# Patient Record
Sex: MALE | Race: WHITE | NOT HISPANIC OR LATINO | Employment: OTHER | URBAN - NONMETROPOLITAN AREA
[De-identification: names, ages, dates, MRNs, and addresses within clinical notes are randomized per-mention and may not be internally consistent; named-entity substitution may affect disease eponyms.]

---

## 2020-08-10 ENCOUNTER — TRANSCRIBE ORDERS (OUTPATIENT)
Dept: ADMINISTRATIVE | Facility: HOSPITAL | Age: 70
End: 2020-08-10

## 2020-08-10 DIAGNOSIS — Z01.818 PREOP EXAMINATION: Primary | ICD-10-CM

## 2022-05-29 ENCOUNTER — APPOINTMENT (OUTPATIENT)
Dept: CT IMAGING | Facility: HOSPITAL | Age: 72
End: 2022-05-29

## 2022-05-29 ENCOUNTER — HOSPITAL ENCOUNTER (EMERGENCY)
Facility: HOSPITAL | Age: 72
Discharge: HOME | End: 2022-05-29
Attending: STUDENT IN AN ORGANIZED HEALTH CARE EDUCATION/TRAINING PROGRAM | Admitting: EMERGENCY MEDICINE

## 2022-05-29 VITALS
TEMPERATURE: 98.2 F | DIASTOLIC BLOOD PRESSURE: 79 MMHG | WEIGHT: 250 LBS | RESPIRATION RATE: 18 BRPM | SYSTOLIC BLOOD PRESSURE: 155 MMHG | OXYGEN SATURATION: 94 % | HEART RATE: 102 BPM | HEIGHT: 71 IN | BODY MASS INDEX: 35 KG/M2

## 2022-05-29 DIAGNOSIS — R11.2 NAUSEA AND VOMITING, UNSPECIFIED VOMITING TYPE: Primary | ICD-10-CM

## 2022-05-29 LAB
ALBUMIN SERPL-MCNC: 3.97 G/DL (ref 3.5–5.2)
ALBUMIN/GLOB SERPL: 1.2 G/DL
ALP SERPL-CCNC: 57 U/L (ref 39–117)
ALT SERPL W P-5'-P-CCNC: 15 U/L (ref 1–41)
AMYLASE SERPL-CCNC: 51 U/L (ref 28–100)
ANION GAP SERPL CALCULATED.3IONS-SCNC: 18.7 MMOL/L (ref 5–15)
AST SERPL-CCNC: 27 U/L (ref 1–40)
BACTERIA UR QL AUTO: ABNORMAL /HPF
BASOPHILS # BLD AUTO: 0.04 10*3/MM3 (ref 0–0.2)
BASOPHILS NFR BLD AUTO: 0.3 % (ref 0–1.5)
BILIRUB SERPL-MCNC: 0.3 MG/DL (ref 0–1.2)
BILIRUB UR QL STRIP: NEGATIVE
BUN SERPL-MCNC: 44 MG/DL (ref 8–23)
BUN/CREAT SERPL: 10.5 (ref 7–25)
CALCIUM SPEC-SCNC: 9.1 MG/DL (ref 8.6–10.5)
CHLORIDE SERPL-SCNC: 103 MMOL/L (ref 98–107)
CLARITY UR: CLEAR
CO2 SERPL-SCNC: 19.3 MMOL/L (ref 22–29)
COLOR UR: YELLOW
CREAT SERPL-MCNC: 4.18 MG/DL (ref 0.76–1.27)
CRP SERPL-MCNC: 1.7 MG/DL (ref 0–0.5)
D-LACTATE SERPL-SCNC: 1.2 MMOL/L (ref 0.5–2)
DEPRECATED RDW RBC AUTO: 44.8 FL (ref 37–54)
EGFRCR SERPLBLD CKD-EPI 2021: 14.3 ML/MIN/1.73
EOSINOPHIL # BLD AUTO: 0.12 10*3/MM3 (ref 0–0.4)
EOSINOPHIL NFR BLD AUTO: 1 % (ref 0.3–6.2)
ERYTHROCYTE [DISTWIDTH] IN BLOOD BY AUTOMATED COUNT: 14.9 % (ref 12.3–15.4)
FLUAV RNA RESP QL NAA+PROBE: NOT DETECTED
FLUBV RNA RESP QL NAA+PROBE: NOT DETECTED
GLOBULIN UR ELPH-MCNC: 3.2 GM/DL
GLUCOSE BLDC GLUCOMTR-MCNC: 149 MG/DL (ref 70–130)
GLUCOSE SERPL-MCNC: 158 MG/DL (ref 65–99)
GLUCOSE UR STRIP-MCNC: ABNORMAL MG/DL
HCT VFR BLD AUTO: 34 % (ref 37.5–51)
HGB BLD-MCNC: 10.8 G/DL (ref 13–17.7)
HGB UR QL STRIP.AUTO: NEGATIVE
HOLD SPECIMEN: NORMAL
HOLD SPECIMEN: NORMAL
HYALINE CASTS UR QL AUTO: ABNORMAL /LPF
IMM GRANULOCYTES # BLD AUTO: 0.05 10*3/MM3 (ref 0–0.05)
IMM GRANULOCYTES NFR BLD AUTO: 0.4 % (ref 0–0.5)
KETONES UR QL STRIP: NEGATIVE
LEUKOCYTE ESTERASE UR QL STRIP.AUTO: NEGATIVE
LIPASE SERPL-CCNC: 24 U/L (ref 13–60)
LYMPHOCYTES # BLD AUTO: 0.84 10*3/MM3 (ref 0.7–3.1)
LYMPHOCYTES NFR BLD AUTO: 7 % (ref 19.6–45.3)
MAGNESIUM SERPL-MCNC: 1.7 MG/DL (ref 1.6–2.4)
MCH RBC QN AUTO: 26.5 PG (ref 26.6–33)
MCHC RBC AUTO-ENTMCNC: 31.8 G/DL (ref 31.5–35.7)
MCV RBC AUTO: 83.5 FL (ref 79–97)
MONOCYTES # BLD AUTO: 0.78 10*3/MM3 (ref 0.1–0.9)
MONOCYTES NFR BLD AUTO: 6.5 % (ref 5–12)
NEUTROPHILS NFR BLD AUTO: 10.24 10*3/MM3 (ref 1.7–7)
NEUTROPHILS NFR BLD AUTO: 84.8 % (ref 42.7–76)
NITRITE UR QL STRIP: NEGATIVE
NRBC BLD AUTO-RTO: 0 /100 WBC (ref 0–0.2)
PH UR STRIP.AUTO: <=5 [PH] (ref 5–8)
PLATELET # BLD AUTO: 221 10*3/MM3 (ref 140–450)
PMV BLD AUTO: 10.7 FL (ref 6–12)
POTASSIUM SERPL-SCNC: 4.9 MMOL/L (ref 3.5–5.2)
PROCALCITONIN SERPL-MCNC: 0.17 NG/ML (ref 0–0.25)
PROT SERPL-MCNC: 7.2 G/DL (ref 6–8.5)
PROT UR QL STRIP: ABNORMAL
QT INTERVAL: 362 MS
QTC INTERVAL: 466 MS
RBC # BLD AUTO: 4.07 10*6/MM3 (ref 4.14–5.8)
RBC # UR STRIP: ABNORMAL /HPF
REF LAB TEST METHOD: ABNORMAL
SARS-COV-2 RNA RESP QL NAA+PROBE: NOT DETECTED
SODIUM SERPL-SCNC: 141 MMOL/L (ref 136–145)
SP GR UR STRIP: 1.01 (ref 1–1.03)
SQUAMOUS #/AREA URNS HPF: ABNORMAL /HPF
T4 FREE SERPL-MCNC: 1.43 NG/DL (ref 0.93–1.7)
TROPONIN T SERPL-MCNC: 0.09 NG/ML (ref 0–0.03)
TROPONIN T SERPL-MCNC: 0.09 NG/ML (ref 0–0.03)
TSH SERPL DL<=0.05 MIU/L-ACNC: 1.54 UIU/ML (ref 0.27–4.2)
UROBILINOGEN UR QL STRIP: ABNORMAL
WBC # UR STRIP: ABNORMAL /HPF
WBC NRBC COR # BLD: 12.07 10*3/MM3 (ref 3.4–10.8)
WHOLE BLOOD HOLD COAG: NORMAL
WHOLE BLOOD HOLD SPECIMEN: NORMAL

## 2022-05-29 PROCEDURE — 83605 ASSAY OF LACTIC ACID: CPT | Performed by: PHYSICIAN ASSISTANT

## 2022-05-29 PROCEDURE — 87636 SARSCOV2 & INF A&B AMP PRB: CPT | Performed by: PHYSICIAN ASSISTANT

## 2022-05-29 PROCEDURE — 82962 GLUCOSE BLOOD TEST: CPT

## 2022-05-29 PROCEDURE — 85025 COMPLETE CBC W/AUTO DIFF WBC: CPT | Performed by: PHYSICIAN ASSISTANT

## 2022-05-29 PROCEDURE — 80053 COMPREHEN METABOLIC PANEL: CPT | Performed by: PHYSICIAN ASSISTANT

## 2022-05-29 PROCEDURE — 84145 PROCALCITONIN (PCT): CPT | Performed by: PHYSICIAN ASSISTANT

## 2022-05-29 PROCEDURE — 83735 ASSAY OF MAGNESIUM: CPT | Performed by: PHYSICIAN ASSISTANT

## 2022-05-29 PROCEDURE — 83690 ASSAY OF LIPASE: CPT | Performed by: PHYSICIAN ASSISTANT

## 2022-05-29 PROCEDURE — 93010 ELECTROCARDIOGRAM REPORT: CPT | Performed by: INTERNAL MEDICINE

## 2022-05-29 PROCEDURE — 84439 ASSAY OF FREE THYROXINE: CPT | Performed by: PHYSICIAN ASSISTANT

## 2022-05-29 PROCEDURE — 82150 ASSAY OF AMYLASE: CPT | Performed by: PHYSICIAN ASSISTANT

## 2022-05-29 PROCEDURE — 84484 ASSAY OF TROPONIN QUANT: CPT | Performed by: PHYSICIAN ASSISTANT

## 2022-05-29 PROCEDURE — 96374 THER/PROPH/DIAG INJ IV PUSH: CPT

## 2022-05-29 PROCEDURE — 86140 C-REACTIVE PROTEIN: CPT | Performed by: PHYSICIAN ASSISTANT

## 2022-05-29 PROCEDURE — 93005 ELECTROCARDIOGRAM TRACING: CPT | Performed by: PHYSICIAN ASSISTANT

## 2022-05-29 PROCEDURE — 74176 CT ABD & PELVIS W/O CONTRAST: CPT

## 2022-05-29 PROCEDURE — 99283 EMERGENCY DEPT VISIT LOW MDM: CPT

## 2022-05-29 PROCEDURE — 81001 URINALYSIS AUTO W/SCOPE: CPT | Performed by: PHYSICIAN ASSISTANT

## 2022-05-29 PROCEDURE — 87040 BLOOD CULTURE FOR BACTERIA: CPT | Performed by: PHYSICIAN ASSISTANT

## 2022-05-29 PROCEDURE — 84443 ASSAY THYROID STIM HORMONE: CPT | Performed by: PHYSICIAN ASSISTANT

## 2022-05-29 PROCEDURE — 25010000002 ONDANSETRON PER 1 MG: Performed by: PHYSICIAN ASSISTANT

## 2022-05-29 RX ORDER — ATORVASTATIN CALCIUM 20 MG/1
20 TABLET, FILM COATED ORAL NIGHTLY
COMMUNITY

## 2022-05-29 RX ORDER — ONDANSETRON 4 MG/1
4 TABLET, ORALLY DISINTEGRATING ORAL EVERY 6 HOURS PRN
Qty: 10 TABLET | Refills: 0 | Status: SHIPPED | OUTPATIENT
Start: 2022-05-29

## 2022-05-29 RX ORDER — METOPROLOL SUCCINATE 25 MG/1
12.5 TABLET, EXTENDED RELEASE ORAL DAILY
COMMUNITY

## 2022-05-29 RX ORDER — OMEPRAZOLE 20 MG/1
20 CAPSULE, DELAYED RELEASE ORAL DAILY
COMMUNITY

## 2022-05-29 RX ORDER — DOXAZOSIN MESYLATE 4 MG/1
4 TABLET ORAL NIGHTLY
COMMUNITY

## 2022-05-29 RX ORDER — ONDANSETRON 2 MG/ML
4 INJECTION INTRAMUSCULAR; INTRAVENOUS ONCE
Status: COMPLETED | OUTPATIENT
Start: 2022-05-29 | End: 2022-05-29

## 2022-05-29 RX ORDER — HYDRALAZINE HYDROCHLORIDE 50 MG/1
100 TABLET, FILM COATED ORAL 3 TIMES DAILY
COMMUNITY

## 2022-05-29 RX ORDER — FENOFIBRATE 54 MG/1
54 TABLET ORAL DAILY
COMMUNITY

## 2022-05-29 RX ORDER — SODIUM CHLORIDE 0.9 % (FLUSH) 0.9 %
10 SYRINGE (ML) INJECTION AS NEEDED
Status: DISCONTINUED | OUTPATIENT
Start: 2022-05-29 | End: 2022-05-29 | Stop reason: HOSPADM

## 2022-05-29 RX ORDER — CLONIDINE HYDROCHLORIDE 0.2 MG/1
0.2 TABLET ORAL 3 TIMES DAILY
COMMUNITY

## 2022-05-29 RX ORDER — FUROSEMIDE 40 MG/1
40 TABLET ORAL 2 TIMES DAILY
COMMUNITY

## 2022-05-29 RX ORDER — LEVOTHYROXINE SODIUM 0.07 MG/1
75 TABLET ORAL DAILY
COMMUNITY

## 2022-05-29 RX ORDER — GLIPIZIDE 10 MG/1
10 TABLET ORAL
COMMUNITY

## 2022-05-29 RX ADMIN — ONDANSETRON 4 MG: 2 INJECTION INTRAMUSCULAR; INTRAVENOUS at 17:22

## 2022-05-29 RX ADMIN — SODIUM CHLORIDE 500 ML: 9 INJECTION, SOLUTION INTRAVENOUS at 18:31

## 2022-06-03 LAB
BACTERIA SPEC AEROBE CULT: NORMAL
BACTERIA SPEC AEROBE CULT: NORMAL

## 2023-03-23 ENCOUNTER — LAB REQUISITION (OUTPATIENT)
Dept: LAB | Facility: HOSPITAL | Age: 73
End: 2023-03-23
Payer: MEDICARE

## 2023-03-23 DIAGNOSIS — I10 ESSENTIAL (PRIMARY) HYPERTENSION: ICD-10-CM

## 2023-03-23 LAB
ALBUMIN SERPL-MCNC: 3.7 G/DL (ref 3.5–5.2)
ALBUMIN/GLOB SERPL: 1.4 G/DL
ALP SERPL-CCNC: 104 U/L (ref 39–117)
ALT SERPL W P-5'-P-CCNC: 6 U/L (ref 1–41)
ANION GAP SERPL CALCULATED.3IONS-SCNC: 10.2 MMOL/L (ref 5–15)
AST SERPL-CCNC: 11 U/L (ref 1–40)
BASOPHILS # BLD AUTO: 0.05 10*3/MM3 (ref 0–0.2)
BASOPHILS NFR BLD AUTO: 0.5 % (ref 0–1.5)
BILIRUB SERPL-MCNC: 0.5 MG/DL (ref 0–1.2)
BUN SERPL-MCNC: 29 MG/DL (ref 8–23)
BUN/CREAT SERPL: 6.7 (ref 7–25)
CALCIUM SPEC-SCNC: 9.7 MG/DL (ref 8.6–10.5)
CHLORIDE SERPL-SCNC: 101 MMOL/L (ref 98–107)
CHOLEST SERPL-MCNC: 71 MG/DL (ref 0–200)
CO2 SERPL-SCNC: 27.8 MMOL/L (ref 22–29)
CREAT SERPL-MCNC: 4.3 MG/DL (ref 0.76–1.27)
DEPRECATED RDW RBC AUTO: 49.8 FL (ref 37–54)
EGFRCR SERPLBLD CKD-EPI 2021: 13.7 ML/MIN/1.73
EOSINOPHIL # BLD AUTO: 0.17 10*3/MM3 (ref 0–0.4)
EOSINOPHIL NFR BLD AUTO: 1.6 % (ref 0.3–6.2)
ERYTHROCYTE [DISTWIDTH] IN BLOOD BY AUTOMATED COUNT: 17.2 % (ref 12.3–15.4)
GLOBULIN UR ELPH-MCNC: 2.6 GM/DL
GLUCOSE SERPL-MCNC: 172 MG/DL (ref 65–99)
HBA1C MFR BLD: 6.9 % (ref 4.8–5.6)
HCT VFR BLD AUTO: 31.5 % (ref 37.5–51)
HDLC SERPL-MCNC: 32 MG/DL (ref 40–60)
HGB BLD-MCNC: 9.8 G/DL (ref 13–17.7)
IMM GRANULOCYTES # BLD AUTO: 0.04 10*3/MM3 (ref 0–0.05)
IMM GRANULOCYTES NFR BLD AUTO: 0.4 % (ref 0–0.5)
LDLC SERPL CALC-MCNC: 18 MG/DL (ref 0–100)
LDLC/HDLC SERPL: 0.5 {RATIO}
LYMPHOCYTES # BLD AUTO: 0.8 10*3/MM3 (ref 0.7–3.1)
LYMPHOCYTES NFR BLD AUTO: 7.7 % (ref 19.6–45.3)
MCH RBC QN AUTO: 25.3 PG (ref 26.6–33)
MCHC RBC AUTO-ENTMCNC: 31.1 G/DL (ref 31.5–35.7)
MCV RBC AUTO: 81.4 FL (ref 79–97)
MONOCYTES # BLD AUTO: 1.08 10*3/MM3 (ref 0.1–0.9)
MONOCYTES NFR BLD AUTO: 10.4 % (ref 5–12)
NEUTROPHILS NFR BLD AUTO: 79.4 % (ref 42.7–76)
NEUTROPHILS NFR BLD AUTO: 8.25 10*3/MM3 (ref 1.7–7)
NRBC BLD AUTO-RTO: 0 /100 WBC (ref 0–0.2)
PLATELET # BLD AUTO: 170 10*3/MM3 (ref 140–450)
PMV BLD AUTO: 11.6 FL (ref 6–12)
POTASSIUM SERPL-SCNC: 3.7 MMOL/L (ref 3.5–5.2)
PROT SERPL-MCNC: 6.3 G/DL (ref 6–8.5)
RBC # BLD AUTO: 3.87 10*6/MM3 (ref 4.14–5.8)
SODIUM SERPL-SCNC: 139 MMOL/L (ref 136–145)
TRIGL SERPL-MCNC: 115 MG/DL (ref 0–150)
TSH SERPL DL<=0.05 MIU/L-ACNC: 2.47 UIU/ML (ref 0.27–4.2)
VLDLC SERPL-MCNC: 21 MG/DL (ref 5–40)
WBC NRBC COR # BLD: 10.39 10*3/MM3 (ref 3.4–10.8)

## 2023-03-23 PROCEDURE — 84443 ASSAY THYROID STIM HORMONE: CPT | Performed by: INTERNAL MEDICINE

## 2023-03-23 PROCEDURE — 80053 COMPREHEN METABOLIC PANEL: CPT | Performed by: INTERNAL MEDICINE

## 2023-03-23 PROCEDURE — 80061 LIPID PANEL: CPT | Performed by: INTERNAL MEDICINE

## 2023-03-23 PROCEDURE — 83036 HEMOGLOBIN GLYCOSYLATED A1C: CPT | Performed by: INTERNAL MEDICINE

## 2023-03-23 PROCEDURE — 85025 COMPLETE CBC W/AUTO DIFF WBC: CPT | Performed by: INTERNAL MEDICINE

## 2023-03-29 ENCOUNTER — LAB REQUISITION (OUTPATIENT)
Dept: LAB | Facility: HOSPITAL | Age: 73
End: 2023-03-29
Payer: MEDICARE

## 2023-03-29 DIAGNOSIS — I10 ESSENTIAL (PRIMARY) HYPERTENSION: ICD-10-CM

## 2023-03-29 LAB
CHOLEST SERPL-MCNC: 59 MG/DL (ref 0–200)
FERRITIN SERPL-MCNC: 329.8 NG/ML (ref 30–400)
HDLC SERPL-MCNC: 27 MG/DL (ref 40–60)
IRON 24H UR-MRATE: 44 MCG/DL (ref 59–158)
IRON SATN MFR SERPL: 17 % (ref 20–50)
LDL/HDL RATIO NULL: ABNORMAL
LDLC SERPL CALC-MCNC: <5 MG/DL (ref 0–100)
TIBC SERPL-MCNC: 253 MCG/DL (ref 298–536)
TRANSFERRIN SERPL-MCNC: 170 MG/DL (ref 200–360)
TRIGL SERPL-MCNC: 173 MG/DL (ref 0–150)
VLDLC SERPL-MCNC: ABNORMAL MG/DL

## 2023-03-29 PROCEDURE — 83540 ASSAY OF IRON: CPT | Performed by: INTERNAL MEDICINE

## 2023-03-29 PROCEDURE — 80061 LIPID PANEL: CPT | Performed by: INTERNAL MEDICINE

## 2023-03-29 PROCEDURE — 82728 ASSAY OF FERRITIN: CPT | Performed by: INTERNAL MEDICINE

## 2023-03-29 PROCEDURE — 84466 ASSAY OF TRANSFERRIN: CPT | Performed by: INTERNAL MEDICINE

## 2023-05-04 ENCOUNTER — LAB REQUISITION (OUTPATIENT)
Dept: LAB | Facility: HOSPITAL | Age: 73
End: 2023-05-04
Payer: MEDICARE

## 2023-05-04 DIAGNOSIS — I10 ESSENTIAL (PRIMARY) HYPERTENSION: ICD-10-CM

## 2023-05-04 LAB
027 TOXIN: ABNORMAL
C DIFF GDH + TOXINS A+B STL QL IA.RAPID: NEGATIVE
C DIFF TOX GENS STL QL NAA+PROBE: POSITIVE

## 2023-05-04 PROCEDURE — 87449 NOS EACH ORGANISM AG IA: CPT | Performed by: INTERNAL MEDICINE

## 2023-05-04 PROCEDURE — 87427 SHIGA-LIKE TOXIN AG IA: CPT | Performed by: INTERNAL MEDICINE

## 2023-05-04 PROCEDURE — 87493 C DIFF AMPLIFIED PROBE: CPT | Performed by: INTERNAL MEDICINE

## 2023-05-04 PROCEDURE — 87046 STOOL CULTR AEROBIC BACT EA: CPT | Performed by: INTERNAL MEDICINE

## 2023-05-04 PROCEDURE — 87045 FECES CULTURE AEROBIC BACT: CPT | Performed by: INTERNAL MEDICINE

## 2023-05-08 LAB
BACTERIA SPEC CULT: NORMAL
BACTERIA SPEC CULT: NORMAL
CAMPYLOBACTER STL CULT: NORMAL
E COLI SXT STL QL IA: NEGATIVE
SALM + SHIG STL CULT: NORMAL

## 2023-05-15 ENCOUNTER — LAB REQUISITION (OUTPATIENT)
Dept: LAB | Facility: HOSPITAL | Age: 73
End: 2023-05-15
Payer: MEDICARE

## 2023-05-15 DIAGNOSIS — R79.9 ABNORMAL FINDING OF BLOOD CHEMISTRY, UNSPECIFIED: ICD-10-CM

## 2023-05-15 PROCEDURE — 87186 SC STD MICRODIL/AGAR DIL: CPT | Performed by: NURSE PRACTITIONER

## 2023-05-15 PROCEDURE — 87205 SMEAR GRAM STAIN: CPT | Performed by: NURSE PRACTITIONER

## 2023-05-15 PROCEDURE — 87070 CULTURE OTHR SPECIMN AEROBIC: CPT | Performed by: NURSE PRACTITIONER

## 2023-05-15 PROCEDURE — 87147 CULTURE TYPE IMMUNOLOGIC: CPT | Performed by: NURSE PRACTITIONER

## 2023-05-18 LAB
BACTERIA SPEC AEROBE CULT: ABNORMAL
BACTERIA SPEC AEROBE CULT: ABNORMAL
GRAM STN SPEC: ABNORMAL

## 2023-05-22 ENCOUNTER — OFFICE VISIT (OUTPATIENT)
Dept: SURGERY | Facility: CLINIC | Age: 73
End: 2023-05-22
Payer: MEDICARE

## 2023-05-22 DIAGNOSIS — L98.9 SCALP LESION: Primary | ICD-10-CM

## 2023-05-22 DIAGNOSIS — L97.411 SKIN ULCER OF RIGHT HEEL, LIMITED TO BREAKDOWN OF SKIN: ICD-10-CM

## 2023-05-22 RX ORDER — SULFAMETHOXAZOLE AND TRIMETHOPRIM 800; 160 MG/1; MG/1
1 TABLET ORAL 2 TIMES DAILY
COMMUNITY

## 2023-05-22 RX ORDER — ACETAMINOPHEN 500 MG
500 TABLET ORAL EVERY 6 HOURS PRN
COMMUNITY

## 2023-05-22 RX ORDER — INSULIN LISPRO 100 [IU]/ML
INJECTION, SOLUTION INTRAVENOUS; SUBCUTANEOUS
COMMUNITY

## 2023-05-22 NOTE — PROGRESS NOTES
Subjective   Kennedy Prescott is a 72 y.o. male.     Chief Complaint: heel ulcer and scalp lesion    History of Present Illness He is a 73 yo who has had a slowly growing lesion on his right anterior scalp for a year. He also has some skin issues on his right foot. He is weak and is mostly in a wheelchair during the day.     The following portions of the patient's history were reviewed and updated as appropriate: current medications, past family history, past medical history, past social history, past surgical history and problem list.    Review of Systems    Objective   Physical Exam He has a raised 2.5 x 1.5 cm lesion on the right anterior scalp excised with lidocaine and nylon sutures.   The heel has some callous and superficial breakdown. There is a small callous on the medial MP joint.     Past Medical History:   Diagnosis Date   • Diabetes mellitus    • Disease of thyroid gland    • Myocardial infarction    • Renal disorder        History reviewed. No pertinent family history.    Social History     Tobacco Use   • Smoking status: Never   • Smokeless tobacco: Never   Substance Use Topics   • Alcohol use: Never   • Drug use: Never       Past Surgical History:   Procedure Laterality Date   • CARDIAC SURGERY     • CHOLECYSTECTOMY         Current Outpatient Medications   Medication Instructions   • acetaminophen (TYLENOL) 500 mg, Oral, Every 6 Hours PRN   • atorvastatin (LIPITOR) 20 mg, Oral, Nightly   • cloNIDine (CATAPRES) 0.2 mg, Oral, 3 Times Daily   • doxazosin (CARDURA) 4 mg, Oral, Nightly   • fenofibrate (TRICOR) 54 mg, Oral, Daily   • furosemide (LASIX) 40 mg, Oral, 2 Times Daily   • glipizide (GLUCOTROL) 10 mg, Oral, 2 Times Daily Before Meals   • hydrALAZINE (APRESOLINE) 100 mg, Oral, 3 Times Daily   • Insulin Lispro (humaLOG) 100 UNIT/ML injection Subcutaneous, 3 Times Daily Before Meals   • levothyroxine (SYNTHROID, LEVOTHROID) 75 mcg, Oral, Daily   • metoprolol succinate XL (TOPROL-XL) 12.5 mg, Oral,  Daily   • omeprazole (PRILOSEC) 20 mg, Oral, Daily   • ondansetron ODT (ZOFRAN-ODT) 4 mg, Translingual, Every 6 Hours PRN   • SITagliptin (JANUVIA) 100 mg, Oral, Daily   • sulfamethoxazole-trimethoprim (BACTRIM DS,SEPTRA DS) 800-160 MG per tablet 1 tablet, Oral, 2 Times Daily         Assessment & Plan   Diagnoses and all orders for this visit:    1. Scalp lesion (Primary)    2. Skin ulcer of right heel, limited to breakdown of skin    remove sutures in a little over a week.   Avoid keeping feet in one place too long. Try to move them around more often, as these are from not not moving around his feet enough while sitting in the wheelchair.

## 2023-05-23 ENCOUNTER — LAB REQUISITION (OUTPATIENT)
Dept: LAB | Facility: HOSPITAL | Age: 73
End: 2023-05-23
Payer: MEDICARE

## 2023-05-23 ENCOUNTER — APPOINTMENT (OUTPATIENT)
Dept: CT IMAGING | Facility: HOSPITAL | Age: 73
End: 2023-05-23
Payer: MEDICARE

## 2023-05-23 ENCOUNTER — HOSPITAL ENCOUNTER (EMERGENCY)
Facility: HOSPITAL | Age: 73
Discharge: HOME | End: 2023-05-24
Attending: EMERGENCY MEDICINE | Admitting: EMERGENCY MEDICINE
Payer: MEDICARE

## 2023-05-23 VITALS
HEIGHT: 71 IN | TEMPERATURE: 98.3 F | DIASTOLIC BLOOD PRESSURE: 60 MMHG | HEART RATE: 66 BPM | WEIGHT: 210 LBS | OXYGEN SATURATION: 90 % | BODY MASS INDEX: 29.4 KG/M2 | SYSTOLIC BLOOD PRESSURE: 114 MMHG | RESPIRATION RATE: 18 BRPM

## 2023-05-23 DIAGNOSIS — I10 ESSENTIAL (PRIMARY) HYPERTENSION: ICD-10-CM

## 2023-05-23 DIAGNOSIS — N17.9 AKI (ACUTE KIDNEY INJURY): Primary | ICD-10-CM

## 2023-05-23 DIAGNOSIS — E87.5 HYPERKALEMIA: ICD-10-CM

## 2023-05-23 LAB
A-A DO2: 38.3 MMHG (ref 0–300)
ALBUMIN SERPL-MCNC: 3.6 G/DL (ref 3.5–5.2)
ALBUMIN SERPL-MCNC: 3.7 G/DL (ref 3.5–5.2)
ALBUMIN/GLOB SERPL: 1.3 G/DL
ALBUMIN/GLOB SERPL: 1.4 G/DL
ALP SERPL-CCNC: 129 U/L (ref 39–117)
ALP SERPL-CCNC: 132 U/L (ref 39–117)
ALT SERPL W P-5'-P-CCNC: 17 U/L (ref 1–41)
ALT SERPL W P-5'-P-CCNC: 17 U/L (ref 1–41)
ANION GAP SERPL CALCULATED.3IONS-SCNC: 14.5 MMOL/L (ref 5–15)
ANION GAP SERPL CALCULATED.3IONS-SCNC: 16.7 MMOL/L (ref 5–15)
ANION GAP SERPL CALCULATED.3IONS-SCNC: 17.6 MMOL/L (ref 5–15)
ARTERIAL PATENCY WRIST A: ABNORMAL
AST SERPL-CCNC: 17 U/L (ref 1–40)
AST SERPL-CCNC: 17 U/L (ref 1–40)
ATMOSPHERIC PRESS: 730 MMHG
BASE EXCESS BLDA CALC-SCNC: -5.2 MMOL/L (ref 0–2)
BASOPHILS # BLD AUTO: 0.03 10*3/MM3 (ref 0–0.2)
BASOPHILS # BLD AUTO: 0.04 10*3/MM3 (ref 0–0.2)
BASOPHILS NFR BLD AUTO: 0.3 % (ref 0–1.5)
BASOPHILS NFR BLD AUTO: 0.3 % (ref 0–1.5)
BDY SITE: ABNORMAL
BILIRUB SERPL-MCNC: 0.3 MG/DL (ref 0–1.2)
BILIRUB SERPL-MCNC: 0.3 MG/DL (ref 0–1.2)
BUN SERPL-MCNC: 50 MG/DL (ref 8–23)
BUN SERPL-MCNC: 52 MG/DL (ref 8–23)
BUN SERPL-MCNC: 52 MG/DL (ref 8–23)
BUN/CREAT SERPL: 7.8 (ref 7–25)
BUN/CREAT SERPL: 8 (ref 7–25)
BUN/CREAT SERPL: 8 (ref 7–25)
CALCIUM SPEC-SCNC: 8.7 MG/DL (ref 8.6–10.5)
CALCIUM SPEC-SCNC: 8.9 MG/DL (ref 8.6–10.5)
CALCIUM SPEC-SCNC: 9.2 MG/DL (ref 8.6–10.5)
CHLORIDE SERPL-SCNC: 91 MMOL/L (ref 98–107)
CHLORIDE SERPL-SCNC: 91 MMOL/L (ref 98–107)
CHLORIDE SERPL-SCNC: 95 MMOL/L (ref 98–107)
CO2 BLDA-SCNC: 21.4 MMOL/L (ref 22–33)
CO2 SERPL-SCNC: 21.4 MMOL/L (ref 22–29)
CO2 SERPL-SCNC: 22.3 MMOL/L (ref 22–29)
CO2 SERPL-SCNC: 23.5 MMOL/L (ref 22–29)
COHGB MFR BLD: 1.5 % (ref 0–5)
CREAT SERPL-MCNC: 6.4 MG/DL (ref 0.76–1.27)
CREAT SERPL-MCNC: 6.5 MG/DL (ref 0.76–1.27)
CREAT SERPL-MCNC: 6.54 MG/DL (ref 0.76–1.27)
DEPRECATED RDW RBC AUTO: 58 FL (ref 37–54)
DEPRECATED RDW RBC AUTO: 58.1 FL (ref 37–54)
EGFRCR SERPLBLD CKD-EPI 2021: 8.3 ML/MIN/1.73
EGFRCR SERPLBLD CKD-EPI 2021: 8.4 ML/MIN/1.73
EGFRCR SERPLBLD CKD-EPI 2021: 8.5 ML/MIN/1.73
EOSINOPHIL # BLD AUTO: 0.1 10*3/MM3 (ref 0–0.4)
EOSINOPHIL # BLD AUTO: 0.12 10*3/MM3 (ref 0–0.4)
EOSINOPHIL NFR BLD AUTO: 0.8 % (ref 0.3–6.2)
EOSINOPHIL NFR BLD AUTO: 1 % (ref 0.3–6.2)
ERYTHROCYTE [DISTWIDTH] IN BLOOD BY AUTOMATED COUNT: 17.8 % (ref 12.3–15.4)
ERYTHROCYTE [DISTWIDTH] IN BLOOD BY AUTOMATED COUNT: 17.9 % (ref 12.3–15.4)
FLUAV RNA RESP QL NAA+PROBE: NOT DETECTED
FLUBV RNA RESP QL NAA+PROBE: NOT DETECTED
GLOBULIN UR ELPH-MCNC: 2.6 GM/DL
GLOBULIN UR ELPH-MCNC: 2.8 GM/DL
GLUCOSE SERPL-MCNC: 177 MG/DL (ref 65–99)
GLUCOSE SERPL-MCNC: 192 MG/DL (ref 65–99)
GLUCOSE SERPL-MCNC: 193 MG/DL (ref 65–99)
HCO3 BLDA-SCNC: 20.2 MMOL/L (ref 20–26)
HCT VFR BLD AUTO: 34.8 % (ref 37.5–51)
HCT VFR BLD AUTO: 36.2 % (ref 37.5–51)
HCT VFR BLD CALC: 33.9 % (ref 38–51)
HGB BLD-MCNC: 10.9 G/DL (ref 13–17.7)
HGB BLD-MCNC: 11.1 G/DL (ref 13–17.7)
HGB BLDA-MCNC: 11.1 G/DL (ref 14–18)
IMM GRANULOCYTES # BLD AUTO: 0.05 10*3/MM3 (ref 0–0.05)
IMM GRANULOCYTES # BLD AUTO: 0.06 10*3/MM3 (ref 0–0.05)
IMM GRANULOCYTES NFR BLD AUTO: 0.4 % (ref 0–0.5)
IMM GRANULOCYTES NFR BLD AUTO: 0.5 % (ref 0–0.5)
INHALED O2 CONCENTRATION: 21 %
LYMPHOCYTES # BLD AUTO: 0.46 10*3/MM3 (ref 0.7–3.1)
LYMPHOCYTES # BLD AUTO: 0.68 10*3/MM3 (ref 0.7–3.1)
LYMPHOCYTES NFR BLD AUTO: 3.8 % (ref 19.6–45.3)
LYMPHOCYTES NFR BLD AUTO: 5.6 % (ref 19.6–45.3)
Lab: ABNORMAL
MCH RBC QN AUTO: 27.1 PG (ref 26.6–33)
MCH RBC QN AUTO: 27.6 PG (ref 26.6–33)
MCHC RBC AUTO-ENTMCNC: 30.7 G/DL (ref 31.5–35.7)
MCHC RBC AUTO-ENTMCNC: 31.3 G/DL (ref 31.5–35.7)
MCV RBC AUTO: 88.1 FL (ref 79–97)
MCV RBC AUTO: 88.3 FL (ref 79–97)
METHGB BLD QL: 0.1 % (ref 0–3)
MODALITY: ABNORMAL
MONOCYTES # BLD AUTO: 1.23 10*3/MM3 (ref 0.1–0.9)
MONOCYTES # BLD AUTO: 1.26 10*3/MM3 (ref 0.1–0.9)
MONOCYTES NFR BLD AUTO: 10.3 % (ref 5–12)
MONOCYTES NFR BLD AUTO: 10.4 % (ref 5–12)
NEUTROPHILS NFR BLD AUTO: 10.13 10*3/MM3 (ref 1.7–7)
NEUTROPHILS NFR BLD AUTO: 82.2 % (ref 42.7–76)
NEUTROPHILS NFR BLD AUTO: 84.4 % (ref 42.7–76)
NEUTROPHILS NFR BLD AUTO: 9.93 10*3/MM3 (ref 1.7–7)
NOTE: ABNORMAL
NOTIFIED BY: ABNORMAL
NOTIFIED WHO: ABNORMAL
NRBC BLD AUTO-RTO: 0 /100 WBC (ref 0–0.2)
NRBC BLD AUTO-RTO: 0 /100 WBC (ref 0–0.2)
OXYHGB MFR BLDV: 87.8 % (ref 94–99)
PCO2 BLDA: 38.1 MM HG (ref 35–45)
PCO2 TEMP ADJ BLD: ABNORMAL MM[HG]
PH BLDA: 7.33 PH UNITS (ref 7.35–7.45)
PH, TEMP CORRECTED: ABNORMAL
PLATELET # BLD AUTO: 130 10*3/MM3 (ref 140–450)
PLATELET # BLD AUTO: 132 10*3/MM3 (ref 140–450)
PMV BLD AUTO: 10.3 FL (ref 6–12)
PMV BLD AUTO: 10.9 FL (ref 6–12)
PO2 BLDA: 62.1 MM HG (ref 83–108)
PO2 TEMP ADJ BLD: ABNORMAL MM[HG]
POTASSIUM SERPL-SCNC: 6.1 MMOL/L (ref 3.5–5.2)
POTASSIUM SERPL-SCNC: 7 MMOL/L (ref 3.5–5.2)
POTASSIUM SERPL-SCNC: 7.1 MMOL/L (ref 3.5–5.2)
PROT SERPL-MCNC: 6.2 G/DL (ref 6–8.5)
PROT SERPL-MCNC: 6.5 G/DL (ref 6–8.5)
RBC # BLD AUTO: 3.95 10*6/MM3 (ref 4.14–5.8)
RBC # BLD AUTO: 4.1 10*6/MM3 (ref 4.14–5.8)
SAO2 % BLDCOA: 89.2 % (ref 94–99)
SARS-COV-2 RNA RESP QL NAA+PROBE: NOT DETECTED
SODIUM SERPL-SCNC: 129 MMOL/L (ref 136–145)
SODIUM SERPL-SCNC: 130 MMOL/L (ref 136–145)
SODIUM SERPL-SCNC: 134 MMOL/L (ref 136–145)
VENTILATOR MODE: ABNORMAL
WBC NRBC COR # BLD: 12 10*3/MM3 (ref 3.4–10.8)
WBC NRBC COR # BLD: 12.09 10*3/MM3 (ref 3.4–10.8)

## 2023-05-23 PROCEDURE — 25010000002 CALCIUM GLUCONATE-NACL 1-0.675 GM/50ML-% SOLUTION: Performed by: EMERGENCY MEDICINE

## 2023-05-23 PROCEDURE — 96376 TX/PRO/DX INJ SAME DRUG ADON: CPT

## 2023-05-23 PROCEDURE — 96375 TX/PRO/DX INJ NEW DRUG ADDON: CPT

## 2023-05-23 PROCEDURE — 85025 COMPLETE CBC W/AUTO DIFF WBC: CPT | Performed by: EMERGENCY MEDICINE

## 2023-05-23 PROCEDURE — 80053 COMPREHEN METABOLIC PANEL: CPT | Performed by: NURSE PRACTITIONER

## 2023-05-23 PROCEDURE — 94640 AIRWAY INHALATION TREATMENT: CPT

## 2023-05-23 PROCEDURE — 87636 SARSCOV2 & INF A&B AMP PRB: CPT | Performed by: EMERGENCY MEDICINE

## 2023-05-23 PROCEDURE — 74176 CT ABD & PELVIS W/O CONTRAST: CPT

## 2023-05-23 PROCEDURE — 82375 ASSAY CARBOXYHB QUANT: CPT

## 2023-05-23 PROCEDURE — 99284 EMERGENCY DEPT VISIT MOD MDM: CPT

## 2023-05-23 PROCEDURE — 36415 COLL VENOUS BLD VENIPUNCTURE: CPT

## 2023-05-23 PROCEDURE — 85025 COMPLETE CBC W/AUTO DIFF WBC: CPT | Performed by: NURSE PRACTITIONER

## 2023-05-23 PROCEDURE — 71250 CT THORAX DX C-: CPT

## 2023-05-23 PROCEDURE — 87040 BLOOD CULTURE FOR BACTERIA: CPT | Performed by: EMERGENCY MEDICINE

## 2023-05-23 PROCEDURE — 36600 WITHDRAWAL OF ARTERIAL BLOOD: CPT

## 2023-05-23 PROCEDURE — 94799 UNLISTED PULMONARY SVC/PX: CPT

## 2023-05-23 PROCEDURE — 82805 BLOOD GASES W/O2 SATURATION: CPT

## 2023-05-23 PROCEDURE — 83050 HGB METHEMOGLOBIN QUAN: CPT

## 2023-05-23 PROCEDURE — 63710000001 INSULIN REGULAR HUMAN PER 5 UNITS: Performed by: EMERGENCY MEDICINE

## 2023-05-23 RX ORDER — SODIUM POLYSTYRENE SULFONATE 4.1 MEQ/G
15 POWDER, FOR SUSPENSION ORAL; RECTAL ONCE
Status: COMPLETED | OUTPATIENT
Start: 2023-05-23 | End: 2023-05-23

## 2023-05-23 RX ORDER — MAGNESIUM SULFATE 1 G/100ML
1 INJECTION INTRAVENOUS ONCE
Status: COMPLETED | OUTPATIENT
Start: 2023-05-24 | End: 2023-05-24

## 2023-05-23 RX ORDER — SODIUM CHLORIDE 9 MG/ML
125 INJECTION, SOLUTION INTRAVENOUS CONTINUOUS
Status: DISCONTINUED | OUTPATIENT
Start: 2023-05-24 | End: 2023-05-24 | Stop reason: HOSPADM

## 2023-05-23 RX ORDER — DEXTROSE MONOHYDRATE 25 G/50ML
50 INJECTION, SOLUTION INTRAVENOUS ONCE
Status: COMPLETED | OUTPATIENT
Start: 2023-05-23 | End: 2023-05-23

## 2023-05-23 RX ORDER — CALCIUM GLUCONATE 20 MG/ML
1 INJECTION, SOLUTION INTRAVENOUS ONCE
Status: COMPLETED | OUTPATIENT
Start: 2023-05-24 | End: 2023-05-24

## 2023-05-23 RX ORDER — CALCIUM GLUCONATE 20 MG/ML
1 INJECTION, SOLUTION INTRAVENOUS ONCE
Status: COMPLETED | OUTPATIENT
Start: 2023-05-23 | End: 2023-05-23

## 2023-05-23 RX ADMIN — SODIUM POLYSTYRENE SULFONATE 15 G: 4.1 POWDER, FOR SUSPENSION ORAL; RECTAL at 21:15

## 2023-05-23 RX ADMIN — CALCIUM GLUCONATE 1 G: 20 INJECTION, SOLUTION INTRAVENOUS at 21:21

## 2023-05-23 RX ADMIN — ALBUTEROL SULFATE 5 MG: 2.5 SOLUTION RESPIRATORY (INHALATION) at 21:01

## 2023-05-23 RX ADMIN — SODIUM CHLORIDE 1000 ML: 9 INJECTION, SOLUTION INTRAVENOUS at 19:23

## 2023-05-23 RX ADMIN — SODIUM BICARBONATE 50 MEQ: 84 INJECTION INTRAVENOUS at 21:14

## 2023-05-23 RX ADMIN — INSULIN HUMAN 10 UNITS: 100 INJECTION, SOLUTION PARENTERAL at 21:19

## 2023-05-23 RX ADMIN — DEXTROSE MONOHYDRATE 50 ML: 25 INJECTION, SOLUTION INTRAVENOUS at 21:14

## 2023-05-24 PROCEDURE — 96365 THER/PROPH/DIAG IV INF INIT: CPT

## 2023-05-24 PROCEDURE — 25010000002 CALCIUM GLUCONATE-NACL 1-0.675 GM/50ML-% SOLUTION: Performed by: EMERGENCY MEDICINE

## 2023-05-24 PROCEDURE — 96367 TX/PROPH/DG ADDL SEQ IV INF: CPT

## 2023-05-24 PROCEDURE — 25010000002 MAGNESIUM SULFATE IN D5W 1G/100ML (PREMIX) 1-5 GM/100ML-% SOLUTION: Performed by: EMERGENCY MEDICINE

## 2023-05-24 RX ADMIN — CALCIUM GLUCONATE 1 G: 20 INJECTION, SOLUTION INTRAVENOUS at 00:28

## 2023-05-24 RX ADMIN — SODIUM CHLORIDE 125 ML/HR: 9 INJECTION, SOLUTION INTRAVENOUS at 00:45

## 2023-05-24 RX ADMIN — MAGNESIUM SULFATE HEPTAHYDRATE 1 G: 1 INJECTION, SOLUTION INTRAVENOUS at 00:45

## 2023-05-24 NOTE — ED NOTES
Called house supervisor for in house transport back to the [facility]. She will let the crew know.

## 2023-05-24 NOTE — ED PROVIDER NOTES
Subjective   History of Present Illness  Sent to Er with elevated K. Hx kidney disease    Weakness - Generalized  Severity:  Moderate  Onset quality:  Gradual  Timing:  Intermittent  Chronicity:  Recurrent  Context: dehydration    Relieved by:  Nothing  Worsened by:  Nothing  Ineffective treatments:  None tried      Review of Systems   Constitutional: Positive for activity change.   HENT: Negative.    Eyes: Negative.    Respiratory: Negative.    Cardiovascular: Negative.    Gastrointestinal: Negative.    Endocrine: Negative.    Musculoskeletal: Negative.    Allergic/Immunologic: Negative.    Hematological: Negative.    Psychiatric/Behavioral: Negative.        Past Medical History:   Diagnosis Date   • Diabetes mellitus    • Disease of thyroid gland    • Myocardial infarction    • Renal disorder        No Known Allergies    Past Surgical History:   Procedure Laterality Date   • CARDIAC SURGERY     • CHOLECYSTECTOMY         No family history on file.    Social History     Socioeconomic History   • Marital status: Unknown   Tobacco Use   • Smoking status: Never   • Smokeless tobacco: Never   Substance and Sexual Activity   • Alcohol use: Never   • Drug use: Never   • Sexual activity: Defer           Objective   Physical Exam  Vitals and nursing note reviewed.   HENT:      Head: Normocephalic.      Nose: Nose normal.      Mouth/Throat:      Mouth: Mucous membranes are moist.   Cardiovascular:      Rate and Rhythm: Normal rate.      Pulses: Normal pulses.   Pulmonary:      Effort: Pulmonary effort is normal.   Musculoskeletal:         General: Normal range of motion.      Cervical back: Normal range of motion.   Skin:     General: Skin is warm.      Capillary Refill: Capillary refill takes less than 2 seconds.   Neurological:      General: No focal deficit present.      Mental Status: He is alert.         Procedures           ED Course                                           MDM    Final diagnoses:   MIKEY (acute kidney  injury)   Hyperkalemia       ED Disposition  ED Disposition     ED Disposition   Discharge    Condition   Stable    Comment   --             Jag Guillaume MD  [address] [facility] [address]  [phone]    Schedule an appointment as soon as possible for a visit   If symptoms worsen         Medication List      No changes were made to your prescriptions during this visit.          Donnie Marcano MD  05/24/23 0038

## 2023-05-26 LAB — REF LAB TEST METHOD: NORMAL

## 2023-05-28 LAB
BACTERIA SPEC AEROBE CULT: NORMAL
BACTERIA SPEC AEROBE CULT: NORMAL

## 2023-06-05 ENCOUNTER — OFFICE VISIT (OUTPATIENT)
Dept: SURGERY | Facility: CLINIC | Age: 73
End: 2023-06-05
Payer: MEDICARE

## 2023-06-05 VITALS — HEIGHT: 71 IN | BODY MASS INDEX: 29.4 KG/M2 | WEIGHT: 210 LBS

## 2023-06-05 DIAGNOSIS — D23.9 HIDRADENOMA: Primary | ICD-10-CM

## 2023-06-05 PROCEDURE — 1160F RVW MEDS BY RX/DR IN RCRD: CPT | Performed by: SURGERY

## 2023-06-05 PROCEDURE — 1159F MED LIST DOCD IN RCRD: CPT | Performed by: SURGERY

## 2023-06-05 PROCEDURE — 99024 POSTOP FOLLOW-UP VISIT: CPT | Performed by: SURGERY

## 2023-06-05 NOTE — PROGRESS NOTES
Subjective   Kennedy Prescott is a 72 y.o. male.     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 The wound is doing well.    The following portions of the patient's history were reviewed and updated as appropriate: current medications, past family history, past medical history, past social history, past surgical history and problem list.    Review of Systems    Objective   Physical Exam the wound looks good. Sutures removed    Past Medical History:   Diagnosis Date    Diabetes mellitus     Disease of thyroid gland     Myocardial infarction     Renal disorder        History reviewed. No pertinent family history.    Social History     Tobacco Use    Smoking status: Never    Smokeless tobacco: Never   Substance Use Topics    Alcohol use: Never    Drug use: Never       Past Surgical History:   Procedure Laterality Date    CARDIAC SURGERY      CHOLECYSTECTOMY         Current Outpatient Medications   Medication Instructions    acetaminophen (TYLENOL) 500 mg, Oral, Every 6 Hours PRN    atorvastatin (LIPITOR) 20 mg, Oral, Nightly    cloNIDine (CATAPRES) 0.2 mg, Oral, 3 Times Daily    doxazosin (CARDURA) 4 mg, Oral, Nightly    fenofibrate (TRICOR) 54 mg, Oral, Daily    furosemide (LASIX) 40 mg, Oral, 2 Times Daily    glipizide (GLUCOTROL) 10 mg, Oral, 2 Times Daily Before Meals    hydrALAZINE (APRESOLINE) 100 mg, Oral, 3 Times Daily    Insulin Lispro (humaLOG) 100 UNIT/ML injection Subcutaneous, 3 Times Daily Before Meals    levothyroxine (SYNTHROID, LEVOTHROID) 75 mcg, Oral, Daily    metoprolol succinate XL (TOPROL-XL) 12.5 mg, Oral, Daily    omeprazole (PRILOSEC) 20 mg, Oral, Daily    ondansetron ODT (ZOFRAN-ODT) 4 mg, Translingual, Every 6 Hours PRN    SITagliptin (JANUVIA) 100 mg, Oral, Daily    sulfamethoxazole-trimethoprim (BACTRIM DS,SEPTRA DS) 800-160 MG per tablet 1 tablet, Oral, 2 Times Daily         Assessment & Plan    Diagnoses and all orders for this visit:    1. Hidradenoma (Primary)    Return in 1 mo to do a wider excision

## 2023-06-09 ENCOUNTER — HOSPITAL ENCOUNTER (EMERGENCY)
Facility: HOSPITAL | Age: 73
Discharge: HOME | End: 2023-06-09
Attending: STUDENT IN AN ORGANIZED HEALTH CARE EDUCATION/TRAINING PROGRAM
Payer: MEDICARE

## 2023-06-09 ENCOUNTER — LAB REQUISITION (OUTPATIENT)
Dept: LAB | Facility: HOSPITAL | Age: 73
End: 2023-06-09
Payer: MEDICARE

## 2023-06-09 VITALS
TEMPERATURE: 97.9 F | RESPIRATION RATE: 18 BRPM | WEIGHT: 210 LBS | HEART RATE: 81 BPM | BODY MASS INDEX: 29.4 KG/M2 | SYSTOLIC BLOOD PRESSURE: 156 MMHG | DIASTOLIC BLOOD PRESSURE: 71 MMHG | OXYGEN SATURATION: 91 % | HEIGHT: 71 IN

## 2023-06-09 DIAGNOSIS — E83.52 HYPERCALCEMIA: Primary | ICD-10-CM

## 2023-06-09 DIAGNOSIS — I10 ESSENTIAL (PRIMARY) HYPERTENSION: ICD-10-CM

## 2023-06-09 LAB
ALBUMIN SERPL-MCNC: 3.7 G/DL (ref 3.5–5.2)
ALBUMIN/GLOB SERPL: 1.3 G/DL
ALP SERPL-CCNC: 177 U/L (ref 39–117)
ALT SERPL W P-5'-P-CCNC: 18 U/L (ref 1–41)
ANION GAP SERPL CALCULATED.3IONS-SCNC: 12.5 MMOL/L (ref 5–15)
ANION GAP SERPL CALCULATED.3IONS-SCNC: 13.2 MMOL/L (ref 5–15)
AST SERPL-CCNC: 17 U/L (ref 1–40)
BASOPHILS # BLD AUTO: 0.06 10*3/MM3 (ref 0–0.2)
BASOPHILS NFR BLD AUTO: 0.8 % (ref 0–1.5)
BILIRUB SERPL-MCNC: 0.7 MG/DL (ref 0–1.2)
BUN SERPL-MCNC: 26 MG/DL (ref 8–23)
BUN SERPL-MCNC: 28 MG/DL (ref 8–23)
BUN/CREAT SERPL: 5.6 (ref 7–25)
BUN/CREAT SERPL: 5.8 (ref 7–25)
CALCIUM SPEC-SCNC: 11.7 MG/DL (ref 8.6–10.5)
CALCIUM SPEC-SCNC: 11.9 MG/DL (ref 8.6–10.5)
CALCIUM SPEC-SCNC: 12 MG/DL (ref 8.6–10.5)
CHLORIDE SERPL-SCNC: 97 MMOL/L (ref 98–107)
CHLORIDE SERPL-SCNC: 99 MMOL/L (ref 98–107)
CO2 SERPL-SCNC: 27.8 MMOL/L (ref 22–29)
CO2 SERPL-SCNC: 28.5 MMOL/L (ref 22–29)
CREAT SERPL-MCNC: 4.5 MG/DL (ref 0.76–1.27)
CREAT SERPL-MCNC: 4.98 MG/DL (ref 0.76–1.27)
DEPRECATED RDW RBC AUTO: 59.9 FL (ref 37–54)
EGFRCR SERPLBLD CKD-EPI 2021: 11.5 ML/MIN/1.73
EGFRCR SERPLBLD CKD-EPI 2021: 13 ML/MIN/1.73
EOSINOPHIL # BLD AUTO: 0.17 10*3/MM3 (ref 0–0.4)
EOSINOPHIL NFR BLD AUTO: 2.2 % (ref 0.3–6.2)
ERYTHROCYTE [DISTWIDTH] IN BLOOD BY AUTOMATED COUNT: 17.7 % (ref 12.3–15.4)
GLOBULIN UR ELPH-MCNC: 2.9 GM/DL
GLUCOSE SERPL-MCNC: 173 MG/DL (ref 65–99)
GLUCOSE SERPL-MCNC: 244 MG/DL (ref 65–99)
HCT VFR BLD AUTO: 38.9 % (ref 37.5–51)
HGB BLD-MCNC: 11.6 G/DL (ref 13–17.7)
HOLD SPECIMEN: NORMAL
IMM GRANULOCYTES # BLD AUTO: 0.02 10*3/MM3 (ref 0–0.05)
IMM GRANULOCYTES NFR BLD AUTO: 0.3 % (ref 0–0.5)
LYMPHOCYTES # BLD AUTO: 0.71 10*3/MM3 (ref 0.7–3.1)
LYMPHOCYTES NFR BLD AUTO: 9.1 % (ref 19.6–45.3)
MCH RBC QN AUTO: 27.6 PG (ref 26.6–33)
MCHC RBC AUTO-ENTMCNC: 29.8 G/DL (ref 31.5–35.7)
MCV RBC AUTO: 92.4 FL (ref 79–97)
MONOCYTES # BLD AUTO: 0.64 10*3/MM3 (ref 0.1–0.9)
MONOCYTES NFR BLD AUTO: 8.2 % (ref 5–12)
NEUTROPHILS NFR BLD AUTO: 6.19 10*3/MM3 (ref 1.7–7)
NEUTROPHILS NFR BLD AUTO: 79.4 % (ref 42.7–76)
NRBC BLD AUTO-RTO: 0 /100 WBC (ref 0–0.2)
PLATELET # BLD AUTO: 146 10*3/MM3 (ref 140–450)
PMV BLD AUTO: 10.6 FL (ref 6–12)
POTASSIUM SERPL-SCNC: 4.3 MMOL/L (ref 3.5–5.2)
POTASSIUM SERPL-SCNC: 4.8 MMOL/L (ref 3.5–5.2)
PROT SERPL-MCNC: 6.6 G/DL (ref 6–8.5)
RBC # BLD AUTO: 4.21 10*6/MM3 (ref 4.14–5.8)
SODIUM SERPL-SCNC: 138 MMOL/L (ref 136–145)
SODIUM SERPL-SCNC: 140 MMOL/L (ref 136–145)
WBC NRBC COR # BLD: 7.79 10*3/MM3 (ref 3.4–10.8)
WHOLE BLOOD HOLD COAG: NORMAL
WHOLE BLOOD HOLD SPECIMEN: NORMAL

## 2023-06-09 PROCEDURE — 96360 HYDRATION IV INFUSION INIT: CPT

## 2023-06-09 PROCEDURE — 85025 COMPLETE CBC W/AUTO DIFF WBC: CPT | Performed by: PHYSICIAN ASSISTANT

## 2023-06-09 PROCEDURE — 80053 COMPREHEN METABOLIC PANEL: CPT | Performed by: NURSE PRACTITIONER

## 2023-06-09 PROCEDURE — 99283 EMERGENCY DEPT VISIT LOW MDM: CPT

## 2023-06-09 PROCEDURE — 82310 ASSAY OF CALCIUM: CPT | Performed by: PHYSICIAN ASSISTANT

## 2023-06-09 RX ORDER — SODIUM CHLORIDE 0.9 % (FLUSH) 0.9 %
10 SYRINGE (ML) INJECTION AS NEEDED
Status: DISCONTINUED | OUTPATIENT
Start: 2023-06-09 | End: 2023-06-09 | Stop reason: HOSPADM

## 2023-06-09 RX ADMIN — SODIUM CHLORIDE 500 ML: 9 INJECTION, SOLUTION INTRAVENOUS at 17:39

## 2023-06-09 RX ADMIN — SODIUM CHLORIDE 500 ML: 9 INJECTION, SOLUTION INTRAVENOUS at 16:54

## 2023-06-09 NOTE — ED NOTES
Pt states he was sent here and is not sure why other than his blood labs abnormal. Pt is not complaining of any pain and doesn't appear to be in in acute distress. Pt is A&O x4 with VS stable.

## 2023-06-09 NOTE — ED PROVIDER NOTES
Subjective   History of Present Illness  72-year-old male who presents to the emergency department with complaint of weakness.  Patient had hypercalcemia that was found at [facility].  Patient does receive dialysis 3 times per week.  Patient states that nephrology told to send to ER.     History provided by:  Patient   used: No    Weakness - Generalized  Severity:  Moderate  Onset quality:  Gradual  Duration:  1 day  Timing:  Intermittent  Progression:  Worsening  Chronicity:  New  Context: not alcohol use, not allergies, not change in medication, not decreased sleep, not dehydration, not drug use, not recent infection and not stress    Relieved by:  Nothing  Worsened by:  Nothing  Ineffective treatments:  None tried  Associated symptoms: no abdominal pain, no anorexia, no arthralgias, no ataxia, no chest pain, no cough, no diarrhea, no dizziness, no drooling, no dysuria, no numbness in extremities, no falls, no fever, no foul-smelling urine, no headaches, no hematochezia, no lethargy, no loss of consciousness, no myalgias, no nausea, no near-syncope, no seizures, no sensory-motor deficit, no shortness of breath, no syncope, no urgency and no vision change    Risk factors: no anemia, no congestive heart failure, no coronary artery disease, no excessive menstruation, no family hx of stroke, no heart disease, no neurologic disease, no new medications and no recent stressors      Review of Systems   Constitutional: Negative.  Negative for chills, fatigue and fever.   HENT: Negative.  Negative for drooling, ear pain, facial swelling, hearing loss and mouth sores.    Eyes: Negative.  Negative for photophobia, pain, redness and itching.   Respiratory: Negative.  Negative for cough, chest tightness and shortness of breath.    Cardiovascular: Negative.  Negative for chest pain, syncope and near-syncope.   Gastrointestinal:  Negative for abdominal pain, anal bleeding, anorexia, blood in stool,  constipation, diarrhea, hematochezia and nausea.   Endocrine: Negative.  Negative for heat intolerance, polydipsia and polyphagia.   Genitourinary: Negative.  Negative for dysuria, enuresis, flank pain, hematuria, penile pain and urgency.   Musculoskeletal: Negative.  Negative for arthralgias, back pain, falls, gait problem, joint swelling and myalgias.   Skin: Negative.  Negative for color change, rash and wound.   Allergic/Immunologic: Negative.    Neurological:  Negative for dizziness, seizures, loss of consciousness, numbness and headaches.   Hematological: Negative.    Psychiatric/Behavioral: Negative.  Negative for behavioral problems, confusion, decreased concentration, dysphoric mood and hallucinations. The patient is not hyperactive.    All other systems reviewed and are negative.    Past Medical History:   Diagnosis Date    Diabetes mellitus     Disease of thyroid gland     Myocardial infarction     Renal disorder        No Known Allergies    Past Surgical History:   Procedure Laterality Date    CARDIAC SURGERY      CHOLECYSTECTOMY         No family history on file.    Social History     Socioeconomic History    Marital status: Unknown   Tobacco Use    Smoking status: Never    Smokeless tobacco: Never   Substance and Sexual Activity    Alcohol use: Never    Drug use: Never    Sexual activity: Defer           Objective   Physical Exam  Vitals and nursing note reviewed.   Constitutional:       General: He is not in acute distress.     Appearance: Normal appearance. He is normal weight. He is not ill-appearing, toxic-appearing or diaphoretic.   HENT:      Head: Normocephalic and atraumatic.      Right Ear: Tympanic membrane, ear canal and external ear normal. There is no impacted cerumen.      Left Ear: Tympanic membrane, ear canal and external ear normal. There is no impacted cerumen.      Nose: Nose normal. No congestion or rhinorrhea.      Mouth/Throat:      Mouth: Mucous membranes are moist. Mucous  membranes are dry.      Pharynx: Oropharynx is clear. No oropharyngeal exudate or posterior oropharyngeal erythema.   Eyes:      General: No scleral icterus.        Right eye: No discharge.         Left eye: No discharge.      Extraocular Movements: Extraocular movements intact.      Conjunctiva/sclera: Conjunctivae normal.      Pupils: Pupils are equal, round, and reactive to light.   Neck:      Vascular: No carotid bruit.   Cardiovascular:      Rate and Rhythm: Normal rate and regular rhythm.      Pulses: Normal pulses.      Heart sounds: Normal heart sounds. No murmur heard.    No friction rub. No gallop.   Pulmonary:      Effort: Pulmonary effort is normal. No respiratory distress.      Breath sounds: Normal breath sounds. No stridor. No wheezing, rhonchi or rales.   Chest:      Chest wall: No tenderness.   Abdominal:      General: Abdomen is flat. Bowel sounds are normal. There is no distension.      Palpations: Abdomen is soft. There is no mass.      Tenderness: There is no abdominal tenderness. There is no right CVA tenderness, left CVA tenderness, guarding or rebound.      Hernia: No hernia is present.   Musculoskeletal:         General: No swelling, tenderness, deformity or signs of injury. Normal range of motion.      Cervical back: Normal range of motion and neck supple. No rigidity or tenderness.      Right lower leg: No edema.      Left lower leg: No edema.   Lymphadenopathy:      Cervical: No cervical adenopathy.   Skin:     General: Skin is warm and dry.      Capillary Refill: Capillary refill takes less than 2 seconds.      Coloration: Skin is not jaundiced or pale.      Findings: No bruising, erythema, lesion or rash.   Neurological:      General: No focal deficit present.      Mental Status: He is alert and oriented to person, place, and time. Mental status is at baseline.      Cranial Nerves: No cranial nerve deficit.      Sensory: No sensory deficit.      Motor: No weakness.      Coordination:  Coordination normal.      Gait: Gait normal.      Deep Tendon Reflexes: Reflexes normal.   Psychiatric:         Mood and Affect: Mood normal.         Behavior: Behavior normal.         Thought Content: Thought content normal.         Judgment: Judgment normal.       Procedures           ED Course  ED Course as of 06/09/23 1946 Fri Jun 09, 2023 1730 Discussed care with nephrology.  Advised to give 1 L of normal saline recheck calcium if drops can be sent back to nursing home for dialysis tomorrow. [BH]      ED Course User Index  [] Jesus Domínguez PA-C                                           Medical Decision Making  Problems Addressed:  Hypercalcemia: complicated acute illness or injury    Amount and/or Complexity of Data Reviewed  Labs: ordered.    Risk  Prescription drug management.        Final diagnoses:   Hypercalcemia       ED Disposition  ED Disposition       ED Disposition   Discharge    Condition   Stable    Comment   --               Jag Guillaume MD  [address] [facility] [address] [phone]    Call in 1 day           Medication List      No changes were made to your prescriptions during this visit.            Jesus Domínguez PA-C  06/09/23 1946

## 2023-06-12 ENCOUNTER — LAB REQUISITION (OUTPATIENT)
Dept: LAB | Facility: HOSPITAL | Age: 73
End: 2023-06-12
Payer: MEDICARE

## 2023-06-12 DIAGNOSIS — R68.84 JAW PAIN: ICD-10-CM

## 2023-06-12 DIAGNOSIS — E11.8 TYPE 2 DIABETES MELLITUS WITH UNSPECIFIED COMPLICATIONS: ICD-10-CM

## 2023-06-12 LAB — PSA SERPL-MCNC: 1.63 NG/ML (ref 0–4)

## 2023-06-12 PROCEDURE — 82784 ASSAY IGA/IGD/IGG/IGM EACH: CPT | Performed by: INTERNAL MEDICINE

## 2023-06-12 PROCEDURE — 84165 PROTEIN E-PHORESIS SERUM: CPT | Performed by: INTERNAL MEDICINE

## 2023-06-12 PROCEDURE — 86334 IMMUNOFIX E-PHORESIS SERUM: CPT | Performed by: INTERNAL MEDICINE

## 2023-06-12 PROCEDURE — 84153 ASSAY OF PSA TOTAL: CPT | Performed by: INTERNAL MEDICINE

## 2023-06-12 PROCEDURE — 84155 ASSAY OF PROTEIN SERUM: CPT | Performed by: INTERNAL MEDICINE

## 2023-06-13 ENCOUNTER — LAB REQUISITION (OUTPATIENT)
Dept: LAB | Facility: HOSPITAL | Age: 73
End: 2023-06-13
Payer: MEDICARE

## 2023-06-13 DIAGNOSIS — D64.9 ANEMIA, UNSPECIFIED: ICD-10-CM

## 2023-06-13 LAB
ALBUMIN SERPL ELPH-MCNC: 3.4 G/DL (ref 2.9–4.4)
ALBUMIN/GLOB SERPL: 1.3 {RATIO} (ref 0.7–1.7)
ALPHA1 GLOB SERPL ELPH-MCNC: 0.3 G/DL (ref 0–0.4)
ALPHA2 GLOB SERPL ELPH-MCNC: 0.7 G/DL (ref 0.4–1)
B-GLOBULIN SERPL ELPH-MCNC: 0.8 G/DL (ref 0.7–1.3)
GAMMA GLOB SERPL ELPH-MCNC: 0.8 G/DL (ref 0.4–1.8)
GLOBULIN SER-MCNC: 2.7 G/DL (ref 2.2–3.9)
HEMOCCULT STL QL: NEGATIVE
IGA SERPL-MCNC: 331 MG/DL (ref 61–437)
IGG SERPL-MCNC: 735 MG/DL (ref 603–1613)
IGM SERPL-MCNC: 38 MG/DL (ref 15–143)
INTERPRETATION SERPL IEP-IMP: NORMAL
LABORATORY COMMENT REPORT: NORMAL
M PROTEIN SERPL ELPH-MCNC: NORMAL G/DL
PROT SERPL-MCNC: 6.1 G/DL (ref 6–8.5)

## 2023-06-13 PROCEDURE — 82272 OCCULT BLD FECES 1-3 TESTS: CPT | Performed by: INTERNAL MEDICINE

## 2023-08-06 ENCOUNTER — LAB REQUISITION (OUTPATIENT)
Dept: LAB | Facility: HOSPITAL | Age: 73
End: 2023-08-06
Payer: MEDICARE

## 2023-08-06 DIAGNOSIS — R19.5 OTHER FECAL ABNORMALITIES: ICD-10-CM

## 2023-08-06 LAB — HEMOCCULT STL QL: NEGATIVE

## 2023-08-06 PROCEDURE — 82272 OCCULT BLD FECES 1-3 TESTS: CPT | Performed by: NURSE PRACTITIONER

## 2023-08-25 ENCOUNTER — LAB REQUISITION (OUTPATIENT)
Dept: LAB | Facility: HOSPITAL | Age: 73
End: 2023-08-25
Payer: MEDICARE

## 2023-08-25 DIAGNOSIS — I10 ESSENTIAL (PRIMARY) HYPERTENSION: ICD-10-CM

## 2023-08-25 LAB — HBA1C MFR BLD: 6.8 % (ref 4.8–5.6)

## 2023-08-25 PROCEDURE — 83036 HEMOGLOBIN GLYCOSYLATED A1C: CPT | Performed by: NURSE PRACTITIONER

## 2023-09-11 ENCOUNTER — OFFICE VISIT (OUTPATIENT)
Dept: SURGERY | Facility: CLINIC | Age: 73
End: 2023-09-11
Payer: MEDICARE

## 2023-09-11 VITALS — BODY MASS INDEX: 29.4 KG/M2 | WEIGHT: 210 LBS | HEIGHT: 71 IN

## 2023-09-11 DIAGNOSIS — D23.9 HIDRADENOMA: ICD-10-CM

## 2023-09-11 DIAGNOSIS — K59.1 FUNCTIONAL DIARRHEA: Primary | ICD-10-CM

## 2023-09-11 PROCEDURE — 1160F RVW MEDS BY RX/DR IN RCRD: CPT | Performed by: SURGERY

## 2023-09-11 PROCEDURE — 1159F MED LIST DOCD IN RCRD: CPT | Performed by: SURGERY

## 2023-09-11 PROCEDURE — 99214 OFFICE O/P EST MOD 30 MIN: CPT | Performed by: SURGERY

## 2023-09-11 RX ORDER — SODIUM, POTASSIUM,MAG SULFATES 17.5-3.13G
SOLUTION, RECONSTITUTED, ORAL ORAL
Qty: 175 ML | Refills: 0 | Status: SHIPPED | OUTPATIENT
Start: 2023-09-11

## 2023-09-11 RX ORDER — HYDROXYZINE PAMOATE 25 MG/1
25 CAPSULE ORAL 3 TIMES DAILY PRN
COMMUNITY

## 2023-09-11 RX ORDER — MULTIPLE VITAMINS W/ MINERALS TAB 9MG-400MCG
1 TAB ORAL DAILY
COMMUNITY

## 2023-09-11 RX ORDER — LEVOCETIRIZINE DIHYDROCHLORIDE 5 MG/1
5 TABLET, FILM COATED ORAL EVERY EVENING
COMMUNITY

## 2023-09-11 RX ORDER — AMLODIPINE BESYLATE 5 MG/1
5 TABLET ORAL DAILY
COMMUNITY

## 2023-09-11 RX ORDER — LOPERAMIDE HYDROCHLORIDE 2 MG/1
2 CAPSULE ORAL 4 TIMES DAILY PRN
COMMUNITY

## 2023-09-11 RX ORDER — LIDOCAINE 40 MG/G
1 CREAM TOPICAL AS NEEDED
COMMUNITY

## 2023-09-11 RX ORDER — ONDANSETRON HYDROCHLORIDE 8 MG/1
TABLET, FILM COATED ORAL EVERY 8 HOURS PRN
COMMUNITY

## 2023-09-11 NOTE — PROGRESS NOTES
Subjective   Kennedy Prescott is a 73 y.o. male.     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 He was to have it re excised but had not had it done.   He also has had diarrhea the last few weeks. His last colonoscopy was several years ago, but he had no diarrhea then.    The following portions of the patient's history were reviewed and updated as appropriate: current medications, past family history, past medical history, past social history, past surgical history and problem list.    Review of Systems   Constitutional:  Negative for activity change, appetite change, chills, fever and unexpected weight change.   HENT:  Negative for congestion, facial swelling and sore throat.    Eyes:  Negative for photophobia and visual disturbance.   Respiratory:  Negative for chest tightness, shortness of breath and wheezing.    Cardiovascular:  Negative for chest pain, palpitations and leg swelling.   Gastrointestinal:  Positive for abdominal pain and diarrhea. Negative for abdominal distention, anal bleeding, blood in stool, constipation, nausea, rectal pain and vomiting.   Endocrine: Negative for cold intolerance, heat intolerance, polydipsia and polyuria.   Genitourinary:  Negative for difficulty urinating, dysuria, flank pain and urgency.   Musculoskeletal:  Negative for back pain and myalgias.   Skin:  Negative for rash and wound.   Allergic/Immunologic: Negative for immunocompromised state.   Neurological:  Negative for dizziness, seizures, syncope, light-headedness, numbness and headaches.   Hematological:  Negative for adenopathy. Does not bruise/bleed easily.   Psychiatric/Behavioral:  Negative for behavioral problems and confusion. The patient is not nervous/anxious.      Objective   Physical Exam  Vitals reviewed.   Constitutional:       General: He is not in acute distress.      Appearance: He is well-developed. He is not ill-appearing.   HENT:      Head: Normocephalic. No laceration. Hair is normal.      Right Ear: Hearing and ear canal normal.      Left Ear: Hearing and ear canal normal.      Nose: Nose normal.      Right Sinus: No maxillary sinus tenderness or frontal sinus tenderness.      Left Sinus: No maxillary sinus tenderness or frontal sinus tenderness.   Eyes:      General: Lids are normal.      Conjunctiva/sclera: Conjunctivae normal.      Pupils: Pupils are equal, round, and reactive to light.   Neck:      Thyroid: No thyroid mass or thyromegaly.      Vascular: No JVD.      Trachea: No tracheal tenderness or tracheal deviation.   Cardiovascular:      Rate and Rhythm: Normal rate and regular rhythm.      Heart sounds: No murmur heard.    No gallop.   Pulmonary:      Effort: Pulmonary effort is normal.      Breath sounds: Normal breath sounds. No stridor. No wheezing.   Chest:      Chest wall: No tenderness.   Abdominal:      General: Bowel sounds are normal. There is no distension.      Palpations: Abdomen is soft. There is no mass.      Tenderness: There is no abdominal tenderness. There is no guarding or rebound.      Hernia: No hernia is present.   Musculoskeletal:      Cervical back: Normal range of motion.   Lymphadenopathy:      Cervical: No cervical adenopathy.      Upper Body:      Right upper body: No supraclavicular adenopathy.      Left upper body: No supraclavicular adenopathy.   Skin:     General: Skin is warm and dry.      Coloration: Skin is not pale.      Findings: No erythema or rash.   Neurological:      Mental Status: He is alert and oriented to person, place, and time.      Motor: No abnormal muscle tone.   Psychiatric:         Behavior: Behavior normal.         Thought Content: Thought content normal.       Past Medical History:   Diagnosis Date    Diabetes mellitus     Disease of thyroid gland     Myocardial infarction     Renal disorder        History  reviewed. No pertinent family history.    Social History     Tobacco Use    Smoking status: Never    Smokeless tobacco: Never   Vaping Use    Vaping Use: Never used   Substance Use Topics    Alcohol use: Never    Drug use: Never       Past Surgical History:   Procedure Laterality Date    CARDIAC SURGERY      CHOLECYSTECTOMY         Current Outpatient Medications   Medication Instructions    acetaminophen (TYLENOL) 500 mg, Every 6 Hours PRN    amLODIPine (NORVASC) 5 mg, Oral, Daily    atorvastatin (LIPITOR) 20 mg, Oral, Nightly    cloNIDine (CATAPRES) 0.2 mg, 3 Times Daily    doxazosin (CARDURA) 4 mg, Nightly    fenofibrate (TRICOR) 54 mg, Daily    furosemide (LASIX) 40 mg, 2 Times Daily    glipizide (GLUCOTROL) 10 mg, Oral, 2 Times Daily Before Meals    hydrALAZINE (APRESOLINE) 100 mg, Oral, 3 Times Daily    hydrOXYzine pamoate (VISTARIL) 25 mg, Oral, 3 Times Daily PRN    insulin detemir (LEVEMIR) 100 UNIT/ML injection Subcutaneous, Daily    Insulin Lispro (humaLOG) 100 UNIT/ML injection Subcutaneous, 3 Times Daily Before Meals    levocetirizine (XYZAL) 5 mg, Oral, Every Evening    levothyroxine (SYNTHROID, LEVOTHROID) 75 mcg, Oral, Daily    lidocaine (LMX) 4 % cream 1 application , Topical, As Needed    loperamide (IMODIUM) 2 mg, Oral, 4 Times Daily PRN    metoprolol succinate XL (TOPROL-XL) 12.5 mg, Oral, Daily    multivitamin with minerals (THERA M PLUS PO) 1 tablet, Oral, Daily    omeprazole (PRILOSEC) 20 mg, Daily    ondansetron (ZOFRAN) 8 MG tablet Oral, Every 8 Hours PRN    ondansetron ODT (ZOFRAN-ODT) 4 mg, Translingual, Every 6 Hours PRN    sertraline (ZOLOFT) 50 mg, Oral, Daily    SITagliptin (JANUVIA) 100 mg, Daily    sulfamethoxazole-trimethoprim (BACTRIM DS,SEPTRA DS) 800-160 MG per tablet 1 tablet, 2 Times Daily         Assessment & Plan   Diagnoses and all orders for this visit:    1. Functional diarrhea (Primary)    2. Hidradenoma    Watch scalp wound  Schedule colonoscopy

## 2023-09-13 ENCOUNTER — LAB REQUISITION (OUTPATIENT)
Dept: LAB | Facility: HOSPITAL | Age: 73
End: 2023-09-13
Payer: MEDICARE

## 2023-09-13 DIAGNOSIS — I10 ESSENTIAL (PRIMARY) HYPERTENSION: ICD-10-CM

## 2023-09-13 LAB
ALBUMIN SERPL-MCNC: 3.9 G/DL (ref 3.5–5.2)
ALBUMIN/GLOB SERPL: 1.6 G/DL
ALP SERPL-CCNC: 186 U/L (ref 39–117)
ALT SERPL W P-5'-P-CCNC: 18 U/L (ref 1–41)
ANION GAP SERPL CALCULATED.3IONS-SCNC: 9 MMOL/L (ref 5–15)
AST SERPL-CCNC: 15 U/L (ref 1–40)
BASOPHILS # BLD AUTO: 0.03 10*3/MM3 (ref 0–0.2)
BASOPHILS NFR BLD AUTO: 0.4 % (ref 0–1.5)
BILIRUB SERPL-MCNC: 0.5 MG/DL (ref 0–1.2)
BUN SERPL-MCNC: 40 MG/DL (ref 8–23)
BUN/CREAT SERPL: 8.6 (ref 7–25)
CALCIUM SPEC-SCNC: 9 MG/DL (ref 8.6–10.5)
CHLORIDE SERPL-SCNC: 98 MMOL/L (ref 98–107)
CHOLEST SERPL-MCNC: 69 MG/DL (ref 0–200)
CO2 SERPL-SCNC: 33 MMOL/L (ref 22–29)
CREAT SERPL-MCNC: 4.65 MG/DL (ref 0.76–1.27)
DEPRECATED RDW RBC AUTO: 47 FL (ref 37–54)
EGFRCR SERPLBLD CKD-EPI 2021: 12.4 ML/MIN/1.73
EOSINOPHIL # BLD AUTO: 0.14 10*3/MM3 (ref 0–0.4)
EOSINOPHIL NFR BLD AUTO: 1.7 % (ref 0.3–6.2)
ERYTHROCYTE [DISTWIDTH] IN BLOOD BY AUTOMATED COUNT: 14.1 % (ref 12.3–15.4)
GLOBULIN UR ELPH-MCNC: 2.4 GM/DL
GLUCOSE SERPL-MCNC: 162 MG/DL (ref 65–99)
HBA1C MFR BLD: 6.6 % (ref 4.8–5.6)
HCT VFR BLD AUTO: 30.9 % (ref 37.5–51)
HDLC SERPL-MCNC: 35 MG/DL (ref 40–60)
HGB BLD-MCNC: 9.5 G/DL (ref 13–17.7)
IMM GRANULOCYTES # BLD AUTO: 0.02 10*3/MM3 (ref 0–0.05)
IMM GRANULOCYTES NFR BLD AUTO: 0.2 % (ref 0–0.5)
LDLC SERPL CALC-MCNC: 18 MG/DL (ref 0–100)
LDLC/HDLC SERPL: 0.57 {RATIO}
LYMPHOCYTES # BLD AUTO: 1.06 10*3/MM3 (ref 0.7–3.1)
LYMPHOCYTES NFR BLD AUTO: 12.6 % (ref 19.6–45.3)
MCH RBC QN AUTO: 28.5 PG (ref 26.6–33)
MCHC RBC AUTO-ENTMCNC: 30.7 G/DL (ref 31.5–35.7)
MCV RBC AUTO: 92.8 FL (ref 79–97)
MONOCYTES # BLD AUTO: 0.93 10*3/MM3 (ref 0.1–0.9)
MONOCYTES NFR BLD AUTO: 11.1 % (ref 5–12)
NEUTROPHILS NFR BLD AUTO: 6.21 10*3/MM3 (ref 1.7–7)
NEUTROPHILS NFR BLD AUTO: 74 % (ref 42.7–76)
NRBC BLD AUTO-RTO: 0 /100 WBC (ref 0–0.2)
PLATELET # BLD AUTO: 166 10*3/MM3 (ref 140–450)
PMV BLD AUTO: 10.9 FL (ref 6–12)
POTASSIUM SERPL-SCNC: 5.4 MMOL/L (ref 3.5–5.2)
PROT SERPL-MCNC: 6.3 G/DL (ref 6–8.5)
RBC # BLD AUTO: 3.33 10*6/MM3 (ref 4.14–5.8)
SODIUM SERPL-SCNC: 140 MMOL/L (ref 136–145)
TRIGL SERPL-MCNC: 71 MG/DL (ref 0–150)
TSH SERPL DL<=0.05 MIU/L-ACNC: 3.6 UIU/ML (ref 0.27–4.2)
VLDLC SERPL-MCNC: 16 MG/DL (ref 5–40)
WBC NRBC COR # BLD: 8.39 10*3/MM3 (ref 3.4–10.8)

## 2023-09-13 PROCEDURE — 80053 COMPREHEN METABOLIC PANEL: CPT | Performed by: NURSE PRACTITIONER

## 2023-09-13 PROCEDURE — 80061 LIPID PANEL: CPT | Performed by: NURSE PRACTITIONER

## 2023-09-13 PROCEDURE — 85025 COMPLETE CBC W/AUTO DIFF WBC: CPT | Performed by: NURSE PRACTITIONER

## 2023-09-13 PROCEDURE — 84443 ASSAY THYROID STIM HORMONE: CPT | Performed by: NURSE PRACTITIONER

## 2023-09-13 PROCEDURE — 83036 HEMOGLOBIN GLYCOSYLATED A1C: CPT | Performed by: NURSE PRACTITIONER

## 2023-09-15 ENCOUNTER — TELEPHONE (OUTPATIENT)
Dept: SURGERY | Facility: CLINIC | Age: 73
End: 2023-09-15
Payer: MEDICARE

## 2023-09-15 NOTE — TELEPHONE ENCOUNTER
Spoke to Leeroy at the nursing home. He is aware of patient's prep, diet, and arrival time for colonoscopy with Dr. Peter on 9/22/2023 at 9:30 am.

## 2023-09-22 ENCOUNTER — ANESTHESIA (OUTPATIENT)
Dept: PERIOP | Facility: HOSPITAL | Age: 73
End: 2023-09-22
Payer: MEDICARE

## 2023-09-22 ENCOUNTER — ANESTHESIA EVENT (OUTPATIENT)
Dept: PERIOP | Facility: HOSPITAL | Age: 73
End: 2023-09-22
Payer: MEDICARE

## 2023-09-22 ENCOUNTER — HOSPITAL ENCOUNTER (OUTPATIENT)
Facility: HOSPITAL | Age: 73
Setting detail: HOSPITAL OUTPATIENT SURGERY
Discharge: HOME | End: 2023-09-22
Attending: SURGERY | Admitting: SURGERY
Payer: MEDICARE

## 2023-09-22 VITALS
RESPIRATION RATE: 18 BRPM | HEIGHT: 71 IN | TEMPERATURE: 97.7 F | OXYGEN SATURATION: 98 % | SYSTOLIC BLOOD PRESSURE: 102 MMHG | BODY MASS INDEX: 26.88 KG/M2 | DIASTOLIC BLOOD PRESSURE: 72 MMHG | WEIGHT: 192 LBS | HEART RATE: 67 BPM

## 2023-09-22 DIAGNOSIS — K59.1 FUNCTIONAL DIARRHEA: ICD-10-CM

## 2023-09-22 LAB — GLUCOSE BLDC GLUCOMTR-MCNC: 72 MG/DL (ref 70–130)

## 2023-09-22 PROCEDURE — 82948 REAGENT STRIP/BLOOD GLUCOSE: CPT

## 2023-09-22 PROCEDURE — 88305 TISSUE EXAM BY PATHOLOGIST: CPT

## 2023-09-22 PROCEDURE — 25010000002 PROPOFOL 200 MG/20ML EMULSION: Performed by: NURSE ANESTHETIST, CERTIFIED REGISTERED

## 2023-09-22 PROCEDURE — 45380 COLONOSCOPY AND BIOPSY: CPT | Performed by: SURGERY

## 2023-09-22 RX ORDER — PROPOFOL 10 MG/ML
INJECTION, EMULSION INTRAVENOUS AS NEEDED
Status: DISCONTINUED | OUTPATIENT
Start: 2023-09-22 | End: 2023-09-22 | Stop reason: SURG

## 2023-09-22 RX ORDER — SODIUM CHLORIDE 9 MG/ML
40 INJECTION, SOLUTION INTRAVENOUS AS NEEDED
Status: DISCONTINUED | OUTPATIENT
Start: 2023-09-22 | End: 2023-09-22 | Stop reason: SDUPTHER

## 2023-09-22 RX ORDER — IPRATROPIUM BROMIDE AND ALBUTEROL SULFATE 2.5; .5 MG/3ML; MG/3ML
3 SOLUTION RESPIRATORY (INHALATION) ONCE AS NEEDED
Status: DISCONTINUED | OUTPATIENT
Start: 2023-09-22 | End: 2023-09-22 | Stop reason: HOSPADM

## 2023-09-22 RX ORDER — FAMOTIDINE 20 MG/1
20 TABLET, FILM COATED ORAL 2 TIMES DAILY
COMMUNITY

## 2023-09-22 RX ORDER — GABAPENTIN 100 MG/1
100 CAPSULE ORAL 3 TIMES DAILY
COMMUNITY

## 2023-09-22 RX ORDER — FENTANYL CITRATE 50 UG/ML
50 INJECTION, SOLUTION INTRAMUSCULAR; INTRAVENOUS
Status: DISCONTINUED | OUTPATIENT
Start: 2023-09-22 | End: 2023-09-22 | Stop reason: HOSPADM

## 2023-09-22 RX ORDER — SODIUM CHLORIDE, SODIUM LACTATE, POTASSIUM CHLORIDE, CALCIUM CHLORIDE 600; 310; 30; 20 MG/100ML; MG/100ML; MG/100ML; MG/100ML
125 INJECTION, SOLUTION INTRAVENOUS ONCE
Status: DISCONTINUED | OUTPATIENT
Start: 2023-09-22 | End: 2023-09-22 | Stop reason: HOSPADM

## 2023-09-22 RX ORDER — MIDAZOLAM HYDROCHLORIDE 1 MG/ML
0.5 INJECTION INTRAMUSCULAR; INTRAVENOUS
Status: DISCONTINUED | OUTPATIENT
Start: 2023-09-22 | End: 2023-09-22 | Stop reason: HOSPADM

## 2023-09-22 RX ORDER — SODIUM CHLORIDE 0.9 % (FLUSH) 0.9 %
10 SYRINGE (ML) INJECTION AS NEEDED
Status: DISCONTINUED | OUTPATIENT
Start: 2023-09-22 | End: 2023-09-22 | Stop reason: HOSPADM

## 2023-09-22 RX ORDER — ONDANSETRON 2 MG/ML
4 INJECTION INTRAMUSCULAR; INTRAVENOUS AS NEEDED
Status: DISCONTINUED | OUTPATIENT
Start: 2023-09-22 | End: 2023-09-22 | Stop reason: HOSPADM

## 2023-09-22 RX ORDER — SEVELAMER HYDROCHLORIDE 800 MG/1
800 TABLET, FILM COATED ORAL
COMMUNITY

## 2023-09-22 RX ORDER — SODIUM CHLORIDE, SODIUM LACTATE, POTASSIUM CHLORIDE, CALCIUM CHLORIDE 600; 310; 30; 20 MG/100ML; MG/100ML; MG/100ML; MG/100ML
100 INJECTION, SOLUTION INTRAVENOUS ONCE AS NEEDED
Status: DISCONTINUED | OUTPATIENT
Start: 2023-09-22 | End: 2023-09-22 | Stop reason: HOSPADM

## 2023-09-22 RX ORDER — OXYCODONE HYDROCHLORIDE AND ACETAMINOPHEN 5; 325 MG/1; MG/1
1 TABLET ORAL ONCE AS NEEDED
Status: DISCONTINUED | OUTPATIENT
Start: 2023-09-22 | End: 2023-09-22 | Stop reason: HOSPADM

## 2023-09-22 RX ORDER — SODIUM CHLORIDE, SODIUM LACTATE, POTASSIUM CHLORIDE, CALCIUM CHLORIDE 600; 310; 30; 20 MG/100ML; MG/100ML; MG/100ML; MG/100ML
125 INJECTION, SOLUTION INTRAVENOUS ONCE
Status: DISCONTINUED | OUTPATIENT
Start: 2023-09-22 | End: 2023-09-22 | Stop reason: SDUPTHER

## 2023-09-22 RX ORDER — SODIUM CHLORIDE 0.9 % (FLUSH) 0.9 %
10 SYRINGE (ML) INJECTION EVERY 12 HOURS SCHEDULED
Status: DISCONTINUED | OUTPATIENT
Start: 2023-09-22 | End: 2023-09-22 | Stop reason: HOSPADM

## 2023-09-22 RX ORDER — MIRTAZAPINE 15 MG/1
15 TABLET, FILM COATED ORAL NIGHTLY
COMMUNITY

## 2023-09-22 RX ORDER — SODIUM CHLORIDE 9 MG/ML
40 INJECTION, SOLUTION INTRAVENOUS AS NEEDED
Status: DISCONTINUED | OUTPATIENT
Start: 2023-09-22 | End: 2023-09-22 | Stop reason: HOSPADM

## 2023-09-22 RX ORDER — MIDAZOLAM HYDROCHLORIDE 1 MG/ML
0.5 INJECTION INTRAMUSCULAR; INTRAVENOUS
Status: DISCONTINUED | OUTPATIENT
Start: 2023-09-22 | End: 2023-09-22 | Stop reason: SDUPTHER

## 2023-09-22 RX ADMIN — PROPOFOL 20 MG: 10 INJECTION, EMULSION INTRAVENOUS at 11:37

## 2023-09-22 RX ADMIN — PROPOFOL 50 MG: 10 INJECTION, EMULSION INTRAVENOUS at 11:32

## 2023-09-22 RX ADMIN — PROPOFOL 100 MCG/KG/MIN: 10 INJECTION, EMULSION INTRAVENOUS at 11:33

## 2023-09-22 NOTE — ANESTHESIA PREPROCEDURE EVALUATION
Anesthesia Evaluation     Patient summary reviewed and Nursing notes reviewed   no history of anesthetic complications:                Airway   Mallampati: II  TM distance: >3 FB  Neck ROM: limited  Possible difficult intubation  Dental - normal exam   (+) poor dentition    Pulmonary - negative pulmonary ROS and normal exam   Cardiovascular - normal exam  Exercise tolerance: good (4-7 METS)    NYHA Classification: II  Beta blocker given within 24 hours of surgery    (+) hypertension, past MI , CAD, CHF , hyperlipidemia      Neuro/Psych- negative ROS  GI/Hepatic/Renal/Endo    (+) GERD, renal disease, diabetes mellitus, thyroid problem     Musculoskeletal (-) negative ROS    Abdominal  - normal exam    Bowel sounds: normal.   Substance History - negative use     OB/GYN negative ob/gyn ROS         Other - negative ROS                       Anesthesia Plan    ASA 3     general     intravenous induction     Anesthetic plan, risks, benefits, and alternatives have been provided, discussed and informed consent has been obtained with: patient.      CODE STATUS:

## 2023-09-22 NOTE — ANESTHESIA POSTPROCEDURE EVALUATION
Patient: Kennedy Prescott    Procedure Summary       Date: 09/22/23 Room / Location: [facility] OR [address] [place] [address] COR OR    Anesthesia Start: 1130 Anesthesia Stop: 1151    Procedure: COLONOSCOPY Diagnosis:       Functional diarrhea      (Functional diarrhea [K59.1])    Surgeons: Trip Peter MD Provider: Brennen Lees DO    Anesthesia Type: general ASA Status: 3            Anesthesia Type: general    Vitals  Vitals Value Taken Time   /72 09/22/23 1222   Temp 97.7 °F (36.5 °C) 09/22/23 1152   Pulse 67 09/22/23 1222   Resp 18 09/22/23 1222   SpO2 98 % 09/22/23 1222           Post Anesthesia Care and Evaluation    Patient location during evaluation: PHASE II  Patient participation: complete - patient participated  Level of consciousness: awake and alert  Pain score: 1  Pain management: adequate    Airway patency: patent  Anesthetic complications: No anesthetic complications  PONV Status: controlled  Cardiovascular status: acceptable  Respiratory status: acceptable  Hydration status: acceptable

## 2023-09-22 NOTE — OP NOTE
COLONOSCOPY  Procedure Note    Kennedy Prescott  9/22/2023    Pre-op Diagnosis:   Functional diarrhea [K59.1]    Post-op Diagnosis: two small polyps, diveticulosis        Procedure(s):  COLONOSCOPY    Surgeon(s):  Trip Peter MD    Anesthesia: General    Staff:   Circulator: Chelsea Toribio RN  Endo Technician: Carson Sneed    Estimated Blood Loss: none    Specimens:                Order Name Source Comment Collection Info Order Time   TISSUE EXAM, [facility] (QI, COR, [facility]) Large Intestine, Right / Ascending Colon  Collected By: Trip Peter MD 9/22/2023 11:40 AM     Release to patient   Routine Release              Drains: * No LDAs found *    Procedure: The scope was advanced from the rectum to the cecum. The ileocecal valve was unremarkable. No inflammation was seen. There was a small polyp in the ascending colon removed with biopsy forceps and one in the sigmoid as well. There is diverticulosis in the sigmoid but no inflammation or other abnormalities.     Findings: diverticulosis, polyps           Complications: none   Grafts / Implants N/A    Trip Peter MD     Date: 9/22/2023  Time: 11:51 EDT

## 2023-09-25 LAB — REF LAB TEST METHOD: NORMAL

## 2023-10-02 ENCOUNTER — OFFICE VISIT (OUTPATIENT)
Dept: SURGERY | Facility: CLINIC | Age: 73
End: 2023-10-02
Payer: MEDICARE

## 2023-10-02 VITALS — WEIGHT: 192 LBS | HEIGHT: 71 IN | BODY MASS INDEX: 26.88 KG/M2

## 2023-10-02 DIAGNOSIS — K59.1 FUNCTIONAL DIARRHEA: ICD-10-CM

## 2023-10-02 DIAGNOSIS — D12.2 ADENOMATOUS POLYP OF ASCENDING COLON: Primary | ICD-10-CM

## 2023-10-02 PROCEDURE — 1159F MED LIST DOCD IN RCRD: CPT | Performed by: SURGERY

## 2023-10-02 PROCEDURE — 1160F RVW MEDS BY RX/DR IN RCRD: CPT | Performed by: SURGERY

## 2023-10-02 PROCEDURE — 99212 OFFICE O/P EST SF 10 MIN: CPT | Performed by: SURGERY

## 2023-10-02 NOTE — PROGRESS NOTES
Subjective   Kennedy Prescott is a 73 y.o. male.     Chief Complaint: colon polyp    History of Present Illness He is a 74 yo who had a colonoscopy with a small polyp in the ascending colon. He had no indication of infection or inflammatory bowel disease, so his diarrhea is likely irritable bowel.     The following portions of the patient's history were reviewed and updated as appropriate: current medications, past family history, past medical history, past social history, past surgical history and problem list.    Review of Systems    Objective   Physical Exam abdomen soft and not tender.    Past Medical History:   Diagnosis Date    CHF (congestive heart failure)     Coronary artery disease     Diabetes mellitus     Dialysis patient     Disease of thyroid gland     Elevated cholesterol     GERD (gastroesophageal reflux disease)     Hypertension     Myocardial infarction     Renal disorder     stage 5       History reviewed. No pertinent family history.    Social History     Tobacco Use    Smoking status: Never    Smokeless tobacco: Never   Vaping Use    Vaping Use: Never used   Substance Use Topics    Alcohol use: Never    Drug use: Never       Past Surgical History:   Procedure Laterality Date    CARDIAC SURGERY      cabg    CHOLECYSTECTOMY      COLONOSCOPY N/A 9/22/2023    Procedure: COLONOSCOPY;  Surgeon: Trip Peter MD;  Location: [facility];  Service: Gastroenterology;  Laterality: N/A;    DIALYSIS FISTULA CREATION Left     arm       Current Outpatient Medications   Medication Instructions    acetaminophen (TYLENOL) 500 mg, Oral, Every 6 Hours PRN    amLODIPine (NORVASC) 5 mg, Oral, Daily    atorvastatin (LIPITOR) 20 mg, Oral, Nightly    famotidine (PEPCID) 20 mg, Oral, 2 Times Daily    gabapentin (NEURONTIN) 100 mg, Oral, 3 Times Daily    glipizide (GLUCOTROL) 10 mg, Oral, 2 Times Daily Before Meals    hydrALAZINE (APRESOLINE) 100 mg, Oral, 3 Times Daily    hydrOXYzine pamoate (VISTARIL) 25 mg, Oral, 3  Times Daily PRN    insulin detemir (LEVEMIR) 100 UNIT/ML injection Subcutaneous, Daily    Insulin Lispro (humaLOG) 100 UNIT/ML injection Subcutaneous, 3 Times Daily Before Meals    levocetirizine (XYZAL) 5 mg, Oral, Every Evening    levothyroxine (SYNTHROID, LEVOTHROID) 75 mcg, Oral, Daily    lidocaine (LMX) 4 % cream 1 application , Topical, As Needed    loperamide (IMODIUM) 2 mg, Oral, 4 Times Daily PRN    metoprolol succinate XL (TOPROL-XL) 12.5 mg, Oral, Daily    mirtazapine (REMERON) 15 mg, Oral, Nightly    multivitamin with minerals tablet tablet 1 tablet, Oral, Daily    ondansetron (ZOFRAN) 8 MG tablet Oral, Every 8 Hours PRN    ondansetron ODT (ZOFRAN-ODT) 4 mg, Translingual, Every 6 Hours PRN    sertraline (ZOLOFT) 50 mg, Oral, Daily    sevelamer (RENAGEL) 800 mg, Oral, 3 Times Daily With Meals    Suprep Bowel Prep Kit 17.5-3.13-1.6 GM/177ML solution oral solution Dispense one Kit        Sig: Used as Directed         Assessment & Plan   Diagnoses and all orders for this visit:    1. Adenomatous polyp of ascending colon (Primary)    2. Functional diarrhea    Recheck scalp wound in 2 months  Try probiotics and see if any particular foods make his diarrhea worse

## 2023-11-13 ENCOUNTER — LAB REQUISITION (OUTPATIENT)
Dept: LAB | Facility: HOSPITAL | Age: 73
End: 2023-11-13
Payer: MEDICARE

## 2023-11-13 DIAGNOSIS — E11.9 TYPE 2 DIABETES MELLITUS WITHOUT COMPLICATIONS: ICD-10-CM

## 2023-11-13 LAB — HBA1C MFR BLD: 7 % (ref 4.8–5.6)

## 2023-11-13 PROCEDURE — 83036 HEMOGLOBIN GLYCOSYLATED A1C: CPT | Performed by: INTERNAL MEDICINE

## 2023-12-16 ENCOUNTER — LAB REQUISITION (OUTPATIENT)
Dept: LAB | Facility: HOSPITAL | Age: 73
End: 2023-12-16
Payer: MEDICARE

## 2023-12-16 DIAGNOSIS — U07.1 COVID-19: ICD-10-CM

## 2023-12-16 LAB
ALBUMIN SERPL-MCNC: 3.8 G/DL (ref 3.5–5.2)
ALBUMIN/GLOB SERPL: 1.4 G/DL
ALP SERPL-CCNC: 159 U/L (ref 39–117)
ALT SERPL W P-5'-P-CCNC: 17 U/L (ref 1–41)
ANION GAP SERPL CALCULATED.3IONS-SCNC: 13.7 MMOL/L (ref 5–15)
AST SERPL-CCNC: 18 U/L (ref 1–40)
BASOPHILS # BLD AUTO: 0.03 10*3/MM3 (ref 0–0.2)
BASOPHILS NFR BLD AUTO: 0.3 % (ref 0–1.5)
BILIRUB SERPL-MCNC: 0.5 MG/DL (ref 0–1.2)
BUN SERPL-MCNC: 49 MG/DL (ref 8–23)
BUN/CREAT SERPL: 9.5 (ref 7–25)
CALCIUM SPEC-SCNC: 9.4 MG/DL (ref 8.6–10.5)
CHLORIDE SERPL-SCNC: 96 MMOL/L (ref 98–107)
CO2 SERPL-SCNC: 28.3 MMOL/L (ref 22–29)
CREAT SERPL-MCNC: 5.15 MG/DL (ref 0.76–1.27)
CRP SERPL-MCNC: 1.13 MG/DL (ref 0–0.5)
DEPRECATED RDW RBC AUTO: 49.1 FL (ref 37–54)
EGFRCR SERPLBLD CKD-EPI 2021: 11 ML/MIN/1.73
EOSINOPHIL # BLD AUTO: 0.11 10*3/MM3 (ref 0–0.4)
EOSINOPHIL NFR BLD AUTO: 1.1 % (ref 0.3–6.2)
ERYTHROCYTE [DISTWIDTH] IN BLOOD BY AUTOMATED COUNT: 14.5 % (ref 12.3–15.4)
ERYTHROCYTE [SEDIMENTATION RATE] IN BLOOD: 31 MM/HR (ref 0–20)
GLOBULIN UR ELPH-MCNC: 2.8 GM/DL
GLUCOSE SERPL-MCNC: 180 MG/DL (ref 65–99)
HCT VFR BLD AUTO: 29.6 % (ref 37.5–51)
HGB BLD-MCNC: 9.5 G/DL (ref 13–17.7)
IMM GRANULOCYTES # BLD AUTO: 0.03 10*3/MM3 (ref 0–0.05)
IMM GRANULOCYTES NFR BLD AUTO: 0.3 % (ref 0–0.5)
LYMPHOCYTES # BLD AUTO: 0.54 10*3/MM3 (ref 0.7–3.1)
LYMPHOCYTES NFR BLD AUTO: 5.6 % (ref 19.6–45.3)
MCH RBC QN AUTO: 29.5 PG (ref 26.6–33)
MCHC RBC AUTO-ENTMCNC: 32.1 G/DL (ref 31.5–35.7)
MCV RBC AUTO: 91.9 FL (ref 79–97)
MONOCYTES # BLD AUTO: 1.03 10*3/MM3 (ref 0.1–0.9)
MONOCYTES NFR BLD AUTO: 10.7 % (ref 5–12)
NEUTROPHILS NFR BLD AUTO: 7.87 10*3/MM3 (ref 1.7–7)
NEUTROPHILS NFR BLD AUTO: 82 % (ref 42.7–76)
NRBC BLD AUTO-RTO: 0 /100 WBC (ref 0–0.2)
PLATELET # BLD AUTO: 125 10*3/MM3 (ref 140–450)
PMV BLD AUTO: 11.1 FL (ref 6–12)
POTASSIUM SERPL-SCNC: 4.9 MMOL/L (ref 3.5–5.2)
PROT SERPL-MCNC: 6.6 G/DL (ref 6–8.5)
RBC # BLD AUTO: 3.22 10*6/MM3 (ref 4.14–5.8)
SODIUM SERPL-SCNC: 138 MMOL/L (ref 136–145)
WBC NRBC COR # BLD AUTO: 9.61 10*3/MM3 (ref 3.4–10.8)

## 2023-12-16 PROCEDURE — 85652 RBC SED RATE AUTOMATED: CPT | Performed by: INTERNAL MEDICINE

## 2023-12-16 PROCEDURE — 86140 C-REACTIVE PROTEIN: CPT | Performed by: INTERNAL MEDICINE

## 2023-12-16 PROCEDURE — 80053 COMPREHEN METABOLIC PANEL: CPT | Performed by: INTERNAL MEDICINE

## 2023-12-16 PROCEDURE — 85025 COMPLETE CBC W/AUTO DIFF WBC: CPT | Performed by: INTERNAL MEDICINE

## 2024-01-01 ENCOUNTER — APPOINTMENT (OUTPATIENT)
Dept: CARDIOLOGY | Facility: HOSPITAL | Age: 74
DRG: 377 | End: 2024-01-01
Payer: MEDICARE

## 2024-01-01 ENCOUNTER — APPOINTMENT (OUTPATIENT)
Dept: GENERAL RADIOLOGY | Facility: HOSPITAL | Age: 74
DRG: 377 | End: 2024-01-01
Payer: MEDICARE

## 2024-01-01 ENCOUNTER — HOSPITAL ENCOUNTER (INPATIENT)
Facility: HOSPITAL | Age: 74
LOS: 9 days | DRG: 377 | End: 2024-08-28
Attending: STUDENT IN AN ORGANIZED HEALTH CARE EDUCATION/TRAINING PROGRAM | Admitting: INTERNAL MEDICINE
Payer: MEDICARE

## 2024-01-01 VITALS
HEIGHT: 71 IN | DIASTOLIC BLOOD PRESSURE: 61 MMHG | OXYGEN SATURATION: 89 % | RESPIRATION RATE: 28 BRPM | WEIGHT: 227.07 LBS | BODY MASS INDEX: 31.79 KG/M2 | SYSTOLIC BLOOD PRESSURE: 78 MMHG | HEART RATE: 107 BPM | TEMPERATURE: 98.8 F

## 2024-01-01 DIAGNOSIS — K92.2 GASTROINTESTINAL HEMORRHAGE, UNSPECIFIED GASTROINTESTINAL HEMORRHAGE TYPE: Primary | ICD-10-CM

## 2024-01-01 DIAGNOSIS — D64.9 ANEMIA, UNSPECIFIED TYPE: ICD-10-CM

## 2024-01-01 DIAGNOSIS — K29.71 GASTROINTESTINAL HEMORRHAGE ASSOCIATED WITH GASTRITIS, UNSPECIFIED GASTRITIS TYPE: ICD-10-CM

## 2024-01-01 DIAGNOSIS — R19.7 DIARRHEA, UNSPECIFIED TYPE: ICD-10-CM

## 2024-01-01 DIAGNOSIS — Z99.2 STAGE 5 CHRONIC KIDNEY DISEASE ON CHRONIC DIALYSIS: ICD-10-CM

## 2024-01-01 DIAGNOSIS — N18.6 STAGE 5 CHRONIC KIDNEY DISEASE ON CHRONIC DIALYSIS: ICD-10-CM

## 2024-01-01 LAB
A-A DO2: 101.2 MMHG (ref 0–300)
A-A DO2: 139.1 MMHG (ref 0–300)
A-A DO2: 215 MMHG (ref 0–300)
A-A DO2: 235.3 MMHG (ref 0–300)
A-A DO2: 90.1 MMHG (ref 0–300)
ABO GROUP BLD: NORMAL
ABO GROUP BLD: NORMAL
ACID FAST STN SPEC: NEGATIVE
ALBUMIN SERPL-MCNC: 2.1 G/DL (ref 3.5–5.2)
ALBUMIN SERPL-MCNC: 2.4 G/DL (ref 3.5–5.2)
ALBUMIN SERPL-MCNC: 2.6 G/DL (ref 3.5–5.2)
ALBUMIN SERPL-MCNC: 2.7 G/DL (ref 3.5–5.2)
ALBUMIN SERPL-MCNC: 2.7 G/DL (ref 3.5–5.2)
ALBUMIN SERPL-MCNC: 2.9 G/DL (ref 3.5–5.2)
ALBUMIN SERPL-MCNC: 3 G/DL (ref 3.5–5.2)
ALBUMIN/GLOB SERPL: 0.9 G/DL
ALBUMIN/GLOB SERPL: 1 G/DL
ALBUMIN/GLOB SERPL: 1.1 G/DL
ALBUMIN/GLOB SERPL: 1.1 G/DL
ALBUMIN/GLOB SERPL: 1.2 G/DL
ALBUMIN/GLOB SERPL: 1.2 G/DL
ALBUMIN/GLOB SERPL: 1.3 G/DL
ALP SERPL-CCNC: 102 U/L (ref 39–117)
ALP SERPL-CCNC: 104 U/L (ref 39–117)
ALP SERPL-CCNC: 107 U/L (ref 39–117)
ALP SERPL-CCNC: 109 U/L (ref 39–117)
ALP SERPL-CCNC: 109 U/L (ref 39–117)
ALP SERPL-CCNC: 110 U/L (ref 39–117)
ALP SERPL-CCNC: 196 U/L (ref 39–117)
ALP SERPL-CCNC: 86 U/L (ref 39–117)
ALP SERPL-CCNC: 97 U/L (ref 39–117)
ALT SERPL W P-5'-P-CCNC: 11 U/L (ref 1–41)
ALT SERPL W P-5'-P-CCNC: 11 U/L (ref 1–41)
ALT SERPL W P-5'-P-CCNC: 1183 U/L (ref 1–41)
ALT SERPL W P-5'-P-CCNC: 14 U/L (ref 1–41)
ALT SERPL W P-5'-P-CCNC: 15 U/L (ref 1–41)
ALT SERPL W P-5'-P-CCNC: 15 U/L (ref 1–41)
ALT SERPL W P-5'-P-CCNC: 85 U/L (ref 1–41)
ALT SERPL W P-5'-P-CCNC: 9 U/L (ref 1–41)
ALT SERPL W P-5'-P-CCNC: 90 U/L (ref 1–41)
ANION GAP SERPL CALCULATED.3IONS-SCNC: 14 MMOL/L (ref 5–15)
ANION GAP SERPL CALCULATED.3IONS-SCNC: 15 MMOL/L (ref 5–15)
ANION GAP SERPL CALCULATED.3IONS-SCNC: 15.5 MMOL/L (ref 5–15)
ANION GAP SERPL CALCULATED.3IONS-SCNC: 15.9 MMOL/L (ref 5–15)
ANION GAP SERPL CALCULATED.3IONS-SCNC: 17.3 MMOL/L (ref 5–15)
ANION GAP SERPL CALCULATED.3IONS-SCNC: 17.5 MMOL/L (ref 5–15)
ANION GAP SERPL CALCULATED.3IONS-SCNC: 18 MMOL/L (ref 5–15)
ANION GAP SERPL CALCULATED.3IONS-SCNC: 18.3 MMOL/L (ref 5–15)
ANION GAP SERPL CALCULATED.3IONS-SCNC: 18.8 MMOL/L (ref 5–15)
ANION GAP SERPL CALCULATED.3IONS-SCNC: 19.3 MMOL/L (ref 5–15)
ANION GAP SERPL CALCULATED.3IONS-SCNC: 21.6 MMOL/L (ref 5–15)
ANION GAP SERPL CALCULATED.3IONS-SCNC: 23.6 MMOL/L (ref 5–15)
ANION GAP SERPL CALCULATED.3IONS-SCNC: 33.4 MMOL/L (ref 5–15)
ANION GAP SERPL CALCULATED.3IONS-SCNC: 34 MMOL/L (ref 5–15)
ANISOCYTOSIS BLD QL: ABNORMAL
ANISOCYTOSIS BLD QL: NORMAL
APTT PPP: 29.8 SECONDS (ref 26.5–34.5)
APTT PPP: 54.9 SECONDS (ref 26.5–34.5)
ARTERIAL PATENCY WRIST A: POSITIVE
AST SERPL-CCNC: 126 U/L (ref 1–40)
AST SERPL-CCNC: 14 U/L (ref 1–40)
AST SERPL-CCNC: 168 U/L (ref 1–40)
AST SERPL-CCNC: 18 U/L (ref 1–40)
AST SERPL-CCNC: 21 U/L (ref 1–40)
AST SERPL-CCNC: 22 U/L (ref 1–40)
AST SERPL-CCNC: 24 U/L (ref 1–40)
AST SERPL-CCNC: 25 U/L (ref 1–40)
AST SERPL-CCNC: 3307 U/L (ref 1–40)
ATMOSPHERIC PRESS: 728 MMHG
ATMOSPHERIC PRESS: 729 MMHG
ATMOSPHERIC PRESS: 729 MMHG
ATMOSPHERIC PRESS: 730 MMHG
ATMOSPHERIC PRESS: 730 MMHG
ATMOSPHERIC PRESS: 731 MMHG
BASE EXCESS BLDA CALC-SCNC: -14.7 MMOL/L (ref 0–2)
BASE EXCESS BLDA CALC-SCNC: 1.3 MMOL/L (ref 0–2)
BASE EXCESS BLDA CALC-SCNC: 4.6 MMOL/L (ref 0–2)
BASE EXCESS BLDA CALC-SCNC: 6.6 MMOL/L (ref 0–2)
BASE EXCESS BLDA CALC-SCNC: 6.7 MMOL/L (ref 0–2)
BASE EXCESS BLDV CALC-SCNC: -15.5 MMOL/L (ref 0–2)
BASE EXCESS BLDV CALC-SCNC: -2 MMOL/L (ref 0–2)
BASE EXCESS BLDV CALC-SCNC: -20.4 MMOL/L (ref 0–2)
BASOPHILS # BLD AUTO: 0.01 10*3/MM3 (ref 0–0.2)
BASOPHILS # BLD AUTO: 0.01 10*3/MM3 (ref 0–0.2)
BASOPHILS # BLD AUTO: 0.02 10*3/MM3 (ref 0–0.2)
BASOPHILS # BLD AUTO: 0.03 10*3/MM3 (ref 0–0.2)
BASOPHILS # BLD AUTO: 0.04 10*3/MM3 (ref 0–0.2)
BASOPHILS NFR BLD AUTO: 0.1 % (ref 0–1.5)
BASOPHILS NFR BLD AUTO: 0.2 % (ref 0–1.5)
BASOPHILS NFR BLD AUTO: 0.3 % (ref 0–1.5)
BDY SITE: ABNORMAL
BH BB BLOOD EXPIRATION DATE: NORMAL
BH BB BLOOD TYPE BARCODE: 2800
BH BB DISPENSE STATUS: NORMAL
BH BB PRODUCT CODE: NORMAL
BH BB UNIT NUMBER: NORMAL
BH CV ECHO MEAS - ACS: 2 CM
BH CV ECHO MEAS - AO MAX PG: 4.7 MMHG
BH CV ECHO MEAS - AO MEAN PG: 2 MMHG
BH CV ECHO MEAS - AO ROOT DIAM: 3.1 CM
BH CV ECHO MEAS - AO V2 MAX: 108 CM/SEC
BH CV ECHO MEAS - AO V2 VTI: 17 CM
BH CV ECHO MEAS - AVA(I,D): 2.35 CM2
BH CV ECHO MEAS - EDV(CUBED): 154.6 ML
BH CV ECHO MEAS - EDV(CUBED): 181.8 ML
BH CV ECHO MEAS - EDV(MOD-SP2): 133 ML
BH CV ECHO MEAS - EDV(MOD-SP2): 60.3 ML
BH CV ECHO MEAS - EDV(MOD-SP4): 166 ML
BH CV ECHO MEAS - EDV(MOD-SP4): 64.9 ML
BH CV ECHO MEAS - EF(MOD-BP): 32.4 %
BH CV ECHO MEAS - EF(MOD-BP): 43.9 %
BH CV ECHO MEAS - EF(MOD-SP2): 27 %
BH CV ECHO MEAS - EF(MOD-SP2): 32.6 %
BH CV ECHO MEAS - EF(MOD-SP4): 33.7 %
BH CV ECHO MEAS - EF(MOD-SP4): 50.8 %
BH CV ECHO MEAS - ESV(CUBED): 125 ML
BH CV ECHO MEAS - ESV(CUBED): 151.4 ML
BH CV ECHO MEAS - ESV(MOD-SP2): 44 ML
BH CV ECHO MEAS - ESV(MOD-SP2): 89.6 ML
BH CV ECHO MEAS - ESV(MOD-SP4): 110 ML
BH CV ECHO MEAS - ESV(MOD-SP4): 31.9 ML
BH CV ECHO MEAS - FS: 5.9 %
BH CV ECHO MEAS - FS: 6.8 %
BH CV ECHO MEAS - IVS/LVPW: 0.99 CM
BH CV ECHO MEAS - IVS/LVPW: 1 CM
BH CV ECHO MEAS - IVSD: 0.87 CM
BH CV ECHO MEAS - IVSD: 1.09 CM
BH CV ECHO MEAS - LA DIMENSION: 4.7 CM
BH CV ECHO MEAS - LA DIMENSION: 4.8 CM
BH CV ECHO MEAS - LAT PEAK E' VEL: 10.7 CM/SEC
BH CV ECHO MEAS - LV DIASTOLIC VOL/BSA (35-75): 29.2 CM2
BH CV ECHO MEAS - LV MASS(C)D: 169.8 GRAMS
BH CV ECHO MEAS - LV MASS(C)D: 251 GRAMS
BH CV ECHO MEAS - LV MAX PG: 2.23 MMHG
BH CV ECHO MEAS - LV MEAN PG: 1 MMHG
BH CV ECHO MEAS - LV SYSTOLIC VOL/BSA (12-30): 14.3 CM2
BH CV ECHO MEAS - LV V1 MAX: 74.6 CM/SEC
BH CV ECHO MEAS - LV V1 VTI: 10.5 CM
BH CV ECHO MEAS - LVIDD: 5.4 CM
BH CV ECHO MEAS - LVIDD: 5.7 CM
BH CV ECHO MEAS - LVIDS: 5 CM
BH CV ECHO MEAS - LVIDS: 5.3 CM
BH CV ECHO MEAS - LVOT AREA: 3.8 CM2
BH CV ECHO MEAS - LVOT DIAM: 2.2 CM
BH CV ECHO MEAS - LVPWD: 0.87 CM
BH CV ECHO MEAS - LVPWD: 1.1 CM
BH CV ECHO MEAS - MED PEAK E' VEL: 8.3 CM/SEC
BH CV ECHO MEAS - MR MAX PG: 77.2 MMHG
BH CV ECHO MEAS - MR MAX VEL: 439 CM/SEC
BH CV ECHO MEAS - MR MEAN PG: 48 MMHG
BH CV ECHO MEAS - MR MEAN VEL: 329 CM/SEC
BH CV ECHO MEAS - MR VTI: 105 CM
BH CV ECHO MEAS - MV DEC SLOPE: 917 CM/SEC2
BH CV ECHO MEAS - MV DEC TIME: 0.14 SEC
BH CV ECHO MEAS - MV E MAX VEL: 129 CM/SEC
BH CV ECHO MEAS - PA ACC TIME: 0.11 SEC
BH CV ECHO MEAS - RAP SYSTOLE: 10 MMHG
BH CV ECHO MEAS - RVSP: 28.1 MMHG
BH CV ECHO MEAS - SV(LVOT): 39.9 ML
BH CV ECHO MEAS - SV(MOD-SP2): 16.3 ML
BH CV ECHO MEAS - SV(MOD-SP2): 43.4 ML
BH CV ECHO MEAS - SV(MOD-SP4): 33 ML
BH CV ECHO MEAS - SV(MOD-SP4): 56 ML
BH CV ECHO MEAS - SVI(MOD-SP2): 7.3 ML/M2
BH CV ECHO MEAS - SVI(MOD-SP4): 14.8 ML/M2
BH CV ECHO MEAS - TAPSE (>1.6): 0.98 CM
BH CV ECHO MEAS - TR MAX PG: 18.1 MMHG
BH CV ECHO MEAS - TR MAX VEL: 213 CM/SEC
BH CV ECHO MEASUREMENTS AVERAGE E/E' RATIO: 13.58
BILIRUB SERPL-MCNC: 0.4 MG/DL (ref 0–1.2)
BILIRUB SERPL-MCNC: 0.5 MG/DL (ref 0–1.2)
BILIRUB SERPL-MCNC: 0.6 MG/DL (ref 0–1.2)
BILIRUB SERPL-MCNC: 0.7 MG/DL (ref 0–1.2)
BLD GP AB SCN SERPL QL: NEGATIVE
BUN SERPL-MCNC: 110 MG/DL (ref 8–23)
BUN SERPL-MCNC: 19 MG/DL (ref 8–23)
BUN SERPL-MCNC: 20 MG/DL (ref 8–23)
BUN SERPL-MCNC: 20 MG/DL (ref 8–23)
BUN SERPL-MCNC: 25 MG/DL (ref 8–23)
BUN SERPL-MCNC: 25 MG/DL (ref 8–23)
BUN SERPL-MCNC: 31 MG/DL (ref 8–23)
BUN SERPL-MCNC: 31 MG/DL (ref 8–23)
BUN SERPL-MCNC: 33 MG/DL (ref 8–23)
BUN SERPL-MCNC: 35 MG/DL (ref 8–23)
BUN SERPL-MCNC: 38 MG/DL (ref 8–23)
BUN SERPL-MCNC: 39 MG/DL (ref 8–23)
BUN SERPL-MCNC: 61 MG/DL (ref 8–23)
BUN SERPL-MCNC: 72 MG/DL (ref 8–23)
BUN/CREAT SERPL: 11.5 (ref 7–25)
BUN/CREAT SERPL: 13 (ref 7–25)
BUN/CREAT SERPL: 13.4 (ref 7–25)
BUN/CREAT SERPL: 14.4 (ref 7–25)
BUN/CREAT SERPL: 5.6 (ref 7–25)
BUN/CREAT SERPL: 6.4 (ref 7–25)
BUN/CREAT SERPL: 6.5 (ref 7–25)
BUN/CREAT SERPL: 6.5 (ref 7–25)
BUN/CREAT SERPL: 6.9 (ref 7–25)
BUN/CREAT SERPL: 7.1 (ref 7–25)
BUN/CREAT SERPL: 7.2 (ref 7–25)
BUN/CREAT SERPL: 7.5 (ref 7–25)
BUN/CREAT SERPL: 7.9 (ref 7–25)
BUN/CREAT SERPL: 9.2 (ref 7–25)
BURR CELLS BLD QL SMEAR: ABNORMAL
C3 FRG RBC-MCNC: ABNORMAL
CALCIUM SPEC-SCNC: 8.2 MG/DL (ref 8.6–10.5)
CALCIUM SPEC-SCNC: 8.3 MG/DL (ref 8.6–10.5)
CALCIUM SPEC-SCNC: 8.4 MG/DL (ref 8.6–10.5)
CALCIUM SPEC-SCNC: 8.6 MG/DL (ref 8.6–10.5)
CALCIUM SPEC-SCNC: 8.7 MG/DL (ref 8.6–10.5)
CALCIUM SPEC-SCNC: 8.8 MG/DL (ref 8.6–10.5)
CHLORIDE SERPL-SCNC: 88 MMOL/L (ref 98–107)
CHLORIDE SERPL-SCNC: 93 MMOL/L (ref 98–107)
CHLORIDE SERPL-SCNC: 93 MMOL/L (ref 98–107)
CHLORIDE SERPL-SCNC: 94 MMOL/L (ref 98–107)
CHLORIDE SERPL-SCNC: 95 MMOL/L (ref 98–107)
CHLORIDE SERPL-SCNC: 96 MMOL/L (ref 98–107)
CHLORIDE SERPL-SCNC: 96 MMOL/L (ref 98–107)
CHLORIDE SERPL-SCNC: 97 MMOL/L (ref 98–107)
CHLORIDE SERPL-SCNC: 97 MMOL/L (ref 98–107)
CHLORIDE SERPL-SCNC: 98 MMOL/L (ref 98–107)
CO2 BLDA-SCNC: 13.3 MMOL/L (ref 22–33)
CO2 BLDA-SCNC: 15.2 MMOL/L (ref 22–33)
CO2 BLDA-SCNC: 24.6 MMOL/L (ref 22–33)
CO2 BLDA-SCNC: 26.7 MMOL/L (ref 22–33)
CO2 BLDA-SCNC: 30.3 MMOL/L (ref 22–33)
CO2 BLDA-SCNC: 30.6 MMOL/L (ref 22–33)
CO2 BLDA-SCNC: 31.7 MMOL/L (ref 22–33)
CO2 BLDA-SCNC: 9.5 MMOL/L (ref 22–33)
CO2 SERPL-SCNC: 12 MMOL/L (ref 22–29)
CO2 SERPL-SCNC: 12.6 MMOL/L (ref 22–29)
CO2 SERPL-SCNC: 20.2 MMOL/L (ref 22–29)
CO2 SERPL-SCNC: 20.4 MMOL/L (ref 22–29)
CO2 SERPL-SCNC: 21.7 MMOL/L (ref 22–29)
CO2 SERPL-SCNC: 23 MMOL/L (ref 22–29)
CO2 SERPL-SCNC: 23.4 MMOL/L (ref 22–29)
CO2 SERPL-SCNC: 23.7 MMOL/L (ref 22–29)
CO2 SERPL-SCNC: 25 MMOL/L (ref 22–29)
CO2 SERPL-SCNC: 25.5 MMOL/L (ref 22–29)
CO2 SERPL-SCNC: 26.7 MMOL/L (ref 22–29)
CO2 SERPL-SCNC: 27.1 MMOL/L (ref 22–29)
CO2 SERPL-SCNC: 28 MMOL/L (ref 22–29)
CO2 SERPL-SCNC: 28.5 MMOL/L (ref 22–29)
COHGB MFR BLD: 1.4 % (ref 0–5)
COHGB MFR BLD: 1.8 % (ref 0–5)
COHGB MFR BLD: 2 % (ref 0–5)
COHGB MFR BLD: 2.1 % (ref 0–5)
COHGB MFR BLD: 2.1 % (ref 0–5)
COHGB MFR BLD: 2.4 % (ref 0–5)
COHGB MFR BLD: 2.4 % (ref 0–5)
COHGB MFR BLD: 3.2 % (ref 0–5)
CREAT SERPL-MCNC: 2.65 MG/DL (ref 0.76–1.27)
CREAT SERPL-MCNC: 2.7 MG/DL (ref 0.76–1.27)
CREAT SERPL-MCNC: 2.97 MG/DL (ref 0.76–1.27)
CREAT SERPL-MCNC: 3.54 MG/DL (ref 0.76–1.27)
CREAT SERPL-MCNC: 3.83 MG/DL (ref 0.76–1.27)
CREAT SERPL-MCNC: 3.83 MG/DL (ref 0.76–1.27)
CREAT SERPL-MCNC: 4.23 MG/DL (ref 0.76–1.27)
CREAT SERPL-MCNC: 4.47 MG/DL (ref 0.76–1.27)
CREAT SERPL-MCNC: 4.6 MG/DL (ref 0.76–1.27)
CREAT SERPL-MCNC: 4.68 MG/DL (ref 0.76–1.27)
CREAT SERPL-MCNC: 4.78 MG/DL (ref 0.76–1.27)
CREAT SERPL-MCNC: 4.96 MG/DL (ref 0.76–1.27)
CREAT SERPL-MCNC: 5.39 MG/DL (ref 0.76–1.27)
CREAT SERPL-MCNC: 7.65 MG/DL (ref 0.76–1.27)
CROSSMATCH INTERPRETATION: NORMAL
CRP SERPL-MCNC: 1.72 MG/DL (ref 0–0.5)
D DIMER PPP FEU-MCNC: 5.26 MCGFEU/ML (ref 0–0.76)
D-LACTATE SERPL-SCNC: 14.1 MMOL/L (ref 0.5–2)
D-LACTATE SERPL-SCNC: 14.9 MMOL/L (ref 0.5–2)
D-LACTATE SERPL-SCNC: 17.3 MMOL/L (ref 0.5–2)
D-LACTATE SERPL-SCNC: 2 MMOL/L (ref 0.5–2)
DACRYOCYTES BLD QL SMEAR: ABNORMAL
DACRYOCYTES BLD QL SMEAR: NORMAL
DEPRECATED RDW RBC AUTO: 63.2 FL (ref 37–54)
DEPRECATED RDW RBC AUTO: 64.7 FL (ref 37–54)
DEPRECATED RDW RBC AUTO: 65.1 FL (ref 37–54)
DEPRECATED RDW RBC AUTO: 65.1 FL (ref 37–54)
DEPRECATED RDW RBC AUTO: 67.5 FL (ref 37–54)
DEPRECATED RDW RBC AUTO: 68.2 FL (ref 37–54)
DEPRECATED RDW RBC AUTO: 69.1 FL (ref 37–54)
DEPRECATED RDW RBC AUTO: 69.2 FL (ref 37–54)
DEPRECATED RDW RBC AUTO: 70 FL (ref 37–54)
DEPRECATED RDW RBC AUTO: 70.2 FL (ref 37–54)
DEPRECATED RDW RBC AUTO: 70.5 FL (ref 37–54)
DEPRECATED RDW RBC AUTO: 70.6 FL (ref 37–54)
DEPRECATED RDW RBC AUTO: 73.7 FL (ref 37–54)
DEPRECATED RDW RBC AUTO: 77.5 FL (ref 37–54)
EGFRCR SERPLBLD CKD-EPI 2021: 10.3 ML/MIN/1.73
EGFRCR SERPLBLD CKD-EPI 2021: 11.4 ML/MIN/1.73
EGFRCR SERPLBLD CKD-EPI 2021: 11.9 ML/MIN/1.73
EGFRCR SERPLBLD CKD-EPI 2021: 12.2 ML/MIN/1.73
EGFRCR SERPLBLD CKD-EPI 2021: 12.5 ML/MIN/1.73
EGFRCR SERPLBLD CKD-EPI 2021: 12.9 ML/MIN/1.73
EGFRCR SERPLBLD CKD-EPI 2021: 13.8 ML/MIN/1.73
EGFRCR SERPLBLD CKD-EPI 2021: 15.6 ML/MIN/1.73
EGFRCR SERPLBLD CKD-EPI 2021: 15.6 ML/MIN/1.73
EGFRCR SERPLBLD CKD-EPI 2021: 17.1 ML/MIN/1.73
EGFRCR SERPLBLD CKD-EPI 2021: 21.1 ML/MIN/1.73
EGFRCR SERPLBLD CKD-EPI 2021: 23.7 ML/MIN/1.73
EGFRCR SERPLBLD CKD-EPI 2021: 24.2 ML/MIN/1.73
EGFRCR SERPLBLD CKD-EPI 2021: 6.8 ML/MIN/1.73
ELLIPTOCYTES BLD QL SMEAR: ABNORMAL
EOSINOPHIL # BLD AUTO: 0 10*3/MM3 (ref 0–0.4)
EOSINOPHIL # BLD AUTO: 0.02 10*3/MM3 (ref 0–0.4)
EOSINOPHIL # BLD AUTO: 0.04 10*3/MM3 (ref 0–0.4)
EOSINOPHIL # BLD AUTO: 0.04 10*3/MM3 (ref 0–0.4)
EOSINOPHIL # BLD AUTO: 0.06 10*3/MM3 (ref 0–0.4)
EOSINOPHIL # BLD AUTO: 0.08 10*3/MM3 (ref 0–0.4)
EOSINOPHIL # BLD AUTO: 0.1 10*3/MM3 (ref 0–0.4)
EOSINOPHIL # BLD AUTO: 0.13 10*3/MM3 (ref 0–0.4)
EOSINOPHIL # BLD AUTO: 0.16 10*3/MM3 (ref 0–0.4)
EOSINOPHIL NFR BLD AUTO: 0 % (ref 0.3–6.2)
EOSINOPHIL NFR BLD AUTO: 0.2 % (ref 0.3–6.2)
EOSINOPHIL NFR BLD AUTO: 0.3 % (ref 0.3–6.2)
EOSINOPHIL NFR BLD AUTO: 0.3 % (ref 0.3–6.2)
EOSINOPHIL NFR BLD AUTO: 0.5 % (ref 0.3–6.2)
EOSINOPHIL NFR BLD AUTO: 0.5 % (ref 0.3–6.2)
EOSINOPHIL NFR BLD AUTO: 1 % (ref 0.3–6.2)
EOSINOPHIL NFR BLD AUTO: 1.3 % (ref 0.3–6.2)
EOSINOPHIL NFR BLD AUTO: 1.5 % (ref 0.3–6.2)
ERYTHROCYTE [DISTWIDTH] IN BLOOD BY AUTOMATED COUNT: 19.9 % (ref 12.3–15.4)
ERYTHROCYTE [DISTWIDTH] IN BLOOD BY AUTOMATED COUNT: 20.5 % (ref 12.3–15.4)
ERYTHROCYTE [DISTWIDTH] IN BLOOD BY AUTOMATED COUNT: 20.7 % (ref 12.3–15.4)
ERYTHROCYTE [DISTWIDTH] IN BLOOD BY AUTOMATED COUNT: 20.8 % (ref 12.3–15.4)
ERYTHROCYTE [DISTWIDTH] IN BLOOD BY AUTOMATED COUNT: 20.9 % (ref 12.3–15.4)
ERYTHROCYTE [DISTWIDTH] IN BLOOD BY AUTOMATED COUNT: 21 % (ref 12.3–15.4)
ERYTHROCYTE [DISTWIDTH] IN BLOOD BY AUTOMATED COUNT: 21.1 % (ref 12.3–15.4)
ERYTHROCYTE [DISTWIDTH] IN BLOOD BY AUTOMATED COUNT: 21.2 % (ref 12.3–15.4)
ERYTHROCYTE [DISTWIDTH] IN BLOOD BY AUTOMATED COUNT: 21.2 % (ref 12.3–15.4)
ERYTHROCYTE [DISTWIDTH] IN BLOOD BY AUTOMATED COUNT: 21.3 % (ref 12.3–15.4)
ERYTHROCYTE [DISTWIDTH] IN BLOOD BY AUTOMATED COUNT: 21.6 % (ref 12.3–15.4)
FLUAV RNA RESP QL NAA+PROBE: NOT DETECTED
FLUBV RNA RESP QL NAA+PROBE: NOT DETECTED
GAS FLOW AIRWAY: 4 LPM
GEN 5 2HR TROPONIN T REFLEX: 150 NG/L
GEN 5 2HR TROPONIN T REFLEX: 188 NG/L
GEN 5 2HR TROPONIN T REFLEX: 237 NG/L
GLOBULIN UR ELPH-MCNC: 2.1 GM/DL
GLOBULIN UR ELPH-MCNC: 2.2 GM/DL
GLOBULIN UR ELPH-MCNC: 2.3 GM/DL
GLOBULIN UR ELPH-MCNC: 2.4 GM/DL
GLOBULIN UR ELPH-MCNC: 2.5 GM/DL
GLOBULIN UR ELPH-MCNC: 2.6 GM/DL
GLOBULIN UR ELPH-MCNC: 2.7 GM/DL
GLUCOSE BLDC GLUCOMTR-MCNC: 104 MG/DL (ref 70–130)
GLUCOSE BLDC GLUCOMTR-MCNC: 106 MG/DL (ref 70–130)
GLUCOSE BLDC GLUCOMTR-MCNC: 109 MG/DL (ref 70–130)
GLUCOSE BLDC GLUCOMTR-MCNC: 110 MG/DL (ref 70–130)
GLUCOSE BLDC GLUCOMTR-MCNC: 113 MG/DL (ref 70–130)
GLUCOSE BLDC GLUCOMTR-MCNC: 120 MG/DL (ref 70–130)
GLUCOSE BLDC GLUCOMTR-MCNC: 125 MG/DL (ref 70–130)
GLUCOSE BLDC GLUCOMTR-MCNC: 128 MG/DL (ref 70–130)
GLUCOSE BLDC GLUCOMTR-MCNC: 128 MG/DL (ref 70–130)
GLUCOSE BLDC GLUCOMTR-MCNC: 130 MG/DL (ref 70–130)
GLUCOSE BLDC GLUCOMTR-MCNC: 130 MG/DL (ref 70–130)
GLUCOSE BLDC GLUCOMTR-MCNC: 131 MG/DL (ref 70–130)
GLUCOSE BLDC GLUCOMTR-MCNC: 132 MG/DL (ref 70–130)
GLUCOSE BLDC GLUCOMTR-MCNC: 135 MG/DL (ref 70–130)
GLUCOSE BLDC GLUCOMTR-MCNC: 135 MG/DL (ref 70–130)
GLUCOSE BLDC GLUCOMTR-MCNC: 143 MG/DL (ref 70–130)
GLUCOSE BLDC GLUCOMTR-MCNC: 145 MG/DL (ref 70–130)
GLUCOSE BLDC GLUCOMTR-MCNC: 148 MG/DL (ref 70–130)
GLUCOSE BLDC GLUCOMTR-MCNC: 149 MG/DL (ref 70–130)
GLUCOSE BLDC GLUCOMTR-MCNC: 151 MG/DL (ref 70–130)
GLUCOSE BLDC GLUCOMTR-MCNC: 153 MG/DL (ref 70–130)
GLUCOSE BLDC GLUCOMTR-MCNC: 154 MG/DL (ref 70–130)
GLUCOSE BLDC GLUCOMTR-MCNC: 159 MG/DL (ref 70–130)
GLUCOSE BLDC GLUCOMTR-MCNC: 169 MG/DL (ref 70–130)
GLUCOSE BLDC GLUCOMTR-MCNC: 175 MG/DL (ref 70–130)
GLUCOSE BLDC GLUCOMTR-MCNC: 177 MG/DL (ref 70–130)
GLUCOSE BLDC GLUCOMTR-MCNC: 188 MG/DL (ref 70–130)
GLUCOSE BLDC GLUCOMTR-MCNC: 50 MG/DL (ref 70–130)
GLUCOSE BLDC GLUCOMTR-MCNC: 69 MG/DL (ref 70–130)
GLUCOSE BLDC GLUCOMTR-MCNC: 75 MG/DL (ref 70–130)
GLUCOSE BLDC GLUCOMTR-MCNC: 90 MG/DL (ref 70–130)
GLUCOSE BLDC GLUCOMTR-MCNC: 92 MG/DL (ref 70–130)
GLUCOSE BLDC GLUCOMTR-MCNC: 95 MG/DL (ref 70–130)
GLUCOSE BLDC GLUCOMTR-MCNC: 97 MG/DL (ref 70–130)
GLUCOSE BLDC GLUCOMTR-MCNC: 99 MG/DL (ref 70–130)
GLUCOSE BLDC GLUCOMTR-MCNC: 99 MG/DL (ref 70–130)
GLUCOSE SERPL-MCNC: 105 MG/DL (ref 65–99)
GLUCOSE SERPL-MCNC: 106 MG/DL (ref 65–99)
GLUCOSE SERPL-MCNC: 112 MG/DL (ref 65–99)
GLUCOSE SERPL-MCNC: 114 MG/DL (ref 65–99)
GLUCOSE SERPL-MCNC: 119 MG/DL (ref 65–99)
GLUCOSE SERPL-MCNC: 126 MG/DL (ref 65–99)
GLUCOSE SERPL-MCNC: 127 MG/DL (ref 65–99)
GLUCOSE SERPL-MCNC: 132 MG/DL (ref 65–99)
GLUCOSE SERPL-MCNC: 143 MG/DL (ref 65–99)
GLUCOSE SERPL-MCNC: 146 MG/DL (ref 65–99)
GLUCOSE SERPL-MCNC: 148 MG/DL (ref 65–99)
GLUCOSE SERPL-MCNC: 156 MG/DL (ref 65–99)
GLUCOSE SERPL-MCNC: 190 MG/DL (ref 65–99)
GLUCOSE SERPL-MCNC: 70 MG/DL (ref 65–99)
HCO3 BLDA-SCNC: 12.3 MMOL/L (ref 20–26)
HCO3 BLDA-SCNC: 25.5 MMOL/L (ref 20–26)
HCO3 BLDA-SCNC: 29.3 MMOL/L (ref 20–26)
HCO3 BLDA-SCNC: 29.3 MMOL/L (ref 20–26)
HCO3 BLDA-SCNC: 30.5 MMOL/L (ref 20–26)
HCO3 BLDV-SCNC: 13.8 MMOL/L (ref 22–28)
HCO3 BLDV-SCNC: 23.4 MMOL/L (ref 22–28)
HCO3 BLDV-SCNC: 8.6 MMOL/L (ref 22–28)
HCT VFR BLD AUTO: 19.4 % (ref 37.5–51)
HCT VFR BLD AUTO: 19.5 % (ref 37.5–51)
HCT VFR BLD AUTO: 23.6 % (ref 37.5–51)
HCT VFR BLD AUTO: 23.8 % (ref 37.5–51)
HCT VFR BLD AUTO: 24.4 % (ref 37.5–51)
HCT VFR BLD AUTO: 24.7 % (ref 37.5–51)
HCT VFR BLD AUTO: 25.2 % (ref 37.5–51)
HCT VFR BLD AUTO: 25.3 % (ref 37.5–51)
HCT VFR BLD AUTO: 25.6 % (ref 37.5–51)
HCT VFR BLD AUTO: 25.7 % (ref 37.5–51)
HCT VFR BLD AUTO: 26.1 % (ref 37.5–51)
HCT VFR BLD AUTO: 26.2 % (ref 37.5–51)
HCT VFR BLD AUTO: 30.2 % (ref 37.5–51)
HCT VFR BLD AUTO: 32.5 % (ref 37.5–51)
HCT VFR BLD AUTO: 37.7 % (ref 37.5–51)
HCT VFR BLD CALC: 22.3 % (ref 38–51)
HCT VFR BLD CALC: 22.5 % (ref 38–51)
HCT VFR BLD CALC: 23.6 % (ref 38–51)
HCT VFR BLD CALC: 27.1 % (ref 38–51)
HCT VFR BLD CALC: 29.3 % (ref 38–51)
HGB BLD-MCNC: 10.5 G/DL (ref 13–17.7)
HGB BLD-MCNC: 5.9 G/DL (ref 13–17.7)
HGB BLD-MCNC: 6 G/DL (ref 13–17.7)
HGB BLD-MCNC: 7.3 G/DL (ref 13–17.7)
HGB BLD-MCNC: 7.3 G/DL (ref 13–17.7)
HGB BLD-MCNC: 7.5 G/DL (ref 13–17.7)
HGB BLD-MCNC: 7.7 G/DL (ref 13–17.7)
HGB BLD-MCNC: 7.8 G/DL (ref 13–17.7)
HGB BLD-MCNC: 7.9 G/DL (ref 13–17.7)
HGB BLD-MCNC: 7.9 G/DL (ref 13–17.7)
HGB BLD-MCNC: 8 G/DL (ref 13–17.7)
HGB BLD-MCNC: 9.4 G/DL (ref 13–17.7)
HGB BLD-MCNC: 9.9 G/DL (ref 13–17.7)
HGB BLDA-MCNC: 10.1 G/DL (ref 14–18)
HGB BLDA-MCNC: 10.3 G/DL (ref 14–18)
HGB BLDA-MCNC: 7.3 G/DL (ref 14–18)
HGB BLDA-MCNC: 7.3 G/DL (ref 14–18)
HGB BLDA-MCNC: 7.7 G/DL (ref 14–18)
HGB BLDA-MCNC: 8.9 G/DL (ref 14–18)
HGB BLDA-MCNC: 9.5 G/DL (ref 14–18)
HGB BLDA-MCNC: 9.6 G/DL (ref 14–18)
HOLD SPECIMEN: NORMAL
HOLD SPECIMEN: NORMAL
HYPOCHROMIA BLD QL: ABNORMAL
HYPOCHROMIA BLD QL: NORMAL
IMM GRANULOCYTES # BLD AUTO: 0.05 10*3/MM3 (ref 0–0.05)
IMM GRANULOCYTES # BLD AUTO: 0.06 10*3/MM3 (ref 0–0.05)
IMM GRANULOCYTES # BLD AUTO: 0.08 10*3/MM3 (ref 0–0.05)
IMM GRANULOCYTES # BLD AUTO: 0.1 10*3/MM3 (ref 0–0.05)
IMM GRANULOCYTES # BLD AUTO: 0.19 10*3/MM3 (ref 0–0.05)
IMM GRANULOCYTES NFR BLD AUTO: 0.4 % (ref 0–0.5)
IMM GRANULOCYTES NFR BLD AUTO: 0.5 % (ref 0–0.5)
IMM GRANULOCYTES NFR BLD AUTO: 0.5 % (ref 0–0.5)
IMM GRANULOCYTES NFR BLD AUTO: 0.6 % (ref 0–0.5)
IMM GRANULOCYTES NFR BLD AUTO: 0.7 % (ref 0–0.5)
IMM GRANULOCYTES NFR BLD AUTO: 0.7 % (ref 0–0.5)
IMM GRANULOCYTES NFR BLD AUTO: 1.1 % (ref 0–0.5)
INHALED O2 CONCENTRATION: 100 %
INHALED O2 CONCENTRATION: 30 %
INHALED O2 CONCENTRATION: 36 %
INHALED O2 CONCENTRATION: 50 %
INHALED O2 CONCENTRATION: 60 %
INHALED O2 CONCENTRATION: 80 %
INR PPP: 1.31 (ref 0.9–1.1)
INR PPP: 1.4 (ref 0.9–1.1)
INR PPP: 2.14 (ref 0.9–1.1)
INR PPP: 3.3 (ref 0.9–1.1)
INR PPP: 4.25 (ref 0.9–1.1)
LARGE PLATELETS: ABNORMAL
LARGE PLATELETS: NORMAL
LEFT ATRIUM VOLUME INDEX: 35 ML/M2
LYMPHOCYTES # BLD AUTO: 0.3 10*3/MM3 (ref 0.7–3.1)
LYMPHOCYTES # BLD AUTO: 0.31 10*3/MM3 (ref 0.7–3.1)
LYMPHOCYTES # BLD AUTO: 0.39 10*3/MM3 (ref 0.7–3.1)
LYMPHOCYTES # BLD AUTO: 0.44 10*3/MM3 (ref 0.7–3.1)
LYMPHOCYTES # BLD AUTO: 0.64 10*3/MM3 (ref 0.7–3.1)
LYMPHOCYTES # BLD AUTO: 0.73 10*3/MM3 (ref 0.7–3.1)
LYMPHOCYTES # BLD AUTO: 0.75 10*3/MM3 (ref 0.7–3.1)
LYMPHOCYTES # BLD AUTO: 0.87 10*3/MM3 (ref 0.7–3.1)
LYMPHOCYTES # BLD AUTO: 1.02 10*3/MM3 (ref 0.7–3.1)
LYMPHOCYTES # BLD MANUAL: 1.19 10*3/MM3 (ref 0.7–3.1)
LYMPHOCYTES NFR BLD AUTO: 1.8 % (ref 19.6–45.3)
LYMPHOCYTES NFR BLD AUTO: 10.1 % (ref 19.6–45.3)
LYMPHOCYTES NFR BLD AUTO: 2.7 % (ref 19.6–45.3)
LYMPHOCYTES NFR BLD AUTO: 4.8 % (ref 19.6–45.3)
LYMPHOCYTES NFR BLD AUTO: 4.9 % (ref 19.6–45.3)
LYMPHOCYTES NFR BLD AUTO: 5.1 % (ref 19.6–45.3)
LYMPHOCYTES NFR BLD AUTO: 5.6 % (ref 19.6–45.3)
LYMPHOCYTES NFR BLD AUTO: 7 % (ref 19.6–45.3)
LYMPHOCYTES NFR BLD AUTO: 7.4 % (ref 19.6–45.3)
LYMPHOCYTES NFR BLD MANUAL: 9 % (ref 5–12)
Lab: ABNORMAL
MACROCYTES BLD QL SMEAR: ABNORMAL
MACROCYTES BLD QL SMEAR: NORMAL
MAGNESIUM SERPL-MCNC: 2 MG/DL (ref 1.6–2.4)
MAGNESIUM SERPL-MCNC: 2 MG/DL (ref 1.6–2.4)
MAGNESIUM SERPL-MCNC: 2.3 MG/DL (ref 1.6–2.4)
MCH RBC QN AUTO: 28.3 PG (ref 26.6–33)
MCH RBC QN AUTO: 28.5 PG (ref 26.6–33)
MCH RBC QN AUTO: 28.5 PG (ref 26.6–33)
MCH RBC QN AUTO: 28.7 PG (ref 26.6–33)
MCH RBC QN AUTO: 28.8 PG (ref 26.6–33)
MCH RBC QN AUTO: 28.8 PG (ref 26.6–33)
MCH RBC QN AUTO: 28.9 PG (ref 26.6–33)
MCH RBC QN AUTO: 29 PG (ref 26.6–33)
MCH RBC QN AUTO: 29.1 PG (ref 26.6–33)
MCH RBC QN AUTO: 29.2 PG (ref 26.6–33)
MCH RBC QN AUTO: 29.2 PG (ref 26.6–33)
MCH RBC QN AUTO: 29.5 PG (ref 26.6–33)
MCHC RBC AUTO-ENTMCNC: 27.9 G/DL (ref 31.5–35.7)
MCHC RBC AUTO-ENTMCNC: 30 G/DL (ref 31.5–35.7)
MCHC RBC AUTO-ENTMCNC: 30.3 G/DL (ref 31.5–35.7)
MCHC RBC AUTO-ENTMCNC: 30.5 G/DL (ref 31.5–35.7)
MCHC RBC AUTO-ENTMCNC: 30.5 G/DL (ref 31.5–35.7)
MCHC RBC AUTO-ENTMCNC: 30.7 G/DL (ref 31.5–35.7)
MCHC RBC AUTO-ENTMCNC: 30.9 G/DL (ref 31.5–35.7)
MCHC RBC AUTO-ENTMCNC: 30.9 G/DL (ref 31.5–35.7)
MCHC RBC AUTO-ENTMCNC: 31 G/DL (ref 31.5–35.7)
MCHC RBC AUTO-ENTMCNC: 31.1 G/DL (ref 31.5–35.7)
MCHC RBC AUTO-ENTMCNC: 31.2 G/DL (ref 31.5–35.7)
MCHC RBC AUTO-ENTMCNC: 31.3 G/DL (ref 31.5–35.7)
MCV RBC AUTO: 103.9 FL (ref 79–97)
MCV RBC AUTO: 92.1 FL (ref 79–97)
MCV RBC AUTO: 92.1 FL (ref 79–97)
MCV RBC AUTO: 92.2 FL (ref 79–97)
MCV RBC AUTO: 92.7 FL (ref 79–97)
MCV RBC AUTO: 92.8 FL (ref 79–97)
MCV RBC AUTO: 93 FL (ref 79–97)
MCV RBC AUTO: 93.8 FL (ref 79–97)
MCV RBC AUTO: 94.4 FL (ref 79–97)
MCV RBC AUTO: 94.6 FL (ref 79–97)
MCV RBC AUTO: 94.6 FL (ref 79–97)
MCV RBC AUTO: 95.6 FL (ref 79–97)
MCV RBC AUTO: 96.3 FL (ref 79–97)
MCV RBC AUTO: 97.5 FL (ref 79–97)
METAMYELOCYTES NFR BLD MANUAL: 2 % (ref 0–0)
METHGB BLD QL: 0 % (ref 0–3)
METHGB BLD QL: 0 % (ref 0–3)
METHGB BLD QL: 0.2 % (ref 0–3)
METHGB BLD QL: 0.2 % (ref 0–3)
METHGB BLD QL: 0.4 % (ref 0–3)
METHGB BLD QL: 0.4 % (ref 0–3)
METHGB BLD QL: 0.9 % (ref 0–3)
METHGB BLD QL: <-0.1 % (ref 0–3)
MODALITY: ABNORMAL
MONOCYTES # BLD AUTO: 0.58 10*3/MM3 (ref 0.1–0.9)
MONOCYTES # BLD AUTO: 0.75 10*3/MM3 (ref 0.1–0.9)
MONOCYTES # BLD AUTO: 0.77 10*3/MM3 (ref 0.1–0.9)
MONOCYTES # BLD AUTO: 0.84 10*3/MM3 (ref 0.1–0.9)
MONOCYTES # BLD AUTO: 0.95 10*3/MM3 (ref 0.1–0.9)
MONOCYTES # BLD AUTO: 1 10*3/MM3 (ref 0.1–0.9)
MONOCYTES # BLD AUTO: 1.05 10*3/MM3 (ref 0.1–0.9)
MONOCYTES # BLD AUTO: 1.15 10*3/MM3 (ref 0.1–0.9)
MONOCYTES # BLD AUTO: 1.39 10*3/MM3 (ref 0.1–0.9)
MONOCYTES # BLD: 3.58 10*3/MM3 (ref 0.1–0.9)
MONOCYTES NFR BLD AUTO: 11 % (ref 5–12)
MONOCYTES NFR BLD AUTO: 4.4 % (ref 5–12)
MONOCYTES NFR BLD AUTO: 6.8 % (ref 5–12)
MONOCYTES NFR BLD AUTO: 8 % (ref 5–12)
MONOCYTES NFR BLD AUTO: 8.3 % (ref 5–12)
MONOCYTES NFR BLD AUTO: 8.3 % (ref 5–12)
MONOCYTES NFR BLD AUTO: 9 % (ref 5–12)
MONOCYTES NFR BLD AUTO: 9.4 % (ref 5–12)
MONOCYTES NFR BLD AUTO: 9.9 % (ref 5–12)
MYELOCYTES NFR BLD MANUAL: 2 % (ref 0–0)
NEUTROPHILS # BLD AUTO: 33.42 10*3/MM3 (ref 1.7–7)
NEUTROPHILS NFR BLD AUTO: 10.14 10*3/MM3 (ref 1.7–7)
NEUTROPHILS NFR BLD AUTO: 11.19 10*3/MM3 (ref 1.7–7)
NEUTROPHILS NFR BLD AUTO: 12.96 10*3/MM3 (ref 1.7–7)
NEUTROPHILS NFR BLD AUTO: 15.63 10*3/MM3 (ref 1.7–7)
NEUTROPHILS NFR BLD AUTO: 6.91 10*3/MM3 (ref 1.7–7)
NEUTROPHILS NFR BLD AUTO: 7.49 10*3/MM3 (ref 1.7–7)
NEUTROPHILS NFR BLD AUTO: 79.7 % (ref 42.7–76)
NEUTROPHILS NFR BLD AUTO: 79.8 % (ref 42.7–76)
NEUTROPHILS NFR BLD AUTO: 8.1 10*3/MM3 (ref 1.7–7)
NEUTROPHILS NFR BLD AUTO: 8.14 10*3/MM3 (ref 1.7–7)
NEUTROPHILS NFR BLD AUTO: 8.38 10*3/MM3 (ref 1.7–7)
NEUTROPHILS NFR BLD AUTO: 80.6 % (ref 42.7–76)
NEUTROPHILS NFR BLD AUTO: 84.2 % (ref 42.7–76)
NEUTROPHILS NFR BLD AUTO: 84.8 % (ref 42.7–76)
NEUTROPHILS NFR BLD AUTO: 85.8 % (ref 42.7–76)
NEUTROPHILS NFR BLD AUTO: 87.3 % (ref 42.7–76)
NEUTROPHILS NFR BLD AUTO: 88.1 % (ref 42.7–76)
NEUTROPHILS NFR BLD AUTO: 92.1 % (ref 42.7–76)
NEUTROPHILS NFR BLD MANUAL: 77 % (ref 42.7–76)
NEUTS BAND NFR BLD MANUAL: 7 % (ref 0–5)
NEUTS VAC BLD QL SMEAR: ABNORMAL
NOTIFIED BY: ABNORMAL
NOTIFIED WHO: ABNORMAL
NRBC BLD AUTO-RTO: 0 /100 WBC (ref 0–0.2)
NRBC BLD AUTO-RTO: 0.1 /100 WBC (ref 0–0.2)
NRBC BLD AUTO-RTO: 0.2 /100 WBC (ref 0–0.2)
NRBC BLD AUTO-RTO: 0.3 /100 WBC (ref 0–0.2)
NRBC SPEC MANUAL: 2 /100 WBC (ref 0–0.2)
NT-PROBNP SERPL-MCNC: ABNORMAL PG/ML (ref 0–1800)
OXYHGB MFR BLDV: 34.7 % (ref 45–75)
OXYHGB MFR BLDV: 73.3 % (ref 45–75)
OXYHGB MFR BLDV: 81.2 % (ref 45–75)
OXYHGB MFR BLDV: 90.7 % (ref 94–99)
OXYHGB MFR BLDV: 93.6 % (ref 94–99)
OXYHGB MFR BLDV: 94.9 % (ref 94–99)
OXYHGB MFR BLDV: 96.8 % (ref 94–99)
OXYHGB MFR BLDV: >99 % (ref 94–99)
PCO2 BLDA: 32 MM HG (ref 35–45)
PCO2 BLDA: 32.9 MM HG (ref 35–45)
PCO2 BLDA: 37.9 MM HG (ref 35–45)
PCO2 BLDA: 39.3 MM HG (ref 35–45)
PCO2 BLDA: 43.4 MM HG (ref 35–45)
PCO2 BLDV: 30.8 MM HG (ref 41–51)
PCO2 BLDV: 41.5 MM HG (ref 41–51)
PCO2 BLDV: 46.4 MM HG (ref 41–51)
PCO2 TEMP ADJ BLD: ABNORMAL MM[HG]
PEEP RESPIRATORY: 5 CM[H2O]
PH BLDA: 7.19 PH UNITS (ref 7.35–7.45)
PH BLDA: 7.44 PH UNITS (ref 7.35–7.45)
PH BLDA: 7.44 PH UNITS (ref 7.35–7.45)
PH BLDA: 7.5 PH UNITS (ref 7.35–7.45)
PH BLDA: 7.56 PH UNITS (ref 7.35–7.45)
PH BLDV: 7.05 PH UNITS (ref 7.32–7.42)
PH BLDV: 7.08 PH UNITS (ref 7.32–7.42)
PH BLDV: 7.36 PH UNITS (ref 7.32–7.42)
PH, TEMP CORRECTED: ABNORMAL
PHOSPHATE SERPL-MCNC: 7.3 MG/DL (ref 2.5–4.5)
PLATELET # BLD AUTO: 146 10*3/MM3 (ref 140–450)
PLATELET # BLD AUTO: 148 10*3/MM3 (ref 140–450)
PLATELET # BLD AUTO: 149 10*3/MM3 (ref 140–450)
PLATELET # BLD AUTO: 158 10*3/MM3 (ref 140–450)
PLATELET # BLD AUTO: 159 10*3/MM3 (ref 140–450)
PLATELET # BLD AUTO: 166 10*3/MM3 (ref 140–450)
PLATELET # BLD AUTO: 171 10*3/MM3 (ref 140–450)
PLATELET # BLD AUTO: 172 10*3/MM3 (ref 140–450)
PLATELET # BLD AUTO: 182 10*3/MM3 (ref 140–450)
PLATELET # BLD AUTO: 183 10*3/MM3 (ref 140–450)
PLATELET # BLD AUTO: 193 10*3/MM3 (ref 140–450)
PLATELET # BLD AUTO: 267 10*3/MM3 (ref 140–450)
PLATELET # BLD AUTO: 285 10*3/MM3 (ref 140–450)
PLATELET # BLD AUTO: 293 10*3/MM3 (ref 140–450)
PMV BLD AUTO: 10 FL (ref 6–12)
PMV BLD AUTO: 10.2 FL (ref 6–12)
PMV BLD AUTO: 10.3 FL (ref 6–12)
PMV BLD AUTO: 10.5 FL (ref 6–12)
PMV BLD AUTO: 10.7 FL (ref 6–12)
PMV BLD AUTO: 11.1 FL (ref 6–12)
PMV BLD AUTO: 9.3 FL (ref 6–12)
PMV BLD AUTO: 9.7 FL (ref 6–12)
PMV BLD AUTO: 9.7 FL (ref 6–12)
PMV BLD AUTO: 9.8 FL (ref 6–12)
PO2 BLDA: 140 MM HG (ref 83–108)
PO2 BLDA: 291 MM HG (ref 83–108)
PO2 BLDA: 59 MM HG (ref 83–108)
PO2 BLDA: 67.3 MM HG (ref 83–108)
PO2 BLDA: 73.1 MM HG (ref 83–108)
PO2 BLDV: 27.8 MM HG (ref 27–53)
PO2 BLDV: 43.8 MM HG (ref 27–53)
PO2 BLDV: 62.6 MM HG (ref 27–53)
PO2 TEMP ADJ BLD: ABNORMAL MM[HG]
POIKILOCYTOSIS BLD QL SMEAR: NORMAL
POIKILOCYTOSIS BLD QL SMEAR: NORMAL
POLYCHROMASIA BLD QL SMEAR: ABNORMAL
POLYCHROMASIA BLD QL SMEAR: NORMAL
POTASSIUM SERPL-SCNC: 3.4 MMOL/L (ref 3.5–5.2)
POTASSIUM SERPL-SCNC: 3.6 MMOL/L (ref 3.5–5.2)
POTASSIUM SERPL-SCNC: 3.7 MMOL/L (ref 3.5–5.2)
POTASSIUM SERPL-SCNC: 3.8 MMOL/L (ref 3.5–5.2)
POTASSIUM SERPL-SCNC: 3.8 MMOL/L (ref 3.5–5.2)
POTASSIUM SERPL-SCNC: 4.3 MMOL/L (ref 3.5–5.2)
POTASSIUM SERPL-SCNC: 4.5 MMOL/L (ref 3.5–5.2)
POTASSIUM SERPL-SCNC: 4.6 MMOL/L (ref 3.5–5.2)
POTASSIUM SERPL-SCNC: 5.4 MMOL/L (ref 3.5–5.2)
POTASSIUM SERPL-SCNC: 5.5 MMOL/L (ref 3.5–5.2)
POTASSIUM SERPL-SCNC: 5.8 MMOL/L (ref 3.5–5.2)
POTASSIUM SERPL-SCNC: 6.2 MMOL/L (ref 3.5–5.2)
PROT SERPL-MCNC: 4.5 G/DL (ref 6–8.5)
PROT SERPL-MCNC: 4.7 G/DL (ref 6–8.5)
PROT SERPL-MCNC: 4.8 G/DL (ref 6–8.5)
PROT SERPL-MCNC: 4.8 G/DL (ref 6–8.5)
PROT SERPL-MCNC: 5.2 G/DL (ref 6–8.5)
PROT SERPL-MCNC: 5.2 G/DL (ref 6–8.5)
PROT SERPL-MCNC: 5.3 G/DL (ref 6–8.5)
PROT SERPL-MCNC: 5.6 G/DL (ref 6–8.5)
PROT SERPL-MCNC: 5.7 G/DL (ref 6–8.5)
PROTHROMBIN TIME: 16.4 SECONDS (ref 12.1–14.7)
PROTHROMBIN TIME: 17.2 SECONDS (ref 12.1–14.7)
PROTHROMBIN TIME: 23.9 SECONDS (ref 12.1–14.7)
PROTHROMBIN TIME: 33.5 SECONDS (ref 12.1–14.7)
PROTHROMBIN TIME: 40.8 SECONDS (ref 12.1–14.7)
QT INTERVAL: 302 MS
QT INTERVAL: 316 MS
QT INTERVAL: 348 MS
QT INTERVAL: 378 MS
QT INTERVAL: 392 MS
QT INTERVAL: 402 MS
QTC INTERVAL: 416 MS
QTC INTERVAL: 469 MS
QTC INTERVAL: 497 MS
QTC INTERVAL: 523 MS
QTC INTERVAL: 553 MS
QTC INTERVAL: 553 MS
RBC # BLD AUTO: 2 10*6/MM3 (ref 4.14–5.8)
RBC # BLD AUTO: 2.09 10*6/MM3 (ref 4.14–5.8)
RBC # BLD AUTO: 2.56 10*6/MM3 (ref 4.14–5.8)
RBC # BLD AUTO: 2.56 10*6/MM3 (ref 4.14–5.8)
RBC # BLD AUTO: 2.65 10*6/MM3 (ref 4.14–5.8)
RBC # BLD AUTO: 2.67 10*6/MM3 (ref 4.14–5.8)
RBC # BLD AUTO: 2.67 10*6/MM3 (ref 4.14–5.8)
RBC # BLD AUTO: 2.73 10*6/MM3 (ref 4.14–5.8)
RBC # BLD AUTO: 2.76 10*6/MM3 (ref 4.14–5.8)
RBC # BLD AUTO: 2.77 10*6/MM3 (ref 4.14–5.8)
RBC # BLD AUTO: 2.78 10*6/MM3 (ref 4.14–5.8)
RBC # BLD AUTO: 3.22 10*6/MM3 (ref 4.14–5.8)
RBC # BLD AUTO: 3.4 10*6/MM3 (ref 4.14–5.8)
RBC # BLD AUTO: 3.63 10*6/MM3 (ref 4.14–5.8)
REF LAB TEST METHOD: NORMAL
RH BLD: NEGATIVE
RH BLD: NEGATIVE
SAO2 % BLDCOA: 93 % (ref 94–99)
SAO2 % BLDCOA: 96.1 % (ref 94–99)
SAO2 % BLDCOA: 97.1 % (ref 94–99)
SAO2 % BLDCOA: 99 % (ref 94–99)
SAO2 % BLDCOA: >99.2 % (ref 94–99)
SAO2 % BLDCOV: 35.5 % (ref 45–75)
SAO2 % BLDCOV: 75 % (ref 45–75)
SAO2 % BLDCOV: 83.2 % (ref 45–75)
SARS-COV-2 RNA RESP QL NAA+PROBE: NOT DETECTED
SET MECH RESP RATE: 16
SET MECH RESP RATE: 18
SET MECH RESP RATE: 20
SET MECH RESP RATE: 20
SET MECH RESP RATE: 26
SODIUM SERPL-SCNC: 129 MMOL/L (ref 136–145)
SODIUM SERPL-SCNC: 136 MMOL/L (ref 136–145)
SODIUM SERPL-SCNC: 137 MMOL/L (ref 136–145)
SODIUM SERPL-SCNC: 138 MMOL/L (ref 136–145)
SODIUM SERPL-SCNC: 139 MMOL/L (ref 136–145)
SODIUM SERPL-SCNC: 140 MMOL/L (ref 136–145)
SODIUM SERPL-SCNC: 141 MMOL/L (ref 136–145)
SPECIMEN PREPARATION: NORMAL
T&S EXPIRATION DATE: NORMAL
TOXIC GRANULATION: ABNORMAL
TROPONIN T DELTA: -33 NG/L
TROPONIN T DELTA: 0 NG/L
TROPONIN T DELTA: 10 NG/L
TROPONIN T SERPL HS-MCNC: 178 NG/L
TROPONIN T SERPL HS-MCNC: 183 NG/L
TROPONIN T SERPL HS-MCNC: 237 NG/L
TSH SERPL DL<=0.05 MIU/L-ACNC: 5.16 UIU/ML (ref 0.27–4.2)
UFH PPP CHRO-ACNC: 0.17 IU/ML (ref 0.3–0.7)
UFH PPP CHRO-ACNC: 0.31 IU/ML (ref 0.3–0.7)
UFH PPP CHRO-ACNC: 0.35 IU/ML (ref 0.3–0.7)
UFH PPP CHRO-ACNC: 0.36 IU/ML (ref 0.3–0.7)
UFH PPP CHRO-ACNC: 0.39 IU/ML (ref 0.3–0.7)
UFH PPP CHRO-ACNC: 0.48 IU/ML (ref 0.3–0.7)
UFH PPP CHRO-ACNC: <0.1 IU/ML (ref 0.3–0.7)
UNIT  ABO: NORMAL
UNIT  RH: NORMAL
VANCOMYCIN SERPL-MCNC: 23.2 MCG/ML (ref 5–40)
VANCOMYCIN TROUGH SERPL-MCNC: 14.4 MCG/ML (ref 5–20)
VARIANT LYMPHS NFR BLD MANUAL: 3 % (ref 19.6–45.3)
VENTILATOR MODE: ABNORMAL
VT ON VENT VENT: 480 ML
VT ON VENT VENT: 500 ML
VT ON VENT VENT: 500 ML
WBC NRBC COR # BLD AUTO: 10.02 10*3/MM3 (ref 3.4–10.8)
WBC NRBC COR # BLD AUTO: 10.1 10*3/MM3 (ref 3.4–10.8)
WBC NRBC COR # BLD AUTO: 10.14 10*3/MM3 (ref 3.4–10.8)
WBC NRBC COR # BLD AUTO: 10.14 10*3/MM3 (ref 3.4–10.8)
WBC NRBC COR # BLD AUTO: 10.5 10*3/MM3 (ref 3.4–10.8)
WBC NRBC COR # BLD AUTO: 11.35 10*3/MM3 (ref 3.4–10.8)
WBC NRBC COR # BLD AUTO: 11.51 10*3/MM3 (ref 3.4–10.8)
WBC NRBC COR # BLD AUTO: 13.05 10*3/MM3 (ref 3.4–10.8)
WBC NRBC COR # BLD AUTO: 15.4 10*3/MM3 (ref 3.4–10.8)
WBC NRBC COR # BLD AUTO: 16.97 10*3/MM3 (ref 3.4–10.8)
WBC NRBC COR # BLD AUTO: 23.22 10*3/MM3 (ref 3.4–10.8)
WBC NRBC COR # BLD AUTO: 39.78 10*3/MM3 (ref 3.4–10.8)
WBC NRBC COR # BLD AUTO: 8.16 10*3/MM3 (ref 3.4–10.8)
WBC NRBC COR # BLD AUTO: 8.58 10*3/MM3 (ref 3.4–10.8)
WHOLE BLOOD HOLD COAG: NORMAL
WHOLE BLOOD HOLD SPECIMEN: NORMAL

## 2024-01-01 PROCEDURE — 99233 SBSQ HOSP IP/OBS HIGH 50: CPT | Performed by: INTERNAL MEDICINE

## 2024-01-01 PROCEDURE — 86901 BLOOD TYPING SEROLOGIC RH(D): CPT | Performed by: PHYSICIAN ASSISTANT

## 2024-01-01 PROCEDURE — 80053 COMPREHEN METABOLIC PANEL: CPT | Performed by: PHYSICIAN ASSISTANT

## 2024-01-01 PROCEDURE — 86923 COMPATIBILITY TEST ELECTRIC: CPT

## 2024-01-01 PROCEDURE — 87205 SMEAR GRAM STAIN: CPT | Performed by: INTERNAL MEDICINE

## 2024-01-01 PROCEDURE — 82375 ASSAY CARBOXYHB QUANT: CPT

## 2024-01-01 PROCEDURE — 83050 HGB METHEMOGLOBIN QUAN: CPT

## 2024-01-01 PROCEDURE — 84484 ASSAY OF TROPONIN QUANT: CPT | Performed by: INTERNAL MEDICINE

## 2024-01-01 PROCEDURE — 93005 ELECTROCARDIOGRAM TRACING: CPT | Performed by: INTERNAL MEDICINE

## 2024-01-01 PROCEDURE — 25010000002 PIPERACILLIN SOD-TAZOBACTAM PER 1 G

## 2024-01-01 PROCEDURE — 93010 ELECTROCARDIOGRAM REPORT: CPT | Performed by: SPECIALIST

## 2024-01-01 PROCEDURE — 94799 UNLISTED PULMONARY SVC/PX: CPT

## 2024-01-01 PROCEDURE — 94640 AIRWAY INHALATION TREATMENT: CPT

## 2024-01-01 PROCEDURE — 93308 TTE F-UP OR LMTD: CPT

## 2024-01-01 PROCEDURE — 82948 REAGENT STRIP/BLOOD GLUCOSE: CPT

## 2024-01-01 PROCEDURE — 71045 X-RAY EXAM CHEST 1 VIEW: CPT

## 2024-01-01 PROCEDURE — 85730 THROMBOPLASTIN TIME PARTIAL: CPT | Performed by: INTERNAL MEDICINE

## 2024-01-01 PROCEDURE — 25010000002 VASOPRESSIN 20-5 UT/100ML-% SOLUTION: Performed by: STUDENT IN AN ORGANIZED HEALTH CARE EDUCATION/TRAINING PROGRAM

## 2024-01-01 PROCEDURE — 85014 HEMATOCRIT: CPT | Performed by: INTERNAL MEDICINE

## 2024-01-01 PROCEDURE — 94761 N-INVAS EAR/PLS OXIMETRY MLT: CPT

## 2024-01-01 PROCEDURE — 84484 ASSAY OF TROPONIN QUANT: CPT | Performed by: PHYSICIAN ASSISTANT

## 2024-01-01 PROCEDURE — 85027 COMPLETE CBC AUTOMATED: CPT | Performed by: SURGERY

## 2024-01-01 PROCEDURE — 85520 HEPARIN ASSAY: CPT

## 2024-01-01 PROCEDURE — 0B9F8ZZ DRAINAGE OF RIGHT LOWER LUNG LOBE, VIA NATURAL OR ARTIFICIAL OPENING ENDOSCOPIC: ICD-10-PCS | Performed by: INTERNAL MEDICINE

## 2024-01-01 PROCEDURE — 80048 BASIC METABOLIC PNL TOTAL CA: CPT | Performed by: STUDENT IN AN ORGANIZED HEALTH CARE EDUCATION/TRAINING PROGRAM

## 2024-01-01 PROCEDURE — 25810000003 SODIUM CHLORIDE 0.9 % SOLUTION

## 2024-01-01 PROCEDURE — 80053 COMPREHEN METABOLIC PANEL: CPT | Performed by: STUDENT IN AN ORGANIZED HEALTH CARE EDUCATION/TRAINING PROGRAM

## 2024-01-01 PROCEDURE — 93321 DOPPLER ECHO F-UP/LMTD STD: CPT | Performed by: INTERNAL MEDICINE

## 2024-01-01 PROCEDURE — 85025 COMPLETE CBC W/AUTO DIFF WBC: CPT

## 2024-01-01 PROCEDURE — 92950 HEART/LUNG RESUSCITATION CPR: CPT

## 2024-01-01 PROCEDURE — 25010000002 EPINEPHRINE 1 MG/10ML SOLUTION PREFILLED SYRINGE: Performed by: ANESTHESIOLOGY

## 2024-01-01 PROCEDURE — 85610 PROTHROMBIN TIME: CPT | Performed by: INTERNAL MEDICINE

## 2024-01-01 PROCEDURE — 86900 BLOOD TYPING SEROLOGIC ABO: CPT

## 2024-01-01 PROCEDURE — 71045 X-RAY EXAM CHEST 1 VIEW: CPT | Performed by: RADIOLOGY

## 2024-01-01 PROCEDURE — 93005 ELECTROCARDIOGRAM TRACING: CPT | Performed by: PHYSICIAN ASSISTANT

## 2024-01-01 PROCEDURE — 25010000002 GLYCOPYRROLATE 0.4 MG/2ML SOLUTION: Performed by: STUDENT IN AN ORGANIZED HEALTH CARE EDUCATION/TRAINING PROGRAM

## 2024-01-01 PROCEDURE — 99232 SBSQ HOSP IP/OBS MODERATE 35: CPT | Performed by: SURGERY

## 2024-01-01 PROCEDURE — 99232 SBSQ HOSP IP/OBS MODERATE 35: CPT | Performed by: STUDENT IN AN ORGANIZED HEALTH CARE EDUCATION/TRAINING PROGRAM

## 2024-01-01 PROCEDURE — 85520 HEPARIN ASSAY: CPT | Performed by: STUDENT IN AN ORGANIZED HEALTH CARE EDUCATION/TRAINING PROGRAM

## 2024-01-01 PROCEDURE — 85379 FIBRIN DEGRADATION QUANT: CPT | Performed by: PHYSICIAN ASSISTANT

## 2024-01-01 PROCEDURE — 85025 COMPLETE CBC W/AUTO DIFF WBC: CPT | Performed by: INTERNAL MEDICINE

## 2024-01-01 PROCEDURE — 86850 RBC ANTIBODY SCREEN: CPT | Performed by: PHYSICIAN ASSISTANT

## 2024-01-01 PROCEDURE — 25010000002 FENTANYL 10 MCG/1 ML NS: Performed by: INTERNAL MEDICINE

## 2024-01-01 PROCEDURE — 25010000002 DIGOXIN PER 500 MCG: Performed by: INTERNAL MEDICINE

## 2024-01-01 PROCEDURE — 36600 WITHDRAWAL OF ARTERIAL BLOOD: CPT

## 2024-01-01 PROCEDURE — 25010000002 PIPERACILLIN SOD-TAZOBACTAM PER 1 G: Performed by: INTERNAL MEDICINE

## 2024-01-01 PROCEDURE — 25010000002 PROPOFOL 10 MG/ML EMULSION: Performed by: INTERNAL MEDICINE

## 2024-01-01 PROCEDURE — 87040 BLOOD CULTURE FOR BACTERIA: CPT | Performed by: STUDENT IN AN ORGANIZED HEALTH CARE EDUCATION/TRAINING PROGRAM

## 2024-01-01 PROCEDURE — 82805 BLOOD GASES W/O2 SATURATION: CPT

## 2024-01-01 PROCEDURE — 83605 ASSAY OF LACTIC ACID: CPT | Performed by: STUDENT IN AN ORGANIZED HEALTH CARE EDUCATION/TRAINING PROGRAM

## 2024-01-01 PROCEDURE — 99291 CRITICAL CARE FIRST HOUR: CPT | Performed by: INTERNAL MEDICINE

## 2024-01-01 PROCEDURE — 87116 MYCOBACTERIA CULTURE: CPT | Performed by: INTERNAL MEDICINE

## 2024-01-01 PROCEDURE — 25010000002 MORPHINE PER 10 MG: Performed by: STUDENT IN AN ORGANIZED HEALTH CARE EDUCATION/TRAINING PROGRAM

## 2024-01-01 PROCEDURE — 99232 SBSQ HOSP IP/OBS MODERATE 35: CPT | Performed by: INTERNAL MEDICINE

## 2024-01-01 PROCEDURE — 99223 1ST HOSP IP/OBS HIGH 75: CPT | Performed by: INTERNAL MEDICINE

## 2024-01-01 PROCEDURE — 85018 HEMOGLOBIN: CPT | Performed by: INTERNAL MEDICINE

## 2024-01-01 PROCEDURE — 5A1955Z RESPIRATORY VENTILATION, GREATER THAN 96 CONSECUTIVE HOURS: ICD-10-PCS | Performed by: STUDENT IN AN ORGANIZED HEALTH CARE EDUCATION/TRAINING PROGRAM

## 2024-01-01 PROCEDURE — 84443 ASSAY THYROID STIM HORMONE: CPT | Performed by: PHYSICIAN ASSISTANT

## 2024-01-01 PROCEDURE — 99233 SBSQ HOSP IP/OBS HIGH 50: CPT | Performed by: SURGERY

## 2024-01-01 PROCEDURE — 25810000003 SODIUM CHLORIDE 0.9 % SOLUTION: Performed by: INTERNAL MEDICINE

## 2024-01-01 PROCEDURE — 25010000002 CEFTRIAXONE PER 250 MG: Performed by: SURGERY

## 2024-01-01 PROCEDURE — 94003 VENT MGMT INPAT SUBQ DAY: CPT

## 2024-01-01 PROCEDURE — 84100 ASSAY OF PHOSPHORUS: CPT | Performed by: PHYSICIAN ASSISTANT

## 2024-01-01 PROCEDURE — 0DB68ZX EXCISION OF STOMACH, VIA NATURAL OR ARTIFICIAL OPENING ENDOSCOPIC, DIAGNOSTIC: ICD-10-PCS | Performed by: SURGERY

## 2024-01-01 PROCEDURE — 25810000003 SODIUM CHLORIDE 0.9 % SOLUTION: Performed by: STUDENT IN AN ORGANIZED HEALTH CARE EDUCATION/TRAINING PROGRAM

## 2024-01-01 PROCEDURE — 25010000002 MICAFUNGIN SODIUM 100 MG RECONSTITUTED SOLUTION 1 EACH VIAL: Performed by: INTERNAL MEDICINE

## 2024-01-01 PROCEDURE — 93306 TTE W/DOPPLER COMPLETE: CPT | Performed by: SPECIALIST

## 2024-01-01 PROCEDURE — 93308 TTE F-UP OR LMTD: CPT | Performed by: INTERNAL MEDICINE

## 2024-01-01 PROCEDURE — 0B9J8ZZ DRAINAGE OF LEFT LOWER LUNG LOBE, VIA NATURAL OR ARTIFICIAL OPENING ENDOSCOPIC: ICD-10-PCS | Performed by: INTERNAL MEDICINE

## 2024-01-01 PROCEDURE — 43235 EGD DIAGNOSTIC BRUSH WASH: CPT | Performed by: SURGERY

## 2024-01-01 PROCEDURE — 93321 DOPPLER ECHO F-UP/LMTD STD: CPT

## 2024-01-01 PROCEDURE — 87206 SMEAR FLUORESCENT/ACID STAI: CPT | Performed by: INTERNAL MEDICINE

## 2024-01-01 PROCEDURE — 25010000002 MEROPENEM PER 100 MG: Performed by: INTERNAL MEDICINE

## 2024-01-01 PROCEDURE — 25010000002 PHENYLEPHRINE 10 MG/ML SOLUTION: Performed by: INTERNAL MEDICINE

## 2024-01-01 PROCEDURE — 25010000002 VANCOMYCIN 5 G RECONSTITUTED SOLUTION: Performed by: INTERNAL MEDICINE

## 2024-01-01 PROCEDURE — 93005 ELECTROCARDIOGRAM TRACING: CPT | Performed by: STUDENT IN AN ORGANIZED HEALTH CARE EDUCATION/TRAINING PROGRAM

## 2024-01-01 PROCEDURE — 25010000002 HEPARIN (PORCINE) PER 1000 UNITS: Performed by: STUDENT IN AN ORGANIZED HEALTH CARE EDUCATION/TRAINING PROGRAM

## 2024-01-01 PROCEDURE — 5A1D70Z PERFORMANCE OF URINARY FILTRATION, INTERMITTENT, LESS THAN 6 HOURS PER DAY: ICD-10-PCS | Performed by: STUDENT IN AN ORGANIZED HEALTH CARE EDUCATION/TRAINING PROGRAM

## 2024-01-01 PROCEDURE — 83880 ASSAY OF NATRIURETIC PEPTIDE: CPT | Performed by: PHYSICIAN ASSISTANT

## 2024-01-01 PROCEDURE — 99222 1ST HOSP IP/OBS MODERATE 55: CPT | Performed by: SURGERY

## 2024-01-01 PROCEDURE — 85610 PROTHROMBIN TIME: CPT | Performed by: STUDENT IN AN ORGANIZED HEALTH CARE EDUCATION/TRAINING PROGRAM

## 2024-01-01 PROCEDURE — 85007 BL SMEAR W/DIFF WBC COUNT: CPT | Performed by: INTERNAL MEDICINE

## 2024-01-01 PROCEDURE — 25010000002 HEPARIN (PORCINE) 25000-0.45 UT/250ML-% SOLUTION: Performed by: INTERNAL MEDICINE

## 2024-01-01 PROCEDURE — 85007 BL SMEAR W/DIFF WBC COUNT: CPT | Performed by: PHYSICIAN ASSISTANT

## 2024-01-01 PROCEDURE — 63710000001 INSULIN LISPRO (HUMAN) PER 5 UNITS: Performed by: INTERNAL MEDICINE

## 2024-01-01 PROCEDURE — 86140 C-REACTIVE PROTEIN: CPT | Performed by: PHYSICIAN ASSISTANT

## 2024-01-01 PROCEDURE — 99222 1ST HOSP IP/OBS MODERATE 55: CPT | Performed by: INTERNAL MEDICINE

## 2024-01-01 PROCEDURE — 25010000002 POTASSIUM CHLORIDE 10 MEQ/100ML SOLUTION: Performed by: INTERNAL MEDICINE

## 2024-01-01 PROCEDURE — P9016 RBC LEUKOCYTES REDUCED: HCPCS

## 2024-01-01 PROCEDURE — 25010000002 AMIODARONE IN DEXTROSE 5% 150-4.21 MG/100ML-% SOLUTION: Performed by: INTERNAL MEDICINE

## 2024-01-01 PROCEDURE — 80053 COMPREHEN METABOLIC PANEL: CPT | Performed by: INTERNAL MEDICINE

## 2024-01-01 PROCEDURE — 36415 COLL VENOUS BLD VENIPUNCTURE: CPT

## 2024-01-01 PROCEDURE — 25010000002 LINEZOLID 600 MG/300ML SOLUTION: Performed by: INTERNAL MEDICINE

## 2024-01-01 PROCEDURE — 82820 HEMOGLOBIN-OXYGEN AFFINITY: CPT

## 2024-01-01 PROCEDURE — 80202 ASSAY OF VANCOMYCIN: CPT | Performed by: STUDENT IN AN ORGANIZED HEALTH CARE EDUCATION/TRAINING PROGRAM

## 2024-01-01 PROCEDURE — 94002 VENT MGMT INPAT INIT DAY: CPT

## 2024-01-01 PROCEDURE — 0W3P8ZZ CONTROL BLEEDING IN GASTROINTESTINAL TRACT, VIA NATURAL OR ARTIFICIAL OPENING ENDOSCOPIC: ICD-10-PCS | Performed by: SURGERY

## 2024-01-01 PROCEDURE — 87102 FUNGUS ISOLATION CULTURE: CPT | Performed by: INTERNAL MEDICINE

## 2024-01-01 PROCEDURE — 99285 EMERGENCY DEPT VISIT HI MDM: CPT

## 2024-01-01 PROCEDURE — 5A0935A ASSISTANCE WITH RESPIRATORY VENTILATION, LESS THAN 24 CONSECUTIVE HOURS, HIGH NASAL FLOW/VELOCITY: ICD-10-PCS | Performed by: STUDENT IN AN ORGANIZED HEALTH CARE EDUCATION/TRAINING PROGRAM

## 2024-01-01 PROCEDURE — 83735 ASSAY OF MAGNESIUM: CPT | Performed by: INTERNAL MEDICINE

## 2024-01-01 PROCEDURE — 31624 DX BRONCHOSCOPE/LAVAGE: CPT | Performed by: INTERNAL MEDICINE

## 2024-01-01 PROCEDURE — 87636 SARSCOV2 & INF A&B AMP PRB: CPT | Performed by: INTERNAL MEDICINE

## 2024-01-01 PROCEDURE — 87040 BLOOD CULTURE FOR BACTERIA: CPT | Performed by: INTERNAL MEDICINE

## 2024-01-01 PROCEDURE — 99239 HOSP IP/OBS DSCHRG MGMT >30: CPT | Performed by: STUDENT IN AN ORGANIZED HEALTH CARE EDUCATION/TRAINING PROGRAM

## 2024-01-01 PROCEDURE — 0BH17EZ INSERTION OF ENDOTRACHEAL AIRWAY INTO TRACHEA, VIA NATURAL OR ARTIFICIAL OPENING: ICD-10-PCS | Performed by: STUDENT IN AN ORGANIZED HEALTH CARE EDUCATION/TRAINING PROGRAM

## 2024-01-01 PROCEDURE — 85520 HEPARIN ASSAY: CPT | Performed by: INTERNAL MEDICINE

## 2024-01-01 PROCEDURE — 83605 ASSAY OF LACTIC ACID: CPT | Performed by: INTERNAL MEDICINE

## 2024-01-01 PROCEDURE — 99221 1ST HOSP IP/OBS SF/LOW 40: CPT | Performed by: INTERNAL MEDICINE

## 2024-01-01 PROCEDURE — 85027 COMPLETE CBC AUTOMATED: CPT | Performed by: INTERNAL MEDICINE

## 2024-01-01 PROCEDURE — 25010000002 VANCOMYCIN 5 G RECONSTITUTED SOLUTION: Performed by: STUDENT IN AN ORGANIZED HEALTH CARE EDUCATION/TRAINING PROGRAM

## 2024-01-01 PROCEDURE — 86900 BLOOD TYPING SEROLOGIC ABO: CPT | Performed by: PHYSICIAN ASSISTANT

## 2024-01-01 PROCEDURE — 25010000002 CEFTRIAXONE PER 250 MG: Performed by: INTERNAL MEDICINE

## 2024-01-01 PROCEDURE — 25010000002 HYDROCORTISONE SOD SUC (PF) 100 MG RECONSTITUTED SOLUTION: Performed by: INTERNAL MEDICINE

## 2024-01-01 PROCEDURE — 80048 BASIC METABOLIC PNL TOTAL CA: CPT | Performed by: INTERNAL MEDICINE

## 2024-01-01 PROCEDURE — 43239 EGD BIOPSY SINGLE/MULTIPLE: CPT | Performed by: SURGERY

## 2024-01-01 PROCEDURE — 25010000002 AMIODARONE IN DEXTROSE 5% 360-4.14 MG/200ML-% SOLUTION: Performed by: INTERNAL MEDICINE

## 2024-01-01 PROCEDURE — 87186 SC STD MICRODIL/AGAR DIL: CPT | Performed by: INTERNAL MEDICINE

## 2024-01-01 PROCEDURE — 93306 TTE W/DOPPLER COMPLETE: CPT

## 2024-01-01 PROCEDURE — 85027 COMPLETE CBC AUTOMATED: CPT | Performed by: STUDENT IN AN ORGANIZED HEALTH CARE EDUCATION/TRAINING PROGRAM

## 2024-01-01 PROCEDURE — 87071 CULTURE AEROBIC QUANT OTHER: CPT | Performed by: INTERNAL MEDICINE

## 2024-01-01 PROCEDURE — 85025 COMPLETE CBC W/AUTO DIFF WBC: CPT | Performed by: PHYSICIAN ASSISTANT

## 2024-01-01 PROCEDURE — 86901 BLOOD TYPING SEROLOGIC RH(D): CPT

## 2024-01-01 PROCEDURE — 85610 PROTHROMBIN TIME: CPT | Performed by: PHYSICIAN ASSISTANT

## 2024-01-01 PROCEDURE — 5A12012 PERFORMANCE OF CARDIAC OUTPUT, SINGLE, MANUAL: ICD-10-PCS | Performed by: STUDENT IN AN ORGANIZED HEALTH CARE EDUCATION/TRAINING PROGRAM

## 2024-01-01 PROCEDURE — 25010000002 AMIODARONE PER 30 MG: Performed by: INTERNAL MEDICINE

## 2024-01-01 PROCEDURE — 83735 ASSAY OF MAGNESIUM: CPT | Performed by: PHYSICIAN ASSISTANT

## 2024-01-01 PROCEDURE — 0 DEXTROSE 5 % SOLUTION: Performed by: INTERNAL MEDICINE

## 2024-01-01 RX ORDER — AMOXICILLIN 250 MG
2 CAPSULE ORAL 2 TIMES DAILY
Status: DISCONTINUED | OUTPATIENT
Start: 2024-01-01 | End: 2024-01-01

## 2024-01-01 RX ORDER — HEPARIN SODIUM 5000 [USP'U]/ML
2000 INJECTION, SOLUTION INTRAVENOUS; SUBCUTANEOUS AS NEEDED
Status: DISCONTINUED | OUTPATIENT
Start: 2024-01-01 | End: 2024-01-01

## 2024-01-01 RX ORDER — ACETAMINOPHEN 325 MG/1
650 TABLET ORAL 3 TIMES DAILY
Status: DISCONTINUED | OUTPATIENT
Start: 2024-01-01 | End: 2024-01-01

## 2024-01-01 RX ORDER — PANTOPRAZOLE SODIUM 40 MG/10ML
40 INJECTION, POWDER, LYOPHILIZED, FOR SOLUTION INTRAVENOUS
Status: DISCONTINUED | OUTPATIENT
Start: 2024-01-01 | End: 2024-01-01

## 2024-01-01 RX ORDER — ACETAMINOPHEN 325 MG/1
650 TABLET ORAL 3 TIMES DAILY
COMMUNITY

## 2024-01-01 RX ORDER — SEVELAMER CARBONATE 800 MG/1
800 TABLET, FILM COATED ORAL
Status: DISCONTINUED | OUTPATIENT
Start: 2024-01-01 | End: 2024-01-01

## 2024-01-01 RX ORDER — FENTANYL CITRATE 50 UG/ML
50 INJECTION, SOLUTION INTRAMUSCULAR; INTRAVENOUS
Status: DISCONTINUED | OUTPATIENT
Start: 2024-01-01 | End: 2024-01-01 | Stop reason: HOSPADM

## 2024-01-01 RX ORDER — ACETAMINOPHEN 500 MG
500 TABLET ORAL EVERY 4 HOURS PRN
Status: DISCONTINUED | OUTPATIENT
Start: 2024-01-01 | End: 2024-01-01

## 2024-01-01 RX ORDER — SODIUM CHLORIDE, SODIUM LACTATE, POTASSIUM CHLORIDE, CALCIUM CHLORIDE 600; 310; 30; 20 MG/100ML; MG/100ML; MG/100ML; MG/100ML
125 INJECTION, SOLUTION INTRAVENOUS ONCE
Status: DISCONTINUED | OUTPATIENT
Start: 2024-01-01 | End: 2024-01-01 | Stop reason: HOSPADM

## 2024-01-01 RX ORDER — SEVELAMER CARBONATE 800 MG/1
2400 TABLET, FILM COATED ORAL
Status: DISCONTINUED | OUTPATIENT
Start: 2024-01-01 | End: 2024-01-01

## 2024-01-01 RX ORDER — NITROGLYCERIN 0.4 MG/1
0.4 TABLET SUBLINGUAL
Status: DISCONTINUED | OUTPATIENT
Start: 2024-01-01 | End: 2024-01-01

## 2024-01-01 RX ORDER — INSULIN LISPRO 100 [IU]/ML
2-7 INJECTION, SOLUTION INTRAVENOUS; SUBCUTANEOUS
Status: DISCONTINUED | OUTPATIENT
Start: 2024-01-01 | End: 2024-01-01

## 2024-01-01 RX ORDER — PHENYLEPHRINE HCL IN 0.9% NACL 0.5 MG/5ML
.5-3 SYRINGE (ML) INTRAVENOUS
Status: DISCONTINUED | OUTPATIENT
Start: 2024-01-01 | End: 2024-01-01

## 2024-01-01 RX ORDER — LIDOCAINE HYDROCHLORIDE 20 MG/ML
5 SOLUTION OROPHARYNGEAL EVERY 6 HOURS PRN
Status: DISCONTINUED | OUTPATIENT
Start: 2024-01-01 | End: 2024-01-01

## 2024-01-01 RX ORDER — ATORVASTATIN CALCIUM 40 MG/1
40 TABLET, FILM COATED ORAL NIGHTLY
Status: DISCONTINUED | OUTPATIENT
Start: 2024-01-01 | End: 2024-01-01

## 2024-01-01 RX ORDER — BISACODYL 5 MG/1
5 TABLET, DELAYED RELEASE ORAL DAILY PRN
Status: DISCONTINUED | OUTPATIENT
Start: 2024-01-01 | End: 2024-01-01

## 2024-01-01 RX ORDER — SODIUM CHLORIDE 0.9 % (FLUSH) 0.9 %
10 SYRINGE (ML) INJECTION AS NEEDED
Status: DISCONTINUED | OUTPATIENT
Start: 2024-01-01 | End: 2024-01-01 | Stop reason: HOSPADM

## 2024-01-01 RX ORDER — INSULIN LISPRO 100 [IU]/ML
2-8 INJECTION, SOLUTION INTRAVENOUS; SUBCUTANEOUS
Status: CANCELLED | OUTPATIENT
Start: 2024-01-01

## 2024-01-01 RX ORDER — AMOXICILLIN 250 MG
2 CAPSULE ORAL 2 TIMES DAILY PRN
Status: DISCONTINUED | OUTPATIENT
Start: 2024-01-01 | End: 2024-01-01

## 2024-01-01 RX ORDER — FENTANYL CITRATE-0.9 % NACL/PF 10 MCG/ML
50-300 PLASTIC BAG, INJECTION (ML) INTRAVENOUS
Status: DISCONTINUED | OUTPATIENT
Start: 2024-01-01 | End: 2024-01-01 | Stop reason: ALTCHOICE

## 2024-01-01 RX ORDER — SUCRALFATE 1 G/1
1 TABLET ORAL
Status: DISCONTINUED | OUTPATIENT
Start: 2024-01-01 | End: 2024-01-01

## 2024-01-01 RX ORDER — SODIUM CHLORIDE 9 MG/ML
40 INJECTION, SOLUTION INTRAVENOUS AS NEEDED
Status: DISCONTINUED | OUTPATIENT
Start: 2024-01-01 | End: 2024-01-01 | Stop reason: HOSPADM

## 2024-01-01 RX ORDER — BISACODYL 10 MG
10 SUPPOSITORY, RECTAL RECTAL DAILY PRN
Status: DISCONTINUED | OUTPATIENT
Start: 2024-01-01 | End: 2024-01-01

## 2024-01-01 RX ORDER — BISACODYL 10 MG
10 SUPPOSITORY, RECTAL RECTAL DAILY PRN
Status: CANCELLED | OUTPATIENT
Start: 2024-01-01

## 2024-01-01 RX ORDER — CHLORHEXIDINE GLUCONATE 0.12 MG/ML
15 RINSE ORAL EVERY 12 HOURS SCHEDULED
Status: DISCONTINUED | OUTPATIENT
Start: 2024-01-01 | End: 2024-01-01 | Stop reason: HOSPADM

## 2024-01-01 RX ORDER — IPRATROPIUM BROMIDE AND ALBUTEROL SULFATE 2.5; .5 MG/3ML; MG/3ML
3 SOLUTION RESPIRATORY (INHALATION) ONCE AS NEEDED
Status: DISCONTINUED | OUTPATIENT
Start: 2024-01-01 | End: 2024-01-01 | Stop reason: HOSPADM

## 2024-01-01 RX ORDER — ONDANSETRON 2 MG/ML
4 INJECTION INTRAMUSCULAR; INTRAVENOUS AS NEEDED
Status: DISCONTINUED | OUTPATIENT
Start: 2024-01-01 | End: 2024-01-01 | Stop reason: HOSPADM

## 2024-01-01 RX ORDER — LACTULOSE 10 G/15ML
20 SOLUTION ORAL DAILY PRN
Status: DISCONTINUED | OUTPATIENT
Start: 2024-01-01 | End: 2024-01-01

## 2024-01-01 RX ORDER — AMMONIUM LACTATE 12 G/100G
1 CREAM TOPICAL NIGHTLY
Status: DISCONTINUED | OUTPATIENT
Start: 2024-01-01 | End: 2024-01-01 | Stop reason: HOSPADM

## 2024-01-01 RX ORDER — DEXTROSE MONOHYDRATE 50 MG/ML
50 INJECTION, SOLUTION INTRAVENOUS CONTINUOUS
Status: DISCONTINUED | OUTPATIENT
Start: 2024-01-01 | End: 2024-01-01

## 2024-01-01 RX ORDER — HEPARIN SODIUM 5000 [USP'U]/ML
4000 INJECTION, SOLUTION INTRAVENOUS; SUBCUTANEOUS ONCE
Status: DISCONTINUED | OUTPATIENT
Start: 2024-01-01 | End: 2024-01-01 | Stop reason: SDUPTHER

## 2024-01-01 RX ORDER — HEPARIN SODIUM 5000 [USP'U]/ML
2000 INJECTION, SOLUTION INTRAVENOUS; SUBCUTANEOUS ONCE
Status: COMPLETED | OUTPATIENT
Start: 2024-01-01 | End: 2024-01-01

## 2024-01-01 RX ORDER — GABAPENTIN 100 MG/1
200 CAPSULE ORAL NIGHTLY
Status: DISCONTINUED | OUTPATIENT
Start: 2024-01-01 | End: 2024-01-01

## 2024-01-01 RX ORDER — ONDANSETRON 4 MG/1
4 TABLET, ORALLY DISINTEGRATING ORAL EVERY 8 HOURS PRN
Status: DISCONTINUED | OUTPATIENT
Start: 2024-01-01 | End: 2024-01-01

## 2024-01-01 RX ORDER — CLOPIDOGREL BISULFATE 75 MG/1
75 TABLET ORAL DAILY
Status: DISCONTINUED | OUTPATIENT
Start: 2024-01-01 | End: 2024-01-01

## 2024-01-01 RX ORDER — NICOTINE 21 MG/24HR
1 PATCH, TRANSDERMAL 24 HOURS TRANSDERMAL
Status: DISCONTINUED | OUTPATIENT
Start: 2024-01-01 | End: 2024-01-01

## 2024-01-01 RX ORDER — ACETAMINOPHEN 325 MG/1
650 TABLET ORAL 3 TIMES WEEKLY
Status: DISCONTINUED | OUTPATIENT
Start: 2024-01-01 | End: 2024-01-01

## 2024-01-01 RX ORDER — HEPARIN SODIUM 10000 [USP'U]/100ML
11.7 INJECTION, SOLUTION INTRAVENOUS
Status: DISCONTINUED | OUTPATIENT
Start: 2024-01-01 | End: 2024-01-01

## 2024-01-01 RX ORDER — NOREPINEPHRINE BITARTRATE 0.03 MG/ML
.02-1 INJECTION, SOLUTION INTRAVENOUS
Status: DISCONTINUED | OUTPATIENT
Start: 2024-01-01 | End: 2024-01-01

## 2024-01-01 RX ORDER — SODIUM CHLORIDE 0.9 % (FLUSH) 0.9 %
10 SYRINGE (ML) INJECTION EVERY 12 HOURS SCHEDULED
Status: DISCONTINUED | OUTPATIENT
Start: 2024-01-01 | End: 2024-01-01 | Stop reason: HOSPADM

## 2024-01-01 RX ORDER — NITROGLYCERIN 0.4 MG/1
0.4 TABLET SUBLINGUAL
Status: CANCELLED | OUTPATIENT
Start: 2024-01-01

## 2024-01-01 RX ORDER — AMMONIUM LACTATE 120 MG/G
1 CREAM TOPICAL NIGHTLY
COMMUNITY

## 2024-01-01 RX ORDER — GLYCOPYRROLATE 0.2 MG/ML
0.4 INJECTION INTRAMUSCULAR; INTRAVENOUS EVERY 6 HOURS PRN
Status: DISCONTINUED | OUTPATIENT
Start: 2024-01-01 | End: 2024-01-01 | Stop reason: HOSPADM

## 2024-01-01 RX ORDER — GLUCAGON 1 MG/ML
1 KIT INJECTION
Status: DISCONTINUED | OUTPATIENT
Start: 2024-01-01 | End: 2024-01-01

## 2024-01-01 RX ORDER — POLYETHYLENE GLYCOL 3350 17 G/17G
17 POWDER, FOR SOLUTION ORAL DAILY PRN
Status: DISCONTINUED | OUTPATIENT
Start: 2024-01-01 | End: 2024-01-01

## 2024-01-01 RX ORDER — NICOTINE POLACRILEX 4 MG
15 LOZENGE BUCCAL
Status: DISCONTINUED | OUTPATIENT
Start: 2024-01-01 | End: 2024-01-01

## 2024-01-01 RX ORDER — MORPHINE SULFATE 2 MG/ML
2 INJECTION, SOLUTION INTRAMUSCULAR; INTRAVENOUS
Status: DISCONTINUED | OUTPATIENT
Start: 2024-01-01 | End: 2024-01-01 | Stop reason: HOSPADM

## 2024-01-01 RX ORDER — POLYETHYLENE GLYCOL 3350 17 G/17G
0.5 POWDER, FOR SOLUTION ORAL EVERY 12 HOURS PRN
Status: CANCELLED | OUTPATIENT
Start: 2024-01-01

## 2024-01-01 RX ORDER — METOPROLOL TARTRATE 25 MG/1
12.5 TABLET, FILM COATED ORAL EVERY 12 HOURS SCHEDULED
Status: DISCONTINUED | OUTPATIENT
Start: 2024-01-01 | End: 2024-01-01

## 2024-01-01 RX ORDER — HEPARIN SODIUM 5000 [USP'U]/ML
4000 INJECTION, SOLUTION INTRAVENOUS; SUBCUTANEOUS AS NEEDED
Status: DISCONTINUED | OUTPATIENT
Start: 2024-01-01 | End: 2024-01-01

## 2024-01-01 RX ORDER — VANCOMYCIN 2 GRAM/500 ML IN 0.9 % SODIUM CHLORIDE INTRAVENOUS
2000 ONCE
Status: COMPLETED | OUTPATIENT
Start: 2024-01-01 | End: 2024-01-01

## 2024-01-01 RX ORDER — LINEZOLID 2 MG/ML
600 INJECTION, SOLUTION INTRAVENOUS EVERY 12 HOURS
Status: DISCONTINUED | OUTPATIENT
Start: 2024-01-01 | End: 2024-01-01

## 2024-01-01 RX ORDER — LORAZEPAM 2 MG/ML
1 INJECTION INTRAMUSCULAR
Status: DISCONTINUED | OUTPATIENT
Start: 2024-01-01 | End: 2024-01-01 | Stop reason: HOSPADM

## 2024-01-01 RX ORDER — HYDROCODONE BITARTRATE AND ACETAMINOPHEN 5; 325 MG/1; MG/1
1 TABLET ORAL EVERY 12 HOURS PRN
Status: CANCELLED | OUTPATIENT
Start: 2024-01-01

## 2024-01-01 RX ORDER — MIDAZOLAM HYDROCHLORIDE 1 MG/ML
0.5 INJECTION INTRAMUSCULAR; INTRAVENOUS
Status: DISCONTINUED | OUTPATIENT
Start: 2024-01-01 | End: 2024-01-01 | Stop reason: HOSPADM

## 2024-01-01 RX ORDER — PANTOPRAZOLE SODIUM 40 MG/1
40 TABLET, DELAYED RELEASE ORAL EVERY MORNING
Status: CANCELLED | OUTPATIENT
Start: 2024-01-01

## 2024-01-01 RX ORDER — POTASSIUM CHLORIDE 7.45 MG/ML
10 INJECTION INTRAVENOUS ONCE
Status: COMPLETED | OUTPATIENT
Start: 2024-01-01 | End: 2024-01-01

## 2024-01-01 RX ORDER — DIGOXIN 0.25 MG/ML
250 INJECTION INTRAMUSCULAR; INTRAVENOUS ONCE
Status: COMPLETED | OUTPATIENT
Start: 2024-01-01 | End: 2024-01-01

## 2024-01-01 RX ORDER — SODIUM CHLORIDE 0.9 % (FLUSH) 0.9 %
20 SYRINGE (ML) INJECTION AS NEEDED
Status: DISCONTINUED | OUTPATIENT
Start: 2024-01-01 | End: 2024-01-01 | Stop reason: HOSPADM

## 2024-01-01 RX ORDER — HEPARIN SODIUM 10000 [USP'U]/100ML
7.5 INJECTION, SOLUTION INTRAVENOUS
Status: DISCONTINUED | OUTPATIENT
Start: 2024-01-01 | End: 2024-01-01

## 2024-01-01 RX ORDER — METOPROLOL SUCCINATE 25 MG/1
12.5 TABLET, EXTENDED RELEASE ORAL NIGHTLY
Status: DISCONTINUED | OUTPATIENT
Start: 2024-01-01 | End: 2024-01-01

## 2024-01-01 RX ORDER — AMIODARONE HYDROCHLORIDE 150 MG/3ML
INJECTION, SOLUTION INTRAVENOUS
Status: COMPLETED | OUTPATIENT
Start: 2024-01-01 | End: 2024-01-01

## 2024-01-01 RX ORDER — METOPROLOL SUCCINATE 25 MG/1
12.5 TABLET, EXTENDED RELEASE ORAL NIGHTLY
Status: CANCELLED | OUTPATIENT
Start: 2024-01-01

## 2024-01-01 RX ORDER — PANTOPRAZOLE SODIUM 40 MG/10ML
80 INJECTION, POWDER, LYOPHILIZED, FOR SOLUTION INTRAVENOUS ONCE
Status: COMPLETED | OUTPATIENT
Start: 2024-01-01 | End: 2024-01-01

## 2024-01-01 RX ORDER — SODIUM CHLORIDE, SODIUM LACTATE, POTASSIUM CHLORIDE, CALCIUM CHLORIDE 600; 310; 30; 20 MG/100ML; MG/100ML; MG/100ML; MG/100ML
100 INJECTION, SOLUTION INTRAVENOUS ONCE AS NEEDED
Status: DISCONTINUED | OUTPATIENT
Start: 2024-01-01 | End: 2024-01-01 | Stop reason: HOSPADM

## 2024-01-01 RX ORDER — CLOPIDOGREL BISULFATE 75 MG/1
75 TABLET ORAL DAILY
Status: CANCELLED | OUTPATIENT
Start: 2024-01-01

## 2024-01-01 RX ORDER — OXYCODONE AND ACETAMINOPHEN 5; 325 MG/1; MG/1
1 TABLET ORAL ONCE AS NEEDED
Status: DISCONTINUED | OUTPATIENT
Start: 2024-01-01 | End: 2024-01-01 | Stop reason: HOSPADM

## 2024-01-01 RX ORDER — MIDODRINE HYDROCHLORIDE 2.5 MG/1
5 TABLET ORAL EVERY 8 HOURS
Status: DISCONTINUED | OUTPATIENT
Start: 2024-01-01 | End: 2024-01-01

## 2024-01-01 RX ORDER — MUPIROCIN 20 MG/G
1 OINTMENT TOPICAL EVERY 12 HOURS SCHEDULED
Status: DISCONTINUED | OUTPATIENT
Start: 2024-01-01 | End: 2024-01-01 | Stop reason: HOSPADM

## 2024-01-01 RX ORDER — METOPROLOL TARTRATE 1 MG/ML
2.5 INJECTION, SOLUTION INTRAVENOUS ONCE
Status: DISCONTINUED | OUTPATIENT
Start: 2024-01-01 | End: 2024-01-01

## 2024-01-01 RX ORDER — DEXTROSE MONOHYDRATE 25 G/50ML
25 INJECTION, SOLUTION INTRAVENOUS
Status: DISCONTINUED | OUTPATIENT
Start: 2024-01-01 | End: 2024-01-01

## 2024-01-01 RX ORDER — DIGOXIN 0.25 MG/ML
500 INJECTION INTRAMUSCULAR; INTRAVENOUS ONCE
Status: COMPLETED | OUTPATIENT
Start: 2024-01-01 | End: 2024-01-01

## 2024-01-01 RX ORDER — ASPIRIN 81 MG/1
81 TABLET, CHEWABLE ORAL DAILY
Status: DISCONTINUED | OUTPATIENT
Start: 2024-01-01 | End: 2024-01-01

## 2024-01-01 RX ORDER — VASOPRESSIN IN DEXTROSE 5 % 20/100 ML
0.03 PLASTIC BAG, INJECTION (ML) INTRAVENOUS CONTINUOUS
Status: DISCONTINUED | OUTPATIENT
Start: 2024-01-01 | End: 2024-01-01

## 2024-01-01 RX ORDER — VANCOMYCIN/0.9 % SOD CHLORIDE 1.5G/250ML
1500 PLASTIC BAG, INJECTION (ML) INTRAVENOUS ONCE
Status: COMPLETED | OUTPATIENT
Start: 2024-01-01 | End: 2024-01-01

## 2024-01-01 RX ORDER — MULTIPLE VITAMINS W/ MINERALS TAB 9MG-400MCG
1 TAB ORAL DAILY
Status: DISCONTINUED | OUTPATIENT
Start: 2024-01-01 | End: 2024-01-01

## 2024-01-01 RX ORDER — ACETAMINOPHEN 325 MG/1
650 TABLET ORAL 3 TIMES WEEKLY
COMMUNITY

## 2024-01-01 RX ORDER — SODIUM CHLORIDE 0.9 % (FLUSH) 0.9 %
10 SYRINGE (ML) INJECTION EVERY 12 HOURS SCHEDULED
Status: DISCONTINUED | OUTPATIENT
Start: 2024-01-01 | End: 2024-01-01

## 2024-01-01 RX ORDER — IPRATROPIUM BROMIDE AND ALBUTEROL SULFATE 2.5; .5 MG/3ML; MG/3ML
3 SOLUTION RESPIRATORY (INHALATION) EVERY 4 HOURS PRN
Status: DISCONTINUED | OUTPATIENT
Start: 2024-01-01 | End: 2024-01-01 | Stop reason: HOSPADM

## 2024-01-01 RX ADMIN — Medication 10 ML: at 20:15

## 2024-01-01 RX ADMIN — SUCRALFATE 1 G: 1 TABLET ORAL at 06:30

## 2024-01-01 RX ADMIN — METOPROLOL TARTRATE 12.5 MG: 25 TABLET, FILM COATED ORAL at 20:04

## 2024-01-01 RX ADMIN — Medication 150 MCG/HR: at 04:24

## 2024-01-01 RX ADMIN — DOXYCYCLINE 100 MG: 100 INJECTION, POWDER, LYOPHILIZED, FOR SOLUTION INTRAVENOUS at 20:03

## 2024-01-01 RX ADMIN — MEROPENEM 1000 MG: 1 INJECTION, POWDER, FOR SOLUTION INTRAVENOUS at 00:09

## 2024-01-01 RX ADMIN — Medication 10 ML: at 08:20

## 2024-01-01 RX ADMIN — SUCRALFATE 1 G: 1 TABLET ORAL at 12:09

## 2024-01-01 RX ADMIN — PIPERACILLIN SODIUM AND TAZOBACTAM SODIUM 3.38 G: 3; .375 INJECTION, POWDER, LYOPHILIZED, FOR SOLUTION INTRAVENOUS at 06:05

## 2024-01-01 RX ADMIN — COLLAGENASE SANTYL 1 APPLICATION: 250 OINTMENT TOPICAL at 08:31

## 2024-01-01 RX ADMIN — LINEZOLID 600 MG: 600 INJECTION, SOLUTION INTRAVENOUS at 11:15

## 2024-01-01 RX ADMIN — GABAPENTIN 200 MG: 100 CAPSULE ORAL at 21:44

## 2024-01-01 RX ADMIN — CHLORHEXIDINE GLUCONATE 15 ML: 1.2 RINSE ORAL at 15:54

## 2024-01-01 RX ADMIN — SUCRALFATE 1 G: 1 TABLET ORAL at 08:48

## 2024-01-01 RX ADMIN — SUCRALFATE 1 G: 1 TABLET ORAL at 20:25

## 2024-01-01 RX ADMIN — PHENYLEPHRINE HYDROCHLORIDE 3 MCG/KG/MIN: 10 INJECTION INTRAVENOUS at 08:09

## 2024-01-01 RX ADMIN — AMMONIUM LACTATE 1 APPLICATION: 120 CREAM TOPICAL at 21:56

## 2024-01-01 RX ADMIN — SEVELAMER CARBONATE 800 MG: 800 TABLET, FILM COATED ORAL at 17:57

## 2024-01-01 RX ADMIN — SENNOSIDES AND DOCUSATE SODIUM 2 TABLET: 50; 8.6 TABLET ORAL at 08:40

## 2024-01-01 RX ADMIN — Medication 10 ML: at 20:56

## 2024-01-01 RX ADMIN — MUPIROCIN 1 APPLICATION: 20 OINTMENT TOPICAL at 08:20

## 2024-01-01 RX ADMIN — ACETAMINOPHEN 650 MG: 325 TABLET ORAL at 20:57

## 2024-01-01 RX ADMIN — SODIUM CHLORIDE 2000 MG: 9 INJECTION, SOLUTION INTRAVENOUS at 23:46

## 2024-01-01 RX ADMIN — PIPERACILLIN SODIUM AND TAZOBACTAM SODIUM 3.38 G: 3; .375 INJECTION, POWDER, LYOPHILIZED, FOR SOLUTION INTRAVENOUS at 17:11

## 2024-01-01 RX ADMIN — PANTOPRAZOLE SODIUM 80 MG: 40 INJECTION, POWDER, FOR SOLUTION INTRAVENOUS at 16:40

## 2024-01-01 RX ADMIN — PANTOPRAZOLE SODIUM 40 MG: 40 INJECTION, POWDER, FOR SOLUTION INTRAVENOUS at 16:36

## 2024-01-01 RX ADMIN — SENNOSIDES AND DOCUSATE SODIUM 2 TABLET: 50; 8.6 TABLET ORAL at 08:55

## 2024-01-01 RX ADMIN — DOXYCYCLINE 100 MG: 100 INJECTION, POWDER, LYOPHILIZED, FOR SOLUTION INTRAVENOUS at 08:33

## 2024-01-01 RX ADMIN — SUCRALFATE 1 G: 1 TABLET ORAL at 17:12

## 2024-01-01 RX ADMIN — VANCOMYCIN HYDROCHLORIDE 2000 MG: 5 INJECTION, POWDER, LYOPHILIZED, FOR SOLUTION INTRAVENOUS at 15:48

## 2024-01-01 RX ADMIN — SEVELAMER CARBONATE 800 MG: 800 TABLET, FILM COATED ORAL at 08:55

## 2024-01-01 RX ADMIN — SEVELAMER CARBONATE 800 MG: 800 TABLET, FILM COATED ORAL at 11:38

## 2024-01-01 RX ADMIN — SODIUM BICARBONATE 50 MEQ: 84 INJECTION INTRAVENOUS at 13:46

## 2024-01-01 RX ADMIN — VANCOMYCIN HYDROCHLORIDE 1500 MG: 5 INJECTION, POWDER, LYOPHILIZED, FOR SOLUTION INTRAVENOUS at 13:03

## 2024-01-01 RX ADMIN — PANTOPRAZOLE SODIUM 40 MG: 40 INJECTION, POWDER, FOR SOLUTION INTRAVENOUS at 17:11

## 2024-01-01 RX ADMIN — DEXTROSE MONOHYDRATE 50 ML/HR: 50 INJECTION, SOLUTION INTRAVENOUS at 15:14

## 2024-01-01 RX ADMIN — ACETAMINOPHEN 650 MG: 325 TABLET ORAL at 20:47

## 2024-01-01 RX ADMIN — MIDODRINE HYDROCHLORIDE 5 MG: 2.5 TABLET ORAL at 11:14

## 2024-01-01 RX ADMIN — GLYCOPYRROLATE 0.4 MG: 0.4 INJECTION INTRAMUSCULAR; INTRAVENOUS at 17:19

## 2024-01-01 RX ADMIN — MICAFUNGIN SODIUM 100 MG: 100 INJECTION, POWDER, LYOPHILIZED, FOR SOLUTION INTRAVENOUS at 12:22

## 2024-01-01 RX ADMIN — Medication 10 ML: at 20:28

## 2024-01-01 RX ADMIN — SERTRALINE 50 MG: 50 TABLET, FILM COATED ORAL at 20:48

## 2024-01-01 RX ADMIN — Medication 10 ML: at 20:49

## 2024-01-01 RX ADMIN — SEVELAMER CARBONATE 800 MG: 800 TABLET, FILM COATED ORAL at 08:48

## 2024-01-01 RX ADMIN — Medication 1 TABLET: at 12:17

## 2024-01-01 RX ADMIN — MEROPENEM 1000 MG: 1 INJECTION, POWDER, FOR SOLUTION INTRAVENOUS at 17:32

## 2024-01-01 RX ADMIN — Medication 10 ML: at 08:35

## 2024-01-01 RX ADMIN — IPRATROPIUM BROMIDE AND ALBUTEROL SULFATE 3 ML: 2.5; .5 SOLUTION RESPIRATORY (INHALATION) at 18:51

## 2024-01-01 RX ADMIN — PANTOPRAZOLE SODIUM 40 MG: 40 INJECTION, POWDER, FOR SOLUTION INTRAVENOUS at 17:00

## 2024-01-01 RX ADMIN — SUCRALFATE 1 G: 1 TABLET ORAL at 20:57

## 2024-01-01 RX ADMIN — PIPERACILLIN SODIUM AND TAZOBACTAM SODIUM 3.38 G: 3; .375 INJECTION, POWDER, LYOPHILIZED, FOR SOLUTION INTRAVENOUS at 16:33

## 2024-01-01 RX ADMIN — Medication 10 ML: at 21:55

## 2024-01-01 RX ADMIN — CHLORHEXIDINE GLUCONATE 15 ML: 1.2 RINSE ORAL at 20:02

## 2024-01-01 RX ADMIN — DEXMEDETOMIDINE 0.8 MCG/KG/HR: 100 INJECTION, SOLUTION INTRAVENOUS at 09:16

## 2024-01-01 RX ADMIN — DEXMEDETOMIDINE 1.2 MCG/KG/HR: 100 INJECTION, SOLUTION INTRAVENOUS at 04:19

## 2024-01-01 RX ADMIN — DEXTROSE MONOHYDRATE 25 G: 25 INJECTION, SOLUTION INTRAVENOUS at 20:11

## 2024-01-01 RX ADMIN — Medication 1 TABLET: at 08:11

## 2024-01-01 RX ADMIN — ACETAMINOPHEN 650 MG: 325 TABLET ORAL at 16:02

## 2024-01-01 RX ADMIN — PANTOPRAZOLE SODIUM 40 MG: 40 INJECTION, POWDER, FOR SOLUTION INTRAVENOUS at 06:30

## 2024-01-01 RX ADMIN — CLOPIDOGREL BISULFATE 75 MG: 75 TABLET ORAL at 10:58

## 2024-01-01 RX ADMIN — DEXMEDETOMIDINE 0.2 MCG/KG/HR: 100 INJECTION, SOLUTION INTRAVENOUS at 10:19

## 2024-01-01 RX ADMIN — Medication 10 ML: at 09:08

## 2024-01-01 RX ADMIN — SUCRALFATE 1 G: 1 TABLET ORAL at 11:18

## 2024-01-01 RX ADMIN — SUCRALFATE 1 G: 1 TABLET ORAL at 20:47

## 2024-01-01 RX ADMIN — NICOTINE 1 PATCH: 21 PATCH TRANSDERMAL at 08:48

## 2024-01-01 RX ADMIN — ACETAMINOPHEN 650 MG: 325 TABLET ORAL at 20:14

## 2024-01-01 RX ADMIN — Medication 10 ML: at 08:49

## 2024-01-01 RX ADMIN — DEXMEDETOMIDINE 0.8 MCG/KG/HR: 100 INJECTION, SOLUTION INTRAVENOUS at 16:32

## 2024-01-01 RX ADMIN — Medication 10 ML: at 21:44

## 2024-01-01 RX ADMIN — SENNOSIDES AND DOCUSATE SODIUM 2 TABLET: 50; 8.6 TABLET ORAL at 20:54

## 2024-01-01 RX ADMIN — PIPERACILLIN SODIUM AND TAZOBACTAM SODIUM 3.38 G: 3; .375 INJECTION, POWDER, LYOPHILIZED, FOR SOLUTION INTRAVENOUS at 06:22

## 2024-01-01 RX ADMIN — SUCRALFATE 1 G: 1 TABLET ORAL at 21:56

## 2024-01-01 RX ADMIN — HEPARIN SODIUM 9.7 UNITS/KG/HR: 10000 INJECTION, SOLUTION INTRAVENOUS at 00:17

## 2024-01-01 RX ADMIN — DOXYCYCLINE 100 MG: 100 INJECTION, POWDER, LYOPHILIZED, FOR SOLUTION INTRAVENOUS at 20:48

## 2024-01-01 RX ADMIN — CLOPIDOGREL BISULFATE 75 MG: 75 TABLET ORAL at 08:40

## 2024-01-01 RX ADMIN — EPINEPHRINE 1 MG: 0.1 INJECTION INTRAVENOUS at 13:45

## 2024-01-01 RX ADMIN — COLLAGENASE SANTYL 1 APPLICATION: 250 OINTMENT TOPICAL at 09:07

## 2024-01-01 RX ADMIN — Medication 1 TABLET: at 10:57

## 2024-01-01 RX ADMIN — PANTOPRAZOLE SODIUM 8 MG/HR: 40 INJECTION, POWDER, FOR SOLUTION INTRAVENOUS at 21:42

## 2024-01-01 RX ADMIN — PROPOFOL 5 MCG/KG/MIN: 10 INJECTION, EMULSION INTRAVENOUS at 15:43

## 2024-01-01 RX ADMIN — ACETAMINOPHEN 650 MG: 325 TABLET ORAL at 12:17

## 2024-01-01 RX ADMIN — PANTOPRAZOLE SODIUM 40 MG: 40 INJECTION, POWDER, FOR SOLUTION INTRAVENOUS at 08:47

## 2024-01-01 RX ADMIN — SERTRALINE 50 MG: 50 TABLET, FILM COATED ORAL at 20:19

## 2024-01-01 RX ADMIN — SODIUM BICARBONATE 150 MEQ: 84 INJECTION INTRAVENOUS at 02:38

## 2024-01-01 RX ADMIN — ACETAMINOPHEN 650 MG: 325 TABLET ORAL at 10:58

## 2024-01-01 RX ADMIN — ACETAMINOPHEN 650 MG: 325 TABLET ORAL at 11:15

## 2024-01-01 RX ADMIN — SUCRALFATE 1 G: 1 TABLET ORAL at 12:37

## 2024-01-01 RX ADMIN — INSULIN LISPRO 2 UNITS: 100 INJECTION, SOLUTION INTRAVENOUS; SUBCUTANEOUS at 06:59

## 2024-01-01 RX ADMIN — PANTOPRAZOLE SODIUM 40 MG: 40 INJECTION, POWDER, FOR SOLUTION INTRAVENOUS at 06:49

## 2024-01-01 RX ADMIN — DOXYCYCLINE 100 MG: 100 INJECTION, POWDER, LYOPHILIZED, FOR SOLUTION INTRAVENOUS at 11:12

## 2024-01-01 RX ADMIN — LEVOTHYROXINE SODIUM 75 MCG: 0.07 TABLET ORAL at 06:22

## 2024-01-01 RX ADMIN — SERTRALINE 50 MG: 50 TABLET, FILM COATED ORAL at 20:15

## 2024-01-01 RX ADMIN — SUCRALFATE 1 G: 1 TABLET ORAL at 13:10

## 2024-01-01 RX ADMIN — METOPROLOL TARTRATE 12.5 MG: 25 TABLET, FILM COATED ORAL at 10:35

## 2024-01-01 RX ADMIN — Medication 10 ML: at 08:56

## 2024-01-01 RX ADMIN — NOREPINEPHRINE BITARTRATE 0.16 MCG/KG/MIN: 0.03 INJECTION, SOLUTION INTRAVENOUS at 06:25

## 2024-01-01 RX ADMIN — LEVOTHYROXINE SODIUM 75 MCG: 0.07 TABLET ORAL at 06:48

## 2024-01-01 RX ADMIN — ACETAMINOPHEN 650 MG: 325 TABLET ORAL at 08:55

## 2024-01-01 RX ADMIN — DOXYCYCLINE 100 MG: 100 INJECTION, POWDER, LYOPHILIZED, FOR SOLUTION INTRAVENOUS at 09:12

## 2024-01-01 RX ADMIN — LIDOCAINE HYDROCHLORIDE 100 MG: 20 INJECTION, SOLUTION INTRAVENOUS at 14:00

## 2024-01-01 RX ADMIN — ATORVASTATIN CALCIUM 40 MG: 40 TABLET, FILM COATED ORAL at 20:14

## 2024-01-01 RX ADMIN — AMMONIUM LACTATE 1 APPLICATION: 120 CREAM TOPICAL at 20:48

## 2024-01-01 RX ADMIN — PIPERACILLIN AND TAZOBACTAM 3.38 G: 3; .375 INJECTION, POWDER, FOR SOLUTION INTRAVENOUS at 06:45

## 2024-01-01 RX ADMIN — ATORVASTATIN CALCIUM 40 MG: 40 TABLET, FILM COATED ORAL at 21:55

## 2024-01-01 RX ADMIN — SUCRALFATE 1 G: 1 TABLET ORAL at 20:14

## 2024-01-01 RX ADMIN — GABAPENTIN 200 MG: 100 CAPSULE ORAL at 21:55

## 2024-01-01 RX ADMIN — SEVELAMER CARBONATE 800 MG: 800 TABLET, FILM COATED ORAL at 08:12

## 2024-01-01 RX ADMIN — PANTOPRAZOLE SODIUM 40 MG: 40 INJECTION, POWDER, FOR SOLUTION INTRAVENOUS at 16:37

## 2024-01-01 RX ADMIN — ATORVASTATIN CALCIUM 40 MG: 40 TABLET, FILM COATED ORAL at 20:10

## 2024-01-01 RX ADMIN — IPRATROPIUM BROMIDE AND ALBUTEROL SULFATE 3 ML: 2.5; .5 SOLUTION RESPIRATORY (INHALATION) at 06:39

## 2024-01-01 RX ADMIN — CHLORHEXIDINE GLUCONATE 15 ML: 1.2 RINSE ORAL at 21:57

## 2024-01-01 RX ADMIN — SERTRALINE 50 MG: 50 TABLET, FILM COATED ORAL at 20:03

## 2024-01-01 RX ADMIN — PHENYLEPHRINE HYDROCHLORIDE 3 MCG/KG/MIN: 10 INJECTION INTRAVENOUS at 21:00

## 2024-01-01 RX ADMIN — CHLORHEXIDINE GLUCONATE 15 ML: 1.2 RINSE ORAL at 09:07

## 2024-01-01 RX ADMIN — SUCRALFATE 1 G: 1 TABLET ORAL at 06:46

## 2024-01-01 RX ADMIN — DEXMEDETOMIDINE 0.5 MCG/KG/HR: 100 INJECTION, SOLUTION INTRAVENOUS at 18:57

## 2024-01-01 RX ADMIN — PHENYLEPHRINE HYDROCHLORIDE 3 MCG/KG/MIN: 10 INJECTION INTRAVENOUS at 12:09

## 2024-01-01 RX ADMIN — ACETAMINOPHEN 650 MG: 325 TABLET ORAL at 21:55

## 2024-01-01 RX ADMIN — ATORVASTATIN CALCIUM 40 MG: 40 TABLET, FILM COATED ORAL at 20:57

## 2024-01-01 RX ADMIN — CHLORHEXIDINE GLUCONATE 15 ML: 1.2 RINSE ORAL at 08:20

## 2024-01-01 RX ADMIN — ATORVASTATIN CALCIUM 40 MG: 40 TABLET, FILM COATED ORAL at 20:19

## 2024-01-01 RX ADMIN — VASOPRESSIN 0.03 UNITS/MIN: 0.2 INJECTION INTRAVENOUS at 10:02

## 2024-01-01 RX ADMIN — CHLORHEXIDINE GLUCONATE 15 ML: 1.2 RINSE ORAL at 20:57

## 2024-01-01 RX ADMIN — IPRATROPIUM BROMIDE AND ALBUTEROL SULFATE 3 ML: 2.5; .5 SOLUTION RESPIRATORY (INHALATION) at 06:46

## 2024-01-01 RX ADMIN — ACETAMINOPHEN 650 MG: 325 TABLET ORAL at 20:19

## 2024-01-01 RX ADMIN — PHENYLEPHRINE HYDROCHLORIDE 2.3 MCG/KG/MIN: 10 INJECTION INTRAVENOUS at 11:42

## 2024-01-01 RX ADMIN — DOXYCYCLINE 100 MG: 100 INJECTION, POWDER, LYOPHILIZED, FOR SOLUTION INTRAVENOUS at 21:43

## 2024-01-01 RX ADMIN — PIPERACILLIN AND TAZOBACTAM 3.38 G: 3; .375 INJECTION, POWDER, FOR SOLUTION INTRAVENOUS at 06:48

## 2024-01-01 RX ADMIN — PHENYLEPHRINE HYDROCHLORIDE 3 MCG/KG/MIN: 10 INJECTION INTRAVENOUS at 05:45

## 2024-01-01 RX ADMIN — PIPERACILLIN AND TAZOBACTAM 3.38 G: 3; .375 INJECTION, POWDER, FOR SOLUTION INTRAVENOUS at 17:31

## 2024-01-01 RX ADMIN — PANTOPRAZOLE SODIUM 40 MG: 40 INJECTION, POWDER, FOR SOLUTION INTRAVENOUS at 16:41

## 2024-01-01 RX ADMIN — PANTOPRAZOLE SODIUM 40 MG: 40 INJECTION, POWDER, FOR SOLUTION INTRAVENOUS at 16:57

## 2024-01-01 RX ADMIN — SEVELAMER CARBONATE 800 MG: 800 TABLET, FILM COATED ORAL at 11:18

## 2024-01-01 RX ADMIN — PROPOFOL 10 MCG/KG/MIN: 10 INJECTION, EMULSION INTRAVENOUS at 07:19

## 2024-01-01 RX ADMIN — NOREPINEPHRINE BITARTRATE 0.28 MCG/KG/MIN: 0.03 INJECTION, SOLUTION INTRAVENOUS at 12:09

## 2024-01-01 RX ADMIN — HEPARIN SODIUM 7.5 UNITS/KG/HR: 10000 INJECTION, SOLUTION INTRAVENOUS at 02:39

## 2024-01-01 RX ADMIN — DEXMEDETOMIDINE 1 MCG/KG/HR: 100 INJECTION, SOLUTION INTRAVENOUS at 22:08

## 2024-01-01 RX ADMIN — Medication 1 TABLET: at 08:49

## 2024-01-01 RX ADMIN — SENNOSIDES AND DOCUSATE SODIUM 2 TABLET: 50; 8.6 TABLET ORAL at 08:48

## 2024-01-01 RX ADMIN — ASPIRIN 81 MG: 81 TABLET, CHEWABLE ORAL at 10:58

## 2024-01-01 RX ADMIN — CLOPIDOGREL BISULFATE 75 MG: 75 TABLET ORAL at 11:14

## 2024-01-01 RX ADMIN — SEVELAMER CARBONATE 800 MG: 800 TABLET, FILM COATED ORAL at 17:11

## 2024-01-01 RX ADMIN — PHENYLEPHRINE HYDROCHLORIDE 3 MCG/KG/MIN: 10 INJECTION INTRAVENOUS at 15:07

## 2024-01-01 RX ADMIN — LEVOTHYROXINE SODIUM 75 MCG: 0.07 TABLET ORAL at 06:05

## 2024-01-01 RX ADMIN — LEVOTHYROXINE SODIUM 75 MCG: 0.07 TABLET ORAL at 06:26

## 2024-01-01 RX ADMIN — DOXYCYCLINE 100 MG: 100 INJECTION, POWDER, LYOPHILIZED, FOR SOLUTION INTRAVENOUS at 20:57

## 2024-01-01 RX ADMIN — HEPARIN SODIUM 7.5 UNITS/KG/HR: 10000 INJECTION, SOLUTION INTRAVENOUS at 16:39

## 2024-01-01 RX ADMIN — POTASSIUM CHLORIDE 10 MEQ: 7.46 INJECTION, SOLUTION INTRAVENOUS at 14:28

## 2024-01-01 RX ADMIN — ACETAMINOPHEN 650 MG: 325 TABLET ORAL at 17:00

## 2024-01-01 RX ADMIN — EPINEPHRINE 1 MG: 0.1 INJECTION INTRAVENOUS at 13:43

## 2024-01-01 RX ADMIN — CHLORHEXIDINE GLUCONATE 15 ML: 1.2 RINSE ORAL at 08:31

## 2024-01-01 RX ADMIN — IPRATROPIUM BROMIDE AND ALBUTEROL SULFATE 3 ML: 2.5; .5 SOLUTION RESPIRATORY (INHALATION) at 18:22

## 2024-01-01 RX ADMIN — COLLAGENASE SANTYL 1 APPLICATION: 250 OINTMENT TOPICAL at 15:22

## 2024-01-01 RX ADMIN — EPINEPHRINE 1 MG: 0.1 INJECTION INTRAVENOUS at 13:40

## 2024-01-01 RX ADMIN — LEVOTHYROXINE SODIUM 75 MCG: 0.07 TABLET ORAL at 06:46

## 2024-01-01 RX ADMIN — MUPIROCIN 1 APPLICATION: 20 OINTMENT TOPICAL at 08:31

## 2024-01-01 RX ADMIN — SEVELAMER CARBONATE 800 MG: 800 TABLET, FILM COATED ORAL at 17:00

## 2024-01-01 RX ADMIN — Medication 10 ML: at 20:02

## 2024-01-01 RX ADMIN — SERTRALINE 50 MG: 50 TABLET, FILM COATED ORAL at 20:57

## 2024-01-01 RX ADMIN — SODIUM CHLORIDE 2000 MG: 9 INJECTION, SOLUTION INTRAVENOUS at 23:27

## 2024-01-01 RX ADMIN — Medication 10 ML: at 08:12

## 2024-01-01 RX ADMIN — PHENYLEPHRINE HYDROCHLORIDE 3 MCG/KG/MIN: 10 INJECTION INTRAVENOUS at 03:02

## 2024-01-01 RX ADMIN — INSULIN LISPRO 2 UNITS: 100 INJECTION, SOLUTION INTRAVENOUS; SUBCUTANEOUS at 20:19

## 2024-01-01 RX ADMIN — GABAPENTIN 200 MG: 100 CAPSULE ORAL at 20:48

## 2024-01-01 RX ADMIN — CHLORHEXIDINE GLUCONATE 15 ML: 1.2 RINSE ORAL at 20:15

## 2024-01-01 RX ADMIN — PHENYLEPHRINE HYDROCHLORIDE 0.5 MCG/KG/MIN: 10 INJECTION INTRAVENOUS at 22:08

## 2024-01-01 RX ADMIN — DIGOXIN 500 MCG: 0.25 INJECTION INTRAMUSCULAR; INTRAVENOUS at 00:09

## 2024-01-01 RX ADMIN — DOXYCYCLINE 100 MG: 100 INJECTION, POWDER, LYOPHILIZED, FOR SOLUTION INTRAVENOUS at 08:47

## 2024-01-01 RX ADMIN — PHENYLEPHRINE HYDROCHLORIDE 0.5 MCG/KG/MIN: 10 INJECTION INTRAVENOUS at 17:03

## 2024-01-01 RX ADMIN — DEXMEDETOMIDINE 0.8 MCG/KG/HR: 100 INJECTION, SOLUTION INTRAVENOUS at 11:03

## 2024-01-01 RX ADMIN — SUCRALFATE 1 G: 1 TABLET ORAL at 20:19

## 2024-01-01 RX ADMIN — MIDODRINE HYDROCHLORIDE 5 MG: 2.5 TABLET ORAL at 17:31

## 2024-01-01 RX ADMIN — SUCRALFATE 1 G: 1 TABLET ORAL at 17:00

## 2024-01-01 RX ADMIN — DOXYCYCLINE 100 MG: 100 INJECTION, POWDER, LYOPHILIZED, FOR SOLUTION INTRAVENOUS at 08:56

## 2024-01-01 RX ADMIN — SENNOSIDES AND DOCUSATE SODIUM 2 TABLET: 50; 8.6 TABLET ORAL at 10:19

## 2024-01-01 RX ADMIN — NICOTINE 1 PATCH: 21 PATCH TRANSDERMAL at 08:41

## 2024-01-01 RX ADMIN — MUPIROCIN 1 APPLICATION: 20 OINTMENT TOPICAL at 20:02

## 2024-01-01 RX ADMIN — MICAFUNGIN SODIUM 100 MG: 100 INJECTION, POWDER, LYOPHILIZED, FOR SOLUTION INTRAVENOUS at 14:14

## 2024-01-01 RX ADMIN — ASPIRIN 81 MG: 81 TABLET, CHEWABLE ORAL at 11:15

## 2024-01-01 RX ADMIN — SERTRALINE 50 MG: 50 TABLET, FILM COATED ORAL at 21:55

## 2024-01-01 RX ADMIN — PANTOPRAZOLE SODIUM 40 MG: 40 INJECTION, POWDER, FOR SOLUTION INTRAVENOUS at 08:12

## 2024-01-01 RX ADMIN — SERTRALINE 50 MG: 50 TABLET, FILM COATED ORAL at 20:54

## 2024-01-01 RX ADMIN — ACETAMINOPHEN 650 MG: 325 TABLET ORAL at 21:44

## 2024-01-01 RX ADMIN — AMMONIUM LACTATE 1 APPLICATION: 120 CREAM TOPICAL at 21:43

## 2024-01-01 RX ADMIN — AMMONIUM LACTATE 1 APPLICATION: 120 CREAM TOPICAL at 20:49

## 2024-01-01 RX ADMIN — SERTRALINE 50 MG: 50 TABLET, FILM COATED ORAL at 21:44

## 2024-01-01 RX ADMIN — AMMONIUM LACTATE 1 APPLICATION: 120 CREAM TOPICAL at 20:02

## 2024-01-01 RX ADMIN — Medication 1 TABLET: at 08:40

## 2024-01-01 RX ADMIN — SUCRALFATE 1 G: 1 TABLET ORAL at 16:36

## 2024-01-01 RX ADMIN — IPRATROPIUM BROMIDE AND ALBUTEROL SULFATE 3 ML: 2.5; .5 SOLUTION RESPIRATORY (INHALATION) at 06:48

## 2024-01-01 RX ADMIN — ACETAMINOPHEN 650 MG: 325 TABLET ORAL at 08:11

## 2024-01-01 RX ADMIN — GABAPENTIN 200 MG: 100 CAPSULE ORAL at 20:57

## 2024-01-01 RX ADMIN — CHLORHEXIDINE GLUCONATE 15 ML: 1.2 RINSE ORAL at 08:54

## 2024-01-01 RX ADMIN — ATORVASTATIN CALCIUM 40 MG: 40 TABLET, FILM COATED ORAL at 20:47

## 2024-01-01 RX ADMIN — LEVOTHYROXINE SODIUM 75 MCG: 0.07 TABLET ORAL at 06:15

## 2024-01-01 RX ADMIN — PANTOPRAZOLE SODIUM 40 MG: 40 INJECTION, POWDER, FOR SOLUTION INTRAVENOUS at 06:34

## 2024-01-01 RX ADMIN — SUCRALFATE 1 G: 1 TABLET ORAL at 08:12

## 2024-01-01 RX ADMIN — INSULIN LISPRO 2 UNITS: 100 INJECTION, SOLUTION INTRAVENOUS; SUBCUTANEOUS at 17:09

## 2024-01-01 RX ADMIN — ATORVASTATIN CALCIUM 40 MG: 40 TABLET, FILM COATED ORAL at 20:48

## 2024-01-01 RX ADMIN — MIDODRINE HYDROCHLORIDE 5 MG: 2.5 TABLET ORAL at 02:23

## 2024-01-01 RX ADMIN — SUCRALFATE 1 G: 1 TABLET ORAL at 06:34

## 2024-01-01 RX ADMIN — ACETAMINOPHEN 650 MG: 325 TABLET ORAL at 16:41

## 2024-01-01 RX ADMIN — PIPERACILLIN SODIUM AND TAZOBACTAM SODIUM 3.38 G: 3; .375 INJECTION, POWDER, LYOPHILIZED, FOR SOLUTION INTRAVENOUS at 22:47

## 2024-01-01 RX ADMIN — DIGOXIN 250 MCG: 0.25 INJECTION INTRAMUSCULAR; INTRAVENOUS at 05:58

## 2024-01-01 RX ADMIN — ACETAMINOPHEN 650 MG: 325 TABLET ORAL at 20:03

## 2024-01-01 RX ADMIN — PANTOPRAZOLE SODIUM 40 MG: 40 INJECTION, POWDER, FOR SOLUTION INTRAVENOUS at 17:57

## 2024-01-01 RX ADMIN — SUCRALFATE 1 G: 1 TABLET ORAL at 11:38

## 2024-01-01 RX ADMIN — ASPIRIN 81 MG: 81 TABLET, CHEWABLE ORAL at 08:40

## 2024-01-01 RX ADMIN — AMIODARONE HYDROCHLORIDE 150 MG: 1.5 INJECTION, SOLUTION INTRAVENOUS at 00:08

## 2024-01-01 RX ADMIN — MUPIROCIN 1 APPLICATION: 20 OINTMENT TOPICAL at 09:07

## 2024-01-01 RX ADMIN — ATORVASTATIN CALCIUM 40 MG: 40 TABLET, FILM COATED ORAL at 21:44

## 2024-01-01 RX ADMIN — SODIUM CHLORIDE 2000 MG: 9 INJECTION, SOLUTION INTRAVENOUS at 22:29

## 2024-01-01 RX ADMIN — MUPIROCIN 1 APPLICATION: 20 OINTMENT TOPICAL at 20:15

## 2024-01-01 RX ADMIN — IPRATROPIUM BROMIDE AND ALBUTEROL SULFATE 3 ML: 2.5; .5 SOLUTION RESPIRATORY (INHALATION) at 06:22

## 2024-01-01 RX ADMIN — Medication 1 TABLET: at 11:14

## 2024-01-01 RX ADMIN — SEVELAMER CARBONATE 800 MG: 800 TABLET, FILM COATED ORAL at 12:17

## 2024-01-01 RX ADMIN — SUCRALFATE 1 G: 1 TABLET ORAL at 12:17

## 2024-01-01 RX ADMIN — NOREPINEPHRINE BITARTRATE 0.02 MCG/KG/MIN: 0.03 INJECTION, SOLUTION INTRAVENOUS at 22:36

## 2024-01-01 RX ADMIN — NICOTINE 1 PATCH: 21 PATCH TRANSDERMAL at 08:56

## 2024-01-01 RX ADMIN — DOXYCYCLINE 100 MG: 100 INJECTION, POWDER, LYOPHILIZED, FOR SOLUTION INTRAVENOUS at 10:56

## 2024-01-01 RX ADMIN — SENNOSIDES AND DOCUSATE SODIUM 2 TABLET: 50; 8.6 TABLET ORAL at 20:57

## 2024-01-01 RX ADMIN — PROPOFOL 5 MCG/KG/MIN: 10 INJECTION, EMULSION INTRAVENOUS at 10:17

## 2024-01-01 RX ADMIN — MORPHINE SULFATE 2 MG: 2 INJECTION, SOLUTION INTRAMUSCULAR; INTRAVENOUS at 17:19

## 2024-01-01 RX ADMIN — AMIODARONE HYDROCHLORIDE 0.5 MG/MIN: 1.8 INJECTION, SOLUTION INTRAVENOUS at 05:58

## 2024-01-01 RX ADMIN — DOXYCYCLINE 100 MG: 100 INJECTION, POWDER, LYOPHILIZED, FOR SOLUTION INTRAVENOUS at 20:14

## 2024-01-01 RX ADMIN — IPRATROPIUM BROMIDE AND ALBUTEROL SULFATE 3 ML: 2.5; .5 SOLUTION RESPIRATORY (INHALATION) at 18:23

## 2024-01-01 RX ADMIN — CHLORHEXIDINE GLUCONATE 15 ML: 1.2 RINSE ORAL at 20:48

## 2024-01-01 RX ADMIN — PIPERACILLIN AND TAZOBACTAM 3.38 G: 3; .375 INJECTION, POWDER, FOR SOLUTION INTRAVENOUS at 17:16

## 2024-01-01 RX ADMIN — ACETAMINOPHEN 650 MG: 325 TABLET ORAL at 08:42

## 2024-01-01 RX ADMIN — GABAPENTIN 200 MG: 100 CAPSULE ORAL at 20:03

## 2024-01-01 RX ADMIN — MUPIROCIN 1 APPLICATION: 20 OINTMENT TOPICAL at 20:50

## 2024-01-01 RX ADMIN — NICOTINE 1 PATCH: 21 PATCH TRANSDERMAL at 11:13

## 2024-01-01 RX ADMIN — Medication 10 ML: at 20:03

## 2024-01-01 RX ADMIN — GABAPENTIN 200 MG: 100 CAPSULE ORAL at 20:14

## 2024-01-01 RX ADMIN — HEPARIN SODIUM 2000 UNITS: 5000 INJECTION INTRAVENOUS; SUBCUTANEOUS at 11:16

## 2024-01-01 RX ADMIN — SUCRALFATE 1 G: 1 TABLET ORAL at 16:38

## 2024-01-01 RX ADMIN — SUCRALFATE 1 G: 1 TABLET ORAL at 06:48

## 2024-01-01 RX ADMIN — SENNOSIDES AND DOCUSATE SODIUM 2 TABLET: 50; 8.6 TABLET ORAL at 20:03

## 2024-01-01 RX ADMIN — SUCRALFATE 1 G: 1 TABLET ORAL at 08:56

## 2024-01-01 RX ADMIN — MUPIROCIN 1 APPLICATION: 20 OINTMENT TOPICAL at 22:46

## 2024-01-01 RX ADMIN — IPRATROPIUM BROMIDE AND ALBUTEROL SULFATE 3 ML: 2.5; .5 SOLUTION RESPIRATORY (INHALATION) at 06:29

## 2024-01-01 RX ADMIN — Medication 150 MCG/HR: at 02:37

## 2024-01-01 RX ADMIN — Medication 150 MCG/HR: at 00:17

## 2024-01-01 RX ADMIN — SEVELAMER CARBONATE 800 MG: 800 TABLET, FILM COATED ORAL at 17:34

## 2024-01-01 RX ADMIN — PANTOPRAZOLE SODIUM 40 MG: 40 INJECTION, POWDER, FOR SOLUTION INTRAVENOUS at 08:55

## 2024-01-01 RX ADMIN — ACETAMINOPHEN 650 MG: 325 TABLET ORAL at 08:48

## 2024-01-01 RX ADMIN — SODIUM ZIRCONIUM CYCLOSILICATE 10 G: 10 POWDER, FOR SUSPENSION ORAL at 02:12

## 2024-01-01 RX ADMIN — SENNOSIDES AND DOCUSATE SODIUM 2 TABLET: 50; 8.6 TABLET ORAL at 20:14

## 2024-01-01 RX ADMIN — Medication 10 ML: at 13:57

## 2024-01-01 RX ADMIN — ACETAMINOPHEN 650 MG: 325 TABLET ORAL at 17:12

## 2024-01-01 RX ADMIN — NICOTINE 1 PATCH: 21 PATCH TRANSDERMAL at 13:05

## 2024-01-01 RX ADMIN — Medication 1 TABLET: at 08:55

## 2024-01-01 RX ADMIN — SUCRALFATE 1 G: 1 TABLET ORAL at 21:44

## 2024-01-01 RX ADMIN — MUPIROCIN 1 APPLICATION: 20 OINTMENT TOPICAL at 08:55

## 2024-01-01 RX ADMIN — HYDROCORTISONE SODIUM SUCCINATE 100 MG: 100 INJECTION, POWDER, FOR SOLUTION INTRAMUSCULAR; INTRAVENOUS at 11:13

## 2024-01-01 RX ADMIN — Medication 250 MCG/HR: at 20:05

## 2024-01-01 RX ADMIN — AMIODARONE HYDROCHLORIDE 150 MG: 50 INJECTION, SOLUTION INTRAVENOUS at 14:02

## 2024-01-01 RX ADMIN — CHLORHEXIDINE GLUCONATE 15 ML: 1.2 RINSE ORAL at 08:49

## 2024-01-01 RX ADMIN — WHITE PETROLATUM 57.7 %-MINERAL OIL 31.9 % EYE OINTMENT: at 08:55

## 2024-01-01 RX ADMIN — DOXYCYCLINE 100 MG: 100 INJECTION, POWDER, LYOPHILIZED, FOR SOLUTION INTRAVENOUS at 19:15

## 2024-01-01 RX ADMIN — DOXYCYCLINE 100 MG: 100 INJECTION, POWDER, LYOPHILIZED, FOR SOLUTION INTRAVENOUS at 08:11

## 2024-01-01 RX ADMIN — SODIUM CHLORIDE 2000 MG: 9 INJECTION, SOLUTION INTRAVENOUS at 23:44

## 2024-01-01 RX ADMIN — ACETAMINOPHEN 650 MG: 325 TABLET ORAL at 16:36

## 2024-01-01 RX ADMIN — AMIODARONE HYDROCHLORIDE 1 MG/MIN: 1.8 INJECTION, SOLUTION INTRAVENOUS at 00:08

## 2024-01-01 RX ADMIN — SEVELAMER CARBONATE 800 MG: 800 TABLET, FILM COATED ORAL at 13:10

## 2024-01-01 RX ADMIN — PHENYLEPHRINE HYDROCHLORIDE 1.7 MCG/KG/MIN: 10 INJECTION INTRAVENOUS at 16:06

## 2024-01-01 RX ADMIN — LEVOTHYROXINE SODIUM 75 MCG: 0.07 TABLET ORAL at 06:01

## 2024-01-01 RX ADMIN — AMMONIUM LACTATE 1 APPLICATION: 120 CREAM TOPICAL at 20:15

## 2024-01-01 RX ADMIN — Medication 10 ML: at 09:13

## 2024-01-01 RX ADMIN — GABAPENTIN 200 MG: 100 CAPSULE ORAL at 20:47

## 2024-01-01 RX ADMIN — PANTOPRAZOLE SODIUM 40 MG: 40 INJECTION, POWDER, FOR SOLUTION INTRAVENOUS at 06:37

## 2024-01-01 RX ADMIN — GABAPENTIN 200 MG: 100 CAPSULE ORAL at 20:27

## 2024-01-01 RX ADMIN — Medication 250 MCG/HR: at 07:27

## 2024-01-01 RX ADMIN — SUCRALFATE 1 G: 1 TABLET ORAL at 20:48

## 2024-01-01 RX ADMIN — PHENYLEPHRINE HYDROCHLORIDE 3 MCG/KG/MIN: 10 INJECTION INTRAVENOUS at 00:15

## 2024-01-01 RX ADMIN — PIPERACILLIN SODIUM AND TAZOBACTAM SODIUM 3.38 G: 3; .375 INJECTION, POWDER, LYOPHILIZED, FOR SOLUTION INTRAVENOUS at 23:18

## 2024-01-01 RX ADMIN — INSULIN LISPRO 2 UNITS: 100 INJECTION, SOLUTION INTRAVENOUS; SUBCUTANEOUS at 20:54

## 2024-01-01 RX ADMIN — LEVOTHYROXINE SODIUM 75 MCG: 0.07 TABLET ORAL at 06:34

## 2024-01-01 RX ADMIN — DOXYCYCLINE 100 MG: 100 INJECTION, POWDER, LYOPHILIZED, FOR SOLUTION INTRAVENOUS at 21:56

## 2024-01-01 RX ADMIN — PANTOPRAZOLE SODIUM 8 MG/HR: 40 INJECTION, POWDER, FOR SOLUTION INTRAVENOUS at 16:45

## 2024-01-01 RX ADMIN — Medication 10 ML: at 23:27

## 2024-01-01 RX ADMIN — ACETAMINOPHEN 650 MG: 325 TABLET ORAL at 16:37

## 2024-01-01 RX ADMIN — NICOTINE 1 PATCH: 21 PATCH TRANSDERMAL at 11:05

## 2024-01-01 RX ADMIN — DOXYCYCLINE 100 MG: 100 INJECTION, POWDER, LYOPHILIZED, FOR SOLUTION INTRAVENOUS at 13:57

## 2024-01-01 RX ADMIN — SUCRALFATE 1 G: 1 TABLET ORAL at 17:57

## 2024-01-01 RX ADMIN — DEXTROSE MONOHYDRATE 25 G: 25 INJECTION, SOLUTION INTRAVENOUS at 06:20

## 2024-01-01 RX ADMIN — AMMONIUM LACTATE 1 APPLICATION: 120 CREAM TOPICAL at 20:57

## 2024-01-01 RX ADMIN — Medication 10 ML: at 08:40

## 2024-01-01 RX ADMIN — PANTOPRAZOLE SODIUM 40 MG: 40 INJECTION, POWDER, FOR SOLUTION INTRAVENOUS at 06:46

## 2024-01-17 ENCOUNTER — TRANSCRIBE ORDERS (OUTPATIENT)
Dept: WOUND CARE | Facility: HOSPITAL | Age: 74
End: 2024-01-17
Payer: MEDICARE

## 2024-01-22 ENCOUNTER — LAB REQUISITION (OUTPATIENT)
Dept: LAB | Facility: HOSPITAL | Age: 74
End: 2024-01-22
Payer: MEDICARE

## 2024-01-22 DIAGNOSIS — E44.1 MILD PROTEIN-CALORIE MALNUTRITION: ICD-10-CM

## 2024-01-22 LAB
PREALB SERPL-MCNC: 32.1 MG/DL (ref 20–40)
PROT SERPL-MCNC: 7 G/DL (ref 6–8.5)

## 2024-01-22 PROCEDURE — 84155 ASSAY OF PROTEIN SERUM: CPT | Performed by: NURSE PRACTITIONER

## 2024-01-22 PROCEDURE — 84134 ASSAY OF PREALBUMIN: CPT | Performed by: NURSE PRACTITIONER

## 2024-02-29 ENCOUNTER — LAB REQUISITION (OUTPATIENT)
Dept: LAB | Facility: HOSPITAL | Age: 74
End: 2024-02-29
Payer: MEDICARE

## 2024-02-29 DIAGNOSIS — E11.9 TYPE 2 DIABETES MELLITUS WITHOUT COMPLICATIONS: ICD-10-CM

## 2024-02-29 LAB — HBA1C MFR BLD: 6.1 % (ref 4.8–5.6)

## 2024-02-29 PROCEDURE — 83036 HEMOGLOBIN GLYCOSYLATED A1C: CPT | Performed by: NURSE PRACTITIONER

## 2024-03-06 ENCOUNTER — LAB REQUISITION (OUTPATIENT)
Dept: LAB | Facility: HOSPITAL | Age: 74
End: 2024-03-06
Payer: MEDICARE

## 2024-03-06 DIAGNOSIS — E11.9 TYPE 2 DIABETES MELLITUS WITHOUT COMPLICATIONS: ICD-10-CM

## 2024-03-06 LAB — HBA1C MFR BLD: 6.6 % (ref 4.8–5.6)

## 2024-03-06 PROCEDURE — 83036 HEMOGLOBIN GLYCOSYLATED A1C: CPT | Performed by: NURSE PRACTITIONER

## 2024-03-21 ENCOUNTER — LAB REQUISITION (OUTPATIENT)
Dept: LAB | Facility: HOSPITAL | Age: 74
End: 2024-03-21
Payer: MEDICARE

## 2024-03-21 DIAGNOSIS — E11.9 TYPE 2 DIABETES MELLITUS WITHOUT COMPLICATIONS: ICD-10-CM

## 2024-03-21 LAB
ALBUMIN SERPL-MCNC: 4 G/DL (ref 3.5–5.2)
ALBUMIN/GLOB SERPL: 1.7 G/DL
ALP SERPL-CCNC: 174 U/L (ref 39–117)
ALT SERPL W P-5'-P-CCNC: 19 U/L (ref 1–41)
ANION GAP SERPL CALCULATED.3IONS-SCNC: 17.5 MMOL/L (ref 5–15)
AST SERPL-CCNC: 22 U/L (ref 1–40)
BASOPHILS # BLD AUTO: 0.03 10*3/MM3 (ref 0–0.2)
BASOPHILS NFR BLD AUTO: 0.4 % (ref 0–1.5)
BILIRUB SERPL-MCNC: 0.3 MG/DL (ref 0–1.2)
BUN SERPL-MCNC: 73 MG/DL (ref 8–23)
BUN/CREAT SERPL: 8.5 (ref 7–25)
CALCIUM SPEC-SCNC: 8.7 MG/DL (ref 8.6–10.5)
CHLORIDE SERPL-SCNC: 102 MMOL/L (ref 98–107)
CHOLEST SERPL-MCNC: 74 MG/DL (ref 0–200)
CO2 SERPL-SCNC: 21.5 MMOL/L (ref 22–29)
CREAT SERPL-MCNC: 8.56 MG/DL (ref 0.76–1.27)
DEPRECATED RDW RBC AUTO: 47.9 FL (ref 37–54)
EGFRCR SERPLBLD CKD-EPI 2021: 6 ML/MIN/1.73
EOSINOPHIL # BLD AUTO: 0.23 10*3/MM3 (ref 0–0.4)
EOSINOPHIL NFR BLD AUTO: 2.7 % (ref 0.3–6.2)
ERYTHROCYTE [DISTWIDTH] IN BLOOD BY AUTOMATED COUNT: 13.7 % (ref 12.3–15.4)
GLOBULIN UR ELPH-MCNC: 2.4 GM/DL
GLUCOSE SERPL-MCNC: 107 MG/DL (ref 65–99)
HBA1C MFR BLD: 6.4 % (ref 4.8–5.6)
HCT VFR BLD AUTO: 27.3 % (ref 37.5–51)
HDLC SERPL-MCNC: 22 MG/DL (ref 40–60)
HGB BLD-MCNC: 9 G/DL (ref 13–17.7)
IMM GRANULOCYTES # BLD AUTO: 0.02 10*3/MM3 (ref 0–0.05)
IMM GRANULOCYTES NFR BLD AUTO: 0.2 % (ref 0–0.5)
LDL/HDL RATIO NULL: ABNORMAL
LDLC SERPL CALC-MCNC: 13 MG/DL (ref 0–100)
LYMPHOCYTES # BLD AUTO: 1.39 10*3/MM3 (ref 0.7–3.1)
LYMPHOCYTES NFR BLD AUTO: 16.3 % (ref 19.6–45.3)
MCH RBC QN AUTO: 31.4 PG (ref 26.6–33)
MCHC RBC AUTO-ENTMCNC: 33 G/DL (ref 31.5–35.7)
MCV RBC AUTO: 95.1 FL (ref 79–97)
MONOCYTES # BLD AUTO: 1.29 10*3/MM3 (ref 0.1–0.9)
MONOCYTES NFR BLD AUTO: 15.1 % (ref 5–12)
NEUTROPHILS NFR BLD AUTO: 5.58 10*3/MM3 (ref 1.7–7)
NEUTROPHILS NFR BLD AUTO: 65.3 % (ref 42.7–76)
NRBC BLD AUTO-RTO: 0 /100 WBC (ref 0–0.2)
PLATELET # BLD AUTO: 108 10*3/MM3 (ref 140–450)
PMV BLD AUTO: 11.8 FL (ref 6–12)
POTASSIUM SERPL-SCNC: 5.6 MMOL/L (ref 3.5–5.2)
PROT SERPL-MCNC: 6.4 G/DL (ref 6–8.5)
RBC # BLD AUTO: 2.87 10*6/MM3 (ref 4.14–5.8)
SODIUM SERPL-SCNC: 141 MMOL/L (ref 136–145)
TRIGL SERPL-MCNC: 266 MG/DL (ref 0–150)
TSH SERPL DL<=0.05 MIU/L-ACNC: 2.49 UIU/ML (ref 0.27–4.2)
VLDLC SERPL-MCNC: 39 MG/DL (ref 5–40)
WBC NRBC COR # BLD AUTO: 8.54 10*3/MM3 (ref 3.4–10.8)

## 2024-03-21 PROCEDURE — 85025 COMPLETE CBC W/AUTO DIFF WBC: CPT | Performed by: NURSE PRACTITIONER

## 2024-03-21 PROCEDURE — 80053 COMPREHEN METABOLIC PANEL: CPT | Performed by: NURSE PRACTITIONER

## 2024-03-21 PROCEDURE — 80061 LIPID PANEL: CPT | Performed by: NURSE PRACTITIONER

## 2024-03-21 PROCEDURE — 83036 HEMOGLOBIN GLYCOSYLATED A1C: CPT | Performed by: NURSE PRACTITIONER

## 2024-03-21 PROCEDURE — 84443 ASSAY THYROID STIM HORMONE: CPT | Performed by: NURSE PRACTITIONER

## 2024-04-12 ENCOUNTER — LAB REQUISITION (OUTPATIENT)
Dept: LAB | Facility: HOSPITAL | Age: 74
End: 2024-04-12
Payer: MEDICARE

## 2024-04-12 DIAGNOSIS — E87.6 HYPOKALEMIA: ICD-10-CM

## 2024-04-12 DIAGNOSIS — E87.5 HYPERKALEMIA: ICD-10-CM

## 2024-04-12 LAB — POTASSIUM SERPL-SCNC: 5.5 MMOL/L (ref 3.5–5.2)

## 2024-04-12 PROCEDURE — 84132 ASSAY OF SERUM POTASSIUM: CPT | Performed by: NURSE PRACTITIONER

## 2024-05-02 ENCOUNTER — LAB REQUISITION (OUTPATIENT)
Dept: LAB | Facility: HOSPITAL | Age: 74
End: 2024-05-02
Payer: MEDICARE

## 2024-05-02 DIAGNOSIS — N18.9 CHRONIC KIDNEY DISEASE, UNSPECIFIED: ICD-10-CM

## 2024-05-02 LAB — POTASSIUM SERPL-SCNC: 4.6 MMOL/L (ref 3.5–5.2)

## 2024-05-02 PROCEDURE — 84132 ASSAY OF SERUM POTASSIUM: CPT | Performed by: NURSE PRACTITIONER

## 2024-05-15 ENCOUNTER — HOSPITAL ENCOUNTER (INPATIENT)
Facility: HOSPITAL | Age: 74
LOS: 14 days | Discharge: NURSING, REHAB OR LONG-TERM CARE | DRG: 321 | End: 2024-05-30
Attending: STUDENT IN AN ORGANIZED HEALTH CARE EDUCATION/TRAINING PROGRAM | Admitting: INTERNAL MEDICINE
Payer: MEDICARE

## 2024-05-15 ENCOUNTER — APPOINTMENT (OUTPATIENT)
Dept: CT IMAGING | Facility: HOSPITAL | Age: 74
DRG: 321 | End: 2024-05-15
Payer: MEDICARE

## 2024-05-15 ENCOUNTER — LAB REQUISITION (OUTPATIENT)
Dept: LAB | Facility: HOSPITAL | Age: 74
DRG: 321 | End: 2024-05-15
Payer: MEDICARE

## 2024-05-15 ENCOUNTER — APPOINTMENT (OUTPATIENT)
Dept: GENERAL RADIOLOGY | Facility: HOSPITAL | Age: 74
DRG: 321 | End: 2024-05-15
Payer: MEDICARE

## 2024-05-15 DIAGNOSIS — Z87.39 H/O DEGENERATIVE DISC DISEASE: ICD-10-CM

## 2024-05-15 DIAGNOSIS — R65.10 SIRS (SYSTEMIC INFLAMMATORY RESPONSE SYNDROME): Primary | ICD-10-CM

## 2024-05-15 DIAGNOSIS — Z20.828 CONTACT WITH AND (SUSPECTED) EXPOSURE TO OTHER VIRAL COMMUNICABLE DISEASES: ICD-10-CM

## 2024-05-15 DIAGNOSIS — I25.5 ISCHEMIC CARDIOMYOPATHY: ICD-10-CM

## 2024-05-15 LAB
A-A DO2: 48.8 MMHG (ref 0–300)
ALBUMIN SERPL-MCNC: 3.7 G/DL (ref 3.5–5.2)
ALBUMIN SERPL-MCNC: 3.8 G/DL (ref 3.5–5.2)
ALBUMIN/GLOB SERPL: 1.2 G/DL
ALBUMIN/GLOB SERPL: 1.3 G/DL
ALP SERPL-CCNC: 176 U/L (ref 39–117)
ALP SERPL-CCNC: 184 U/L (ref 39–117)
ALT SERPL W P-5'-P-CCNC: 8 U/L (ref 1–41)
ALT SERPL W P-5'-P-CCNC: 9 U/L (ref 1–41)
ANION GAP SERPL CALCULATED.3IONS-SCNC: 15.5 MMOL/L (ref 5–15)
ANION GAP SERPL CALCULATED.3IONS-SCNC: 17.5 MMOL/L (ref 5–15)
APTT PPP: 31 SECONDS (ref 26.5–34.5)
ARTERIAL PATENCY WRIST A: POSITIVE
AST SERPL-CCNC: 16 U/L (ref 1–40)
AST SERPL-CCNC: 16 U/L (ref 1–40)
ATMOSPHERIC PRESS: 720 MMHG
B PARAPERT DNA SPEC QL NAA+PROBE: NOT DETECTED
B PERT DNA SPEC QL NAA+PROBE: NOT DETECTED
BASE EXCESS BLDA CALC-SCNC: 3 MMOL/L (ref 0–2)
BASOPHILS # BLD AUTO: 0.03 10*3/MM3 (ref 0–0.2)
BASOPHILS # BLD AUTO: 0.04 10*3/MM3 (ref 0–0.2)
BASOPHILS NFR BLD AUTO: 0.2 % (ref 0–1.5)
BASOPHILS NFR BLD AUTO: 0.2 % (ref 0–1.5)
BDY SITE: ABNORMAL
BILIRUB SERPL-MCNC: 0.4 MG/DL (ref 0–1.2)
BILIRUB SERPL-MCNC: 0.5 MG/DL (ref 0–1.2)
BUN SERPL-MCNC: 39 MG/DL (ref 8–23)
BUN SERPL-MCNC: 41 MG/DL (ref 8–23)
BUN/CREAT SERPL: 5.8 (ref 7–25)
BUN/CREAT SERPL: 6.2 (ref 7–25)
C PNEUM DNA NPH QL NAA+NON-PROBE: NOT DETECTED
CALCIUM SPEC-SCNC: 8.7 MG/DL (ref 8.6–10.5)
CALCIUM SPEC-SCNC: 9 MG/DL (ref 8.6–10.5)
CHLORIDE SERPL-SCNC: 93 MMOL/L (ref 98–107)
CHLORIDE SERPL-SCNC: 93 MMOL/L (ref 98–107)
CO2 BLDA-SCNC: 28 MMOL/L (ref 22–33)
CO2 SERPL-SCNC: 25.5 MMOL/L (ref 22–29)
CO2 SERPL-SCNC: 27.5 MMOL/L (ref 22–29)
COHGB MFR BLD: 1.9 % (ref 0–5)
CREAT SERPL-MCNC: 6.33 MG/DL (ref 0.76–1.27)
CREAT SERPL-MCNC: 7.03 MG/DL (ref 0.76–1.27)
CRP SERPL-MCNC: 5.34 MG/DL (ref 0–0.5)
CRP SERPL-MCNC: 7.54 MG/DL (ref 0–0.5)
D-LACTATE SERPL-SCNC: 2.3 MMOL/L (ref 0.5–2)
D-LACTATE SERPL-SCNC: 2.8 MMOL/L (ref 0.5–2)
D-LACTATE SERPL-SCNC: 2.8 MMOL/L (ref 0.5–2)
DEPRECATED RDW RBC AUTO: 47.5 FL (ref 37–54)
DEPRECATED RDW RBC AUTO: 48 FL (ref 37–54)
EGFRCR SERPLBLD CKD-EPI 2021: 7.6 ML/MIN/1.73
EGFRCR SERPLBLD CKD-EPI 2021: 8.6 ML/MIN/1.73
EOSINOPHIL # BLD AUTO: 0.02 10*3/MM3 (ref 0–0.4)
EOSINOPHIL # BLD AUTO: 0.03 10*3/MM3 (ref 0–0.4)
EOSINOPHIL NFR BLD AUTO: 0.1 % (ref 0.3–6.2)
EOSINOPHIL NFR BLD AUTO: 0.2 % (ref 0.3–6.2)
ERYTHROCYTE [DISTWIDTH] IN BLOOD BY AUTOMATED COUNT: 14 % (ref 12.3–15.4)
ERYTHROCYTE [DISTWIDTH] IN BLOOD BY AUTOMATED COUNT: 14.1 % (ref 12.3–15.4)
FLUAV SUBTYP SPEC NAA+PROBE: NOT DETECTED
FLUBV RNA ISLT QL NAA+PROBE: NOT DETECTED
GEN 5 2HR TROPONIN T REFLEX: 160 NG/L
GLOBULIN UR ELPH-MCNC: 2.8 GM/DL
GLOBULIN UR ELPH-MCNC: 3.1 GM/DL
GLUCOSE BLDC GLUCOMTR-MCNC: 121 MG/DL (ref 70–130)
GLUCOSE SERPL-MCNC: 83 MG/DL (ref 65–99)
GLUCOSE SERPL-MCNC: 96 MG/DL (ref 65–99)
HADV DNA SPEC NAA+PROBE: NOT DETECTED
HCO3 BLDA-SCNC: 26.9 MMOL/L (ref 20–26)
HCOV 229E RNA SPEC QL NAA+PROBE: NOT DETECTED
HCOV HKU1 RNA SPEC QL NAA+PROBE: NOT DETECTED
HCOV NL63 RNA SPEC QL NAA+PROBE: NOT DETECTED
HCOV OC43 RNA SPEC QL NAA+PROBE: NOT DETECTED
HCT VFR BLD AUTO: 27.5 % (ref 37.5–51)
HCT VFR BLD AUTO: 30.4 % (ref 37.5–51)
HCT VFR BLD CALC: 27 % (ref 38–51)
HGB BLD-MCNC: 8.9 G/DL (ref 13–17.7)
HGB BLD-MCNC: 9.7 G/DL (ref 13–17.7)
HGB BLDA-MCNC: 8.8 G/DL (ref 14–18)
HMPV RNA NPH QL NAA+NON-PROBE: NOT DETECTED
HPIV1 RNA ISLT QL NAA+PROBE: NOT DETECTED
HPIV2 RNA SPEC QL NAA+PROBE: NOT DETECTED
HPIV3 RNA NPH QL NAA+PROBE: NOT DETECTED
HPIV4 P GENE NPH QL NAA+PROBE: NOT DETECTED
IMM GRANULOCYTES # BLD AUTO: 0.11 10*3/MM3 (ref 0–0.05)
IMM GRANULOCYTES # BLD AUTO: 0.14 10*3/MM3 (ref 0–0.05)
IMM GRANULOCYTES NFR BLD AUTO: 0.7 % (ref 0–0.5)
IMM GRANULOCYTES NFR BLD AUTO: 0.8 % (ref 0–0.5)
INHALED O2 CONCENTRATION: 21 %
INR PPP: 1.35 (ref 0.9–1.1)
LYMPHOCYTES # BLD AUTO: 0.35 10*3/MM3 (ref 0.7–3.1)
LYMPHOCYTES # BLD AUTO: 0.51 10*3/MM3 (ref 0.7–3.1)
LYMPHOCYTES NFR BLD AUTO: 2.1 % (ref 19.6–45.3)
LYMPHOCYTES NFR BLD AUTO: 3 % (ref 19.6–45.3)
Lab: ABNORMAL
Lab: ABNORMAL
M PNEUMO IGG SER IA-ACNC: NOT DETECTED
MCH RBC QN AUTO: 30.1 PG (ref 26.6–33)
MCH RBC QN AUTO: 30.4 PG (ref 26.6–33)
MCHC RBC AUTO-ENTMCNC: 31.9 G/DL (ref 31.5–35.7)
MCHC RBC AUTO-ENTMCNC: 32.4 G/DL (ref 31.5–35.7)
MCV RBC AUTO: 93.9 FL (ref 79–97)
MCV RBC AUTO: 94.4 FL (ref 79–97)
METHGB BLD QL: <-0.1 % (ref 0–3)
MODALITY: ABNORMAL
MONOCYTES # BLD AUTO: 1.15 10*3/MM3 (ref 0.1–0.9)
MONOCYTES # BLD AUTO: 1.18 10*3/MM3 (ref 0.1–0.9)
MONOCYTES NFR BLD AUTO: 6.8 % (ref 5–12)
MONOCYTES NFR BLD AUTO: 7 % (ref 5–12)
NEUTROPHILS NFR BLD AUTO: 14.97 10*3/MM3 (ref 1.7–7)
NEUTROPHILS NFR BLD AUTO: 15.31 10*3/MM3 (ref 1.7–7)
NEUTROPHILS NFR BLD AUTO: 88.9 % (ref 42.7–76)
NEUTROPHILS NFR BLD AUTO: 90 % (ref 42.7–76)
NOTIFIED BY: ABNORMAL
NOTIFIED WHO: ABNORMAL
NRBC BLD AUTO-RTO: 0 /100 WBC (ref 0–0.2)
NRBC BLD AUTO-RTO: 0 /100 WBC (ref 0–0.2)
OXYHGB MFR BLDV: 90.3 % (ref 94–99)
PCO2 BLDA: 37 MM HG (ref 35–45)
PCO2 TEMP ADJ BLD: ABNORMAL MM[HG]
PH BLDA: 7.47 PH UNITS (ref 7.35–7.45)
PH, TEMP CORRECTED: ABNORMAL
PLATELET # BLD AUTO: 133 10*3/MM3 (ref 140–450)
PLATELET # BLD AUTO: 138 10*3/MM3 (ref 140–450)
PMV BLD AUTO: 10.2 FL (ref 6–12)
PMV BLD AUTO: 10.6 FL (ref 6–12)
PO2 BLDA: 50.7 MM HG (ref 83–108)
PO2 TEMP ADJ BLD: ABNORMAL MM[HG]
POTASSIUM SERPL-SCNC: 3.9 MMOL/L (ref 3.5–5.2)
POTASSIUM SERPL-SCNC: 4.1 MMOL/L (ref 3.5–5.2)
PROCALCITONIN SERPL-MCNC: 0.64 NG/ML (ref 0–0.25)
PROT SERPL-MCNC: 6.5 G/DL (ref 6–8.5)
PROT SERPL-MCNC: 6.9 G/DL (ref 6–8.5)
PROTHROMBIN TIME: 16.8 SECONDS (ref 12.1–14.7)
QT INTERVAL: 360 MS
QTC INTERVAL: 482 MS
RBC # BLD AUTO: 2.93 10*6/MM3 (ref 4.14–5.8)
RBC # BLD AUTO: 3.22 10*6/MM3 (ref 4.14–5.8)
RHINOVIRUS RNA SPEC NAA+PROBE: NOT DETECTED
RSV RNA NPH QL NAA+NON-PROBE: NOT DETECTED
SAO2 % BLDCOA: 90.5 % (ref 94–99)
SARS-COV-2 RNA NPH QL NAA+NON-PROBE: NOT DETECTED
SODIUM SERPL-SCNC: 136 MMOL/L (ref 136–145)
SODIUM SERPL-SCNC: 136 MMOL/L (ref 136–145)
TROPONIN T DELTA: -12 NG/L
TROPONIN T SERPL HS-MCNC: 172 NG/L
TROPONIN T SERPL HS-MCNC: 173 NG/L
VENTILATOR MODE: ABNORMAL
WBC NRBC COR # BLD AUTO: 16.83 10*3/MM3 (ref 3.4–10.8)
WBC NRBC COR # BLD AUTO: 17.01 10*3/MM3 (ref 3.4–10.8)

## 2024-05-15 PROCEDURE — 99285 EMERGENCY DEPT VISIT HI MDM: CPT

## 2024-05-15 PROCEDURE — G0378 HOSPITAL OBSERVATION PER HR: HCPCS

## 2024-05-15 PROCEDURE — 84484 ASSAY OF TROPONIN QUANT: CPT | Performed by: NURSE PRACTITIONER

## 2024-05-15 PROCEDURE — 25010000002 VANCOMYCIN 5 G RECONSTITUTED SOLUTION: Performed by: INTERNAL MEDICINE

## 2024-05-15 PROCEDURE — 80053 COMPREHEN METABOLIC PANEL: CPT | Performed by: NURSE PRACTITIONER

## 2024-05-15 PROCEDURE — 25010000002 HEPARIN (PORCINE) PER 1000 UNITS: Performed by: INTERNAL MEDICINE

## 2024-05-15 PROCEDURE — 83050 HGB METHEMOGLOBIN QUAN: CPT

## 2024-05-15 PROCEDURE — 25510000001 IOPAMIDOL PER 1 ML: Performed by: EMERGENCY MEDICINE

## 2024-05-15 PROCEDURE — 86140 C-REACTIVE PROTEIN: CPT | Performed by: NURSE PRACTITIONER

## 2024-05-15 PROCEDURE — 71045 X-RAY EXAM CHEST 1 VIEW: CPT

## 2024-05-15 PROCEDURE — 85730 THROMBOPLASTIN TIME PARTIAL: CPT | Performed by: NURSE PRACTITIONER

## 2024-05-15 PROCEDURE — 0202U NFCT DS 22 TRGT SARS-COV-2: CPT | Performed by: INTERNAL MEDICINE

## 2024-05-15 PROCEDURE — 82375 ASSAY CARBOXYHB QUANT: CPT

## 2024-05-15 PROCEDURE — 87154 CUL TYP ID BLD PTHGN 6+ TRGT: CPT | Performed by: NURSE PRACTITIONER

## 2024-05-15 PROCEDURE — 25010000002 PIPERACILLIN SOD-TAZOBACTAM PER 1 G: Performed by: INTERNAL MEDICINE

## 2024-05-15 PROCEDURE — 87481 CANDIDA DNA AMP PROBE: CPT | Performed by: EMERGENCY MEDICINE

## 2024-05-15 PROCEDURE — 74176 CT ABD & PELVIS W/O CONTRAST: CPT | Performed by: RADIOLOGY

## 2024-05-15 PROCEDURE — 87186 SC STD MICRODIL/AGAR DIL: CPT | Performed by: NURSE PRACTITIONER

## 2024-05-15 PROCEDURE — 71275 CT ANGIOGRAPHY CHEST: CPT

## 2024-05-15 PROCEDURE — 84145 PROCALCITONIN (PCT): CPT | Performed by: EMERGENCY MEDICINE

## 2024-05-15 PROCEDURE — 36415 COLL VENOUS BLD VENIPUNCTURE: CPT | Performed by: NURSE PRACTITIONER

## 2024-05-15 PROCEDURE — 36600 WITHDRAWAL OF ARTERIAL BLOOD: CPT

## 2024-05-15 PROCEDURE — 82805 BLOOD GASES W/O2 SATURATION: CPT

## 2024-05-15 PROCEDURE — 86140 C-REACTIVE PROTEIN: CPT | Performed by: INTERNAL MEDICINE

## 2024-05-15 PROCEDURE — 25810000003 SODIUM CHLORIDE 0.9 % SOLUTION: Performed by: EMERGENCY MEDICINE

## 2024-05-15 PROCEDURE — 93010 ELECTROCARDIOGRAM REPORT: CPT | Performed by: INTERNAL MEDICINE

## 2024-05-15 PROCEDURE — 87147 CULTURE TYPE IMMUNOLOGIC: CPT | Performed by: NURSE PRACTITIONER

## 2024-05-15 PROCEDURE — 63710000001 INSULIN GLARGINE PER 5 UNITS: Performed by: INTERNAL MEDICINE

## 2024-05-15 PROCEDURE — 85025 COMPLETE CBC W/AUTO DIFF WBC: CPT | Performed by: INTERNAL MEDICINE

## 2024-05-15 PROCEDURE — 93005 ELECTROCARDIOGRAM TRACING: CPT | Performed by: INTERNAL MEDICINE

## 2024-05-15 PROCEDURE — 85025 COMPLETE CBC W/AUTO DIFF WBC: CPT | Performed by: NURSE PRACTITIONER

## 2024-05-15 PROCEDURE — 36415 COLL VENOUS BLD VENIPUNCTURE: CPT

## 2024-05-15 PROCEDURE — 71045 X-RAY EXAM CHEST 1 VIEW: CPT | Performed by: RADIOLOGY

## 2024-05-15 PROCEDURE — 80053 COMPREHEN METABOLIC PANEL: CPT | Performed by: INTERNAL MEDICINE

## 2024-05-15 PROCEDURE — 71275 CT ANGIOGRAPHY CHEST: CPT | Performed by: RADIOLOGY

## 2024-05-15 PROCEDURE — 74176 CT ABD & PELVIS W/O CONTRAST: CPT

## 2024-05-15 PROCEDURE — 99223 1ST HOSP IP/OBS HIGH 75: CPT | Performed by: INTERNAL MEDICINE

## 2024-05-15 PROCEDURE — 85610 PROTHROMBIN TIME: CPT | Performed by: NURSE PRACTITIONER

## 2024-05-15 PROCEDURE — 84484 ASSAY OF TROPONIN QUANT: CPT | Performed by: INTERNAL MEDICINE

## 2024-05-15 PROCEDURE — 93005 ELECTROCARDIOGRAM TRACING: CPT | Performed by: NURSE PRACTITIONER

## 2024-05-15 PROCEDURE — 82948 REAGENT STRIP/BLOOD GLUCOSE: CPT

## 2024-05-15 PROCEDURE — 87040 BLOOD CULTURE FOR BACTERIA: CPT | Performed by: NURSE PRACTITIONER

## 2024-05-15 PROCEDURE — 83605 ASSAY OF LACTIC ACID: CPT | Performed by: NURSE PRACTITIONER

## 2024-05-15 PROCEDURE — 94799 UNLISTED PULMONARY SVC/PX: CPT

## 2024-05-15 PROCEDURE — 25010000002 CEFTRIAXONE PER 250 MG: Performed by: EMERGENCY MEDICINE

## 2024-05-15 PROCEDURE — 25810000003 SODIUM CHLORIDE 0.9 % SOLUTION: Performed by: INTERNAL MEDICINE

## 2024-05-15 RX ORDER — BISACODYL 5 MG/1
5 TABLET, DELAYED RELEASE ORAL DAILY PRN
Status: DISCONTINUED | OUTPATIENT
Start: 2024-05-15 | End: 2024-05-30 | Stop reason: HOSPADM

## 2024-05-15 RX ORDER — ATORVASTATIN CALCIUM 20 MG/1
20 TABLET, FILM COATED ORAL DAILY
Status: CANCELLED | OUTPATIENT
Start: 2024-05-15

## 2024-05-15 RX ORDER — MIDODRINE HYDROCHLORIDE 2.5 MG/1
5 TABLET ORAL ONCE
Status: COMPLETED | OUTPATIENT
Start: 2024-05-15 | End: 2024-05-15

## 2024-05-15 RX ORDER — GLIPIZIDE 5 MG/1
10 TABLET ORAL DAILY
Status: CANCELLED | OUTPATIENT
Start: 2024-05-15

## 2024-05-15 RX ORDER — GABAPENTIN 100 MG/1
200 CAPSULE ORAL NIGHTLY
Status: DISCONTINUED | OUTPATIENT
Start: 2024-05-15 | End: 2024-05-17

## 2024-05-15 RX ORDER — LIDOCAINE HYDROCHLORIDE 40 MG/ML
SOLUTION TOPICAL
Status: CANCELLED | OUTPATIENT
Start: 2024-05-15

## 2024-05-15 RX ORDER — POLYETHYLENE GLYCOL 3350 17 G/17G
17 POWDER, FOR SOLUTION ORAL 2 TIMES DAILY PRN
COMMUNITY

## 2024-05-15 RX ORDER — AMMONIUM LACTATE 120 MG/G
1 CREAM TOPICAL NIGHTLY
COMMUNITY

## 2024-05-15 RX ORDER — GLUCAGON 1 MG/ML
1 KIT INJECTION
Status: DISCONTINUED | OUTPATIENT
Start: 2024-05-15 | End: 2024-05-30 | Stop reason: HOSPADM

## 2024-05-15 RX ORDER — INSULIN LISPRO 100 [IU]/ML
2-7 INJECTION, SOLUTION INTRAVENOUS; SUBCUTANEOUS
Status: DISCONTINUED | OUTPATIENT
Start: 2024-05-15 | End: 2024-05-30 | Stop reason: HOSPADM

## 2024-05-15 RX ORDER — LEVOTHYROXINE SODIUM 0.07 MG/1
75 TABLET ORAL DAILY
Status: DISCONTINUED | OUTPATIENT
Start: 2024-05-16 | End: 2024-05-30 | Stop reason: HOSPADM

## 2024-05-15 RX ORDER — ASPIRIN 81 MG/1
324 TABLET, CHEWABLE ORAL ONCE
Status: COMPLETED | OUTPATIENT
Start: 2024-05-15 | End: 2024-05-15

## 2024-05-15 RX ORDER — ONDANSETRON 4 MG/1
4 TABLET, FILM COATED ORAL EVERY 8 HOURS PRN
COMMUNITY

## 2024-05-15 RX ORDER — LOPERAMIDE HYDROCHLORIDE 2 MG/1
4 CAPSULE ORAL EVERY 6 HOURS PRN
COMMUNITY
End: 2024-05-30 | Stop reason: HOSPADM

## 2024-05-15 RX ORDER — MIDODRINE HYDROCHLORIDE 2.5 MG/1
2.5 TABLET ORAL 3 TIMES WEEKLY
COMMUNITY
End: 2024-05-30 | Stop reason: HOSPADM

## 2024-05-15 RX ORDER — MULTIPLE VITAMINS W/ MINERALS TAB 9MG-400MCG
1 TAB ORAL DAILY
Status: DISCONTINUED | OUTPATIENT
Start: 2024-05-16 | End: 2024-05-30 | Stop reason: HOSPADM

## 2024-05-15 RX ORDER — ACETAMINOPHEN 500 MG
1000 TABLET ORAL ONCE
Status: COMPLETED | OUTPATIENT
Start: 2024-05-15 | End: 2024-05-15

## 2024-05-15 RX ORDER — AMOXICILLIN 250 MG
2 CAPSULE ORAL 2 TIMES DAILY PRN
Status: DISCONTINUED | OUTPATIENT
Start: 2024-05-15 | End: 2024-05-30 | Stop reason: HOSPADM

## 2024-05-15 RX ORDER — INSULIN DETEMIR 100 [IU]/ML
25 INJECTION, SOLUTION SUBCUTANEOUS 2 TIMES DAILY
Status: ON HOLD | COMMUNITY
End: 2024-05-30

## 2024-05-15 RX ORDER — NICOTINE POLACRILEX 4 MG
15 LOZENGE BUCCAL
Status: DISCONTINUED | OUTPATIENT
Start: 2024-05-15 | End: 2024-05-30 | Stop reason: HOSPADM

## 2024-05-15 RX ORDER — METOPROLOL SUCCINATE 25 MG/1
12.5 TABLET, EXTENDED RELEASE ORAL NIGHTLY
Status: DISCONTINUED | OUTPATIENT
Start: 2024-05-15 | End: 2024-05-30

## 2024-05-15 RX ORDER — ACETAMINOPHEN 325 MG/1
650 TABLET ORAL 3 TIMES WEEKLY
COMMUNITY

## 2024-05-15 RX ORDER — AMMONIUM LACTATE 12 G/100G
1 CREAM TOPICAL NIGHTLY
Status: DISCONTINUED | OUTPATIENT
Start: 2024-05-15 | End: 2024-05-30 | Stop reason: HOSPADM

## 2024-05-15 RX ORDER — SEVELAMER CARBONATE 800 MG/1
2400 TABLET, FILM COATED ORAL
Status: DISCONTINUED | OUTPATIENT
Start: 2024-05-16 | End: 2024-05-30 | Stop reason: HOSPADM

## 2024-05-15 RX ORDER — GLIPIZIDE 5 MG/1
5 TABLET ORAL DAILY
COMMUNITY
End: 2024-05-30 | Stop reason: HOSPADM

## 2024-05-15 RX ORDER — ERYTHROMYCIN 5 MG/G
1 OINTMENT OPHTHALMIC 2 TIMES DAILY
Status: DISCONTINUED | OUTPATIENT
Start: 2024-05-15 | End: 2024-05-30 | Stop reason: HOSPADM

## 2024-05-15 RX ORDER — HEPARIN SODIUM 5000 [USP'U]/ML
5000 INJECTION, SOLUTION INTRAVENOUS; SUBCUTANEOUS EVERY 12 HOURS SCHEDULED
Status: DISCONTINUED | OUTPATIENT
Start: 2024-05-15 | End: 2024-05-17

## 2024-05-15 RX ORDER — LIDOCAINE 4 G/G
1 PATCH TOPICAL EVERY EVENING
Status: DISCONTINUED | OUTPATIENT
Start: 2024-05-15 | End: 2024-05-30 | Stop reason: HOSPADM

## 2024-05-15 RX ORDER — BISACODYL 10 MG
10 SUPPOSITORY, RECTAL RECTAL DAILY PRN
Status: DISCONTINUED | OUTPATIENT
Start: 2024-05-15 | End: 2024-05-30 | Stop reason: HOSPADM

## 2024-05-15 RX ORDER — DEXTROSE MONOHYDRATE 25 G/50ML
25 INJECTION, SOLUTION INTRAVENOUS
Status: DISCONTINUED | OUTPATIENT
Start: 2024-05-15 | End: 2024-05-30 | Stop reason: HOSPADM

## 2024-05-15 RX ORDER — ERYTHROMYCIN 5 MG/G
1 OINTMENT OPHTHALMIC 2 TIMES DAILY
COMMUNITY

## 2024-05-15 RX ORDER — LACTULOSE 10 G/15ML
20 SOLUTION ORAL DAILY PRN
COMMUNITY

## 2024-05-15 RX ORDER — ONDANSETRON 4 MG/1
4 TABLET, ORALLY DISINTEGRATING ORAL EVERY 8 HOURS PRN
Status: DISCONTINUED | OUTPATIENT
Start: 2024-05-15 | End: 2024-05-30 | Stop reason: HOSPADM

## 2024-05-15 RX ORDER — EMOLLIENT COMBINATION NO.92
1 LOTION (ML) TOPICAL DAILY
Status: DISCONTINUED | OUTPATIENT
Start: 2024-05-16 | End: 2024-05-30 | Stop reason: HOSPADM

## 2024-05-15 RX ORDER — SODIUM CHLORIDE 0.9 % (FLUSH) 0.9 %
10 SYRINGE (ML) INJECTION AS NEEDED
Status: DISCONTINUED | OUTPATIENT
Start: 2024-05-15 | End: 2024-05-30 | Stop reason: HOSPADM

## 2024-05-15 RX ORDER — LIDOCAINE HYDROCHLORIDE 30 MG/G
1 CREAM TOPICAL NIGHTLY
COMMUNITY
End: 2024-05-30 | Stop reason: HOSPADM

## 2024-05-15 RX ORDER — NITROGLYCERIN 0.4 MG/1
0.4 TABLET SUBLINGUAL
Status: DISCONTINUED | OUTPATIENT
Start: 2024-05-15 | End: 2024-05-30 | Stop reason: HOSPADM

## 2024-05-15 RX ORDER — MIDODRINE HYDROCHLORIDE 2.5 MG/1
2.5 TABLET ORAL
Status: DISCONTINUED | OUTPATIENT
Start: 2024-05-16 | End: 2024-05-16

## 2024-05-15 RX ORDER — SODIUM CHLORIDE 0.9 % (FLUSH) 0.9 %
10 SYRINGE (ML) INJECTION EVERY 12 HOURS SCHEDULED
Status: DISCONTINUED | OUTPATIENT
Start: 2024-05-15 | End: 2024-05-30 | Stop reason: HOSPADM

## 2024-05-15 RX ORDER — LIDOCAINE HYDROCHLORIDE 20 MG/ML
5 SOLUTION OROPHARYNGEAL EVERY 6 HOURS PRN
Status: ON HOLD | COMMUNITY
End: 2024-05-30

## 2024-05-15 RX ORDER — ASPIRIN 81 MG/1
81 TABLET ORAL DAILY
Status: DISCONTINUED | OUTPATIENT
Start: 2024-05-16 | End: 2024-05-30 | Stop reason: HOSPADM

## 2024-05-15 RX ORDER — SODIUM CHLORIDE 9 MG/ML
40 INJECTION, SOLUTION INTRAVENOUS AS NEEDED
Status: DISCONTINUED | OUTPATIENT
Start: 2024-05-15 | End: 2024-05-30 | Stop reason: HOSPADM

## 2024-05-15 RX ORDER — ACETAMINOPHEN 500 MG
500 TABLET ORAL EVERY 4 HOURS PRN
COMMUNITY

## 2024-05-15 RX ORDER — ATORVASTATIN CALCIUM 20 MG/1
20 TABLET, FILM COATED ORAL DAILY
Status: DISCONTINUED | OUTPATIENT
Start: 2024-05-15 | End: 2024-05-29

## 2024-05-15 RX ORDER — LIDOCAINE HYDROCHLORIDE 20 MG/ML
5 SOLUTION OROPHARYNGEAL EVERY 6 HOURS PRN
Status: DISCONTINUED | OUTPATIENT
Start: 2024-05-15 | End: 2024-05-30 | Stop reason: HOSPADM

## 2024-05-15 RX ORDER — AMMONIUM LACTATE 12 G/100G
1 CREAM TOPICAL NIGHTLY
Status: CANCELLED | OUTPATIENT
Start: 2024-05-15

## 2024-05-15 RX ORDER — CETIRIZINE HYDROCHLORIDE 10 MG/1
10 TABLET ORAL DAILY
Status: DISCONTINUED | OUTPATIENT
Start: 2024-05-16 | End: 2024-05-21

## 2024-05-15 RX ORDER — ACETAMINOPHEN 500 MG
500 TABLET ORAL EVERY 4 HOURS PRN
Status: DISCONTINUED | OUTPATIENT
Start: 2024-05-15 | End: 2024-05-30 | Stop reason: HOSPADM

## 2024-05-15 RX ORDER — ACETAMINOPHEN 325 MG/1
650 TABLET ORAL 3 TIMES WEEKLY
Status: DISCONTINUED | OUTPATIENT
Start: 2024-05-16 | End: 2024-05-30 | Stop reason: HOSPADM

## 2024-05-15 RX ORDER — GLIPIZIDE 5 MG/1
5 TABLET ORAL DAILY
Status: CANCELLED | OUTPATIENT
Start: 2024-05-15

## 2024-05-15 RX ORDER — LOPERAMIDE HYDROCHLORIDE 2 MG/1
2 CAPSULE ORAL 2 TIMES DAILY
COMMUNITY
End: 2024-05-30 | Stop reason: HOSPADM

## 2024-05-15 RX ORDER — BISACODYL 10 MG
10 SUPPOSITORY, RECTAL RECTAL DAILY PRN
COMMUNITY

## 2024-05-15 RX ORDER — FAMOTIDINE 20 MG/1
20 TABLET, FILM COATED ORAL
Status: DISCONTINUED | OUTPATIENT
Start: 2024-05-15 | End: 2024-05-30 | Stop reason: HOSPADM

## 2024-05-15 RX ORDER — POLYETHYLENE GLYCOL 3350 17 G/17G
17 POWDER, FOR SOLUTION ORAL DAILY PRN
Status: DISCONTINUED | OUTPATIENT
Start: 2024-05-15 | End: 2024-05-30 | Stop reason: HOSPADM

## 2024-05-15 RX ORDER — VIT E ACET/GLY/DIMETH/WATER
1 LOTION (ML) TOPICAL DAILY
COMMUNITY

## 2024-05-15 RX ADMIN — ACETAMINOPHEN 500 MG: 500 TABLET ORAL at 23:02

## 2024-05-15 RX ADMIN — ERYTHROMYCIN 1 APPLICATION: 5 OINTMENT OPHTHALMIC at 22:51

## 2024-05-15 RX ADMIN — CARBIDOPA AND LEVODOPA 5 MG: 50; 200 TABLET, EXTENDED RELEASE ORAL at 22:51

## 2024-05-15 RX ADMIN — ACETAMINOPHEN 1000 MG: 500 TABLET ORAL at 14:42

## 2024-05-15 RX ADMIN — ASPIRIN 324 MG: 81 TABLET, CHEWABLE ORAL at 21:35

## 2024-05-15 RX ADMIN — PIPERACILLIN SODIUM AND TAZOBACTAM SODIUM 3.38 G: 3; .375 INJECTION, POWDER, LYOPHILIZED, FOR SOLUTION INTRAVENOUS at 22:50

## 2024-05-15 RX ADMIN — GABAPENTIN 200 MG: 100 CAPSULE ORAL at 22:51

## 2024-05-15 RX ADMIN — ATORVASTATIN CALCIUM 20 MG: 20 TABLET, FILM COATED ORAL at 21:35

## 2024-05-15 RX ADMIN — VANCOMYCIN HYDROCHLORIDE 1750 MG: 5 INJECTION, POWDER, LYOPHILIZED, FOR SOLUTION INTRAVENOUS at 22:55

## 2024-05-15 RX ADMIN — Medication 10 ML: at 21:37

## 2024-05-15 RX ADMIN — SODIUM CHLORIDE 500 ML: 9 INJECTION, SOLUTION INTRAVENOUS at 15:09

## 2024-05-15 RX ADMIN — CEFTRIAXONE 1000 MG: 1 INJECTION, POWDER, FOR SOLUTION INTRAMUSCULAR; INTRAVENOUS at 15:09

## 2024-05-15 RX ADMIN — FAMOTIDINE 20 MG: 20 TABLET, FILM COATED ORAL at 22:53

## 2024-05-15 RX ADMIN — SERTRALINE 50 MG: 50 TABLET, FILM COATED ORAL at 22:53

## 2024-05-15 RX ADMIN — HEPARIN SODIUM 5000 UNITS: 5000 INJECTION INTRAVENOUS; SUBCUTANEOUS at 21:37

## 2024-05-15 RX ADMIN — IOPAMIDOL 100 ML: 755 INJECTION, SOLUTION INTRAVENOUS at 17:04

## 2024-05-15 RX ADMIN — AMMONIUM LACTATE 1 APPLICATION: 120 CREAM TOPICAL at 22:55

## 2024-05-15 RX ADMIN — INSULIN GLARGINE 5 UNITS: 100 INJECTION, SOLUTION SUBCUTANEOUS at 22:53

## 2024-05-15 NOTE — Clinical Note
First balloon inflation max pressure = 16 jose angel. First balloon inflation duration = 15 seconds. Second inflation of balloon - Max pressure = 14 jose angel. 2nd Inflation of balloon - Duration = 18 seconds. 2nd inflation was done at 11:09 EDT. Third inflation of balloon - Max pressure = 16 jose angel. 3rd Inflation of balloon - Duration = 15 seconds. 3rd inflation was done at 11:10 EDT.

## 2024-05-15 NOTE — Clinical Note
First balloon inflation max pressure = 18 jose angel. First balloon inflation duration = 20 seconds. Second inflation of balloon - Max pressure = 22 jose angel. 2nd Inflation of balloon - Duration = 25 seconds. 2nd inflation was done at 11:34 EDT.

## 2024-05-15 NOTE — Clinical Note
First balloon inflation max pressure = 20 jose angel. First balloon inflation duration = 40 seconds.
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
## 2024-05-15 NOTE — Clinical Note
First balloon inflation max pressure = 12 jose angel. First balloon inflation duration = 30 seconds.

## 2024-05-15 NOTE — Clinical Note
A 6 fr sheath was successfully inserted using micropuncture technique with ultrasound guidance into the right femoral artery.

## 2024-05-15 NOTE — Clinical Note
First balloon inflation max pressure = 22 jose angel. First balloon inflation duration = 15 seconds.

## 2024-05-15 NOTE — Clinical Note
First balloon inflation max pressure = 20 jose angel. First balloon inflation duration = 15 seconds. Second inflation of balloon - Max pressure = 20 jose angel. 2nd Inflation of balloon - Duration = 15 seconds. 2nd inflation was done at 11:27 EDT. Third inflation of balloon - Max pressure = 23 jose angel. 3rd Inflation of balloon - Duration = 30 seconds. 3rd inflation was done at 11:29 EDT.

## 2024-05-15 NOTE — CASE MANAGEMENT/SOCIAL WORK
Discharge Planning Assessment   [place]     Patient Name: Kennedy Prescott  MRN: [id number]  Today's Date: 5/15/2024    Admit Date: 5/15/2024    Plan: Patient is resident of [facility] and will return at discharge. Patient will need EMS tranport at discharge.   Discharge Needs Assessment       Row Name 05/15/24 1641       Living Environment    People in Home facility resident    Potentially Unsafe Housing Conditions none    In the past 12 months has the electric, gas, oil, or water company threatened to shut off services in your home? No    Provides Primary Care For no one    Family Caregiver if Needed child(jie), adult    Family Caregiver Names KOBY PRESCOTT Son [phone]    Quality of Family Relationships unable to assess    Able to Return to Prior Arrangements yes       Resource/Environmental Concerns    Resource/Environmental Concerns none    Transportation Concerns none       Transportation Needs    In the past 12 months, has lack of transportation kept you from medical appointments or from getting medications? no    In the past 12 months, has lack of transportation kept you from meetings, work, or from getting things needed for daily living? No       Food Insecurity    Within the past 12 months, you worried that your food would run out before you got the money to buy more. Never true    Within the past 12 months, the food you bought just didn't last and you didn't have money to get more. Never true       Transition Planning    Patient/Family Anticipates Transition to long-term care facility    Patient/Family Anticipated Services at Transition none    Transportation Anticipated public transportation       Discharge Needs Assessment    Readmission Within the Last 30 Days no previous admission in last 30 days    Concerns to be Addressed discharge planning    Anticipated Changes Related to Illness none    Equipment Needed After Discharge none                   Discharge Plan       Row Name  05/15/24 1642       Plan    Plan Patient is resident of [facility] and will return at discharge. Patient will need EMS tranport at discharge.    Patient/Family in Agreement with Plan yes                     Kierra Cruz

## 2024-05-16 ENCOUNTER — APPOINTMENT (OUTPATIENT)
Dept: GENERAL RADIOLOGY | Facility: HOSPITAL | Age: 74
DRG: 321 | End: 2024-05-16
Payer: MEDICARE

## 2024-05-16 ENCOUNTER — APPOINTMENT (OUTPATIENT)
Dept: ULTRASOUND IMAGING | Facility: HOSPITAL | Age: 74
DRG: 321 | End: 2024-05-16
Payer: MEDICARE

## 2024-05-16 PROBLEM — A41.9 SEPSIS: Status: ACTIVE | Noted: 2024-05-16

## 2024-05-16 LAB
A-A DO2: 44.3 MMHG (ref 0–300)
ALBUMIN SERPL-MCNC: 3.1 G/DL (ref 3.5–5.2)
ALBUMIN/GLOB SERPL: 1.1 G/DL
ALP SERPL-CCNC: 143 U/L (ref 39–117)
ALT SERPL W P-5'-P-CCNC: 7 U/L (ref 1–41)
ANION GAP SERPL CALCULATED.3IONS-SCNC: 15.3 MMOL/L (ref 5–15)
ARTERIAL PATENCY WRIST A: POSITIVE
AST SERPL-CCNC: 15 U/L (ref 1–40)
ATMOSPHERIC PRESS: 722 MMHG
BACTERIA BLD CULT: ABNORMAL
BASE EXCESS BLDA CALC-SCNC: 2.6 MMOL/L (ref 0–2)
BASOPHILS # BLD AUTO: 0.04 10*3/MM3 (ref 0–0.2)
BASOPHILS NFR BLD AUTO: 0.3 % (ref 0–1.5)
BDY SITE: ABNORMAL
BILIRUB SERPL-MCNC: 0.3 MG/DL (ref 0–1.2)
BOTTLE TYPE: ABNORMAL
BUN SERPL-MCNC: 50 MG/DL (ref 8–23)
BUN/CREAT SERPL: 6.4 (ref 7–25)
C AURIS DNA SPEC QL NAA+NON-PROBE: NOT DETECTED
CALCIUM SPEC-SCNC: 8.3 MG/DL (ref 8.6–10.5)
CHLORIDE SERPL-SCNC: 95 MMOL/L (ref 98–107)
CHOLEST SERPL-MCNC: 48 MG/DL (ref 0–200)
CO2 BLDA-SCNC: 28.5 MMOL/L (ref 22–33)
CO2 SERPL-SCNC: 24.7 MMOL/L (ref 22–29)
COHGB MFR BLD: 2.2 % (ref 0–5)
CREAT SERPL-MCNC: 7.86 MG/DL (ref 0.76–1.27)
CRP SERPL-MCNC: 14.13 MG/DL (ref 0–0.5)
D-LACTATE SERPL-SCNC: 2.1 MMOL/L (ref 0.5–2)
DEPRECATED RDW RBC AUTO: 50 FL (ref 37–54)
EGFRCR SERPLBLD CKD-EPI 2021: 6.6 ML/MIN/1.73
EOSINOPHIL # BLD AUTO: 0 10*3/MM3 (ref 0–0.4)
EOSINOPHIL NFR BLD AUTO: 0 % (ref 0.3–6.2)
ERYTHROCYTE [DISTWIDTH] IN BLOOD BY AUTOMATED COUNT: 14.3 % (ref 12.3–15.4)
GAS FLOW AIRWAY: 2 LPM
GLOBULIN UR ELPH-MCNC: 2.7 GM/DL
GLUCOSE BLDC GLUCOMTR-MCNC: 116 MG/DL (ref 70–130)
GLUCOSE BLDC GLUCOMTR-MCNC: 153 MG/DL (ref 70–130)
GLUCOSE BLDC GLUCOMTR-MCNC: 165 MG/DL (ref 70–130)
GLUCOSE BLDC GLUCOMTR-MCNC: 38 MG/DL (ref 70–130)
GLUCOSE BLDC GLUCOMTR-MCNC: 42 MG/DL (ref 70–130)
GLUCOSE BLDC GLUCOMTR-MCNC: 90 MG/DL (ref 70–130)
GLUCOSE SERPL-MCNC: 94 MG/DL (ref 65–99)
HCO3 BLDA-SCNC: 27.3 MMOL/L (ref 20–26)
HCT VFR BLD AUTO: 25.8 % (ref 37.5–51)
HCT VFR BLD CALC: 23.7 % (ref 38–51)
HDLC SERPL-MCNC: 22 MG/DL (ref 40–60)
HGB BLD-MCNC: 8.1 G/DL (ref 13–17.7)
HGB BLDA-MCNC: 7.7 G/DL (ref 14–18)
IMM GRANULOCYTES # BLD AUTO: 0.07 10*3/MM3 (ref 0–0.05)
IMM GRANULOCYTES NFR BLD AUTO: 0.5 % (ref 0–0.5)
INHALED O2 CONCENTRATION: 28 %
INR PPP: 1.54 (ref 0.9–1.1)
LDLC SERPL CALC-MCNC: 6 MG/DL (ref 0–100)
LDLC/HDLC SERPL: 0.28 {RATIO}
LYMPHOCYTES # BLD AUTO: 0.3 10*3/MM3 (ref 0.7–3.1)
LYMPHOCYTES NFR BLD AUTO: 2.2 % (ref 19.6–45.3)
Lab: ABNORMAL
MAGNESIUM SERPL-MCNC: 1.8 MG/DL (ref 1.6–2.4)
MCH RBC QN AUTO: 30.1 PG (ref 26.6–33)
MCHC RBC AUTO-ENTMCNC: 31.4 G/DL (ref 31.5–35.7)
MCV RBC AUTO: 95.9 FL (ref 79–97)
METHGB BLD QL: 0.1 % (ref 0–3)
MODALITY: ABNORMAL
MONOCYTES # BLD AUTO: 0.75 10*3/MM3 (ref 0.1–0.9)
MONOCYTES NFR BLD AUTO: 5.6 % (ref 5–12)
NEUTROPHILS NFR BLD AUTO: 12.34 10*3/MM3 (ref 1.7–7)
NEUTROPHILS NFR BLD AUTO: 91.4 % (ref 42.7–76)
NRBC BLD AUTO-RTO: 0 /100 WBC (ref 0–0.2)
OXYHGB MFR BLDV: 97.2 % (ref 94–99)
PCO2 BLDA: 41.9 MM HG (ref 35–45)
PCO2 TEMP ADJ BLD: ABNORMAL MM[HG]
PH BLDA: 7.42 PH UNITS (ref 7.35–7.45)
PH, TEMP CORRECTED: ABNORMAL
PHOSPHATE SERPL-MCNC: 2.3 MG/DL (ref 2.5–4.5)
PLATELET # BLD AUTO: 108 10*3/MM3 (ref 140–450)
PMV BLD AUTO: 9.9 FL (ref 6–12)
PO2 BLDA: 97.9 MM HG (ref 83–108)
PO2 TEMP ADJ BLD: ABNORMAL MM[HG]
POTASSIUM SERPL-SCNC: 4.1 MMOL/L (ref 3.5–5.2)
PROT SERPL-MCNC: 5.8 G/DL (ref 6–8.5)
PROTHROMBIN TIME: 18.6 SECONDS (ref 12.1–14.7)
QT INTERVAL: 376 MS
QT INTERVAL: 390 MS
QTC INTERVAL: 432 MS
QTC INTERVAL: 439 MS
RBC # BLD AUTO: 2.69 10*6/MM3 (ref 4.14–5.8)
SAO2 % BLDCOA: >99.2 % (ref 94–99)
SODIUM SERPL-SCNC: 135 MMOL/L (ref 136–145)
TRIGL SERPL-MCNC: 99 MG/DL (ref 0–150)
TROPONIN T SERPL HS-MCNC: 221 NG/L
VENTILATOR MODE: ABNORMAL
VLDLC SERPL-MCNC: 20 MG/DL (ref 5–40)
WBC NRBC COR # BLD AUTO: 13.5 10*3/MM3 (ref 3.4–10.8)

## 2024-05-16 PROCEDURE — 99222 1ST HOSP IP/OBS MODERATE 55: CPT | Performed by: NURSE PRACTITIONER

## 2024-05-16 PROCEDURE — 99233 SBSQ HOSP IP/OBS HIGH 50: CPT | Performed by: INTERNAL MEDICINE

## 2024-05-16 PROCEDURE — 80053 COMPREHEN METABOLIC PANEL: CPT | Performed by: INTERNAL MEDICINE

## 2024-05-16 PROCEDURE — 83735 ASSAY OF MAGNESIUM: CPT | Performed by: INTERNAL MEDICINE

## 2024-05-16 PROCEDURE — 25010000002 PIPERACILLIN SOD-TAZOBACTAM PER 1 G: Performed by: INTERNAL MEDICINE

## 2024-05-16 PROCEDURE — 25010000002 HEPARIN (PORCINE) PER 1000 UNITS: Performed by: INTERNAL MEDICINE

## 2024-05-16 PROCEDURE — 36600 WITHDRAWAL OF ARTERIAL BLOOD: CPT

## 2024-05-16 PROCEDURE — 93010 ELECTROCARDIOGRAM REPORT: CPT | Performed by: INTERNAL MEDICINE

## 2024-05-16 PROCEDURE — 82805 BLOOD GASES W/O2 SATURATION: CPT

## 2024-05-16 PROCEDURE — 93005 ELECTROCARDIOGRAM TRACING: CPT | Performed by: INTERNAL MEDICINE

## 2024-05-16 PROCEDURE — 84484 ASSAY OF TROPONIN QUANT: CPT | Performed by: INTERNAL MEDICINE

## 2024-05-16 PROCEDURE — 85610 PROTHROMBIN TIME: CPT | Performed by: INTERNAL MEDICINE

## 2024-05-16 PROCEDURE — 73630 X-RAY EXAM OF FOOT: CPT

## 2024-05-16 PROCEDURE — 86140 C-REACTIVE PROTEIN: CPT | Performed by: INTERNAL MEDICINE

## 2024-05-16 PROCEDURE — 83605 ASSAY OF LACTIC ACID: CPT | Performed by: INTERNAL MEDICINE

## 2024-05-16 PROCEDURE — 82948 REAGENT STRIP/BLOOD GLUCOSE: CPT

## 2024-05-16 PROCEDURE — 83050 HGB METHEMOGLOBIN QUAN: CPT

## 2024-05-16 PROCEDURE — 97162 PT EVAL MOD COMPLEX 30 MIN: CPT

## 2024-05-16 PROCEDURE — 84100 ASSAY OF PHOSPHORUS: CPT | Performed by: INTERNAL MEDICINE

## 2024-05-16 PROCEDURE — 73630 X-RAY EXAM OF FOOT: CPT | Performed by: RADIOLOGY

## 2024-05-16 PROCEDURE — 82375 ASSAY CARBOXYHB QUANT: CPT

## 2024-05-16 PROCEDURE — 80061 LIPID PANEL: CPT | Performed by: INTERNAL MEDICINE

## 2024-05-16 PROCEDURE — 93923 UPR/LXTR ART STDY 3+ LVLS: CPT

## 2024-05-16 PROCEDURE — 99221 1ST HOSP IP/OBS SF/LOW 40: CPT | Performed by: SURGERY

## 2024-05-16 PROCEDURE — 85025 COMPLETE CBC W/AUTO DIFF WBC: CPT | Performed by: INTERNAL MEDICINE

## 2024-05-16 RX ORDER — MIDODRINE HYDROCHLORIDE 2.5 MG/1
5 TABLET ORAL
Status: DISCONTINUED | OUTPATIENT
Start: 2024-05-16 | End: 2024-05-17

## 2024-05-16 RX ADMIN — ERYTHROMYCIN 1 APPLICATION: 5 OINTMENT OPHTHALMIC at 21:16

## 2024-05-16 RX ADMIN — ACETAMINOPHEN 650 MG: 325 TABLET ORAL at 21:13

## 2024-05-16 RX ADMIN — DEXTROSE MONOHYDRATE 25 G: 25 INJECTION, SOLUTION INTRAVENOUS at 06:54

## 2024-05-16 RX ADMIN — DEXTROSE MONOHYDRATE 25 G: 25 INJECTION, SOLUTION INTRAVENOUS at 17:18

## 2024-05-16 RX ADMIN — GABAPENTIN 200 MG: 100 CAPSULE ORAL at 21:16

## 2024-05-16 RX ADMIN — SEVELAMER CARBONATE 2400 MG: 800 TABLET, FILM COATED ORAL at 08:22

## 2024-05-16 RX ADMIN — CARBIDOPA AND LEVODOPA 5 MG: 50; 200 TABLET, EXTENDED RELEASE ORAL at 08:22

## 2024-05-16 RX ADMIN — HEPARIN SODIUM 5000 UNITS: 5000 INJECTION INTRAVENOUS; SUBCUTANEOUS at 21:17

## 2024-05-16 RX ADMIN — SERTRALINE 50 MG: 50 TABLET, FILM COATED ORAL at 21:16

## 2024-05-16 RX ADMIN — ASPIRIN 81 MG: 81 TABLET, COATED ORAL at 08:22

## 2024-05-16 RX ADMIN — CETIRIZINE HYDROCHLORIDE 10 MG: 10 TABLET, FILM COATED ORAL at 08:22

## 2024-05-16 RX ADMIN — ATORVASTATIN CALCIUM 20 MG: 20 TABLET, FILM COATED ORAL at 08:22

## 2024-05-16 RX ADMIN — HEPARIN SODIUM 5000 UNITS: 5000 INJECTION INTRAVENOUS; SUBCUTANEOUS at 08:23

## 2024-05-16 RX ADMIN — PIPERACILLIN SODIUM AND TAZOBACTAM SODIUM 3.38 G: 3; .375 INJECTION, POWDER, LYOPHILIZED, FOR SOLUTION INTRAVENOUS at 08:23

## 2024-05-16 RX ADMIN — SEVELAMER CARBONATE 2400 MG: 800 TABLET, FILM COATED ORAL at 12:09

## 2024-05-16 RX ADMIN — Medication 10 ML: at 08:23

## 2024-05-16 RX ADMIN — Medication 10 ML: at 21:17

## 2024-05-16 RX ADMIN — LEVOTHYROXINE SODIUM 75 MCG: 0.07 TABLET ORAL at 08:22

## 2024-05-16 RX ADMIN — AMMONIUM LACTATE 1 APPLICATION: 120 CREAM TOPICAL at 21:16

## 2024-05-16 RX ADMIN — ACETAMINOPHEN 500 MG: 500 TABLET ORAL at 04:10

## 2024-05-16 RX ADMIN — Medication 1 TABLET: at 08:23

## 2024-05-16 NOTE — CASE MANAGEMENT/SOCIAL WORK
Discharge Planning Assessment   [place]     Patient Name: Kennedy Prescott  MRN: [id number]  Today's Date: 5/16/2024    Admit Date: 5/15/2024    Plan: SS received a consult for d/c planning from NH. Pt was admitted from [facility] on 5/15/24. SS contacted [facility] per Irma on this date who state pt is a longterm resident at their facility. Pt does not have a bed hold at this time. Per Irma, the facility will accept pt back at discharge when medically stable. SS to follow and assist with discharge planning.     Discharge Plan       Row Name 05/16/24 0929       Plan    Plan SS received a consult for d/c planning from NH. Pt was admitted from [facility] on 5/15/24. SS contacted [facility] per Irma on this date who state pt is a longterm resident at their facility. Pt does not have a bed hold at this time. Per Irma, the facility will accept pt back at discharge when medically stable. SS to follow and assist with discharge planning.    Patient/Family in Agreement with Plan yes               Expected Discharge Date and Time       Expected Discharge Date Expected Discharge Time    May 16, 2024            Demographic Summary       Row Name 05/16/24 0929       General Information    Admission Type inpatient    Referral Source nursing    Reason for Consult discharge planning  SS received a consult for d/c planning from NH.                 MONA Jordan

## 2024-05-16 NOTE — PROGRESS NOTES
"Nephrology Progress Note  Chief complaint, nausea and vomiting    Subjective     Patient is known to have ESRD on intermittent dialysis, patient is a nursing home resident and has been brought to the hospital with persistent nausea and vomiting.  Patient is on Monday Wednesday Friday dialysis schedule and his last dialysis was done on Monday.    Objective       Vital signs :     Temp:  [98.5 °F (36.9 °C)-103 °F (39.4 °C)] 98.6 °F (37 °C)  Heart Rate:  [] 95  Resp:  [16-18] 16  BP: ()/(40-71) 105/40    Intake/Output                   05/15/24 0701 - 05/16/24 0700     [id number] Total              Intake    P.O.  --  120 120    IV Piggyback  600  -- 600    Total Intake 600 120 720       Output    Total Output -- -- --             Physical Exam:    General Appearance : Not in acute distress  Lungs : clear to auscultation, respirations regular  Heart :  regular rhythm & normal rate, normal S1, S2 and no murmur, no rub  Abdomen : Soft, nondistended  Extremities : No edema,   Neurologic :   No apparent focal neurological deficit, unable to assess fully      Laboratory Data :     Albumin Albumin   Date Value Ref Range Status   05/16/2024 3.1 (L) 3.5 - 5.2 g/dL Final   05/15/2024 3.8 3.5 - 5.2 g/dL Final   05/15/2024 3.7 3.5 - 5.2 g/dL Final      Magnesium Magnesium   Date Value Ref Range Status   05/16/2024 1.8 1.6 - 2.4 mg/dL Final          PTH               No results found for: \"PTH\"    CBC and coagulation:  Results from last 7 days   Lab Units 05/16/24  0128 05/15/24  1904 05/15/24  1639 05/15/24  1415 05/15/24  1415 05/15/24  1128   PROCALCITONIN ng/mL  --   --   --   --  0.64*  --    LACTATE mmol/L 2.1* 2.8* 2.8*   < > 2.3*  --    CRP mg/dL 14.13*  --   --   --  7.54* 5.34*   WBC 10*3/mm3 13.50*  --   --   --  17.01* 16.83*   HEMOGLOBIN g/dL 8.1*  --   --   --  9.7* 8.9*   HEMATOCRIT % 25.8*  --   --   --  30.4* 27.5*   MCV fL 95.9  --   --   --  94.4 93.9   MCHC g/dL 31.4*  --   --   --  31.9 " 32.4   PLATELETS 10*3/mm3 108*  --   --   --  138* 133*   INR  1.54*  --   --   --  1.35*  --     < > = values in this interval not displayed.     Acid/base balance:  Results from last 7 days   Lab Units 05/16/24  0440 05/15/24  1608   PH, ARTERIAL pH units 7.421 7.470*   PO2 ART mm Hg 97.9 50.7*   PCO2, ARTERIAL mm Hg 41.9 37.0   HCO3 ART mmol/L 27.3* 26.9*     Renal and electrolytes:    Results from last 7 days   Lab Units 05/16/24  0128 05/15/24  1415 05/15/24  1128   SODIUM mmol/L 135* 136 136   POTASSIUM mmol/L 4.1 4.1 3.9   MAGNESIUM mg/dL 1.8  --   --    CHLORIDE mmol/L 95* 93* 93*   CO2 mmol/L 24.7 25.5 27.5   BUN mg/dL 50* 41* 39*   CREATININE mg/dL 7.86* 7.03* 6.33*   CALCIUM mg/dL 8.3* 9.0 8.7   PHOSPHORUS mg/dL 2.3*  --   --      Estimated Creatinine Clearance: 10 mL/min (A) (by C-G formula based on SCr of 7.86 mg/dL (H)).  @GFRCG:3@   Liver and pancreatic function:  Results from last 7 days   Lab Units 05/16/24  0128 05/15/24  1415 05/15/24  1128   ALBUMIN g/dL 3.1* 3.8 3.7   BILIRUBIN mg/dL 0.3 0.5 0.4   ALK PHOS U/L 143* 184* 176*   AST (SGOT) U/L 15 16 16   ALT (SGPT) U/L 7 8 9         Cardiac:      Liver and pancreatic function:  Results from last 7 days   Lab Units 05/16/24  0128 05/15/24  1415 05/15/24  1128   ALBUMIN g/dL 3.1* 3.8 3.7   BILIRUBIN mg/dL 0.3 0.5 0.4   ALK PHOS U/L 143* 184* 176*   AST (SGOT) U/L 15 16 16   ALT (SGPT) U/L 7 8 9       Medications :     acetaminophen, 650 mg, Oral, Once per day on Tuesday Thursday Saturday  ammonium lactate, 1 Application, Topical, Nightly  aspirin, 81 mg, Oral, Daily  atorvastatin, 20 mg, Oral, Daily  cetirizine, 10 mg, Oral, Daily  erythromycin, 1 Application, Left Eye, BID  famotidine, 20 mg, Oral, Q48H  gabapentin, 200 mg, Oral, Nightly  heparin (porcine), 5,000 Units, Subcutaneous, Q12H  [Held by provider] insulin glargine, 5 Units, Subcutaneous, Q12H  insulin lispro, 2-7 Units, Subcutaneous, 4x Daily AC & at Bedtime  levothyroxine, 75 mcg, Oral,  Daily  Lidocaine, 1 patch, Transdermal, Q PM  lubrisoft, 1 Application, Topical, Daily  metoprolol succinate XL, 12.5 mg, Oral, Nightly  midodrine, 5 mg, Oral, Once per day on Tuesday Thursday Saturday  multivitamin with minerals, 1 tablet, Oral, Daily  piperacillin-tazobactam, 3.375 g, Intravenous, Q12H  sertraline, 50 mg, Oral, Nightly  sevelamer, 2,400 mg, Oral, TID With Meals  sodium chloride, 10 mL, Intravenous, Q12H      Pharmacy to dose vancomycin,           Assessment & Plan     -ESKD on intermittent dialysis  - Anemia  - Persistent nausea and vomiting  - Type 2 diabetes mellitus  - Essential hypertension    Patient is significantly volume depleted clinically with a history of poor oral intake and nausea and vomiting.  Although patient was dialyzed on Monday will hold dialysis today and will do it tomorrow as there is no emergent indication for dialysis today  There is no fluid overload clinically  Reviewed the labs reviewed chest x-ray and reviewed clinical notes from hospitalist team      Nova Saucedo MD  05/16/24  09:17 EDT

## 2024-05-16 NOTE — CONSULTS
Consult Note     Patient Identification:  Name:  Kennedy Prescott  Age:  73 y.o.  Sex:  male  :  1950  MRN:  [id number]   Visit Number:  [id number]  Primary Care Physician:  Jag Guillaume MD     Subjective     Chief complaint:   Chief Complaint   Patient presents with    Nausea    Vomiting       History of presenting illness:   Patient is a 73 y.o. male with past medical history significant for diabetes, ESRD on hemodialysis. obesity that presented to the [facility] Emergency Department for complaints of nausea and vomiting. Limited review as he appear to be confused today. Wound care was consulted to assess left lower leg, left heel and left lateral foot ulcers. He is unsure of onset date or current treatment. He is pending arteiral US. Wounds do not appear to be acutely infected. Denies any fever or chills.     ---------------------------------------------------------------------------------------------------------------------   Review of Systems:  Review of Systems   Constitutional:  Negative for chills and fever.   Cardiovascular:  Positive for leg swelling.   Gastrointestinal:  Positive for nausea and vomiting.   Musculoskeletal:  Positive for back pain and gait problem.   Skin:  Positive for wound.   Psychiatric/Behavioral:  Negative for confusion. The patient is not nervous/anxious.         ---------------------------------------------------------------------------------------------------------------------   Past Medical History:   Diagnosis Date    CHF (congestive heart failure)     Coronary artery disease     Diabetes mellitus     Dialysis patient     Disease of thyroid gland     Elevated cholesterol     GERD (gastroesophageal reflux disease)     Hypertension     Myocardial infarction     Renal disorder     stage 5     Past Surgical History:   Procedure Laterality Date    CARDIAC SURGERY      cabg    CHOLECYSTECTOMY      COLONOSCOPY N/A 2023    Procedure: COLONOSCOPY;  Surgeon:  Trip Peter MD;  [facility];  Service: Gastroenterology;  Laterality: N/A;    DIALYSIS FISTULA CREATION Left     arm     Family History   Problem Relation Age of Onset    Arthritis Mother     COPD Father     Diabetes Father     Heart disease Father     Hyperlipidemia Father     Hypertension Father     Cancer Daughter      Social History     Socioeconomic History    Marital status: Unknown   Tobacco Use    Smoking status: Never    Smokeless tobacco: Never   Vaping Use    Vaping status: Never Used   Substance and Sexual Activity    Alcohol use: Never    Drug use: Never    Sexual activity: Defer     ---------------------------------------------------------------------------------------------------------------------   Allergies:  Patient has no known allergies.  ---------------------------------------------------------------------------------------------------------------------   Medications below are reported home medications pulling from within the system; at this time, these medications have not been reconciled unless otherwise specified and are in the verification process for further verifcation as current home medications.    Prior to Admission Medications       Prescriptions Last Dose Informant Patient Reported? Taking?    ammonium lactate (AMLACTIN) 12 % cream 5/14/2024 [facility] Yes Yes    Apply 1 g topically to the appropriate area as directed Every Night. Apply to dry skin on feet    atorvastatin (LIPITOR) 20 MG tablet 5/15/2024 [facility] Yes Yes    Take 1 tablet by mouth Daily.    cetaphil (CETAPHIL) lotion 5/15/2024 [facility] Yes Yes    Apply 1 Application topically to the appropriate area as directed Daily.    erythromycin (ROMYCIN) 5 MG/GM ophthalmic ointment 5/15/2024 [facility] Yes Yes    Administer 1 Application into the left eye 2 (Two) Times a Day.    famotidine (PEPCID) 20 MG tablet 5/15/2024 [facility] Yes Yes    Take 1 tablet by mouth 2 (Two) Times a Day.    gabapentin  (NEURONTIN) 100 MG capsule 5/14/2024 [facility] Yes Yes    Take 2 capsules by mouth Every Night.    glipizide (GLUCOTROL) 10 MG tablet 5/15/2024 [facility] Yes Yes    Take 1 tablet by mouth Daily.    glipizide (GLUCOTROL) 5 MG tablet 5/15/2024 [facility] Yes Yes    Take 1 tablet by mouth Daily.    insulin aspart (novoLOG FLEXPEN) 100 UNIT/ML solution pen-injector sc pen 5/15/2024 [facility] Yes Yes    Inject 2-6 Units under the skin into the appropriate area as directed 4 (Four) Times a Day Before Meals & at Bedtime.    insulin detemir (Levemir FlexPen) 100 UNIT/ML injection 5/15/2024 [facility] Yes Yes    Inject 25 Units under the skin into the appropriate area as directed 2 (Two) Times a Day.    levocetirizine (XYZAL) 5 MG tablet 5/15/2024 [facility] Yes Yes    Take 1 tablet by mouth Daily.    levothyroxine (SYNTHROID, LEVOTHROID) 75 MCG tablet 5/15/2024 [facility] Yes Yes    Take 1 tablet by mouth Daily.    lidocaine 3 % cream cream 5/14/2024 [facility] Yes Yes    Apply 1 Application topically Every Night.    loperamide (IMODIUM) 2 MG capsule 5/14/2024 [facility] Yes Yes    Take 1 capsule by mouth 3 (Three) Times a Week.    loperamide (IMODIUM) 2 MG capsule 5/15/2024 [facility] Yes Yes    Take 1 capsule by mouth 2 (Two) Times a Day.    metoprolol succinate XL (TOPROL-XL) 25 MG 24 hr tablet 5/14/2024 [facility] Yes Yes    Take 0.5 tablets by mouth Every Night.    midodrine (PROAMATINE) 2.5 MG tablet 5/14/2024 [facility] Yes Yes    Take 1 tablet by mouth 3 (Three) Times a Week.    multivitamin with minerals tablet tablet 5/15/2024 [facility] Yes Yes    Take 1 tablet by mouth Daily.    sertraline (ZOLOFT) 50 MG tablet 5/14/2024 [facility] Yes Yes    Take 1 tablet by mouth Every Night.    sevelamer (RENAGEL) 800 MG tablet 5/15/2024 [facility] Yes Yes    Take 3 tablets by mouth 3 (Three) Times a Day With Meals.    acetaminophen (TYLENOL) 325 MG tablet 5/13/2024 Nursing Home Yes No    Take  2 tablets by mouth 3 (Three) Times a Week.    acetaminophen (TYLENOL) 500 MG tablet Unknown Nursing Home Yes No    Take 1 tablet by mouth Every 4 (Four) Hours As Needed for Mild Pain.    bisacodyl (Dulcolax) 10 MG suppository Unknown Nursing Home Yes No    Insert 1 suppository into the rectum Daily As Needed for Constipation.    lactulose (CHRONULAC) 10 GM/15ML solution Unknown Nursing Home Yes No    Take 30 mL by mouth Daily As Needed (constipation).    Lidocaine Viscous HCl (XYLOCAINE) 2 % solution Unknown Nursing Home Yes No    Take 5 mL by mouth Every 6 (Six) Hours As Needed for Mild Pain.    loperamide (IMODIUM) 2 MG capsule Unknown Nursing Home Yes No    Take 2 capsules by mouth Every 6 (Six) Hours As Needed for Diarrhea.    ondansetron (ZOFRAN) 4 MG tablet Unknown Nursing Home Yes No    Take 1 tablet by mouth Every 8 (Eight) Hours As Needed for Nausea or Vomiting.    polyethylene glycol (MIRALAX) 17 GM/SCOOP powder Unknown Nursing Home Yes No    Take 17 g by mouth 2 (Two) Times a Day As Needed (constipation).          ---------------------------------------------------------------------------------------------------------------------    Objective     Hospital Scheduled Meds:  acetaminophen, 650 mg, Oral, Once per day on Tuesday Thursday Saturday  ammonium lactate, 1 Application, Topical, Nightly  aspirin, 81 mg, Oral, Daily  atorvastatin, 20 mg, Oral, Daily  cetirizine, 10 mg, Oral, Daily  erythromycin, 1 Application, Left Eye, BID  famotidine, 20 mg, Oral, Q48H  gabapentin, 200 mg, Oral, Nightly  heparin (porcine), 5,000 Units, Subcutaneous, Q12H  [Held by provider] insulin glargine, 5 Units, Subcutaneous, Q12H  insulin lispro, 2-7 Units, Subcutaneous, 4x Daily AC & at Bedtime  levothyroxine, 75 mcg, Oral, Daily  Lidocaine, 1 patch, Transdermal, Q PM  lubrisoft, 1 Application, Topical, Daily  metoprolol succinate XL, 12.5 mg, Oral, Nightly  midodrine, 5 mg, Oral, Once per day on Tuesday Thursday  Saturday  multivitamin with minerals, 1 tablet, Oral, Daily  sertraline, 50 mg, Oral, Nightly  sevelamer, 2,400 mg, Oral, TID With Meals  sodium chloride, 10 mL, Intravenous, Q12H  Vancomycin Pharmacy Intermittent/Pulse Dosing, , Does not apply, Daily           Current listed hospital scheduled medications may not yet reflect those currently placed in orders that are signed and held, awaiting patient's arrival to floor/unit.    ---------------------------------------------------------------------------------------------------------------------   Vital Signs:  Temp:  [97.8 °F (36.6 °C)-103 °F (39.4 °C)] 97.8 °F (36.6 °C)  Heart Rate:  [] 73  Resp:  [16-18] 18  BP: ()/(40-71) 104/54  Mean Arterial Pressure (Non-Invasive) for the past 24 hrs (Last 3 readings):   Noninvasive MAP (mmHg)   05/15/24 1640 89   05/15/24 1600 75   05/15/24 1520 72     SpO2 Percentage    05/16/24 0300 05/16/24 0645 05/16/24 1236   SpO2: 94% 96% 99%     SpO2:  [90 %-99 %] 99 %  on  Flow (L/min):  [2] 2;   Device (Oxygen Therapy): nasal cannula    Body mass index is 26.78 kg/m².  Wt Readings from Last 3 Encounters:   05/16/24 87.1 kg (192 lb)   10/02/23 87.1 kg (192 lb)   09/22/23 87.1 kg (192 lb)       ---------------------------------------------------------------------------------------------------------------------   Physical Exam:  Physical Exam  HENT:      Mouth/Throat:      Mouth: Mucous membranes are moist.   Abdominal:      General: Abdomen is flat.   Skin:     Capillary Refill: Capillary refill takes 2 to 3 seconds.      Comments: cool   Neurological:      Mental Status: He is alert.   Psychiatric:         Mood and Affect: Mood normal.       Wound Assessment:  Left lower leg- scattered scabbing present.  Purpler discoloration present. Swelling minimal. Cool to touch.    Left lateral foot- base red. Edges dry. Periwound dry. No erytheam, warmth or swelling     Left heel- dry callus. No surrounding evidence of cellulitis      Assessment & Plan      Arterial Ulcers of Left lower leg  -paint area with betadine and leave DEANGELO  -Arterial US pending   -This condition is associated with a high risk for non-healing, loss of function, amputation, and premature mortality.   -Recommend ronaldo protein diet 120g/day along with vitamin C 2000mg/day, vitamin A 5000 Units/day, vitamin D3 5000 Units/day, zinc 50mg/day to help promote wound healing       Left lateral foot Ulcer  -Likely multiple eitology: arterial disease and diabetes   -Paint with betadine and leave DEANGELO    Left heel  --Likely multiple eitology: arterial disease and diabetes   -Paint with betadine and leave DEANGELO    Recommend follow-up in outpatient wound clinic once stable for discharge       DORA Huerta,CWS  [facility]  05/16/24  14:56 EDT

## 2024-05-16 NOTE — THERAPY EVALUATION
Acute Care - Physical Therapy Initial Evaluation   [place]     Patient Name: Kennedy Prescott  : 1950  MRN: [id number]  Today's Date: 2024   Onset of Illness/Injury or Date of Surgery: 05/15/24  Visit Dx:     ICD-10-CM ICD-9-CM   1. SIRS (systemic inflammatory response syndrome)  R65.10 995.90     Patient Active Problem List   Diagnosis    Scalp lesion    Skin ulcer of right heel, limited to breakdown of skin    Hidradenoma    Functional diarrhea    Adenomatous polyp of ascending colon    SIRS (systemic inflammatory response syndrome)    Sepsis     Past Medical History:   Diagnosis Date    CHF (congestive heart failure)     Coronary artery disease     Diabetes mellitus     Dialysis patient     Disease of thyroid gland     Elevated cholesterol     GERD (gastroesophageal reflux disease)     Hypertension     Myocardial infarction     Renal disorder     stage 5     Past Surgical History:   Procedure Laterality Date    CARDIAC SURGERY      cabg    CHOLECYSTECTOMY      COLONOSCOPY N/A 2023    Procedure: COLONOSCOPY;  Surgeon: Trip Peter MD;  Location: [facility];  Service: Gastroenterology;  Laterality: N/A;    DIALYSIS FISTULA CREATION Left     arm     PT Assessment (Last 12 Hours)       PT Evaluation and Treatment       Row Name 24 1301          Physical Therapy Time and Intention    Subjective Information complains of;weakness  -AG     Document Type evaluation  -AG     Mode of Treatment physical therapy  -AG     Patient Effort good  -AG     Symptoms Noted During/After Treatment fatigue  -AG       Row Name 24 1301          General Information    Patient Profile Reviewed yes  -AG     Onset of Illness/Injury or Date of Surgery 05/15/24  -AG     Referring Physician Dr. Crum  -     Patient Observations agree to therapy;cooperative;alert  -AG     Patient/Family/Caregiver Comments/Observations pt. supine in bed; L UE HD fistula noted.  Pt. cooperative, agreeable to participation.  -AG      Prior Level of Function --  pt. reports being independent w/ fxnl transfers at SNF; ambulates with therapist assistance and with what is described as an ARJO walker.  -AG     Equipment Currently Used at Home shower chair;walker, rolling  -AG     Pertinent History of Current Functional Problem pt. admitted with SIRS; hx ESRD w/ HD; hx medical noncompliance  -AG     Existing Precautions/Restrictions fall  -AG     Equipment Issued to Patient gait belt  -AG     Risks Reviewed patient:;LOB  -AG     Benefits Reviewed patient:;improve function;increase independence;increase strength;increase balance;increase knowledge;decrease risk of DVT  -AG     Barriers to Rehab previous functional deficit  -AG       Row Name 05/16/24 1301          Living Environment    Current Living Arrangements extended care facility  -AG       Row Name 05/16/24 1301          Home Use of Assistive/Adaptive Equipment    Equipment Currently Used at Home walker, rolling  -AG       Row Name 05/16/24 1301          Pain    Pretreatment Pain Rating 0/10 - no pain  -AG     Posttreatment Pain Rating 0/10 - no pain  -AG       Row Name 05/16/24 1301          Cognition    Affect/Mental Status (Cognition) WFL  -AG     Orientation Status (Cognition) oriented x 3  -AG     Follows Commands (Cognition) verbal cues/prompting required  -AG     Personal Safety Interventions fall prevention program maintained;gait belt;nonskid shoes/slippers when out of bed;supervised activity  -AG       Row Name 05/16/24 1301          Range of Motion (ROM)    Range of Motion ROM is WFL;bilateral lower extremities  -AG       Row Name 05/16/24 1301          Strength (Manual Muscle Testing)    Strength (Manual Muscle Testing) --  B quads 4+/5; R psoas 4/5; L psoas 4-/5  -AG       Row Name 05/16/24 1301          Sensory    Hearing Status WFL  -AG       Row Name 05/16/24 1301          Vision Assessment/Intervention    Visual Impairment/Limitations corrective lenses full-time  -AG        Row Name 05/16/24 1301          Sensory Assessment (Somatosensory)    Sensory Subjective Reports numbness  hands, feet d/t neuropathy  -AG       Row Name 05/16/24 1301          Bed Mobility    Bed Mobility rolling left;rolling right;scooting/bridging;supine-sit;sit-supine  -AG     Scooting/Bridging [place] (Bed Mobility) standby assist  -AG     Supine-Sit [place] (Bed Mobility) minimum assist (75% patient effort);nonverbal cues (demo/gesture);verbal cues  -AG     Sit-Supine [place] (Bed Mobility) minimum assist (75% patient effort);nonverbal cues (demo/gesture);verbal cues  -AG     Bed Mobility, Safety Issues decreased use of legs for bridging/pushing;decreased use of arms for pushing/pulling  -AG       Row Name 05/16/24 1301          Transfers    Transfers sit-stand transfer;stand-sit transfer  -AG       Row Name 05/16/24 1301          Sit-Stand Transfer    Sit-Stand [place] (Transfers) verbal cues;nonverbal cues (demo/gesture);moderate assist (50% patient effort)  -AG     Assistive Device (Sit-Stand Transfers) walker, front-wheeled  -AG     Comment, (Sit-Stand Transfer) once standing, pt. is able to briefly maintain before knees/ hips abruptly buckle.  Performed 2 x STS w/ RW.  -       Row Name 05/16/24 1301          Stand-Sit Transfer    Stand-Sit [place] (Transfers) verbal cues;nonverbal cues (demo/gesture);moderate assist (50% patient effort)  -AG     Assistive Device (Stand-Sit Transfers) walker, front-wheeled  -AG       [facility] Name 05/16/24 1301          Gait/Stairs (Locomotion)    Patient was able to Ambulate no, other medical factors prevent ambulation  -AG     Reason Patient was unable to Ambulate Excessive Weakness  -AG       Row Name 05/16/24 1301          Safety Issues, Functional Mobility    Impairments Affecting Function (Mobility) balance;endurance/activity tolerance;strength  -[facility] Name 05/16/24 1301          Balance    Balance Assessment sitting static  balance;sitting dynamic balance;sit to stand dynamic balance;standing static balance  -AG     Static Sitting Balance standby assist  -AG     Position, Sitting Balance unsupported;sitting edge of bed  -AG     Static Standing Balance verbal cues;non-verbal cues (demo/gesture);moderate assist  -AG     Position/Device Used, Standing Balance walker, front-wheeled  -AG       Row Name             Wound 05/15/24 2225 Left posterior heel Diabetic Ulcer    Wound - Properties Group Placement Date: 05/15/24  -CL Placement Time: 2225  -CL Side: Left  -CL Orientation: posterior  -CL Location: heel  -CL Primary Wound Type: Diabetic ulc  -CL    Retired Wound - Properties Group Placement Date: 05/15/24  -CL Placement Time: 2225  -CL Side: Left  -CL Orientation: posterior  -CL Location: heel  -CL Primary Wound Type: Diabetic ulc  -CL    Retired Wound - Properties Group Date first assessed: 05/15/24  -CL Time first assessed: 2225  -CL Side: Left  -CL Location: heel  -CL Primary Wound Type: Diabetic ulc  -CL      Row Name             Wound 05/15/24 2230 Left posterior fifth toe Diabetic Ulcer    Wound - Properties Group Placement Date: 05/15/24  -CL Placement Time: 2230  -CL Side: Left  -CL Orientation: posterior  -CL Location: fifth toe  -CL Primary Wound Type: Diabetic ulc  -CL    Retired Wound - Properties Group Placement Date: 05/15/24  -CL Placement Time: 2230  -CL Side: Left  -CL Orientation: posterior  -CL Location: fifth toe  -CL Primary Wound Type: Diabetic ulc  -CL    Retired Wound - Properties Group Date first assessed: 05/15/24  -CL Time first assessed: 2230  -CL Side: Left  -CL Location: fifth toe  -CL Primary Wound Type: Diabetic ulc  -CL      Row Name             Wound 05/15/24 2230 Left lower leg Other (comment)    Wound - Properties Group Placement Date: 05/15/24  -CL Placement Time: 2230  -CL Side: Left  -CL Orientation: lower  -CL Location: leg  -CL Primary Wound Type: Other  -CL, scab diabteic ulcer?     Retired  Wound - Properties Group Placement Date: 05/15/24  -CL Placement Time: 2230 -CL Side: Left  -CL Orientation: lower  -CL Location: leg  -CL Primary Wound Type: Other  -CL, scab diabteic ulcer?     Retired Wound - Properties Group Date first assessed: 05/15/24  -CL Time first assessed: 2230  -CL Side: Left  -CL Location: leg  -CL Primary Wound Type: Other  -CL, scab diabteic ulcer?       Row Name             Wound 05/29/22 1726 Left gluteal    Wound - Properties Group Placement Date: 05/29/22  -AS Placement Time: 1726  -AS Present on Original Admission: Y  -AS Side: Left  -AS Location: gluteal  -AS    Retired Wound - Properties Group Placement Date: 05/29/22  -AS Placement Time: 1726  -AS Present on Original Admission: Y  -AS Side: Left  -AS Location: gluteal  -AS    Retired Wound - Properties Group Date first assessed: 05/29/22  -AS Time first assessed: 1726  -AS Present on Original Admission: Y  -AS Side: Left  -AS Location: gluteal  -AS      Row Name 05/16/24 1301          Coping    Observed Emotional State calm;cooperative  -AG     Verbalized Emotional State acceptance  -AG     Trust Relationship/Rapport care explained;choices provided;thoughts/feelings acknowledged  -AG     Family/Support Persons family  -AG     Involvement in Care not present at bedside  -     Family/Support System Care self-care encouraged;support provided  -AG       Row Name 05/16/24 1301          Plan of Care Review    Plan of Care Reviewed With patient  -AG     Outcome Evaluation PT evaluation complete.  Pt. exhibits B hip weakness, L>R LE; min A for bed mobilty skills; able to perform 2 x brief STS w/ RW at bedside but sits abruptly d/t LE buckling.  Will continue to follow and progress as tolerated.  -AG       Row Name 05/16/24 1301          Positioning and Restraints    Pre-Treatment Position in bed  -AG     Post Treatment Position bed  -AG     In Bed fowlers;call light within reach;encouraged to call for assist;exit alarm on;side  rails up x3  -AG       Row Name 05/16/24 1301          Therapy Assessment/Plan (PT)    Patient/Family Therapy Goals Statement (PT) return to SNF  -AG     PT Diagnosis (PT) impaired functional mobility; LE weakness  -AG     Rehab Potential (PT) good, to achieve stated therapy goals  -AG     Therapy Frequency (PT) other (see comments)  1-5 times/ week per priority  -AG     Predicted Duration of Therapy Intervention (PT) LOS  -AG     Problem List (PT) problems related to;balance;mobility;strength  -AG     Activity Limitations Related to Problem List (PT) unable to ambulate safely;unable to transfer safely;BADLs not performed adequately or safely  -AG       Row Name 05/16/24 1301          Therapy Plan Review/Discharge Plan (PT)    Therapy Plan Review (PT) evaluation/treatment results reviewed;care plan/treatment goals reviewed;risks/benefits reviewed;current/potential barriers reviewed;participants voiced agreement with care plan;participants included;patient  -AG       Row Name 05/16/24 1301          Physical Therapy Goals    Bed Mobility Goal Selection (PT) bed mobility, PT goal 1  -AG     Transfer Goal Selection (PT) transfer, PT goal 1  -AG     Gait Training Goal Selection (PT) gait training, PT goal 1  -AG       Row Name 05/16/24 1301          Bed Mobility Goal 1 (PT)    Activity/Assistive Device (Bed Mobility Goal 1, PT) rolling to left;rolling to right;scooting;sit to supine;supine to sit  -AG     [place] Level/Cues Needed (Bed Mobility Goal 1, PT) standby assist  -AG     Time Frame (Bed Mobility Goal 1, PT) by discharge  -AG       Row Name 05/16/24 1301          Transfer Goal 1 (PT)    Activity/Assistive Device (Transfer Goal 1, PT) sit-to-stand/stand-to-sit;bed-to-chair/chair-to-bed;toilet;walker, rolling  -AG     [place] Level/Cues Needed (Transfer Goal 1, PT) contact guard required  -AG     Time Frame (Transfer Goal 1, PT) by discharge  -AG               User Key  (r) = Recorded By, (t) = Taken By,  (c) = Cosigned By      Initials Name Provider Type    AS Sierra Goel, RN Registered Nurse    Zahraa Desai, PT Physical Therapist    Lana Corea RN Registered Nurse                    Physical Therapy Education       Title: PT OT SLP Therapies (Done)       Topic: Physical Therapy (Done)       Point: Mobility training (Done)       Learning Progress Summary             Patient Acceptance, E,D, VU,NR by  at 5/16/2024 1300                         Point: Home exercise program (Done)       Learning Progress Summary             Patient Acceptance, E,D, VU,NR by  at 5/16/2024 1300                         Point: Body mechanics (Done)       Learning Progress Summary             Patient Acceptance, E,D, VU,NR by  at 5/16/2024 1300                         Point: Precautions (Done)       Learning Progress Summary             Patient Acceptance, E,D, VU,NR by  at 5/16/2024 1300                                         User Key       Initials Effective Dates Name Provider Type Discipline     06/16/21 -  Zahraa Almaraz, PT Physical Therapist PT                  PT Recommendation and Plan  Anticipated Discharge Disposition (PT): skilled nursing facility  Planned Therapy Interventions (PT): balance training, bed mobility training, gait training, home exercise program, transfer training, strengthening, patient/family education  Therapy Frequency (PT): other (see comments) (1-5 times/ week per priority)  Plan of Care Reviewed With: patient  Outcome Evaluation: PT evaluation complete.  Pt. exhibits B hip weakness, L>R LE; min A for bed mobilty skills; able to perform 2 x brief STS w/ RW at bedside but sits abruptly d/t LE buckling.  Will continue to follow and progress as tolerated.       Time Calculation:    PT Charges       Row Name 05/16/24 1300             Time Calculation    PT Received On 05/16/24 -      PT Goal Re-Cert Due Date 05/30/24 -                User Key  (r) = Recorded By, (t) = Taken By,  (c) = Cosigned By      Initials Name Provider Type    AG Zahraa Almaraz, PT Physical Therapist                  Therapy Charges for Today       Code Description Service Date Service Provider Modifiers Qty    79552613449 HC PT EVAL MOD COMPLEXITY 4 5/16/2024 Zahraa Almaraz, PT GP 1            PT G-Codes  AM-PAC 6 Clicks Score (PT): 6    Zahraa Almaraz, PT  5/16/2024

## 2024-05-16 NOTE — PLAN OF CARE
Goal Outcome Evaluation:           Progress: no change  Outcome Evaluation: pt has been resting in bed. pt worked with PT. wound care done per orders. no other changes to note at this time; poc ongoing

## 2024-05-16 NOTE — NURSING NOTE
Wound consult for diabetic and arterial ulcers to the bilateral lower extremities.     LT posterior heel presents with a hard callus covering the entire wound bed. The surrounding tissue is dry and flaky. Wound is need of debridement and it is recommended to follow up in the out patient clinic. Paraje with Betadine and leave open to air.     LT lateral foot and 5th toe amputation site presents as a diabetic ulcer with a dry open wound bed, and a callused juanjose wound skin. Betadine and leave open to air.     LT lower leg and toes present with arterial ulcers with scattered.,symmetrical, dry, purple wounds. Extremities are cold to touch and pulses are diminished. Betadine and leave open to air.

## 2024-05-16 NOTE — CONSULTS
INFECTIOUS DISEASE CONSULTATION REPORT        Patient Identification:  Name:  Kennedy Prescott  Age:  73 y.o.  Sex:  male  :  1950  MRN:  [id number]   Visit Number:  [id number]  Primary Care Physician:  Jag Guillaume MD        Referring Provider: Dr. Crum    Reason for consult: Sepsis of unknown etiology       LOS: 0 days        Subjective       Subjective     History of present illness:      Thank you Dr. Crum for allowing us to participate in the care of your patient.  As you well know, Mr. Kennedy Prescott is a 73 y.o. male with past medical history significant for insulin-dependent type 2 diabetes mellitus, essential pretension, ESRD with hemodialysis Tuesday/Thursday/Saturday via tunneled HD catheter, HLD, paroxysmal atrial fibrillation, coronary artery disease status post CABG in the distant past, pulmonary hypertension, heart failure with preserved EF, who presented to [facility] Emergency Department on 5/15/2024 for nausea and vomiting.  The patient resides at the [facility].  Was recently admitted to [facility]  through 2024 with hyperkalemia that required emergent hemodialysis.  While the patient was hospitalized he had left arm hemodialysis AV fistula placed by Dr. Floyd on 2024 and a tunneled hemodialysis catheter was placed on 2024.  The patient reported he began feeling ill 1 day prior to presentation with nausea and vomiting.  He denied any diarrhea or abdominal pain.  The patient had chills but no sweating or fever.  The patient noted his left foot was starting to hurt but did not disclose this to the ED provider.  In the ED he had a temperature of 100.2 °F but imaging did not reveal suspected infections.  Labs were concerning, as WBC CRP and lactic acid were elevated.  The patient was placed on medical surgical floor for further evaluation and treatment.    On arrival to the ED the patient's CRP was elevated at 5.34  with repeat being 7.54.  Lactate 2.3 with repeat 2.8.  Procalcitonin 0.64.  Leukocytosis at 16.83 with repeat 17.01.  Platelet count 138.  Respiratory panel PCR was negative.  Blood cultures pending x 2.  Chest x-ray on admission reported cardiomegaly and CABG.  No acute cardiopulmonary findings.  CT angiogram chest reported no acute process.  CT abdomen pelvis without contrast reported no acute findings identified within the abdomen or pelvis.  Sigmoid diverticulosis without diverticulitis.  Prior cholecystectomy.  Decreased prominence of the retroperitoneal lymph nodes which are within normal limits for size.  Atherosclerosis.  Chronic compression fractures of the lumbar spine.  Degenerative changes.  No acute bony findings.  Cardiomegaly and basilar atelectasis.  Candida auris PCR was negative.    The patient is sleepy, resting quietly in bed.  On 2 L nasal cannula with no apparent distress.  The patient seems confused and was unable to answer questions this morning.  Tmax 103 °F.  No reports of diarrhea.  Lung exam is diminished to auscultation bilaterally.  Abdomen soft and nontender with normoactive bowel sounds.  Faint murmur noted.  Tunneled hemodialysis catheter to the right subclavian was examined, pink at the insertion site with some scabbing noted.  No odor or drainage.  No edema.  Left upper extremity AV fistula with some scabbing but no erythema/edema, no open areas no drainage.  Thrill and bruit noted.  Bilateral lower extremities with discoloration due to chronic ischemia.  Left lower extremity is cooler to touch with finder pulses.  Left heel with dry callus/decubitus ulcer with no surrounding erythema or edema and no drainage.  Left lateral pinky toe amputation site is dry and scabbed with no surrounding erythema or edema.  No open areas.  CRP this morning is elevated at 14.13.  Lactate 2.1.  Leukocytosis slightly improved at 13.50.  Platelet count 108.  Bilateral foot x-ray from this morning  reports soft tissue edema of the left greater than right feet which is nonspecific.  More focal increased soft tissue swelling along the lateral margin of the left fifth MTP joint region with possible ulceration.  No radiographic findings of periosteal reaction or destructive bone change.  Left greater than right midfoot arthropathy.  Calcaneal spurring.      Infectious Disease consultation was requested for antimicrobial management.      ---------------------------------------------------------------------------------------------------------------------     Review Of Systems:    Unable to assess  ---------------------------------------------------------------------------------------------------------------------     Past Medical History    Past Medical History:   Diagnosis Date    CHF (congestive heart failure)     Coronary artery disease     Diabetes mellitus     Dialysis patient     Disease of thyroid gland     Elevated cholesterol     GERD (gastroesophageal reflux disease)     Hypertension     Myocardial infarction     Renal disorder     stage 5       Past Surgical History    Past Surgical History:   Procedure Laterality Date    CARDIAC SURGERY      cabg    CHOLECYSTECTOMY      COLONOSCOPY N/A 9/22/2023    Procedure: COLONOSCOPY;  Surgeon: Trip Peter MD;  Location: [facility];  Service: Gastroenterology;  Laterality: N/A;    DIALYSIS FISTULA CREATION Left     arm       Family History    Family History   Problem Relation Age of Onset    Arthritis Mother     COPD Father     Diabetes Father     Heart disease Father     Hyperlipidemia Father     Hypertension Father     Cancer Daughter        Social History    Social History     Tobacco Use    Smoking status: Never    Smokeless tobacco: Never   Vaping Use    Vaping status: Never Used   Substance Use Topics    Alcohol use: Never    Drug use: Never       Allergies    Patient has no known  allergies.  ---------------------------------------------------------------------------------------------------------------------     Home Medications:    Prior to Admission Medications       Prescriptions Last Dose Informant Patient Reported? Taking?    ammonium lactate (AMLACTIN) 12 % cream 5/14/2024 intermediate Yes Yes    Apply 1 g topically to the appropriate area as directed Every Night. Apply to dry skin on feet    atorvastatin (LIPITOR) 20 MG tablet 5/15/2024 intermediate Yes Yes    Take 1 tablet by mouth Daily.    cetaphil (CETAPHIL) lotion 5/15/2024 intermediate Yes Yes    Apply 1 Application topically to the appropriate area as directed Daily.    erythromycin (ROMYCIN) 5 MG/GM ophthalmic ointment 5/15/2024 intermediate Yes Yes    Administer 1 Application into the left eye 2 (Two) Times a Day.    famotidine (PEPCID) 20 MG tablet 5/15/2024 intermediate Yes Yes    Take 1 tablet by mouth 2 (Two) Times a Day.    gabapentin (NEURONTIN) 100 MG capsule 5/14/2024 intermediate Yes Yes    Take 2 capsules by mouth Every Night.    glipizide (GLUCOTROL) 10 MG tablet 5/15/2024 intermediate Yes Yes    Take 1 tablet by mouth Daily.    glipizide (GLUCOTROL) 5 MG tablet 5/15/2024 intermediate Yes Yes    Take 1 tablet by mouth Daily.    insulin aspart (novoLOG FLEXPEN) 100 UNIT/ML solution pen-injector sc pen 5/15/2024 intermediate Yes Yes    Inject 2-6 Units under the skin into the appropriate area as directed 4 (Four) Times a Day Before Meals & at Bedtime.    insulin detemir (Levemir FlexPen) 100 UNIT/ML injection 5/15/2024 intermediate Yes Yes    Inject 25 Units under the skin into the appropriate area as directed 2 (Two) Times a Day.    levocetirizine (XYZAL) 5 MG tablet 5/15/2024 intermediate Yes Yes    Take 1 tablet by mouth Daily.    levothyroxine (SYNTHROID, LEVOTHROID) 75 MCG tablet 5/15/2024 intermediate Yes Yes    Take 1 tablet by mouth Daily.    lidocaine 3 % cream cream 5/14/2024 intermediate Yes Yes    Apply 1  Application topically Every Night.    loperamide (IMODIUM) 2 MG capsule 5/14/2024 residential Yes Yes    Take 1 capsule by mouth 3 (Three) Times a Week.    loperamide (IMODIUM) 2 MG capsule 5/15/2024 residential Yes Yes    Take 1 capsule by mouth 2 (Two) Times a Day.    metoprolol succinate XL (TOPROL-XL) 25 MG 24 hr tablet 5/14/2024 residential Yes Yes    Take 0.5 tablets by mouth Every Night.    midodrine (PROAMATINE) 2.5 MG tablet 5/14/2024 residential Yes Yes    Take 1 tablet by mouth 3 (Three) Times a Week.    multivitamin with minerals tablet tablet 5/15/2024 residential Yes Yes    Take 1 tablet by mouth Daily.    sertraline (ZOLOFT) 50 MG tablet 5/14/2024 residential Yes Yes    Take 1 tablet by mouth Every Night.    sevelamer (RENAGEL) 800 MG tablet 5/15/2024 residential Yes Yes    Take 3 tablets by mouth 3 (Three) Times a Day With Meals.    acetaminophen (TYLENOL) 325 MG tablet 5/13/2024 [facility] Home Yes No    Take 2 tablets by mouth 3 (Three) Times a Week.    acetaminophen (TYLENOL) 500 MG tablet Unknown Nursing Home Yes No    Take 1 tablet by mouth Every 4 (Four) Hours As Needed for Mild Pain.    bisacodyl (Dulcolax) 10 MG suppository Unknown [facility] Home Yes No    Insert 1 suppository into the rectum Daily As Needed for Constipation.    lactulose (CHRONULAC) 10 GM/15ML solution Unknown Nursing Home Yes No    Take 30 mL by mouth Daily As Needed (constipation).    Lidocaine Viscous HCl (XYLOCAINE) 2 % solution Unknown Nursing Home Yes No    Take 5 mL by mouth Every 6 (Six) Hours As Needed for Mild Pain.    loperamide (IMODIUM) 2 MG capsule Unknown Nursing Home Yes No    Take 2 capsules by mouth Every 6 (Six) Hours As Needed for Diarrhea.    ondansetron (ZOFRAN) 4 MG tablet Unknown Nursing Home Yes No    Take 1 tablet by mouth Every 8 (Eight) Hours As Needed for Nausea or Vomiting.    polyethylene glycol (MIRALAX) 17 GM/SCOOP powder Unknown Nursing Home Yes No    Take 17 g by mouth 2 (Two) Times a Day  As Needed (constipation).          ---------------------------------------------------------------------------------------------------------------------    Objective       Objective     Hospital Scheduled Meds:  acetaminophen, 650 mg, Oral, Once per day on Tuesday Thursday Saturday  ammonium lactate, 1 Application, Topical, Nightly  aspirin, 81 mg, Oral, Daily  atorvastatin, 20 mg, Oral, Daily  cetirizine, 10 mg, Oral, Daily  erythromycin, 1 Application, Left Eye, BID  famotidine, 20 mg, Oral, Q48H  gabapentin, 200 mg, Oral, Nightly  heparin (porcine), 5,000 Units, Subcutaneous, Q12H  [Held by provider] insulin glargine, 5 Units, Subcutaneous, Q12H  insulin lispro, 2-7 Units, Subcutaneous, 4x Daily AC & at Bedtime  levothyroxine, 75 mcg, Oral, Daily  Lidocaine, 1 patch, Transdermal, Q PM  lubrisoft, 1 Application, Topical, Daily  metoprolol succinate XL, 12.5 mg, Oral, Nightly  midodrine, 5 mg, Oral, Once per day on Tuesday Thursday Saturday  multivitamin with minerals, 1 tablet, Oral, Daily  piperacillin-tazobactam, 3.375 g, Intravenous, Q12H  sertraline, 50 mg, Oral, Nightly  sevelamer, 2,400 mg, Oral, TID With Meals  sodium chloride, 10 mL, Intravenous, Q12H      Pharmacy to dose vancomycin,       ---------------------------------------------------------------------------------------------------------------------   Vital Signs:  Temp:  [98.5 °F (36.9 °C)-103 °F (39.4 °C)] 98.6 °F (37 °C)  Heart Rate:  [] 95  Resp:  [16-18] 16  BP: ()/(40-71) 105/40  Mean Arterial Pressure (Non-Invasive) for the past 24 hrs (Last 3 readings):   Noninvasive MAP (mmHg)   05/15/24 1640 89   05/15/24 1600 75   05/15/24 1520 72     SpO2 Percentage    05/15/24 1640 05/16/24 0300 05/16/24 0645   SpO2: 96% 94% 96%     SpO2:  [90 %-96 %] 96 %  on  Flow (L/min):  [2] 2;   Device (Oxygen Therapy): nasal cannula    Body mass index is 26.78 kg/m².  Wt Readings from Last 3 Encounters:   05/16/24 87.1 kg (192 lb)   10/02/23 87.1 kg  (192 lb)   09/22/23 87.1 kg (192 lb)     ---------------------------------------------------------------------------------------------------------------------     Physical Exam:    Constitutional: Chronically ill-appearing elderly gentleman.  Resting quietly in bed.  Unable to answer questions.  Sleepy.  HENT:  Head: Normocephalic and atraumatic.  Mouth:  Moist mucous membranes.    Eyes:  Conjunctivae and EOM are normal.  No scleral icterus.  Neck:  Neck supple.  No JVD present.    Cardiovascular:  Normal rate, regular rhythm and normal heart sounds with faint murmur. No edema.  Pulmonary/Chest: Lung exam is diminished to auscultation bilaterally abdominal:  Soft.  Bowel sounds are normal.  No distension and no tenderness.   Musculoskeletal:  No edema, no tenderness, and no deformity.  No swelling or redness of joints.  Bilateral lower extremity chronic ischemic changes.  Left lower extremity cooler to palpation.  Left pedal pulses more faint.  Neurological: Sleepy unable to answer questions.  Skin:  Skin is warm and dry.  No rash noted.  No pallor.  Right subclavian hemodialysis catheter, mildly pink at insertion site with some dried drainage.  Left upper extremity AV fistula with scabbing, no surrounding erythema/edema.  Bilateral lower extremity chronic ischemic changes.  Left heel callus/decubitus ulcer dry with no erythema/edema.  Left lateral foot fifth toe amputation site dry with scabbing, no erythema/edema.  No drainage.      ---------------------------------------------------------------------------------------------------------------------    Results from last 7 days   Lab Units 05/16/24  0128 05/15/24  1954 05/15/24  1639   HSTROP T ng/L 221* 173* 160*       Results from last 7 days   Lab Units 05/16/24  0128   CHOLESTEROL mg/dL 48   TRIGLYCERIDES mg/dL 99   HDL CHOL mg/dL 22*   LDL CHOL mg/dL 6     Results from last 7 days   Lab Units 05/16/24  0440   PH, ARTERIAL pH units 7.421   PO2 ART mm Hg 97.9  "  PCO2, ARTERIAL mm Hg 41.9   HCO3 ART mmol/L 27.3*     Results from last 7 days   Lab Units 05/16/24  0128 05/15/24  1904 05/15/24  1639 05/15/24  1415 05/15/24  1415 05/15/24  1128   CRP mg/dL 14.13*  --   --   --  7.54* 5.34*   LACTATE mmol/L 2.1* 2.8* 2.8*   < > 2.3*  --    WBC 10*3/mm3 13.50*  --   --   --  17.01* 16.83*   HEMOGLOBIN g/dL 8.1*  --   --   --  9.7* 8.9*   HEMATOCRIT % 25.8*  --   --   --  30.4* 27.5*   MCV fL 95.9  --   --   --  94.4 93.9   MCHC g/dL 31.4*  --   --   --  31.9 32.4   PLATELETS 10*3/mm3 108*  --   --   --  138* 133*   INR  1.54*  --   --   --  1.35*  --     < > = values in this interval not displayed.     Results from last 7 days   Lab Units 05/16/24  0128 05/15/24  1415 05/15/24  1128   SODIUM mmol/L 135* 136 136   POTASSIUM mmol/L 4.1 4.1 3.9   MAGNESIUM mg/dL 1.8  --   --    CHLORIDE mmol/L 95* 93* 93*   CO2 mmol/L 24.7 25.5 27.5   BUN mg/dL 50* 41* 39*   CREATININE mg/dL 7.86* 7.03* 6.33*   CALCIUM mg/dL 8.3* 9.0 8.7   GLUCOSE mg/dL 94 96 83   ALBUMIN g/dL 3.1* 3.8 3.7   BILIRUBIN mg/dL 0.3 0.5 0.4   ALK PHOS U/L 143* 184* 176*   AST (SGOT) U/L 15 16 16   ALT (SGPT) U/L 7 8 9   Estimated Creatinine Clearance: 10 mL/min (A) (by C-G formula based on SCr of 7.86 mg/dL (H)).  No results found for: \"AMMONIA\"    Glucose   Date/Time Value Ref Range Status   05/16/2024 0704 165 (H) 70 - 130 mg/dL Final   05/16/2024 0651 42 (C) 70 - 130 mg/dL Final   05/15/2024 2000 121 70 - 130 mg/dL Final     Lab Results   Component Value Date    HGBA1C 6.40 (H) 03/21/2024     Lab Results   Component Value Date    TSH 2.490 03/21/2024    FREET4 1.43 05/29/2022       No results found for: \"BLOODCX\"  No results found for: \"URINECX\"  No results found for: \"WOUNDCX\"  No results found for: \"STOOLCX\"  No results found for: \"RESPCX\"  Pain Management Panel           No data to display              I have personally reviewed the above laboratory results. "   ---------------------------------------------------------------------------------------------------------------------  Imaging Results (Last 7 Days)       Procedure Component Value Units Date/Time    XR Foot 3+ View Bilateral [[id number]] Collected: 05/16/24 0907     Updated: 05/16/24 0911    Narrative:      EXAM:    XR Bilateral Feet Complete, 3+ Views     EXAM DATE:    5/16/2024 7:25 AM     CLINICAL HISTORY:    Suspect osteomyelitis of left foot in a known diabetic     TECHNIQUE:    Frontal, lateral and oblique views of the bilateral feet.     COMPARISON:    No relevant prior studies available.     FINDINGS:    Bones/joints:  Mild degenerative changes of the right foot  predominantly the interphalangeal joints and first MTP joint.  Prominent  calcaneal spurring of the left heel.  Prominent calcaneal spurring of  the right heel.  Midfoot arthropathy is noted of the left greater than  right feet.  No acute fracture.  No dislocation.  No destructive bone  changes or periosteal reaction of the right foot osseous structures.    Soft tissues:  Focal soft tissue swelling along the lateral margin of  the left fifth MTP joint region with possible superficial ulceration. No  subjacent destructive bone change or periosteal reaction identified.   Soft tissue edema noted diffusely of left foot and is nonspecific.  No  radiopaque foreign body.       Impression:      1.  Soft tissue edema of the left greater than right feet which is  nonspecific.  2.  More focal increased soft tissue swelling along the lateral margin  of the left fifth MTP joint region with possible ulceration.  3.  No radiographic findings of periosteal reaction or destructive bone  change.  4.  Left greater than right midfoot arthropathy.  5.  Calcaneal spurring.        This report was finalized on 5/16/2024 9:09 AM by Dr. Jose Ramon Hutchison MD.       CT Angiogram Chest Pulmonary Embolism [[id number]] Collected: 05/15/24 1716     Updated: 05/15/24 1721     Narrative:      VERIFICATION OBSERVER NAME: Lisa Levy MD.     Technique: Axial images were obtained along with coronal and sagittal  reconstruction. Mip images were generated.   Patient was injected with contrast.   DLP in mGycm and contrast amount and type are reported in the EMR  record.. Dose lowering technique: Automated exposure control. Data  included in the medical records.      HISTORY/COMPARISON/FINDINGS:     Comparison: None.     History and Findings: PE study.       No evidence of PE.  No aortic aneurysm or dissection.  Minimal cardiac enlargement.  No pleural or pericardial effusion.  Lung fields are clear.  Visualized portion of the upper abdomen appeared unremarkable.  Post  cholecystectomy.       Impression:      No acute process.              [id number]     ZIP Code [id number].              This report was finalized on 5/15/2024 5:19 PM by Dr. Lisa Levy MD.       CT Abdomen Pelvis Without Contrast [[id number]] Collected: 05/15/24 1507     Updated: 05/15/24 1512    Narrative:      EXAM:    CT Abdomen and Pelvis Without Intravenous Contrast     EXAM DATE:    5/15/2024 2:40 PM     CLINICAL HISTORY:    General Abdominal Pain     TECHNIQUE:    Axial computed tomography images of the abdomen and pelvis without  intravenous contrast.  Sagittal and coronal reformatted images were  created and reviewed.  This CT exam was performed using one or more of  the following dose reduction techniques:  automated exposure control,  adjustment of the mA and/or kV according to patient size, and/or use of  iterative reconstruction technique.     COMPARISON:    5/23/2023     FINDINGS:    Lung bases:  Minimal basilar atelectasis noted.    Heart:  Cardiomegaly, CABG, and calcifications of the native coronary  arteries.      ABDOMEN:    Liver:  Unremarkable as visualized.    Gallbladder and bile ducts:  Cholecystectomy.  No ductal dilation.    Pancreas:  Unremarkable as visualized.  No ductal dilation.    Spleen:   Unremarkable as visualized.  No splenomegaly.    Adrenals:  Unremarkable as visualized.  No mass.    Kidneys and ureters:  Unremarkable as visualized.  No obstructing  stones.  No hydronephrosis.    Stomach and bowel:  Sigmoid diverticulosis without diverticulitis.  No  bowel obstruction.      PELVIS:    Appendix:  Normal appendix.    Bladder:  Unremarkable as visualized.  No stones.    Reproductive:  Mild prostate enlargement.      ABDOMEN and PELVIS:    Intraperitoneal space:  Unremarkable as visualized.  No  pneumoperitoneum identified.  No ascites or abscess.    Bones/joints:  Degenerative changes lumbar spine multilevel stenosis.   Hip arthroplasty changes right hip and fixation hardware left hip.   Chronic compression fracture is noted of L2 and L5 vertebral bodies  unchanged from the previous exam.  No dislocation.    Soft tissues:  Unremarkable as visualized.    Vasculature:  Atherosclerosis aortoiliac vasculature without evidence  of aneurysm.    Lymph nodes:  Reactive appearing nonenlarged retroperitoneal lymph  nodes similar to the previous exam.       Impression:      1.  No acute findings identified within abdomen or pelvis.  2.  Sigmoid diverticulosis without diverticulitis.  3.  Prior cholecystectomy.  4.  Decreased prominence of retroperitoneal lymph nodes which are within  normal limits for size.  5.  Atherosclerosis.  6.  Chronic compression fractures lumbar spine. Degenerative changes. No  acute bony findings.  7.  Cardiomegaly and basilar atelectasis.  8.  Other nonacute findings above.        This report was finalized on 5/15/2024 3:10 PM by Dr. Jose Ramon Hutchison MD.       XR Chest AP [[id number]] Collected: 05/15/24 1506     Updated: 05/15/24 1509    Narrative:      EXAM:    XR Chest, 1 View     EXAM DATE:    5/15/2024 2:15 PM     CLINICAL HISTORY:    fever     TECHNIQUE:    Frontal view of the chest.     COMPARISON:    No relevant prior studies available.     FINDINGS:    Lungs and pleural spaces:   Unremarkable as visualized.  No  consolidation.  No pneumothorax.    Heart:  Cardiomegaly.  CABG.    Mediastinum:  Unremarkable as visualized.  Normal mediastinal contour.    Bones/joints:  Unremarkable as visualized.  No acute fracture.    Tubes, lines and devices:  Right tunneled dialysis catheter.       Impression:      1.  Cardiomegaly and CABG.  2.  No acute cardiopulmonary findings.        This report was finalized on 5/15/2024 3:07 PM by Dr. Jose Ramon Hutchison MD.             I have personally reviewed the above radiology results.   ---------------------------------------------------------------------------------------------------------------------      Pertinent Infectious Disease Results          Assessment & Plan      Assessment      Sepsis of unknown etiology  Chronic left foot wounds      Plan      Mr. Kennedy Prescott is a 73 y.o. male with past medical history significant for insulin-dependent type 2 diabetes mellitus, essential pretension, ESRD with hemodialysis Tuesday/Thursday/Saturday via tunneled HD catheter, HLD, paroxysmal atrial fibrillation, coronary artery disease status post CABG in the distant past, pulmonary hypertension, heart failure with preserved EF, who presented to [facility] Emergency Department on 5/15/2024 for nausea and vomiting.  The patient resides at the [facility].  Was recently admitted to [facility] 4/26 through 5/1/2024 with hyperkalemia that required emergent hemodialysis.  While the patient was hospitalized he had left arm hemodialysis AV fistula placed by Dr. Floyd on 4/29/2024 and a tunneled hemodialysis catheter was placed on 5/1/2024.  The patient reported he began feeling ill 1 day prior to presentation with nausea and vomiting.  He denied any diarrhea or abdominal pain.  The patient had chills but no sweating or fever.  The patient noted his left foot was starting to hurt but did not disclose this to the ED provider.  In the ED  he had a temperature of 100.2 °F but imaging did not reveal suspected infections.  Labs were concerning, as WBC CRP and lactic acid were elevated.  The patient was placed on medical surgical floor for further evaluation and treatment.    On arrival to the ED the patient's CRP was elevated at 5.34 with repeat being 7.54.  Lactate 2.3 with repeat 2.8.  Procalcitonin 0.64.  Leukocytosis at 16.83 with repeat 17.01.  Platelet count 138.  Respiratory panel PCR was negative.  Blood cultures pending x 2.  Chest x-ray on admission reported cardiomegaly and CABG.  No acute cardiopulmonary findings.  CT angiogram chest reported no acute process.  CT abdomen pelvis without contrast reported no acute findings identified within the abdomen or pelvis.  Sigmoid diverticulosis without diverticulitis.  Prior cholecystectomy.  Decreased prominence of the retroperitoneal lymph nodes which are within normal limits for size.  Atherosclerosis.  Chronic compression fractures of the lumbar spine.  Degenerative changes.  No acute bony findings.  Cardiomegaly and basilar atelectasis.  Candida auris PCR was negative.    The patient is sleepy, resting quietly in bed.  On 2 L nasal cannula with no apparent distress.  The patient seems confused and was unable to answer questions this morning.  Tmax 103 °F.  No reports of diarrhea.  Lung exam is diminished to auscultation bilaterally.  Abdomen soft and nontender with normoactive bowel sounds.  Faint murmur noted.  Tunneled hemodialysis catheter to the right subclavian was examined, pink at the insertion site with some scabbing noted.  No odor or drainage.  No edema.  Left upper extremity AV fistula with some scabbing but no erythema/edema, no open areas no drainage.  Thrill and bruit noted.  Bilateral lower extremities with discoloration due to chronic ischemia.  Left lower extremity is cooler to touch with finder pulses.  Left heel with dry callus/decubitus ulcer with no surrounding erythema or  edema and no drainage.  Left lateral pinky toe amputation site is dry and scabbed with no surrounding erythema or edema.  No open areas.  CRP this morning is elevated at 14.13.  Lactate 2.1.  Leukocytosis slightly improved at 13.50.  Platelet count 108.  Bilateral foot x-ray from this morning reports soft tissue edema of the left greater than right feet which is nonspecific.  More focal increased soft tissue swelling along the lateral margin of the left fifth MTP joint region with possible ulceration.  No radiographic findings of periosteal reaction or destructive bone change.  Left greater than right midfoot arthropathy.  Calcaneal spurring.    Do not suspect the left foot as being the source of sepsis, however would recommend left foot MRI if any concern remains.  Awaiting blood cultures to finalize but suspect possible infection of right subclavian tunneled hemodialysis catheter.  Zosyn has been discontinued.  Continue with pharmacy dosing vancomycin in setting of possible HD catheter infection and sepsis on admission.  We will continue to monitor closely and adjust antibiotic coverage as appropriate.        ANTIMICROBIAL THERAPY    Pharmacy to dose vancomycin  piperacillin-tazobactam (ZOSYN) IVPB 3.375 g IVPB in 100 mL NS (VTB)       Again, thank you Dr. Crum for allowing us to participate in the care of your patient and please feel free to call for any questions you may have.        Code Status:     Code Status and Medical Interventions:   Ordered at: 05/15/24 8528     Level Of Support Discussed With:    Patient     Code Status (Patient has no pulse and is not breathing):    CPR (Attempt to Resuscitate)     Medical Interventions (Patient has pulse or is breathing):    Full Support         DORA Anrde  05/16/24  09:25 EDT

## 2024-05-16 NOTE — PLAN OF CARE
Goal Outcome Evaluation:  Plan of Care Reviewed With: patient        Progress: no change          Pt was admitted via er. Pt is resting well no acute changes at this time
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
## 2024-05-16 NOTE — ED PROVIDER NOTES
Subjective     History provided by:  Patient   used: No    Vomiting  The primary symptoms include fever, abdominal pain, nausea and vomiting. Primary symptoms do not include weight loss, fatigue, diarrhea, melena, hematemesis, jaundice, hematochezia, dysuria, myalgias, arthralgias or rash. The illness began today. The onset was sudden. The problem has been gradually improving.   The illness does not include chills, anorexia, dysphagia, odynophagia, bloating, constipation, tenesmus, back pain or itching. Associated medical issues do not include inflammatory bowel disease, GERD, gallstones, liver disease, alcohol abuse, PUD, gastric bypass, bowel resection, irritable bowel syndrome, hemorrhoids or diverticulitis.       Review of Systems   Constitutional:  Positive for fever. Negative for activity change, appetite change, chills, diaphoresis, fatigue and weight loss.   HENT:  Negative for congestion, ear pain and sore throat.    Eyes:  Negative for redness.   Respiratory:  Negative for cough, chest tightness, shortness of breath and wheezing.    Cardiovascular:  Negative for chest pain, palpitations and leg swelling.   Gastrointestinal:  Positive for abdominal pain, nausea and vomiting. Negative for anorexia, bloating, constipation, diarrhea, dysphagia, hematemesis, hematochezia, jaundice and melena.   Genitourinary:  Negative for dysuria and urgency.   Musculoskeletal:  Negative for arthralgias, back pain, myalgias and neck pain.   Skin:  Negative for itching, pallor, rash and wound.   Neurological:  Negative for dizziness, speech difficulty, weakness and headaches.   Psychiatric/Behavioral:  Negative for agitation, behavioral problems, confusion and decreased concentration.    All other systems reviewed and are negative.      Past Medical History:   Diagnosis Date    CHF (congestive heart failure)     Coronary artery disease     Diabetes mellitus     Dialysis patient     Disease of thyroid gland      Elevated cholesterol     GERD (gastroesophageal reflux disease)     Hypertension     Myocardial infarction     Renal disorder     stage 5       No Known Allergies    Past Surgical History:   Procedure Laterality Date    CARDIAC CATHETERIZATION N/A 5/28/2024    Procedure: Left Heart Cath;  Surgeon: Mackenzie Ruiz MD;  Location: [facility] CATH INVASIVE LOCATION;  Service: Cardiology;  Laterality: N/A;    CARDIAC SURGERY      cabg    CHOLECYSTECTOMY      COLONOSCOPY N/A 9/22/2023    Procedure: COLONOSCOPY;  Surgeon: Trip Peter MD;  Location: [facility] OR;  Service: Gastroenterology;  Laterality: N/A;    DIALYSIS FISTULA CREATION Left     arm       Family History   Problem Relation Age of Onset    Arthritis Mother     COPD Father     Diabetes Father     Heart disease Father     Hyperlipidemia Father     Hypertension Father     Cancer Daughter        Social History     Socioeconomic History    Marital status: Unknown   Tobacco Use    Smoking status: Never    Smokeless tobacco: Never   Vaping Use    Vaping status: Never Used   Substance and Sexual Activity    Alcohol use: Never    Drug use: Never    Sexual activity: Defer           Objective   Physical Exam  Vitals and nursing note reviewed.   Constitutional:       General: He is not in acute distress.     Appearance: Normal appearance. He is well-developed. He is not toxic-appearing or diaphoretic.   HENT:      Head: Normocephalic and atraumatic.      Right Ear: External ear normal.      Left Ear: External ear normal.      Nose: Nose normal.      Mouth/Throat:      Pharynx: No oropharyngeal exudate.      Tonsils: No tonsillar exudate.   Eyes:      General: Lids are normal.      Conjunctiva/sclera: Conjunctivae normal.      Pupils: Pupils are equal, round, and reactive to light.   Neck:      Thyroid: No thyromegaly.   Cardiovascular:      Rate and Rhythm: Normal rate and regular rhythm.      Pulses: Normal pulses.      Heart sounds: Normal heart sounds, S1 normal and S2  normal.   Pulmonary:      Effort: Pulmonary effort is normal. No tachypnea or respiratory distress.      Breath sounds: Normal breath sounds. No decreased breath sounds, wheezing or rales.   Chest:      Chest wall: No tenderness.   Abdominal:      General: Bowel sounds are normal. There is no distension.      Palpations: Abdomen is soft.      Tenderness: There is abdominal tenderness. There is no guarding or rebound.   Musculoskeletal:         General: No tenderness or deformity. Normal range of motion.      Cervical back: Full passive range of motion without pain, normal range of motion and neck supple.   Lymphadenopathy:      Cervical: No cervical adenopathy.   Skin:     General: Skin is warm and dry.      Coloration: Skin is not pale.      Findings: No erythema or rash.   Neurological:      Mental Status: He is alert and oriented to person, place, and time.      GCS: GCS eye subscore is 4. GCS verbal subscore is 5. GCS motor subscore is 6.      Cranial Nerves: No cranial nerve deficit.      Sensory: No sensory deficit.   Psychiatric:         Speech: Speech normal.         Behavior: Behavior normal.         Thought Content: Thought content normal.         Judgment: Judgment normal.         Procedures           ED Course  ED Course as of 05/29/24 1531   Wed May 15, 2024   1539 CT Abdomen Pelvis Without Contrast  IMPRESSION:  1.  No acute findings identified within abdomen or pelvis.  2.  Sigmoid diverticulosis without diverticulitis.  3.  Prior cholecystectomy.  4.  Decreased prominence of retroperitoneal lymph nodes which are within  normal limits for size.  5.  Atherosclerosis.  6.  Chronic compression fractures lumbar spine. Degenerative changes. No  acute bony findings.  7.  Cardiomegaly and basilar atelectasis.  8.  Other nonacute findings above.   [ES]   1539 XR Chest AP  IMPRESSION:  1.  Cardiomegaly and CABG.  2.  No acute cardiopulmonary findings.   [ES]   u May 16, 2024   1249 CT Angiogram Chest  Pulmonary Embolism  IMPRESSION:  No acute process.      [ES]      ED Course User Index  [ES] Saleem Wadsworth MD                                             Medical Decision Making  Problems Addressed:  SIRS (systemic inflammatory response syndrome): complicated acute illness or injury    Amount and/or Complexity of Data Reviewed  Labs: ordered.  Radiology: ordered. Decision-making details documented in ED Course.  ECG/medicine tests: ordered.    Risk  OTC drugs.  Prescription drug management.  Decision regarding hospitalization.        Final diagnoses:   SIRS (systemic inflammatory response syndrome)       ED Disposition  ED Disposition       ED Disposition   Decision to Admit    Condition   --    Comment   Level of Care: Medical Telemetry [23]   Diagnosis: SIRS (systemic inflammatory response syndrome) [435547]                 Rosanne Martinez MD  [address] [place] [id number]  [phone]    Schedule an appointment as soon as possible for a visit in 1 week(s)           Medication List        New Prescriptions      VANCOMYCIN PHARMACY INTERMITTENT/PULSE DOSING  Use Daily for 23 days. Vancomycin post HD through 6/15/24  Indications: Infection with Dysregulated Inflammatory Response            ASK your doctor about these medications      * acetaminophen 325 MG tablet  Commonly known as: TYLENOL  Ask about: Which instructions should I use?     * acetaminophen 500 MG tablet  Commonly known as: TYLENOL  Ask about: Which instructions should I use?     Levemir FlexPen 100 UNIT/ML injection  Generic drug: insulin detemir  Ask about: Which instructions should I use?     ondansetron 4 MG tablet  Commonly known as: ZOFRAN  Ask about: Which instructions should I use?           * This list has 2 medication(s) that are the same as other medications prescribed for you. Read the directions carefully, and ask your doctor or other care provider to review them with you.                   Where to Get Your  Medications        Information about where to get these medications is not yet available    Ask your nurse or doctor about these medications  VANCOMYCIN PHARMACY INTERMITTENT/PULSE DOSING            Saleem Wadsworth MD  05/16/24 1244       Saleem Wadsworth MD  05/29/24 5646

## 2024-05-16 NOTE — CONSULTS
Patient Name:  Kennedy Prescott  YOB: 1950  [id number]       Patient Care Team:  Jag Guillaume MD as PCP - General (Internal Medicine)      General Surgery Consult Note     Date of Consultation: 05/16/24    Consulting Physician : Alex Crum MD    Reason for Consult : Bacteremia    Subjective     I have been asked to see  Kennedy Prescott , a 73 y.o. male in consultation for bacteremia.  He had a tunneled line placed in outside hospital and blood cultures obtained on arrival to the emergency department were positive for gram-positive cocci in both bottles.  He will need to undergo dialysis tomorrow.      Allergy: No Known Allergies    Medications:  acetaminophen, 650 mg, Oral, Once per day on Tuesday Thursday Saturday  ammonium lactate, 1 Application, Topical, Nightly  aspirin, 81 mg, Oral, Daily  atorvastatin, 20 mg, Oral, Daily  cetirizine, 10 mg, Oral, Daily  erythromycin, 1 Application, Left Eye, BID  famotidine, 20 mg, Oral, Q48H  gabapentin, 200 mg, Oral, Nightly  heparin (porcine), 5,000 Units, Subcutaneous, Q12H  [Held by provider] insulin glargine, 5 Units, Subcutaneous, Q12H  insulin lispro, 2-7 Units, Subcutaneous, 4x Daily AC & at Bedtime  levothyroxine, 75 mcg, Oral, Daily  Lidocaine, 1 patch, Transdermal, Q PM  lubrisoft, 1 Application, Topical, Daily  metoprolol succinate XL, 12.5 mg, Oral, Nightly  midodrine, 5 mg, Oral, Once per day on Tuesday Thursday Saturday  multivitamin with minerals, 1 tablet, Oral, Daily  sertraline, 50 mg, Oral, Nightly  sevelamer, 2,400 mg, Oral, TID With Meals  sodium chloride, 10 mL, Intravenous, Q12H  Vancomycin Pharmacy Intermittent/Pulse Dosing, , Does not apply, Daily         No current facility-administered medications on file prior to encounter.     Current Outpatient Medications on File Prior to Encounter   Medication Sig    ammonium lactate (AMLACTIN) 12 % cream Apply 1 g topically to the appropriate area as directed Every Night. Apply to  dry skin on feet    atorvastatin (LIPITOR) 20 MG tablet Take 1 tablet by mouth Daily.    cetaphil (CETAPHIL) lotion Apply 1 Application topically to the appropriate area as directed Daily.    erythromycin (ROMYCIN) 5 MG/GM ophthalmic ointment Administer 1 Application into the left eye 2 (Two) Times a Day.    famotidine (PEPCID) 20 MG tablet Take 1 tablet by mouth 2 (Two) Times a Day.    gabapentin (NEURONTIN) 100 MG capsule Take 2 capsules by mouth Every Night.    glipizide (GLUCOTROL) 10 MG tablet Take 1 tablet by mouth Daily.    glipizide (GLUCOTROL) 5 MG tablet Take 1 tablet by mouth Daily.    insulin aspart (novoLOG FLEXPEN) 100 UNIT/ML solution pen-injector sc pen Inject 2-6 Units under the skin into the appropriate area as directed 4 (Four) Times a Day Before Meals & at Bedtime.    insulin detemir (Levemir FlexPen) 100 UNIT/ML injection Inject 25 Units under the skin into the appropriate area as directed 2 (Two) Times a Day.    levocetirizine (XYZAL) 5 MG tablet Take 1 tablet by mouth Daily.    levothyroxine (SYNTHROID, LEVOTHROID) 75 MCG tablet Take 1 tablet by mouth Daily.    lidocaine 3 % cream cream Apply 1 Application topically Every Night.    loperamide (IMODIUM) 2 MG capsule Take 1 capsule by mouth 3 (Three) Times a Week.    loperamide (IMODIUM) 2 MG capsule Take 1 capsule by mouth 2 (Two) Times a Day.    metoprolol succinate XL (TOPROL-XL) 25 MG 24 hr tablet Take 0.5 tablets by mouth Every Night.    midodrine (PROAMATINE) 2.5 MG tablet Take 1 tablet by mouth 3 (Three) Times a Week.    multivitamin with minerals tablet tablet Take 1 tablet by mouth Daily.    sertraline (ZOLOFT) 50 MG tablet Take 1 tablet by mouth Every Night.    sevelamer (RENAGEL) 800 MG tablet Take 3 tablets by mouth 3 (Three) Times a Day With Meals.    acetaminophen (TYLENOL) 325 MG tablet Take 2 tablets by mouth 3 (Three) Times a Week.    acetaminophen (TYLENOL) 500 MG tablet Take 1 tablet by mouth Every 4 (Four) Hours As Needed for  "Mild Pain.    bisacodyl (Dulcolax) 10 MG suppository Insert 1 suppository into the rectum Daily As Needed for Constipation.    lactulose (CHRONULAC) 10 GM/15ML solution Take 30 mL by mouth Daily As Needed (constipation).    Lidocaine Viscous HCl (XYLOCAINE) 2 % solution Take 5 mL by mouth Every 6 (Six) Hours As Needed for Mild Pain.    loperamide (IMODIUM) 2 MG capsule Take 2 capsules by mouth Every 6 (Six) Hours As Needed for Diarrhea.    ondansetron (ZOFRAN) 4 MG tablet Take 1 tablet by mouth Every 8 (Eight) Hours As Needed for Nausea or Vomiting.    polyethylene glycol (MIRALAX) 17 GM/SCOOP powder Take 17 g by mouth 2 (Two) Times a Day As Needed (constipation).       PMHx:   Past Medical History:   Diagnosis Date    CHF (congestive heart failure)     Coronary artery disease     Diabetes mellitus     Dialysis patient     Disease of thyroid gland     Elevated cholesterol     GERD (gastroesophageal reflux disease)     Hypertension     Myocardial infarction     Renal disorder     stage 5       Past Surgical History:  Tunneled dialysis catheter placement, left upper extremity AV fistula placement    Family History: Noncontributory     Social History:  Noncontributory     Review of Systems   Pertinent items are noted in HPI     Constitutional: No fevers, chills or malaise   Eyes: Denies visual changes    Cardiovascular: Denies chest pain, palpitations   Pulmonary: Denies cough or shortness of breath   Abdominal/ GI: Nontender    Genitourinary: Denies dysuria or hematuria   Musculoskeletal: Denies any but chronic joint aches, pains or deformities   Psychiatric: No recent mood changes   Neurologic: No paresthesias or loss of function          Objective     Physical Exam:      Vital Signs  /54 (BP Location: Left arm, Patient Position: Lying)   Pulse 73   Temp 97.8 °F (36.6 °C) (Oral)   Resp 18   Ht 180.3 cm (71\")   Wt 87.1 kg (192 lb)   SpO2 99%   BMI 26.78 kg/m²     Intake/Output Summary (Last 24 hours) at " 5/16/2024 1550  Last data filed at 5/16/2024 0300  Gross per 24 hour   Intake 120 ml   Output --   Net 120 ml         Physical Exam:    Head: Normocephalic, atraumatic.   Eyes: Pupils equal, round, react to light   Mouth: No lesions noted  CV: Regular rate and rhythm   Lungs: Bilateral chest rise and fall, no use of accessory muscles   Abdomen: Soft, nontender, nondistended  Extremities: Left upper extremity with palpable AV fistula, palpable thrill  Neurologic: No gross deficits      Assessment and Plan:    Problem List Items Addressed This Visit          Other    * (Principal) SIRS (systemic inflammatory response syndrome) - Primary        Active Hospital Problems    Diagnosis  POA    **SIRS (systemic inflammatory response syndrome) [R65.10]  Yes    Sepsis [A41.9]  Yes      Resolved Hospital Problems   No resolved problems to display.     Mr. Prescott is a 73-year-old male with bacteremia and a indwelling tunneled dialysis catheter.  He will require removal of the catheter in order to adequately clear his bacteremia.  As he is scheduled for dialysis tomorrow, we will plan for him to undergo dialysis with his current line and I will remove the catheter after dialysis.  We can then replace the line on Monday after a line holiday.    I discussed the patient's findings and my recommendations with the patient and/or family, as well as the primary team and infectious disease.     Lorrie Steiner MD  05/16/24  15:50 EDT

## 2024-05-16 NOTE — NURSING NOTE
Pt BG 36 and 38 on recheck. Nurse educated pt on importance of eating dinner. Pt voice understanding, but refused to eat.     D50 given per orders    BG rechecked in 15 mins and is now 153.     Pts fam is at bedside and . Fam and RN encouraged pt to eat dinner; pt is now eating his dinner.

## 2024-05-17 ENCOUNTER — APPOINTMENT (OUTPATIENT)
Dept: CARDIOLOGY | Facility: HOSPITAL | Age: 74
DRG: 321 | End: 2024-05-17
Payer: MEDICARE

## 2024-05-17 LAB
A-A DO2: 64 MMHG (ref 0–300)
ABO GROUP BLD: NORMAL
ABO GROUP BLD: NORMAL
ALBUMIN SERPL-MCNC: 3.1 G/DL (ref 3.5–5.2)
ALBUMIN/GLOB SERPL: 1.1 G/DL
ALP SERPL-CCNC: 137 U/L (ref 39–117)
ALT SERPL W P-5'-P-CCNC: 10 U/L (ref 1–41)
ANION GAP SERPL CALCULATED.3IONS-SCNC: 16.7 MMOL/L (ref 5–15)
ANION GAP SERPL CALCULATED.3IONS-SCNC: 17.3 MMOL/L (ref 5–15)
APTT PPP: 140.6 SECONDS (ref 26.5–34.5)
ARTERIAL PATENCY WRIST A: POSITIVE
AST SERPL-CCNC: 27 U/L (ref 1–40)
ATMOSPHERIC PRESS: 723 MMHG
BASE EXCESS BLDA CALC-SCNC: 9.6 MMOL/L (ref 0–2)
BASOPHILS # BLD AUTO: 0.01 10*3/MM3 (ref 0–0.2)
BASOPHILS # BLD AUTO: 0.03 10*3/MM3 (ref 0–0.2)
BASOPHILS NFR BLD AUTO: 0.1 % (ref 0–1.5)
BASOPHILS NFR BLD AUTO: 0.3 % (ref 0–1.5)
BDY SITE: ABNORMAL
BILIRUB SERPL-MCNC: 0.3 MG/DL (ref 0–1.2)
BLD GP AB SCN SERPL QL: NEGATIVE
BUN SERPL-MCNC: 63 MG/DL (ref 8–23)
BUN SERPL-MCNC: 65 MG/DL (ref 8–23)
BUN/CREAT SERPL: 6.9 (ref 7–25)
BUN/CREAT SERPL: 7.1 (ref 7–25)
CALCIUM SPEC-SCNC: 8.1 MG/DL (ref 8.6–10.5)
CALCIUM SPEC-SCNC: 8.1 MG/DL (ref 8.6–10.5)
CHLORIDE SERPL-SCNC: 93 MMOL/L (ref 98–107)
CHLORIDE SERPL-SCNC: 93 MMOL/L (ref 98–107)
CO2 BLDA-SCNC: 34.4 MMOL/L (ref 22–33)
CO2 SERPL-SCNC: 22.7 MMOL/L (ref 22–29)
CO2 SERPL-SCNC: 23.3 MMOL/L (ref 22–29)
COHGB MFR BLD: 2.1 % (ref 0–5)
CREAT SERPL-MCNC: 8.89 MG/DL (ref 0.76–1.27)
CREAT SERPL-MCNC: 9.44 MG/DL (ref 0.76–1.27)
D DIMER PPP FEU-MCNC: 10.17 MCGFEU/ML (ref 0–0.75)
DEPRECATED RDW RBC AUTO: 50.3 FL (ref 37–54)
DEPRECATED RDW RBC AUTO: 50.7 FL (ref 37–54)
EGFRCR SERPLBLD CKD-EPI 2021: 5.3 ML/MIN/1.73
EGFRCR SERPLBLD CKD-EPI 2021: 5.7 ML/MIN/1.73
EOSINOPHIL # BLD AUTO: 0.04 10*3/MM3 (ref 0–0.4)
EOSINOPHIL # BLD AUTO: 0.15 10*3/MM3 (ref 0–0.4)
EOSINOPHIL NFR BLD AUTO: 0.4 % (ref 0.3–6.2)
EOSINOPHIL NFR BLD AUTO: 1.6 % (ref 0.3–6.2)
ERYTHROCYTE [DISTWIDTH] IN BLOOD BY AUTOMATED COUNT: 14.3 % (ref 12.3–15.4)
ERYTHROCYTE [DISTWIDTH] IN BLOOD BY AUTOMATED COUNT: 14.5 % (ref 12.3–15.4)
FIBRINOGEN PPP-MCNC: 509 MG/DL (ref 173–524)
GAS FLOW AIRWAY: 2 LPM
GLOBULIN UR ELPH-MCNC: 2.8 GM/DL
GLUCOSE BLDC GLUCOMTR-MCNC: 111 MG/DL (ref 70–130)
GLUCOSE BLDC GLUCOMTR-MCNC: 117 MG/DL (ref 70–130)
GLUCOSE BLDC GLUCOMTR-MCNC: 144 MG/DL (ref 70–130)
GLUCOSE BLDC GLUCOMTR-MCNC: 191 MG/DL (ref 70–130)
GLUCOSE BLDC GLUCOMTR-MCNC: 37 MG/DL (ref 70–130)
GLUCOSE BLDC GLUCOMTR-MCNC: 65 MG/DL (ref 70–130)
GLUCOSE BLDC GLUCOMTR-MCNC: 66 MG/DL (ref 70–130)
GLUCOSE BLDC GLUCOMTR-MCNC: 78 MG/DL (ref 70–130)
GLUCOSE SERPL-MCNC: 51 MG/DL (ref 65–99)
GLUCOSE SERPL-MCNC: 68 MG/DL (ref 65–99)
HAV IGM SERPL QL IA: NORMAL
HBV CORE IGM SERPL QL IA: NORMAL
HBV SURFACE AG SERPL QL IA: NORMAL
HCO3 BLDA-SCNC: 33.2 MMOL/L (ref 20–26)
HCT VFR BLD AUTO: 22.6 % (ref 37.5–51)
HCT VFR BLD AUTO: 24.4 % (ref 37.5–51)
HCT VFR BLD CALC: 29.3 % (ref 38–51)
HCV AB SER QL: NORMAL
HGB BLD-MCNC: 7.2 G/DL (ref 13–17.7)
HGB BLD-MCNC: 7.8 G/DL (ref 13–17.7)
HGB BLDA-MCNC: 9.6 G/DL (ref 14–18)
IMM GRANULOCYTES # BLD AUTO: 0.04 10*3/MM3 (ref 0–0.05)
IMM GRANULOCYTES # BLD AUTO: 0.06 10*3/MM3 (ref 0–0.05)
IMM GRANULOCYTES NFR BLD AUTO: 0.4 % (ref 0–0.5)
IMM GRANULOCYTES NFR BLD AUTO: 0.6 % (ref 0–0.5)
INHALED O2 CONCENTRATION: 28 %
INR PPP: 1.52 (ref 0.9–1.1)
LYMPHOCYTES # BLD AUTO: 0.51 10*3/MM3 (ref 0.7–3.1)
LYMPHOCYTES # BLD AUTO: 0.53 10*3/MM3 (ref 0.7–3.1)
LYMPHOCYTES NFR BLD AUTO: 4.9 % (ref 19.6–45.3)
LYMPHOCYTES NFR BLD AUTO: 5.4 % (ref 19.6–45.3)
Lab: ABNORMAL
Lab: ABNORMAL
MAGNESIUM SERPL-MCNC: 1.9 MG/DL (ref 1.6–2.4)
MCH RBC QN AUTO: 30.6 PG (ref 26.6–33)
MCH RBC QN AUTO: 30.6 PG (ref 26.6–33)
MCHC RBC AUTO-ENTMCNC: 31.9 G/DL (ref 31.5–35.7)
MCHC RBC AUTO-ENTMCNC: 32 G/DL (ref 31.5–35.7)
MCV RBC AUTO: 95.7 FL (ref 79–97)
MCV RBC AUTO: 96.2 FL (ref 79–97)
METHGB BLD QL: <-0.1 % (ref 0–3)
MODALITY: ABNORMAL
MONOCYTES # BLD AUTO: 0.76 10*3/MM3 (ref 0.1–0.9)
MONOCYTES # BLD AUTO: 0.84 10*3/MM3 (ref 0.1–0.9)
MONOCYTES NFR BLD AUTO: 7.1 % (ref 5–12)
MONOCYTES NFR BLD AUTO: 8.8 % (ref 5–12)
NEUTROPHILS NFR BLD AUTO: 7.93 10*3/MM3 (ref 1.7–7)
NEUTROPHILS NFR BLD AUTO: 83.3 % (ref 42.7–76)
NEUTROPHILS NFR BLD AUTO: 87.1 % (ref 42.7–76)
NEUTROPHILS NFR BLD AUTO: 9.33 10*3/MM3 (ref 1.7–7)
NOTIFIED BY: ABNORMAL
NOTIFIED WHO: ABNORMAL
NRBC BLD AUTO-RTO: 0 /100 WBC (ref 0–0.2)
NRBC BLD AUTO-RTO: 0 /100 WBC (ref 0–0.2)
OXYHGB MFR BLDV: 96.6 % (ref 94–99)
PCO2 BLDA: 40.4 MM HG (ref 35–45)
PCO2 TEMP ADJ BLD: ABNORMAL MM[HG]
PH BLDA: 7.52 PH UNITS (ref 7.35–7.45)
PH, TEMP CORRECTED: ABNORMAL
PHOSPHATE SERPL-MCNC: 3.3 MG/DL (ref 2.5–4.5)
PLATELET # BLD AUTO: 108 10*3/MM3 (ref 140–450)
PLATELET # BLD AUTO: 95 10*3/MM3 (ref 140–450)
PMV BLD AUTO: 10.7 FL (ref 6–12)
PMV BLD AUTO: 10.7 FL (ref 6–12)
PO2 BLDA: 80.2 MM HG (ref 83–108)
PO2 TEMP ADJ BLD: ABNORMAL MM[HG]
POTASSIUM SERPL-SCNC: 4 MMOL/L (ref 3.5–5.2)
POTASSIUM SERPL-SCNC: 4.4 MMOL/L (ref 3.5–5.2)
PROT SERPL-MCNC: 5.9 G/DL (ref 6–8.5)
PROTHROMBIN TIME: 18.4 SECONDS (ref 12.1–14.7)
RBC # BLD AUTO: 2.35 10*6/MM3 (ref 4.14–5.8)
RBC # BLD AUTO: 2.55 10*6/MM3 (ref 4.14–5.8)
RH BLD: NEGATIVE
RH BLD: NEGATIVE
SAO2 % BLDCOA: 98.3 % (ref 94–99)
SODIUM SERPL-SCNC: 133 MMOL/L (ref 136–145)
SODIUM SERPL-SCNC: 133 MMOL/L (ref 136–145)
T&S EXPIRATION DATE: NORMAL
VANCOMYCIN SERPL-MCNC: 16.7 MCG/ML (ref 5–40)
VENTILATOR MODE: ABNORMAL
WBC NRBC COR # BLD AUTO: 10.71 10*3/MM3 (ref 3.4–10.8)
WBC NRBC COR # BLD AUTO: 9.52 10*3/MM3 (ref 3.4–10.8)

## 2024-05-17 PROCEDURE — 0JPTXXZ REMOVAL OF TUNNELED VASCULAR ACCESS DEVICE FROM TRUNK SUBCUTANEOUS TISSUE AND FASCIA, EXTERNAL APPROACH: ICD-10-PCS | Performed by: SURGERY

## 2024-05-17 PROCEDURE — 87147 CULTURE TYPE IMMUNOLOGIC: CPT | Performed by: INTERNAL MEDICINE

## 2024-05-17 PROCEDURE — 86900 BLOOD TYPING SEROLOGIC ABO: CPT

## 2024-05-17 PROCEDURE — 82375 ASSAY CARBOXYHB QUANT: CPT

## 2024-05-17 PROCEDURE — 02PYX3Z REMOVAL OF INFUSION DEVICE FROM GREAT VESSEL, EXTERNAL APPROACH: ICD-10-PCS | Performed by: SURGERY

## 2024-05-17 PROCEDURE — 84100 ASSAY OF PHOSPHORUS: CPT | Performed by: INTERNAL MEDICINE

## 2024-05-17 PROCEDURE — 83735 ASSAY OF MAGNESIUM: CPT | Performed by: INTERNAL MEDICINE

## 2024-05-17 PROCEDURE — 36600 WITHDRAWAL OF ARTERIAL BLOOD: CPT

## 2024-05-17 PROCEDURE — 25010000002 VANCOMYCIN 1 G RECONSTITUTED SOLUTION 1 EACH VIAL: Performed by: INTERNAL MEDICINE

## 2024-05-17 PROCEDURE — 85730 THROMBOPLASTIN TIME PARTIAL: CPT | Performed by: INTERNAL MEDICINE

## 2024-05-17 PROCEDURE — 86022 PLATELET ANTIBODIES: CPT | Performed by: INTERNAL MEDICINE

## 2024-05-17 PROCEDURE — 82805 BLOOD GASES W/O2 SATURATION: CPT

## 2024-05-17 PROCEDURE — 86923 COMPATIBILITY TEST ELECTRIC: CPT

## 2024-05-17 PROCEDURE — 85362 FIBRIN DEGRADATION PRODUCTS: CPT | Performed by: INTERNAL MEDICINE

## 2024-05-17 PROCEDURE — 85384 FIBRINOGEN ACTIVITY: CPT | Performed by: INTERNAL MEDICINE

## 2024-05-17 PROCEDURE — 99291 CRITICAL CARE FIRST HOUR: CPT | Performed by: INTERNAL MEDICINE

## 2024-05-17 PROCEDURE — 86901 BLOOD TYPING SEROLOGIC RH(D): CPT | Performed by: INTERNAL MEDICINE

## 2024-05-17 PROCEDURE — 25010000002 HEPARIN (PORCINE) PER 1000 UNITS: Performed by: INTERNAL MEDICINE

## 2024-05-17 PROCEDURE — 25810000003 SODIUM CHLORIDE 0.9 % SOLUTION 250 ML FLEX CONT: Performed by: INTERNAL MEDICINE

## 2024-05-17 PROCEDURE — 63710000001 ONDANSETRON ODT 4 MG TABLET DISPERSIBLE: Performed by: INTERNAL MEDICINE

## 2024-05-17 PROCEDURE — 86901 BLOOD TYPING SEROLOGIC RH(D): CPT

## 2024-05-17 PROCEDURE — 85025 COMPLETE CBC W/AUTO DIFF WBC: CPT | Performed by: INTERNAL MEDICINE

## 2024-05-17 PROCEDURE — 80053 COMPREHEN METABOLIC PANEL: CPT | Performed by: INTERNAL MEDICINE

## 2024-05-17 PROCEDURE — 82948 REAGENT STRIP/BLOOD GLUCOSE: CPT

## 2024-05-17 PROCEDURE — 85610 PROTHROMBIN TIME: CPT | Performed by: INTERNAL MEDICINE

## 2024-05-17 PROCEDURE — 36589 REMOVAL TUNNELED CV CATH: CPT | Performed by: SURGERY

## 2024-05-17 PROCEDURE — 85379 FIBRIN DEGRADATION QUANT: CPT | Performed by: INTERNAL MEDICINE

## 2024-05-17 PROCEDURE — 80202 ASSAY OF VANCOMYCIN: CPT | Performed by: INTERNAL MEDICINE

## 2024-05-17 PROCEDURE — 99232 SBSQ HOSP IP/OBS MODERATE 35: CPT | Performed by: NURSE PRACTITIONER

## 2024-05-17 PROCEDURE — 80074 ACUTE HEPATITIS PANEL: CPT | Performed by: INTERNAL MEDICINE

## 2024-05-17 PROCEDURE — P9016 RBC LEUKOCYTES REDUCED: HCPCS

## 2024-05-17 PROCEDURE — 87040 BLOOD CULTURE FOR BACTERIA: CPT | Performed by: INTERNAL MEDICINE

## 2024-05-17 PROCEDURE — 93923 UPR/LXTR ART STDY 3+ LVLS: CPT | Performed by: RADIOLOGY

## 2024-05-17 PROCEDURE — 97166 OT EVAL MOD COMPLEX 45 MIN: CPT

## 2024-05-17 PROCEDURE — 86850 RBC ANTIBODY SCREEN: CPT | Performed by: INTERNAL MEDICINE

## 2024-05-17 PROCEDURE — 5A1D70Z PERFORMANCE OF URINARY FILTRATION, INTERMITTENT, LESS THAN 6 HOURS PER DAY: ICD-10-PCS | Performed by: INTERNAL MEDICINE

## 2024-05-17 PROCEDURE — 83050 HGB METHEMOGLOBIN QUAN: CPT

## 2024-05-17 PROCEDURE — 86900 BLOOD TYPING SEROLOGIC ABO: CPT | Performed by: INTERNAL MEDICINE

## 2024-05-17 RX ORDER — MIDODRINE HYDROCHLORIDE 2.5 MG/1
10 TABLET ORAL
Status: DISCONTINUED | OUTPATIENT
Start: 2024-05-17 | End: 2024-05-18

## 2024-05-17 RX ORDER — GABAPENTIN 100 MG/1
100 CAPSULE ORAL NIGHTLY
Status: DISCONTINUED | OUTPATIENT
Start: 2024-05-17 | End: 2024-05-30 | Stop reason: HOSPADM

## 2024-05-17 RX ORDER — DEXTROSE MONOHYDRATE 100 MG/ML
100 INJECTION, SOLUTION INTRAVENOUS CONTINUOUS
Status: DISCONTINUED | OUTPATIENT
Start: 2024-05-17 | End: 2024-05-18

## 2024-05-17 RX ORDER — NOREPINEPHRINE BITARTRATE 0.03 MG/ML
.02-.3 INJECTION, SOLUTION INTRAVENOUS
Status: DISCONTINUED | OUTPATIENT
Start: 2024-05-17 | End: 2024-05-19

## 2024-05-17 RX ADMIN — DEXTROSE MONOHYDRATE 50 ML/HR: 100 INJECTION, SOLUTION INTRAVENOUS at 09:49

## 2024-05-17 RX ADMIN — AMMONIUM LACTATE 1 APPLICATION: 120 CREAM TOPICAL at 21:37

## 2024-05-17 RX ADMIN — CARBIDOPA AND LEVODOPA 10 MG: 50; 200 TABLET, EXTENDED RELEASE ORAL at 08:23

## 2024-05-17 RX ADMIN — LEVOTHYROXINE SODIUM 75 MCG: 0.07 TABLET ORAL at 08:24

## 2024-05-17 RX ADMIN — DEXTROSE MONOHYDRATE 25 G: 25 INJECTION, SOLUTION INTRAVENOUS at 10:43

## 2024-05-17 RX ADMIN — ASPIRIN 81 MG: 81 TABLET, COATED ORAL at 08:24

## 2024-05-17 RX ADMIN — VANCOMYCIN HYDROCHLORIDE 1000 MG: 1 INJECTION, POWDER, FOR SOLUTION INTRAVENOUS at 14:53

## 2024-05-17 RX ADMIN — ONDANSETRON 4 MG: 4 TABLET, ORALLY DISINTEGRATING ORAL at 17:53

## 2024-05-17 RX ADMIN — ACETAMINOPHEN 500 MG: 500 TABLET ORAL at 15:58

## 2024-05-17 RX ADMIN — Medication 10 ML: at 08:24

## 2024-05-17 RX ADMIN — GABAPENTIN 100 MG: 100 CAPSULE ORAL at 21:36

## 2024-05-17 RX ADMIN — SEVELAMER CARBONATE 2400 MG: 800 TABLET, FILM COATED ORAL at 12:20

## 2024-05-17 RX ADMIN — HEPARIN SODIUM 5000 UNITS: 5000 INJECTION INTRAVENOUS; SUBCUTANEOUS at 08:25

## 2024-05-17 RX ADMIN — Medication 10 ML: at 21:37

## 2024-05-17 RX ADMIN — DEXTROSE MONOHYDRATE 25 G: 25 INJECTION, SOLUTION INTRAVENOUS at 06:46

## 2024-05-17 RX ADMIN — DEXTROSE MONOHYDRATE 100 ML/HR: 100 INJECTION, SOLUTION INTRAVENOUS at 17:56

## 2024-05-17 RX ADMIN — ACETAMINOPHEN 500 MG: 500 TABLET ORAL at 04:40

## 2024-05-17 RX ADMIN — SEVELAMER CARBONATE 2400 MG: 800 TABLET, FILM COATED ORAL at 08:24

## 2024-05-17 RX ADMIN — ERYTHROMYCIN 1 APPLICATION: 5 OINTMENT OPHTHALMIC at 08:24

## 2024-05-17 RX ADMIN — FAMOTIDINE 20 MG: 20 TABLET, FILM COATED ORAL at 21:36

## 2024-05-17 RX ADMIN — SERTRALINE 50 MG: 50 TABLET, FILM COATED ORAL at 21:36

## 2024-05-17 RX ADMIN — CETIRIZINE HYDROCHLORIDE 10 MG: 10 TABLET, FILM COATED ORAL at 08:23

## 2024-05-17 RX ADMIN — ERYTHROMYCIN 1 APPLICATION: 5 OINTMENT OPHTHALMIC at 21:36

## 2024-05-17 RX ADMIN — ATORVASTATIN CALCIUM 20 MG: 20 TABLET, FILM COATED ORAL at 08:23

## 2024-05-17 RX ADMIN — NOREPINEPHRINE BITARTRATE 0.02 MCG/KG/MIN: 0.03 INJECTION, SOLUTION INTRAVENOUS at 10:34

## 2024-05-17 NOTE — PROGRESS NOTES
PROGRESS NOTE         Patient Identification:  Name:  Kennedy Prescott  Age:  73 y.o.  Sex:  male  :  1950  MRN:  [id number]  Visit Number:  [id number]  Primary Care Provider:  Jag Guillaume MD         LOS: 1 day       ----------------------------------------------------------------------------------------------------------------------  Subjective       Chief Complaints:    Nausea and Vomiting        Interval History:      The patient was more awake and alert this morning, sitting up comfortably in bed on 2 L nasal cannula.  No apparent distress.  Tmax 101 °F.  No reports of diarrhea.  The patient has been hypotensive this morning and was awaiting transfer to PCU to start pressors prior to dialysis.  Lung exam is diminished to auscultation bilaterally.  Abdomen is soft and rounded with normoactive bowel sounds.  Tunneled HD cath to the right subclavian remained pink at the insertion site with no drainage or surrounding edema.  Left upper extremity aVF with no surrounding erythema or edema, thrill and bruit noted.  Bilateral lower extremities continue with ischemic changes.  Left lower extremity cooler to touch than the right.  The left lateral foot and left heel with dry ulcers, no surrounding erythema or edema and no drainage.  WBC 10.71.  Platelet count 108.  Blood cultures from 5/15 finalized with 2 out of 2 reporting MRSA.  Repeat cultures from this morning pending.  Transthoracic echocardiogram this morning is pending.    Review of Systems:    Constitutional: no fever, chills and night sweats.  Generalized fatigue.  Eyes: no eye drainage, itching or redness.  HEENT: no mouth sores, dysphagia or nose bleed.  Respiratory: no for shortness of breath, cough or production of sputum.  Cardiovascular: no chest pain, no palpitations, no orthopnea.  Gastrointestinal: no nausea, vomiting or diarrhea. No abdominal pain, hematemesis or rectal bleeding.  Genitourinary: no dysuria or  polyuria.  Hematologic/lymphatic: no lymph node abnormalities, no easy bruising or easy bleeding.  Musculoskeletal: no muscle or joint pain.  Skin: No rash and no itching.  Neurological: no loss of consciousness, no seizure, no headache.  Behavioral/Psych: no depression or suicidal ideation.  Endocrine: no hot flashes.  Immunologic: negative.    ----------------------------------------------------------------------------------------------------------------------      Objective       Current [facility] Meds:  acetaminophen, 650 mg, Oral, Once per day on Tuesday Thursday Saturday  ammonium lactate, 1 Application, Topical, Nightly  aspirin, 81 mg, Oral, Daily  atorvastatin, 20 mg, Oral, Daily  cetirizine, 10 mg, Oral, Daily  erythromycin, 1 Application, Left Eye, BID  famotidine, 20 mg, Oral, Q48H  gabapentin, 100 mg, Oral, Nightly  heparin (porcine), 5,000 Units, Subcutaneous, Q12H  [Held by provider] insulin glargine, 5 Units, Subcutaneous, Q12H  insulin lispro, 2-7 Units, Subcutaneous, 4x Daily AC & at Bedtime  levothyroxine, 75 mcg, Oral, Daily  Lidocaine, 1 patch, Transdermal, Q PM  lubrisoft, 1 Application, Topical, Daily  [Held by provider] metoprolol succinate XL, 12.5 mg, Oral, Nightly  midodrine, 10 mg, Oral, BID AC  multivitamin with minerals, 1 tablet, Oral, Daily  sertraline, 50 mg, Oral, Nightly  sevelamer, 2,400 mg, Oral, TID With Meals  sodium chloride, 10 mL, Intravenous, Q12H  Vancomycin Pharmacy Intermittent/Pulse Dosing, , Does not apply, Daily      dextrose, 50 mL/hr, Last Rate: 50 mL/hr (05/17/24 0949)      ----------------------------------------------------------------------------------------------------------------------    Vital Signs:  Temp:  [97.8 °F (36.6 °C)-101 °F (38.3 °C)] 98.6 °F (37 °C)  Heart Rate:  [73-91] 76  Resp:  [12-18] 12  BP: ()/(45-56) 94/51  Mean Arterial Pressure (Non-Invasive) for the past 24 hrs (Last 3 readings):   Noninvasive MAP (mmHg)   05/17/24 0945 65    05/17/24 0930 70   05/17/24 0915 66     SpO2 Percentage    05/16/24 1236 05/17/24 0915 05/17/24 0930   SpO2: 99% 98% 93%     SpO2:  [93 %-99 %] 93 %  on  Flow (L/min):  [2] 2;   Device (Oxygen Therapy): nasal cannula    Body mass index is 32.52 kg/m².  Wt Readings from Last 3 Encounters:   05/17/24 106 kg (233 lb 3.2 oz)   10/02/23 87.1 kg (192 lb)   09/22/23 87.1 kg (192 lb)        Intake/Output Summary (Last 24 hours) at 5/17/2024 1014  Last data filed at 5/16/2024 1718  Gross per 24 hour   Intake 100 ml   Output --   Net 100 ml     Diet: Cardiac, Diabetic, Renal; Healthy Heart (2-3 Na+); Consistent Carbohydrate; Low Sodium (2-3g), Low Potassium, Low Phosphorus; Fluid Consistency: Thin (IDDSI 0)  ----------------------------------------------------------------------------------------------------------------------      Physical Exam:    Constitutional: Chronically ill-appearing elderly gentleman.  More awake and alert this morning.  On 2 L nasal cannula with no apparent distress.  Denies any complaints.  HENT:  Head: Normocephalic and atraumatic.  Mouth:  Moist mucous membranes.    Eyes:  Conjunctivae and EOM are normal.  No scleral icterus.  Neck:  Neck supple.  No JVD present.    Cardiovascular:  Normal rate, regular rhythm and normal heart sounds with no murmur. No edema.  Pulmonary/Chest: Diminished to auscultation bilaterally   abdominal:  Soft.  Bowel sounds are normal.  No distension and no tenderness.   Musculoskeletal:  No edema, no tenderness, and no deformity.  No swelling or redness of joints.  Neurological:  Alert and oriented to person, place, and time.  No facial droop.  No slurred speech.   Skin:  Skin is warm and dry.  No rash noted.  No pallor.  Right subclavian tunneled HD cath, pink around the insertion site.  No drainage.  No edema.  LUE AVF with no erythema/edema, thrill and bruit.  Bilateral lower extremity chronic ischemic changes.  Left lower extremity cool to touch with faint pulses.   Left lateral foot and heel with dry ulcers, no drainage erythema or edema  Psychiatric:  Normal mood and affect.  Behavior is normal.        ----------------------------------------------------------------------------------------------------------------------  Results from last 7 days   Lab Units 05/16/24  0128 05/15/24  1954 05/15/24  1639   HSTROP T ng/L 221* 173* 160*       Results from last 7 days   Lab Units 05/16/24  0128   CHOLESTEROL mg/dL 48   TRIGLYCERIDES mg/dL 99   HDL CHOL mg/dL 22*   LDL CHOL mg/dL 6     Results from last 7 days   Lab Units 05/16/24  0440   PH, ARTERIAL pH units 7.421   PO2 ART mm Hg 97.9   PCO2, ARTERIAL mm Hg 41.9   HCO3 ART mmol/L 27.3*     Results from last 7 days   Lab Units 05/17/24  0218 05/16/24  0128 05/15/24  1904 05/15/24  1639 05/15/24  1415 05/15/24  1128 05/15/24  1128   CRP mg/dL  --  14.13*  --   --  7.54*  --  5.34*   LACTATE mmol/L  --  2.1* 2.8* 2.8* 2.3*   < >  --    WBC 10*3/mm3 10.71 13.50*  --   --  17.01*  --  16.83*   HEMOGLOBIN g/dL 7.8* 8.1*  --   --  9.7*  --  8.9*   HEMATOCRIT % 24.4* 25.8*  --   --  30.4*  --  27.5*   MCV fL 95.7 95.9  --   --  94.4  --  93.9   MCHC g/dL 32.0 31.4*  --   --  31.9  --  32.4   PLATELETS 10*3/mm3 108* 108*  --   --  138*  --  133*   INR   --  1.54*  --   --  1.35*  --   --     < > = values in this interval not displayed.     Results from last 7 days   Lab Units 05/17/24  0218 05/16/24  0128 05/15/24  1415   SODIUM mmol/L 133* 135* 136   POTASSIUM mmol/L 4.4 4.1 4.1   MAGNESIUM mg/dL 1.9 1.8  --    CHLORIDE mmol/L 93* 95* 93*   CO2 mmol/L 22.7 24.7 25.5   BUN mg/dL 63* 50* 41*   CREATININE mg/dL 8.89* 7.86* 7.03*   CALCIUM mg/dL 8.1* 8.3* 9.0   GLUCOSE mg/dL 68 94 96   ALBUMIN g/dL 3.1* 3.1* 3.8   BILIRUBIN mg/dL 0.3 0.3 0.5   ALK PHOS U/L 137* 143* 184*   AST (SGOT) U/L 27 15 16   ALT (SGPT) U/L 10 7 8   Estimated Creatinine Clearance: 8.9 mL/min (A) (by C-G formula based on SCr of 8.89 mg/dL (H)).  No results found for:  "\"AMMONIA\"    Glucose   Date/Time Value Ref Range Status   05/17/2024 0954 78 70 - 130 mg/dL Final   05/17/2024 0948 66 (L) 70 - 130 mg/dL Final   05/17/2024 0725 117 70 - 130 mg/dL Final   05/17/2024 0640 37 (C) 70 - 130 mg/dL Final   05/17/2024 0323 144 (H) 70 - 130 mg/dL Final   05/16/2024 1953 116 70 - 130 mg/dL Final   05/16/2024 1728 153 (H) 70 - 130 mg/dL Final   05/16/2024 1713 38 (C) 70 - 130 mg/dL Final     Lab Results   Component Value Date    HGBA1C 6.40 (H) 03/21/2024     Lab Results   Component Value Date    TSH 2.490 03/21/2024    FREET4 1.43 05/29/2022       Blood Culture   Date Value Ref Range Status   05/15/2024 Staphylococcus aureus, MRSA (C)  Preliminary     Comment:       Infectious disease consultation is highly recommended to rule out distant foci of infection.  Methicillin resistant Staphylococcus aureus, Patient may be an isolation risk.   05/15/2024 Staphylococcus aureus, MRSA (C)  Preliminary     Comment:       Infectious disease consultation is highly recommended to rule out distant foci of infection.  Methicillin resistant Staphylococcus aureus, Patient may be an isolation risk.     No results found for: \"URINECX\"  No results found for: \"WOUNDCX\"  No results found for: \"STOOLCX\"  No results found for: \"RESPCX\"  Pain Management Panel           No data to display                  ----------------------------------------------------------------------------------------------------------------------  Imaging Results (Last 24 Hours)       Procedure Component Value Units Date/Time    US Arterial Doppler Lower Extremity Bilateral [[id number]] Collected: 05/17/24 0836     Updated: 05/17/24 0839    Narrative:      EXAM:    US Duplex Bilateral Lower Extremities Arteries     EXAM DATE:    5/16/2024 7:31 PM     CLINICAL HISTORY:    History of PVD and now the bilateral feet are cold to touch;  R65.10-Systemic inflammatory response syndrome (sirs) of non-infectious  origin without acute organ " dysfunction     TECHNIQUE:    Real-time duplex ultrasound scan of the bilateral lower extremity  arteries integrating B-mode two-dimensional vascular structure, Doppler  spectral analysis and color flow Doppler imaging.     COMPARISON:    No relevant prior studies available.     FINDINGS:    Right common femoral artery:  Peak systolic velocity in the right  common femoral artery is 181 cm/s consistent with a 30-49% stenosis.    Right superficial femoral artery:  Peak systolic velocity in the right  right superficial femoral artery is 160 cm/s consistent with a 30-49%  stenosis.    Right popliteal artery:  Peak systolic velocity in the right popliteal  artery is 72 cm/s.  Monophasic waveform.    Right calf/foot arteries:  Peak systolic velocity in the right  posterior tibial artery is 69 cm/s.  Monophasic waveform.       Left common femoral artery:  No acute findings.  Normal waveform.   Peak systolic velocity in the left common femoral artery is 91 cm/s.    Left superficial femoral artery:  No acute findings.  Normal waveform.   Peak systolic velocity in the left superficial femoral artery is 107  cm/s.    Left popliteal artery:  No acute findings.  Normal waveform.  Peak  systolic velocity in the left popliteal artery is 27 cm/s.    Left calf/foot arteries:  Peak systolic velocity in the left posterior  tibial artery is 27 cm/s.  Monophasic waveform.       Soft tissues:  Unremarkable as visualized.       Impression:      1. No segments of significant stenosis or occlusion.  2. Monophasic waveforms of the left greater than right more distal lower  extremity vasculature indicative of arterial inflow disease..        This report was finalized on 5/17/2024 8:37 AM by Dr. Jose Ramon Hutchison MD.               ----------------------------------------------------------------------------------------------------------------------    Pertinent Infectious Disease Results                Assessment/Plan       Assessment     Sepsis  on admission  MRSA bacteremia  Chronic left foot wounds        Plan      The patient was more awake and alert this morning, sitting up comfortably in bed on 2 L nasal cannula.  No apparent distress.  Tmax 101 °F.  No reports of diarrhea.  The patient has been hypotensive this morning and was awaiting transfer to PCU to start pressors prior to dialysis.  Lung exam is diminished to auscultation bilaterally.  Abdomen is soft and rounded with normoactive bowel sounds.  Tunneled HD cath to the right subclavian remained pink at the insertion site with no drainage or surrounding edema.  Left upper extremity aVF with no surrounding erythema or edema, thrill and bruit noted.  Bilateral lower extremities continue with ischemic changes.  Left lower extremity cooler to touch than the right.  The left lateral foot and left heel with dry ulcers, no surrounding erythema or edema and no drainage.  WBC 10.71.  Platelet count 108.  Blood cultures from 5/15 finalized with 2 out of 2 reporting MRSA.  Repeat cultures from this morning pending.  Transthoracic echocardiogram this morning is pending.    Case discussed with nephrology, the patient will receive a prolonged course of dialysis today and the tunneled HD cath will be removed afterward.  The patient will be given a dialysis holiday over the weekend with plans to resume dialysis through left upper extremity aVF.  Continues with vancomycin pharmacy dosing for MRSA bacteremia.  Awaiting repeat blood cultures.  We will continue to monitor closely and adjust antibiotic coverage as appropriate.      ANTIMICROBIAL THERAPY    Vancomycin Pharmacy Intermittent/Pulse Dosing     Code Status:   Code Status and Medical Interventions:   Ordered at: 05/15/24 4343     Level Of Support Discussed With:    Patient     Code Status (Patient has no pulse and is not breathing):    CPR (Attempt to Resuscitate)     Medical Interventions (Patient has pulse or is breathing):    Full Support       Johanna FOSTER  DORA Marcum  05/17/24  10:14 EDT

## 2024-05-17 NOTE — THERAPY EVALUATION
Acute Care - Occupational Therapy Initial Evaluation   [place]     Patient Name: Kennedy Prescott  : 1950  MRN: [id number]  Today's Date: 2024  Onset of Illness/Injury or Date of Surgery: 05/15/24     Referring Physician: Dr. Crum    Admit Date: 5/15/2024       ICD-10-CM ICD-9-CM   1. SIRS (systemic inflammatory response syndrome)  R65.10 995.90     Patient Active Problem List   Diagnosis    Scalp lesion    Skin ulcer of right heel, limited to breakdown of skin    Hidradenoma    Functional diarrhea    Adenomatous polyp of ascending colon    SIRS (systemic inflammatory response syndrome)    Sepsis     Past Medical History:   Diagnosis Date    CHF (congestive heart failure)     Coronary artery disease     Diabetes mellitus     Dialysis patient     Disease of thyroid gland     Elevated cholesterol     GERD (gastroesophageal reflux disease)     Hypertension     Myocardial infarction     Renal disorder     stage 5     Past Surgical History:   Procedure Laterality Date    CARDIAC SURGERY      cabg    CHOLECYSTECTOMY      COLONOSCOPY N/A 2023    Procedure: COLONOSCOPY;  Surgeon: Trip Peter MD;  Location: [facility];  Service: Gastroenterology;  Laterality: N/A;    DIALYSIS FISTULA CREATION Left     arm         OT ASSESSMENT FLOWSHEET (Last 12 Hours)       OT Evaluation and Treatment       Row Name 24 1743                   OT Time and Intention    Document Type evaluation  -KR        Mode of Treatment occupational therapy  -KR        Patient Effort fair  -KR        Symptoms Noted During/After Treatment nausea  -KR           Living Environment    Current Living Arrangements residential facility  -KR        People in Home facility resident  -KR        Primary Care Provided by --  self/staff  -KR           Cognition    Cognitive Status difficult to obtain accurate data at this time due to pt experiencing nausea  -KR        Affect/Mental Status (Cognition) flat/blunted affect  -KR         Orientation Status (Cognition) oriented to;person  -KR           Range of Motion Comprehensive    Comment, General Range of Motion BUE WFL  -KR           Strength Comprehensive (MMT)    Comment, General Manual Muscle Testing (MMT) Assessment BUE 3-/5  -KR           Activities of Daily Living    BADL Assessment/Intervention --  mod/max assist at this time due to nausea/general malaise  -KR           Wound 05/15/24 2225 Left posterior heel Diabetic Ulcer    Wound - Properties Group Placement Date: 05/15/24  -CL Placement Time: 2225  -CL Side: Left  -CL Orientation: posterior  -CL Location: heel  -CL Primary Wound Type: Diabetic ulc  -CL    Retired Wound - Properties Group Placement Date: 05/15/24  -CL Placement Time: 2225  -CL Side: Left  -CL Orientation: posterior  -CL Location: heel  -CL Primary Wound Type: Diabetic ulc  -CL    Retired Wound - Properties Group Date first assessed: 05/15/24  -CL Time first assessed: 2225  -CL Side: Left  -CL Location: heel  -CL Primary Wound Type: Diabetic ulc  -CL       Wound 05/15/24 2230 Left posterior fifth toe Diabetic Ulcer    Wound - Properties Group Placement Date: 05/15/24  -CL Placement Time: 2230  -CL Side: Left  -CL Orientation: posterior  -CL Location: fifth toe  -CL Primary Wound Type: Diabetic ulc  -CL    Retired Wound - Properties Group Placement Date: 05/15/24  -CL Placement Time: 2230  -CL Side: Left  -CL Orientation: posterior  -CL Location: fifth toe  -CL Primary Wound Type: Diabetic ulc  -CL    Retired Wound - Properties Group Date first assessed: 05/15/24  -CL Time first assessed: 2230  -CL Side: Left  -CL Location: fifth toe  -CL Primary Wound Type: Diabetic ulc  -CL       Wound 05/15/24 2230 Left lower leg Other (comment)    Wound - Properties Group Placement Date: 05/15/24  -CL Placement Time: 2230  -CL Side: Left  -CL Orientation: lower  -CL Location: leg  -CL Primary Wound Type: Other  -CL, scab diabteic ulcer?     Retired Wound - Properties Group Placement  Date: 05/15/24  -CL Placement Time: 2230 -CL Side: Left  -CL Orientation: lower  -CL Location: leg  -CL Primary Wound Type: Other  -CL, scab diabteic ulcer?     Retired Wound - Properties Group Date first assessed: 05/15/24  -CL Time first assessed: 2230 -CL Side: Left  -CL Location: leg  -CL Primary Wound Type: Other  -CL, scab diabteic ulcer?        Plan of Care Review    Plan of Care Reviewed With patient;son;family  -KR           Therapy Assessment/Plan (OT)    Planned Therapy Interventions (OT) activity tolerance training  -KR           Therapy Plan Review/Discharge Plan (OT)    Anticipated Discharge Disposition (OT) skilled nursing facility  -KR           OT Goals    Activity Tolerance Goal Selection (OT) activity tolerance, OT goal 1  -KR            Activity Tolerance Goal 1 (OT)    Activity Tolerance Goal 1 (OT) Increase to enhance self care/mobility performance  -KR        Activity Level (Endurance Goal 1, OT) 15 min activity  -KR        Time Frame (Activity Tolerance Goal 1, OT) by discharge  -KR                  User Key  (r) = Recorded By, (t) = Taken By, (c) = Cosigned By      Initials Name Effective Dates    Jose Ramon Lomas OT 06/16/21 -     CL Lana Carney RN 11/29/22 -                            OT Recommendation and Plan  Planned Therapy Interventions (OT): activity tolerance training  Plan of Care Review  Plan of Care Reviewed With: patient, son, family  Plan of Care Reviewed With: patient, son, family        Time Calculation:     Therapy Charges for Today       Code Description Service Date Service Provider Modifiers Qty    [id number] HC OT EVAL MOD COMPLEXITY 4 5/17/2024 Jose Ramon Potts OT GO 1                 Jose Ramon Potts OT  5/17/2024
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
## 2024-05-17 NOTE — PROCEDURES
Tunneled Diaylsis Catheter Removal    Diagnosis: bacteremia    Area prepped and draped. Tunneled dialysis catheter removed easily. Pressure held for 10 minutes. Sterile dressing applied.     Dressing may be removed tomorrow and bandaid may be replaced afterwards.     Lorrie Amos MD       General Surgeon  [facility]

## 2024-05-17 NOTE — PROGRESS NOTES
Pharmacokinetics Service Note:    Kennedy Prescott is a 73 y.o. male being managed by Pharmacy for vancomycin dosing.    Indication for Therapy: MRSA bacteremia   Current Regimen: Pharmacy intermittent dosing due to renal function.   Current Day of Therapy and Ordered Duration: Day 3 of 10  Level Reported and Timing with Respect to Previous Dose: 16.70 mcg/mL random vancomycin level    Assessment/Plan: Will give a 1000 mg dose post HD session today and check another random the morning of HD session    Harlan Capellan PharmD  05/17/24  11:36 EDT

## 2024-05-17 NOTE — CASE MANAGEMENT/SOCIAL WORK
Discharge Planning Assessment  [facility]     Patient Name: Kennedy Prescott  MRN: [id number]  Today's Date: 5/17/2024    Admit Date: 5/15/2024    Plan: Pt was admitted from [facility] on 5/15/24. SS contacted [facility] on this date who state pt is a longterm resident at their facility. Pt does not have a bed hold at this time. Per [facility], the facility will accept pt back at discharge when medically stable. SS to follow.       Discharge Plan       Row Name 05/17/24 0939       Plan    Plan Pt was admitted from [facility] on 5/15/24. SS contacted [facility] on this date who state pt is a longterm resident at their facility. Pt does not have a bed hold at this time. Per [facility], the facility will accept pt back at discharge when medically stable. SS to follow.                    MONA Jordan

## 2024-05-17 NOTE — PLAN OF CARE
Goal Outcome Evaluation:              Outcome Evaluation: Patient remains in Afib on 2L NC. Patient has been on Levophed intermittently this shift. IVF infusing per orders. Blood glucose checked per orders. Safety maintained, call light in reach.

## 2024-05-18 ENCOUNTER — APPOINTMENT (OUTPATIENT)
Dept: CARDIOLOGY | Facility: HOSPITAL | Age: 74
DRG: 321 | End: 2024-05-18
Payer: MEDICARE

## 2024-05-18 LAB
ALBUMIN SERPL-MCNC: 3.3 G/DL (ref 3.5–5.2)
ALBUMIN/GLOB SERPL: 1.2 G/DL
ALP SERPL-CCNC: 153 U/L (ref 39–117)
ALT SERPL W P-5'-P-CCNC: 12 U/L (ref 1–41)
ANION GAP SERPL CALCULATED.3IONS-SCNC: 14.1 MMOL/L (ref 5–15)
APTT PPP: 37.9 SECONDS (ref 26.5–34.5)
AST SERPL-CCNC: 31 U/L (ref 1–40)
ATMOSPHERIC PRESS: 723 MMHG
BACTERIA SPEC AEROBE CULT: ABNORMAL
BASE EXCESS BLDV CALC-SCNC: 7.4 MMOL/L (ref 0–2)
BASOPHILS # BLD AUTO: 0.04 10*3/MM3 (ref 0–0.2)
BASOPHILS NFR BLD AUTO: 0.3 % (ref 0–1.5)
BDY SITE: ABNORMAL
BH BB BLOOD EXPIRATION DATE: NORMAL
BH BB BLOOD TYPE BARCODE: 2800
BH BB DISPENSE STATUS: NORMAL
BH BB PRODUCT CODE: NORMAL
BH BB UNIT NUMBER: NORMAL
BILIRUB SERPL-MCNC: 0.5 MG/DL (ref 0–1.2)
BUN SERPL-MCNC: 30 MG/DL (ref 8–23)
BUN/CREAT SERPL: 5.4 (ref 7–25)
CALCIUM SPEC-SCNC: 8.2 MG/DL (ref 8.6–10.5)
CHLORIDE SERPL-SCNC: 87 MMOL/L (ref 98–107)
CO2 BLDA-SCNC: 33.6 MMOL/L (ref 22–33)
CO2 SERPL-SCNC: 27.9 MMOL/L (ref 22–29)
COHGB MFR BLD: 2.4 % (ref 0–5)
CREAT SERPL-MCNC: 5.54 MG/DL (ref 0.76–1.27)
CROSSMATCH INTERPRETATION: NORMAL
CRP SERPL-MCNC: 18.08 MG/DL (ref 0–0.5)
D DIMER PPP FEU-MCNC: 4.4 MCGFEU/ML (ref 0–0.75)
D-LACTATE SERPL-SCNC: 1 MMOL/L (ref 0.5–2)
DEPRECATED RDW RBC AUTO: 49.1 FL (ref 37–54)
EGFRCR SERPLBLD CKD-EPI 2021: 10.1 ML/MIN/1.73
EOSINOPHIL # BLD AUTO: 0.2 10*3/MM3 (ref 0–0.4)
EOSINOPHIL NFR BLD AUTO: 1.7 % (ref 0.3–6.2)
ERYTHROCYTE [DISTWIDTH] IN BLOOD BY AUTOMATED COUNT: 14.5 % (ref 12.3–15.4)
FIBRINOGEN PPP-MCNC: 590 MG/DL (ref 173–524)
FOLATE SERPL-MCNC: 14.1 NG/ML (ref 4.78–24.2)
GAS FLOW AIRWAY: 2 LPM
GLOBULIN UR ELPH-MCNC: 2.8 GM/DL
GLUCOSE BLDC GLUCOMTR-MCNC: 128 MG/DL (ref 70–130)
GLUCOSE BLDC GLUCOMTR-MCNC: 160 MG/DL (ref 70–130)
GLUCOSE BLDC GLUCOMTR-MCNC: 181 MG/DL (ref 70–130)
GLUCOSE BLDC GLUCOMTR-MCNC: 207 MG/DL (ref 70–130)
GLUCOSE BLDC GLUCOMTR-MCNC: 208 MG/DL (ref 70–130)
GLUCOSE BLDC GLUCOMTR-MCNC: 216 MG/DL (ref 70–130)
GLUCOSE BLDC GLUCOMTR-MCNC: 219 MG/DL (ref 70–130)
GLUCOSE BLDC GLUCOMTR-MCNC: 230 MG/DL (ref 70–130)
GLUCOSE BLDC GLUCOMTR-MCNC: 257 MG/DL (ref 70–130)
GLUCOSE SERPL-MCNC: 164 MG/DL (ref 65–99)
GRAM STN SPEC: ABNORMAL
GRAM STN SPEC: ABNORMAL
HCO3 BLDV-SCNC: 32.2 MMOL/L (ref 22–28)
HCT VFR BLD AUTO: 27 % (ref 37.5–51)
HGB BLD-MCNC: 8.8 G/DL (ref 13–17.7)
HGB BLDA-MCNC: 9 G/DL (ref 14–18)
IMM GRANULOCYTES # BLD AUTO: 0.06 10*3/MM3 (ref 0–0.05)
IMM GRANULOCYTES NFR BLD AUTO: 0.5 % (ref 0–0.5)
INHALED O2 CONCENTRATION: 28 %
INR PPP: 1.58 (ref 0.9–1.1)
ISOLATED FROM: ABNORMAL
LYMPHOCYTES # BLD AUTO: 0.78 10*3/MM3 (ref 0.7–3.1)
LYMPHOCYTES NFR BLD AUTO: 6.8 % (ref 19.6–45.3)
Lab: ABNORMAL
MAGNESIUM SERPL-MCNC: 1.8 MG/DL (ref 1.6–2.4)
MCH RBC QN AUTO: 30.1 PG (ref 26.6–33)
MCHC RBC AUTO-ENTMCNC: 32.6 G/DL (ref 31.5–35.7)
MCV RBC AUTO: 92.5 FL (ref 79–97)
METHGB BLD QL: 0 % (ref 0–3)
MODALITY: ABNORMAL
MONOCYTES # BLD AUTO: 1.34 10*3/MM3 (ref 0.1–0.9)
MONOCYTES NFR BLD AUTO: 11.6 % (ref 5–12)
NEUTROPHILS NFR BLD AUTO: 79.1 % (ref 42.7–76)
NEUTROPHILS NFR BLD AUTO: 9.11 10*3/MM3 (ref 1.7–7)
NRBC BLD AUTO-RTO: 0 /100 WBC (ref 0–0.2)
OXYHGB MFR BLDV: 87.9 % (ref 45–75)
PCO2 BLDV: 46.2 MM HG (ref 41–51)
PH BLDV: 7.45 PH UNITS (ref 7.32–7.42)
PHOSPHATE SERPL-MCNC: 3 MG/DL (ref 2.5–4.5)
PLATELET # BLD AUTO: 116 10*3/MM3 (ref 140–450)
PMV BLD AUTO: 10.6 FL (ref 6–12)
PO2 BLDV: 50.1 MM HG (ref 27–53)
POTASSIUM SERPL-SCNC: 3.6 MMOL/L (ref 3.5–5.2)
PROT SERPL-MCNC: 6.1 G/DL (ref 6–8.5)
PROTHROMBIN TIME: 18.9 SECONDS (ref 12.1–14.7)
RBC # BLD AUTO: 2.92 10*6/MM3 (ref 4.14–5.8)
SAO2 % BLDCOV: 90.1 % (ref 45–75)
SODIUM SERPL-SCNC: 129 MMOL/L (ref 136–145)
UNIT  ABO: NORMAL
UNIT  RH: NORMAL
VANCOMYCIN SERPL-MCNC: 19.6 MCG/ML (ref 5–40)
VENTILATOR MODE: ABNORMAL
VIT B12 BLD-MCNC: 1345 PG/ML (ref 211–946)
WBC NRBC COR # BLD AUTO: 11.53 10*3/MM3 (ref 3.4–10.8)

## 2024-05-18 PROCEDURE — 82820 HEMOGLOBIN-OXYGEN AFFINITY: CPT

## 2024-05-18 PROCEDURE — 87040 BLOOD CULTURE FOR BACTERIA: CPT | Performed by: INTERNAL MEDICINE

## 2024-05-18 PROCEDURE — 85730 THROMBOPLASTIN TIME PARTIAL: CPT | Performed by: INTERNAL MEDICINE

## 2024-05-18 PROCEDURE — 82607 VITAMIN B-12: CPT | Performed by: INTERNAL MEDICINE

## 2024-05-18 PROCEDURE — 82746 ASSAY OF FOLIC ACID SERUM: CPT | Performed by: INTERNAL MEDICINE

## 2024-05-18 PROCEDURE — 99291 CRITICAL CARE FIRST HOUR: CPT | Performed by: INTERNAL MEDICINE

## 2024-05-18 PROCEDURE — 85384 FIBRINOGEN ACTIVITY: CPT | Performed by: INTERNAL MEDICINE

## 2024-05-18 PROCEDURE — 82948 REAGENT STRIP/BLOOD GLUCOSE: CPT

## 2024-05-18 PROCEDURE — 83605 ASSAY OF LACTIC ACID: CPT | Performed by: INTERNAL MEDICINE

## 2024-05-18 PROCEDURE — 84100 ASSAY OF PHOSPHORUS: CPT | Performed by: INTERNAL MEDICINE

## 2024-05-18 PROCEDURE — 85610 PROTHROMBIN TIME: CPT | Performed by: INTERNAL MEDICINE

## 2024-05-18 PROCEDURE — 99232 SBSQ HOSP IP/OBS MODERATE 35: CPT | Performed by: NURSE PRACTITIONER

## 2024-05-18 PROCEDURE — 86140 C-REACTIVE PROTEIN: CPT | Performed by: INTERNAL MEDICINE

## 2024-05-18 PROCEDURE — 82805 BLOOD GASES W/O2 SATURATION: CPT

## 2024-05-18 PROCEDURE — 83735 ASSAY OF MAGNESIUM: CPT | Performed by: INTERNAL MEDICINE

## 2024-05-18 PROCEDURE — 25010000002 VANCOMYCIN 1 G RECONSTITUTED SOLUTION 1 EACH VIAL

## 2024-05-18 PROCEDURE — 25810000003 SODIUM CHLORIDE 0.9 % SOLUTION 250 ML FLEX CONT

## 2024-05-18 PROCEDURE — 85025 COMPLETE CBC W/AUTO DIFF WBC: CPT | Performed by: INTERNAL MEDICINE

## 2024-05-18 PROCEDURE — 85379 FIBRIN DEGRADATION QUANT: CPT | Performed by: INTERNAL MEDICINE

## 2024-05-18 PROCEDURE — 80202 ASSAY OF VANCOMYCIN: CPT | Performed by: INTERNAL MEDICINE

## 2024-05-18 PROCEDURE — 63710000001 INSULIN LISPRO (HUMAN) PER 5 UNITS: Performed by: INTERNAL MEDICINE

## 2024-05-18 PROCEDURE — 80053 COMPREHEN METABOLIC PANEL: CPT | Performed by: INTERNAL MEDICINE

## 2024-05-18 RX ORDER — MIDODRINE HYDROCHLORIDE 2.5 MG/1
10 TABLET ORAL
Status: DISCONTINUED | OUTPATIENT
Start: 2024-05-19 | End: 2024-05-25

## 2024-05-18 RX ADMIN — SEVELAMER CARBONATE 2400 MG: 800 TABLET, FILM COATED ORAL at 11:41

## 2024-05-18 RX ADMIN — ACETAMINOPHEN 650 MG: 325 TABLET ORAL at 20:32

## 2024-05-18 RX ADMIN — ASPIRIN 81 MG: 81 TABLET, COATED ORAL at 08:13

## 2024-05-18 RX ADMIN — SERTRALINE 50 MG: 50 TABLET, FILM COATED ORAL at 20:32

## 2024-05-18 RX ADMIN — CARBIDOPA AND LEVODOPA 10 MG: 50; 200 TABLET, EXTENDED RELEASE ORAL at 17:14

## 2024-05-18 RX ADMIN — LIDOCAINE 1 PATCH: 4 PATCH TOPICAL at 17:14

## 2024-05-18 RX ADMIN — Medication 1 APPLICATION: at 08:13

## 2024-05-18 RX ADMIN — Medication 10 ML: at 20:33

## 2024-05-18 RX ADMIN — LEVOTHYROXINE SODIUM 75 MCG: 0.07 TABLET ORAL at 08:13

## 2024-05-18 RX ADMIN — Medication 10 ML: at 08:13

## 2024-05-18 RX ADMIN — GABAPENTIN 100 MG: 100 CAPSULE ORAL at 20:32

## 2024-05-18 RX ADMIN — VANCOMYCIN HYDROCHLORIDE 1000 MG: 1 INJECTION, POWDER, FOR SOLUTION INTRAVENOUS at 13:05

## 2024-05-18 RX ADMIN — SEVELAMER CARBONATE 2400 MG: 800 TABLET, FILM COATED ORAL at 08:13

## 2024-05-18 RX ADMIN — CARBIDOPA AND LEVODOPA 10 MG: 50; 200 TABLET, EXTENDED RELEASE ORAL at 06:36

## 2024-05-18 RX ADMIN — AMMONIUM LACTATE 1 APPLICATION: 120 CREAM TOPICAL at 20:33

## 2024-05-18 RX ADMIN — SEVELAMER CARBONATE 2400 MG: 800 TABLET, FILM COATED ORAL at 17:14

## 2024-05-18 RX ADMIN — INSULIN LISPRO 2 UNITS: 100 INJECTION, SOLUTION INTRAVENOUS; SUBCUTANEOUS at 20:42

## 2024-05-18 RX ADMIN — ATORVASTATIN CALCIUM 20 MG: 20 TABLET, FILM COATED ORAL at 08:13

## 2024-05-18 RX ADMIN — ERYTHROMYCIN 1 APPLICATION: 5 OINTMENT OPHTHALMIC at 20:33

## 2024-05-18 RX ADMIN — ERYTHROMYCIN 1 APPLICATION: 5 OINTMENT OPHTHALMIC at 08:13
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
## 2024-05-18 NOTE — PLAN OF CARE
Goal Outcome Evaluation:              Outcome Evaluation: Patient remains on 2L NC and Afib on the telemetry monitor. Levophed infusing see MAR documentation. Patient verbalizes no complaints or concerns at this time. Safety maintained, call light in reach.

## 2024-05-18 NOTE — PROGRESS NOTES
Nephrology Progress Note    Interval History:     Patient Complaints: No complaints.  Feeling better.  Not eating well but denies nausea.  No shortness of breath or chills.  Did have fever to 101.7 last 24 hours.  Is willing to try Ensure        Vital Signs  Temp:  [97.8 °F (36.6 °C)-101.7 °F (38.7 °C)] 97.8 °F (36.6 °C)  Heart Rate:  [] 89  Resp:  [10-25] 25  BP: ()/() 106/48    Physical Exam:    General:           No distress      HEENT: Pallor               Neck: No JVD       Lungs:   Clear   Heart:  Regular,  no rub       Abdomen:   Normal bowel sounds, soft non-tender, non-distended, no guarding       Extremities: No edema.  Left arm brachiocephalic fistula with good thrill       Skin: No petechiae       Neurologic: Cranial nerves grossly intact, moves all extremities.        Results Review:    I reviewed the patient's new clinical results.    Lab Results (last 24 hours)       Procedure Component Value Units Date/Time    Blood Culture - Blood, Arm, Left [[id number]]  (Abnormal)  (Susceptibility) Collected: 05/15/24 1415    Specimen: Blood from Arm, Left Updated: 05/18/24 1246     Blood Culture Staphylococcus aureus, MRSA     Comment:   Infectious disease consultation is highly recommended to rule out distant foci of infection.  Methicillin resistant Staphylococcus aureus, Patient may be an isolation risk.        Isolated from Aerobic and Anaerobic Bottles     Gram Stain Aerobic Bottle Gram positive cocci in clusters      Anaerobic Bottle Gram positive cocci in clusters    Susceptibility        Staphylococcus aureus, MRSA      YOSEPH      Daptomycin Susceptible (C)  [1]       Gentamicin Susceptible      Oxacillin Resistant      Rifampin Susceptible      Vancomycin Susceptible                   [1]  Appended report. These results have been appended to a previously final verified report.                    POC Glucose Once [[id number]]   (Abnormal) Collected: 05/18/24 1140    Specimen: Blood Updated: 05/18/24 1155     Glucose 208 mg/dL     Blood Culture - Blood, Wrist, Right [[id number]] Collected: 05/18/24 0910    Specimen: Blood from Wrist, Right Updated: 05/18/24 1017    Blood Culture - Blood, Hand, Right [[id number]] Collected: 05/18/24 0910    Specimen: Blood from Hand, Right Updated: 05/18/24 1017    Vancomycin, Random [[id number]]  (Normal) Collected: 05/18/24 0911    Specimen: Blood Updated: 05/18/24 1006     Vancomycin Random 19.60 mcg/mL     Narrative:      Therapeutic Ranges for Vancomycin    Vancomycin Random   5.0-40.0 mcg/mL  Vancomycin Trough   5.0-20.0 mcg/mL  Vancomycin Peak     20.0-40.0 mcg/mL    POC Glucose Once [[id number]]  (Normal) Collected: 05/18/24 0817    Specimen: Blood Updated: 05/18/24 0901     Glucose 128 mg/dL     POC Glucose Once [[id number]]  (Abnormal) Collected: 05/18/24 0619    Specimen: Blood Updated: 05/18/24 0901     Glucose 160 mg/dL     POC Glucose Once [[id number]]  (Abnormal) Collected: 05/18/24 0336    Specimen: Blood Updated: 05/18/24 0901     Glucose 219 mg/dL     POC Glucose Once [[id number]]  (Abnormal) Collected: 05/18/24 0034    Specimen: Blood Updated: 05/18/24 0901     Glucose 216 mg/dL     POC Glucose Once [[id number]]  (Abnormal) Collected: 05/17/24 2127    Specimen: Blood Updated: 05/18/24 0901     Glucose 257 mg/dL     Magnesium [[id number]]  (Normal) Collected: 05/18/24 0408    Specimen: Blood Updated: 05/18/24 0435     Magnesium 1.8 mg/dL     Comprehensive Metabolic Panel [[id number]]  (Abnormal) Collected: 05/18/24 0408    Specimen: Blood Updated: 05/18/24 0435     Glucose 164 mg/dL      BUN 30 mg/dL      Creatinine 5.54 mg/dL      Sodium 129 mmol/L      Potassium 3.6 mmol/L      Chloride 87 mmol/L      CO2 27.9 mmol/L      Calcium 8.2 mg/dL      Total Protein 6.1 g/dL      Albumin 3.3 g/dL      ALT (SGPT) 12 U/L      AST (SGOT) 31 U/L      Alkaline Phosphatase 153 U/L      Total Bilirubin 0.5 mg/dL       Globulin 2.8 gm/dL      A/G Ratio 1.2 g/dL      BUN/Creatinine Ratio 5.4     Anion Gap 14.1 mmol/L      eGFR 10.1 mL/min/1.73      Comment: <15 Indicative of kidney failure       Narrative:      GFR Normal >60  Chronic Kidney Disease <60  Kidney Failure <15    The GFR formula is only valid for adults with stable renal function between ages 18 and 70.    Fibrinogen [[id number]]  (Abnormal) Collected: 05/18/24 0408    Specimen: Blood Updated: 05/18/24 0434     Fibrinogen 590 mg/dL     Protime-INR [[id number]]  (Abnormal) Collected: 05/18/24 0408    Specimen: Blood Updated: 05/18/24 0434     Protime 18.9 Seconds      INR 1.58    Narrative:      Suggested INR therapeutic range for stable oral anticoagulant therapy:    Low Intensity therapy:   1.5-2.0  Moderate Intensity therapy:   2.0-3.0  High Intensity therapy:   2.5-4.0    aPTT [[id number]]  (Abnormal) Collected: 05/18/24 0408    Specimen: Blood Updated: 05/18/24 0434     PTT 37.9 seconds     Narrative:      PTT Heparin Therapeutic Range:  45 - 116 seconds      D-dimer, Quantitative [[id number]]  (Abnormal) Collected: 05/18/24 0408    Specimen: Blood Updated: 05/18/24 0434     D-Dimer, Quantitative 4.40 MCGFEU/mL     Narrative:      According to the assay 's published package insert, a normal (<0.50 MCGFEU/mL) D-dimer result in conjunction with a non-high clinical probability assessment, excludes deep vein thrombosis (DVT) and pulmonary embolism (PE) with high sensitivity.    D-dimer values increase with age and this can make VTE exclusion of an older population difficult. To address this, the [employer], based on best available evidence and recent guidelines, recommends that clinicians use age-adjusted D-dimer thresholds in patients greater than 50 years of age with: a) a low probability of PE who do not meet all Pulmonary Embolism Rule Out Criteria, or b) in those with intermediate probability of PE.   The formula for an age-adjusted  "D-dimer cut-off is \"age/100\".  For example, a 60 year old patient would have an age-adjusted cut-off of 0.60 MCGFEU/mL and an 80 year old 0.80 MCGFEU/mL.    Phosphorus [[id number]]  (Normal) Collected: 05/18/24 0408    Specimen: Blood Updated: 05/18/24 0433     Phosphorus 3.0 mg/dL     C-reactive Protein [[id number]]  (Abnormal) Collected: 05/18/24 0408    Specimen: Blood Updated: 05/18/24 0433     C-Reactive Protein 18.08 mg/dL     Lactic Acid, Plasma [[id number]]  (Normal) Collected: 05/18/24 0408    Specimen: Blood Updated: 05/18/24 0430     Lactate 1.0 mmol/L     CBC & Differential [[id number]]  (Abnormal) Collected: 05/18/24 0408    Specimen: Blood Updated: 05/18/24 0416    Narrative:      The following orders were created for panel order CBC & Differential.  Procedure                               Abnormality         Status                     ---------                               -----------         ------                     CBC Auto Differential[[id number]]        Abnormal            Final result               Scan Slide[[id number]]                                                                    Please view results for these tests on the individual orders.    CBC Auto Differential [[id number]]  (Abnormal) Collected: 05/18/24 0408    Specimen: Blood Updated: 05/18/24 0416     WBC 11.53 10*3/mm3      RBC 2.92 10*6/mm3      Hemoglobin 8.8 g/dL      Hematocrit 27.0 %      MCV 92.5 fL      MCH 30.1 pg      MCHC 32.6 g/dL      RDW 14.5 %      RDW-SD 49.1 fl      MPV 10.6 fL      Platelets 116 10*3/mm3      Neutrophil % 79.1 %      Lymphocyte % 6.8 %      Monocyte % 11.6 %      Eosinophil % 1.7 %      Basophil % 0.3 %      Immature Grans % 0.5 %      Neutrophils, Absolute 9.11 10*3/mm3      Lymphocytes, Absolute 0.78 10*3/mm3      Monocytes, Absolute 1.34 10*3/mm3      Eosinophils, Absolute 0.20 10*3/mm3      Basophils, Absolute 0.04 10*3/mm3      Immature Grans, Absolute 0.06 10*3/mm3      nRBC 0.0 /100 WBC     " Blood Gas, Venous With Co-Ox [[id number]]  (Abnormal) Collected: 05/18/24 0410    Specimen: Venous Blood Updated: 05/18/24 0412     Site Lab     pH, Venous 7.452 pH Units      pCO2, Venous 46.2 mm Hg      pO2, Venous 50.1 mm Hg      HCO3, Venous 32.2 mmol/L      Comment: 83 Value above reference range        Base Excess, Venous 7.4 mmol/L      O2 Saturation, Venous 90.1 %      Comment: 83 Value above reference range        Hemoglobin, Blood Gas 9.0 g/dL      Comment: 84 Value below reference range        CO2 Content 33.6 mmol/L      Barometric Pressure for Blood Gas 723 mmHg      Modality Nasal Cannula     FIO2 28 %      Flow Rate 2.0 lpm      Ventilator Mode NA     Collected by [id number]     Comment: Meter: [id number]     :  [id number]        Oxyhemoglobin Venous 87.9 %      Comment: 83 Value above reference range        Carboxyhemoglobin Venous 2.4 %      Methemoglobin Venous 0.0 %     Folate [[id number]] Collected: 05/18/24 0408    Specimen: Blood Updated: 05/18/24 0411    Vitamin B12 [[id number]] Collected: 05/18/24 0408    Specimen: Blood Updated: 05/18/24 0411    Blood Culture - Blood, Arm, Right [[id number]]  (Abnormal) Collected: 05/17/24 0218    Specimen: Blood from Arm, Right Updated: 05/18/24 0055     Blood Culture Abnormal Stain     Gram Stain Aerobic Bottle Gram positive cocci in clusters    Narrative:      Matches previous BCID resulted on 5/16/24 1222    Blood Culture - Blood, Hand, Right [[id number]]  (Abnormal) Collected: 05/17/24 0219    Specimen: Blood from Hand, Right Updated: 05/18/24 0055     Blood Culture Abnormal Stain     Gram Stain Aerobic Bottle Gram positive cocci in clusters    Narrative:      Matches previous BCID resulted on 5/16/24 1222    POC Glucose Once [[id number]]  (Abnormal) Collected: 05/17/24 1724    Specimen: Blood Updated: 05/17/24 1737     Glucose 191 mg/dL     POC Glucose Once [[id number]]  (Normal) Collected: 05/17/24 1458    Specimen: Blood Updated: 05/17/24 1514      Glucose 111 mg/dL     Blood Gas, Arterial With Co-Ox [[id number]]  (Abnormal) Collected: 05/17/24 1416    Specimen: Arterial Blood Updated: 05/17/24 1419     Site Right Radial     René's Test Positive     pH, Arterial 7.523 pH units      Comment: 86 Value above critical limit        pCO2, Arterial 40.4 mm Hg      pO2, Arterial 80.2 mm Hg      HCO3, Arterial 33.2 mmol/L      Comment: 83 Value above reference range        Base Excess, Arterial 9.6 mmol/L      O2 Saturation, Arterial 98.3 %      Hemoglobin, Blood Gas 9.6 g/dL      Comment: 84 Value below reference range        Hematocrit, Blood Gas 29.3 %      Comment: 84 Value below reference range        Oxyhemoglobin 96.6 %      Methemoglobin <-0.10 %      Comment: 94 Value below reportable range < _0.1        Carboxyhemoglobin 2.1 %      A-a DO2 64.0 mmHg      CO2 Content 34.4 mmol/L      Barometric Pressure for Blood Gas 723 mmHg      Modality Nasal Cannula     FIO2 28 %      Flow Rate 2.0 lpm      Ventilator Mode NA     Notified Who dr. peralta     Notified By [id number]     Notified Time 05/17/2024 14:19     Collected by [id number]     Comment: Meter: [id number]     :  [id number]        pH, Temp Corrected --     pCO2, Temperature Corrected --     pO2, Temperature Corrected --            Imaging Results (Last 24 Hours)       ** No results found for the last 24 hours. **            Assessment and Plan:    1.  End-stage renal disease on dialysis  2.  MRSA bacteremia  3.  Anorexia but resolved vomiting  4.  Type 2 diabetes with renal involvement  5.  Hypertension    I have reviewed the notes of DORA Ruffin and Dr. Peralta  I have reviewed the labs.  His dialysis catheter has been removed by general surgery thank you  Will change his diet to a regular diet as he is not eating much and will add Ensure and his potassium is okay and volume status is fine.  Better that he ate something than nothing.  Patient says he wants to go home.  I have told him he  is still bacteremic and he needs to wait.  Plus we will try his access on Monday and make sure that it is working        Monroe Nunez MD  05/18/24  13:02 EDT

## 2024-05-18 NOTE — PLAN OF CARE
Goal Outcome Evaluation:  Plan of Care Reviewed With: patient        Progress: no change  Outcome Evaluation: Patient AOx3 running afib on 2L NC, patient on levo see MAR. IVF turned off per Crum this shift, Blood glucose checked q3hrs, patient has no complaints at this time, bed in lowest position and call light within reach.           Problem: Adjustment to Illness (Sepsis/Septic Shock)  Goal: Optimal Coping  Outcome: Ongoing, Progressing  Intervention: Optimize Psychosocial Adjustment to Illness  Recent Flowsheet Documentation  Taken 5/18/2024 0200 by Иван Mckenna, RN  Family/Support System Care: support provided  Taken 5/17/2024 2000 by Иван Mckenna, RN  Family/Support System Care: support provided     Problem: Bleeding (Sepsis/Septic Shock)  Goal: Absence of Bleeding  Outcome: Ongoing, Progressing     Problem: Glycemic Control Impaired (Sepsis/Septic Shock)  Goal: Blood Glucose Level Within Desired Range  Outcome: Ongoing, Progressing     Problem: Infection Progression (Sepsis/Septic Shock)  Goal: Absence of Infection Signs and Symptoms  Outcome: Ongoing, Progressing  Intervention: Initiate Sepsis Management  Recent Flowsheet Documentation  Taken 5/18/2024 0300 by Иван Mckenna RN  Infection Prevention:   single patient room provided   rest/sleep promoted  Taken 5/18/2024 0100 by Иван Mckenna RN  Infection Prevention:   rest/sleep promoted   single patient room provided  Taken 5/17/2024 2300 by Иван Mckenna RN  Infection Prevention:   single patient room provided   rest/sleep promoted  Taken 5/17/2024 2100 by Иван Mckenna RN  Infection Prevention:   rest/sleep promoted   single patient room provided  Isolation Precautions: precautions maintained  Taken 5/17/2024 1900 by Иван Mckenna RN  Infection Prevention:   single patient room provided   rest/sleep promoted  Isolation Precautions: precautions maintained  Intervention: Promote Recovery  Recent Flowsheet Documentation  Taken  5/18/2024 0200 by Иван Mckenna RN  Sleep/Rest Enhancement:   regular sleep/rest pattern promoted   relaxation techniques promoted  Taken 5/17/2024 2000 by Иван Mckenna RN  Sleep/Rest Enhancement:   relaxation techniques promoted   regular sleep/rest pattern promoted     Problem: Nutrition Impaired (Sepsis/Septic Shock)  Goal: Optimal Nutrition Intake  Outcome: Ongoing, Progressing     Problem: Adult Inpatient Plan of Care  Goal: Plan of Care Review  Outcome: Ongoing, Progressing  Flowsheets (Taken 5/18/2024 0439)  Progress: no change  Plan of Care Reviewed With: patient  Outcome Evaluation: Patient AOx3 running afib on 2L NC, patient on levo see MAR. IVF turned off per Crum this shift, Blood glucose checked q3hrs, patient has no complaints at this time, bed in lowest position and call light within reach.  Goal: Patient-Specific Goal (Individualized)  Outcome: Ongoing, Progressing  Goal: Absence of Hospital-Acquired Illness or Injury  Outcome: Ongoing, Progressing  Intervention: Identify and Manage Fall Risk  Recent Flowsheet Documentation  Taken 5/18/2024 0300 by Иван Mckenna RN  Safety Promotion/Fall Prevention: safety round/check completed  Taken 5/18/2024 0100 by Иван Mckenna RN  Safety Promotion/Fall Prevention: safety round/check completed  Taken 5/17/2024 2300 by Иван Mckenna RN  Safety Promotion/Fall Prevention: safety round/check completed  Taken 5/17/2024 2100 by Иван Mckenna RN  Safety Promotion/Fall Prevention: safety round/check completed  Taken 5/17/2024 1900 by Иван Mckenna RN  Safety Promotion/Fall Prevention: safety round/check completed  Intervention: Prevent Skin Injury  Recent Flowsheet Documentation  Taken 5/18/2024 0200 by Иван Mckenna RN  Skin Protection:   adhesive use limited   incontinence pads utilized   protective footwear used   pulse oximeter probe site changed   tubing/devices free from skin contact  Taken 5/17/2024 2000 by Иван Mckenna  RN  Skin Protection:   adhesive use limited   incontinence pads utilized   pulse oximeter probe site changed   protective footwear used   skin-to-skin areas padded   transparent dressing maintained   tubing/devices free from skin contact  Intervention: Prevent and Manage VTE (Venous Thromboembolism) Risk  Recent Flowsheet Documentation  Taken 5/18/2024 0200 by Иван Mckenna RN  VTE Prevention/Management: (See MAR) other (see comments)  Range of Motion: active ROM (range of motion) encouraged  Taken 5/17/2024 2000 by Иван Mckenna RN  VTE Prevention/Management: (See MAR) other (see comments)  Range of Motion: active ROM (range of motion) encouraged  Intervention: Prevent Infection  Recent Flowsheet Documentation  Taken 5/18/2024 0300 by Иван Mckenna RN  Infection Prevention:   single patient room provided   rest/sleep promoted  Taken 5/18/2024 0100 by Иван Mckenna RN  Infection Prevention:   rest/sleep promoted   single patient room provided  Taken 5/17/2024 2300 by Иван Mckenna RN  Infection Prevention:   single patient room provided   rest/sleep promoted  Taken 5/17/2024 2100 by Иван Mckenna RN  Infection Prevention:   rest/sleep promoted   single patient room provided  Taken 5/17/2024 1900 by Иван Mckenna RN  Infection Prevention:   single patient room provided   rest/sleep promoted  Goal: Optimal Comfort and Wellbeing  Outcome: Ongoing, Progressing  Intervention: Provide Person-Centered Care  Recent Flowsheet Documentation  Taken 5/18/2024 0200 by Иван Mckenna RN  Trust Relationship/Rapport:   care explained   choices provided   thoughts/feelings acknowledged   reassurance provided  Taken 5/17/2024 2000 by Иван Mckenna RN  Trust Relationship/Rapport:   care explained   choices provided   reassurance provided   thoughts/feelings acknowledged  Goal: Readiness for Transition of Care  Outcome: Ongoing, Progressing     Problem: Skin Injury Risk Increased  Goal: Skin Health  and Integrity  Outcome: Ongoing, Progressing  Intervention: Optimize Skin Protection  Recent Flowsheet Documentation  Taken 5/18/2024 0200 by Иван Mckenna RN  Pressure Reduction Techniques:   frequent weight shift encouraged   weight shift assistance provided  Pressure Reduction Devices:   heel offloading device utilized   pressure-redistributing mattress utilized  Skin Protection:   adhesive use limited   incontinence pads utilized   protective footwear used   pulse oximeter probe site changed   tubing/devices free from skin contact  Taken 5/17/2024 2000 by Ивна Mckenna RN  Pressure Reduction Techniques:   weight shift assistance provided   heels elevated off bed   positioned off wounds  Pressure Reduction Devices: pressure-redistributing mattress utilized  Skin Protection:   adhesive use limited   incontinence pads utilized   pulse oximeter probe site changed   protective footwear used   skin-to-skin areas padded   transparent dressing maintained   tubing/devices free from skin contact     Problem: Diabetes Comorbidity  Goal: Blood Glucose Level Within Targeted Range  Outcome: Ongoing, Progressing     Problem: Hypertension Comorbidity  Goal: Blood Pressure in Desired Range  Outcome: Ongoing, Progressing     Problem: Fall Injury Risk  Goal: Absence of Fall and Fall-Related Injury  Outcome: Ongoing, Progressing  Intervention: Promote Injury-Free Environment  Recent Flowsheet Documentation  Taken 5/18/2024 0300 by Иван Mckenna RN  Safety Promotion/Fall Prevention: safety round/check completed  Taken 5/18/2024 0100 by Иван Mckenna RN  Safety Promotion/Fall Prevention: safety round/check completed  Taken 5/17/2024 2300 by Иван Mckenna RN  Safety Promotion/Fall Prevention: safety round/check completed  Taken 5/17/2024 2100 by Иван Mckenna RN  Safety Promotion/Fall Prevention: safety round/check completed  Taken 5/17/2024 1900 by Иван Mckenna RN  Safety Promotion/Fall Prevention: safety  round/check completed

## 2024-05-18 NOTE — PROGRESS NOTES
PROGRESS NOTE         Patient Identification:  Name:  Kennedy Prescott  Age:  73 y.o.  Sex:  male  :  1950  MRN:  [id number]  Visit Number:  [id number]  Primary Care Provider:  Jag Guillaume MD         LOS: 2 days       ----------------------------------------------------------------------------------------------------------------------  Subjective       Chief Complaints:    Nausea and Vomiting        Interval History:      Patient was awake and alert, sitting up eating breakfast.  On 2 L nasal cannula with no apparent distress.  Tmax 101.7 °F.  No reports of diarrhea.  Lung exam is clear to auscultation bilaterally.  Abdomen soft and nontender with normoactive bowel sounds.  The right subclavian tunneled dialysis catheter has been removed and is dressed with an occlusive dressing, clean dry and intact.  CRP elevated at 18.08.  Lactate 1.0.  Leukocytosis of 11.53.  blood cultures from  are positive with 2 out of 2 reporting gram-positive cocci in clusters on Gram stain.  Repeat blood cultures pending.  Transthoracic echocardiogram report is pending.      Review of Systems:    Constitutional: no fever, chills and night sweats.  Generalized fatigue.  Eyes: no eye drainage, itching or redness.  HEENT: no mouth sores, dysphagia or nose bleed.  Respiratory: no for shortness of breath, cough or production of sputum.  Cardiovascular: no chest pain, no palpitations, no orthopnea.  Gastrointestinal: no nausea, vomiting or diarrhea. No abdominal pain, hematemesis or rectal bleeding.  Genitourinary: no dysuria or polyuria.  Hematologic/lymphatic: no lymph node abnormalities, no easy bruising or easy bleeding.  Musculoskeletal: no muscle or joint pain.  Skin: No rash and no itching.  Neurological: no loss of consciousness, no seizure, no headache.  Behavioral/Psych: no depression or suicidal ideation.  Endocrine: no hot flashes.  Immunologic:  negative.    ----------------------------------------------------------------------------------------------------------------------      Objective       [facility] Meds:  [Held by provider] acetaminophen, 650 mg, Oral, Once per day on Tuesday Thursday Saturday  ammonium lactate, 1 Application, Topical, Nightly  aspirin, 81 mg, Oral, Daily  atorvastatin, 20 mg, Oral, Daily  [Held by provider] cetirizine, 10 mg, Oral, Daily  erythromycin, 1 Application, Left Eye, BID  famotidine, 20 mg, Oral, Q48H  gabapentin, 100 mg, Oral, Nightly  [Held by provider] insulin glargine, 5 Units, Subcutaneous, Q12H  [Held by provider] insulin lispro, 2-7 Units, Subcutaneous, 4x Daily AC & at Bedtime  levothyroxine, 75 mcg, Oral, Daily  Lidocaine, 1 patch, Transdermal, Q PM  lubrisoft, 1 Application, Topical, Daily  [Held by provider] metoprolol succinate XL, 12.5 mg, Oral, Nightly  midodrine, 10 mg, Oral, BID AC  [Held by provider] multivitamin with minerals, 1 tablet, Oral, Daily  sertraline, 50 mg, Oral, Nightly  sevelamer, 2,400 mg, Oral, TID With Meals  sodium chloride, 10 mL, Intravenous, Q12H  vancomycin, 10 mg/kg, Intravenous, Once  Vancomycin Pharmacy Intermittent/Pulse Dosing, , Does not apply, Daily      [Held by provider] dextrose, 100 mL/hr, Last Rate: Stopped (05/17/24 2134)  norepinephrine, 0.02-0.3 mcg/kg/min, Last Rate: 0.02 mcg/kg/min (05/18/24 0927)      ----------------------------------------------------------------------------------------------------------------------    Vital Signs:  Temp:  [97.8 °F (36.6 °C)-101.7 °F (38.7 °C)] 97.8 °F (36.6 °C)  Heart Rate:  [] 78  Resp:  [10-23] 19  BP: ()/() 76/27  Mean Arterial Pressure (Non-Invasive) for the past 24 hrs (Last 3 readings):   Noninvasive MAP (mmHg)   05/18/24 1100 43   05/18/24 1000 78   05/18/24 0900 108     SpO2 Percentage    05/18/24 0900 05/18/24 1000 05/18/24 1100   SpO2: 95% 90% 96%     SpO2:  [72 %-100 %] 96 %  on  Flow (L/min):   [2] 2;   Device (Oxygen Therapy): nasal cannula    Body mass index is 31.58 kg/m².  Wt Readings from Last 3 Encounters:   05/18/24 103 kg (226 lb 6.6 oz)   10/02/23 87.1 kg (192 lb)   09/22/23 87.1 kg (192 lb)        Intake/Output Summary (Last 24 hours) at 5/18/2024 1237  Last data filed at 5/18/2024 0600  Gross per 24 hour   Intake 2804.45 ml   Output 2500 ml   Net 304.45 ml     Diet: Cardiac, Diabetic, Renal; Healthy Heart (2-3 Na+); Consistent Carbohydrate; Low Sodium (2-3g), Low Potassium, Low Phosphorus; Fluid Consistency: Thin (IDDSI 0)  ----------------------------------------------------------------------------------------------------------------------      Physical Exam:    Constitutional: Chronically ill-appearing elderly gentleman.  Awake and alert, sitting up comfortably bed eating breakfast.  On 2 L nasal cannula with no apparent distress.  Denies any complaints.  HENT:  Head: Normocephalic and atraumatic.  Mouth:  Moist mucous membranes.    Eyes:  Conjunctivae and EOM are normal.  No scleral icterus.  Neck:  Neck supple.  No JVD present.    Cardiovascular:  Normal rate, regular rhythm and normal heart sounds with 3/6 murmur. No edema.  Pulmonary/Chest: Clear to auscultation bilaterally   abdominal:  Soft.  Bowel sounds are normal.  No distension and no tenderness.   Musculoskeletal:  No edema, no tenderness, and no deformity.  No swelling or redness of joints.  Neurological:  Alert and oriented to person, place, and time.  No facial droop.  No slurred speech.   Skin:  Skin is warm and dry.  No rash noted.  No pallor.  Right subclavian HD cath site dressed with occlusive dressing, no surrounding erythema or edema.  No drainage on the dressing.  LUE AVF with no erythema/edema, thrill and bruit.  Bilateral lower extremity chronic ischemic changes.  Left lower extremity cool to touch with faint pulses.  Left lateral foot and heel with dry ulcers, no drainage erythema or edema  Psychiatric:  Normal mood  and affect.  Behavior is normal.        ----------------------------------------------------------------------------------------------------------------------  Results from last 7 days   Lab Units 05/16/24  0128 05/15/24  1954 05/15/24  1639   HSTROP T ng/L 221* 173* 160*       Results from last 7 days   Lab Units 05/16/24  0128   CHOLESTEROL mg/dL 48   TRIGLYCERIDES mg/dL 99   HDL CHOL mg/dL 22*   LDL CHOL mg/dL 6     Results from last 7 days   Lab Units 05/17/24  1416   PH, ARTERIAL pH units 7.523*   PO2 ART mm Hg 80.2*   PCO2, ARTERIAL mm Hg 40.4   HCO3 ART mmol/L 33.2*     Results from last 7 days   Lab Units 05/18/24  0408 05/17/24  0927 05/17/24  0218 05/16/24  0128 05/15/24  1904 05/15/24  1639 05/15/24  1415   CRP mg/dL 18.08*  --   --  14.13*  --   --  7.54*   LACTATE mmol/L 1.0  --   --  2.1* 2.8*   < > 2.3*   WBC 10*3/mm3 11.53* 9.52 10.71 13.50*  --   --  17.01*   HEMOGLOBIN g/dL 8.8* 7.2* 7.8* 8.1*  --   --  9.7*   HEMATOCRIT % 27.0* 22.6* 24.4* 25.8*  --   --  30.4*   MCV fL 92.5 96.2 95.7 95.9  --   --  94.4   MCHC g/dL 32.6 31.9 32.0 31.4*  --   --  31.9   PLATELETS 10*3/mm3 116* 95* 108* 108*  --   --  138*   INR  1.58* 1.52*  --  1.54*  --   --  1.35*    < > = values in this interval not displayed.     Results from last 7 days   Lab Units 05/18/24  0408 05/17/24  0927 05/17/24  0218 05/16/24  0128   SODIUM mmol/L 129* 133* 133* 135*   POTASSIUM mmol/L 3.6 4.0 4.4 4.1   MAGNESIUM mg/dL 1.8  --  1.9 1.8   CHLORIDE mmol/L 87* 93* 93* 95*   CO2 mmol/L 27.9 23.3 22.7 24.7   BUN mg/dL 30* 65* 63* 50*   CREATININE mg/dL 5.54* 9.44* 8.89* 7.86*   CALCIUM mg/dL 8.2* 8.1* 8.1* 8.3*   GLUCOSE mg/dL 164* 51* 68 94   ALBUMIN g/dL 3.3*  --  3.1* 3.1*   BILIRUBIN mg/dL 0.5  --  0.3 0.3   ALK PHOS U/L 153*  --  137* 143*   AST (SGOT) U/L 31  --  27 15   ALT (SGPT) U/L 12  --  10 7   Estimated Creatinine Clearance: 14.1 mL/min (A) (by C-G formula based on SCr of 5.54 mg/dL (H)).  No results found for:  "\"AMMONIA\"    Glucose   Date/Time Value Ref Range Status   05/18/2024 1140 208 (H) 70 - 130 mg/dL Final   05/18/2024 0817 128 70 - 130 mg/dL Final   05/18/2024 0619 160 (H) 70 - 130 mg/dL Final   05/18/2024 0336 219 (H) 70 - 130 mg/dL Final   05/18/2024 0034 216 (H) 70 - 130 mg/dL Final   05/17/2024 2127 257 (H) 70 - 130 mg/dL Final   05/17/2024 1724 191 (H) 70 - 130 mg/dL Final   05/17/2024 1458 111 70 - 130 mg/dL Final     Lab Results   Component Value Date    HGBA1C 6.40 (H) 03/21/2024     Lab Results   Component Value Date    TSH 2.490 03/21/2024    FREET4 1.43 05/29/2022       Blood Culture   Date Value Ref Range Status   05/15/2024 Staphylococcus aureus, MRSA (C)  Preliminary     Comment:       Infectious disease consultation is highly recommended to rule out distant foci of infection.  Methicillin resistant Staphylococcus aureus, Patient may be an isolation risk.   05/15/2024 Staphylococcus aureus, MRSA (C)  Preliminary     Comment:       Infectious disease consultation is highly recommended to rule out distant foci of infection.  Methicillin resistant Staphylococcus aureus, Patient may be an isolation risk.     No results found for: \"URINECX\"  No results found for: \"WOUNDCX\"  No results found for: \"STOOLCX\"  No results found for: \"RESPCX\"  Pain Management Panel           No data to display                  ----------------------------------------------------------------------------------------------------------------------  Imaging Results (Last 24 Hours)       ** No results found for the last 24 hours. **            ----------------------------------------------------------------------------------------------------------------------    Pertinent Infectious Disease Results                Assessment/Plan       Assessment     Sepsis on admission  MRSA bacteremia  Chronic left foot wounds        Plan      Patient was awake and alert, sitting up eating breakfast.  On 2 L nasal cannula with no apparent distress.  " Tmax 101.7 °F.  No reports of diarrhea.  Lung exam is clear to auscultation bilaterally.  Abdomen soft and nontender with normoactive bowel sounds.  The right subclavian tunneled dialysis catheter has been removed and is dressed with an occlusive dressing, clean dry and intact.  CRP elevated at 18.08.  Lactate 1.0.  Leukocytosis of 11.53.  blood cultures from 5/17 are positive with 2 out of 2 reporting gram-positive cocci in clusters on Gram stain.  Repeat blood cultures pending.  Transthoracic echocardiogram report is pending.    Repeat blood cultures are preliminarily positive.  The patient will continue with vancomycin, pharmacy dosing for MRSA bacteremia.  Blood cultures from today are pending.  If bacteremia persists may need GAVI.  We will continue to monitor closely and adjust antibiotic coverage as appropriate.      ANTIMICROBIAL THERAPY    vancomycin (VANCOCIN) 1000mg in NS 250ml (CD)  Vancomycin Pharmacy Intermittent/Pulse Dosing     Code Status:   Code Status and Medical Interventions:   Ordered at: 05/15/24 7238     Level Of Support Discussed With:    Patient     Code Status (Patient has no pulse and is not breathing):    CPR (Attempt to Resuscitate)     Medical Interventions (Patient has pulse or is breathing):    Full Support       Johanna Marcum, APRN  05/18/24  12:37 EDT

## 2024-05-19 ENCOUNTER — APPOINTMENT (OUTPATIENT)
Dept: CARDIOLOGY | Facility: HOSPITAL | Age: 74
DRG: 321 | End: 2024-05-19
Payer: MEDICARE

## 2024-05-19 LAB
ALBUMIN SERPL-MCNC: 3.1 G/DL (ref 3.5–5.2)
ALBUMIN/GLOB SERPL: 1.1 G/DL
ALP SERPL-CCNC: 163 U/L (ref 39–117)
ALT SERPL W P-5'-P-CCNC: 11 U/L (ref 1–41)
ANION GAP SERPL CALCULATED.3IONS-SCNC: 13.6 MMOL/L (ref 5–15)
AST SERPL-CCNC: 27 U/L (ref 1–40)
BACTERIA SPEC AEROBE CULT: ABNORMAL
BASOPHILS # BLD AUTO: 0.03 10*3/MM3 (ref 0–0.2)
BASOPHILS NFR BLD AUTO: 0.3 % (ref 0–1.5)
BILIRUB SERPL-MCNC: 0.4 MG/DL (ref 0–1.2)
BUN SERPL-MCNC: 45 MG/DL (ref 8–23)
BUN/CREAT SERPL: 6.3 (ref 7–25)
CALCIUM SPEC-SCNC: 8.1 MG/DL (ref 8.6–10.5)
CHLORIDE SERPL-SCNC: 86 MMOL/L (ref 98–107)
CO2 SERPL-SCNC: 27.4 MMOL/L (ref 22–29)
CREAT SERPL-MCNC: 7.18 MG/DL (ref 0.76–1.27)
CRP SERPL-MCNC: 15.62 MG/DL (ref 0–0.5)
D-LACTATE SERPL-SCNC: 1 MMOL/L (ref 0.5–2)
DEPRECATED RDW RBC AUTO: 46.5 FL (ref 37–54)
EGFRCR SERPLBLD CKD-EPI 2021: 7.4 ML/MIN/1.73
EOSINOPHIL # BLD AUTO: 0.12 10*3/MM3 (ref 0–0.4)
EOSINOPHIL NFR BLD AUTO: 1.1 % (ref 0.3–6.2)
ERYTHROCYTE [DISTWIDTH] IN BLOOD BY AUTOMATED COUNT: 14.1 % (ref 12.3–15.4)
FSP PPP-MCNC: 40 UG/ML
GLOBULIN UR ELPH-MCNC: 2.9 GM/DL
GLUCOSE BLDC GLUCOMTR-MCNC: 108 MG/DL (ref 70–130)
GLUCOSE BLDC GLUCOMTR-MCNC: 117 MG/DL (ref 70–130)
GLUCOSE BLDC GLUCOMTR-MCNC: 130 MG/DL (ref 70–130)
GLUCOSE BLDC GLUCOMTR-MCNC: 65 MG/DL (ref 70–130)
GLUCOSE BLDC GLUCOMTR-MCNC: 69 MG/DL (ref 70–130)
GLUCOSE SERPL-MCNC: 96 MG/DL (ref 65–99)
GRAM STN SPEC: ABNORMAL
HCT VFR BLD AUTO: 26.3 % (ref 37.5–51)
HGB BLD-MCNC: 8.7 G/DL (ref 13–17.7)
IMM GRANULOCYTES # BLD AUTO: 0.12 10*3/MM3 (ref 0–0.05)
IMM GRANULOCYTES NFR BLD AUTO: 1.1 % (ref 0–0.5)
INR PPP: 1.31 (ref 0.9–1.1)
ISOLATED FROM: ABNORMAL
LYMPHOCYTES # BLD AUTO: 0.74 10*3/MM3 (ref 0.7–3.1)
LYMPHOCYTES NFR BLD AUTO: 6.7 % (ref 19.6–45.3)
MAGNESIUM SERPL-MCNC: 2 MG/DL (ref 1.6–2.4)
MCH RBC QN AUTO: 30 PG (ref 26.6–33)
MCHC RBC AUTO-ENTMCNC: 33.1 G/DL (ref 31.5–35.7)
MCV RBC AUTO: 90.7 FL (ref 79–97)
MONOCYTES # BLD AUTO: 1.33 10*3/MM3 (ref 0.1–0.9)
MONOCYTES NFR BLD AUTO: 12 % (ref 5–12)
NEUTROPHILS NFR BLD AUTO: 78.8 % (ref 42.7–76)
NEUTROPHILS NFR BLD AUTO: 8.74 10*3/MM3 (ref 1.7–7)
NRBC BLD AUTO-RTO: 0 /100 WBC (ref 0–0.2)
PF4 HEPARIN CMPLX IGG SERPL IA: 0.06 OD (ref 0–0.4)
PHOSPHATE SERPL-MCNC: 3.6 MG/DL (ref 2.5–4.5)
PLATELET # BLD AUTO: 97 10*3/MM3 (ref 140–450)
PMV BLD AUTO: 11 FL (ref 6–12)
POTASSIUM SERPL-SCNC: 3.8 MMOL/L (ref 3.5–5.2)
PROT SERPL-MCNC: 6 G/DL (ref 6–8.5)
PROTHROMBIN TIME: 16.4 SECONDS (ref 12.1–14.7)
RBC # BLD AUTO: 2.9 10*6/MM3 (ref 4.14–5.8)
SODIUM SERPL-SCNC: 127 MMOL/L (ref 136–145)
VANCOMYCIN SERPL-MCNC: 22.8 MCG/ML (ref 5–40)
WBC NRBC COR # BLD AUTO: 11.08 10*3/MM3 (ref 3.4–10.8)

## 2024-05-19 PROCEDURE — 82948 REAGENT STRIP/BLOOD GLUCOSE: CPT

## 2024-05-19 PROCEDURE — 84100 ASSAY OF PHOSPHORUS: CPT | Performed by: INTERNAL MEDICINE

## 2024-05-19 PROCEDURE — 80202 ASSAY OF VANCOMYCIN: CPT

## 2024-05-19 PROCEDURE — 80053 COMPREHEN METABOLIC PANEL: CPT | Performed by: INTERNAL MEDICINE

## 2024-05-19 PROCEDURE — 85025 COMPLETE CBC W/AUTO DIFF WBC: CPT | Performed by: INTERNAL MEDICINE

## 2024-05-19 PROCEDURE — 99233 SBSQ HOSP IP/OBS HIGH 50: CPT | Performed by: INTERNAL MEDICINE

## 2024-05-19 PROCEDURE — 85610 PROTHROMBIN TIME: CPT | Performed by: INTERNAL MEDICINE

## 2024-05-19 PROCEDURE — 86140 C-REACTIVE PROTEIN: CPT | Performed by: INTERNAL MEDICINE

## 2024-05-19 PROCEDURE — 99232 SBSQ HOSP IP/OBS MODERATE 35: CPT | Performed by: NURSE PRACTITIONER

## 2024-05-19 PROCEDURE — 83735 ASSAY OF MAGNESIUM: CPT | Performed by: INTERNAL MEDICINE

## 2024-05-19 PROCEDURE — 83605 ASSAY OF LACTIC ACID: CPT | Performed by: INTERNAL MEDICINE

## 2024-05-19 PROCEDURE — 87040 BLOOD CULTURE FOR BACTERIA: CPT | Performed by: INTERNAL MEDICINE

## 2024-05-19 RX ORDER — MEGESTROL ACETATE 40 MG/1
40 TABLET ORAL
Status: DISCONTINUED | OUTPATIENT
Start: 2024-05-19 | End: 2024-05-30 | Stop reason: HOSPADM

## 2024-05-19 RX ADMIN — CARBIDOPA AND LEVODOPA 10 MG: 50; 200 TABLET, EXTENDED RELEASE ORAL at 06:35

## 2024-05-19 RX ADMIN — MEGESTROL ACETATE 40 MG: 40 TABLET ORAL at 17:25

## 2024-05-19 RX ADMIN — ACETAMINOPHEN 500 MG: 500 TABLET ORAL at 23:52

## 2024-05-19 RX ADMIN — LIDOCAINE 1 PATCH: 4 PATCH TOPICAL at 17:24

## 2024-05-19 RX ADMIN — ERYTHROMYCIN 1 APPLICATION: 5 OINTMENT OPHTHALMIC at 20:54

## 2024-05-19 RX ADMIN — FAMOTIDINE 20 MG: 20 TABLET, FILM COATED ORAL at 23:49

## 2024-05-19 RX ADMIN — Medication 1 APPLICATION: at 08:05

## 2024-05-19 RX ADMIN — Medication 10 ML: at 08:05

## 2024-05-19 RX ADMIN — SERTRALINE 50 MG: 50 TABLET, FILM COATED ORAL at 20:53

## 2024-05-19 RX ADMIN — SEVELAMER CARBONATE 2400 MG: 800 TABLET, FILM COATED ORAL at 11:00

## 2024-05-19 RX ADMIN — GABAPENTIN 100 MG: 100 CAPSULE ORAL at 20:53

## 2024-05-19 RX ADMIN — CARBIDOPA AND LEVODOPA 10 MG: 50; 200 TABLET, EXTENDED RELEASE ORAL at 17:24

## 2024-05-19 RX ADMIN — Medication 1 TABLET: at 08:05

## 2024-05-19 RX ADMIN — AMMONIUM LACTATE 1 APPLICATION: 120 CREAM TOPICAL at 20:53

## 2024-05-19 RX ADMIN — Medication 10 ML: at 20:54

## 2024-05-19 RX ADMIN — ASPIRIN 81 MG: 81 TABLET, COATED ORAL at 08:04

## 2024-05-19 RX ADMIN — CARBIDOPA AND LEVODOPA 10 MG: 50; 200 TABLET, EXTENDED RELEASE ORAL at 10:51

## 2024-05-19 RX ADMIN — SEVELAMER CARBONATE 2400 MG: 800 TABLET, FILM COATED ORAL at 17:24

## 2024-05-19 RX ADMIN — LEVOTHYROXINE SODIUM 75 MCG: 0.07 TABLET ORAL at 08:05

## 2024-05-19 RX ADMIN — CETIRIZINE HYDROCHLORIDE 10 MG: 10 TABLET, FILM COATED ORAL at 08:04

## 2024-05-19 RX ADMIN — ATORVASTATIN CALCIUM 20 MG: 20 TABLET, FILM COATED ORAL at 08:04

## 2024-05-19 RX ADMIN — SEVELAMER CARBONATE 2400 MG: 800 TABLET, FILM COATED ORAL at 08:04

## 2024-05-19 RX ADMIN — ERYTHROMYCIN 1 APPLICATION: 5 OINTMENT OPHTHALMIC at 08:05

## 2024-05-19 NOTE — PLAN OF CARE
Goal Outcome Evaluation:  Plan of Care Reviewed With: patient           Outcome Evaluation: Patient remains on 2L NC and in afib on telemetry. Patient verbalizes no complaints or concerns at this time. Safety maintained, call light in reach.

## 2024-05-19 NOTE — PLAN OF CARE
Problem: Adjustment to Illness (Sepsis/Septic Shock)  Goal: Optimal Coping  Outcome: Ongoing, Progressing  Intervention: Optimize Psychosocial Adjustment to Illness  Recent Flowsheet Documentation  Taken 5/19/2024 0154 by Ramon Conley RN  Family/Support System Care: support provided  Taken 5/18/2024 2000 by Ramon Conley RN  Supportive Measures: active listening utilized  Family/Support System Care:   support provided   self-care encouraged     Problem: Bleeding (Sepsis/Septic Shock)  Goal: Absence of Bleeding  Outcome: Ongoing, Progressing     Problem: Glycemic Control Impaired (Sepsis/Septic Shock)  Goal: Blood Glucose Level Within Desired Range  Outcome: Ongoing, Progressing  Intervention: Optimize Glycemic Control  Recent Flowsheet Documentation  Taken 5/18/2024 2000 by Ramon Conley RN  Glycemic Management: blood glucose monitored     Problem: Infection Progression (Sepsis/Septic Shock)  Goal: Absence of Infection Signs and Symptoms  Outcome: Ongoing, Progressing  Intervention: Initiate Sepsis Management  Recent Flowsheet Documentation  Taken 5/19/2024 0154 by Ramon Conley RN  Infection Prevention:   single patient room provided   rest/sleep promoted   hand hygiene promoted   environmental surveillance performed  Taken 5/19/2024 0100 by Ramon Conley RN  Infection Prevention:   single patient room provided   rest/sleep promoted   hand hygiene promoted   environmental surveillance performed  Taken 5/18/2024 2300 by Ramon Conley RN  Infection Prevention:   single patient room provided   rest/sleep promoted   hand hygiene promoted   environmental surveillance performed  Taken 5/18/2024 2100 by Ramon Conley RN  Infection Prevention:   single patient room provided   rest/sleep promoted   hand hygiene promoted   environmental surveillance performed  Taken 5/18/2024 2000 by Ramon Conley RN  Infection Prevention:   single patient room provided   rest/sleep promoted   hand hygiene promoted   environmental  surveillance performed  Taken 5/18/2024 1900 by Ramon Conley RN  Infection Prevention:   single patient room provided   rest/sleep promoted   hand hygiene promoted   environmental surveillance performed  Intervention: Promote Recovery  Recent Flowsheet Documentation  Taken 5/19/2024 0154 by Ramon Conley RN  Activity Management: bedrest  Taken 5/18/2024 2000 by Ramon Conley RN  Activity Management: bedrest     Problem: Nutrition Impaired (Sepsis/Septic Shock)  Goal: Optimal Nutrition Intake  Outcome: Ongoing, Progressing     Problem: Adult Inpatient Plan of Care  Goal: Plan of Care Review  Outcome: Ongoing, Progressing  Flowsheets (Taken 5/19/2024 0303)  Outcome Evaluation: pt A&O X4 vss at this time bed alarm set call light in reach  Goal: Patient-Specific Goal (Individualized)  Outcome: Ongoing, Progressing  Goal: Absence of Hospital-Acquired Illness or Injury  Outcome: Ongoing, Progressing  Intervention: Identify and Manage Fall Risk  Recent Flowsheet Documentation  Taken 5/19/2024 0154 by Ramon Conley RN  Safety Promotion/Fall Prevention:   safety round/check completed   room organization consistent   nonskid shoes/slippers when out of bed   lighting adjusted   fall prevention program maintained   clutter free environment maintained   assistive device/personal items within reach   activity supervised  Taken 5/19/2024 0100 by Ramon Conley RN  Safety Promotion/Fall Prevention:   safety round/check completed   room organization consistent   nonskid shoes/slippers when out of bed   lighting adjusted   fall prevention program maintained   clutter free environment maintained   assistive device/personal items within reach   activity supervised  Taken 5/18/2024 2300 by Ramon Conley RN  Safety Promotion/Fall Prevention:   safety round/check completed   room organization consistent   nonskid shoes/slippers when out of bed   lighting adjusted   fall prevention program maintained   clutter free environment maintained    assistive device/personal items within reach   activity supervised  Taken 5/18/2024 2100 by Ramon Conley RN  Safety Promotion/Fall Prevention:   safety round/check completed   room organization consistent   nonskid shoes/slippers when out of bed   lighting adjusted   fall prevention program maintained   clutter free environment maintained   assistive device/personal items within reach   activity supervised  Taken 5/18/2024 2000 by Ramon Conley RN  Safety Promotion/Fall Prevention:   safety round/check completed   room organization consistent   nonskid shoes/slippers when out of bed   lighting adjusted   fall prevention program maintained   clutter free environment maintained   assistive device/personal items within reach   activity supervised  Taken 5/18/2024 1900 by Ramon Conley RN  Safety Promotion/Fall Prevention:   safety round/check completed   room organization consistent   nonskid shoes/slippers when out of bed   lighting adjusted   fall prevention program maintained   clutter free environment maintained   assistive device/personal items within reach   activity supervised  Intervention: Prevent Skin Injury  Recent Flowsheet Documentation  Taken 5/18/2024 2000 by Ramon Conley RN  Skin Protection: adhesive use limited  Intervention: Prevent and Manage VTE (Venous Thromboembolism) Risk  Recent Flowsheet Documentation  Taken 5/19/2024 0154 by Ramon Conley, RN  Activity Management: bedrest  Range of Motion: active ROM (range of motion) encouraged  Taken 5/18/2024 2000 by Ramon Conley RN  Activity Management: bedrest  Range of Motion: active ROM (range of motion) encouraged  Intervention: Prevent Infection  Recent Flowsheet Documentation  Taken 5/19/2024 0154 by Ramon Conley RN  Infection Prevention:   single patient room provided   rest/sleep promoted   hand hygiene promoted   environmental surveillance performed  Taken 5/19/2024 0100 by Ramon Conley RN  Infection Prevention:   single patient room  provided   rest/sleep promoted   hand hygiene promoted   environmental surveillance performed  Taken 5/18/2024 2300 by Ramon Conley RN  Infection Prevention:   single patient room provided   rest/sleep promoted   hand hygiene promoted   environmental surveillance performed  Taken 5/18/2024 2100 by Ramon Conley RN  Infection Prevention:   single patient room provided   rest/sleep promoted   hand hygiene promoted   environmental surveillance performed  Taken 5/18/2024 2000 by Ramon Conley RN  Infection Prevention:   single patient room provided   rest/sleep promoted   hand hygiene promoted   environmental surveillance performed  Taken 5/18/2024 1900 by Ramon Conley RN  Infection Prevention:   single patient room provided   rest/sleep promoted   hand hygiene promoted   environmental surveillance performed  Goal: Optimal Comfort and Wellbeing  Outcome: Ongoing, Progressing  Intervention: Provide Person-Centered Care  Recent Flowsheet Documentation  Taken 5/19/2024 0154 by Ramon Conley RN  Trust Relationship/Rapport:   care explained   choices provided   thoughts/feelings acknowledged  Taken 5/18/2024 2000 by Ramon Conley RN  Trust Relationship/Rapport:   care explained   choices provided   thoughts/feelings acknowledged  Goal: Readiness for Transition of Care  Outcome: Ongoing, Progressing     Problem: Skin Injury Risk Increased  Goal: Skin Health and Integrity  Outcome: Ongoing, Progressing  Intervention: Optimize Skin Protection  Recent Flowsheet Documentation  Taken 5/18/2024 2000 by Ramon Conley RN  Skin Protection: adhesive use limited     Problem: Diabetes Comorbidity  Goal: Blood Glucose Level Within Targeted Range  Outcome: Ongoing, Progressing  Intervention: Monitor and Manage Glycemia  Recent Flowsheet Documentation  Taken 5/18/2024 2000 by Ramon Conley RN  Glycemic Management: blood glucose monitored     Problem: Hypertension Comorbidity  Goal: Blood Pressure in Desired Range  Outcome: Ongoing,  Progressing     Problem: Fall Injury Risk  Goal: Absence of Fall and Fall-Related Injury  Outcome: Ongoing, Progressing  Intervention: Promote Injury-Free Environment  Recent Flowsheet Documentation  Taken 5/19/2024 0154 by Ramon Conley, RN  Safety Promotion/Fall Prevention:   safety round/check completed   room organization consistent   nonskid shoes/slippers when out of bed   lighting adjusted   fall prevention program maintained   clutter free environment maintained   assistive device/personal items within reach   activity supervised  Taken 5/19/2024 0100 by Ramon Conley, RN  Safety Promotion/Fall Prevention:   safety round/check completed   room organization consistent   nonskid shoes/slippers when out of bed   lighting adjusted   fall prevention program maintained   clutter free environment maintained   assistive device/personal items within reach   activity supervised  Taken 5/18/2024 2300 by Ramon Conley, RN  Safety Promotion/Fall Prevention:   safety round/check completed   room organization consistent   nonskid shoes/slippers when out of bed   lighting adjusted   fall prevention program maintained   clutter free environment maintained   assistive device/personal items within reach   activity supervised  Taken 5/18/2024 2100 by Ramon Conley, RN  Safety Promotion/Fall Prevention:   safety round/check completed   room organization consistent   nonskid shoes/slippers when out of bed   lighting adjusted   fall prevention program maintained   clutter free environment maintained   assistive device/personal items within reach   activity supervised  Taken 5/18/2024 2000 by Ramon Conley, RN  Safety Promotion/Fall Prevention:   safety round/check completed   room organization consistent   nonskid shoes/slippers when out of bed   lighting adjusted   fall prevention program maintained   clutter free environment maintained   assistive device/personal items within reach   activity supervised  Taken 5/18/2024 1900 by  Ramon Conley, RN  Safety Promotion/Fall Prevention:   safety round/check completed   room organization consistent   nonskid shoes/slippers when out of bed   lighting adjusted   fall prevention program maintained   clutter free environment maintained   assistive device/personal items within reach   activity supervised   Goal Outcome Evaluation:              Outcome Evaluation: pt A&O X4 vss at this time bed alarm set call light in reach

## 2024-05-19 NOTE — PROGRESS NOTES
Nephrology Progress Note    Interval History:     Patient Complaints: No complaints. No SOB. Reviewed notes by Dr Crum        Vital Signs  Temp:  [97 °F (36.1 °C)-99.5 °F (37.5 °C)] 97.5 °F (36.4 °C)  Heart Rate:  [66-80] 70  Resp:  [11-24] 16  BP: ()/(45-68) 117/52    Physical Exam:    General:                   No distress        HEENT: Pallor+                     Neck: No JVD         Lungs:   Clear   Heart:  Regula         Abdomen:   Normal bowel sounds, soft non-tender         Extremities: No edema.  Left arm brachiocephalic fistula with good thrill                          Results Review:    I reviewed the patient's new clinical results.    Lab Results (last 24 hours)       Procedure Component Value Units Date/Time    Heparin Induced PLT Antibody With / Rfx [[id number]] Collected: 05/17/24 0927    Specimen: Blood Updated: 05/19/24 1707     Heparin Induced Plt Ab 0.057 OD     Narrative:      Performed at:  [address] [place] [address]  [id number]  : Danny Michael MD, Phone:  [phone]    POC Glucose Once [[id number]]  (Normal) Collected: 05/19/24 1443    Specimen: Blood Updated: 05/19/24 1500     Glucose 117 mg/dL     Blood Culture - Blood, Arm, Left [[id number]] Collected: 05/19/24 1430    Specimen: Blood from Arm, Left Updated: 05/19/24 1438    Blood Culture - Blood, Arm, Left [[id number]] Collected: 05/19/24 1417    Specimen: Blood from Arm, Left Updated: 05/19/24 1438    POC Glucose Once [[id number]]  (Normal) Collected: 05/19/24 1051    Specimen: Blood Updated: 05/19/24 1107     Glucose 130 mg/dL     Blood Culture - Blood, Wrist, Right [[id number]]  (Normal) Collected: 05/18/24 0910    Specimen: Blood from Wrist, Right Updated: 05/19/24 1030     Blood Culture No growth at 24 hours    Blood Culture - Blood, Hand, Right [[id number]]  (Normal) Collected: 05/18/24 0910    Specimen: Blood from Hand, Right  Updated: 05/19/24 1030     Blood Culture No growth at 24 hours    Blood Culture - Blood, Arm, Right [[id number]]  (Abnormal) Collected: 05/15/24 1415    Specimen: Blood from Arm, Right Updated: 05/19/24 0727     Blood Culture Staphylococcus aureus, MRSA     Comment:   Infectious disease consultation is highly recommended to rule out distant foci of infection.  Methicillin resistant Staphylococcus aureus, Patient may be an isolation risk.        Isolated from Aerobic and Anaerobic Bottles     Gram Stain Aerobic Bottle Gram positive cocci in clusters      Anaerobic Bottle Gram positive cocci in clusters    Narrative:      Refer to previous blood culture collected on 05/15/2024 1415 for MICs.    Blood Culture - Blood, Hand, Right [[id number]]  (Abnormal) Collected: 05/17/24 0219    Specimen: Blood from Hand, Right Updated: 05/19/24 0726     Blood Culture Staphylococcus aureus, MRSA     Comment:   Infectious disease consultation is highly recommended to rule out distant foci of infection.  Methicillin resistant Staphylococcus aureus, Patient may be an isolation risk.        Isolated from Aerobic Bottle     Gram Stain Aerobic Bottle Gram positive cocci in clusters    Narrative:      Refer to previous blood culture collected on 5/15/2024 1415 for MICs      Blood Culture - Blood, Arm, Right [[id number]]  (Abnormal) Collected: 05/17/24 0218    Specimen: Blood from Arm, Right Updated: 05/19/24 0726     Blood Culture Staphylococcus aureus, MRSA     Comment:   Infectious disease consultation is highly recommended to rule out distant foci of infection.  Methicillin resistant Staphylococcus aureus, Patient may be an isolation risk.        Isolated from Aerobic Bottle     Gram Stain Aerobic Bottle Gram positive cocci in clusters    Narrative:      Refer to previous blood culture collected on 5/15/2024 1415 for MICs      POC Glucose Once [[id number]]  (Normal) Collected: 05/19/24 0704    Specimen: Blood Updated: 05/19/24 0711      Glucose 108 mg/dL     POC Glucose Once [[id number]]  (Abnormal) Collected: 05/19/24 0652    Specimen: Blood Updated: 05/19/24 0658     Glucose 69 mg/dL     POC Glucose Once [[id number]]  (Abnormal) Collected: 05/19/24 0637    Specimen: Blood Updated: 05/19/24 0644     Glucose 65 mg/dL     Magnesium [[id number]]  (Normal) Collected: 05/19/24 0102    Specimen: Blood Updated: 05/19/24 0144     Magnesium 2.0 mg/dL     Comprehensive Metabolic Panel [[id number]]  (Abnormal) Collected: 05/19/24 0102    Specimen: Blood Updated: 05/19/24 0144     Glucose 96 mg/dL      BUN 45 mg/dL      Creatinine 7.18 mg/dL      Sodium 127 mmol/L      Potassium 3.8 mmol/L      Chloride 86 mmol/L      CO2 27.4 mmol/L      Calcium 8.1 mg/dL      Total Protein 6.0 g/dL      Albumin 3.1 g/dL      ALT (SGPT) 11 U/L      AST (SGOT) 27 U/L      Alkaline Phosphatase 163 U/L      Total Bilirubin 0.4 mg/dL      Globulin 2.9 gm/dL      A/G Ratio 1.1 g/dL      BUN/Creatinine Ratio 6.3     Anion Gap 13.6 mmol/L      eGFR 7.4 mL/min/1.73      Comment: <15 Indicative of kidney failure       Narrative:      GFR Normal >60  Chronic Kidney Disease <60  Kidney Failure <15    The GFR formula is only valid for adults with stable renal function between ages 18 and 70.    Vancomycin, Random [[id number]]  (Normal) Collected: 05/19/24 0102    Specimen: Blood Updated: 05/19/24 0142     Vancomycin Random 22.80 mcg/mL     Narrative:      Therapeutic Ranges for Vancomycin    Vancomycin Random   5.0-40.0 mcg/mL  Vancomycin Trough   5.0-20.0 mcg/mL  Vancomycin Peak     20.0-40.0 mcg/mL    Phosphorus [[id number]]  (Normal) Collected: 05/19/24 0102    Specimen: Blood Updated: 05/19/24 0142     Phosphorus 3.6 mg/dL     C-reactive Protein [[id number]]  (Abnormal) Collected: 05/19/24 0102    Specimen: Blood Updated: 05/19/24 0142     C-Reactive Protein 15.62 mg/dL     Lactic Acid, Plasma [[id number]]  (Normal) Collected: 05/19/24 0102    Specimen: Blood Updated: 05/19/24 0137      Lactate 1.0 mmol/L     CBC & Differential [[id number]]  (Abnormal) Collected: 05/19/24 0102    Specimen: Blood Updated: 05/19/24 0126    Narrative:      The following orders were created for panel order CBC & Differential.  Procedure                               Abnormality         Status                     ---------                               -----------         ------                     CBC Auto Differential[[id number]]        Abnormal            Final result                 Please view results for these tests on the individual orders.    CBC Auto Differential [[id number]]  (Abnormal) Collected: 05/19/24 0102    Specimen: Blood Updated: 05/19/24 0126     WBC 11.08 10*3/mm3      RBC 2.90 10*6/mm3      Hemoglobin 8.7 g/dL      Hematocrit 26.3 %      MCV 90.7 fL      MCH 30.0 pg      MCHC 33.1 g/dL      RDW 14.1 %      RDW-SD 46.5 fl      MPV 11.0 fL      Platelets 97 10*3/mm3      Neutrophil % 78.8 %      Lymphocyte % 6.7 %      Monocyte % 12.0 %      Eosinophil % 1.1 %      Basophil % 0.3 %      Immature Grans % 1.1 %      Neutrophils, Absolute 8.74 10*3/mm3      Lymphocytes, Absolute 0.74 10*3/mm3      Monocytes, Absolute 1.33 10*3/mm3      Eosinophils, Absolute 0.12 10*3/mm3      Basophils, Absolute 0.03 10*3/mm3      Immature Grans, Absolute 0.12 10*3/mm3      nRBC 0.0 /100 WBC     Protime-INR [[id number]]  (Abnormal) Collected: 05/19/24 0102    Specimen: Blood Updated: 05/19/24 0122     Protime 16.4 Seconds      INR 1.31    Narrative:      Suggested INR therapeutic range for stable oral anticoagulant therapy:    Low Intensity therapy:   1.5-2.0  Moderate Intensity therapy:   2.0-3.0  High Intensity therapy:   2.5-4.0    POC Glucose Once [[id number]]  (Abnormal) Collected: 05/18/24 2036    Specimen: Blood Updated: 05/18/24 2047     Glucose 181 mg/dL     POC Glucose Once [[id number]]  (Abnormal) Collected: 05/18/24 1717    Specimen: Blood Updated: 05/18/24 1733     Glucose 207 mg/dL             Imaging Results  (Last 24 Hours)       ** No results found for the last 24 hours. **            Assessment and Plan:      1.  End-stage renal disease on dialysis  2.  MRSA bacteremia  3.  Anorexia but resolved vomiting  4.  Type 2 diabetes with renal involvement  5.  Hypertension    Hd in AM    Monroe Nunez MD  05/19/24  17:21 EDT
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
## 2024-05-20 ENCOUNTER — APPOINTMENT (OUTPATIENT)
Dept: CARDIOLOGY | Facility: HOSPITAL | Age: 74
DRG: 321 | End: 2024-05-20
Payer: MEDICARE

## 2024-05-20 LAB
ALBUMIN SERPL-MCNC: 3.1 G/DL (ref 3.5–5.2)
ALBUMIN/GLOB SERPL: 1.2 G/DL
ALP SERPL-CCNC: 197 U/L (ref 39–117)
ALT SERPL W P-5'-P-CCNC: 11 U/L (ref 1–41)
ANION GAP SERPL CALCULATED.3IONS-SCNC: 16.1 MMOL/L (ref 5–15)
AST SERPL-CCNC: 23 U/L (ref 1–40)
BASOPHILS # BLD AUTO: 0.05 10*3/MM3 (ref 0–0.2)
BASOPHILS NFR BLD AUTO: 0.4 % (ref 0–1.5)
BH CV ECHO MEAS - AO MAX PG: 5.7 MMHG
BH CV ECHO MEAS - AO MEAN PG: 3 MMHG
BH CV ECHO MEAS - AO ROOT DIAM: 3.2 CM
BH CV ECHO MEAS - AO V2 MAX: 119 CM/SEC
BH CV ECHO MEAS - AO V2 VTI: 26.3 CM
BH CV ECHO MEAS - EDV(CUBED): 169.1 ML
BH CV ECHO MEAS - EDV(MOD-SP4): 64.7 ML
BH CV ECHO MEAS - EF(MOD-SP4): 55 %
BH CV ECHO MEAS - ESV(CUBED): 114.8 ML
BH CV ECHO MEAS - ESV(MOD-SP4): 29.1 ML
BH CV ECHO MEAS - FS: 12.1 %
BH CV ECHO MEAS - IVS/LVPW: 1.01 CM
BH CV ECHO MEAS - IVSD: 1.55 CM
BH CV ECHO MEAS - LA DIMENSION: 3.6 CM
BH CV ECHO MEAS - LAT PEAK E' VEL: 11.4 CM/SEC
BH CV ECHO MEAS - LV DIASTOLIC VOL/BSA (35-75): 28.7 CM2
BH CV ECHO MEAS - LV MASS(C)D: 392.8 GRAMS
BH CV ECHO MEAS - LV SYSTOLIC VOL/BSA (12-30): 12.9 CM2
BH CV ECHO MEAS - LVIDD: 5.5 CM
BH CV ECHO MEAS - LVIDS: 4.9 CM
BH CV ECHO MEAS - LVOT AREA: 4.2 CM2
BH CV ECHO MEAS - LVOT DIAM: 2.3 CM
BH CV ECHO MEAS - LVPWD: 1.54 CM
BH CV ECHO MEAS - MED PEAK E' VEL: 5.2 CM/SEC
BH CV ECHO MEAS - MV A MAX VEL: 34.9 CM/SEC
BH CV ECHO MEAS - MV E MAX VEL: 143 CM/SEC
BH CV ECHO MEAS - MV E/A: 4.1
BH CV ECHO MEAS - PA ACC TIME: 0.09 SEC
BH CV ECHO MEAS - RAP SYSTOLE: 10 MMHG
BH CV ECHO MEAS - RVSP: 33.6 MMHG
BH CV ECHO MEAS - SV(MOD-SP4): 35.6 ML
BH CV ECHO MEAS - SVI(MOD-SP4): 15.8 ML/M2
BH CV ECHO MEAS - TR MAX PG: 23.6 MMHG
BH CV ECHO MEAS - TR MAX VEL: 243 CM/SEC
BH CV ECHO MEASUREMENTS AVERAGE E/E' RATIO: 17.23
BILIRUB SERPL-MCNC: 0.4 MG/DL (ref 0–1.2)
BUN SERPL-MCNC: 56 MG/DL (ref 8–23)
BUN/CREAT SERPL: 6.8 (ref 7–25)
CALCIUM SPEC-SCNC: 7.9 MG/DL (ref 8.6–10.5)
CHLORIDE SERPL-SCNC: 87 MMOL/L (ref 98–107)
CO2 SERPL-SCNC: 25.9 MMOL/L (ref 22–29)
CREAT SERPL-MCNC: 8.29 MG/DL (ref 0.76–1.27)
DEPRECATED RDW RBC AUTO: 47.8 FL (ref 37–54)
EGFRCR SERPLBLD CKD-EPI 2021: 6.2 ML/MIN/1.73
EOSINOPHIL # BLD AUTO: 0.18 10*3/MM3 (ref 0–0.4)
EOSINOPHIL NFR BLD AUTO: 1.4 % (ref 0.3–6.2)
ERYTHROCYTE [DISTWIDTH] IN BLOOD BY AUTOMATED COUNT: 14.3 % (ref 12.3–15.4)
GLOBULIN UR ELPH-MCNC: 2.6 GM/DL
GLUCOSE BLDC GLUCOMTR-MCNC: 105 MG/DL (ref 70–130)
GLUCOSE BLDC GLUCOMTR-MCNC: 110 MG/DL (ref 70–130)
GLUCOSE BLDC GLUCOMTR-MCNC: 154 MG/DL (ref 70–130)
GLUCOSE BLDC GLUCOMTR-MCNC: 169 MG/DL (ref 70–130)
GLUCOSE BLDC GLUCOMTR-MCNC: 211 MG/DL (ref 70–130)
GLUCOSE BLDC GLUCOMTR-MCNC: 85 MG/DL (ref 70–130)
GLUCOSE BLDC GLUCOMTR-MCNC: 90 MG/DL (ref 70–130)
GLUCOSE SERPL-MCNC: 128 MG/DL (ref 65–99)
HCT VFR BLD AUTO: 25.8 % (ref 37.5–51)
HGB BLD-MCNC: 8.5 G/DL (ref 13–17.7)
IMM GRANULOCYTES # BLD AUTO: 0.14 10*3/MM3 (ref 0–0.05)
IMM GRANULOCYTES NFR BLD AUTO: 1.1 % (ref 0–0.5)
LEFT ATRIUM VOLUME INDEX: 33.6 ML/M2
LYMPHOCYTES # BLD AUTO: 0.99 10*3/MM3 (ref 0.7–3.1)
LYMPHOCYTES NFR BLD AUTO: 7.6 % (ref 19.6–45.3)
MAGNESIUM SERPL-MCNC: 2.1 MG/DL (ref 1.6–2.4)
MCH RBC QN AUTO: 30.1 PG (ref 26.6–33)
MCHC RBC AUTO-ENTMCNC: 32.9 G/DL (ref 31.5–35.7)
MCV RBC AUTO: 91.5 FL (ref 79–97)
MONOCYTES # BLD AUTO: 1.41 10*3/MM3 (ref 0.1–0.9)
MONOCYTES NFR BLD AUTO: 10.8 % (ref 5–12)
NEUTROPHILS NFR BLD AUTO: 10.23 10*3/MM3 (ref 1.7–7)
NEUTROPHILS NFR BLD AUTO: 78.7 % (ref 42.7–76)
NRBC BLD AUTO-RTO: 0 /100 WBC (ref 0–0.2)
PHOSPHATE SERPL-MCNC: 4.5 MG/DL (ref 2.5–4.5)
PLATELET # BLD AUTO: 116 10*3/MM3 (ref 140–450)
PMV BLD AUTO: 11 FL (ref 6–12)
POTASSIUM SERPL-SCNC: 3.9 MMOL/L (ref 3.5–5.2)
PROT SERPL-MCNC: 5.7 G/DL (ref 6–8.5)
RBC # BLD AUTO: 2.82 10*6/MM3 (ref 4.14–5.8)
REF LAB TEST METHOD: NORMAL
SODIUM SERPL-SCNC: 129 MMOL/L (ref 136–145)
WBC NRBC COR # BLD AUTO: 13 10*3/MM3 (ref 3.4–10.8)

## 2024-05-20 PROCEDURE — 93306 TTE W/DOPPLER COMPLETE: CPT

## 2024-05-20 PROCEDURE — 83735 ASSAY OF MAGNESIUM: CPT | Performed by: INTERNAL MEDICINE

## 2024-05-20 PROCEDURE — 85025 COMPLETE CBC W/AUTO DIFF WBC: CPT | Performed by: INTERNAL MEDICINE

## 2024-05-20 PROCEDURE — 99232 SBSQ HOSP IP/OBS MODERATE 35: CPT | Performed by: NURSE PRACTITIONER

## 2024-05-20 PROCEDURE — 84100 ASSAY OF PHOSPHORUS: CPT | Performed by: INTERNAL MEDICINE

## 2024-05-20 PROCEDURE — 93306 TTE W/DOPPLER COMPLETE: CPT | Performed by: INTERNAL MEDICINE

## 2024-05-20 PROCEDURE — 99232 SBSQ HOSP IP/OBS MODERATE 35: CPT | Performed by: INTERNAL MEDICINE

## 2024-05-20 PROCEDURE — 82948 REAGENT STRIP/BLOOD GLUCOSE: CPT

## 2024-05-20 PROCEDURE — 25010000002 HEPARIN (PORCINE) PER 1000 UNITS: Performed by: INTERNAL MEDICINE

## 2024-05-20 PROCEDURE — 80053 COMPREHEN METABOLIC PANEL: CPT | Performed by: INTERNAL MEDICINE

## 2024-05-20 RX ORDER — HEPARIN SODIUM 5000 [USP'U]/ML
5000 INJECTION, SOLUTION INTRAVENOUS; SUBCUTANEOUS EVERY 8 HOURS SCHEDULED
Status: DISCONTINUED | OUTPATIENT
Start: 2024-05-20 | End: 2024-05-30

## 2024-05-20 RX ADMIN — ERYTHROMYCIN 1 APPLICATION: 5 OINTMENT OPHTHALMIC at 21:23

## 2024-05-20 RX ADMIN — Medication 1 APPLICATION: at 08:56

## 2024-05-20 RX ADMIN — SEVELAMER CARBONATE 2400 MG: 800 TABLET, FILM COATED ORAL at 18:08

## 2024-05-20 RX ADMIN — SEVELAMER CARBONATE 2400 MG: 800 TABLET, FILM COATED ORAL at 11:22

## 2024-05-20 RX ADMIN — Medication 10 ML: at 21:24

## 2024-05-20 RX ADMIN — ATORVASTATIN CALCIUM 20 MG: 20 TABLET, FILM COATED ORAL at 08:55

## 2024-05-20 RX ADMIN — MEGESTROL ACETATE 40 MG: 40 TABLET ORAL at 18:08

## 2024-05-20 RX ADMIN — SERTRALINE 50 MG: 50 TABLET, FILM COATED ORAL at 21:24

## 2024-05-20 RX ADMIN — CARBIDOPA AND LEVODOPA 10 MG: 50; 200 TABLET, EXTENDED RELEASE ORAL at 11:22

## 2024-05-20 RX ADMIN — ASPIRIN 81 MG: 81 TABLET, COATED ORAL at 08:55

## 2024-05-20 RX ADMIN — ERYTHROMYCIN 1 APPLICATION: 5 OINTMENT OPHTHALMIC at 08:56

## 2024-05-20 RX ADMIN — Medication 1 TABLET: at 08:55

## 2024-05-20 RX ADMIN — SEVELAMER CARBONATE 2400 MG: 800 TABLET, FILM COATED ORAL at 08:56

## 2024-05-20 RX ADMIN — GABAPENTIN 100 MG: 100 CAPSULE ORAL at 21:28

## 2024-05-20 RX ADMIN — CARBIDOPA AND LEVODOPA 10 MG: 50; 200 TABLET, EXTENDED RELEASE ORAL at 17:20

## 2024-05-20 RX ADMIN — CETIRIZINE HYDROCHLORIDE 10 MG: 10 TABLET, FILM COATED ORAL at 08:55

## 2024-05-20 RX ADMIN — Medication 10 ML: at 08:56

## 2024-05-20 RX ADMIN — CARBIDOPA AND LEVODOPA 10 MG: 50; 200 TABLET, EXTENDED RELEASE ORAL at 06:43

## 2024-05-20 RX ADMIN — MEGESTROL ACETATE 40 MG: 40 TABLET ORAL at 11:22

## 2024-05-20 RX ADMIN — HEPARIN SODIUM 5000 UNITS: 5000 INJECTION INTRAVENOUS; SUBCUTANEOUS at 21:28

## 2024-05-20 RX ADMIN — LEVOTHYROXINE SODIUM 75 MCG: 0.07 TABLET ORAL at 08:56

## 2024-05-20 RX ADMIN — MEGESTROL ACETATE 40 MG: 40 TABLET ORAL at 08:55

## 2024-05-20 NOTE — PROGRESS NOTES
[facility] HOSPITALIST PROGRESS NOTE    Subjective     History:   Kennedy Prescott is a 73 y.o. male admitted on 5/15/2024 secondary to SIRS (systemic inflammatory response syndrome)     Procedures:   5/17/24: Removal of tunneled HD catheter     CC: Follow up sepsis, bacteremia     Patient seen and examined. Awake and alert. No reported CP, dyspnea or palpitations. No reported vomiting. States he feels like he needs to have a BM. No acute events overnight per RN.     History taken from: patient, chart, and RN.      Objective     Vital Signs  Temp:  [97.5 °F (36.4 °C)-99.5 °F (37.5 °C)] 98 °F (36.7 °C)  Heart Rate:  [61-78] 75  Resp:  [8-24] 16  BP: ()/(41-74) 121/62    Intake/Output Summary (Last 24 hours) at 5/20/2024 1343  Last data filed at 5/20/2024 1143  Gross per 24 hour   Intake 840 ml   Output --   Net 840 ml         Physical Exam:  General:    Awake, alert, in no acute distress, chronically ill appearing   Heart:      Normal S1 and S2. Irregular.    Lungs:     Respirations regular, even and unlabored. Lungs clear to auscultation B/L. No wheezes, rales or rhonchi.   Abdomen:   Soft and nontender. No guarding, rebound tenderness or  organomegaly noted. Bowel sounds present x 4.   Extremities:  No clubbing, cyanosis or edema noted.      Results Review:    Results from last 7 days   Lab Units 05/20/24  0109 05/19/24 0102 05/18/24  0408 05/17/24  0927 05/17/24  0218 05/16/24  0128 05/15/24  1415   WBC 10*3/mm3 13.00* 11.08* 11.53* 9.52 10.71 13.50* 17.01*   HEMOGLOBIN g/dL 8.5* 8.7* 8.8* 7.2* 7.8* 8.1* 9.7*   PLATELETS 10*3/mm3 116* 97* 116* 95* 108* 108* 138*     Results from last 7 days   Lab Units 05/20/24  0109 05/19/24  0102 05/18/24  0408 05/17/24  0927 05/17/24  0218 05/16/24  0128 05/15/24  1415   SODIUM mmol/L 129* 127* 129* 133* 133* 135* 136   POTASSIUM mmol/L 3.9 3.8 3.6 4.0 4.4 4.1 4.1   CHLORIDE mmol/L 87* 86* 87* 93* 93* 95* 93*   CO2 mmol/L 25.9 27.4 27.9 23.3 22.7 24.7  25.5   BUN mg/dL 56* 45* 30* 65* 63* 50* 41*   CREATININE mg/dL 8.29* 7.18* 5.54* 9.44* 8.89* 7.86* 7.03*   CALCIUM mg/dL 7.9* 8.1* 8.2* 8.1* 8.1* 8.3* 9.0   GLUCOSE mg/dL 128* 96 164* 51* 68 94 96     Results from last 7 days   Lab Units 05/20/24  0109 05/19/24  0102 05/18/24  0408 05/17/24  0218 05/16/24  0128 05/15/24  1415 05/15/24  1128   BILIRUBIN mg/dL 0.4 0.4 0.5 0.3 0.3 0.5 0.4   ALK PHOS U/L 197* 163* 153* 137* 143* 184* 176*   AST (SGOT) U/L 23 27 31 27 15 16 16   ALT (SGPT) U/L 11 11 12 10 7 8 9     Results from last 7 days   Lab Units 05/20/24  0109 05/19/24  0102 05/18/24  0408 05/17/24  0218 05/16/24  0128   MAGNESIUM mg/dL 2.1 2.0 1.8 1.9 1.8     Results from last 7 days   Lab Units 05/19/24  0102 05/18/24  0408 05/17/24  0927 05/16/24  0128 05/15/24  1415   INR  1.31* 1.58* 1.52* 1.54* 1.35*     Results from last 7 days   Lab Units 05/16/24  0128 05/15/24  1954 05/15/24  1639   HSTROP T ng/L 221* 173* 160*       Imaging Results (Last 24 Hours)       ** No results found for the last 24 hours. **              Medications:  acetaminophen, 650 mg, Oral, Once per day on Tuesday Thursday Saturday  ammonium lactate, 1 Application, Topical, Nightly  aspirin, 81 mg, Oral, Daily  atorvastatin, 20 mg, Oral, Daily  cetirizine, 10 mg, Oral, Daily  erythromycin, 1 Application, Left Eye, BID  famotidine, 20 mg, Oral, Q48H  gabapentin, 100 mg, Oral, Nightly  [Held by provider] insulin lispro, 2-7 Units, Subcutaneous, 4x Daily AC & at Bedtime  levothyroxine, 75 mcg, Oral, Daily  Lidocaine, 1 patch, Transdermal, Q PM  lubrisoft, 1 Application, Topical, Daily  megestrol, 40 mg, Oral, TID With Meals  [Held by provider] metoprolol succinate XL, 12.5 mg, Oral, Nightly  midodrine, 10 mg, Oral, TID AC  multivitamin with minerals, 1 tablet, Oral, Daily  sertraline, 50 mg, Oral, Nightly  sevelamer, 2,400 mg, Oral, TID With Meals  sodium chloride, 10 mL, Intravenous, Q12H  Vancomycin Pharmacy Intermittent/Pulse Dosing, , Does  not apply, Daily               Assessment & Plan   Septic shock: Likely 2/2 MRSA bacteremia with suspected infected HD catheter. Afebrile. Previously required Levophed but BP now stable off vasopressor support. WBC has trended up slightly today but overall improved from upon admission. Lactate has normalized. Cultures as below. Cont Vanc. Cont midodrine. Follow cultures and repeat labs in the AM.     MRSA bacteremia: Likely 2/2 infected HD catheter which has been removed. TTE pending. Blood cultures from 5/15 and 5/17 positive. Cultures from 5/18 with NGTD. Cultures from 5/19 pending. Cont Vanc. Cont to follow cultures. ID input appreciated.     Acute diastolic CHF: Echo pending. Improved with HD. Cont to monitor volume status.     Acute hypoxic respiratory failure: Likely 2/2 above. No evidence of PE or pneumonia on CT. Currently stable on 2L's NC.     NSTEMI: Suspected type II in the setting of above. Echo pending. Cont ASA and statin.     ESRD on HD: Tunneled HD catheter removed. Plan for HD via LUE AV fistula. Management per nephrology with input appreciated.     Paroxysmal Afib: Currently rate controlled. Holding metoprolol in the setting of hypotension. Monitor on telemetry.     DM II, insulin dependent: Episodes of hypoglycemia improved. Holding insulin  Cont to monitor.     PVD: Cont ASA and statin.     DVT PPX: Add SQ heparin    Disposition Likely return to SNF when tolerating HD and antibiotic regimen finalized.     Donnell Hines DO  05/20/24  13:43 EDT

## 2024-05-20 NOTE — CASE MANAGEMENT/SOCIAL WORK
Discharge Planning Assessment   [facility]     Patient Name: Kennedy Prescott  MRN: [id number]  Today's Date: 5/20/2024    Admit Date: 5/15/2024     Discharge Plan       Row Name 05/20/24 1059       Plan    Plan Pt admitted on 5/15/24. Pt is from [facility]. Per Irma who states pt is a longterm resident at their facility. Pt does not have a bed hold at this time. Per Irma, the facility will accept pt back at discharge when medically stable. SS placed updated clinical in basket and notified Jude SOFIA to follow.                  Continued Care and Services - Admitted Since 5/15/2024       Destination       Service Provider Request Status Selected Services Address Phone Fax Patient Preferred    [facility] Pending - Request Sent N/A [address] [place] [address] [phone] --               MONA Long

## 2024-05-20 NOTE — PLAN OF CARE
Goal Outcome Evaluation:  Plan of Care Reviewed With: patient           Outcome Evaluation: VSS on 2L NC. Patient verbalizes no complaints or concerns at this time. Safety maintained, call light in reach.

## 2024-05-20 NOTE — PLAN OF CARE
Problem: Adjustment to Illness (Sepsis/Septic Shock)  Goal: Optimal Coping  Outcome: Ongoing, Progressing  Intervention: Optimize Psychosocial Adjustment to Illness  Recent Flowsheet Documentation  Taken 5/20/2024 0230 by Mely Almaraz RN  Supportive Measures: active listening utilized  Family/Support System Care:   self-care encouraged   support provided  Taken 5/19/2024 2030 by Mely Almaraz RN  Supportive Measures:   active listening utilized   counseling provided  Family/Support System Care:   support provided   self-care encouraged     Problem: Bleeding (Sepsis/Septic Shock)  Goal: Absence of Bleeding  Outcome: Ongoing, Progressing     Problem: Glycemic Control Impaired (Sepsis/Septic Shock)  Goal: Blood Glucose Level Within Desired Range  Outcome: Ongoing, Progressing  Intervention: Optimize Glycemic Control  Recent Flowsheet Documentation  Taken 5/20/2024 0230 by Mely Almaraz RN  Glycemic Management: blood glucose monitored  Taken 5/19/2024 2030 by Mely Almaraz RN  Glycemic Management: blood glucose monitored     Problem: Infection Progression (Sepsis/Septic Shock)  Goal: Absence of Infection Signs and Symptoms  Outcome: Ongoing, Progressing  Intervention: Initiate Sepsis Management  Recent Flowsheet Documentation  Taken 5/20/2024 0230 by Mely Almaraz RN  Infection Prevention:   hand hygiene promoted   single patient room provided   rest/sleep promoted  Isolation Precautions:   precautions maintained   contact  Taken 5/19/2024 2030 by Mely Almaraz RN  Infection Prevention:   visitors restricted/screened   environmental surveillance performed   hand hygiene promoted   rest/sleep promoted   single patient room provided  Isolation Precautions: precautions maintained  Intervention: Promote Recovery  Recent Flowsheet Documentation  Taken 5/20/2024 0230 by Mely Almaraz RN  Activity Management: activity encouraged  Sleep/Rest Enhancement:   regular sleep/rest pattern promoted   relaxation techniques  promoted   awakenings minimized  Taken 5/19/2024 2030 by Mely Almaraz RN  Activity Management: activity encouraged  Sleep/Rest Enhancement: regular sleep/rest pattern promoted  Intervention: Promote Stabilization  Recent Flowsheet Documentation  Taken 5/20/2024 0230 by Mely Almaraz RN  Fluid/Electrolyte Management: fluids provided  Taken 5/19/2024 2030 by Mely Almaraz RN  Fluid/Electrolyte Management: fluids provided     Problem: Nutrition Impaired (Sepsis/Septic Shock)  Goal: Optimal Nutrition Intake  Outcome: Ongoing, Progressing     Problem: Adult Inpatient Plan of Care  Goal: Plan of Care Review  Outcome: Ongoing, Progressing  Flowsheets (Taken 5/20/2024 0357)  Progress: no change  Plan of Care Reviewed With: patient  Outcome Evaluation: Patient in bed HOB elevated respiration even and unlabored on 2 L per n/c. He cont to be on cardiac monitoring. No distress noted call light in reach.  Goal: Patient-Specific Goal (Individualized)  Outcome: Ongoing, Progressing  Goal: Absence of Hospital-Acquired Illness or Injury  Outcome: Ongoing, Progressing  Intervention: Identify and Manage Fall Risk  Recent Flowsheet Documentation  Taken 5/20/2024 0300 by Mely Almaraz RN  Safety Promotion/Fall Prevention: safety round/check completed  Taken 5/20/2024 0230 by Mely Almaraz RN  Safety Promotion/Fall Prevention:   activity supervised   assistive device/personal items within reach   clutter free environment maintained   fall prevention program maintained   nonskid shoes/slippers when out of bed   room organization consistent   safety round/check completed  Taken 5/20/2024 0100 by eMly Almaraz RN  Safety Promotion/Fall Prevention: safety round/check completed  Taken 5/19/2024 2300 by Mely Almaraz RN  Safety Promotion/Fall Prevention: safety round/check completed  Taken 5/19/2024 2100 by Mely Almaraz RN  Safety Promotion/Fall Prevention: safety round/check completed  Taken 5/19/2024 2030 by Mely Almaraz  RN  Safety Promotion/Fall Prevention:   activity supervised   assistive device/personal items within reach   clutter free environment maintained   fall prevention program maintained   nonskid shoes/slippers when out of bed   room organization consistent   safety round/check completed  Taken 5/19/2024 1900 by Mely Almaraz RN  Safety Promotion/Fall Prevention: safety round/check completed  Intervention: Prevent Skin Injury  Recent Flowsheet Documentation  Taken 5/19/2024 2030 by Mely Almaraz RN  Skin Protection:   adhesive use limited   incontinence pads utilized   pulse oximeter probe site changed   silicone foam dressing in place   transparent dressing maintained   skin-to-device areas padded  Intervention: Prevent and Manage VTE (Venous Thromboembolism) Risk  Recent Flowsheet Documentation  Taken 5/20/2024 0230 by Mely Almaraz RN  Activity Management: activity encouraged  VTE Prevention/Management: (See MAR) other (see comments)  Range of Motion: active ROM (range of motion) encouraged  Taken 5/19/2024 2030 by Mely Almaraz RN  Activity Management: activity encouraged  VTE Prevention/Management: (MAr) other (see comments)  Range of Motion: active ROM (range of motion) encouraged  Intervention: Prevent Infection  Recent Flowsheet Documentation  Taken 5/20/2024 0230 by Mely Almaraz RN  Infection Prevention:   hand hygiene promoted   single patient room provided   rest/sleep promoted  Taken 5/19/2024 2030 by Mely Almaraz RN  Infection Prevention:   visitors restricted/screened   environmental surveillance performed   hand hygiene promoted   rest/sleep promoted   single patient room provided  Goal: Optimal Comfort and Wellbeing  Outcome: Ongoing, Progressing  Intervention: Provide Person-Centered Care  Recent Flowsheet Documentation  Taken 5/20/2024 0230 by Mely Almaraz RN  Trust Relationship/Rapport:   care explained   reassurance provided  Taken 5/19/2024 2030 by Mely Almaraz RN Trust  Relationship/Rapport: care explained  Goal: Readiness for Transition of Care  Outcome: Ongoing, Progressing     Problem: Skin Injury Risk Increased  Goal: Skin Health and Integrity  Outcome: Ongoing, Progressing  Intervention: Promote and Optimize Oral Intake  Recent Flowsheet Documentation  Taken 5/20/2024 0230 by Mely Almaraz RN  Oral Nutrition Promotion: rest periods promoted  Taken 5/19/2024 2030 by Mely Almaraz RN  Oral Nutrition Promotion: rest periods promoted  Intervention: Optimize Skin Protection  Recent Flowsheet Documentation  Taken 5/19/2024 2030 by Mely Almaraz RN  Pressure Reduction Techniques:   weight shift assistance provided   frequent weight shift encouraged   heels elevated off bed   positioned off wounds   pressure points protected  Pressure Reduction Devices:   specialty bed utilized   pressure-redistributing mattress utilized   positioning supports utilized   heel offloading device utilized   alternating pressure pump (ADD)  Skin Protection:   adhesive use limited   incontinence pads utilized   pulse oximeter probe site changed   silicone foam dressing in place   transparent dressing maintained   skin-to-device areas padded     Problem: Diabetes Comorbidity  Goal: Blood Glucose Level Within Targeted Range  Outcome: Ongoing, Progressing  Intervention: Monitor and Manage Glycemia  Recent Flowsheet Documentation  Taken 5/20/2024 0230 by Mely Almaraz RN  Glycemic Management: blood glucose monitored  Taken 5/19/2024 2030 by Mely Almaraz RN  Glycemic Management: blood glucose monitored     Problem: Hypertension Comorbidity  Goal: Blood Pressure in Desired Range  Outcome: Ongoing, Progressing  Intervention: Maintain Blood Pressure Management  Recent Flowsheet Documentation  Taken 5/20/2024 0230 by Mely Almaraz RN  Medication Review/Management: medications reviewed  Taken 5/19/2024 2030 by Mely Almaraz RN  Medication Review/Management: medications reviewed     Problem: Fall Injury  Risk  Goal: Absence of Fall and Fall-Related Injury  Outcome: Ongoing, Progressing  Intervention: Identify and Manage Contributors  Recent Flowsheet Documentation  Taken 5/20/2024 0230 by Mely Almaraz RN  Medication Review/Management: medications reviewed  Taken 5/19/2024 2030 by Mely Almaraz RN  Medication Review/Management: medications reviewed  Intervention: Promote Injury-Free Environment  Recent Flowsheet Documentation  Taken 5/20/2024 0300 by Mely Almaraz RN  Safety Promotion/Fall Prevention: safety round/check completed  Taken 5/20/2024 0230 by Mely Almaraz RN  Safety Promotion/Fall Prevention:   activity supervised   assistive device/personal items within reach   clutter free environment maintained   fall prevention program maintained   nonskid shoes/slippers when out of bed   room organization consistent   safety round/check completed  Taken 5/20/2024 0100 by Mely Almaraz RN  Safety Promotion/Fall Prevention: safety round/check completed  Taken 5/19/2024 2300 by Mely Almaraz RN  Safety Promotion/Fall Prevention: safety round/check completed  Taken 5/19/2024 2100 by Mely Almaraz RN  Safety Promotion/Fall Prevention: safety round/check completed  Taken 5/19/2024 2030 by Mely Almaraz RN  Safety Promotion/Fall Prevention:   activity supervised   assistive device/personal items within reach   clutter free environment maintained   fall prevention program maintained   nonskid shoes/slippers when out of bed   room organization consistent   safety round/check completed  Taken 5/19/2024 1900 by Mely Almaraz RN  Safety Promotion/Fall Prevention: safety round/check completed   Goal Outcome Evaluation:  Plan of Care Reviewed With: patient        Progress: no change  Outcome Evaluation: Patient in bed HOB elevated respiration even and unlabored on 2 L per n/c. He cont to be on cardiac monitoring. No distress noted call light in reach.

## 2024-05-20 NOTE — DISCHARGE PLACEMENT REQUEST
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 failure, SIRS, ESRD on HD    Alex Crum MD  [facility]  05/15/24  19:49 EDT        Electronically signed by Alex Crum MD at 05/16/24 0803       Vital Signs (last day)       Date/Time Temp Temp src Pulse Resp BP Patient Position SpO2    05/20/24 1000 -- -- 67 19 122/74 -- --    05/20/24 0900 -- -- 77 23 85/41 -- --    05/20/24 0800 -- -- 71 16 125/66 -- --    05/20/24 0749 98.2 (36.8) Oral -- -- -- -- --    05/20/24 0700 -- -- -- 20 115/57 -- --    05/20/24 0600 -- -- 68 10 114/56 -- 90    05/20/24 0500 -- -- 67 12 95/50 -- 92    05/20/24 0400 99.5 (37.5) Oral 66 11 120/68 -- 100    05/20/24 0300 -- -- 67 11 131/68 -- 98    05/20/24 0200 -- -- 65 9 126/69 -- 99    05/20/24 0100 -- -- 69 9 134/61 -- 93    05/20/24 0000 99.1 (37.3) -- 67 8 101/61 -- 90    05/19/24 2300 -- -- 61 8 116/59 -- 90    05/19/24 2200 -- -- 66 8 124/62 -- 100    05/19/24 2100 -- -- 65 10 120/56 -- 100    05/19/24 2000 99 (37.2) Oral 70 13 127/67 -- 99    05/19/24 1900 -- -- 63 10 126/59 -- 98    05/19/24 1800 -- -- 78 11 138/65 -- 99    05/19/24 1700 -- -- 66  19 123/56 -- 99    05/19/24 1614 97.5 (36.4) Axillary -- -- -- -- --    05/19/24 1600 -- -- 70 16 117/52 -- 100    05/19/24 1500 -- -- 71 13 72/67 -- 93    05/19/24 1400 -- -- 75 24 106/61 -- 100    05/19/24 1300 -- -- 67 12 114/54 -- 98    05/19/24 1209 98 (36.7) Oral -- -- -- -- --    05/19/24 1200 -- -- 71 13 115/58 -- 97    05/19/24 1100 -- -- 77 11 91/58 -- 92    05/19/24 1051 -- -- 75 -- 107/56 -- --    05/19/24 1000 -- -- 72 18 108/49 -- 99    05/19/24 0900 -- -- 75 12 106/48 -- 99    05/19/24 0837 97 (36.1) Oral -- -- -- -- --    05/19/24 0800 -- -- 71 18 114/56 -- 100    05/19/24 0700 -- -- 75 12 111/66 -- 93    05/19/24 0635 -- -- 75 -- 96/45 -- --    05/19/24 0600 -- -- 75 18 92/53 -- 94    05/19/24 0500 -- -- 77 15 120/55 -- 96    05/19/24 0400 98.2 (36.8) Axillary 76 14 119/57 -- 97    05/19/24 0300 -- -- 75 20 98/53 -- 95    05/19/24 0200 -- -- 74 14 119/64 -- 90    05/19/24 0100 -- -- 73 15 86/66 -- --    05/19/24 0000 99.5 (37.5) Axillary 76 16 121/60 -- 95          Current Facility-Administered Medications   Medication Dose Route Frequency Provider Last Rate Last Admin    acetaminophen (TYLENOL) tablet 500 mg  500 mg Oral Q4H PRN Alex Crum MD   500 mg at 05/19/24 2352    acetaminophen (TYLENOL) tablet 650 mg  650 mg Oral Once per day on Tuesday Thursday Saturday Alex Crum MD   650 mg at 05/18/24 2032    ammonium lactate (AMLACTIN) 12 % cream 1 Application  1 Application Topical Nightly Alex Crum MD   1 Application at 05/19/24 2053    aspirin EC tablet 81 mg  81 mg Oral Daily Alex Crum MD   81 mg at 05/20/24 0855    atorvastatin (LIPITOR) tablet 20 mg  20 mg Oral Daily Alex Crum MD   20 mg at 05/20/24 0855    sennosides-docusate (PERICOLACE) 8.6-50 MG per tablet 2 tablet  2 tablet Oral BID PRN Alex Crum MD        And    polyethylene glycol (MIRALAX) packet 17 g  17 g Oral Daily PRN Alex Crum MD        And    bisacodyl (DULCOLAX) EC tablet  5 mg  5 mg Oral Daily PRN Alex Crum MD        And    bisacodyl (DULCOLAX) suppository 10 mg  10 mg Rectal Daily PRN Alex Crum MD        cetirizine (zyrTEC) tablet 10 mg  10 mg Oral Daily Aelx Crum MD   10 mg at 05/20/24 0855    dextrose (D50W) (25 g/50 mL) IV injection 25 g  25 g Intravenous Q15 Min PRN Alex Crum MD   25 g at 05/17/24 1043    dextrose (GLUTOSE) oral gel 15 g  15 g Oral Q15 Min PRN Alex Crum MD        erythromycin (ROMYCIN) ophthalmic ointment 1 Application  1 Application Left Eye BID Alex Crum MD   1 Application at 05/20/24 0856    famotidine (PEPCID) tablet 20 mg  20 mg Oral Q48H Alex Crum MD   20 mg at 05/19/24 2349    gabapentin (NEURONTIN) capsule 100 mg  100 mg Oral Nightly Alex Crum MD   100 mg at 05/19/24 2053    glucagon HCl (Diagnostic) injection 1 mg  1 mg Intramuscular Q15 Min PRN Alex Crum MD        [Held by provider] Insulin Lispro (humaLOG) injection 2-7 Units  2-7 Units Subcutaneous 4x Daily AC & at Bedtime Alex Crum MD   2 Units at 05/18/24 2042    levothyroxine (SYNTHROID, LEVOTHROID) tablet 75 mcg  75 mcg Oral Daily Alex Crum MD   75 mcg at 05/20/24 0856    Lidocaine 4 % 1 patch  1 patch Transdermal Q PM Alex Crum MD   1 patch at 05/19/24 1724    Lidocaine Viscous HCl (XYLOCAINE) 2 % solution 5 mL  5 mL Mouth/Throat Q6H PRN Alex Crum MD        lubrisoft lotion 1 Application  1 Application Topical Daily Alex Crum MD   1 Application at 05/20/24 0856    megestrol (MEGACE) tablet 40 mg  40 mg Oral TID With Meals Alex Crum MD   40 mg at 05/20/24 0855    [Held by provider] metoprolol succinate XL (TOPROL-XL) 24 hr tablet 12.5 mg  12.5 mg Oral Nightly Alex Crum MD        midodrine (PROAMATINE) tablet 10 mg  10 mg Oral TID AC Alex Crum MD   10 mg at 05/20/24 0643    multivitamin with minerals 1 tablet  1 tablet Oral Daily Alex Crum  MD DESHAWN   1 tablet at 05/20/24 0855    nitroglycerin (NITROSTAT) SL tablet 0.4 mg  0.4 mg Sublingual Q5 Min PRN Alex Crum MD        ondansetron ODT (ZOFRAN-ODT) disintegrating tablet 4 mg  4 mg Translingual Q8H PRN Alex Crum MD   4 mg at 05/17/24 1753    sertraline (ZOLOFT) tablet 50 mg  50 mg Oral Nightly Alex Crum MD   50 mg at 05/19/24 2053    sevelamer (RENVELA) tablet 2,400 mg  2,400 mg Oral TID With Meals Alex Crum MD   2,400 mg at 05/20/24 0856    sodium chloride 0.9 % flush 10 mL  10 mL Intravenous PRN Alex Crum MD        sodium chloride 0.9 % flush 10 mL  10 mL Intravenous Q12H Alex Crum MD   10 mL at 05/20/24 0856    sodium chloride 0.9 % flush 10 mL  10 mL Intravenous PRN Alex Crum MD        sodium chloride 0.9 % infusion 40 mL  40 mL Intravenous PRN Alex Crum MD        Vancomycin Pharmacy Intermittent/Pulse Dosing   Does not apply Daily Alex Crum MD            Operative/Procedure Notes (most recent note)        Lorrie Steiner MD at 05/17/24 1531          Tunneled Diaylsis Catheter Removal    Diagnosis: bacteremia    Area prepped and draped. Tunneled dialysis catheter removed easily. Pressure held for 10 minutes. Sterile dressing applied.     Dressing may be removed tomorrow and bandaid may be replaced afterwards.     Lorrie Amos MD       General Surgeon  [facility]      Electronically signed by Lorrie Steiner MD at 05/17/24 1532          Physician Progress Notes (most recent note)        Nova Saucedo MD at 05/20/24 0856          Nephrology Progress Note  Chief complaint, nausea and vomiting    Subjective     No chest pain, shortness of breath lying on bed comfortably    Objective       Vital signs :     Temp:  [97.5 °F (36.4 °C)-99.5 °F (37.5 °C)] 98.2 °F (36.8 °C)  Heart Rate:  [61-78] 68  Resp:  [8-24] 20  BP: ()/(48-69) 115/57    Intake/Output                               05/18/24 0701 -  "05/19/24 0700 05/19/24 0701 - 05/20/24 0700 05/20/24 0701 - 05/21/24 0700     [id number] 7464-5294 Total [id number] 1901-0700 Total 8493-2215 9634-0936 Total                    Intake    P.O.  360  -- 360  820  -- 820  360  -- 360    I.V.  301.8  -- 301.8  --  -- --  --  -- --    Total Intake 661.8 -- 661.8 820 -- 820 360 -- 360       Output    Urine  0  0 0  --  -- --  --  -- --    Total Output 0 0 0 -- -- -- -- -- --             Physical Exam:    General Appearance : Not in acute distress  Lungs : Bilateral decreased intensity of breath sounds  Heart :  regular rhythm & normal rate, normal S1, S2 and no murmur, no rub  Abdomen : Soft, nondistended  Extremities : No edema,   Neurologic :   No apparent focal neurological deficit, unable to assess fully      Laboratory Data :     Albumin Albumin   Date Value Ref Range Status   05/20/2024 3.1 (L) 3.5 - 5.2 g/dL Final   05/19/2024 3.1 (L) 3.5 - 5.2 g/dL Final   05/18/2024 3.3 (L) 3.5 - 5.2 g/dL Final      Magnesium Magnesium   Date Value Ref Range Status   05/20/2024 2.1 1.6 - 2.4 mg/dL Final   05/19/2024 2.0 1.6 - 2.4 mg/dL Final   05/18/2024 1.8 1.6 - 2.4 mg/dL Final          PTH               No results found for: \"PTH\"    CBC and coagulation:  Results from last 7 days   Lab Units 05/20/24  0109 05/19/24  0102 05/18/24  0408 05/17/24  0927 05/17/24  0218 05/16/24  0128 05/15/24  1639 05/15/24  1415   PROCALCITONIN ng/mL  --   --   --   --   --   --   --  0.64*   LACTATE mmol/L  --  1.0 1.0  --   --  2.1*   < > 2.3*   CRP mg/dL  --  15.62* 18.08*  --   --  14.13*  --  7.54*   WBC 10*3/mm3 13.00* 11.08* 11.53* 9.52   < > 13.50*  --  17.01*   HEMOGLOBIN g/dL 8.5* 8.7* 8.8* 7.2*   < > 8.1*  --  9.7*   HEMATOCRIT % 25.8* 26.3* 27.0* 22.6*   < > 25.8*  --  30.4*   MCV fL 91.5 90.7 92.5 96.2   < > 95.9  --  94.4   MCHC g/dL 32.9 33.1 32.6 31.9   < > 31.4*  --  31.9   PLATELETS 10*3/mm3 116* 97* 116* 95*   < > 108*  --  138*   INR   --  1.31* 1.58* 1.52*  --  1.54*  --  1.35* "   D DIMER QUANT MCGFEU/mL  --   --  4.40* 10.17*  --   --   --   --     < > = values in this interval not displayed.     Acid/base balance:  Results from last 7 days   Lab Units 05/17/24  1416 05/16/24  0440 05/15/24  1608   PH, ARTERIAL pH units 7.523* 7.421 7.470*   PO2 ART mm Hg 80.2* 97.9 50.7*   PCO2, ARTERIAL mm Hg 40.4 41.9 37.0   HCO3 ART mmol/L 33.2* 27.3* 26.9*     Renal and electrolytes:    Results from last 7 days   Lab Units 05/20/24  0109 05/19/24  0102 05/18/24  0408 05/17/24  0927 05/17/24  0218   SODIUM mmol/L 129* 127* 129* 133* 133*   POTASSIUM mmol/L 3.9 3.8 3.6 4.0 4.4   MAGNESIUM mg/dL 2.1 2.0 1.8  --  1.9   CHLORIDE mmol/L 87* 86* 87* 93* 93*   CO2 mmol/L 25.9 27.4 27.9 23.3 22.7   BUN mg/dL 56* 45* 30* 65* 63*   CREATININE mg/dL 8.29* 7.18* 5.54* 9.44* 8.89*   CALCIUM mg/dL 7.9* 8.1* 8.2* 8.1* 8.1*   PHOSPHORUS mg/dL 4.5 3.6 3.0  --  3.3     Estimated Creatinine Clearance: 9.5 mL/min (A) (by C-G formula based on SCr of 8.29 mg/dL (H)).  @GFRCG:3@   Liver and pancreatic function:  Results from last 7 days   Lab Units 05/20/24  0109 05/19/24 0102 05/18/24  0408   ALBUMIN g/dL 3.1* 3.1* 3.3*   BILIRUBIN mg/dL 0.4 0.4 0.5   ALK PHOS U/L 197* 163* 153*   AST (SGOT) U/L 23 27 31   ALT (SGPT) U/L 11 11 12         Cardiac:      Liver and pancreatic function:  Results from last 7 days   Lab Units 05/20/24 0109 05/19/24 0102 05/18/24  0408   ALBUMIN g/dL 3.1* 3.1* 3.3*   BILIRUBIN mg/dL 0.4 0.4 0.5   ALK PHOS U/L 197* 163* 153*   AST (SGOT) U/L 23 27 31   ALT (SGPT) U/L 11 11 12       Medications :     acetaminophen, 650 mg, Oral, Once per day on Tuesday Thursday Saturday  ammonium lactate, 1 Application, Topical, Nightly  aspirin, 81 mg, Oral, Daily  atorvastatin, 20 mg, Oral, Daily  cetirizine, 10 mg, Oral, Daily  erythromycin, 1 Application, Left Eye, BID  famotidine, 20 mg, Oral, Q48H  gabapentin, 100 mg, Oral, Nightly  [Held by provider] insulin lispro, 2-7 Units, Subcutaneous, 4x Daily AC & at  Bedtime  levothyroxine, 75 mcg, Oral, Daily  Lidocaine, 1 patch, Transdermal, Q PM  lubrisoft, 1 Application, Topical, Daily  megestrol, 40 mg, Oral, TID With Meals  [Held by provider] metoprolol succinate XL, 12.5 mg, Oral, Nightly  midodrine, 10 mg, Oral, TID AC  multivitamin with minerals, 1 tablet, Oral, Daily  sertraline, 50 mg, Oral, Nightly  sevelamer, 2,400 mg, Oral, TID With Meals  sodium chloride, 10 mL, Intravenous, Q12H  Vancomycin Pharmacy Intermittent/Pulse Dosing, , Does not apply, Daily               Assessment & Plan     -ESKD on intermittent dialysis  - Anemia  - Persistent nausea and vomiting  - Type 2 diabetes mellitus  - Essential hypertension    No emergent indication for dialysis.  Repeat blood cultures taken on 5/18 and 5/19 are negative so far.  Continue holding dialysis today as well and plan is to stick recently repaired AV fistula  Reviewed the labs reviewed chest x-ray and reviewed clinical notes from hospitalist team      Nova Saucedo MD  05/20/24  08:56 EDT      Electronically signed by Nova Saucedo MD at 05/20/24 0857          Consult Notes (most recent note)        Lorire Steiner MD at 05/16/24 1549        Consult Orders    1. Inpatient General Surgery Consult [[id number]] ordered by Alex Crum MD at 05/16/24 1231                 Patient Name:  Kennedy Prescott  YOB: 1950  [id number]       Patient Care Team:  Jag Guillaume MD as PCP - General (Internal Medicine)      General Surgery Consult Note     Date of Consultation: 05/16/24    Consulting Physician : Alex Crum MD    Reason for Consult : Bacteremia    Subjective     I have been asked to see  Kennedy Prescott , a 73 y.o. male in consultation for bacteremia.  He had a tunneled line placed in outside hospital and blood cultures obtained on arrival to the emergency department were positive for gram-positive cocci in both bottles.  He will need to undergo dialysis tomorrow.      Allergy: No  Known Allergies    Medications:  acetaminophen, 650 mg, Oral, Once per day on Tuesday Thursday Saturday  ammonium lactate, 1 Application, Topical, Nightly  aspirin, 81 mg, Oral, Daily  atorvastatin, 20 mg, Oral, Daily  cetirizine, 10 mg, Oral, Daily  erythromycin, 1 Application, Left Eye, BID  famotidine, 20 mg, Oral, Q48H  gabapentin, 200 mg, Oral, Nightly  heparin (porcine), 5,000 Units, Subcutaneous, Q12H  [Held by provider] insulin glargine, 5 Units, Subcutaneous, Q12H  insulin lispro, 2-7 Units, Subcutaneous, 4x Daily AC & at Bedtime  levothyroxine, 75 mcg, Oral, Daily  Lidocaine, 1 patch, Transdermal, Q PM  lubrisoft, 1 Application, Topical, Daily  metoprolol succinate XL, 12.5 mg, Oral, Nightly  midodrine, 5 mg, Oral, Once per day on Tuesday Thursday Saturday  multivitamin with minerals, 1 tablet, Oral, Daily  sertraline, 50 mg, Oral, Nightly  sevelamer, 2,400 mg, Oral, TID With Meals  sodium chloride, 10 mL, Intravenous, Q12H  Vancomycin Pharmacy Intermittent/Pulse Dosing, , Does not apply, Daily         No current facility-administered medications on file prior to encounter.     Current Outpatient Medications on File Prior to Encounter   Medication Sig    ammonium lactate (AMLACTIN) 12 % cream Apply 1 g topically to the appropriate area as directed Every Night. Apply to dry skin on feet    atorvastatin (LIPITOR) 20 MG tablet Take 1 tablet by mouth Daily.    cetaphil (CETAPHIL) lotion Apply 1 Application topically to the appropriate area as directed Daily.    erythromycin (ROMYCIN) 5 MG/GM ophthalmic ointment Administer 1 Application into the left eye 2 (Two) Times a Day.    famotidine (PEPCID) 20 MG tablet Take 1 tablet by mouth 2 (Two) Times a Day.    gabapentin (NEURONTIN) 100 MG capsule Take 2 capsules by mouth Every Night.    glipizide (GLUCOTROL) 10 MG tablet Take 1 tablet by mouth Daily.    glipizide (GLUCOTROL) 5 MG tablet Take 1 tablet by mouth Daily.    insulin aspart (novoLOG FLEXPEN) 100 UNIT/ML  solution pen-injector sc pen Inject 2-6 Units under the skin into the appropriate area as directed 4 (Four) Times a Day Before Meals & at Bedtime.    insulin detemir (Levemir FlexPen) 100 UNIT/ML injection Inject 25 Units under the skin into the appropriate area as directed 2 (Two) Times a Day.    levocetirizine (XYZAL) 5 MG tablet Take 1 tablet by mouth Daily.    levothyroxine (SYNTHROID, LEVOTHROID) 75 MCG tablet Take 1 tablet by mouth Daily.    lidocaine 3 % cream cream Apply 1 Application topically Every Night.    loperamide (IMODIUM) 2 MG capsule Take 1 capsule by mouth 3 (Three) Times a Week.    loperamide (IMODIUM) 2 MG capsule Take 1 capsule by mouth 2 (Two) Times a Day.    metoprolol succinate XL (TOPROL-XL) 25 MG 24 hr tablet Take 0.5 tablets by mouth Every Night.    midodrine (PROAMATINE) 2.5 MG tablet Take 1 tablet by mouth 3 (Three) Times a Week.    multivitamin with minerals tablet tablet Take 1 tablet by mouth Daily.    sertraline (ZOLOFT) 50 MG tablet Take 1 tablet by mouth Every Night.    sevelamer (RENAGEL) 800 MG tablet Take 3 tablets by mouth 3 (Three) Times a Day With Meals.    acetaminophen (TYLENOL) 325 MG tablet Take 2 tablets by mouth 3 (Three) Times a Week.    acetaminophen (TYLENOL) 500 MG tablet Take 1 tablet by mouth Every 4 (Four) Hours As Needed for Mild Pain.    bisacodyl (Dulcolax) 10 MG suppository Insert 1 suppository into the rectum Daily As Needed for Constipation.    lactulose (CHRONULAC) 10 GM/15ML solution Take 30 mL by mouth Daily As Needed (constipation).    Lidocaine Viscous HCl (XYLOCAINE) 2 % solution Take 5 mL by mouth Every 6 (Six) Hours As Needed for Mild Pain.    loperamide (IMODIUM) 2 MG capsule Take 2 capsules by mouth Every 6 (Six) Hours As Needed for Diarrhea.    ondansetron (ZOFRAN) 4 MG tablet Take 1 tablet by mouth Every 8 (Eight) Hours As Needed for Nausea or Vomiting.    polyethylene glycol (MIRALAX) 17 GM/SCOOP powder Take 17 g by mouth 2 (Two) Times a Day  "As Needed (constipation).       PMHx:   Past Medical History:   Diagnosis Date    CHF (congestive heart failure)     Coronary artery disease     Diabetes mellitus     Dialysis patient     Disease of thyroid gland     Elevated cholesterol     GERD (gastroesophageal reflux disease)     Hypertension     Myocardial infarction     Renal disorder     stage 5       Past Surgical History:  Tunneled dialysis catheter placement, left upper extremity AV fistula placement    Family History: Noncontributory     Social History:  Noncontributory     Review of Systems   Pertinent items are noted in HPI     Constitutional: No fevers, chills or malaise   Eyes: Denies visual changes    Cardiovascular: Denies chest pain, palpitations   Pulmonary: Denies cough or shortness of breath   Abdominal/ GI: Nontender    Genitourinary: Denies dysuria or hematuria   Musculoskeletal: Denies any but chronic joint aches, pains or deformities   Psychiatric: No recent mood changes   Neurologic: No paresthesias or loss of function         Objective     Physical Exam:      Vital Signs  /54 (BP Location: Left arm, Patient Position: Lying)   Pulse 73   Temp 97.8 °F (36.6 °C) (Oral)   Resp 18   Ht 180.3 cm (71\")   Wt 87.1 kg (192 lb)   SpO2 99%   BMI 26.78 kg/m²     Intake/Output Summary (Last 24 hours) at 5/16/2024 1550  Last data filed at 5/16/2024 0300  Gross per 24 hour   Intake 120 ml   Output --   Net 120 ml         Physical Exam:    Head: Normocephalic, atraumatic.   Eyes: Pupils equal, round, react to light   Mouth: No lesions noted  CV: Regular rate and rhythm   Lungs: Bilateral chest rise and fall, no use of accessory muscles   Abdomen: Soft, nontender, nondistended  Extremities: Left upper extremity with palpable AV fistula, palpable thrill  Neurologic: No gross deficits      Assessment and Plan:    Problem List Items Addressed This Visit          Other    * (Principal) SIRS (systemic inflammatory response syndrome) - Primary    "     Active Hospital Problems    Diagnosis  POA    **SIRS (systemic inflammatory response syndrome) [R65.10]  Yes    Sepsis [A41.9]  Yes      Resolved Hospital Problems   No resolved problems to display.     Mr. Prescott is a 73-year-old male with bacteremia and a indwelling tunneled dialysis catheter.  He will require removal of the catheter in order to adequately clear his bacteremia.  As he is scheduled for dialysis tomorrow, we will plan for him to undergo dialysis with his current line and I will remove the catheter after dialysis.  We can then replace the line on Monday after a line holiday.    I discussed the patient's findings and my recommendations with the patient and/or family, as well as the primary team and infectious disease.     Lorrie Steiner MD  24  15:50 EDT          Electronically signed by Lorrie Steiner MD at 24 [phone]          Physical Therapy Notes (most recent note)        Zahraa Almaraz, PT at 24 1315  Version 1 of 1         Acute Care - Physical Therapy Initial Evaluation   [facility]     Patient Name: Kennedy Prescott  : 1950  MRN: [id number]  Today's Date: 2024   Onset of Illness/Injury or Date of Surgery: 05/15/24  Visit Dx:     ICD-10-CM ICD-9-CM   1. SIRS (systemic inflammatory response syndrome)  R65.10 995.90     Patient Active Problem List   Diagnosis    Scalp lesion    Skin ulcer of right heel, limited to breakdown of skin    Hidradenoma    Functional diarrhea    Adenomatous polyp of ascending colon    SIRS (systemic inflammatory response syndrome)    Sepsis     Past Medical History:   Diagnosis Date    CHF (congestive heart failure)     Coronary artery disease     Diabetes mellitus     Dialysis patient     Disease of thyroid gland     Elevated cholesterol     GERD (gastroesophageal reflux disease)     Hypertension     Myocardial infarction     Renal disorder     stage 5     Past Surgical History:   Procedure Laterality Date    CARDIAC SURGERY       cabg    CHOLECYSTECTOMY      COLONOSCOPY N/A 9/22/2023    Procedure: COLONOSCOPY;  Surgeon: Trip Peter MD;  Location: [facility] OR;  Service: Gastroenterology;  Laterality: N/A;    DIALYSIS FISTULA CREATION Left     arm     PT Assessment (Last 12 Hours)       PT Evaluation and Treatment       Row Name 05/16/24 1301          Physical Therapy Time and Intention    Subjective Information complains of;weakness  -AG     Document Type evaluation  -AG     Mode of Treatment physical therapy  -AG     Patient Effort good  -AG     Symptoms Noted During/After Treatment fatigue  -AG       Row Name 05/16/24 1301          General Information    Patient Profile Reviewed yes  -AG     Onset of Illness/Injury or Date of Surgery 05/15/24  -AG     Referring Physician Dr. Crum  -AG     Patient Observations agree to therapy;cooperative;alert  -AG     Patient/Family/Caregiver Comments/Observations pt. supine in bed; L UE HD fistula noted.  Pt. cooperative, agreeable to participation.  -AG     Prior Level of Function --  pt. reports being independent w/ fxnl transfers at SNF; ambulates with therapist assistance and with what is described as an ARJO walker.  -AG     Equipment Currently Used at Home shower chair;walker, rolling  -AG     Pertinent History of Current Functional Problem pt. admitted with SIRS; hx ESRD w/ HD; hx medical noncompliance  -AG     Existing Precautions/Restrictions fall  -AG     Equipment Issued to Patient gait belt  -AG     Risks Reviewed patient:;LOB  -AG     Benefits Reviewed patient:;improve function;increase independence;increase strength;increase balance;increase knowledge;decrease risk of DVT  -AG     Barriers to Rehab previous functional deficit  -AG       Row Name 05/16/24 1301          Living Environment    Current Living Arrangements extended care facility  -AG       Row Name 05/16/24 1301          Home Use of Assistive/Adaptive Equipment    Equipment Currently Used at Home walker, rolling  -AG        Row Name 05/16/24 1301          Pain    Pretreatment Pain Rating 0/10 - no pain  -AG     Posttreatment Pain Rating 0/10 - no pain  -AG       Row Name 05/16/24 1301          Cognition    Affect/Mental Status (Cognition) WFL  -     Orientation Status (Cognition) oriented x 3  -AG     Follows Commands (Cognition) verbal cues/prompting required  -AG     Personal Safety Interventions fall prevention program maintained;gait belt;nonskid shoes/slippers when out of bed;supervised activity  -AG       Row Name 05/16/24 1301          Range of Motion (ROM)    Range of Motion ROM is WFL;bilateral lower extremities  -AG       Row Name 05/16/24 1301          Strength (Manual Muscle Testing)    Strength (Manual Muscle Testing) --  B quads 4+/5; R psoas 4/5; L psoas 4-/5  -AG       Row Name 05/16/24 1301          Sensory    Hearing Status WFL  -AG       Row Name 05/16/24 1301          Vision Assessment/Intervention    Visual Impairment/Limitations corrective lenses full-time  -AG       Row Name 05/16/24 1301          Sensory Assessment (Somatosensory)    Sensory Subjective Reports numbness  hands, feet d/t neuropathy  -AG       Row Name 05/16/24 1301          Bed Mobility    Bed Mobility rolling left;rolling right;scooting/bridging;supine-sit;sit-supine  -AG     Scooting/Bridging [place] (Bed Mobility) standby assist  -AG     Supine-Sit [place] (Bed Mobility) minimum assist (75% patient effort);nonverbal cues (demo/gesture);verbal cues  -AG     Sit-Supine [place] (Bed Mobility) minimum assist (75% patient effort);nonverbal cues (demo/gesture);verbal cues  -AG     Bed Mobility, Safety Issues decreased use of legs for bridging/pushing;decreased use of arms for pushing/pulling  -AG       Row Name 05/16/24 1301          Transfers    Transfers sit-stand transfer;stand-sit transfer  -AG       Row Name 05/16/24 1301          Sit-Stand Transfer    Sit-Stand [place] (Transfers) verbal cues;nonverbal cues  (demo/gesture);moderate assist (50% patient effort)  -AG     Assistive Device (Sit-Stand Transfers) walker, front-wheeled  -AG     Comment, (Sit-Stand Transfer) once standing, pt. is able to briefly maintain before knees/ hips abruptly buckle.  Performed 2 x STS w/ RW.  -AG       Row Name 05/16/24 1301          Stand-Sit Transfer    Stand-Sit [place] (Transfers) verbal cues;nonverbal cues (demo/gesture);moderate assist (50% patient effort)  -AG     Assistive Device (Stand-Sit Transfers) walker, front-wheeled  -AG       Row Name 05/16/24 1301          Gait/Stairs (Locomotion)    Patient was able to Ambulate no, other medical factors prevent ambulation  -AG     Reason Patient was unable to Ambulate Excessive Weakness  -AG       Row Name 05/16/24 1301          Safety Issues, Functional Mobility    Impairments Affecting Function (Mobility) balance;endurance/activity tolerance;strength  -AG       Row Name 05/16/24 1301          Balance    Balance Assessment sitting static balance;sitting dynamic balance;sit to stand dynamic balance;standing static balance  -AG     Static Sitting Balance standby assist  -AG     Position, Sitting Balance unsupported;sitting edge of bed  -AG     Static Standing Balance verbal cues;non-verbal cues (demo/gesture);moderate assist  -AG     Position/Device Used, Standing Balance walker, front-wheeled  -AG       Row Name             Wound 05/15/24 2225 Left posterior heel Diabetic Ulcer    Wound - Properties Group Placement Date: 05/15/24  -CL Placement Time: 2225  -CL Side: Left  -CL Orientation: posterior  -CL Location: heel  -CL Primary Wound Type: Diabetic ulc  -CL    Retired Wound - Properties Group Placement Date: 05/15/24  -CL Placement Time: 2225  -CL Side: Left  -CL Orientation: posterior  -CL Location: heel  -CL Primary Wound Type: Diabetic ulc  -CL    Retired Wound - Properties Group Date first assessed: 05/15/24  -CL Time first assessed: 2225  -CL Side: Left  -CL Location: heel   -CL Primary Wound Type: Diabetic ulc  -CL      Row Name             Wound 05/15/24 2230 Left posterior fifth toe Diabetic Ulcer    Wound - Properties Group Placement Date: 05/15/24  -CL Placement Time: 2230  -CL Side: Left  -CL Orientation: posterior  -CL Location: fifth toe  -CL Primary Wound Type: Diabetic ulc  -CL    Retired Wound - Properties Group Placement Date: 05/15/24  -CL Placement Time: 2230  -CL Side: Left  -CL Orientation: posterior  -CL Location: fifth toe  -CL Primary Wound Type: Diabetic ulc  -CL    Retired Wound - Properties Group Date first assessed: 05/15/24  -CL Time first assessed: 2230  -CL Side: Left  -CL Location: fifth toe  -CL Primary Wound Type: Diabetic ulc  -CL      Row Name             Wound 05/15/24 2230 Left lower leg Other (comment)    Wound - Properties Group Placement Date: 05/15/24  -CL Placement Time: 2230  -CL Side: Left  -CL Orientation: lower  -CL Location: leg  -CL Primary Wound Type: Other  -CL, scab diabteic ulcer?     Retired Wound - Properties Group Placement Date: 05/15/24  -CL Placement Time: 2230  -CL Side: Left  -CL Orientation: lower  -CL Location: leg  -CL Primary Wound Type: Other  -CL, scab diabteic ulcer?     Retired Wound - Properties Group Date first assessed: 05/15/24  -CL Time first assessed: 2230  -CL Side: Left  -CL Location: leg  -CL Primary Wound Type: Other  -CL, scab diabteic ulcer?       Row Name             Wound 05/29/22 1726 Left gluteal    Wound - Properties Group Placement Date: 05/29/22  -AS Placement Time: 1726  -AS Present on Original Admission: Y  -AS Side: Left  -AS Location: gluteal  -AS    Retired Wound - Properties Group Placement Date: 05/29/22  -AS Placement Time: 1726  -AS Present on Original Admission: Y  -AS Side: Left  -AS Location: gluteal  -AS    Retired Wound - Properties Group Date first assessed: 05/29/22  -AS Time first assessed: 1726  -AS Present on Original Admission: Y  -AS Side: Left  -AS Location: gluteal  -AS      Row  Name 05/16/24 1301          Coping    Observed Emotional State calm;cooperative  -AG     Verbalized Emotional State acceptance  -AG     Trust Relationship/Rapport care explained;choices provided;thoughts/feelings acknowledged  -AG     Family/Support Persons family  -AG     Involvement in Care not present at bedside  -AG     Family/Support System Care self-care encouraged;support provided  -AG       Row Name 05/16/24 1301          Plan of Care Review    Plan of Care Reviewed With patient  -AG     Outcome Evaluation PT evaluation complete.  Pt. exhibits B hip weakness, L>R LE; min A for bed mobilty skills; able to perform 2 x brief STS w/ RW at bedside but sits abruptly d/t LE buckling.  Will continue to follow and progress as tolerated.  -AG       Row Name 05/16/24 1301          Positioning and Restraints    Pre-Treatment Position in bed  -AG     Post Treatment Position bed  -AG     In Bed fowlers;call light within reach;encouraged to call for assist;exit alarm on;side rails up x3  -AG       Row Name 05/16/24 1301          Therapy Assessment/Plan (PT)    Patient/Family Therapy Goals Statement (PT) return to SNF  -AG     PT Diagnosis (PT) impaired functional mobility; LE weakness  -AG     Rehab Potential (PT) good, to achieve stated therapy goals  -AG     Therapy Frequency (PT) other (see comments)  1-5 times/ week per priority  -AG     Predicted Duration of Therapy Intervention (PT) LOS  -AG     Problem List (PT) problems related to;balance;mobility;strength  -AG     Activity Limitations Related to Problem List (PT) unable to ambulate safely;unable to transfer safely;BADLs not performed adequately or safely  -AG       Row Name 05/16/24 1301          Therapy Plan Review/Discharge Plan (PT)    Therapy Plan Review (PT) evaluation/treatment results reviewed;care plan/treatment goals reviewed;risks/benefits reviewed;current/potential barriers reviewed;participants voiced agreement with care plan;participants  included;patient  -AG       Row Name 05/16/24 1301          Physical Therapy Goals    Bed Mobility Goal Selection (PT) bed mobility, PT goal 1  -AG     Transfer Goal Selection (PT) transfer, PT goal 1  -AG     Gait Training Goal Selection (PT) gait training, PT goal 1  -AG       Row Name 05/16/24 1301          Bed Mobility Goal 1 (PT)    Activity/Assistive Device (Bed Mobility Goal 1, PT) rolling to left;rolling to right;scooting;sit to supine;supine to sit  -AG     [place] Level/Cues Needed (Bed Mobility Goal 1, PT) standby assist  -AG     Time Frame (Bed Mobility Goal 1, PT) by discharge  -AG       Row Name 05/16/24 1301          Transfer Goal 1 (PT)    Activity/Assistive Device (Transfer Goal 1, PT) sit-to-stand/stand-to-sit;bed-to-chair/chair-to-bed;toilet;walker, rolling  -AG     [place] Level/Cues Needed (Transfer Goal 1, PT) contact guard required  -AG     Time Frame (Transfer Goal 1, PT) by discharge  -AG               User Key  (r) = Recorded By, (t) = Taken By, (c) = Cosigned By      Initials Name Provider Type    AS Sierra Goel, RN Registered Nurse    Zahraa Desai, PT Physical Therapist    Lana Corea RN Registered Nurse                    Physical Therapy Education       Title: PT OT SLP Therapies (Done)       Topic: Physical Therapy (Done)       Point: Mobility training (Done)       Learning Progress Summary             Patient Acceptance, E,D, VU,NR by  at 5/16/2024 1300                         Point: Home exercise program (Done)       Learning Progress Summary             Patient Acceptance, E,D, VU,NR by  at 5/16/2024 1300                         Point: Body mechanics (Done)       Learning Progress Summary             Patient Acceptance, E,D, VU,NR by  at 5/16/2024 1300                         Point: Precautions (Done)       Learning Progress Summary             Patient Acceptance, E,D, VU,NR by  at 5/16/2024 1300                                         User Key        Initials Effective Dates Name Provider Type Discipline    AG 21 -  Zahraa Almaraz, PT Physical Therapist PT                  PT Recommendation and Plan  Anticipated Discharge Disposition (PT): skilled nursing facility  Planned Therapy Interventions (PT): balance training, bed mobility training, gait training, home exercise program, transfer training, strengthening, patient/family education  Therapy Frequency (PT): other (see comments) (1-5 times/ week per priority)  Plan of Care Reviewed With: patient  Outcome Evaluation: PT evaluation complete.  Pt. exhibits B hip weakness, L>R LE; min A for bed mobilty skills; able to perform 2 x brief STS w/ RW at bedside but sits abruptly d/t LE buckling.  Will continue to follow and progress as tolerated.       Time Calculation:    PT Charges       Row Name 24 1300             Time Calculation    PT Received On 24  -      PT Goal Re-Cert Due Date 24  -                User Key  (r) = Recorded By, (t) = Taken By, (c) = Cosigned By      Initials Name Provider Type    AG Zahraa Almaraz, PT Physical Therapist                  Therapy Charges for Today       Code Description Service Date Service Provider Modifiers Qty    84488754814 HC PT EVAL MOD COMPLEXITY 4 2024 Zahraa Almaraz, PT GP 1            PT G-Codes  AM-PAC 6 Clicks Score (PT): 6    Zahraa Almaraz PT  2024      Electronically signed by Zahraa Almaraz, PT at 24 1316          Occupational Therapy Notes (most recent note)        Jose Ramon Potts, OT at 24 1802          Acute Care - Occupational Therapy Initial Evaluation   [place]     Patient Name: Kennedy Prescott  : 1950  MRN: [id number]  Today's Date: 2024  Onset of Illness/Injury or Date of Surgery: 05/15/24     Referring Physician: Dr. Crum    Admit Date: 5/15/2024       ICD-10-CM ICD-9-CM   1. SIRS (systemic inflammatory response syndrome)  R65.10 995.90     Patient Active Problem List   Diagnosis     Scalp lesion    Skin ulcer of right heel, limited to breakdown of skin    Hidradenoma    Functional diarrhea    Adenomatous polyp of ascending colon    SIRS (systemic inflammatory response syndrome)    Sepsis     Past Medical History:   Diagnosis Date    CHF (congestive heart failure)     Coronary artery disease     Diabetes mellitus     Dialysis patient     Disease of thyroid gland     Elevated cholesterol     GERD (gastroesophageal reflux disease)     Hypertension     Myocardial infarction     Renal disorder     stage 5     Past Surgical History:   Procedure Laterality Date    CARDIAC SURGERY      cabg    CHOLECYSTECTOMY      COLONOSCOPY N/A 9/22/2023    Procedure: COLONOSCOPY;  Surgeon: Trip Peter MD;  Location: [facility];  Service: Gastroenterology;  Laterality: N/A;    DIALYSIS FISTULA CREATION Left     arm         OT ASSESSMENT FLOWSHEET (Last 12 Hours)       OT Evaluation and Treatment       Row Name 05/17/24 1740                   OT Time and Intention    Document Type evaluation  -KR        Mode of Treatment occupational therapy  -KR        Patient Effort fair  -KR        Symptoms Noted During/After Treatment nausea  -KR           Living Environment    Current Living Arrangements residential facility  -KR        People in Home facility resident  -KR        Primary Care Provided by --  self/staff  -KR           Cognition    Cognitive Status difficult to obtain accurate data at this time due to pt experiencing nausea  -KR        Affect/Mental Status (Cognition) flat/blunted affect  -KR        Orientation Status (Cognition) oriented to;person  -KR           Range of Motion Comprehensive    Comment, General Range of Motion BUE WFL  -KR           Strength Comprehensive (MMT)    Comment, General Manual Muscle Testing (MMT) Assessment BUE 3-/5  -KR           Activities of Daily Living    BADL Assessment/Intervention --  mod/max assist at this time due to nausea/general malaise  -KR           Wound 05/15/24  2225 Left posterior heel Diabetic Ulcer    Wound - Properties Group Placement Date: 05/15/24  -CL Placement Time: 2225  -CL Side: Left  -CL Orientation: posterior  -CL Location: heel  -CL Primary Wound Type: Diabetic ulc  -CL    Retired Wound - Properties Group Placement Date: 05/15/24  -CL Placement Time: 2225  -CL Side: Left  -CL Orientation: posterior  -CL Location: heel  -CL Primary Wound Type: Diabetic ulc  -CL    Retired Wound - Properties Group Date first assessed: 05/15/24  -CL Time first assessed: 2225  -CL Side: Left  -CL Location: heel  -CL Primary Wound Type: Diabetic ulc  -CL       Wound 05/15/24 2230 Left posterior fifth toe Diabetic Ulcer    Wound - Properties Group Placement Date: 05/15/24  -CL Placement Time: 2230  -CL Side: Left  -CL Orientation: posterior  -CL Location: fifth toe  -CL Primary Wound Type: Diabetic ulc  -CL    Retired Wound - Properties Group Placement Date: 05/15/24  -CL Placement Time: 2230  -CL Side: Left  -CL Orientation: posterior  -CL Location: fifth toe  -CL Primary Wound Type: Diabetic ulc  -CL    Retired Wound - Properties Group Date first assessed: 05/15/24  -CL Time first assessed: 2230  -CL Side: Left  -CL Location: fifth toe  -CL Primary Wound Type: Diabetic ulc  -CL       Wound 05/15/24 2230 Left lower leg Other (comment)    Wound - Properties Group Placement Date: 05/15/24  -CL Placement Time: 2230  -CL Side: Left  -CL Orientation: lower  -CL Location: leg  -CL Primary Wound Type: Other  -CL, scab diabteic ulcer?     Retired Wound - Properties Group Placement Date: 05/15/24  -CL Placement Time: 2230  -CL Side: Left  -CL Orientation: lower  -CL Location: leg  -CL Primary Wound Type: Other  -CL, scab diabteic ulcer?     Retired Wound - Properties Group Date first assessed: 05/15/24  -CL Time first assessed: 2230  -CL Side: Left  -CL Location: leg  -CL Primary Wound Type: Other  -CL, scab diabteic ulcer?        Plan of Care Review    Plan of Care Reviewed With  patient;son;family  -KR           Therapy Assessment/Plan (OT)    Planned Therapy Interventions (OT) activity tolerance training  -KR           Therapy Plan Review/Discharge Plan (OT)    Anticipated Discharge Disposition (OT) skilled nursing facility  -KR           OT Goals    Activity Tolerance Goal Selection (OT) activity tolerance, OT goal 1  -KR            Activity Tolerance Goal 1 (OT)    Activity Tolerance Goal 1 (OT) Increase to enhance self care/mobility performance  -KR        Activity Level (Endurance Goal 1, OT) 15 min activity  -KR        Time Frame (Activity Tolerance Goal 1, OT) by discharge  -KR                  User Key  (r) = Recorded By, (t) = Taken By, (c) = Cosigned By      Initials Name Effective Dates    KR Jose Ramon Potts OT 06/16/21 -     CL Lana Carney RN 11/29/22 -                            OT Recommendation and Plan  Planned Therapy Interventions (OT): activity tolerance training  Plan of Care Review  Plan of Care Reviewed With: patient, son, family  Plan of Care Reviewed With: patient, son, family        Time Calculation:     Therapy Charges for Today       Code Description Service Date Service Provider Modifiers Qty    [id number]  OT EVAL MOD COMPLEXITY 4 5/17/2024 Jose Ramon Potts OT GO 1                 Jose Ramon Potts OT  5/17/2024    Electronically signed by Jose Ramon Potts OT at 05/17/24 7031

## 2024-05-20 NOTE — PROGRESS NOTES
"Nephrology Progress Note  Chief complaint, nausea and vomiting    Subjective     No chest pain, shortness of breath lying on bed comfortably    Objective       Vital signs :     Temp:  [97.5 °F (36.4 °C)-99.5 °F (37.5 °C)] 98.2 °F (36.8 °C)  Heart Rate:  [61-78] 68  Resp:  [8-24] 20  BP: ()/(48-69) 115/57    Intake/Output                               05/18/24 0701 - 05/19/24 0700 05/19/24 0701 - 05/20/24 0700 05/20/24 0701 - 05/21/24 0700     [id number] 1394-6343 Total 0437-7309 2477-4654 Total 3094-8913 8566-7353 Total                    Intake    P.O.  360  -- 360  820  -- 820  360  -- 360    I.V.  301.8  -- 301.8  --  -- --  --  -- --    Total Intake 661.8 -- 661.8 820 -- 820 360 -- 360       Output    Urine  0  0 0  --  -- --  --  -- --    Total Output 0 0 0 -- -- -- -- -- --             Physical Exam:    General Appearance : Not in acute distress  Lungs : Bilateral decreased intensity of breath sounds  Heart :  regular rhythm & normal rate, normal S1, S2 and no murmur, no rub  Abdomen : Soft, nondistended  Extremities : No edema,   Neurologic :   No apparent focal neurological deficit, unable to assess fully      Laboratory Data :     Albumin Albumin   Date Value Ref Range Status   05/20/2024 3.1 (L) 3.5 - 5.2 g/dL Final   05/19/2024 3.1 (L) 3.5 - 5.2 g/dL Final   05/18/2024 3.3 (L) 3.5 - 5.2 g/dL Final      Magnesium Magnesium   Date Value Ref Range Status   05/20/2024 2.1 1.6 - 2.4 mg/dL Final   05/19/2024 2.0 1.6 - 2.4 mg/dL Final   05/18/2024 1.8 1.6 - 2.4 mg/dL Final          PTH               No results found for: \"PTH\"    CBC and coagulation:  Results from last 7 days   Lab Units 05/20/24  0109 05/19/24  0102 05/18/24  0408 05/17/24  0927 05/17/24  0218 05/16/24  0128 05/15/24  1639 05/15/24  1415   PROCALCITONIN ng/mL  --   --   --   --   --   --   --  0.64*   LACTATE mmol/L  --  1.0 1.0  --   --  2.1*   < > 2.3*   CRP mg/dL  --  15.62* 18.08*  --   --  14.13*  --  7.54*   WBC 10*3/mm3 13.00* " 11.08* 11.53* 9.52   < > 13.50*  --  17.01*   HEMOGLOBIN g/dL 8.5* 8.7* 8.8* 7.2*   < > 8.1*  --  9.7*   HEMATOCRIT % 25.8* 26.3* 27.0* 22.6*   < > 25.8*  --  30.4*   MCV fL 91.5 90.7 92.5 96.2   < > 95.9  --  94.4   MCHC g/dL 32.9 33.1 32.6 31.9   < > 31.4*  --  31.9   PLATELETS 10*3/mm3 116* 97* 116* 95*   < > 108*  --  138*   INR   --  1.31* 1.58* 1.52*  --  1.54*  --  1.35*   D DIMER QUANT MCGFEU/mL  --   --  4.40* 10.17*  --   --   --   --     < > = values in this interval not displayed.     Acid/base balance:  Results from last 7 days   Lab Units 05/17/24  1416 05/16/24  0440 05/15/24  1608   PH, ARTERIAL pH units 7.523* 7.421 7.470*   PO2 ART mm Hg 80.2* 97.9 50.7*   PCO2, ARTERIAL mm Hg 40.4 41.9 37.0   HCO3 ART mmol/L 33.2* 27.3* 26.9*     Renal and electrolytes:    Results from last 7 days   Lab Units 05/20/24  0109 05/19/24  0102 05/18/24  0408 05/17/24  0927 05/17/24  0218   SODIUM mmol/L 129* 127* 129* 133* 133*   POTASSIUM mmol/L 3.9 3.8 3.6 4.0 4.4   MAGNESIUM mg/dL 2.1 2.0 1.8  --  1.9   CHLORIDE mmol/L 87* 86* 87* 93* 93*   CO2 mmol/L 25.9 27.4 27.9 23.3 22.7   BUN mg/dL 56* 45* 30* 65* 63*   CREATININE mg/dL 8.29* 7.18* 5.54* 9.44* 8.89*   CALCIUM mg/dL 7.9* 8.1* 8.2* 8.1* 8.1*   PHOSPHORUS mg/dL 4.5 3.6 3.0  --  3.3     Estimated Creatinine Clearance: 9.5 mL/min (A) (by C-G formula based on SCr of 8.29 mg/dL (H)).  @GFRCG:3@   Liver and pancreatic function:  Results from last 7 days   Lab Units 05/20/24  0109 05/19/24  0102 05/18/24  0408   ALBUMIN g/dL 3.1* 3.1* 3.3*   BILIRUBIN mg/dL 0.4 0.4 0.5   ALK PHOS U/L 197* 163* 153*   AST (SGOT) U/L 23 27 31   ALT (SGPT) U/L 11 11 12         Cardiac:      Liver and pancreatic function:  Results from last 7 days   Lab Units 05/20/24  0109 05/19/24  0102 05/18/24  0408   ALBUMIN g/dL 3.1* 3.1* 3.3*   BILIRUBIN mg/dL 0.4 0.4 0.5   ALK PHOS U/L 197* 163* 153*   AST (SGOT) U/L 23 27 31   ALT (SGPT) U/L 11 11 12       Medications :     acetaminophen, 650 mg,  Oral, Once per day on Tuesday Thursday Saturday  ammonium lactate, 1 Application, Topical, Nightly  aspirin, 81 mg, Oral, Daily  atorvastatin, 20 mg, Oral, Daily  cetirizine, 10 mg, Oral, Daily  erythromycin, 1 Application, Left Eye, BID  famotidine, 20 mg, Oral, Q48H  gabapentin, 100 mg, Oral, Nightly  [Held by provider] insulin lispro, 2-7 Units, Subcutaneous, 4x Daily AC & at Bedtime  levothyroxine, 75 mcg, Oral, Daily  Lidocaine, 1 patch, Transdermal, Q PM  lubrisoft, 1 Application, Topical, Daily  megestrol, 40 mg, Oral, TID With Meals  [Held by provider] metoprolol succinate XL, 12.5 mg, Oral, Nightly  midodrine, 10 mg, Oral, TID AC  multivitamin with minerals, 1 tablet, Oral, Daily  sertraline, 50 mg, Oral, Nightly  sevelamer, 2,400 mg, Oral, TID With Meals  sodium chloride, 10 mL, Intravenous, Q12H  Vancomycin Pharmacy Intermittent/Pulse Dosing, , Does not apply, Daily               Assessment & Plan     -ESKD on intermittent dialysis  - Anemia  - Persistent nausea and vomiting  - Type 2 diabetes mellitus  - Essential hypertension    No emergent indication for dialysis.  Repeat blood cultures taken on 5/18 and 5/19 are negative so far.  Continue holding dialysis today as well and plan is to stick recently repaired AV fistula  Reviewed the labs reviewed chest x-ray and reviewed clinical notes from hospitalist team      Nova Saucedo MD  05/20/24  08:56 EDT

## 2024-05-21 LAB
ANION GAP SERPL CALCULATED.3IONS-SCNC: 15.9 MMOL/L (ref 5–15)
BASOPHILS # BLD AUTO: 0.05 10*3/MM3 (ref 0–0.2)
BASOPHILS NFR BLD AUTO: 0.4 % (ref 0–1.5)
BUN SERPL-MCNC: 70 MG/DL (ref 8–23)
BUN/CREAT SERPL: 6.8 (ref 7–25)
CALCIUM SPEC-SCNC: 8.1 MG/DL (ref 8.6–10.5)
CHLORIDE SERPL-SCNC: 90 MMOL/L (ref 98–107)
CO2 SERPL-SCNC: 25.1 MMOL/L (ref 22–29)
CREAT SERPL-MCNC: 10.34 MG/DL (ref 0.76–1.27)
DEPRECATED RDW RBC AUTO: 48.7 FL (ref 37–54)
EGFRCR SERPLBLD CKD-EPI 2021: 4.8 ML/MIN/1.73
EOSINOPHIL # BLD AUTO: 0.26 10*3/MM3 (ref 0–0.4)
EOSINOPHIL NFR BLD AUTO: 2.1 % (ref 0.3–6.2)
ERYTHROCYTE [DISTWIDTH] IN BLOOD BY AUTOMATED COUNT: 14.3 % (ref 12.3–15.4)
GLUCOSE BLDC GLUCOMTR-MCNC: 187 MG/DL (ref 70–130)
GLUCOSE BLDC GLUCOMTR-MCNC: 242 MG/DL (ref 70–130)
GLUCOSE BLDC GLUCOMTR-MCNC: 301 MG/DL (ref 70–130)
GLUCOSE BLDC GLUCOMTR-MCNC: 76 MG/DL (ref 70–130)
GLUCOSE SERPL-MCNC: 99 MG/DL (ref 65–99)
HCT VFR BLD AUTO: 25.9 % (ref 37.5–51)
HGB BLD-MCNC: 8.5 G/DL (ref 13–17.7)
IMM GRANULOCYTES # BLD AUTO: 0.13 10*3/MM3 (ref 0–0.05)
IMM GRANULOCYTES NFR BLD AUTO: 1.1 % (ref 0–0.5)
LYMPHOCYTES # BLD AUTO: 1.26 10*3/MM3 (ref 0.7–3.1)
LYMPHOCYTES NFR BLD AUTO: 10.4 % (ref 19.6–45.3)
MCH RBC QN AUTO: 30.6 PG (ref 26.6–33)
MCHC RBC AUTO-ENTMCNC: 32.8 G/DL (ref 31.5–35.7)
MCV RBC AUTO: 93.2 FL (ref 79–97)
MONOCYTES # BLD AUTO: 1.27 10*3/MM3 (ref 0.1–0.9)
MONOCYTES NFR BLD AUTO: 10.5 % (ref 5–12)
NEUTROPHILS NFR BLD AUTO: 75.5 % (ref 42.7–76)
NEUTROPHILS NFR BLD AUTO: 9.15 10*3/MM3 (ref 1.7–7)
NRBC BLD AUTO-RTO: 0 /100 WBC (ref 0–0.2)
PLATELET # BLD AUTO: 140 10*3/MM3 (ref 140–450)
PMV BLD AUTO: 10.7 FL (ref 6–12)
POTASSIUM SERPL-SCNC: 4.1 MMOL/L (ref 3.5–5.2)
RBC # BLD AUTO: 2.78 10*6/MM3 (ref 4.14–5.8)
SODIUM SERPL-SCNC: 131 MMOL/L (ref 136–145)
VANCOMYCIN SERPL-MCNC: 21.4 MCG/ML (ref 5–40)
WBC NRBC COR # BLD AUTO: 12.12 10*3/MM3 (ref 3.4–10.8)

## 2024-05-21 PROCEDURE — 25010000002 HEPARIN (PORCINE) PER 1000 UNITS: Performed by: INTERNAL MEDICINE

## 2024-05-21 PROCEDURE — 85025 COMPLETE CBC W/AUTO DIFF WBC: CPT | Performed by: INTERNAL MEDICINE

## 2024-05-21 PROCEDURE — 25810000003 SODIUM CHLORIDE 0.9 % SOLUTION 250 ML FLEX CONT: Performed by: INTERNAL MEDICINE

## 2024-05-21 PROCEDURE — 80202 ASSAY OF VANCOMYCIN: CPT

## 2024-05-21 PROCEDURE — 25010000002 VANCOMYCIN 1 G RECONSTITUTED SOLUTION 1 EACH VIAL: Performed by: INTERNAL MEDICINE

## 2024-05-21 PROCEDURE — 99232 SBSQ HOSP IP/OBS MODERATE 35: CPT | Performed by: NURSE PRACTITIONER

## 2024-05-21 PROCEDURE — 80048 BASIC METABOLIC PNL TOTAL CA: CPT | Performed by: INTERNAL MEDICINE

## 2024-05-21 PROCEDURE — 99232 SBSQ HOSP IP/OBS MODERATE 35: CPT | Performed by: INTERNAL MEDICINE

## 2024-05-21 PROCEDURE — 82948 REAGENT STRIP/BLOOD GLUCOSE: CPT

## 2024-05-21 RX ORDER — CETIRIZINE HYDROCHLORIDE 10 MG/1
5 TABLET ORAL DAILY
Status: DISCONTINUED | OUTPATIENT
Start: 2024-05-22 | End: 2024-05-30 | Stop reason: HOSPADM

## 2024-05-21 RX ADMIN — GABAPENTIN 100 MG: 100 CAPSULE ORAL at 21:20

## 2024-05-21 RX ADMIN — SEVELAMER CARBONATE 2400 MG: 800 TABLET, FILM COATED ORAL at 21:19

## 2024-05-21 RX ADMIN — SEVELAMER CARBONATE 2400 MG: 800 TABLET, FILM COATED ORAL at 12:14

## 2024-05-21 RX ADMIN — MEGESTROL ACETATE 40 MG: 40 TABLET ORAL at 18:14

## 2024-05-21 RX ADMIN — ATORVASTATIN CALCIUM 20 MG: 20 TABLET, FILM COATED ORAL at 10:39

## 2024-05-21 RX ADMIN — HEPARIN SODIUM 5000 UNITS: 5000 INJECTION INTRAVENOUS; SUBCUTANEOUS at 21:18

## 2024-05-21 RX ADMIN — HEPARIN SODIUM 5000 UNITS: 5000 INJECTION INTRAVENOUS; SUBCUTANEOUS at 05:48

## 2024-05-21 RX ADMIN — CARBIDOPA AND LEVODOPA 10 MG: 50; 200 TABLET, EXTENDED RELEASE ORAL at 18:15

## 2024-05-21 RX ADMIN — CARBIDOPA AND LEVODOPA 10 MG: 50; 200 TABLET, EXTENDED RELEASE ORAL at 10:38

## 2024-05-21 RX ADMIN — Medication 1 TABLET: at 10:39

## 2024-05-21 RX ADMIN — VANCOMYCIN HYDROCHLORIDE 1000 MG: 1 INJECTION, POWDER, LYOPHILIZED, FOR SOLUTION INTRAVENOUS at 18:13

## 2024-05-21 RX ADMIN — Medication 10 ML: at 10:42

## 2024-05-21 RX ADMIN — FAMOTIDINE 20 MG: 20 TABLET, FILM COATED ORAL at 21:23

## 2024-05-21 RX ADMIN — SEVELAMER CARBONATE 2400 MG: 800 TABLET, FILM COATED ORAL at 10:38

## 2024-05-21 RX ADMIN — HEPARIN SODIUM 5000 UNITS: 5000 INJECTION INTRAVENOUS; SUBCUTANEOUS at 15:40

## 2024-05-21 RX ADMIN — ERYTHROMYCIN 1 APPLICATION: 5 OINTMENT OPHTHALMIC at 10:39

## 2024-05-21 RX ADMIN — MEGESTROL ACETATE 40 MG: 40 TABLET ORAL at 10:56

## 2024-05-21 RX ADMIN — Medication 10 ML: at 21:20

## 2024-05-21 RX ADMIN — ACETAMINOPHEN 650 MG: 325 TABLET ORAL at 21:19

## 2024-05-21 RX ADMIN — ASPIRIN 81 MG: 81 TABLET, COATED ORAL at 10:39

## 2024-05-21 RX ADMIN — Medication 1 APPLICATION: at 12:14

## 2024-05-21 RX ADMIN — LEVOTHYROXINE SODIUM 75 MCG: 0.07 TABLET ORAL at 10:38

## 2024-05-21 RX ADMIN — SERTRALINE 50 MG: 50 TABLET, FILM COATED ORAL at 21:20

## 2024-05-21 RX ADMIN — AMMONIUM LACTATE 1 APPLICATION: 120 CREAM TOPICAL at 21:20

## 2024-05-21 RX ADMIN — ERYTHROMYCIN 1 APPLICATION: 5 OINTMENT OPHTHALMIC at 21:19

## 2024-05-21 RX ADMIN — MEGESTROL ACETATE 40 MG: 40 TABLET ORAL at 12:14

## 2024-05-21 RX ADMIN — CARBIDOPA AND LEVODOPA 10 MG: 50; 200 TABLET, EXTENDED RELEASE ORAL at 10:42

## 2024-05-21 NOTE — PLAN OF CARE
Goal Outcome Evaluation:  Plan of Care Reviewed With: patient        Progress: improving  Outcome Evaluation: Patient resting in bed at this time. VSS on 2L NC. Wound care completed per orders. Cindy voices no concerns at this time. Will continue with plan of care.

## 2024-05-21 NOTE — PROGRESS NOTES
[facility] HOSPITALIST PROGRESS NOTE    Subjective     History:   Kennedy Prescott is a 73 y.o. male admitted on 5/15/2024 secondary to SIRS (systemic inflammatory response syndrome)     Procedures:   5/17/24: Removal of tunneled HD catheter     CC: Follow up sepsis, bacteremia     Patient seen and examined. Awake and alert. No reported CP, dyspnea or palpitations. No reported vomiting. (+) BM. No acute events overnight per RN.     History taken from: patient, chart, and RN.      Objective     Vital Signs  Temp:  [97.6 °F (36.4 °C)-98.3 °F (36.8 °C)] 98.1 °F (36.7 °C)  Heart Rate:  [58-84] 81  Resp:  [18-20] 20  BP: (108-132)/(49-70) 130/62    Intake/Output Summary (Last 24 hours) at 5/21/2024 1710  Last data filed at 5/21/2024 1600  Gross per 24 hour   Intake 780 ml   Output 100 ml   Net 680 ml         Physical Exam: Unchanged from previous.   General:    Awake, alert, in no acute distress, chronically ill appearing   Heart:      Normal S1 and S2. Irregular.    Lungs:     Respirations regular, even and unlabored. Lungs clear to auscultation B/L. No wheezes, rales or rhonchi.   Abdomen:   Soft and nontender. No guarding, rebound tenderness or  organomegaly noted. Bowel sounds present x 4.   Extremities:  No clubbing, cyanosis or edema noted.      Results Review:    Results from last 7 days   Lab Units 05/21/24  0321 05/20/24  0109 05/19/24  0102 05/18/24  0408 05/17/24  0927 05/17/24  0218 05/16/24  0128   WBC 10*3/mm3 12.12* 13.00* 11.08* 11.53* 9.52 10.71 13.50*   HEMOGLOBIN g/dL 8.5* 8.5* 8.7* 8.8* 7.2* 7.8* 8.1*   PLATELETS 10*3/mm3 140 116* 97* 116* 95* 108* 108*     Results from last 7 days   Lab Units 05/21/24  0321 05/20/24  0109 05/19/24  0102 05/18/24  0408 05/17/24  0927 05/17/24  0218 05/16/24  0128   SODIUM mmol/L 131* 129* 127* 129* 133* 133* 135*   POTASSIUM mmol/L 4.1 3.9 3.8 3.6 4.0 4.4 4.1   CHLORIDE mmol/L 90* 87* 86* 87* 93* 93* 95*   CO2 mmol/L 25.1 25.9 27.4 27.9 23.3 22.7 24.7    BUN mg/dL 70* 56* 45* 30* 65* 63* 50*   CREATININE mg/dL 10.34* 8.29* 7.18* 5.54* 9.44* 8.89* 7.86*   CALCIUM mg/dL 8.1* 7.9* 8.1* 8.2* 8.1* 8.1* 8.3*   GLUCOSE mg/dL 99 128* 96 164* 51* 68 94     Results from last 7 days   Lab Units 05/20/24  0109 05/19/24  0102 05/18/24  0408 05/17/24  0218 05/16/24  0128 05/15/24  1415 05/15/24  1128   BILIRUBIN mg/dL 0.4 0.4 0.5 0.3 0.3 0.5 0.4   ALK PHOS U/L 197* 163* 153* 137* 143* 184* 176*   AST (SGOT) U/L 23 27 31 27 15 16 16   ALT (SGPT) U/L 11 11 12 10 7 8 9     Results from last 7 days   Lab Units 05/20/24  0109 05/19/24  0102 05/18/24  0408 05/17/24  0218 05/16/24  0128   MAGNESIUM mg/dL 2.1 2.0 1.8 1.9 1.8     Results from last 7 days   Lab Units 05/19/24  0102 05/18/24  0408 05/17/24  0927 05/16/24  0128 05/15/24  1415   INR  1.31* 1.58* 1.52* 1.54* 1.35*     Results from last 7 days   Lab Units 05/16/24  0128 05/15/24  1954 05/15/24  1639   HSTROP T ng/L 221* 173* 160*       Imaging Results (Last 24 Hours)       ** No results found for the last 24 hours. **              Medications:  acetaminophen, 650 mg, Oral, Once per day on Tuesday Thursday Saturday  ammonium lactate, 1 Application, Topical, Nightly  aspirin, 81 mg, Oral, Daily  atorvastatin, 20 mg, Oral, Daily  [START ON 5/22/2024] cetirizine, 5 mg, Oral, Daily  erythromycin, 1 Application, Left Eye, BID  famotidine, 20 mg, Oral, Q48H  gabapentin, 100 mg, Oral, Nightly  heparin (porcine), 5,000 Units, Subcutaneous, Q8H  [Held by provider] insulin lispro, 2-7 Units, Subcutaneous, 4x Daily AC & at Bedtime  levothyroxine, 75 mcg, Oral, Daily  Lidocaine, 1 patch, Transdermal, Q PM  lubrisoft, 1 Application, Topical, Daily  megestrol, 40 mg, Oral, TID With Meals  [Held by provider] metoprolol succinate XL, 12.5 mg, Oral, Nightly  midodrine, 10 mg, Oral, TID AC  multivitamin with minerals, 1 tablet, Oral, Daily  sertraline, 50 mg, Oral, Nightly  sevelamer, 2,400 mg, Oral, TID With Meals  sodium chloride, 10 mL,  Intravenous, Q12H  vancomycin, 1,000 mg, Intravenous, Once  Vancomycin Pharmacy Intermittent/Pulse Dosing, , Does not apply, Daily               Assessment & Plan   Septic shock: Likely 2/2 MRSA bacteremia with suspected infected HD catheter. Afebrile. Previously required Levophed but BP now stable off vasopressor support. WBC stable today and overall improved from upon admission. Lactate has normalized. Cultures as below. Cont Vanc. Cont midodrine. Follow cultures and repeat labs in the AM.     MRSA bacteremia: Likely 2/2 infected HD catheter which has been removed. TTE reveals an EF of 51-55% but valves not well visualized. Blood cultures from 5/15 and 5/17 positive. Cultures from 5/18 and 5/19 with NGTD. Consult cardiology for consideration of GAVI. Cont Vanc. Cont to follow cultures. ID input appreciated.     Acute diastolic CHF: Echo reveals normal EF. Improved with HD. Cont to monitor volume status.     Acute hypoxic respiratory failure: Likely 2/2 above. No evidence of PE or pneumonia on CT. Currently stable on 2L's NC.     NSTEMI: Suspected type II in the setting of above. Normal EF on echo. Cont ASA and statin.     ESRD on HD: Tunneled HD catheter removed. Plan for HD via LUE AV fistula. Management per nephrology with input appreciated.     Paroxysmal Afib: Currently rate controlled. Holding metoprolol in the setting of hypotension. Monitor on telemetry.     DM II, insulin dependent: Episodes of hypoglycemia improved. Holding insulin  Cont to monitor.     PVD: Cont ASA and statin.     DVT PPX: SQ heparin    Disposition Likely return to SNF when tolerating HD and antibiotic regimen finalized.     Donnell Hines DO  05/21/24  17:10 EDT

## 2024-05-21 NOTE — PROGRESS NOTES
"Nephrology Progress Note  Chief complaint, nausea and vomiting    Subjective     Had some shortness of breath that gets worse on exertion.  No chest pain    Objective       Vital signs :     Temp:  [97.6 °F (36.4 °C)-98.3 °F (36.8 °C)] 97.7 °F (36.5 °C)  Heart Rate:  [58-84] 84  Resp:  [11-23] 20  BP: ()/(41-75) 118/56    Intake/Output                         05/19/24 0701 - 05/20/24 0700 05/20/24 0701 - 05/21/24 0700     [id number] 9479-5939 Total 0713-8991 9849-0258 Total                 Intake    P.O.  820  -- 820  600  120 720    I.V.  --  -- --  --  0 0    Total Intake 820 -- 820 600 120 720       Output    Total Output -- -- -- -- -- --             Physical Exam:    General Appearance : Not in acute distress  Lungs : Bilateral decreased intensity of breath sounds  Heart :  regular rhythm & normal rate, normal S1, S2 and no murmur, no rub  Abdomen : Soft, nondistended  Extremities : No edema,   Neurologic :   No apparent focal neurological deficit, unable to assess fully      Laboratory Data :     Albumin Albumin   Date Value Ref Range Status   05/20/2024 3.1 (L) 3.5 - 5.2 g/dL Final   05/19/2024 3.1 (L) 3.5 - 5.2 g/dL Final      Magnesium Magnesium   Date Value Ref Range Status   05/20/2024 2.1 1.6 - 2.4 mg/dL Final   05/19/2024 2.0 1.6 - 2.4 mg/dL Final          PTH               No results found for: \"PTH\"    CBC and coagulation:  Results from last 7 days   Lab Units 05/21/24  0321 05/20/24  0109 05/19/24  0102 05/18/24  0408 05/17/24  0927 05/17/24  0218 05/16/24  0128 05/15/24  1639 05/15/24  1415   PROCALCITONIN ng/mL  --   --   --   --   --   --   --   --  0.64*   LACTATE mmol/L  --   --  1.0 1.0  --   --  2.1*   < > 2.3*   CRP mg/dL  --   --  15.62* 18.08*  --   --  14.13*  --  7.54*   WBC 10*3/mm3 12.12* 13.00* 11.08* 11.53* 9.52   < > 13.50*  --  17.01*   HEMOGLOBIN g/dL 8.5* 8.5* 8.7* 8.8* 7.2*   < > 8.1*  --  9.7*   HEMATOCRIT % 25.9* 25.8* 26.3* 27.0* 22.6*   < > 25.8*  --  30.4*   MCV fL 93.2 " 91.5 90.7 92.5 96.2   < > 95.9  --  94.4   MCHC g/dL 32.8 32.9 33.1 32.6 31.9   < > 31.4*  --  31.9   PLATELETS 10*3/mm3 140 116* 97* 116* 95*   < > 108*  --  138*   INR   --   --  1.31* 1.58* 1.52*  --  1.54*  --  1.35*   D DIMER QUANT MCGFEU/mL  --   --   --  4.40* 10.17*  --   --   --   --     < > = values in this interval not displayed.     Acid/base balance:  Results from last 7 days   Lab Units 05/17/24  1416 05/16/24  0440 05/15/24  1608   PH, ARTERIAL pH units 7.523* 7.421 7.470*   PO2 ART mm Hg 80.2* 97.9 50.7*   PCO2, ARTERIAL mm Hg 40.4 41.9 37.0   HCO3 ART mmol/L 33.2* 27.3* 26.9*     Renal and electrolytes:    Results from last 7 days   Lab Units 05/21/24  0321 05/20/24  0109 05/19/24  0102 05/18/24  0408 05/17/24  0927   SODIUM mmol/L 131* 129* 127* 129* 133*   POTASSIUM mmol/L 4.1 3.9 3.8 3.6 4.0   MAGNESIUM mg/dL  --  2.1 2.0 1.8  --    CHLORIDE mmol/L 90* 87* 86* 87* 93*   CO2 mmol/L 25.1 25.9 27.4 27.9 23.3   BUN mg/dL 70* 56* 45* 30* 65*   CREATININE mg/dL 10.34* 8.29* 7.18* 5.54* 9.44*   CALCIUM mg/dL 8.1* 7.9* 8.1* 8.2* 8.1*   PHOSPHORUS mg/dL  --  4.5 3.6 3.0  --      Estimated Creatinine Clearance: 7.6 mL/min (A) (by C-G formula based on SCr of 10.34 mg/dL (H)).  @GFRCG:3@   Liver and pancreatic function:  Results from last 7 days   Lab Units 05/20/24  0109 05/19/24  0102 05/18/24  0408   ALBUMIN g/dL 3.1* 3.1* 3.3*   BILIRUBIN mg/dL 0.4 0.4 0.5   ALK PHOS U/L 197* 163* 153*   AST (SGOT) U/L 23 27 31   ALT (SGPT) U/L 11 11 12         Cardiac:      Liver and pancreatic function:  Results from last 7 days   Lab Units 05/20/24  0109 05/19/24  0102 05/18/24  0408   ALBUMIN g/dL 3.1* 3.1* 3.3*   BILIRUBIN mg/dL 0.4 0.4 0.5   ALK PHOS U/L 197* 163* 153*   AST (SGOT) U/L 23 27 31   ALT (SGPT) U/L 11 11 12       Medications :     acetaminophen, 650 mg, Oral, Once per day on Tuesday Thursday Saturday  ammonium lactate, 1 Application, Topical, Nightly  aspirin, 81 mg, Oral, Daily  atorvastatin, 20 mg,  Oral, Daily  cetirizine, 10 mg, Oral, Daily  erythromycin, 1 Application, Left Eye, BID  famotidine, 20 mg, Oral, Q48H  gabapentin, 100 mg, Oral, Nightly  heparin (porcine), 5,000 Units, Subcutaneous, Q8H  [Held by provider] insulin lispro, 2-7 Units, Subcutaneous, 4x Daily AC & at Bedtime  levothyroxine, 75 mcg, Oral, Daily  Lidocaine, 1 patch, Transdermal, Q PM  lubrisoft, 1 Application, Topical, Daily  megestrol, 40 mg, Oral, TID With Meals  [Held by provider] metoprolol succinate XL, 12.5 mg, Oral, Nightly  midodrine, 10 mg, Oral, TID AC  multivitamin with minerals, 1 tablet, Oral, Daily  sertraline, 50 mg, Oral, Nightly  sevelamer, 2,400 mg, Oral, TID With Meals  sodium chloride, 10 mL, Intravenous, Q12H  Vancomycin Pharmacy Intermittent/Pulse Dosing, , Does not apply, Daily               Assessment & Plan     -ESKD on intermittent dialysis  - Anemia  - Persistent nausea and vomiting  - Type 2 diabetes mellitus  - Essential hypertension    Blood cultures remains negative, will do dialysis today.  Will try to cannulate brachiocephalic fistula and if successful cannulation followed by dialysis patient can be discharged home from nephrology standpoint and follow-up outpatient dialysis clinic for intermittent dialysis and IV antibiotics    , Reviewed notes, reviewed labs  Discussed the case with primary team      Nova Saucedo MD  05/21/24  07:13 EDT

## 2024-05-21 NOTE — PROGRESS NOTES
PROGRESS NOTE         Patient Identification:  Name:  Kennedy Prescott  Age:  73 y.o.  Sex:  male  :  1950  MRN:  [id number]  Visit Number:  [id number]  Primary Care Provider:  Jag Guillaume MD         LOS: 5 days       ----------------------------------------------------------------------------------------------------------------------  Subjective       Chief Complaints:    Nausea and Vomiting        Interval History:      The patient is awake and alert, sitting up comfortably in bed eating breakfast.  On 2 L nasal cannula no apparent distress.  Afebrile.  Denies diarrhea.  Lung exam is diminished to auscultation bilaterally.  Abdomen soft and nontender with normoactive bowel sounds.  Right subclavian HD side remains dressed with occlusive dressing, no surrounding erythema or edema.  No drainage.  Leukocytosis improving at 12.12.  Blood cultures from  preliminarily report no growth at 3 days.  Blood cultures from  preliminarily report no growth at 24 hours.  Transthoracic echocardiogram reported may consider transesophageal echocardiographic study to have a better evaluation of cardiac valves and to rule out endocardial vegetations if clinically warranted.      Review of Systems:    Constitutional: no fever, chills and night sweats.  Generalized fatigue.  Eyes: no eye drainage, itching or redness.  HEENT: no mouth sores, dysphagia or nose bleed.  Respiratory: no for shortness of breath, cough or production of sputum.  Cardiovascular: no chest pain, no palpitations, no orthopnea.  Gastrointestinal: no nausea, vomiting or diarrhea. No abdominal pain, hematemesis or rectal bleeding.  Genitourinary: no dysuria or polyuria.  Hematologic/lymphatic: no lymph node abnormalities, no easy bruising or easy bleeding.  Musculoskeletal: no muscle or joint pain.  Skin: No rash and no itching.  Neurological: no loss of consciousness, no seizure, no headache.  Behavioral/Psych: no depression or  suicidal ideation.  Endocrine: no hot flashes.  Immunologic: negative.    ----------------------------------------------------------------------------------------------------------------------      Objective       Current [facility] Meds:  acetaminophen, 650 mg, Oral, Once per day on Tuesday Thursday Saturday  ammonium lactate, 1 Application, Topical, Nightly  aspirin, 81 mg, Oral, Daily  atorvastatin, 20 mg, Oral, Daily  [START ON 5/22/2024] cetirizine, 5 mg, Oral, Daily  erythromycin, 1 Application, Left Eye, BID  famotidine, 20 mg, Oral, Q48H  gabapentin, 100 mg, Oral, Nightly  heparin (porcine), 5,000 Units, Subcutaneous, Q8H  [Held by provider] insulin lispro, 2-7 Units, Subcutaneous, 4x Daily AC & at Bedtime  levothyroxine, 75 mcg, Oral, Daily  Lidocaine, 1 patch, Transdermal, Q PM  lubrisoft, 1 Application, Topical, Daily  megestrol, 40 mg, Oral, TID With Meals  [Held by provider] metoprolol succinate XL, 12.5 mg, Oral, Nightly  midodrine, 10 mg, Oral, TID AC  multivitamin with minerals, 1 tablet, Oral, Daily  sertraline, 50 mg, Oral, Nightly  sevelamer, 2,400 mg, Oral, TID With Meals  sodium chloride, 10 mL, Intravenous, Q12H  vancomycin, 1,000 mg, Intravenous, Once  Vancomycin Pharmacy Intermittent/Pulse Dosing, , Does not apply, Daily           ----------------------------------------------------------------------------------------------------------------------    Vital Signs:  Temp:  [97.6 °F (36.4 °C)-98.3 °F (36.8 °C)] 97.7 °F (36.5 °C)  Heart Rate:  [58-84] 84  Resp:  [11-20] 20  BP: (106-140)/(49-75) 118/56  Mean Arterial Pressure (Non-Invasive) for the past 24 hrs (Last 3 readings):   Noninvasive MAP (mmHg)   05/20/24 1724 92   05/20/24 1500 110   05/20/24 1400 106     SpO2 Percentage    05/21/24 0000 05/21/24 0300 05/21/24 0600   SpO2: 98% 98% 97%     SpO2:  [97 %-100 %] 97 %  on  Flow (L/min):  [2] 2;   Device (Oxygen Therapy): nasal cannula    Body mass index is 32.3 kg/m².  Wt Readings from Last 3  Encounters:   05/20/24 105 kg (231 lb 9.6 oz)   10/02/23 87.1 kg (192 lb)   09/22/23 87.1 kg (192 lb)        Intake/Output Summary (Last 24 hours) at 5/21/2024 1214  Last data filed at 5/21/2024 0500  Gross per 24 hour   Intake 120 ml   Output --   Net 120 ml     Diet: Regular/House; Fluid Consistency: Thin (IDDSI 0)  ----------------------------------------------------------------------------------------------------------------------      Physical Exam:    Constitutional: Chronically ill-appearing elderly gentleman.  On 2 L nasal cannula with no apparent distress.  Sitting up comfortably eating breakfast.  Denies any complaints.  HENT:  Head: Normocephalic and atraumatic.  Mouth:  Moist mucous membranes.    Eyes:  Conjunctivae and EOM are normal.  No scleral icterus.  Neck:  Neck supple.  No JVD present.    Cardiovascular:  Normal rate, regular rhythm and normal heart sounds with 3/6 murmur. No edema.  Pulmonary/Chest: Lung exam is diminished to auscultation bilaterally   abdominal:  Soft.  Bowel sounds are normal.  No distension and no tenderness.   Musculoskeletal:  No edema, no tenderness, and no deformity.  No swelling or redness of joints.  Neurological:  Alert and oriented to person, place, and time.  No facial droop.  No slurred speech.   Skin:  Skin is warm and dry.  No rash noted.  No pallor.  Right subclavian HD cath site dressed with occlusive dressing, no surrounding erythema or edema.  No drainage on the dressing.  LUE AVF with no erythema/edema, thrill and bruit.  Bilateral lower extremity chronic ischemic changes.  Left pedal pulse faint.  Dry ulcers noted to the left lateral foot and left heel.  Psychiatric:  Normal mood and affect.  Behavior is normal.        ----------------------------------------------------------------------------------------------------------------------  Results from last 7 days   Lab Units 05/16/24  0128 05/15/24  1954 05/15/24  1639   HSTROP T ng/L 221* 173* 160*      "  Results from last 7 days   Lab Units 05/16/24  0128   CHOLESTEROL mg/dL 48   TRIGLYCERIDES mg/dL 99   HDL CHOL mg/dL 22*   LDL CHOL mg/dL 6     Results from last 7 days   Lab Units 05/17/24  1416   PH, ARTERIAL pH units 7.523*   PO2 ART mm Hg 80.2*   PCO2, ARTERIAL mm Hg 40.4   HCO3 ART mmol/L 33.2*     Results from last 7 days   Lab Units 05/21/24 0321 05/20/24 0109 05/19/24 0102 05/18/24  0408 05/17/24  0927 05/17/24  0218 05/16/24  0128 05/15/24  1639 05/15/24  1415   CRP mg/dL  --   --  15.62* 18.08*  --   --  14.13*  --  7.54*   LACTATE mmol/L  --   --  1.0 1.0  --   --  2.1*   < > 2.3*   WBC 10*3/mm3 12.12* 13.00* 11.08* 11.53* 9.52   < > 13.50*  --  17.01*   HEMOGLOBIN g/dL 8.5* 8.5* 8.7* 8.8* 7.2*   < > 8.1*  --  9.7*   HEMATOCRIT % 25.9* 25.8* 26.3* 27.0* 22.6*   < > 25.8*  --  30.4*   MCV fL 93.2 91.5 90.7 92.5 96.2   < > 95.9  --  94.4   MCHC g/dL 32.8 32.9 33.1 32.6 31.9   < > 31.4*  --  31.9   PLATELETS 10*3/mm3 140 116* 97* 116* 95*   < > 108*  --  138*   INR   --   --  1.31* 1.58* 1.52*  --  1.54*  --  1.35*    < > = values in this interval not displayed.     Results from last 7 days   Lab Units 05/21/24 0321 05/20/24 0109 05/19/24 0102 05/18/24  0408   SODIUM mmol/L 131* 129* 127* 129*   POTASSIUM mmol/L 4.1 3.9 3.8 3.6   MAGNESIUM mg/dL  --  2.1 2.0 1.8   CHLORIDE mmol/L 90* 87* 86* 87*   CO2 mmol/L 25.1 25.9 27.4 27.9   BUN mg/dL 70* 56* 45* 30*   CREATININE mg/dL 10.34* 8.29* 7.18* 5.54*   CALCIUM mg/dL 8.1* 7.9* 8.1* 8.2*   GLUCOSE mg/dL 99 128* 96 164*   ALBUMIN g/dL  --  3.1* 3.1* 3.3*   BILIRUBIN mg/dL  --  0.4 0.4 0.5   ALK PHOS U/L  --  197* 163* 153*   AST (SGOT) U/L  --  23 27 31   ALT (SGPT) U/L  --  11 11 12   Estimated Creatinine Clearance: 7.6 mL/min (A) (by C-G formula based on SCr of 10.34 mg/dL (H)).  No results found for: \"AMMONIA\"    Glucose   Date/Time Value Ref Range Status   05/21/2024 0627 76 70 - 130 mg/dL Final   05/20/2024 1955 211 (H) 70 - 130 mg/dL Final " "  05/20/2024 1811 169 (H) 70 - 130 mg/dL Final   05/20/2024 1122 105 70 - 130 mg/dL Final   05/20/2024 0727 90 70 - 130 mg/dL Final   05/20/2024 0432 85 70 - 130 mg/dL Final   05/19/2024 2059 154 (H) 70 - 130 mg/dL Final   05/19/2024 1728 110 70 - 130 mg/dL Final     Lab Results   Component Value Date    HGBA1C 6.40 (H) 03/21/2024     Lab Results   Component Value Date    TSH 2.490 03/21/2024    FREET4 1.43 05/29/2022       Blood Culture   Date Value Ref Range Status   05/15/2024 Staphylococcus aureus, MRSA (C)  Preliminary     Comment:       Infectious disease consultation is highly recommended to rule out distant foci of infection.  Methicillin resistant Staphylococcus aureus, Patient may be an isolation risk.   05/15/2024 Staphylococcus aureus, MRSA (C)  Preliminary     Comment:       Infectious disease consultation is highly recommended to rule out distant foci of infection.  Methicillin resistant Staphylococcus aureus, Patient may be an isolation risk.     No results found for: \"URINECX\"  No results found for: \"WOUNDCX\"  No results found for: \"STOOLCX\"  No results found for: \"RESPCX\"  Pain Management Panel           No data to display                  ----------------------------------------------------------------------------------------------------------------------  Imaging Results (Last 24 Hours)       ** No results found for the last 24 hours. **            ----------------------------------------------------------------------------------------------------------------------    Pertinent Infectious Disease Results                Assessment/Plan       Assessment     Sepsis on admission  MRSA bacteremia  Chronic left foot wounds        Plan      The patient is awake and alert, sitting up comfortably in bed eating breakfast.  On 2 L nasal cannula no apparent distress.  Afebrile.  Denies diarrhea.  Lung exam is diminished to auscultation bilaterally.  Abdomen soft and nontender with normoactive bowel sounds.  " Right subclavian HD side remains dressed with occlusive dressing, no surrounding erythema or edema.  No drainage.  Leukocytosis improving at 12.12.  Blood cultures from 5/18 preliminarily report no growth at 3 days.  Blood cultures from 5/19 preliminarily report no growth at 24 hours.  Transthoracic echocardiogram reported may consider transesophageal echocardiographic study to have a better evaluation of cardiac valves and to rule out endocardial vegetations if clinically warranted.    The patient has been receiving vancomycin pharmacy dosing in the setting of MRSA bacteremia.  Repeat blood cultures preliminarily report no growth at 3 days.  Transthoracic echocardiogram reported recommendation for transesophageal echocardiographic study for better evaluation of cardiac valves, recommend GAVI to rule out infective endocarditis.  We will monitor closely and adjust antibiotic coverage as appropriate.    ANTIMICROBIAL THERAPY    vancomycin (VANCOCIN) 1000mg in NS 250ml (CD)  Vancomycin Pharmacy Intermittent/Pulse Dosing     Code Status:   Code Status and Medical Interventions:   Ordered at: 05/15/24 9898     Level Of Support Discussed With:    Patient     Code Status (Patient has no pulse and is not breathing):    CPR (Attempt to Resuscitate)     Medical Interventions (Patient has pulse or is breathing):    Full Support       Johanna Marcum, DORA  05/21/24  12:14 EDT

## 2024-05-21 NOTE — PLAN OF CARE
Goal Outcome Evaluation:              Outcome Evaluation: Pt is resting in bed, VSS on 2L nc. Pts wound care completed per orders. No acute changes this shift, will continue with POC.

## 2024-05-21 NOTE — NURSING NOTE
Patient refused a bath this shift. Patient educated on the importance of daily hygiene to prevent infection. Patient verbalized understanding but still refused.

## 2024-05-22 ENCOUNTER — ANESTHESIA (OUTPATIENT)
Dept: CARDIOLOGY | Facility: HOSPITAL | Age: 74
End: 2024-05-22
Payer: MEDICARE

## 2024-05-22 ENCOUNTER — ANESTHESIA EVENT (OUTPATIENT)
Dept: CARDIOLOGY | Facility: HOSPITAL | Age: 74
End: 2024-05-22
Payer: MEDICARE

## 2024-05-22 ENCOUNTER — APPOINTMENT (OUTPATIENT)
Dept: CARDIOLOGY | Facility: HOSPITAL | Age: 74
DRG: 321 | End: 2024-05-22
Payer: MEDICARE

## 2024-05-22 VITALS — TEMPERATURE: 98 F | OXYGEN SATURATION: 100 % | DIASTOLIC BLOOD PRESSURE: 70 MMHG | SYSTOLIC BLOOD PRESSURE: 136 MMHG

## 2024-05-22 LAB
ANION GAP SERPL CALCULATED.3IONS-SCNC: 13.3 MMOL/L (ref 5–15)
BASOPHILS # BLD AUTO: 0.05 10*3/MM3 (ref 0–0.2)
BASOPHILS NFR BLD AUTO: 0.5 % (ref 0–1.5)
BUN SERPL-MCNC: 40 MG/DL (ref 8–23)
BUN/CREAT SERPL: 5.9 (ref 7–25)
CALCIUM SPEC-SCNC: 8.2 MG/DL (ref 8.6–10.5)
CHLORIDE SERPL-SCNC: 91 MMOL/L (ref 98–107)
CO2 SERPL-SCNC: 27.7 MMOL/L (ref 22–29)
CREAT SERPL-MCNC: 6.82 MG/DL (ref 0.76–1.27)
DEPRECATED RDW RBC AUTO: 48.1 FL (ref 37–54)
EGFRCR SERPLBLD CKD-EPI 2021: 7.8 ML/MIN/1.73
EOSINOPHIL # BLD AUTO: 0.18 10*3/MM3 (ref 0–0.4)
EOSINOPHIL NFR BLD AUTO: 1.7 % (ref 0.3–6.2)
ERYTHROCYTE [DISTWIDTH] IN BLOOD BY AUTOMATED COUNT: 14.1 % (ref 12.3–15.4)
GLUCOSE BLDC GLUCOMTR-MCNC: 116 MG/DL (ref 70–130)
GLUCOSE BLDC GLUCOMTR-MCNC: 118 MG/DL (ref 70–130)
GLUCOSE BLDC GLUCOMTR-MCNC: 146 MG/DL (ref 70–130)
GLUCOSE BLDC GLUCOMTR-MCNC: 178 MG/DL (ref 70–130)
GLUCOSE SERPL-MCNC: 204 MG/DL (ref 65–99)
HCT VFR BLD AUTO: 27.3 % (ref 37.5–51)
HGB BLD-MCNC: 8.6 G/DL (ref 13–17.7)
IMM GRANULOCYTES # BLD AUTO: 0.09 10*3/MM3 (ref 0–0.05)
IMM GRANULOCYTES NFR BLD AUTO: 0.9 % (ref 0–0.5)
LV EF 2D ECHO EST: 35 %
LYMPHOCYTES # BLD AUTO: 0.92 10*3/MM3 (ref 0.7–3.1)
LYMPHOCYTES NFR BLD AUTO: 8.8 % (ref 19.6–45.3)
MCH RBC QN AUTO: 29.5 PG (ref 26.6–33)
MCHC RBC AUTO-ENTMCNC: 31.5 G/DL (ref 31.5–35.7)
MCV RBC AUTO: 93.5 FL (ref 79–97)
MONOCYTES # BLD AUTO: 1.07 10*3/MM3 (ref 0.1–0.9)
MONOCYTES NFR BLD AUTO: 10.3 % (ref 5–12)
NEUTROPHILS NFR BLD AUTO: 77.8 % (ref 42.7–76)
NEUTROPHILS NFR BLD AUTO: 8.1 10*3/MM3 (ref 1.7–7)
NRBC BLD AUTO-RTO: 0 /100 WBC (ref 0–0.2)
PLATELET # BLD AUTO: 159 10*3/MM3 (ref 140–450)
PMV BLD AUTO: 10.5 FL (ref 6–12)
POTASSIUM SERPL-SCNC: 4.1 MMOL/L (ref 3.5–5.2)
RBC # BLD AUTO: 2.92 10*6/MM3 (ref 4.14–5.8)
SODIUM SERPL-SCNC: 132 MMOL/L (ref 136–145)
WBC NRBC COR # BLD AUTO: 10.41 10*3/MM3 (ref 3.4–10.8)

## 2024-05-22 PROCEDURE — 99232 SBSQ HOSP IP/OBS MODERATE 35: CPT | Performed by: INTERNAL MEDICINE

## 2024-05-22 PROCEDURE — 25010000002 PROPOFOL 200 MG/20ML EMULSION: Performed by: NURSE ANESTHETIST, CERTIFIED REGISTERED

## 2024-05-22 PROCEDURE — 93321 DOPPLER ECHO F-UP/LMTD STD: CPT

## 2024-05-22 PROCEDURE — 63710000001 INSULIN LISPRO (HUMAN) PER 5 UNITS: Performed by: INTERNAL MEDICINE

## 2024-05-22 PROCEDURE — 99222 1ST HOSP IP/OBS MODERATE 55: CPT | Performed by: INTERNAL MEDICINE

## 2024-05-22 PROCEDURE — 93312 ECHO TRANSESOPHAGEAL: CPT

## 2024-05-22 PROCEDURE — 93312 ECHO TRANSESOPHAGEAL: CPT | Performed by: INTERNAL MEDICINE

## 2024-05-22 PROCEDURE — 80048 BASIC METABOLIC PNL TOTAL CA: CPT | Performed by: INTERNAL MEDICINE

## 2024-05-22 PROCEDURE — 25010000002 HEPARIN (PORCINE) PER 1000 UNITS: Performed by: INTERNAL MEDICINE

## 2024-05-22 PROCEDURE — B245ZZ4 ULTRASONOGRAPHY OF LEFT HEART, TRANSESOPHAGEAL: ICD-10-PCS | Performed by: INTERNAL MEDICINE

## 2024-05-22 PROCEDURE — 93325 DOPPLER ECHO COLOR FLOW MAPG: CPT | Performed by: INTERNAL MEDICINE

## 2024-05-22 PROCEDURE — 85025 COMPLETE CBC W/AUTO DIFF WBC: CPT | Performed by: INTERNAL MEDICINE

## 2024-05-22 PROCEDURE — 82948 REAGENT STRIP/BLOOD GLUCOSE: CPT

## 2024-05-22 PROCEDURE — 93321 DOPPLER ECHO F-UP/LMTD STD: CPT | Performed by: INTERNAL MEDICINE

## 2024-05-22 PROCEDURE — 99232 SBSQ HOSP IP/OBS MODERATE 35: CPT | Performed by: NURSE PRACTITIONER

## 2024-05-22 PROCEDURE — 25810000003 SODIUM CHLORIDE 0.9 % SOLUTION: Performed by: NURSE ANESTHETIST, CERTIFIED REGISTERED

## 2024-05-22 PROCEDURE — 93325 DOPPLER ECHO COLOR FLOW MAPG: CPT

## 2024-05-22 RX ORDER — SODIUM CHLORIDE 9 MG/ML
INJECTION, SOLUTION INTRAVENOUS CONTINUOUS PRN
Status: DISCONTINUED | OUTPATIENT
Start: 2024-05-22 | End: 2024-05-22 | Stop reason: SURG

## 2024-05-22 RX ORDER — PROPOFOL 10 MG/ML
INJECTION, EMULSION INTRAVENOUS AS NEEDED
Status: DISCONTINUED | OUTPATIENT
Start: 2024-05-22 | End: 2024-05-22 | Stop reason: SURG

## 2024-05-22 RX ADMIN — ASPIRIN 81 MG: 81 TABLET, COATED ORAL at 08:36

## 2024-05-22 RX ADMIN — Medication 1 TABLET: at 08:36

## 2024-05-22 RX ADMIN — INSULIN LISPRO 3 UNITS: 100 INJECTION, SOLUTION INTRAVENOUS; SUBCUTANEOUS at 21:12

## 2024-05-22 RX ADMIN — ERYTHROMYCIN 1 APPLICATION: 5 OINTMENT OPHTHALMIC at 08:37

## 2024-05-22 RX ADMIN — CARBIDOPA AND LEVODOPA 10 MG: 50; 200 TABLET, EXTENDED RELEASE ORAL at 17:18

## 2024-05-22 RX ADMIN — PROPOFOL 40 MG: 10 INJECTION, EMULSION INTRAVENOUS at 16:19

## 2024-05-22 RX ADMIN — PROPOFOL 20 MG: 10 INJECTION, EMULSION INTRAVENOUS at 16:22

## 2024-05-22 RX ADMIN — HEPARIN SODIUM 5000 UNITS: 5000 INJECTION INTRAVENOUS; SUBCUTANEOUS at 13:15

## 2024-05-22 RX ADMIN — CETIRIZINE HYDROCHLORIDE 5 MG: 10 TABLET, FILM COATED ORAL at 08:36

## 2024-05-22 RX ADMIN — MEGESTROL ACETATE 40 MG: 40 TABLET ORAL at 08:36

## 2024-05-22 RX ADMIN — SODIUM CHLORIDE: 0.9 INJECTION, SOLUTION INTRAVENOUS at 16:12

## 2024-05-22 RX ADMIN — PROPOFOL 40 MG: 10 INJECTION, EMULSION INTRAVENOUS at 16:20

## 2024-05-22 RX ADMIN — GABAPENTIN 100 MG: 100 CAPSULE ORAL at 21:05

## 2024-05-22 RX ADMIN — HEPARIN SODIUM 5000 UNITS: 5000 INJECTION INTRAVENOUS; SUBCUTANEOUS at 05:19

## 2024-05-22 RX ADMIN — SERTRALINE 50 MG: 50 TABLET, FILM COATED ORAL at 21:05

## 2024-05-22 RX ADMIN — LEVOTHYROXINE SODIUM 75 MCG: 0.07 TABLET ORAL at 08:36

## 2024-05-22 RX ADMIN — AMMONIUM LACTATE 1 APPLICATION: 120 CREAM TOPICAL at 21:05

## 2024-05-22 RX ADMIN — Medication 1 APPLICATION: at 08:37

## 2024-05-22 RX ADMIN — ERYTHROMYCIN 1 APPLICATION: 5 OINTMENT OPHTHALMIC at 21:05

## 2024-05-22 RX ADMIN — SEVELAMER CARBONATE 2400 MG: 800 TABLET, FILM COATED ORAL at 17:18

## 2024-05-22 RX ADMIN — Medication 10 ML: at 21:05

## 2024-05-22 RX ADMIN — CARBIDOPA AND LEVODOPA 10 MG: 50; 200 TABLET, EXTENDED RELEASE ORAL at 08:37

## 2024-05-22 RX ADMIN — PROPOFOL 20 MG: 10 INJECTION, EMULSION INTRAVENOUS at 16:24

## 2024-05-22 RX ADMIN — SEVELAMER CARBONATE 2400 MG: 800 TABLET, FILM COATED ORAL at 08:36

## 2024-05-22 RX ADMIN — SEVELAMER CARBONATE 2400 MG: 800 TABLET, FILM COATED ORAL at 11:21

## 2024-05-22 RX ADMIN — HEPARIN SODIUM 5000 UNITS: 5000 INJECTION INTRAVENOUS; SUBCUTANEOUS at 21:05

## 2024-05-22 RX ADMIN — MEGESTROL ACETATE 40 MG: 40 TABLET ORAL at 17:18

## 2024-05-22 RX ADMIN — CARBIDOPA AND LEVODOPA 10 MG: 50; 200 TABLET, EXTENDED RELEASE ORAL at 11:21

## 2024-05-22 RX ADMIN — MEGESTROL ACETATE 40 MG: 40 TABLET ORAL at 11:21

## 2024-05-22 RX ADMIN — ATORVASTATIN CALCIUM 20 MG: 20 TABLET, FILM COATED ORAL at 08:36

## 2024-05-22 RX ADMIN — LIDOCAINE 1 PATCH: 4 PATCH TOPICAL at 17:18

## 2024-05-22 NOTE — PROGRESS NOTES
[facility] HOSPITALIST PROGRESS NOTE    Subjective     History:   Kennedy Prescott is a 73 y.o. male admitted on 5/15/2024 secondary to SIRS (systemic inflammatory response syndrome)     Procedures:   5/17/24: Removal of tunneled HD catheter     CC: Follow up sepsis, bacteremia     Patient seen and examined. Awake and alert. No reported CP, dyspnea or palpitations. No reported vomiting.  No acute events overnight per RN.     History taken from: patient, chart, and RN.      Objective     Vital Signs  Temp:  [97.8 °F (36.6 °C)-98.8 °F (37.1 °C)] 98.4 °F (36.9 °C)  Heart Rate:  [68-78] 68  Resp:  [16-18] 18  BP: (117-138)/(56-76) 117/56    Intake/Output Summary (Last 24 hours) at 5/22/2024 1432  Last data filed at 5/22/2024 1300  Gross per 24 hour   Intake 540 ml   Output 3220 ml   Net -2680 ml         Physical Exam: Unchanged from previous.   General:    Awake, alert, in no acute distress, chronically ill appearing   Heart:      Normal S1 and S2. Irregular.    Lungs:     Respirations regular, even and unlabored. Lungs clear to auscultation B/L. No wheezes, rales or rhonchi.   Abdomen:   Soft and nontender. No guarding, rebound tenderness or  organomegaly noted. Bowel sounds present x 4.   Extremities:  No clubbing, cyanosis or edema noted.      Results Review:    Results from last 7 days   Lab Units 05/22/24  0036 05/21/24  0321 05/20/24  0109 05/19/24  0102 05/18/24  0408 05/17/24  0927 05/17/24  0218   WBC 10*3/mm3 10.41 12.12* 13.00* 11.08* 11.53* 9.52 10.71   HEMOGLOBIN g/dL 8.6* 8.5* 8.5* 8.7* 8.8* 7.2* 7.8*   PLATELETS 10*3/mm3 159 140 116* 97* 116* 95* 108*     Results from last 7 days   Lab Units 05/22/24  0036 05/21/24  0321 05/20/24  0109 05/19/24  0102 05/18/24  0408 05/17/24  0927 05/17/24  0218   SODIUM mmol/L 132* 131* 129* 127* 129* 133* 133*   POTASSIUM mmol/L 4.1 4.1 3.9 3.8 3.6 4.0 4.4   CHLORIDE mmol/L 91* 90* 87* 86* 87* 93* 93*   CO2 mmol/L 27.7 25.1 25.9 27.4 27.9 23.3 22.7   BUN  mg/dL 40* 70* 56* 45* 30* 65* 63*   CREATININE mg/dL 6.82* 10.34* 8.29* 7.18* 5.54* 9.44* 8.89*   CALCIUM mg/dL 8.2* 8.1* 7.9* 8.1* 8.2* 8.1* 8.1*   GLUCOSE mg/dL 204* 99 128* 96 164* 51* 68     Results from last 7 days   Lab Units 05/20/24  0109 05/19/24  0102 05/18/24  0408 05/17/24 0218 05/16/24  0128   BILIRUBIN mg/dL 0.4 0.4 0.5 0.3 0.3   ALK PHOS U/L 197* 163* 153* 137* 143*   AST (SGOT) U/L 23 27 31 27 15   ALT (SGPT) U/L 11 11 12 10 7     Results from last 7 days   Lab Units 05/20/24  0109 05/19/24  0102 05/18/24  0408 05/17/24 0218 05/16/24  0128   MAGNESIUM mg/dL 2.1 2.0 1.8 1.9 1.8     Results from last 7 days   Lab Units 05/19/24  0102 05/18/24  0408 05/17/24  0927 05/16/24  0128   INR  1.31* 1.58* 1.52* 1.54*     Results from last 7 days   Lab Units 05/16/24  0128 05/15/24  1954 05/15/24  1639   HSTROP T ng/L 221* 173* 160*       Imaging Results (Last 24 Hours)       ** No results found for the last 24 hours. **              Medications:  acetaminophen, 650 mg, Oral, Once per day on Tuesday Thursday Saturday  ammonium lactate, 1 Application, Topical, Nightly  aspirin, 81 mg, Oral, Daily  atorvastatin, 20 mg, Oral, Daily  cetirizine, 5 mg, Oral, Daily  erythromycin, 1 Application, Left Eye, BID  famotidine, 20 mg, Oral, Q48H  gabapentin, 100 mg, Oral, Nightly  heparin (porcine), 5,000 Units, Subcutaneous, Q8H  insulin lispro, 2-7 Units, Subcutaneous, 4x Daily AC & at Bedtime  levothyroxine, 75 mcg, Oral, Daily  Lidocaine, 1 patch, Transdermal, Q PM  lubrisoft, 1 Application, Topical, Daily  megestrol, 40 mg, Oral, TID With Meals  [Held by provider] metoprolol succinate XL, 12.5 mg, Oral, Nightly  midodrine, 10 mg, Oral, TID AC  multivitamin with minerals, 1 tablet, Oral, Daily  sertraline, 50 mg, Oral, Nightly  sevelamer, 2,400 mg, Oral, TID With Meals  sodium chloride, 10 mL, Intravenous, Q12H  Vancomycin Pharmacy Intermittent/Pulse Dosing, , Does not apply, Daily               Assessment & Plan    Septic shock: Likely 2/2 MRSA bacteremia with suspected infected HD catheter. Afebrile. Previously required Levophed but BP now stable off vasopressor support. WBC stable today and overall improved from upon admission. Lactate has normalized. Cultures as below. Cont Vanc. Cont midodrine. Follow cultures and repeat labs in the AM.     MRSA bacteremia: Likely 2/2 infected HD catheter which has been removed. TTE reveals an EF of 51-55% but valves not well visualized. Blood cultures from 5/15 and 5/17 positive. Cultures from 5/18 and 5/19 with NGTD. Cardiology consulted for consideration of GAVI. Cont Vanc. Cont to follow cultures. ID input appreciated.     Acute diastolic CHF: Echo reveals normal EF. Improved with HD. Cont to monitor volume status.     Acute hypoxic respiratory failure: Likely 2/2 above. No evidence of PE or pneumonia on CT. Currently stable on 2L's NC.     NSTEMI: Suspected type II in the setting of above. Normal EF on echo. Cont ASA and statin.     ESRD on HD: Tunneled HD catheter removed. Tolerated HD via LUE AV fistula. Management per nephrology with input appreciated.     Paroxysmal Afib: Currently rate controlled. Holding metoprolol in the setting of hypotension. Monitor on telemetry.     DM II, insulin dependent: Episodes of hypoglycemia improved. Resume sliding scale insulin  Cont to monitor.     PVD: Cont ASA and statin.     DVT PPX: SQ heparin    Discussed with cardiology.     Disposition Likely return to SNF when antibiotic regimen finalized.     Donnell Hines,   05/22/24  14:32 EDT

## 2024-05-22 NOTE — CONSULTS
Consults  Date of Admit: 5/15/2024  Date of Consult: 05/22/24  Provider, No Known  Kennedy Prescott  1950    Consulting Physician: Tommy Garcia MD    Cardiology consultation    Reason for consultation: GAVI      Nausea  Associated symptoms include fatigue, nausea, vomiting and weakness. Pertinent negatives include no chest pain, chills or fever.   Vomiting   Pertinent negatives include no chest pain, chills or fever.       Subjective     Chief Complaint   Patient presents with    Nausea    Vomiting       Kennedy Prescott is a 73 y.o. male with coronary artery disease status post CABG in the remote past, HFpEF, insulin-dependent type 2 diabetes mellitus, hypertension, hyperlipidemia, pulmonary hypertension, end-stage renal disease with hemodialysis 3 times per week and obesity.    Mr. Prescott had been in his usual state of poor health where he resides in a nursing home until 05/14/2024.  At that time he began to have nausea, vomiting, and chills.  He was brought to the emergency room for further evaluation and treatment.    Admission labs and studies significant for Pro-Rogerio 0.64.  Lactate 2.3-2.8.  CRP 5.34-7.54.  WBCs 16.83, hemoglobin 8.9, platelets 133.  CMP remarkable for chloride 93, BUN 39, creatinine 6.33, anion gap 15.5-17.5.  High-sensitivity troponins 172-160-221.  Blood cultures grew out MRSA on admission as well as on 05/17/2024, but have since been negative.  His tunneled dialysis catheter was removed on 05/17/2024.  Transthoracic echocardiogram did not provide adequate views of his cardiac valves and so GAVI was requested.    At the time of my visit he is awake in bed, but he is unable to offer much in the way of history.  The majority of the information was taken from the chart.  He does report that he is not having bodyaches or chills.  He also denies chest pain or shortness of breath    Cardiac risk factors:arteriosclerotic heart disease, diabetes mellitus, hypercholesterolemia, hypertension,  Sedentary life style, and Obesity    Last Echo:  Results for orders placed during the hospital encounter of 05/15/24    Adult Transthoracic Echo Complete W/ Cont if Necessary Per Protocol    Interpretation Summary    Normal left ventricular cavity size and wall thickness noted. All left ventricular wall segments contract normally.    Left ventricular ejection fraction appears to be 51 - 55%.    The aortic valve is not well visualized.. The aortic valve is grossly normal in structure.    No aortic valve regurgitation or stenosis is present    Mitral valve is not well visualized. There is mild calcification of the mitral valve posterior leaflet(s).    Mild mitral valve regurgitation is present. No significant mitral valve stenosis is present.    There is no evidence of pericardial effusion.    Comments: May consider a transesophageal echocardiographic study to have a better evaluation of the cardiac valves and to rule out endocardial vegetations if clinically warranted.      Last Stress:       Last Cath:      Past Medical History:   Diagnosis Date    CHF (congestive heart failure)     Coronary artery disease     Diabetes mellitus     Dialysis patient     Disease of thyroid gland     Elevated cholesterol     GERD (gastroesophageal reflux disease)     Hypertension     Myocardial infarction     Renal disorder     stage 5     Past Surgical History:   Procedure Laterality Date    CARDIAC SURGERY      cabg    CHOLECYSTECTOMY      COLONOSCOPY N/A 9/22/2023    Procedure: COLONOSCOPY;  Surgeon: Trip Peter MD;  Location: [facility];  Service: Gastroenterology;  Laterality: N/A;    DIALYSIS FISTULA CREATION Left     arm     Family History   Problem Relation Age of Onset    Arthritis Mother     COPD Father     Diabetes Father     Heart disease Father     Hyperlipidemia Father     Hypertension Father     Cancer Daughter      Social History     Tobacco Use    Smoking status: Never    Smokeless tobacco: Never   Vaping Use     Vaping status: Never Used   Substance Use Topics    Alcohol use: Never    Drug use: Never     Medications Prior to Admission   Medication Sig Dispense Refill Last Dose    ammonium lactate (AMLACTIN) 12 % cream Apply 1 g topically to the appropriate area as directed Every Night. Apply to dry skin on feet   5/14/2024    atorvastatin (LIPITOR) 20 MG tablet Take 1 tablet by mouth Daily.   5/15/2024    cetaphil (CETAPHIL) lotion Apply 1 Application topically to the appropriate area as directed Daily.   5/15/2024    erythromycin (ROMYCIN) 5 MG/GM ophthalmic ointment Administer 1 Application into the left eye 2 (Two) Times a Day.   5/15/2024    famotidine (PEPCID) 20 MG tablet Take 1 tablet by mouth 2 (Two) Times a Day.   5/15/2024    gabapentin (NEURONTIN) 100 MG capsule Take 2 capsules by mouth Every Night.   5/14/2024    glipizide (GLUCOTROL) 10 MG tablet Take 1 tablet by mouth Daily.   5/15/2024    glipizide (GLUCOTROL) 5 MG tablet Take 1 tablet by mouth Daily.   5/15/2024    insulin aspart (novoLOG FLEXPEN) 100 UNIT/ML solution pen-injector sc pen Inject 2-6 Units under the skin into the appropriate area as directed 4 (Four) Times a Day Before Meals & at Bedtime.   5/15/2024    insulin detemir (Levemir FlexPen) 100 UNIT/ML injection Inject 25 Units under the skin into the appropriate area as directed 2 (Two) Times a Day.   5/15/2024    levocetirizine (XYZAL) 5 MG tablet Take 1 tablet by mouth Daily.   5/15/2024    levothyroxine (SYNTHROID, LEVOTHROID) 75 MCG tablet Take 1 tablet by mouth Daily.   5/15/2024    lidocaine 3 % cream cream Apply 1 Application topically Every Night.   5/14/2024    loperamide (IMODIUM) 2 MG capsule Take 1 capsule by mouth 3 (Three) Times a Week.   5/14/2024    loperamide (IMODIUM) 2 MG capsule Take 1 capsule by mouth 2 (Two) Times a Day.   5/15/2024    metoprolol succinate XL (TOPROL-XL) 25 MG 24 hr tablet Take 0.5 tablets by mouth Every Night.   5/14/2024    midodrine (PROAMATINE) 2.5 MG  tablet Take 1 tablet by mouth 3 (Three) Times a Week.   5/14/2024    multivitamin with minerals tablet tablet Take 1 tablet by mouth Daily.   5/15/2024    sertraline (ZOLOFT) 50 MG tablet Take 1 tablet by mouth Every Night.   5/14/2024    sevelamer (RENAGEL) 800 MG tablet Take 3 tablets by mouth 3 (Three) Times a Day With Meals.   5/15/2024    acetaminophen (TYLENOL) 325 MG tablet Take 2 tablets by mouth 3 (Three) Times a Week.   5/13/2024    acetaminophen (TYLENOL) 500 MG tablet Take 1 tablet by mouth Every 4 (Four) Hours As Needed for Mild Pain.   Unknown    bisacodyl (Dulcolax) 10 MG suppository Insert 1 suppository into the rectum Daily As Needed for Constipation.   Unknown    lactulose (CHRONULAC) 10 GM/15ML solution Take 30 mL by mouth Daily As Needed (constipation).   Unknown    Lidocaine Viscous HCl (XYLOCAINE) 2 % solution Take 5 mL by mouth Every 6 (Six) Hours As Needed for Mild Pain.   Unknown    loperamide (IMODIUM) 2 MG capsule Take 2 capsules by mouth Every 6 (Six) Hours As Needed for Diarrhea.   Unknown    ondansetron (ZOFRAN) 4 MG tablet Take 1 tablet by mouth Every 8 (Eight) Hours As Needed for Nausea or Vomiting.   Unknown    polyethylene glycol (MIRALAX) 17 GM/SCOOP powder Take 17 g by mouth 2 (Two) Times a Day As Needed (constipation).   Unknown     Allergies:  Patient has no known allergies.    Review of Systems   Constitutional:  Positive for fatigue. Negative for chills and fever.   Respiratory:  Negative for shortness of breath.    Cardiovascular:  Negative for chest pain.   Gastrointestinal:  Positive for nausea and vomiting.   Neurological:  Positive for weakness.        Objective      Vital Signs  Temp:  [97.8 °F (36.6 °C)-98.8 °F (37.1 °C)] 98.4 °F (36.9 °C)  Heart Rate:  [68-81] 68  Resp:  [16-20] 18  BP: (117-138)/(56-76) 117/56  Body mass index is 30.78 kg/m².    Intake/Output Summary (Last 24 hours) at 5/22/2024 1112  Last data filed at 5/21/2024 2300  Gross per 24 hour   Intake 540  ml   Output 3100 ml   Net -2560 ml       Physical Exam  Vitals and nursing note reviewed.   Constitutional:       General: He is not in acute distress.     Appearance: He is obese. He is ill-appearing (Chronically).   HENT:      Head: Normocephalic and atraumatic.      Nose: Nose normal.   Eyes:      Conjunctiva/sclera: Conjunctivae normal.   Cardiovascular:      Rate and Rhythm: Normal rate. Rhythm irregularly irregular.      Heart sounds: No murmur heard.  Pulmonary:      Effort: Pulmonary effort is normal.      Breath sounds: Normal breath sounds.   Musculoskeletal:         General: Normal range of motion.      Cervical back: Normal range of motion.      Right lower leg: No edema.      Left lower leg: No edema.   Skin:     General: Skin is warm and dry.   Neurological:      General: No focal deficit present.      Mental Status: He is alert.   Psychiatric:         Mood and Affect: Mood normal.         Behavior: Behavior normal.         Telemetry: A-fib 60s    Results Review:   I reviewed the patient's new clinical results.  Results from last 7 days   Lab Units 05/16/24  0128 05/15/24  1954 05/15/24  1639 05/15/24  1415   HSTROP T ng/L 221* 173* 160* 172*     Results from last 7 days   Lab Units 05/22/24  0036 05/21/24  0321 05/20/24  0109 05/19/24  0102 05/18/24  0408 05/17/24  0927 05/17/24  0218   WBC 10*3/mm3 10.41 12.12* 13.00* 11.08* 11.53* 9.52 10.71   HEMOGLOBIN g/dL 8.6* 8.5* 8.5* 8.7* 8.8* 7.2* 7.8*   PLATELETS 10*3/mm3 159 140 116* 97* 116* 95* 108*     Results from last 7 days   Lab Units 05/22/24  0036 05/21/24  0321 05/20/24  0109 05/19/24  0102 05/18/24  0408 05/17/24  0927 05/17/24  0218 05/16/24  0128 05/15/24  1415 05/15/24  1128   SODIUM mmol/L 132* 131* 129* 127* 129* 133* 133* 135* 136 136   POTASSIUM mmol/L 4.1 4.1 3.9 3.8 3.6 4.0 4.4 4.1 4.1 3.9   CHLORIDE mmol/L 91* 90* 87* 86* 87* 93* 93* 95* 93* 93*   CO2 mmol/L 27.7 25.1 25.9 27.4 27.9 23.3 22.7 24.7 25.5 27.5   BUN mg/dL 40* 70* 56* 45*  "30* 65* 63* 50* 41* 39*   CREATININE mg/dL 6.82* 10.34* 8.29* 7.18* 5.54* 9.44* 8.89* 7.86* 7.03* 6.33*   CALCIUM mg/dL 8.2* 8.1* 7.9* 8.1* 8.2* 8.1* 8.1* 8.3* 9.0 8.7   GLUCOSE mg/dL 204* 99 128* 96 164* 51* 68 94 96 83   ALT (SGPT) U/L  --   --  11 11 12  --  10 7 8 9   AST (SGOT) U/L  --   --  23 27 31  --  27 15 16 16     Lab Results   Component Value Date    INR 1.31 (H) 05/19/2024    INR 1.58 (H) 05/18/2024    INR 1.52 (H) 05/17/2024    INR 1.54 (H) 05/16/2024    INR 1.35 (H) 05/15/2024    INR 1.04 03/17/2023     Lab Results   Component Value Date    MG 2.1 05/20/2024    MG 2.0 05/19/2024    MG 1.8 05/18/2024     Lab Results   Component Value Date    TSH 2.490 03/21/2024    PSA 1.630 06/12/2023    CHLPL 119 10/23/2014    TRIG 99 05/16/2024    HDL 22 (L) 05/16/2024    LDL 6 05/16/2024      No results found for: \"PROBNP\"  Blood Culture   Date Value Ref Range Status   05/19/2024 No growth at 2 days  Preliminary   05/19/2024 No growth at 2 days  Preliminary   05/18/2024 No growth at 4 days  Preliminary   05/18/2024 No growth at 4 days  Preliminary   05/17/2024 Staphylococcus aureus, MRSA (C)  Final     Comment:       Infectious disease consultation is highly recommended to rule out distant foci of infection.  Methicillin resistant Staphylococcus aureus, Patient may be an isolation risk.   05/17/2024 Staphylococcus aureus, MRSA (C)  Final     Comment:       Infectious disease consultation is highly recommended to rule out distant foci of infection.  Methicillin resistant Staphylococcus aureus, Patient may be an isolation risk.   05/15/2024 Staphylococcus aureus, MRSA (C)  Final     Comment:       Infectious disease consultation is highly recommended to rule out distant foci of infection.  Methicillin resistant Staphylococcus aureus, Patient may be an isolation risk.   05/15/2024 Staphylococcus aureus, MRSA (C)  Final     Comment:       Infectious disease consultation is highly recommended to rule out distant foci " of infection.  Methicillin resistant Staphylococcus aureus, Patient may be an isolation risk.        EKG:         Imaging Results (Last 72 Hours)       ** No results found for the last 72 hours. **             Assessment -as related to consult:  1.  Bacteremia        Plan:  1.  As valves were poorly visualized on the transthoracic echocardiogram, we will proceed with transesophageal echocardiogram to rule out vegetation.    Further decision making based on Dr. Garcia's assessment and recommendations.    Thank you very much for asking us to be involved in this patient's care.  We will follow along with you.      Electronically signed by DORA Green, 05/22/24, 11:27 AM EDT.    Patient seen and examined on rounds.  Chart reviewed.  Agree with nurse practitioners assessment and plan and charting  Patient underwent transesophageal echocardiogram today.  Patient EF was noted to be markedly decreased from previous echo.  No vegetations were noted on the valves.  Guideline guided medical therapy will be advised for cardiomyopathy.  Patient may need evaluation for ischemic reasons for heart failure also.  Will recommend a CTA coronaries versus stress test once stable.  .Electronically signed by Tommy Garcia MD, 05/22/24, 7:11 PM EDT.

## 2024-05-22 NOTE — ANESTHESIA POSTPROCEDURE EVALUATION
Patient: Kennedy Prescott    Procedure Summary       Date: 05/22/24 Room / Location: [facility] NONINVASIVE LAB    Anesthesia Start: 1612 Anesthesia Stop: 1629    Procedure: ADULT TRANSESOPHAGEAL ECHO (GAVI) W/ CONT IF NECESSARY PER PROTOCOL Diagnosis:       (Endocarditis)      (Bacteremia)    Scheduled Providers: Tommy Garcia MD Provider: Andrés Zamudio DO    Anesthesia Type: general ASA Status: 4            Anesthesia Type: general    Vitals  Vitals Value Taken Time   /91 05/22/24 1630   Temp 98 °F (36.7 °C) 05/22/24 1628   Pulse 77 05/22/24 1630   Resp 16 05/22/24 1630   SpO2 100 % 05/22/24 1630           Post Anesthesia Care and Evaluation    Patient location during evaluation: bedside  Patient participation: complete - patient participated  Level of consciousness: sleepy but conscious  Pain score: 0  Pain management: adequate    Airway patency: patent  Anesthetic complications: No anesthetic complications  PONV Status: controlled  Cardiovascular status: acceptable  Respiratory status: acceptable, room air and nasal cannula  Hydration status: euvolemic  No anesthesia care post op

## 2024-05-22 NOTE — ANESTHESIA PREPROCEDURE EVALUATION
Anesthesia Evaluation     Patient summary reviewed and Nursing notes reviewed   no history of anesthetic complications:   NPO Solid Status: > 8 hours  NPO Liquid Status: > 8 hours           Airway   Mallampati: II  TM distance: >3 FB  Neck ROM: full  No difficulty expected  Dental    (+) poor dentition    Pulmonary - normal exam    breath sounds clear to auscultation  (+) ,shortness of breath  Cardiovascular   Exercise tolerance: poor (<4 METS)    ECG reviewed  Patient on routine beta blocker and Beta blocker given within 24 hours of surgery  Rhythm: irregular  Rate: normal    (+) hypertension, past MI , CAD, CABG, CHF Systolic <55%, RIVERA, hyperlipidemia      Neuro/Psych- negative ROS  GI/Hepatic/Renal/Endo    (+) obesity, GERD, renal disease- CRI, diabetes mellitus type 2 poorly controlled using insulin, thyroid problem hypothyroidism    Musculoskeletal     Abdominal  - normal exam   Substance History - negative use     OB/GYN negative ob/gyn ROS         Other   arthritis, blood dyscrasia anemia,     ROS/Med Hx Other: Echo 5/20/2024:    Normal left ventricular cavity size and wall thickness noted. All left ventricular wall segments contract normally.  ·  Left ventricular ejection fraction appears to be 51 - 55%.  ·  The aortic valve is not well visualized.. The aortic valve is grossly normal in structure.  ·  No aortic valve regurgitation or stenosis is present  ·  Mitral valve is not well visualized. There is mild calcification of the mitral valve posterior leaflet(s).  ·  Mild mitral valve regurgitation is present. No significant mitral valve stenosis is present.  ·  There is no evidence of pericardial effusion.  ·  Comments: May consider a transesophageal echocardiographic study to have a better evaluation of the cardiac valves and to rule out endocardial vegetations if clinically warranted.                Anesthesia Plan    ASA 4     general   total IV anesthesia  intravenous induction     Anesthetic plan,  risks, benefits, and alternatives have been provided, discussed and informed consent has been obtained with: patient.  Pre-procedure education provided  Plan discussed with CRNA.    CODE STATUS:    Level Of Support Discussed With: Patient  Code Status (Patient has no pulse and is not breathing): CPR (Attempt to Resuscitate)  Medical Interventions (Patient has pulse or is breathing): Full Support

## 2024-05-22 NOTE — CASE MANAGEMENT/SOCIAL WORK
Discharge Planning Assessment  [facility]     Patient Name: Kennedy Prescott  MRN: [id number]  Today's Date: 5/22/2024    Admit Date: 5/15/2024    Plan: Pt admitted on 5/15/24. Pt is from [facility]. Per Irma who states pt is a longterm resident at their facility. Pt does not have a bed hold at this time. Per Irma, the facility will accept pt back at discharge when medically stable. SS placed updated clinical in basket and notified Irma. SS to follow.   Discharge Plan       Row Name 05/22/24 1536       Plan    Plan Pt admitted on 5/15/24. Pt is from [facility]. Per Irma who states pt is a longterm resident at their facility. Pt does not have a bed hold at this time. Per Irma, the facility will accept pt back at discharge when medically stable. SS placed updated clinical in basket and notified Irma. SS to follow.        MONA Jordan     None

## 2024-05-22 NOTE — PROGRESS NOTES
PROGRESS NOTE         Patient Identification:  Name:  Kennedy Prescott  Age:  73 y.o.  Sex:  male  :  1950  MRN:  [id number]  Visit Number:  [id number]  Primary Care Provider:  Jag Guillaume MD         LOS: 6 days       ----------------------------------------------------------------------------------------------------------------------  Subjective       Chief Complaints:    Nausea and Vomiting        Interval History:      The patient is awake and alert, sitting up comfortably in bed.  On 2 L nasal cannula with no apparent distress.  Afebrile.  Denies diarrhea.  Lung exam is clear to auscultation bilaterally.  Abdomen soft and nontender with normoactive bowel sounds.  WBC normal at 10.41.  Blood cultures from  preliminarily report no growth at 3 days.  From  report no growth at 2 days.      Review of Systems:    Constitutional: no fever, chills and night sweats.  Generalized fatigue.  Eyes: no eye drainage, itching or redness.  HEENT: no mouth sores, dysphagia or nose bleed.  Respiratory: no for shortness of breath, cough or production of sputum.  Cardiovascular: no chest pain, no palpitations, no orthopnea.  Gastrointestinal: no nausea, vomiting or diarrhea. No abdominal pain, hematemesis or rectal bleeding.  Genitourinary: no dysuria or polyuria.  Hematologic/lymphatic: no lymph node abnormalities, no easy bruising or easy bleeding.  Musculoskeletal: no muscle or joint pain.  Skin: No rash and no itching.  Neurological: no loss of consciousness, no seizure, no headache.  Behavioral/Psych: no depression or suicidal ideation.  Endocrine: no hot flashes.  Immunologic: negative.    ----------------------------------------------------------------------------------------------------------------------      Objective       [facility] Meds:  acetaminophen, 650 mg, Oral, Once per day on   ammonium lactate, 1 Application, Topical, Nightly  aspirin, 81 mg, Oral,  Daily  atorvastatin, 20 mg, Oral, Daily  cetirizine, 5 mg, Oral, Daily  erythromycin, 1 Application, Left Eye, BID  famotidine, 20 mg, Oral, Q48H  gabapentin, 100 mg, Oral, Nightly  heparin (porcine), 5,000 Units, Subcutaneous, Q8H  insulin lispro, 2-7 Units, Subcutaneous, 4x Daily AC & at Bedtime  levothyroxine, 75 mcg, Oral, Daily  Lidocaine, 1 patch, Transdermal, Q PM  lubrisoft, 1 Application, Topical, Daily  megestrol, 40 mg, Oral, TID With Meals  [Held by provider] metoprolol succinate XL, 12.5 mg, Oral, Nightly  midodrine, 10 mg, Oral, TID AC  multivitamin with minerals, 1 tablet, Oral, Daily  sertraline, 50 mg, Oral, Nightly  sevelamer, 2,400 mg, Oral, TID With Meals  sodium chloride, 10 mL, Intravenous, Q12H  Vancomycin Pharmacy Intermittent/Pulse Dosing, , Does not apply, Daily           ----------------------------------------------------------------------------------------------------------------------    Vital Signs:  Temp:  [97.8 °F (36.6 °C)-98.8 °F (37.1 °C)] 98.4 °F (36.9 °C)  Heart Rate:  [68-81] 68  Resp:  [16-20] 18  BP: (117-138)/(56-76) 117/56  No data found.    SpO2 Percentage    05/21/24 1100 05/21/24 1900 05/22/24 0300   SpO2: 93% 96% 99%     SpO2:  [93 %-99 %] 99 %  on  Flow (L/min):  [2] 2;   Device (Oxygen Therapy): nasal cannula    Body mass index is 30.78 kg/m².  Wt Readings from Last 3 Encounters:   05/22/24 100 kg (220 lb 10.9 oz)   10/02/23 87.1 kg (192 lb)   09/22/23 87.1 kg (192 lb)        Intake/Output Summary (Last 24 hours) at 5/22/2024 1004  Last data filed at 5/21/2024 2300  Gross per 24 hour   Intake 540 ml   Output 3100 ml   Net -2560 ml     NPO Diet NPO Type: Sips with Meds  ----------------------------------------------------------------------------------------------------------------------      Physical Exam:    Constitutional: Chronically ill-appearing elderly gentleman.  On 2 L nasal cannula, sitting up comfortably in bed.  Denies any complaints or issues this  morning.  HENT:  Head: Normocephalic and atraumatic.  Mouth:  Moist mucous membranes.    Eyes:  Conjunctivae and EOM are normal.  No scleral icterus.  Neck:  Neck supple.  No JVD present.    Cardiovascular:  Normal rate, regular rhythm and normal heart sounds with 3/6 murmur. No edema.  Pulmonary/Chest: Lung exam is clear to auscultation bilaterally   abdominal:  Soft.  Bowel sounds are normal.  No distension and no tenderness.   Musculoskeletal:  No edema, no tenderness, and no deformity.  No swelling or redness of joints.  Neurological:  Alert and oriented to person, place, and time.  No facial droop.  No slurred speech.   Skin:  Skin is warm and dry.  No rash noted.  No pallor.  Right subclavian HD cath site dressed with occlusive dressing, no surrounding erythema or edema.  No drainage on the dressing.  LUE AVF with no erythema/edema, thrill and bruit.  Bilateral lower extremity chronic ischemic changes.  Left pedal pulse faint.  Dry ulcers noted to the left lateral foot and left heel.  Psychiatric:  Normal mood and affect.  Behavior is normal.        ----------------------------------------------------------------------------------------------------------------------  Results from last 7 days   Lab Units 05/16/24  0128 05/15/24  1954 05/15/24  1639   HSTROP T ng/L 221* 173* 160*       Results from last 7 days   Lab Units 05/16/24  0128   CHOLESTEROL mg/dL 48   TRIGLYCERIDES mg/dL 99   HDL CHOL mg/dL 22*   LDL CHOL mg/dL 6     Results from last 7 days   Lab Units 05/17/24  1416   PH, ARTERIAL pH units 7.523*   PO2 ART mm Hg 80.2*   PCO2, ARTERIAL mm Hg 40.4   HCO3 ART mmol/L 33.2*     Results from last 7 days   Lab Units 05/22/24  0036 05/21/24  0321 05/20/24  0109 05/19/24  0102 05/18/24  0408 05/17/24  0927 05/17/24  0218 05/16/24  0128 05/15/24  1639 05/15/24  1415   CRP mg/dL  --   --   --  15.62* 18.08*  --   --  14.13*  --  7.54*   LACTATE mmol/L  --   --   --  1.0 1.0  --   --  2.1*   < > 2.3*   WBC  "10*3/mm3 10.41 12.12* 13.00* 11.08* 11.53* 9.52   < > 13.50*  --  17.01*   HEMOGLOBIN g/dL 8.6* 8.5* 8.5* 8.7* 8.8* 7.2*   < > 8.1*  --  9.7*   HEMATOCRIT % 27.3* 25.9* 25.8* 26.3* 27.0* 22.6*   < > 25.8*  --  30.4*   MCV fL 93.5 93.2 91.5 90.7 92.5 96.2   < > 95.9  --  94.4   MCHC g/dL 31.5 32.8 32.9 33.1 32.6 31.9   < > 31.4*  --  31.9   PLATELETS 10*3/mm3 159 140 116* 97* 116* 95*   < > 108*  --  138*   INR   --   --   --  1.31* 1.58* 1.52*  --  1.54*  --  1.35*    < > = values in this interval not displayed.     Results from last 7 days   Lab Units 05/22/24  0036 05/21/24  0321 05/20/24  0109 05/19/24  0102 05/18/24  0408   SODIUM mmol/L 132* 131* 129* 127* 129*   POTASSIUM mmol/L 4.1 4.1 3.9 3.8 3.6   MAGNESIUM mg/dL  --   --  2.1 2.0 1.8   CHLORIDE mmol/L 91* 90* 87* 86* 87*   CO2 mmol/L 27.7 25.1 25.9 27.4 27.9   BUN mg/dL 40* 70* 56* 45* 30*   CREATININE mg/dL 6.82* 10.34* 8.29* 7.18* 5.54*   CALCIUM mg/dL 8.2* 8.1* 7.9* 8.1* 8.2*   GLUCOSE mg/dL 204* 99 128* 96 164*   ALBUMIN g/dL  --   --  3.1* 3.1* 3.3*   BILIRUBIN mg/dL  --   --  0.4 0.4 0.5   ALK PHOS U/L  --   --  197* 163* 153*   AST (SGOT) U/L  --   --  23 27 31   ALT (SGPT) U/L  --   --  11 11 12   Estimated Creatinine Clearance: 11.3 mL/min (A) (by C-G formula based on SCr of 6.82 mg/dL (H)).  No results found for: \"AMMONIA\"    Glucose   Date/Time Value Ref Range Status   05/22/2024 0620 146 (H) 70 - 130 mg/dL Final   05/21/2024 2004 301 (H) 70 - 130 mg/dL Final   05/21/2024 1759 242 (H) 70 - 130 mg/dL Final   05/21/2024 1216 187 (H) 70 - 130 mg/dL Final   05/21/2024 0627 76 70 - 130 mg/dL Final   05/20/2024 1955 211 (H) 70 - 130 mg/dL Final   05/20/2024 1811 169 (H) 70 - 130 mg/dL Final   05/20/2024 1122 105 70 - 130 mg/dL Final     Lab Results   Component Value Date    HGBA1C 6.40 (H) 03/21/2024     Lab Results   Component Value Date    TSH 2.490 03/21/2024    FREET4 1.43 05/29/2022       Blood Culture   Date Value Ref Range Status   05/15/2024 " "Staphylococcus aureus, MRSA (C)  Preliminary     Comment:       Infectious disease consultation is highly recommended to rule out distant foci of infection.  Methicillin resistant Staphylococcus aureus, Patient may be an isolation risk.   05/15/2024 Staphylococcus aureus, MRSA (C)  Preliminary     Comment:       Infectious disease consultation is highly recommended to rule out distant foci of infection.  Methicillin resistant Staphylococcus aureus, Patient may be an isolation risk.     No results found for: \"URINECX\"  No results found for: \"WOUNDCX\"  No results found for: \"STOOLCX\"  No results found for: \"RESPCX\"  Pain Management Panel           No data to display                  ----------------------------------------------------------------------------------------------------------------------  Imaging Results (Last 24 Hours)       ** No results found for the last 24 hours. **            ----------------------------------------------------------------------------------------------------------------------    Pertinent Infectious Disease Results                Assessment/Plan       Assessment     Sepsis on admission  MRSA bacteremia  Chronic left foot wounds        Plan      The patient is awake and alert, sitting up comfortably in bed.  On 2 L nasal cannula with no apparent distress.  Afebrile.  Denies diarrhea.  Lung exam is clear to auscultation bilaterally.  Abdomen soft and nontender with normoactive bowel sounds.  WBC normal at 10.41.  Blood cultures from 5/18 preliminarily report no growth at 3 days.  From 5/19 report no growth at 2 days.    The patient has been receiving vancomycin, pharmacy dose in the setting of MRSA bacteremia.  Repeat blood cultures preliminarily report no growth at 3 days.  Recommendation made for GAVI, awaiting cardiology input.  We will continue to monitor closely.      ANTIMICROBIAL THERAPY    Vancomycin Pharmacy Intermittent/Pulse Dosing     Code Status:   Code Status and Medical " Interventions:   Ordered at: 05/15/24 1714     Level Of Support Discussed With:    Patient     Code Status (Patient has no pulse and is not breathing):    CPR (Attempt to Resuscitate)     Medical Interventions (Patient has pulse or is breathing):    Full Support       DORA Andre  05/22/24  10:04 EDT

## 2024-05-22 NOTE — PLAN OF CARE
Goal Outcome Evaluation:  Plan of Care Reviewed With: patient        Progress: improving          Pt is resting no acute changes at this time. Pt has been npo since midnight per orders. Will continue to monitor and follow current plan of care.

## 2024-05-22 NOTE — PROGRESS NOTES
"Nephrology Progress Note  Chief complaint, nausea and vomiting    Subjective     Patient had successful cannulation of AV fistula yesterday and had uneventful dialysis.  Denied any chest pain or shortness of breath    Objective       Vital signs :     Temp:  [97.8 °F (36.6 °C)-98.8 °F (37.1 °C)] 98.4 °F (36.9 °C)  Heart Rate:  [68-81] 68  Resp:  [16-20] 18  BP: (117-138)/(56-76) 117/56    Intake/Output                         05/20/24 0701 - 05/21/24 0700 05/21/24 0701 - 05/22/24 0700     [id number] 4709-2588 Total 8564-8883 3906-0703 Total                 Intake    P.O.  600  120 720  660  240 900    I.V.  --  0 0  --  -- --    Total Intake 600 120 720 660 240 900       Output    Urine  --  -- --  100  -- 100    Dialysis  --  -- --  3000  -- 3000    Total Output -- -- -- 3100 -- 3100             Physical Exam:    General Appearance : Not in acute distress  Lungs : Bilateral decreased intensity of breath sounds  Heart :  regular rhythm & normal rate, normal S1, S2 and no murmur, no rub  Abdomen : Soft, nondistended  Extremities : No edema,   Neurologic :   No apparent focal neurological deficit, unable to assess fully  Brachiocephalic AV fistula, good bruit and thrill      Laboratory Data :     Albumin Albumin   Date Value Ref Range Status   05/20/2024 3.1 (L) 3.5 - 5.2 g/dL Final      Magnesium Magnesium   Date Value Ref Range Status   05/20/2024 2.1 1.6 - 2.4 mg/dL Final          PTH               No results found for: \"PTH\"    CBC and coagulation:  Results from last 7 days   Lab Units 05/22/24  0036 05/21/24  0321 05/20/24  0109 05/19/24  0102 05/18/24  0408 05/17/24  0927 05/17/24  0218 05/16/24  0128 05/15/24  1639 05/15/24  1415   PROCALCITONIN ng/mL  --   --   --   --   --   --   --   --   --  0.64*   LACTATE mmol/L  --   --   --  1.0 1.0  --   --  2.1*   < > 2.3*   CRP mg/dL  --   --   --  15.62* 18.08*  --   --  14.13*  --  7.54*   WBC 10*3/mm3 10.41 12.12* 13.00* 11.08* 11.53* 9.52   < > 13.50*  --  17.01* "   HEMOGLOBIN g/dL 8.6* 8.5* 8.5* 8.7* 8.8* 7.2*   < > 8.1*  --  9.7*   HEMATOCRIT % 27.3* 25.9* 25.8* 26.3* 27.0* 22.6*   < > 25.8*  --  30.4*   MCV fL 93.5 93.2 91.5 90.7 92.5 96.2   < > 95.9  --  94.4   MCHC g/dL 31.5 32.8 32.9 33.1 32.6 31.9   < > 31.4*  --  31.9   PLATELETS 10*3/mm3 159 140 116* 97* 116* 95*   < > 108*  --  138*   INR   --   --   --  1.31* 1.58* 1.52*  --  1.54*  --  1.35*   D DIMER QUANT MCGFEU/mL  --   --   --   --  4.40* 10.17*  --   --   --   --     < > = values in this interval not displayed.     Acid/base balance:  Results from last 7 days   Lab Units 05/17/24  1416 05/16/24  0440 05/15/24  1608   PH, ARTERIAL pH units 7.523* 7.421 7.470*   PO2 ART mm Hg 80.2* 97.9 50.7*   PCO2, ARTERIAL mm Hg 40.4 41.9 37.0   HCO3 ART mmol/L 33.2* 27.3* 26.9*     Renal and electrolytes:    Results from last 7 days   Lab Units 05/22/24  0036 05/21/24  0321 05/20/24  0109 05/19/24  0102 05/18/24  0408   SODIUM mmol/L 132* 131* 129* 127* 129*   POTASSIUM mmol/L 4.1 4.1 3.9 3.8 3.6   MAGNESIUM mg/dL  --   --  2.1 2.0 1.8   CHLORIDE mmol/L 91* 90* 87* 86* 87*   CO2 mmol/L 27.7 25.1 25.9 27.4 27.9   BUN mg/dL 40* 70* 56* 45* 30*   CREATININE mg/dL 6.82* 10.34* 8.29* 7.18* 5.54*   CALCIUM mg/dL 8.2* 8.1* 7.9* 8.1* 8.2*   PHOSPHORUS mg/dL  --   --  4.5 3.6 3.0     Estimated Creatinine Clearance: 11.3 mL/min (A) (by C-G formula based on SCr of 6.82 mg/dL (H)).  @GFRCG:3@   Liver and pancreatic function:  Results from last 7 days   Lab Units 05/20/24  0109 05/19/24  0102 05/18/24  0408   ALBUMIN g/dL 3.1* 3.1* 3.3*   BILIRUBIN mg/dL 0.4 0.4 0.5   ALK PHOS U/L 197* 163* 153*   AST (SGOT) U/L 23 27 31   ALT (SGPT) U/L 11 11 12         Cardiac:      Liver and pancreatic function:  Results from last 7 days   Lab Units 05/20/24  0109 05/19/24  0102 05/18/24  0408   ALBUMIN g/dL 3.1* 3.1* 3.3*   BILIRUBIN mg/dL 0.4 0.4 0.5   ALK PHOS U/L 197* 163* 153*   AST (SGOT) U/L 23 27 31   ALT (SGPT) U/L 11 11 12       Medications  :     acetaminophen, 650 mg, Oral, Once per day on Tuesday Thursday Saturday  ammonium lactate, 1 Application, Topical, Nightly  aspirin, 81 mg, Oral, Daily  atorvastatin, 20 mg, Oral, Daily  cetirizine, 5 mg, Oral, Daily  erythromycin, 1 Application, Left Eye, BID  famotidine, 20 mg, Oral, Q48H  gabapentin, 100 mg, Oral, Nightly  heparin (porcine), 5,000 Units, Subcutaneous, Q8H  insulin lispro, 2-7 Units, Subcutaneous, 4x Daily AC & at Bedtime  levothyroxine, 75 mcg, Oral, Daily  Lidocaine, 1 patch, Transdermal, Q PM  lubrisoft, 1 Application, Topical, Daily  megestrol, 40 mg, Oral, TID With Meals  [Held by provider] metoprolol succinate XL, 12.5 mg, Oral, Nightly  midodrine, 10 mg, Oral, TID AC  multivitamin with minerals, 1 tablet, Oral, Daily  sertraline, 50 mg, Oral, Nightly  sevelamer, 2,400 mg, Oral, TID With Meals  sodium chloride, 10 mL, Intravenous, Q12H  Vancomycin Pharmacy Intermittent/Pulse Dosing, , Does not apply, Daily               Assessment & Plan     -ESKD on intermittent dialysis  - Anemia  - Persistent nausea and vomiting  - Type 2 diabetes mellitus  - Essential hypertension    Patient had successful fistula cannulation followed by dialysis done yesterday.  Will continue on intermittent dialysis 3 times weekly  Grossly stable from nephrology standpoint,      Nova Saucedo MD  05/22/24  08:41 EDT

## 2024-05-22 NOTE — PLAN OF CARE
Goal Outcome Evaluation:  Plan of Care Reviewed With: patient        Progress: no change  Outcome Evaluation: Patient resting in bed at this time. A&O. VSS. 2L NC. GAVI scheduled this shift, awaiting procedure. Patient has been up to BSC this shift with max assist, did not do well with mobility. No other requests or complaints at this time. Will continue with POC.

## 2024-05-23 ENCOUNTER — APPOINTMENT (OUTPATIENT)
Dept: CT IMAGING | Facility: HOSPITAL | Age: 74
DRG: 321 | End: 2024-05-23
Payer: MEDICARE

## 2024-05-23 LAB
ANION GAP SERPL CALCULATED.3IONS-SCNC: 14.5 MMOL/L (ref 5–15)
BACTERIA SPEC AEROBE CULT: NORMAL
BACTERIA SPEC AEROBE CULT: NORMAL
BASOPHILS # BLD AUTO: 0.07 10*3/MM3 (ref 0–0.2)
BASOPHILS NFR BLD AUTO: 0.8 % (ref 0–1.5)
BUN SERPL-MCNC: 55 MG/DL (ref 8–23)
BUN/CREAT SERPL: 6.5 (ref 7–25)
CALCIUM SPEC-SCNC: 8.3 MG/DL (ref 8.6–10.5)
CHLORIDE SERPL-SCNC: 93 MMOL/L (ref 98–107)
CO2 SERPL-SCNC: 26.5 MMOL/L (ref 22–29)
CREAT SERPL-MCNC: 8.48 MG/DL (ref 0.76–1.27)
DEPRECATED RDW RBC AUTO: 48.2 FL (ref 37–54)
EGFRCR SERPLBLD CKD-EPI 2021: 6 ML/MIN/1.73
EOSINOPHIL # BLD AUTO: 0.18 10*3/MM3 (ref 0–0.4)
EOSINOPHIL NFR BLD AUTO: 1.9 % (ref 0.3–6.2)
ERYTHROCYTE [DISTWIDTH] IN BLOOD BY AUTOMATED COUNT: 14.3 % (ref 12.3–15.4)
GLUCOSE BLDC GLUCOMTR-MCNC: 123 MG/DL (ref 70–130)
GLUCOSE BLDC GLUCOMTR-MCNC: 153 MG/DL (ref 70–130)
GLUCOSE BLDC GLUCOMTR-MCNC: 167 MG/DL (ref 70–130)
GLUCOSE BLDC GLUCOMTR-MCNC: 167 MG/DL (ref 70–130)
GLUCOSE SERPL-MCNC: 213 MG/DL (ref 65–99)
HCT VFR BLD AUTO: 28.4 % (ref 37.5–51)
HGB BLD-MCNC: 9.2 G/DL (ref 13–17.7)
IMM GRANULOCYTES # BLD AUTO: 0.09 10*3/MM3 (ref 0–0.05)
IMM GRANULOCYTES NFR BLD AUTO: 1 % (ref 0–0.5)
LYMPHOCYTES # BLD AUTO: 1.25 10*3/MM3 (ref 0.7–3.1)
LYMPHOCYTES NFR BLD AUTO: 13.5 % (ref 19.6–45.3)
MCH RBC QN AUTO: 30.3 PG (ref 26.6–33)
MCHC RBC AUTO-ENTMCNC: 32.4 G/DL (ref 31.5–35.7)
MCV RBC AUTO: 93.4 FL (ref 79–97)
MONOCYTES # BLD AUTO: 1.07 10*3/MM3 (ref 0.1–0.9)
MONOCYTES NFR BLD AUTO: 11.6 % (ref 5–12)
NEUTROPHILS NFR BLD AUTO: 6.58 10*3/MM3 (ref 1.7–7)
NEUTROPHILS NFR BLD AUTO: 71.2 % (ref 42.7–76)
NRBC BLD AUTO-RTO: 0 /100 WBC (ref 0–0.2)
PLATELET # BLD AUTO: 189 10*3/MM3 (ref 140–450)
PMV BLD AUTO: 10.2 FL (ref 6–12)
POTASSIUM SERPL-SCNC: 4.5 MMOL/L (ref 3.5–5.2)
RBC # BLD AUTO: 3.04 10*6/MM3 (ref 4.14–5.8)
SODIUM SERPL-SCNC: 134 MMOL/L (ref 136–145)
VANCOMYCIN SERPL-MCNC: 22.5 MCG/ML (ref 5–40)
WBC NRBC COR # BLD AUTO: 9.24 10*3/MM3 (ref 3.4–10.8)

## 2024-05-23 PROCEDURE — 85025 COMPLETE CBC W/AUTO DIFF WBC: CPT | Performed by: INTERNAL MEDICINE

## 2024-05-23 PROCEDURE — 99232 SBSQ HOSP IP/OBS MODERATE 35: CPT | Performed by: INTERNAL MEDICINE

## 2024-05-23 PROCEDURE — 25010000002 VANCOMYCIN 1 G RECONSTITUTED SOLUTION 1 EACH VIAL: Performed by: INTERNAL MEDICINE

## 2024-05-23 PROCEDURE — 80048 BASIC METABOLIC PNL TOTAL CA: CPT | Performed by: INTERNAL MEDICINE

## 2024-05-23 PROCEDURE — 82948 REAGENT STRIP/BLOOD GLUCOSE: CPT

## 2024-05-23 PROCEDURE — 25010000002 HEPARIN (PORCINE) PER 1000 UNITS: Performed by: INTERNAL MEDICINE

## 2024-05-23 PROCEDURE — 99232 SBSQ HOSP IP/OBS MODERATE 35: CPT | Performed by: NURSE PRACTITIONER

## 2024-05-23 PROCEDURE — 25810000003 SODIUM CHLORIDE 0.9 % SOLUTION 250 ML FLEX CONT: Performed by: INTERNAL MEDICINE

## 2024-05-23 PROCEDURE — 80202 ASSAY OF VANCOMYCIN: CPT | Performed by: INTERNAL MEDICINE

## 2024-05-23 PROCEDURE — 63710000001 INSULIN LISPRO (HUMAN) PER 5 UNITS: Performed by: INTERNAL MEDICINE

## 2024-05-23 RX ADMIN — HEPARIN SODIUM 5000 UNITS: 5000 INJECTION INTRAVENOUS; SUBCUTANEOUS at 05:56

## 2024-05-23 RX ADMIN — CETIRIZINE HYDROCHLORIDE 5 MG: 10 TABLET, FILM COATED ORAL at 08:59

## 2024-05-23 RX ADMIN — Medication 10 ML: at 21:38

## 2024-05-23 RX ADMIN — SODIUM CHLORIDE 1000 MG: 9 INJECTION, SOLUTION INTRAVENOUS at 18:04

## 2024-05-23 RX ADMIN — HEPARIN SODIUM 5000 UNITS: 5000 INJECTION INTRAVENOUS; SUBCUTANEOUS at 18:06

## 2024-05-23 RX ADMIN — CARBIDOPA AND LEVODOPA 10 MG: 50; 200 TABLET, EXTENDED RELEASE ORAL at 06:15

## 2024-05-23 RX ADMIN — SEVELAMER CARBONATE 2400 MG: 800 TABLET, FILM COATED ORAL at 18:06

## 2024-05-23 RX ADMIN — ERYTHROMYCIN 1 APPLICATION: 5 OINTMENT OPHTHALMIC at 08:59

## 2024-05-23 RX ADMIN — Medication 1 APPLICATION: at 09:00

## 2024-05-23 RX ADMIN — Medication 10 ML: at 09:00

## 2024-05-23 RX ADMIN — AMMONIUM LACTATE 1 APPLICATION: 120 CREAM TOPICAL at 21:39

## 2024-05-23 RX ADMIN — INSULIN LISPRO 2 UNITS: 100 INJECTION, SOLUTION INTRAVENOUS; SUBCUTANEOUS at 18:06

## 2024-05-23 RX ADMIN — CARBIDOPA AND LEVODOPA 10 MG: 50; 200 TABLET, EXTENDED RELEASE ORAL at 18:06

## 2024-05-23 RX ADMIN — GABAPENTIN 100 MG: 100 CAPSULE ORAL at 21:38

## 2024-05-23 RX ADMIN — METOPROLOL SUCCINATE 12.5 MG: 25 TABLET, FILM COATED, EXTENDED RELEASE ORAL at 21:46

## 2024-05-23 RX ADMIN — ASPIRIN 81 MG: 81 TABLET, COATED ORAL at 08:59

## 2024-05-23 RX ADMIN — SERTRALINE 50 MG: 50 TABLET, FILM COATED ORAL at 21:38

## 2024-05-23 RX ADMIN — FAMOTIDINE 20 MG: 20 TABLET, FILM COATED ORAL at 21:37

## 2024-05-23 RX ADMIN — LEVOTHYROXINE SODIUM 75 MCG: 0.07 TABLET ORAL at 08:59

## 2024-05-23 RX ADMIN — MEGESTROL ACETATE 40 MG: 40 TABLET ORAL at 08:59

## 2024-05-23 RX ADMIN — Medication 1 TABLET: at 08:59

## 2024-05-23 RX ADMIN — ERYTHROMYCIN 1 APPLICATION: 5 OINTMENT OPHTHALMIC at 21:37

## 2024-05-23 RX ADMIN — INSULIN LISPRO 2 UNITS: 100 INJECTION, SOLUTION INTRAVENOUS; SUBCUTANEOUS at 21:46

## 2024-05-23 RX ADMIN — HEPARIN SODIUM 5000 UNITS: 5000 INJECTION INTRAVENOUS; SUBCUTANEOUS at 21:37

## 2024-05-23 RX ADMIN — ATORVASTATIN CALCIUM 20 MG: 20 TABLET, FILM COATED ORAL at 08:59

## 2024-05-23 RX ADMIN — ACETAMINOPHEN 650 MG: 325 TABLET ORAL at 21:37

## 2024-05-23 RX ADMIN — EMPAGLIFLOZIN 10 MG: 10 TABLET, FILM COATED ORAL at 18:06

## 2024-05-23 RX ADMIN — MEGESTROL ACETATE 40 MG: 40 TABLET ORAL at 18:06

## 2024-05-23 RX ADMIN — SEVELAMER CARBONATE 2400 MG: 800 TABLET, FILM COATED ORAL at 08:59

## 2024-05-23 NOTE — PROGRESS NOTES
PROGRESS NOTE         Patient Identification:  Name:  Kennedy Prescott  Age:  73 y.o.  Sex:  male  :  1950  MRN:  [id number]  Visit Number:  [id number]  Primary Care Provider:  Jag Guillaume MD         LOS: 7 days       ----------------------------------------------------------------------------------------------------------------------  Subjective       Chief Complaints:    Nausea and Vomiting        Interval History:      Patient resting in bed this morning.  Currently on 2 L per nasal cannula with no apparent distress.  Afebrile, denies diarrhea.  Lungs clear to auscultation bilaterally.  Abdomen soft, nontender.  Left upper arm fistula with no surrounding erythema or edema.  Right chest wall former HD cath site with no signs of infection.  GAVI from 2024 reports no evidence of vegetation.  WBC normal at 9.24.      Review of Systems:    Constitutional: no fever, chills and night sweats.  Generalized fatigue.  Eyes: no eye drainage, itching or redness.  HEENT: no mouth sores, dysphagia or nose bleed.  Respiratory: no for shortness of breath, cough or production of sputum.  Cardiovascular: no chest pain, no palpitations, no orthopnea.  Gastrointestinal: no nausea, vomiting or diarrhea. No abdominal pain, hematemesis or rectal bleeding.  Genitourinary: no dysuria or polyuria.  Hematologic/lymphatic: no lymph node abnormalities, no easy bruising or easy bleeding.  Musculoskeletal: no muscle or joint pain.  Skin: No rash and no itching.  Neurological: no loss of consciousness, no seizure, no headache.  Behavioral/Psych: no depression or suicidal ideation.  Endocrine: no hot flashes.  Immunologic: negative.    ----------------------------------------------------------------------------------------------------------------------      Objective       Current [facility] Meds:  acetaminophen, 650 mg, Oral, Once per day on   ammonium lactate, 1 Application, Topical,  Nightly  aspirin, 81 mg, Oral, Daily  atorvastatin, 20 mg, Oral, Daily  cetirizine, 5 mg, Oral, Daily  erythromycin, 1 Application, Left Eye, BID  famotidine, 20 mg, Oral, Q48H  gabapentin, 100 mg, Oral, Nightly  heparin (porcine), 5,000 Units, Subcutaneous, Q8H  insulin lispro, 2-7 Units, Subcutaneous, 4x Daily AC & at Bedtime  levothyroxine, 75 mcg, Oral, Daily  Lidocaine, 1 patch, Transdermal, Q PM  lubrisoft, 1 Application, Topical, Daily  megestrol, 40 mg, Oral, TID With Meals  [Held by provider] metoprolol succinate XL, 12.5 mg, Oral, Nightly  [START ON 5/24/2024] metoprolol tartrate, 25 mg, Oral, On Call  midodrine, 10 mg, Oral, TID AC  multivitamin with minerals, 1 tablet, Oral, Daily  sertraline, 50 mg, Oral, Nightly  sevelamer, 2,400 mg, Oral, TID With Meals  sodium chloride, 10 mL, Intravenous, Q12H  vancomycin, 1,000 mg, Intravenous, Once  Vancomycin Pharmacy Intermittent/Pulse Dosing, , Does not apply, Daily           ----------------------------------------------------------------------------------------------------------------------    Vital Signs:  Temp:  [97.4 °F (36.3 °C)-98.4 °F (36.9 °C)] 98.4 °F (36.9 °C)  Heart Rate:  [68-77] 72  Resp:  [16-19] 18  BP: (115-150)/(58-98) 127/58  No data found.    SpO2 Percentage    05/22/24 1645 05/22/24 1900 05/23/24 0300   SpO2: 94% 95% 96%     SpO2:  [90 %-100 %] 96 %  on  Flow (L/min):  [2] 2;   Device (Oxygen Therapy): nasal cannula    Body mass index is 30.78 kg/m².  Wt Readings from Last 3 Encounters:   05/22/24 100 kg (220 lb 10.9 oz)   10/02/23 87.1 kg (192 lb)   09/22/23 87.1 kg (192 lb)        Intake/Output Summary (Last 24 hours) at 5/23/2024 1217  Last data filed at 5/23/2024 0300  Gross per 24 hour   Intake 360 ml   Output 120 ml   Net 240 ml     NPO Diet NPO Type: Strict NPO  ----------------------------------------------------------------------------------------------------------------------      Physical Exam:    Constitutional: Chronically  ill-appearing elderly gentleman.  On 2 L nasal cannula, sitting up comfortably in bed.   HENT:  Head: Normocephalic and atraumatic.  Mouth:  Moist mucous membranes.    Eyes:  Conjunctivae and EOM are normal.  No scleral icterus.  Neck:  Neck supple.  No JVD present.    Cardiovascular:  Normal rate, regular rhythm and normal heart sounds with 3/6 murmur. No edema.  Pulmonary/Chest: Lung exam is clear to auscultation bilaterally   abdominal:  Soft.  Bowel sounds are normal.  No distension and no tenderness.   Musculoskeletal:  No edema, no tenderness, and no deformity.  No swelling or redness of joints.  Neurological:  Alert and oriented to person, place, and time.  No facial droop.  No slurred speech.   Skin:  Skin is warm and dry.  No rash noted.  No pallor.  Right subclavian HD cath site dressed with occlusive dressing, no surrounding erythema or edema.  No drainage on the dressing.  LUE AVF with no erythema/edema, thrill and bruit.  Bilateral lower extremity chronic ischemic changes.  Left pedal pulse faint.  Dry ulcers noted to the left lateral foot and left heel.  Psychiatric:  Normal mood and affect.  Behavior is normal.        ----------------------------------------------------------------------------------------------------------------------                Results from last 7 days   Lab Units 05/17/24  1416   PH, ARTERIAL pH units 7.523*   PO2 ART mm Hg 80.2*   PCO2, ARTERIAL mm Hg 40.4   HCO3 ART mmol/L 33.2*     Results from last 7 days   Lab Units 05/23/24  0153 05/22/24  0036 05/21/24  0321 05/20/24  0109 05/19/24  0102 05/18/24  0408 05/17/24  0927   CRP mg/dL  --   --   --   --  15.62* 18.08*  --    LACTATE mmol/L  --   --   --   --  1.0 1.0  --    WBC 10*3/mm3 9.24 10.41 12.12*   < > 11.08* 11.53* 9.52   HEMOGLOBIN g/dL 9.2* 8.6* 8.5*   < > 8.7* 8.8* 7.2*   HEMATOCRIT % 28.4* 27.3* 25.9*   < > 26.3* 27.0* 22.6*   MCV fL 93.4 93.5 93.2   < > 90.7 92.5 96.2   MCHC g/dL 32.4 31.5 32.8   < > 33.1 32.6  "31.9   PLATELETS 10*3/mm3 189 159 140   < > 97* 116* 95*   INR   --   --   --   --  1.31* 1.58* 1.52*    < > = values in this interval not displayed.     Results from last 7 days   Lab Units 05/23/24  0153 05/22/24  0036 05/21/24  0321 05/20/24  0109 05/19/24  0102 05/18/24  0408   SODIUM mmol/L 134* 132* 131* 129* 127* 129*   POTASSIUM mmol/L 4.5 4.1 4.1 3.9 3.8 3.6   MAGNESIUM mg/dL  --   --   --  2.1 2.0 1.8   CHLORIDE mmol/L 93* 91* 90* 87* 86* 87*   CO2 mmol/L 26.5 27.7 25.1 25.9 27.4 27.9   BUN mg/dL 55* 40* 70* 56* 45* 30*   CREATININE mg/dL 8.48* 6.82* 10.34* 8.29* 7.18* 5.54*   CALCIUM mg/dL 8.3* 8.2* 8.1* 7.9* 8.1* 8.2*   GLUCOSE mg/dL 213* 204* 99 128* 96 164*   ALBUMIN g/dL  --   --   --  3.1* 3.1* 3.3*   BILIRUBIN mg/dL  --   --   --  0.4 0.4 0.5   ALK PHOS U/L  --   --   --  197* 163* 153*   AST (SGOT) U/L  --   --   --  23 27 31   ALT (SGPT) U/L  --   --   --  11 11 12   Estimated Creatinine Clearance: 9.1 mL/min (A) (by C-G formula based on SCr of 8.48 mg/dL (H)).  No results found for: \"AMMONIA\"    Glucose   Date/Time Value Ref Range Status   05/23/2024 1141 167 (H) 70 - 130 mg/dL Final   05/23/2024 0619 123 70 - 130 mg/dL Final   05/22/2024 2104 178 (H) 70 - 130 mg/dL Final   05/22/2024 1654 118 70 - 130 mg/dL Final   05/22/2024 1050 116 70 - 130 mg/dL Final   05/22/2024 0620 146 (H) 70 - 130 mg/dL Final   05/21/2024 2004 301 (H) 70 - 130 mg/dL Final   05/21/2024 1759 242 (H) 70 - 130 mg/dL Final     Lab Results   Component Value Date    HGBA1C 6.40 (H) 03/21/2024     Lab Results   Component Value Date    TSH 2.490 03/21/2024    FREET4 1.43 05/29/2022       Blood Culture   Date Value Ref Range Status   05/15/2024 Staphylococcus aureus, MRSA (C)  Preliminary     Comment:       Infectious disease consultation is highly recommended to rule out distant foci of infection.  Methicillin resistant Staphylococcus aureus, Patient may be an isolation risk.   05/15/2024 Staphylococcus aureus, MRSA (C)  " "Preliminary     Comment:       Infectious disease consultation is highly recommended to rule out distant foci of infection.  Methicillin resistant Staphylococcus aureus, Patient may be an isolation risk.     No results found for: \"URINECX\"  No results found for: \"WOUNDCX\"  No results found for: \"STOOLCX\"  No results found for: \"RESPCX\"  Pain Management Panel           No data to display                  ----------------------------------------------------------------------------------------------------------------------  Imaging Results (Last 24 Hours)       ** No results found for the last 24 hours. **            ----------------------------------------------------------------------------------------------------------------------    Pertinent Infectious Disease Results                Assessment/Plan       Assessment     Sepsis on admission  MRSA bacteremia  Chronic left foot wounds        Plan      Patient resting in bed this morning.  Currently on 2 L per nasal cannula with no apparent distress.  Afebrile, denies diarrhea.  Lungs clear to auscultation bilaterally.  Abdomen soft, nontender.  Left upper arm fistula with no surrounding erythema or edema.  Right chest wall former HD cath site with no signs of infection.  GAVI from 5/22/2024 reports no evidence of vegetation.  WBC normal at 9.24.      In the setting of negative GAVI would recommend to continue vancomycin pharmacy to dose post hemodialysis to continue through 6/15/2024.  Case discussed with primary team.  We will continue to monitor closely.      ANTIMICROBIAL THERAPY    vancomycin (VANCOCIN) 1000mg in NS 250ml (CD)  VANCOMYCIN PHARMACY INTERMITTENT/PULSE DOSING     Code Status:   Code Status and Medical Interventions:   Ordered at: 05/15/24 0450     Level Of Support Discussed With:    Patient     Code Status (Patient has no pulse and is not breathing):    CPR (Attempt to Resuscitate)     Medical Interventions (Patient has pulse or is breathing):    Full " Support       Monica Hawkins, DORA  05/23/24  12:17 EDT

## 2024-05-23 NOTE — DISCHARGE PLACEMENT REQUEST
Vancomycin HCl (VANCOMYCIN PHARMACY INTERMITTENT/PULSE DOSING) [[id number]] (Order [id number])  Order  Date: 5/23/2024 Department: [address] Ordering/Authorizing: Moniac Hawkins APRN     Medication  Vancomycin HCl (VANCOMYCIN PHARMACY INTERMITTENT/PULSE DOSING) [[id number]]  Order History  Outpatient  Date/Time Action Taken User Additional Information   05/23/24 0814 Sign Monica Hawkins APRN Reorder from Order:[id number]   05/23/24 0814 Taking Flag Checked Monica Hawkins APRN [id number]     Outpatient Morphine Milligram Equivalents Per Day  Expand All  Collapse All  No orders with morphine equivalence     Vancomycin HCl (VANCOMYCIN PHARMACY INTERMITTENT/PULSE DOSING) [[id number]]     Order Details  Dose: -- Route: Does not apply Frequency: Daily   Dispense Quantity: -- Refills: --    Indications of Use: Sepsis         Sig: Use Daily for 23 days. Vancomycin post HD through 6/15/24  Indications: Infection with Dysregulated Inflammatory Response         Start Date: 05/23/24 End Date: 06/15/24 after 23 doses   Written Date: 05/23/24 Expiration Date: 05/23/25   Providers    Ordering Provider and Authorizing Provider:    Monica Hawkins APRN   [facility] [address]   Phone:  [phone]   Fax:  [phone]   NPI:  [id number]        Ordering User:  Monica Hawkins APRN          Orders with any of the following pharmaceutical classes: Anti-Infective Agents - Misc.    Name Dose Frequency Start Date End Date Medication Warnings Interventions? Order Mode    vancomycin (VANCOCIN) 1,000 mg in sodium chloride 0.9 % 250 mL IVPB-VTB 1,000 mg Once 05/23/24 1700  Duplicate Therapy Yes Inpatient    Vancomycin Pharmacy Intermittent/Pulse Dosing  Daily 05/16/24 1115 05/24/24 1107  Yes Inpatient    vancomycin (VANCOCIN) 1,000 mg in sodium chloride 0.9 % 250 mL IVPB-VTB 1,000 mg Once 05/21/24 1500 05/21/24 1913 Duplicate Therapy  Inpatient    vancomycin (VANCOCIN) 1,000 mg in sodium chloride 0.9 % 250 mL  "IVPB-VTB 10 mg/kg Once 05/18/24 1300 05/18/24 1405 Duplicate Therapy  Inpatient    vancomycin (VANCOCIN) 1,000 mg in sodium chloride 0.9 % 250 mL IVPB-VTB 1,000 mg Once 05/17/24 1500 05/17/24 1553 Duplicate Therapy Yes Inpatient      Warnings Override History    No Interaction Warnings Shown      Pharmacist Clinical Review History    This prescription has not been clinically reviewed.     Order Reconciliation Actions       Order Reconciliation Actions     E-Prescribing Status    Outpatient Medication Detail    Vancomycin HCl (VANCOMYCIN PHARMACY INTERMITTENT/PULSE DOSING)        Sig: Use Daily for 23 days. Vancomycin post HD through 6/15/24  Indications: Infection with Dysregulated Inflammatory Response        Class: No Print        Route: Does not apply          Event History       Event History     Tracking Reports    Cosign Tracking Order Transmittal Tracking         Kennedy Prescott (73 y.o. Male)       Date of Birth   1950    Social Security Number       Address   [address] [place] [address]    Home Phone   [phone]    MRN   [id number]       Orthodoxy   None    Marital Status   Unknown                            Admission Date   5/15/24    Admission Type   Emergency    Admitting Provider   Alex Crum MD    Attending Provider   Donnell Hines DO    Department, Room/Bed   [address], [facility]/       Discharge Date       Discharge Disposition       Discharge Destination                                 Attending Provider: Donnell Hines DO    Allergies: No Known Allergies    Isolation: Contact   Infection: MDR Pseudomonas (05/17/23)   Code Status: CPR    Ht: 180.3 cm (71\")   Wt: 100 kg (220 lb 10.9 oz)    Admission Cmt: None   Principal Problem: SIRS (systemic inflammatory response syndrome) [R65.10]                   Active Insurance as of 5/15/2024       Primary Coverage       Payor Plan Insurance Group Employer/Plan Group    MEDICARE MEDICARE " A & B        Payor Plan Address Payor Plan Phone Number Payor Plan Fax Number Effective Dates    [address] [phone]  1996 - None Entered    [facility] [address]         Subscriber Name Subscriber Birth Date Member ID       SATINDER PRESCOTT 1950 [id number]               Secondary Coverage       Payor Plan Insurance Group Employer/Plan Group    [place]        Payor Plan Address Payor Plan Phone Number Payor Plan Fax Number Effective Dates    [address] [phone]  2024 - None Entered    [place] [address]         Subscriber Name Subscriber Birth Date Member ID       SATINDER PRESCOTT 1950 [id number]                     Emergency Contacts        (Rel.) Home Phone Work Phone Mobile Phone    KOBY PRESCOTT (Son) [phone] -- [phone]              Insurance Information                  MEDICARE/MEDICARE A & B Phone: [phone]    Subscriber: Satinder Prescott Subscriber#: [id number]    Group#: -- Precert#: --        [place] Phone: [phone]    Subscriber: Satinder Prescott Subscriber#: [id number]    Group#: -- Precert#: --             History & Physical        Alex Crum MD at 05/15/24 1601              [facility]    Patient Identification:  Name:  Satinder Prescott  Age:  73 y.o.  Sex:  male  :  1950  MRN:  [id number]   Visit Number:  [id number]  Admit Date: 5/15/2024   Room number:  3329/1P  Primary Care Physician:  Jag Guillaume MD    Date of Admission: 5/15/2024     Subjective     Chief complaint:    Chief Complaint   Patient presents with    Nausea    Vomiting     History of presenting illness:  73 y.o. male who was admitted on 5/15/2024 from the nursing home via the ED with nausea and vomiting; the patient resides at the [facility].  He was a past medical history of insulin dependent type 2 diabetes mellitus, essential hypertension, ESRD  with hemodialysis Tuesday,/Thursday/Saturday via a tunneled hemodialysis catheter, hyperlipidemia, paroxysmal atrial fibrillation, coronary artery disease status post CABG in the distant past, pulmonary hypertension, and heart failure with preserved EF.  The patient was recently admitted to [facility] 4/26/2024-5/1/2024 with hyperkalemia that required emergent hemodialysis.  While the patient was hospitalized, he had a left arm hemodialysis AV fistula placed by Dr. Woodson on 4/29/2024 and he had a tunneled hemodialysis catheter placed on 5/1/2024.    I saw the patient in room 334; bedside nurse Lana was present during my evaluation.    The patient states that he began feeling ill one day ago with nausea and vomiting; he denies any diarrhea or abdominal pain.  He had chills but no sweating and no fever.  He noticed that his left foot was starting to hurt, but he did not disclose this to the ED provider.  In the ED, the patient had a temperature of 100.2 but imaging did not reveal suspected infections.  However, the labs were concerning, as the WBC, CRP, and lactic acid were elevated.  Thus, the patient was placed on the medical surgical floor for further evaluation and treatment.  Please note that the patient is a poor historian and I was thus only able to obtain a limited history of presenting illness.  ---------------------------------------------------------------------------------------------------------------------   Review of Systems   Constitutional:  Positive for chills and fatigue. Negative for diaphoresis and fever.   Respiratory:  Negative for cough and shortness of breath.    Cardiovascular:  Negative for chest pain and leg swelling.   Gastrointestinal:  Positive for nausea and vomiting. Negative for abdominal pain and diarrhea.   Musculoskeletal:         Left foot pain   Skin:  Negative for rash and wound.   Neurological:  Negative for headaches.      ---------------------------------------------------------------------------------------------------------------------   Past Medical History:   Diagnosis Date    CHF (congestive heart failure)     Coronary artery disease     Diabetes mellitus     Dialysis patient     Disease of thyroid gland     Elevated cholesterol     GERD (gastroesophageal reflux disease)     Hypertension     Myocardial infarction     Renal disorder     stage 5     Past Surgical History:   Procedure Laterality Date    CARDIAC SURGERY      cabg    CHOLECYSTECTOMY      COLONOSCOPY N/A 9/22/2023    Procedure: COLONOSCOPY;  Surgeon: Trip Peter MD;  Location: [facility];  Service: Gastroenterology;  Laterality: N/A;    DIALYSIS FISTULA CREATION Left     arm     Family History    Arthritis Mother    Hypertension Father    Hyperlipidemia Father    Heart disease Father    Diabetes Father    COPD Father    Cancer Daughter     Social History     Socioeconomic History    Marital status: Unknown   Tobacco Use    Smoking status: Never    Smokeless tobacco: Never   Vaping Use    Vaping status: Never Used   Substance and Sexual Activity    Alcohol use: Never    Drug use: Never    Sexual activity: Defer     ---------------------------------------------------------------------------------------------------------------------   Allergies:  Patient has no known allergies.  ---------------------------------------------------------------------------------------------------------------------   Medications below are reported home medications pulling from within the system; at this time, these medications have not been reconciled unless otherwise specified and are in the verification process for further verification as current home medications.      Prior to Admission Medications       Prescriptions Last Dose Informant Patient Reported? Taking?    acetaminophen (TYLENOL) 500 MG tablet   Yes No    Take 1 tablet by mouth Every 6 (Six) Hours As Needed for Mild  Pain.    amLODIPine (NORVASC) 5 MG tablet   Yes No    Take 1 tablet by mouth Daily.    atorvastatin (LIPITOR) 20 MG tablet   Yes No    Take 1 tablet by mouth Every Night.    famotidine (PEPCID) 20 MG tablet  Pharmacy Yes No    Take 1 tablet by mouth 2 (Two) Times a Day.    gabapentin (NEURONTIN) 100 MG capsule  Pharmacy Yes No    Take 1 capsule by mouth 3 (Three) Times a Day.    glipizide (GLUCOTROL) 10 MG tablet   Yes No    Take 1 tablet by mouth 2 (Two) Times a Day Before Meals.    hydrALAZINE (APRESOLINE) 50 MG tablet   Yes No    Take 2 tablets by mouth 3 (Three) Times a Day.    hydrOXYzine pamoate (VISTARIL) 25 MG capsule   Yes No    Take 1 capsule by mouth 3 (Three) Times a Day As Needed for Itching.    insulin detemir (LEVEMIR) 100 UNIT/ML injection   Yes No    Inject  under the skin into the appropriate area as directed Daily.    Insulin Lispro (humaLOG) 100 UNIT/ML injection   Yes No    Inject  under the skin into the appropriate area as directed 3 (Three) Times a Day Before Meals.    levocetirizine (XYZAL) 5 MG tablet   Yes No    Take 1 tablet by mouth Every Evening.    levothyroxine (SYNTHROID, LEVOTHROID) 75 MCG tablet   Yes No    Take 1 tablet by mouth Daily.    lidocaine (LMX) 4 % cream   Yes No    Apply 1 application  topically to the appropriate area as directed As Needed for Mild Pain.    loperamide (IMODIUM) 2 MG capsule   Yes No    Take 1 capsule by mouth 4 (Four) Times a Day As Needed for Diarrhea.    metoprolol succinate XL (TOPROL-XL) 25 MG 24 hr tablet   Yes No    Take 0.5 tablets by mouth Daily.    mirtazapine (REMERON) 15 MG tablet  Pharmacy Yes No    Take 1 tablet by mouth Every Night.    multivitamin with minerals tablet tablet   Yes No    Take 1 tablet by mouth Daily.    ondansetron (ZOFRAN) 8 MG tablet   Yes No    Take  by mouth Every 8 (Eight) Hours As Needed for Nausea or Vomiting.    ondansetron ODT (ZOFRAN-ODT) 4 MG disintegrating tablet   No No    Place 1 tablet on the tongue Every 6  (Six) Hours As Needed for Nausea or Vomiting.    sertraline (ZOLOFT) 50 MG tablet   Yes No    Take 1 tablet by mouth Daily.    sevelamer (RENAGEL) 800 MG tablet  Pharmacy Yes No    Take 1 tablet by mouth 3 (Three) Times a Day With Meals.    Suprep Bowel Prep Kit 17.5-3.13-1.6 GM/177ML solution oral solution   No No    Dispense one Kit        Sig: Used as Directed          Objective     Vital Signs:  Temp:  [98.5 °F (36.9 °C)-103 °F (39.4 °C)] 103 °F (39.4 °C)  Heart Rate:  [] 96  Resp:  [18] 18  BP: ()/(53-71) 98/54    Mean Arterial Pressure (Non-Invasive) for the past 24 hrs (Last 3 readings):   Noninvasive MAP (mmHg)   05/15/24 1640 89   05/15/24 1600 75   05/15/24 1520 72     SpO2:  [90 %-96 %] 96 %  on   ;   Device (Oxygen Therapy): room air  Body mass index is 26.78 kg/m².    Wt Readings from Last 3 Encounters:   05/15/24 87.1 kg (192 lb 0.3 oz)   10/02/23 87.1 kg (192 lb)   09/22/23 87.1 kg (192 lb)      ----------------------------------------------------------------------------------------------------------------------  Physical Exam  Vitals and nursing note reviewed. Exam conducted with a chaperone present.   Constitutional:       General: He is awake.      Appearance: He is well-developed and normal weight. He is not toxic-appearing or diaphoretic.   HENT:      Head: Normocephalic and atraumatic.      Right Ear: External ear normal.      Left Ear: External ear normal.      Nose: Nose normal.   Eyes:      Comments: Right eye was patched.  Left pupil reflex was normal; no icterus seen.   Neck:      Comments: Right IJ tunneled HD catheter was present with an occlusive dressing on top of the insertion site.  Cardiovascular:      Rate and Rhythm: Normal rate and regular rhythm.      Pulses: Normal pulses.      Heart sounds: No murmur heard.     Arteriovenous access: Left arteriovenous access is present.     Comments: Good thrill is felt.  Pulmonary:      Effort: Pulmonary effort is normal. No  respiratory distress.      Breath sounds: No wheezing or rales.   Abdominal:      General: Bowel sounds are normal. There is no distension.      Palpations: Abdomen is soft.      Tenderness: There is no abdominal tenderness. There is no guarding.   Musculoskeletal:         General: Signs of injury present. No deformity.      Cervical back: Full passive range of motion without pain and normal range of motion.      Comments: Left foot swollen with some erythema.  See skin exam.  No swollen or red knees, ankles, elbows, wrists bilaterally.  Both feet feel cold and appear to be the same temperature; no cyanosis is seen.   Skin:     Capillary Refill: Capillary refill takes less than 2 seconds.      Coloration: Skin is not jaundiced.      Comments: He has healing abrasions down both anterior legs, with the left leg having more abrasions than the right leg.  The left foot has erythema on the toes, with some extension onto the dorsal foot.  There are some crusted lesions on some of the toes.  I do not see any streaking.  The erythema feels cold.   Neurological:      Mental Status: He is alert and oriented to person, place, and time. Mental status is at baseline.      Cranial Nerves: No cranial nerve deficit.   Psychiatric:         Mood and Affect: Mood normal.         Behavior: Behavior normal. Behavior is cooperative.         Thought Content: Thought content normal.         Judgment: Judgment normal.       ---------------------------------------------------------------------------------------------------------------------  EKG: Atrial fibrillation with a heart rate of 108 and a QTc of 482 ms.  There are inverted T waves in the lateral leads with some mild ST depressions.  There is also poor R wave progression.  When compared to an EKG dated 5/29/2022, the T wave inversions are a new finding and the comparison EKG was in normal sinus rhythm.  Please note that I have personally looked at the EKG from this admission, the  comparison EKGs in the medical records, and the above is my interpretation of this admission's EKG; I await the final cardiology read.      Telemetry:  Ordered      Last echocardiogram from [facility] 3/17/2023:  TTE procedure: ECHO COMPLETE (DOPPLER / COLOR) W OR WO CONTRAST.    Impression:    Technically difficult study due to poor acoustic window.    Concentric LVH: mild    Visually estimated ejection fraction 55% +/- 5%.    Cannot exclude regional wall motion abnormalities    Indeterminate diastolic function. However there is some degree of    diastolci dysfunction.    LA dilated: moderate    RA dilated: moderate    Mild mitral valve annulus calcification.    Trace mitral regurgitation.    Mild (1+) tricuspid regurgitation.    RVSP 47 mmHg    Mild pulmonary hypertension.    Dilated IVC with no inspiratory collapse.     I have personally read the ECHO final report.  --------------------------------------------------------------------------------------------------------------------  LABS:    CBC and coagulation:  Results from last 7 days   Lab Units 05/15/24  1904 05/15/24  1639 05/15/24  1415 05/15/24  1128   PROCALCITONIN ng/mL  --   --  0.64*  --    LACTATE mmol/L 2.8* 2.8* 2.3*  --    CRP mg/dL  --   --  7.54* 5.34*   WBC 10*3/mm3  --   --  17.01* 16.83*   HEMOGLOBIN g/dL  --   --  9.7* 8.9*   HEMATOCRIT %  --   --  30.4* 27.5*   MCV fL  --   --  94.4 93.9   MCHC g/dL  --   --  31.9 32.4   PLATELETS 10*3/mm3  --   --  138* 133*   INR   --   --  1.35*  --      Acid/base balance:  Results from last 7 days   Lab Units 05/15/24  1608   PH, ARTERIAL pH units 7.470*   PCO2, ARTERIAL mm Hg 37.0   PO2 ART mm Hg 50.7*   HCO3 ART mmol/L 26.9*   On room air.    Renal and electrolytes:  Results from last 7 days   Lab Units 05/15/24  1415 05/15/24  1128   SODIUM mmol/L 136 136   POTASSIUM mmol/L 4.1 3.9   CHLORIDE mmol/L 93* 93*   CO2 mmol/L 25.5 27.5   BUN mg/dL 41* 39*   CREATININE mg/dL 7.03* 6.33*   CALCIUM mg/dL  9.0 8.7   GLUCOSE mg/dL 96 83   ANION GAP mmol/L 17.5* 15.5*     Estimated Creatinine Clearance: 11.2 mL/min (A) (by C-G formula based on SCr of 7.03 mg/dL (H)).    Liver and pancreatic function:  Results from last 7 days   Lab Units 05/15/24  1415 05/15/24  1128   ALBUMIN g/dL 3.8 3.7   BILIRUBIN mg/dL 0.5 0.4   ALK PHOS U/L 184* 176*   AST (SGOT) U/L 16 16   ALT (SGPT) U/L 8 9     Endocrine function:  Lab Results   Component Value Date    HGBA1C 6.40 (H) 03/21/2024     Lab Results   Component Value Date    TSH 2.490 03/21/2024    FREET4 1.43 05/29/2022     Cardiac:  Results from last 7 days   Lab Units 05/15/24  1639 05/15/24  1415   HSTROP T ng/L 160* 172*       Cultures:  Microbiology Results (last 10 days)       Procedure Component Value - Date/Time    Respiratory Panel PCR w/COVID-19(SARS-CoV-2) ISMAEL/SHAZIA/SARAH/PAD/COR/QI In-House, NP Swab in UTM/VTM, 2 HR TAT - Swab, Nasopharynx [[id number]]  (Normal) Collected: 05/15/24 1128    Lab Status: Final result Specimen: Swab from Nasopharynx Updated: 05/15/24 1233     ADENOVIRUS, PCR Not Detected     Coronavirus 229E Not Detected     Coronavirus HKU1 Not Detected     Coronavirus NL63 Not Detected     Coronavirus OC43 Not Detected     COVID19 Not Detected     Human Metapneumovirus Not Detected     Human Rhinovirus/Enterovirus Not Detected     Influenza A PCR Not Detected     Influenza B PCR Not Detected     Parainfluenza Virus 1 Not Detected     Parainfluenza Virus 2 Not Detected     Parainfluenza Virus 3 Not Detected     Parainfluenza Virus 4 Not Detected     RSV, PCR Not Detected     Bordetella pertussis pcr Not Detected     Bordetella parapertussis PCR Not Detected     Chlamydophila pneumoniae PCR Not Detected     Mycoplasma pneumo by PCR Not Detected       I have personally looked at the labs and they are summarized above.  ----------------------------------------------------------------------------------------------------------------------  Detailed radiology  reports for the last 24 hours:    Imaging Results (Last 24 Hours)       Procedure Component Value Units Date/Time    CT Angiogram Chest Pulmonary Embolism [[id number]] Collected: 05/15/24 1716     Updated: 05/15/24 1721    Narrative:      VERIFICATION OBSERVER NAME: Lisa Levy MD.     Technique: Axial images were obtained along with coronal and sagittal  reconstruction. Mip images were generated.   Patient was injected with contrast.   DLP in mGycm and contrast amount and type are reported in the EMR  record.. Dose lowering technique: Automated exposure control. Data  included in the medical records.      HISTORY/COMPARISON/FINDINGS:     Comparison: None.     History and Findings: PE study.       No evidence of PE.  No aortic aneurysm or dissection.  Minimal cardiac enlargement.  No pleural or pericardial effusion.  Lung fields are clear.  Visualized portion of the upper abdomen appeared unremarkable.  Post  cholecystectomy.       Impression:      No acute process.      This report was finalized on 5/15/2024 5:19 PM by Dr. Lisa Levy MD.       CT Abdomen Pelvis Without Contrast [[id number]] Collected: 05/15/24 1507     Updated: 05/15/24 1512    Narrative:      EXAM:    CT Abdomen and Pelvis Without Intravenous Contrast     EXAM DATE:    5/15/2024 2:40 PM     CLINICAL HISTORY:    General Abdominal Pain     TECHNIQUE:    Axial computed tomography images of the abdomen and pelvis without  intravenous contrast.  Sagittal and coronal reformatted images were  created and reviewed.  This CT exam was performed using one or more of  the following dose reduction techniques:  automated exposure control,  adjustment of the mA and/or kV according to patient size, and/or use of  iterative reconstruction technique.     COMPARISON:    5/23/2023     FINDINGS:    Lung bases:  Minimal basilar atelectasis noted.    Heart:  Cardiomegaly, CABG, and calcifications of the native coronary  arteries.      ABDOMEN:    Liver:  Unremarkable  as visualized.    Gallbladder and bile ducts:  Cholecystectomy.  No ductal dilation.    Pancreas:  Unremarkable as visualized.  No ductal dilation.    Spleen:  Unremarkable as visualized.  No splenomegaly.    Adrenals:  Unremarkable as visualized.  No mass.    Kidneys and ureters:  Unremarkable as visualized.  No obstructing  stones.  No hydronephrosis.    Stomach and bowel:  Sigmoid diverticulosis without diverticulitis.  No  bowel obstruction.      PELVIS:    Appendix:  Normal appendix.    Bladder:  Unremarkable as visualized.  No stones.    Reproductive:  Mild prostate enlargement.      ABDOMEN and PELVIS:    Intraperitoneal space:  Unremarkable as visualized.  No  pneumoperitoneum identified.  No ascites or abscess.    Bones/joints:  Degenerative changes lumbar spine multilevel stenosis.   Hip arthroplasty changes right hip and fixation hardware left hip.   Chronic compression fracture is noted of L2 and L5 vertebral bodies  unchanged from the previous exam.  No dislocation.    Soft tissues:  Unremarkable as visualized.    Vasculature:  Atherosclerosis aortoiliac vasculature without evidence  of aneurysm.    Lymph nodes:  Reactive appearing nonenlarged retroperitoneal lymph  nodes similar to the previous exam.       Impression:      1.  No acute findings identified within abdomen or pelvis.  2.  Sigmoid diverticulosis without diverticulitis.  3.  Prior cholecystectomy.  4.  Decreased prominence of retroperitoneal lymph nodes which are within  normal limits for size.  5.  Atherosclerosis.  6.  Chronic compression fractures lumbar spine. Degenerative changes. No  acute bony findings.  7.  Cardiomegaly and basilar atelectasis.  8.  Other nonacute findings above.     This report was finalized on 5/15/2024 3:10 PM by Dr. Jose Ramon Hutchison MD.       XR Chest AP [[id number]] Collected: 05/15/24 1506     Updated: 05/15/24 1509    Narrative:      EXAM:    XR Chest, 1 View     EXAM DATE:    5/15/2024 2:15 PM     CLINICAL  "HISTORY:    fever     TECHNIQUE:    Frontal view of the chest.     COMPARISON:    No relevant prior studies available.     FINDINGS:    Lungs and pleural spaces:  Unremarkable as visualized.  No  consolidation.  No pneumothorax.    Heart:  Cardiomegaly.  CABG.    Mediastinum:  Unremarkable as visualized.  Normal mediastinal contour.    Bones/joints:  Unremarkable as visualized.  No acute fracture.    Tubes, lines and devices:  Right tunneled dialysis catheter.       Impression:      1.  Cardiomegaly and CABG.  2.  No acute cardiopulmonary findings.     This report was finalized on 5/15/2024 3:07 PM by Dr. Jose Ramon Hutchison MD.             Final impressions for the last 30 days of radiology reports:      IR CENTRAL VENOUS  Result Date: 5/1/2024  Successful placement of a tunneled 19 cm Glidepath via the right internal jugular vein under ultrasound and fluoroscopic guidance. No complications. THANK YOU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

## 2024-05-23 NOTE — PROGRESS NOTES
"Nephrology Progress Note  Chief complaint, nausea and vomiting    Subjective     Patient had successful cannulation of AV fistula yesterday and had uneventful dialysis.  Denied any chest pain or shortness of breath    Objective       Vital signs :     Temp:  [97.4 °F (36.3 °C)-98.4 °F (36.9 °C)] 98.4 °F (36.9 °C)  Heart Rate:  [68-77] 72  Resp:  [16-19] 18  BP: (115-150)/(58-98) 127/58    Intake/Output                         05/21/24 0701 - 05/22/24 0700 05/22/24 0701 - 05/23/24 0700     [id number] 1313-8946 Total 6190-1324 7759-8926 Total                 Intake    P.O.  660  240 900  0  360 360    Total Intake 660 240 900 0 360 360       Output    Urine  100  -- 100  120  -- 120    Dialysis  3000  -- 3000  --  -- --    Total Output 3100 -- 3100 120 -- 120             Physical Exam:    General Appearance : Not in acute distress  Lungs : Bilateral decreased intensity of breath sounds  Heart :  regular rhythm & normal rate, normal S1, S2 and no murmur, no rub  Abdomen : Soft, nondistended  Extremities : No edema,   Neurologic :   No apparent focal neurological deficit, unable to assess fully  Brachiocephalic AV fistula, good bruit and thrill      Laboratory Data :     Albumin No results found for: \"ALBUMIN\"     Magnesium No results found for: \"MG\"         PTH               No results found for: \"PTH\"    CBC and coagulation:  Results from last 7 days   Lab Units 05/23/24  0153 05/22/24  0036 05/21/24  0321 05/20/24  0109 05/19/24  0102 05/18/24  0408 05/17/24  0927   LACTATE mmol/L  --   --   --   --  1.0 1.0  --    CRP mg/dL  --   --   --   --  15.62* 18.08*  --    WBC 10*3/mm3 9.24 10.41 12.12*   < > 11.08* 11.53* 9.52   HEMOGLOBIN g/dL 9.2* 8.6* 8.5*   < > 8.7* 8.8* 7.2*   HEMATOCRIT % 28.4* 27.3* 25.9*   < > 26.3* 27.0* 22.6*   MCV fL 93.4 93.5 93.2   < > 90.7 92.5 96.2   MCHC g/dL 32.4 31.5 32.8   < > 33.1 32.6 31.9   PLATELETS 10*3/mm3 189 159 140   < > 97* 116* 95*   INR   --   --   --   --  1.31* 1.58* 1.52* "   D DIMER QUANT MCGFEU/mL  --   --   --   --   --  4.40* 10.17*    < > = values in this interval not displayed.     Acid/base balance:  Results from last 7 days   Lab Units 05/17/24  1416   PH, ARTERIAL pH units 7.523*   PO2 ART mm Hg 80.2*   PCO2, ARTERIAL mm Hg 40.4   HCO3 ART mmol/L 33.2*     Renal and electrolytes:    Results from last 7 days   Lab Units 05/23/24  0153 05/22/24  0036 05/21/24  0321 05/20/24  0109 05/19/24 0102 05/18/24  0408   SODIUM mmol/L 134* 132* 131* 129* 127* 129*   POTASSIUM mmol/L 4.5 4.1 4.1 3.9 3.8 3.6   MAGNESIUM mg/dL  --   --   --  2.1 2.0 1.8   CHLORIDE mmol/L 93* 91* 90* 87* 86* 87*   CO2 mmol/L 26.5 27.7 25.1 25.9 27.4 27.9   BUN mg/dL 55* 40* 70* 56* 45* 30*   CREATININE mg/dL 8.48* 6.82* 10.34* 8.29* 7.18* 5.54*   CALCIUM mg/dL 8.3* 8.2* 8.1* 7.9* 8.1* 8.2*   PHOSPHORUS mg/dL  --   --   --  4.5 3.6 3.0     Estimated Creatinine Clearance: 9.1 mL/min (A) (by C-G formula based on SCr of 8.48 mg/dL (H)).  @GFRCG:3@   Liver and pancreatic function:  Results from last 7 days   Lab Units 05/20/24  0109 05/19/24 0102 05/18/24  0408   ALBUMIN g/dL 3.1* 3.1* 3.3*   BILIRUBIN mg/dL 0.4 0.4 0.5   ALK PHOS U/L 197* 163* 153*   AST (SGOT) U/L 23 27 31   ALT (SGPT) U/L 11 11 12         Cardiac:      Liver and pancreatic function:  Results from last 7 days   Lab Units 05/20/24  0109 05/19/24 0102 05/18/24  0408   ALBUMIN g/dL 3.1* 3.1* 3.3*   BILIRUBIN mg/dL 0.4 0.4 0.5   ALK PHOS U/L 197* 163* 153*   AST (SGOT) U/L 23 27 31   ALT (SGPT) U/L 11 11 12       Medications :     acetaminophen, 650 mg, Oral, Once per day on Tuesday Thursday Saturday  ammonium lactate, 1 Application, Topical, Nightly  aspirin, 81 mg, Oral, Daily  atorvastatin, 20 mg, Oral, Daily  cetirizine, 5 mg, Oral, Daily  erythromycin, 1 Application, Left Eye, BID  famotidine, 20 mg, Oral, Q48H  gabapentin, 100 mg, Oral, Nightly  heparin (porcine), 5,000 Units, Subcutaneous, Q8H  insulin lispro, 2-7 Units, Subcutaneous, 4x  Daily AC & at Bedtime  levothyroxine, 75 mcg, Oral, Daily  Lidocaine, 1 patch, Transdermal, Q PM  lubrisoft, 1 Application, Topical, Daily  megestrol, 40 mg, Oral, TID With Meals  [Held by provider] metoprolol succinate XL, 12.5 mg, Oral, Nightly  midodrine, 10 mg, Oral, TID AC  multivitamin with minerals, 1 tablet, Oral, Daily  sertraline, 50 mg, Oral, Nightly  sevelamer, 2,400 mg, Oral, TID With Meals  sodium chloride, 10 mL, Intravenous, Q12H  vancomycin, 1,000 mg, Intravenous, Once  Vancomycin Pharmacy Intermittent/Pulse Dosing, , Does not apply, Daily               Assessment & Plan     -ESKD on intermittent dialysis  - Anemia  - Persistent nausea and vomiting  - Type 2 diabetes mellitus  - Essential hypertension    Dialysis today and continue on intermittent dialysis 3 times weekly  Grossly stable from nephrology standpoint,      Nova Saucedo MD  05/23/24  09:11 EDT

## 2024-05-23 NOTE — PROGRESS NOTES
LOS: 7 days     Name: Kennedy Prescott  Age/Sex: 73 y.o. male  :  1950        PCP: Jag Guillaume MD    Principal Problem:    SIRS (systemic inflammatory response syndrome)  Active Problems:    Sepsis      Admission Information: Kennedy Prescott is a 73 y.o. male with coronary artery disease status post CABG in the remote past, HFpEF, insulin-dependent type 2 diabetes mellitus, hypertension, hyperlipidemia, pulmonary hypertension, end-stage renal disease with hemodialysis 3 times per week and obesity.     Chief Complaint: Nausea and vomiting    Interval history: Patient underwent GAVI yesterday which revealed a new reduction in ejection fraction.  No vegetation was noted on the valves.    Subjective     Patient is awake in bed at the time of my visit.  He denies chest pain, shortness of breath, or palpitations.  We discussed the results of his echocardiogram and new reduction in ejection fraction.    Vital Signs  Vital Signs (last 72 hrs)          0700   0659  0700   0659  0700   0659  0700   1150   Most Recent      Temp (°F) 97.6 -  98.3    97.8 -  98.8    97.4 -  98.4       98.4 (36.9)  0300    Heart Rate 58 -  84    68 -  81    68 -  77       72  0620    Resp  -      16 -  19       18  0620    BP 85/41 -  140/70    117/56 -  138/58    115/74 -  148/67       127/58  0620    SpO2 (%) 97 -  100    93 -  99    92 -  100       96  0300    Flow (L/min)   2      2      2       2  2105          Temp:  [97.4 °F (36.3 °C)-98.4 °F (36.9 °C)] 98.4 °F (36.9 °C)  Heart Rate:  [68-77] 72  Resp:  [16-19] 18  BP: (115-150)/(58-98) 127/58  Body mass index is 30.78 kg/m².      Intake/Output Summary (Last 24 hours) at 2024 1150  Last data filed at 2024 0300  Gross per 24 hour   Intake 360 ml   Output 120 ml   Net 240 ml       Vitals and nursing note reviewed.   Constitutional:       Appearance: Chronically ill-appearing.       Interventions: Nasal cannula in place.   Eyes:      Conjunctiva/sclera: Conjunctivae normal.   Pulmonary:      Effort: Pulmonary effort is normal.      Breath sounds: Normal breath sounds.   Cardiovascular:      Normal rate. Regular rhythm.   Edema:     Peripheral edema absent.   Skin:     General: Skin is warm and dry.      Coloration: Skin is pale.   Neurological:      Mental Status: Alert.         Telemetry:       Results Review:     Results from last 7 days   Lab Units 05/23/24  0153 05/22/24  0036 05/21/24  0321 05/20/24 0109 05/19/24 0102 05/18/24 0408 05/17/24 0927   WBC 10*3/mm3 9.24 10.41 12.12* 13.00* 11.08* 11.53* 9.52   HEMOGLOBIN g/dL 9.2* 8.6* 8.5* 8.5* 8.7* 8.8* 7.2*   PLATELETS 10*3/mm3 189 159 140 116* 97* 116* 95*     Results from last 7 days   Lab Units 05/23/24  0153 05/22/24  0036 05/21/24  0321 05/20/24  0109 05/19/24 0102 05/18/24 0408 05/17/24  0927   SODIUM mmol/L 134* 132* 131* 129* 127* 129* 133*   POTASSIUM mmol/L 4.5 4.1 4.1 3.9 3.8 3.6 4.0   CHLORIDE mmol/L 93* 91* 90* 87* 86* 87* 93*   CO2 mmol/L 26.5 27.7 25.1 25.9 27.4 27.9 23.3   BUN mg/dL 55* 40* 70* 56* 45* 30* 65*   CREATININE mg/dL 8.48* 6.82* 10.34* 8.29* 7.18* 5.54* 9.44*   CALCIUM mg/dL 8.3* 8.2* 8.1* 7.9* 8.1* 8.2* 8.1*   GLUCOSE mg/dL 213* 204* 99 128* 96 164* 51*           Results from last 7 days   Lab Units 05/19/24  0102 05/18/24  0408 05/17/24  0927   INR  1.31* 1.58* 1.52*       I reviewed the patient's new clinical results.  I reviewed the patient's new imaging results and agree with the interpretation.  I personally viewed and interpreted the patient's EKG/Telemetry data      Medication Review:   acetaminophen, 650 mg, Oral, Once per day on Tuesday Thursday Saturday  ammonium lactate, 1 Application, Topical, Nightly  aspirin, 81 mg, Oral, Daily  atorvastatin, 20 mg, Oral, Daily  cetirizine, 5 mg, Oral, Daily  erythromycin, 1 Application, Left Eye, BID  famotidine, 20 mg, Oral, Q48H  gabapentin, 100 mg, Oral,  Nightly  heparin (porcine), 5,000 Units, Subcutaneous, Q8H  insulin lispro, 2-7 Units, Subcutaneous, 4x Daily AC & at Bedtime  levothyroxine, 75 mcg, Oral, Daily  Lidocaine, 1 patch, Transdermal, Q PM  lubrisoft, 1 Application, Topical, Daily  megestrol, 40 mg, Oral, TID With Meals  [Held by provider] metoprolol succinate XL, 12.5 mg, Oral, Nightly  midodrine, 10 mg, Oral, TID AC  multivitamin with minerals, 1 tablet, Oral, Daily  sertraline, 50 mg, Oral, Nightly  sevelamer, 2,400 mg, Oral, TID With Meals  sodium chloride, 10 mL, Intravenous, Q12H  vancomycin, 1,000 mg, Intravenous, Once  Vancomycin Pharmacy Intermittent/Pulse Dosing, , Does not apply, Daily           Assessment:  New HFrEF  Hypotension  ESRD on HD      Recommendations:  LifeVest ordered.  Beta-blocker had been held due to hypotension.  He is now on midodrine to support blood pressure.  After discussion with Dr. Hines, this will be resumed.  Jardiance initiated as it has been documented to be safe in hemodialysis.  Will proceed conservatively with other GDMT due to hypotension and/or ESRD  Blood pressure now in the 120s systolic thanks to midodrine  Complicates medication choices    Case discussed with Dr. Garcia and plan of care reflects his recommendations.    I discussed the patients findings and my recommendations with patient and family.      Electronically signed by DORA Green, 05/23/24, 1:29 PM EDT.    Patient seen and examined on rounds independently.  Chart reviewed.  Agree with nurse practitioners charting, assessment and plan.  On exam patient is alert awake and oriented in no distress  Chest no wheezing or crackles anterior  Regular rate rhythm, normal S1-S2  Abdomen distended nontender  No edema  Assessment and plan:  #1 cardiac.  Patient with new onset of heart failure with decreased ejection fraction guideline guided medical therapy.  LifeVest.  May need ischemia evaluation.  .Electronically signed by Tommy Garcia MD,  05/23/24, 7:30 PM EDT.

## 2024-05-23 NOTE — PLAN OF CARE
Goal Outcome Evaluation:  Plan of Care Reviewed With: patient        Progress: no change  Outcome Evaluation: Patient resting in bed. Alert and Oriented x4. VSS. Patient on RA. Wound care complete per orders. No acute changes noted at this time. Will continue with POC.

## 2024-05-23 NOTE — PROGRESS NOTES
Pharmacokinetics Service Note:    Kennedy Prescott is a 73 y.o. male being managed by Pharmacy for vancomycin dosing.    Indication for Therapy: MRSA bacteremia   Current Regimen: Intermittent dosing due to renal function  Current Day of Therapy and Ordered Duration: Day 9 of 10  Level Reported and Timing with Respect to Previous Dose: 22.50 mcg/mL vancomycin random reported at 0153 this morning     Assessment/Plan: Will give a 1000 mg one time dose after hemodialysis today    Monitoring Planned: Will plan to check a vancomycin random level Saturday morning before his next hemodialysis session.    Harlan Capellan PharmD  05/23/24  08:31 EDT

## 2024-05-23 NOTE — PLAN OF CARE
Goal Outcome Evaluation:  Plan of Care Reviewed With: patient        Progress: no change  Outcome Evaluation: Patient resting in bed. Patient is alert and oriented. VSS on 2L. Wound care completed per orders. No acute changes or complaints. Will continue with plan of care.

## 2024-05-23 NOTE — PROGRESS NOTES
[facility] HOSPITALIST PROGRESS NOTE    Subjective     History:   Kennedy Prescott is a 73 y.o. male admitted on 5/15/2024 secondary to SIRS (systemic inflammatory response syndrome)     Procedures:   5/17/24: Removal of tunneled HD catheter   5/22/24: GAVI    Left ventricular systolic function is moderately decreased. Estimated left ventricular EF = 35%    No Vegetations noted on any valve    CC: Follow up sepsis, bacteremia     Patient seen and examined. Awake and alert. No reported CP, dyspnea or palpitations. No reported vomiting.  No acute events overnight per RN.     History taken from: patient, chart, and RN.      Objective     Vital Signs  Temp:  [97.4 °F (36.3 °C)-98.4 °F (36.9 °C)] 97.9 °F (36.6 °C)  Heart Rate:  [68-77] 74  Resp:  [16-19] 18  BP: (115-150)/(58-98) 141/66    Intake/Output Summary (Last 24 hours) at 5/23/2024 1456  Last data filed at 5/23/2024 0900  Gross per 24 hour   Intake 620 ml   Output --   Net 620 ml         Physical Exam: Unchanged from previous.   General:    Awake, alert, in no acute distress, chronically ill appearing   Heart:      Normal S1 and S2. Irregular.    Lungs:     Respirations regular, even and unlabored. Lungs clear to auscultation B/L. No wheezes, rales or rhonchi.   Abdomen:   Soft and nontender. No guarding, rebound tenderness or  organomegaly noted. Bowel sounds present x 4.   Extremities:  No clubbing, cyanosis or edema noted.      Results Review:    Results from last 7 days   Lab Units 05/23/24  0153 05/22/24  0036 05/21/24  0321 05/20/24  0109 05/19/24  0102 05/18/24  0408 05/17/24  0927   WBC 10*3/mm3 9.24 10.41 12.12* 13.00* 11.08* 11.53* 9.52   HEMOGLOBIN g/dL 9.2* 8.6* 8.5* 8.5* 8.7* 8.8* 7.2*   PLATELETS 10*3/mm3 189 159 140 116* 97* 116* 95*     Results from last 7 days   Lab Units 05/23/24  0153 05/22/24  0036 05/21/24  0321 05/20/24  0109 05/19/24  0102 05/18/24  0408 05/17/24  0927   SODIUM mmol/L 134* 132* 131* 129* 127* 129* 133*    POTASSIUM mmol/L 4.5 4.1 4.1 3.9 3.8 3.6 4.0   CHLORIDE mmol/L 93* 91* 90* 87* 86* 87* 93*   CO2 mmol/L 26.5 27.7 25.1 25.9 27.4 27.9 23.3   BUN mg/dL 55* 40* 70* 56* 45* 30* 65*   CREATININE mg/dL 8.48* 6.82* 10.34* 8.29* 7.18* 5.54* 9.44*   CALCIUM mg/dL 8.3* 8.2* 8.1* 7.9* 8.1* 8.2* 8.1*   GLUCOSE mg/dL 213* 204* 99 128* 96 164* 51*     Results from last 7 days   Lab Units 05/20/24  0109 05/19/24  0102 05/18/24  0408 05/17/24  0218   BILIRUBIN mg/dL 0.4 0.4 0.5 0.3   ALK PHOS U/L 197* 163* 153* 137*   AST (SGOT) U/L 23 27 31 27   ALT (SGPT) U/L 11 11 12 10     Results from last 7 days   Lab Units 05/20/24  0109 05/19/24  0102 05/18/24  0408 05/17/24  0218   MAGNESIUM mg/dL 2.1 2.0 1.8 1.9     Results from last 7 days   Lab Units 05/19/24  0102 05/18/24  0408 05/17/24  0927   INR  1.31* 1.58* 1.52*             Imaging Results (Last 24 Hours)       ** No results found for the last 24 hours. **              Medications:  acetaminophen, 650 mg, Oral, Once per day on Tuesday Thursday Saturday  ammonium lactate, 1 Application, Topical, Nightly  aspirin, 81 mg, Oral, Daily  atorvastatin, 20 mg, Oral, Daily  cetirizine, 5 mg, Oral, Daily  empagliflozin, 10 mg, Oral, Daily  erythromycin, 1 Application, Left Eye, BID  famotidine, 20 mg, Oral, Q48H  gabapentin, 100 mg, Oral, Nightly  heparin (porcine), 5,000 Units, Subcutaneous, Q8H  insulin lispro, 2-7 Units, Subcutaneous, 4x Daily AC & at Bedtime  levothyroxine, 75 mcg, Oral, Daily  Lidocaine, 1 patch, Transdermal, Q PM  lubrisoft, 1 Application, Topical, Daily  megestrol, 40 mg, Oral, TID With Meals  metoprolol succinate XL, 12.5 mg, Oral, Nightly  [START ON 5/24/2024] metoprolol tartrate, 25 mg, Oral, On Call  midodrine, 10 mg, Oral, TID AC  multivitamin with minerals, 1 tablet, Oral, Daily  sertraline, 50 mg, Oral, Nightly  sevelamer, 2,400 mg, Oral, TID With Meals  sodium chloride, 10 mL, Intravenous, Q12H  vancomycin, 1,000 mg, Intravenous, Once  Vancomycin  Pharmacy Intermittent/Pulse Dosing, , Does not apply, Daily               Assessment & Plan   Septic shock: Likely 2/2 MRSA bacteremia with suspected infected HD catheter. Afebrile. Previously required Levophed but BP now stable off vasopressor support. WBC stable today and overall improved from upon admission. Lactate has normalized. Cultures as below. Cont Vanc. Cont midodrine. Follow cultures and repeat labs in the AM.     MRSA bacteremia: Likely 2/2 infected HD catheter which has been removed. TTE revealed an EF of 51-55% but valves not well visualized. Blood cultures from 5/15 and 5/17 positive. Cultures from 5/18 and 5/19 with NGTD. S/P GAVI with no evidence of vegetations. Cont Vanc through 6/15. Cont to follow cultures. ID input appreciated.     Acute diastolic CHF: Echo reveals normal EF. Improved with HD. Cont to monitor volume status.     Acute hypoxic respiratory failure: Likely 2/2 above. No evidence of PE or pneumonia on CT. Currently stable on 2L's NC.     Newly diagnosed cardiomyopathy: TTE revealed EF of 51-55% as above. However, GAVI reveals EF of 30%. Ischemic workup with CTA coronary per cards. LifeVest ordered. Will attempt to optimize GDMT but will be limited by BP and ESRD on HD. Unhold Toprol-XL as BP is currently stable. Cardiology has added Jardiance. HD per nephrology. Cardiology input appreciated.     NSTEMI: Suspected type II in the setting of above. Normal EF on TTE but reduced EF of 30% noted on GAVI.  CTA coronary per cardiology. Cont ASA and statin. Cardiology input appreciated.     ESRD on HD: Tunneled HD catheter removed. Tolerated HD via LUE AV fistula. Management per nephrology with input appreciated.     Paroxysmal Afib: Currently rate controlled. Restart metoprolol as above. Monitor on telemetry.     DM II, insulin dependent: Episodes of hypoglycemia improved. Cont sliding scale insulin.  Cont to monitor.     PVD: Cont ASA and statin.     DVT PPX: SQ heparin    Discussed with  cardiology and ID APRN's.     Disposition Likely return to SNF pending additional cardiac eval.     Donnell Hines,   05/23/24  14:56 EDT

## 2024-05-24 ENCOUNTER — APPOINTMENT (OUTPATIENT)
Dept: CT IMAGING | Facility: HOSPITAL | Age: 74
DRG: 321 | End: 2024-05-24
Payer: MEDICARE

## 2024-05-24 LAB
ALBUMIN SERPL-MCNC: 3.4 G/DL (ref 3.5–5.2)
ALBUMIN/GLOB SERPL: 1.2 G/DL
ALP SERPL-CCNC: 216 U/L (ref 39–117)
ALT SERPL W P-5'-P-CCNC: 22 U/L (ref 1–41)
ANION GAP SERPL CALCULATED.3IONS-SCNC: 12.7 MMOL/L (ref 5–15)
AST SERPL-CCNC: 30 U/L (ref 1–40)
BACTERIA SPEC AEROBE CULT: NORMAL
BACTERIA SPEC AEROBE CULT: NORMAL
BASOPHILS # BLD AUTO: 0.05 10*3/MM3 (ref 0–0.2)
BASOPHILS NFR BLD AUTO: 0.5 % (ref 0–1.5)
BILIRUB SERPL-MCNC: 0.4 MG/DL (ref 0–1.2)
BUN SERPL-MCNC: 27 MG/DL (ref 8–23)
BUN/CREAT SERPL: 5.3 (ref 7–25)
CALCIUM SPEC-SCNC: 8.5 MG/DL (ref 8.6–10.5)
CHLORIDE SERPL-SCNC: 93 MMOL/L (ref 98–107)
CO2 SERPL-SCNC: 31.3 MMOL/L (ref 22–29)
CREAT SERPL-MCNC: 5.14 MG/DL (ref 0.76–1.27)
DEPRECATED RDW RBC AUTO: 47.9 FL (ref 37–54)
EGFRCR SERPLBLD CKD-EPI 2021: 11 ML/MIN/1.73
EOSINOPHIL # BLD AUTO: 0.16 10*3/MM3 (ref 0–0.4)
EOSINOPHIL NFR BLD AUTO: 1.6 % (ref 0.3–6.2)
ERYTHROCYTE [DISTWIDTH] IN BLOOD BY AUTOMATED COUNT: 14.3 % (ref 12.3–15.4)
GLOBULIN UR ELPH-MCNC: 2.9 GM/DL
GLUCOSE BLDC GLUCOMTR-MCNC: 138 MG/DL (ref 70–130)
GLUCOSE BLDC GLUCOMTR-MCNC: 147 MG/DL (ref 70–130)
GLUCOSE BLDC GLUCOMTR-MCNC: 149 MG/DL (ref 70–130)
GLUCOSE BLDC GLUCOMTR-MCNC: 215 MG/DL (ref 70–130)
GLUCOSE SERPL-MCNC: 225 MG/DL (ref 65–99)
HCT VFR BLD AUTO: 30.6 % (ref 37.5–51)
HGB BLD-MCNC: 9.8 G/DL (ref 13–17.7)
IMM GRANULOCYTES # BLD AUTO: 0.11 10*3/MM3 (ref 0–0.05)
IMM GRANULOCYTES NFR BLD AUTO: 1.1 % (ref 0–0.5)
LYMPHOCYTES # BLD AUTO: 1 10*3/MM3 (ref 0.7–3.1)
LYMPHOCYTES NFR BLD AUTO: 10 % (ref 19.6–45.3)
MCH RBC QN AUTO: 30.1 PG (ref 26.6–33)
MCHC RBC AUTO-ENTMCNC: 32 G/DL (ref 31.5–35.7)
MCV RBC AUTO: 93.9 FL (ref 79–97)
MONOCYTES # BLD AUTO: 0.93 10*3/MM3 (ref 0.1–0.9)
MONOCYTES NFR BLD AUTO: 9.3 % (ref 5–12)
NEUTROPHILS NFR BLD AUTO: 7.73 10*3/MM3 (ref 1.7–7)
NEUTROPHILS NFR BLD AUTO: 77.5 % (ref 42.7–76)
NRBC BLD AUTO-RTO: 0 /100 WBC (ref 0–0.2)
PLATELET # BLD AUTO: 203 10*3/MM3 (ref 140–450)
PMV BLD AUTO: 10 FL (ref 6–12)
POTASSIUM SERPL-SCNC: 3.8 MMOL/L (ref 3.5–5.2)
PROT SERPL-MCNC: 6.3 G/DL (ref 6–8.5)
RBC # BLD AUTO: 3.26 10*6/MM3 (ref 4.14–5.8)
SODIUM SERPL-SCNC: 137 MMOL/L (ref 136–145)
WBC NRBC COR # BLD AUTO: 9.98 10*3/MM3 (ref 3.4–10.8)

## 2024-05-24 PROCEDURE — 75574 CT ANGIO HRT W/3D IMAGE: CPT | Performed by: RADIOLOGY

## 2024-05-24 PROCEDURE — 25010000002 HEPARIN (PORCINE) PER 1000 UNITS: Performed by: INTERNAL MEDICINE

## 2024-05-24 PROCEDURE — 75574 CT ANGIO HRT W/3D IMAGE: CPT

## 2024-05-24 PROCEDURE — 85025 COMPLETE CBC W/AUTO DIFF WBC: CPT | Performed by: INTERNAL MEDICINE

## 2024-05-24 PROCEDURE — 99232 SBSQ HOSP IP/OBS MODERATE 35: CPT | Performed by: INTERNAL MEDICINE

## 2024-05-24 PROCEDURE — 80053 COMPREHEN METABOLIC PANEL: CPT | Performed by: INTERNAL MEDICINE

## 2024-05-24 PROCEDURE — 63710000001 INSULIN LISPRO (HUMAN) PER 5 UNITS: Performed by: INTERNAL MEDICINE

## 2024-05-24 PROCEDURE — 99232 SBSQ HOSP IP/OBS MODERATE 35: CPT | Performed by: NURSE PRACTITIONER

## 2024-05-24 PROCEDURE — 25510000001 IOPAMIDOL PER 1 ML: Performed by: INTERNAL MEDICINE

## 2024-05-24 PROCEDURE — 82948 REAGENT STRIP/BLOOD GLUCOSE: CPT

## 2024-05-24 RX ADMIN — INSULIN LISPRO 3 UNITS: 100 INJECTION, SOLUTION INTRAVENOUS; SUBCUTANEOUS at 21:52

## 2024-05-24 RX ADMIN — Medication 10 ML: at 21:52

## 2024-05-24 RX ADMIN — AMMONIUM LACTATE 1 APPLICATION: 120 CREAM TOPICAL at 21:52

## 2024-05-24 RX ADMIN — GABAPENTIN 100 MG: 100 CAPSULE ORAL at 21:51

## 2024-05-24 RX ADMIN — ERYTHROMYCIN 1 APPLICATION: 5 OINTMENT OPHTHALMIC at 10:04

## 2024-05-24 RX ADMIN — Medication 1 TABLET: at 10:04

## 2024-05-24 RX ADMIN — LEVOTHYROXINE SODIUM 75 MCG: 0.07 TABLET ORAL at 10:04

## 2024-05-24 RX ADMIN — CARBIDOPA AND LEVODOPA 10 MG: 50; 200 TABLET, EXTENDED RELEASE ORAL at 10:05

## 2024-05-24 RX ADMIN — CARBIDOPA AND LEVODOPA 10 MG: 50; 200 TABLET, EXTENDED RELEASE ORAL at 12:40

## 2024-05-24 RX ADMIN — HEPARIN SODIUM 5000 UNITS: 5000 INJECTION INTRAVENOUS; SUBCUTANEOUS at 05:57

## 2024-05-24 RX ADMIN — SEVELAMER CARBONATE 2400 MG: 800 TABLET, FILM COATED ORAL at 12:40

## 2024-05-24 RX ADMIN — ASPIRIN 81 MG: 81 TABLET, COATED ORAL at 10:05

## 2024-05-24 RX ADMIN — MEGESTROL ACETATE 40 MG: 40 TABLET ORAL at 17:54

## 2024-05-24 RX ADMIN — MEGESTROL ACETATE 40 MG: 40 TABLET ORAL at 10:13

## 2024-05-24 RX ADMIN — HEPARIN SODIUM 5000 UNITS: 5000 INJECTION INTRAVENOUS; SUBCUTANEOUS at 15:14

## 2024-05-24 RX ADMIN — ERYTHROMYCIN 1 APPLICATION: 5 OINTMENT OPHTHALMIC at 21:50

## 2024-05-24 RX ADMIN — CARBIDOPA AND LEVODOPA 10 MG: 50; 200 TABLET, EXTENDED RELEASE ORAL at 17:54

## 2024-05-24 RX ADMIN — Medication 10 ML: at 10:06

## 2024-05-24 RX ADMIN — HEPARIN SODIUM 5000 UNITS: 5000 INJECTION INTRAVENOUS; SUBCUTANEOUS at 21:50

## 2024-05-24 RX ADMIN — SEVELAMER CARBONATE 2400 MG: 800 TABLET, FILM COATED ORAL at 10:05

## 2024-05-24 RX ADMIN — IOPAMIDOL 95 ML: 755 INJECTION, SOLUTION INTRAVENOUS at 14:32

## 2024-05-24 RX ADMIN — CETIRIZINE HYDROCHLORIDE 5 MG: 10 TABLET, FILM COATED ORAL at 10:04

## 2024-05-24 RX ADMIN — SERTRALINE 50 MG: 50 TABLET, FILM COATED ORAL at 21:50

## 2024-05-24 RX ADMIN — LIDOCAINE 1 PATCH: 4 PATCH TOPICAL at 17:54

## 2024-05-24 RX ADMIN — METOPROLOL SUCCINATE 12.5 MG: 25 TABLET, FILM COATED, EXTENDED RELEASE ORAL at 21:50

## 2024-05-24 RX ADMIN — MEGESTROL ACETATE 40 MG: 40 TABLET ORAL at 12:40

## 2024-05-24 RX ADMIN — Medication 1 APPLICATION: at 10:06

## 2024-05-24 RX ADMIN — ATORVASTATIN CALCIUM 20 MG: 20 TABLET, FILM COATED ORAL at 10:05

## 2024-05-24 RX ADMIN — EMPAGLIFLOZIN 10 MG: 10 TABLET, FILM COATED ORAL at 10:04

## 2024-05-24 RX ADMIN — SEVELAMER CARBONATE 2400 MG: 800 TABLET, FILM COATED ORAL at 17:54

## 2024-05-24 RX ADMIN — NITROGLYCERIN 0.4 MG: 0.4 TABLET, ORALLY DISINTEGRATING SUBLINGUAL at 14:25

## 2024-05-24 NOTE — PROGRESS NOTES
[facility] HOSPITALIST PROGRESS NOTE    Subjective     History:   Kennedy Prescott is a 73 y.o. male admitted on 5/15/2024 secondary to SIRS (systemic inflammatory response syndrome)     Procedures:   5/17/24: Removal of tunneled HD catheter   5/22/24: GAVI    Left ventricular systolic function is moderately decreased. Estimated left ventricular EF = 35%    No Vegetations noted on any valve    CC: Follow up sepsis, bacteremia     Patient seen and examined with BHANU Kirby. Awake and alert. No reported CP, dyspnea or palpitations. No reported vomiting. No acute events overnight per RN.     History taken from: patient, chart, and RN.      Objective     Vital Signs  Temp:  [98 °F (36.7 °C)-98.5 °F (36.9 °C)] 98.5 °F (36.9 °C)  Heart Rate:  [70-80] 76  Resp:  [18] 18  BP: (107-173)/(53-76) 150/73    Intake/Output Summary (Last 24 hours) at 5/24/2024 1718  Last data filed at 5/24/2024 1200  Gross per 24 hour   Intake 240 ml   Output 100 ml   Net 140 ml         Physical Exam: Unchanged from previous.   General:    Awake, alert, in no acute distress, chronically ill appearing   Heart:      Normal S1 and S2. Irregular.    Lungs:     Respirations regular, even and unlabored. Lungs clear to auscultation B/L. No wheezes, rales or rhonchi.   Abdomen:   Soft and nontender. No guarding, rebound tenderness or  organomegaly noted. Bowel sounds present x 4.   Extremities:  No clubbing, cyanosis or edema noted.      Results Review:    Results from last 7 days   Lab Units 05/24/24  0113 05/23/24  0153 05/22/24  0036 05/21/24  0321 05/20/24  0109 05/19/24  0102 05/18/24  0408   WBC 10*3/mm3 9.98 9.24 10.41 12.12* 13.00* 11.08* 11.53*   HEMOGLOBIN g/dL 9.8* 9.2* 8.6* 8.5* 8.5* 8.7* 8.8*   PLATELETS 10*3/mm3 203 189 159 140 116* 97* 116*     Results from last 7 days   Lab Units 05/24/24  0114 05/23/24  0153 05/22/24  0036 05/21/24  0321 05/20/24  0109 05/19/24  0102 05/18/24  0408   SODIUM mmol/L 137 134* 132* 131* 129* 127*  129*   POTASSIUM mmol/L 3.8 4.5 4.1 4.1 3.9 3.8 3.6   CHLORIDE mmol/L 93* 93* 91* 90* 87* 86* 87*   CO2 mmol/L 31.3* 26.5 27.7 25.1 25.9 27.4 27.9   BUN mg/dL 27* 55* 40* 70* 56* 45* 30*   CREATININE mg/dL 5.14* 8.48* 6.82* 10.34* 8.29* 7.18* 5.54*   CALCIUM mg/dL 8.5* 8.3* 8.2* 8.1* 7.9* 8.1* 8.2*   GLUCOSE mg/dL 225* 213* 204* 99 128* 96 164*     Results from last 7 days   Lab Units 05/24/24  0114 05/20/24  0109 05/19/24  0102 05/18/24  0408   BILIRUBIN mg/dL 0.4 0.4 0.4 0.5   ALK PHOS U/L 216* 197* 163* 153*   AST (SGOT) U/L 30 23 27 31   ALT (SGPT) U/L 22 11 11 12     Results from last 7 days   Lab Units 05/20/24  0109 05/19/24  0102 05/18/24  0408   MAGNESIUM mg/dL 2.1 2.0 1.8     Results from last 7 days   Lab Units 05/19/24  0102 05/18/24  0408   INR  1.31* 1.58*             Imaging Results (Last 24 Hours)       Procedure Component Value Units Date/Time    CT Angiogram Heart With 3D Image [[id number]] Collected: 05/24/24 1621     Updated: 05/24/24 1629    Narrative:      PROCEDURE: CT ANGIOGRAM HEART W 3D IMAGE-     HISTORY: CAD screening, intermediate CAD risk, not treadmill candidate     COMPARISON: None .     TECHNIQUE: Multiple axial CT images were obtained from the aortic arch  branch vessels through the upper abdomen following the administration of  Isovue 300 per the CTA protocol. 3D MIP images were reconstructed from  the original axial data set. This study was performed with techniques to  keep radiation doses as low as reasonably achievable (ALARA).  Individualized dose reduction techniques using automated exposure  control or adjustment of mA and/or kV according to the patient size were  employed.     FINDINGS:     Coronary Calcium Score (Agatson):  LM:    133  RCA: 1098  LAD: 636  LCX: 1215  Other: 1254     TOTAL: 4651     Coronary Angiography:     Left Main: The left main originates from the left coronary cusp. It is  normal in caliber. There is calcific plaque within the left main which  produces  a 50-70% stenosis (CAD RADS 3)..     Left Anterior Descending: There is diffuse calcific plaque in the LAD  which limits evaluation. Note is made of a prior venous bypass graft  which appears occluded. The patient is status post left internal mammary  bypass of the LAD with patency of the LIMA.     Left Circumflex: The circumflex is diffusely atherosclerotic which  limits evaluation. The degree of stenosis is difficult to determine.     Right Coronary Artery: The RCA is diffusely atherosclerotic and the  degree of stenosis is difficult to determine.     Left Atrium: The left atrium is dilated. No filling defects are seen in  the left atrium.     Left Ventricle: The ventricular cavity size is mildly dilated. There are  no stigmata of prior infarction. There are no filling defects.     Pulmonary Arteries: Normal with no filling defects.     Pulmonary Veins: Normal venous drainage.     Pericardium: Normal thickness with no significant effusion.     Cardiac Valves: There is no thickening or calcifications involving the  aortic or mitral valves.     Aorta: Normal in caliber with no evidence of dissection.     Lungs and Mediastinum: There are small bilateral pleural effusions and  basilar atelectasis.       Impression:      1. Diffuse atherosclerotic disease with a coronary artery calcium score  of 4651 which limits evaluation of the coronary arteries. The patient is  status post prior venous bypass graft which is occluded. The patient is  also status post left internal mammary bypass which is patent.  2. Mild cardiomegaly.  3. Bilateral pleural effusions and basilar atelectasis.     This report was finalized on 5/24/2024 4:27 PM by Kayleigh New M.D..                 Medications:  acetaminophen, 650 mg, Oral, Once per day on Tuesday Thursday Saturday  ammonium lactate, 1 Application, Topical, Nightly  aspirin, 81 mg, Oral, Daily  atorvastatin, 20 mg, Oral, Daily  cetirizine, 5 mg, Oral, Daily  empagliflozin, 10 mg,  Oral, Daily  erythromycin, 1 Application, Left Eye, BID  famotidine, 20 mg, Oral, Q48H  gabapentin, 100 mg, Oral, Nightly  heparin (porcine), 5,000 Units, Subcutaneous, Q8H  insulin lispro, 2-7 Units, Subcutaneous, 4x Daily AC & at Bedtime  levothyroxine, 75 mcg, Oral, Daily  Lidocaine, 1 patch, Transdermal, Q PM  lubrisoft, 1 Application, Topical, Daily  megestrol, 40 mg, Oral, TID With Meals  metoprolol succinate XL, 12.5 mg, Oral, Nightly  midodrine, 10 mg, Oral, TID AC  multivitamin with minerals, 1 tablet, Oral, Daily  sertraline, 50 mg, Oral, Nightly  sevelamer, 2,400 mg, Oral, TID With Meals  sodium chloride, 10 mL, Intravenous, Q12H               Assessment & Plan   Septic shock: Likely 2/2 MRSA bacteremia with suspected infected HD catheter. Afebrile. Previously required Levophed but BP now stable off vasopressor support. WBC stable today and overall improved from upon admission. Lactate has normalized. Cultures as below. Cont Vanc. Cont midodrine. Follow cultures and repeat labs in the AM.     MRSA bacteremia: Likely 2/2 infected HD catheter which has been removed. TTE revealed an EF of 51-55% but valves not well visualized. Blood cultures from 5/15 and 5/17 positive. Cultures from 5/18 and 5/19 with NGTD. S/P GAVI with no evidence of vegetations. Cont Vanc through 6/15. Cont to follow cultures. ID input appreciated.     Acute diastolic CHF: Echo reveals normal EF. Improved with HD. Cont to monitor volume status.     Acute hypoxic respiratory failure: Likely 2/2 above. No evidence of PE or pneumonia on CT. Currently stable on 2L's NC.     Newly diagnosed cardiomyopathy: TTE revealed EF of 51-55% as above. However, GAVI reveals EF of 30%. Ischemic workup with CTA coronary per cards which reveals diffuse atherosclerotic disease with a coronary artery calcium score of 4651. LifeVest delivered. Attempt to optimize GDMT but limited by BP and ESRD on HD. Cont Toprol-XL and Jardiance. HD per nephrology. Await  further input from cardiology. Cardiology input appreciated.     NSTEMI: Suspected type II in the setting of above. Normal EF on TTE but reduced EF of 30% noted on GAVI.  Ischemic eval per cards. Cont ASA and statin. Cardiology input appreciated.     ESRD on HD: Tunneled HD catheter removed. Tolerating HD via LUE AV fistula. Management per nephrology with input appreciated.     Paroxysmal Afib: Currently rate controlled. Cont metoprolol. Monitor on telemetry.     DM II, insulin dependent: Episodes of hypoglycemia improved. Cont sliding scale insulin.  Cont to monitor.     PVD: Cont ASA and statin.     DVT PPX: SQ heparin    Discussed with cardiology APRN.      Disposition Likely return to SNF pending additional cardiac eval.     Donnell Hines DO  05/24/24  17:18 EDT

## 2024-05-24 NOTE — CASE MANAGEMENT/SOCIAL WORK
Discharge Planning Assessment  [facility]     Patient Name: Kennedy Prescott  MRN: [id number]  Today's Date: 5/24/2024    Admit Date: 5/15/2024    Plan: Pt admitted on 5/15/24. Pt is from [facility]. Per Irma who states pt is a longterm resident at their facility. Pt does not have a bed hold at this time. Per Irma, the facility will accept pt back at discharge when medically stable. SS to follow.     Discharge Plan       Row Name 05/24/24 1550       Plan    Plan Pt admitted on 5/15/24. Pt is from [facility]. Per Irma who states pt is a longterm resident at their facility. Pt does not have a bed hold at this time. Per Irma, the facility will accept pt back at discharge when medically stable. SS to follow.              MONA Jordan

## 2024-05-24 NOTE — NURSING NOTE
Patient back from getting a CTA. Went in to room to reapply the patients life vest and he refused to have it put back on. Patient educated on the importance of wearing the Life vest and still refused to have it put back on. Family was at bedside and aware. Provider notified.

## 2024-05-24 NOTE — PLAN OF CARE
Goal Outcome Evaluation:  Plan of Care Reviewed With: patient        Progress: no change  Outcome Evaluation: Patient resting in bed. Patient is alert and oriented. VSS on RA. Wound care completed per orders. Patient NPO, for test that will done tomorrow, notified PA, see orders.  20 gauge IV obtain in AC for CTA. Patient has remained NPO since midnight. No acute changes or complaints. Will continue with plan of care.       Patient has metoprolol that was scheduled for 0000 on 5/24/24, med is overdue. Order states give one hour prior to coronary CTA if heart rate is 75 or higher. Coronary CTA scheduled for later today. Not given per pharmacy.

## 2024-05-24 NOTE — NURSING NOTE
Spoke with CT was told that they were unable to do the CT angiogram today due to the patient having dialysis. Stated it will be done on 5-24-24.

## 2024-05-24 NOTE — PROGRESS NOTES
LOS: 8 days     Name: Kennedy Prescott  Age/Sex: 73 y.o. male  :  1950        PCP: Jag Guillaume MD    Principal Problem:    SIRS (systemic inflammatory response syndrome)  Active Problems:    Sepsis      Admission Information: Kennedy Prescott is a 73 y.o. male with  coronary artery disease status post CABG in the remote past, HFpEF, insulin-dependent type 2 diabetes mellitus, hypertension, hyperlipidemia, pulmonary hypertension, end-stage renal disease with hemodialysis 3 times per week and obesity.     Chief Complaint: Nausea and vomiting    Interval history: Blood pressure remaining stable after reinitiation of beta-blocker and the addition of SGLT2 inhibitor.  Coronary CTA that was ordered yesterday was not yet performed.    Subjective   Patient is awake in bed at the time of my visit.  He denies any chest pain, palpitations, or shortness of breath.  He is anxious to go home.  I explained to him the rationale for coronary CTA and that further planning would be based on those results.      Vital Signs  Vital Signs (last 72 hrs)          0700   0659  0700   0659  0700   0659  07 1414   Most Recent      Temp (°F) 97.8 -  98.8    97.4 -  98.4    97.6 -  98.5       98.5 (36.9)  0624    Heart Rate 68 -  81    68 -  77    66 -  79       71  0624    Resp 16 -  20    16 -  19      18       18  0624    /56 -  138/58    115/74 -  148/67    107/53 -  151/68       139/63  0624    SpO2 (%) 93 -  99    92 -  100      95       95  0300    Flow (L/min)   2      2      2       2  Comment: PRN  [id number]          Temp:  [98 °F (36.7 °C)-98.5 °F (36.9 °C)] 98.5 °F (36.9 °C)  Heart Rate:  [70-79] 71  Resp:  [18] 18  BP: (107-151)/(53-68) 139/63  Body mass index is 30.4 kg/m².      Intake/Output Summary (Last 24 hours) at 2024 1414  Last data filed at 2024 1200  Gross per 24 hour   Intake 240 ml   Output 2100 ml   Net -1860 ml        Vitals and nursing note reviewed.   Constitutional:       Appearance: Chronically ill-appearing.      Interventions: Nasal cannula in place.   Eyes:      Conjunctiva/sclera: Conjunctivae normal.   Pulmonary:      Effort: Pulmonary effort is normal.      Breath sounds: Normal breath sounds.   Cardiovascular:      Normal rate. Regular rhythm.   Edema:     Peripheral edema absent.   Skin:     General: Skin is warm and dry.      Coloration: Skin is pale.   Neurological:      Mental Status: Alert.         Telemetry:       Results Review:     Results from last 7 days   Lab Units 05/24/24  0113 05/23/24  0153 05/22/24  0036 05/21/24  0321 05/20/24  0109 05/19/24  0102 05/18/24  0408   WBC 10*3/mm3 9.98 9.24 10.41 12.12* 13.00* 11.08* 11.53*   HEMOGLOBIN g/dL 9.8* 9.2* 8.6* 8.5* 8.5* 8.7* 8.8*   PLATELETS 10*3/mm3 203 189 159 140 116* 97* 116*     Results from last 7 days   Lab Units 05/24/24  0114 05/23/24  0153 05/22/24  0036 05/21/24  0321 05/20/24  0109 05/19/24  0102 05/18/24  0408   SODIUM mmol/L 137 134* 132* 131* 129* 127* 129*   POTASSIUM mmol/L 3.8 4.5 4.1 4.1 3.9 3.8 3.6   CHLORIDE mmol/L 93* 93* 91* 90* 87* 86* 87*   CO2 mmol/L 31.3* 26.5 27.7 25.1 25.9 27.4 27.9   BUN mg/dL 27* 55* 40* 70* 56* 45* 30*   CREATININE mg/dL 5.14* 8.48* 6.82* 10.34* 8.29* 7.18* 5.54*   CALCIUM mg/dL 8.5* 8.3* 8.2* 8.1* 7.9* 8.1* 8.2*   GLUCOSE mg/dL 225* 213* 204* 99 128* 96 164*           Results from last 7 days   Lab Units 05/19/24  0102 05/18/24  0408   INR  1.31* 1.58*       I reviewed the patient's new clinical results.  I reviewed the patient's new imaging results and agree with the interpretation.  I personally viewed and interpreted the patient's EKG/Telemetry data      Medication Review:   acetaminophen, 650 mg, Oral, Once per day on Tuesday Thursday Saturday  ammonium lactate, 1 Application, Topical, Nightly  aspirin, 81 mg, Oral, Daily  atorvastatin, 20 mg, Oral, Daily  cetirizine, 5 mg, Oral, Daily  empagliflozin,  10 mg, Oral, Daily  erythromycin, 1 Application, Left Eye, BID  famotidine, 20 mg, Oral, Q48H  gabapentin, 100 mg, Oral, Nightly  heparin (porcine), 5,000 Units, Subcutaneous, Q8H  insulin lispro, 2-7 Units, Subcutaneous, 4x Daily AC & at Bedtime  levothyroxine, 75 mcg, Oral, Daily  Lidocaine, 1 patch, Transdermal, Q PM  lubrisoft, 1 Application, Topical, Daily  megestrol, 40 mg, Oral, TID With Meals  metoprolol succinate XL, 12.5 mg, Oral, Nightly  midodrine, 10 mg, Oral, TID AC  multivitamin with minerals, 1 tablet, Oral, Daily  sertraline, 50 mg, Oral, Nightly  sevelamer, 2,400 mg, Oral, TID With Meals  sodium chloride, 10 mL, Intravenous, Q12H           Assessment:  New HFrEF  Hypotension  ESRD on HD      Recommendations:  Awaiting coronary CTA for further decisions about ischemic workup.  Life vest delivered today.  Now on maximally tolerated GDMT  Improving  Managed by hospitalist and nephrology    Case discussed with Dr. Garcia and plan of care reflects his recommendations.    I discussed the patients findings and my recommendations with patient and family.      Electronically signed by DORA Green, 05/24/24, 2:20 PM EDT.    Patient s/p CTA coronaries done today with patent LIMA to LAD.  Occluded graft.  Native coronary artery disease and right coronary artery.  GAVI this admission was negative for endocarditis.  New decrease LV ejection fraction noted.  Guideline guided medical therapy with LifeVest on discharge.  May consider cardiac catheterization for further delineation of coronary anatomy.  .Electronically signed by Tommy Garcia MD, 05/24/24, 7:56 PM EDT.

## 2024-05-24 NOTE — PLAN OF CARE
Goal Outcome Evaluation:  Plan of Care Reviewed With: patient        Progress: no change  Outcome Evaluation: Patient is resting in bed at this time. VSS. 2L in place via NC. Patient went down for a CTA this shift. Wound care complete per orders. No acute distress noted at this time. Will continue with POC.

## 2024-05-24 NOTE — PROGRESS NOTES
PROGRESS NOTE         Patient Identification:  Name:  Kennedy Prescott  Age:  73 y.o.  Sex:  male  :  1950  MRN:  [id number]  Visit Number:  [id number]  Primary Care Provider:  Jag Guillaume MD         LOS: 8 days       ----------------------------------------------------------------------------------------------------------------------  Subjective       Chief Complaints:    Nausea and Vomiting        Interval History:      Patient resting in bed this morning.  No issues or complaints.  Anxious to home soon.  Currently on room air with no apparent distress.  Lungs clear to auscultation bilaterally.  Abdomen soft, nontender.  Afebrile, denies diarrhea.  WBC normal at 9.98.      Review of Systems:    Constitutional: no fever, chills and night sweats.  Generalized fatigue.  Eyes: no eye drainage, itching or redness.  HEENT: no mouth sores, dysphagia or nose bleed.  Respiratory: no for shortness of breath, cough or production of sputum.  Cardiovascular: no chest pain, no palpitations, no orthopnea.  Gastrointestinal: no nausea, vomiting or diarrhea. No abdominal pain, hematemesis or rectal bleeding.  Genitourinary: no dysuria or polyuria.  Hematologic/lymphatic: no lymph node abnormalities, no easy bruising or easy bleeding.  Musculoskeletal: no muscle or joint pain.  Skin: No rash and no itching.  Neurological: no loss of consciousness, no seizure, no headache.  Behavioral/Psych: no depression or suicidal ideation.  Endocrine: no hot flashes.  Immunologic: negative.    ----------------------------------------------------------------------------------------------------------------------      Objective       [facility] Meds:  acetaminophen, 650 mg, Oral, Once per day on   ammonium lactate, 1 Application, Topical, Nightly  aspirin, 81 mg, Oral, Daily  atorvastatin, 20 mg, Oral, Daily  cetirizine, 5 mg, Oral, Daily  empagliflozin, 10 mg, Oral, Daily  erythromycin, 1  Application, Left Eye, BID  famotidine, 20 mg, Oral, Q48H  gabapentin, 100 mg, Oral, Nightly  heparin (porcine), 5,000 Units, Subcutaneous, Q8H  insulin lispro, 2-7 Units, Subcutaneous, 4x Daily AC & at Bedtime  levothyroxine, 75 mcg, Oral, Daily  Lidocaine, 1 patch, Transdermal, Q PM  lubrisoft, 1 Application, Topical, Daily  megestrol, 40 mg, Oral, TID With Meals  metoprolol succinate XL, 12.5 mg, Oral, Nightly  metoprolol tartrate, 25 mg, Oral, On Call  midodrine, 10 mg, Oral, TID AC  multivitamin with minerals, 1 tablet, Oral, Daily  sertraline, 50 mg, Oral, Nightly  sevelamer, 2,400 mg, Oral, TID With Meals  sodium chloride, 10 mL, Intravenous, Q12H  Vancomycin Pharmacy Intermittent/Pulse Dosing, , Does not apply, Daily           ----------------------------------------------------------------------------------------------------------------------    Vital Signs:  Temp:  [97.6 °F (36.4 °C)-98.5 °F (36.9 °C)] 98.5 °F (36.9 °C)  Heart Rate:  [66-79] 71  Resp:  [18] 18  BP: (107-151)/(53-68) 139/63  No data found.    SpO2 Percentage    05/22/24 1900 05/23/24 0300 05/24/24 0300   SpO2: 95% 96% 95%     SpO2:  [95 %] 95 %  on   ;   Device (Oxygen Therapy): room air    Body mass index is 30.4 kg/m².  Wt Readings from Last 3 Encounters:   05/24/24 98.9 kg (218 lb)   10/02/23 87.1 kg (192 lb)   09/22/23 87.1 kg (192 lb)        Intake/Output Summary (Last 24 hours) at 5/24/2024 1101  Last data filed at 5/23/2024 2300  Gross per 24 hour   Intake 240 ml   Output 2100 ml   Net -1860 ml     NPO Diet NPO Type: Strict NPO  ----------------------------------------------------------------------------------------------------------------------      Physical Exam:    Constitutional: Chronically ill-appearing elderly gentleman.  On room air with no apparent distress.   HENT:  Head: Normocephalic and atraumatic.  Mouth:  Moist mucous membranes.    Eyes:  Conjunctivae and EOM are normal.  No scleral icterus.  Neck:  Neck supple.  No  JVD present.    Cardiovascular:  Normal rate, regular rhythm and normal heart sounds with 3/6 murmur. No edema.  Pulmonary/Chest: Lung exam is clear to auscultation bilaterally   abdominal:  Soft.  Bowel sounds are normal.  No distension and no tenderness.   Musculoskeletal:  No edema, no tenderness, and no deformity.  No swelling or redness of joints.  Neurological:  Alert and oriented to person, place, and time.  No facial droop.  No slurred speech.   Skin:  Skin is warm and dry.  No rash noted.  No pallor.  Right subclavian HD cath site dressed with occlusive dressing, no surrounding erythema or edema.  No drainage on the dressing.  LUE AVF with no erythema/edema, thrill and bruit.  Bilateral lower extremity chronic ischemic changes.  Left pedal pulse faint.  Dry ulcers noted to the left lateral foot and left heel.  Psychiatric:  Normal mood and affect.  Behavior is normal.        ----------------------------------------------------------------------------------------------------------------------                Results from last 7 days   Lab Units 05/17/24  1416   PH, ARTERIAL pH units 7.523*   PO2 ART mm Hg 80.2*   PCO2, ARTERIAL mm Hg 40.4   HCO3 ART mmol/L 33.2*     Results from last 7 days   Lab Units 05/24/24  0113 05/23/24  0153 05/22/24  0036 05/20/24  0109 05/19/24  0102 05/18/24  0408   CRP mg/dL  --   --   --   --  15.62* 18.08*   LACTATE mmol/L  --   --   --   --  1.0 1.0   WBC 10*3/mm3 9.98 9.24 10.41   < > 11.08* 11.53*   HEMOGLOBIN g/dL 9.8* 9.2* 8.6*   < > 8.7* 8.8*   HEMATOCRIT % 30.6* 28.4* 27.3*   < > 26.3* 27.0*   MCV fL 93.9 93.4 93.5   < > 90.7 92.5   MCHC g/dL 32.0 32.4 31.5   < > 33.1 32.6   PLATELETS 10*3/mm3 203 189 159   < > 97* 116*   INR   --   --   --   --  1.31* 1.58*    < > = values in this interval not displayed.     Results from last 7 days   Lab Units 05/24/24  0114 05/23/24  0153 05/22/24  0036 05/21/24  0321 05/20/24  0109 05/19/24  0102 05/18/24  0408   SODIUM mmol/L 137 134*  "132*   < > 129* 127* 129*   POTASSIUM mmol/L 3.8 4.5 4.1   < > 3.9 3.8 3.6   MAGNESIUM mg/dL  --   --   --   --  2.1 2.0 1.8   CHLORIDE mmol/L 93* 93* 91*   < > 87* 86* 87*   CO2 mmol/L 31.3* 26.5 27.7   < > 25.9 27.4 27.9   BUN mg/dL 27* 55* 40*   < > 56* 45* 30*   CREATININE mg/dL 5.14* 8.48* 6.82*   < > 8.29* 7.18* 5.54*   CALCIUM mg/dL 8.5* 8.3* 8.2*   < > 7.9* 8.1* 8.2*   GLUCOSE mg/dL 225* 213* 204*   < > 128* 96 164*   ALBUMIN g/dL 3.4*  --   --   --  3.1* 3.1* 3.3*   BILIRUBIN mg/dL 0.4  --   --   --  0.4 0.4 0.5   ALK PHOS U/L 216*  --   --   --  197* 163* 153*   AST (SGOT) U/L 30  --   --   --  23 27 31   ALT (SGPT) U/L 22  --   --   --  11 11 12    < > = values in this interval not displayed.   Estimated Creatinine Clearance: 14.9 mL/min (A) (by C-G formula based on SCr of 5.14 mg/dL (H)).  No results found for: \"AMMONIA\"    Glucose   Date/Time Value Ref Range Status   05/24/2024 0623 147 (H) 70 - 130 mg/dL Final   05/23/2024 2143 167 (H) 70 - 130 mg/dL Final   05/23/2024 1723 153 (H) 70 - 130 mg/dL Final   05/23/2024 1141 167 (H) 70 - 130 mg/dL Final   05/23/2024 0619 123 70 - 130 mg/dL Final   05/22/2024 2104 178 (H) 70 - 130 mg/dL Final   05/22/2024 1654 118 70 - 130 mg/dL Final   05/22/2024 1050 116 70 - 130 mg/dL Final     Lab Results   Component Value Date    HGBA1C 6.40 (H) 03/21/2024     Lab Results   Component Value Date    TSH 2.490 03/21/2024    FREET4 1.43 05/29/2022       Blood Culture   Date Value Ref Range Status   05/15/2024 Staphylococcus aureus, MRSA (C)  Preliminary     Comment:       Infectious disease consultation is highly recommended to rule out distant foci of infection.  Methicillin resistant Staphylococcus aureus, Patient may be an isolation risk.   05/15/2024 Staphylococcus aureus, MRSA (C)  Preliminary     Comment:       Infectious disease consultation is highly recommended to rule out distant foci of infection.  Methicillin resistant Staphylococcus aureus, Patient may be an " "isolation risk.     No results found for: \"URINECX\"  No results found for: \"WOUNDCX\"  No results found for: \"STOOLCX\"  No results found for: \"RESPCX\"  Pain Management Panel           No data to display                  ----------------------------------------------------------------------------------------------------------------------  Imaging Results (Last 24 Hours)       ** No results found for the last 24 hours. **            ----------------------------------------------------------------------------------------------------------------------    Pertinent Infectious Disease Results                Assessment/Plan       Assessment     Sepsis on admission  MRSA bacteremia  Chronic left foot wounds        Plan        Patient resting in bed this morning.  No issues or complaints.  Anxious to home soon.  Currently on room air with no apparent distress.  Lungs clear to auscultation bilaterally.  Abdomen soft, nontender.  Afebrile, denies diarrhea.  WBC normal at 9.98.    GAVI from 5/22/2024 reports no evidence of vegetation.      In the setting of negative GAVI would recommend to continue vancomycin pharmacy to dose post hemodialysis to continue through 6/15/2024. We will continue to monitor closely.      ANTIMICROBIAL THERAPY    VANCOMYCIN PHARMACY INTERMITTENT/PULSE DOSING     Code Status:   Code Status and Medical Interventions:   Ordered at: 05/15/24 9456     Level Of Support Discussed With:    Patient     Code Status (Patient has no pulse and is not breathing):    CPR (Attempt to Resuscitate)     Medical Interventions (Patient has pulse or is breathing):    Full Support       Monica Hawkins, APRN  05/24/24  11:01 EDT    "

## 2024-05-24 NOTE — PHARMACY PATIENT ASSISTANCE
Pharmacy checked on price of Jardiance initiated inpatient. Per patient's plan, copay will be $0 for 1 month supply. No other issues identified at this time.    Thank you,    Trevin Layne, Pharmacy Intern  05/24/24  13:01 EDT

## 2024-05-24 NOTE — PROGRESS NOTES
"Nephrology Progress Note  Chief complaint, nausea and vomiting    Subjective     Patient says he feels fine, and wants to go home.  He denied any chest pain shortness of breath.  Patient stated he should be good on wheelchair and be able to perform his daily activities at home.    Objective       Vital signs :     Temp:  [97.6 °F (36.4 °C)-98.5 °F (36.9 °C)] 98.5 °F (36.9 °C)  Heart Rate:  [66-79] 71  Resp:  [18] 18  BP: (107-151)/(53-68) 139/63    Intake/Output                         05/22/24 0701 - 05/23/24 0700 05/23/24 0701 - 05/24/24 0700     [id number] 9323-3031 Total 8220-9461 4628-8435 Total                 Intake    P.O.  0  360 360  260  240 500    Total Intake 0 360 360 260 240 500       Output    Urine  120  -- 120  --  100 100    Dialysis  --  -- --  2000  -- 2000    Total Output 120 -- 120 2000 100 2100             Physical Exam:    General Appearance : Not in acute distress  Lungs : Bilateral decreased intensity of breath sounds  Heart :  regular rhythm & normal rate, normal S1, S2 and no murmur, no rub  Abdomen : Soft, nondistended  Extremities : No edema,   Neurologic :   No apparent focal neurological deficit, unable to assess fully  Brachiocephalic AV fistula, good bruit and thrill      Laboratory Data :     Albumin Albumin   Date Value Ref Range Status   05/24/2024 3.4 (L) 3.5 - 5.2 g/dL Final      Magnesium No results found for: \"MG\"         PTH               No results found for: \"PTH\"    CBC and coagulation:  Results from last 7 days   Lab Units 05/24/24  0113 05/23/24  0153 05/22/24  0036 05/20/24  0109 05/19/24  0102 05/18/24  0408 05/17/24  0927   LACTATE mmol/L  --   --   --   --  1.0 1.0  --    CRP mg/dL  --   --   --   --  15.62* 18.08*  --    WBC 10*3/mm3 9.98 9.24 10.41   < > 11.08* 11.53* 9.52   HEMOGLOBIN g/dL 9.8* 9.2* 8.6*   < > 8.7* 8.8* 7.2*   HEMATOCRIT % 30.6* 28.4* 27.3*   < > 26.3* 27.0* 22.6*   MCV fL 93.9 93.4 93.5   < > 90.7 92.5 96.2   MCHC g/dL 32.0 32.4 31.5   < > 33.1 " 32.6 31.9   PLATELETS 10*3/mm3 203 189 159   < > 97* 116* 95*   INR   --   --   --   --  1.31* 1.58* 1.52*   D DIMER QUANT MCGFEU/mL  --   --   --   --   --  4.40* 10.17*    < > = values in this interval not displayed.     Acid/base balance:  Results from last 7 days   Lab Units 05/17/24  1416   PH, ARTERIAL pH units 7.523*   PO2 ART mm Hg 80.2*   PCO2, ARTERIAL mm Hg 40.4   HCO3 ART mmol/L 33.2*     Renal and electrolytes:    Results from last 7 days   Lab Units 05/24/24  0114 05/23/24  0153 05/22/24  0036 05/21/24  0321 05/20/24  0109 05/19/24  0102 05/18/24  0408   SODIUM mmol/L 137 134* 132* 131* 129* 127* 129*   POTASSIUM mmol/L 3.8 4.5 4.1 4.1 3.9 3.8 3.6   MAGNESIUM mg/dL  --   --   --   --  2.1 2.0 1.8   CHLORIDE mmol/L 93* 93* 91* 90* 87* 86* 87*   CO2 mmol/L 31.3* 26.5 27.7 25.1 25.9 27.4 27.9   BUN mg/dL 27* 55* 40* 70* 56* 45* 30*   CREATININE mg/dL 5.14* 8.48* 6.82* 10.34* 8.29* 7.18* 5.54*   CALCIUM mg/dL 8.5* 8.3* 8.2* 8.1* 7.9* 8.1* 8.2*   PHOSPHORUS mg/dL  --   --   --   --  4.5 3.6 3.0     Estimated Creatinine Clearance: 14.9 mL/min (A) (by C-G formula based on SCr of 5.14 mg/dL (H)).  @GFRCG:3@   Liver and pancreatic function:  Results from last 7 days   Lab Units 05/24/24  0114 05/20/24  0109 05/19/24  0102   ALBUMIN g/dL 3.4* 3.1* 3.1*   BILIRUBIN mg/dL 0.4 0.4 0.4   ALK PHOS U/L 216* 197* 163*   AST (SGOT) U/L 30 23 27   ALT (SGPT) U/L 22 11 11         Cardiac:      Liver and pancreatic function:  Results from last 7 days   Lab Units 05/24/24  0114 05/20/24  0109 05/19/24  0102   ALBUMIN g/dL 3.4* 3.1* 3.1*   BILIRUBIN mg/dL 0.4 0.4 0.4   ALK PHOS U/L 216* 197* 163*   AST (SGOT) U/L 30 23 27   ALT (SGPT) U/L 22 11 11       Medications :     acetaminophen, 650 mg, Oral, Once per day on Tuesday Thursday Saturday  ammonium lactate, 1 Application, Topical, Nightly  aspirin, 81 mg, Oral, Daily  atorvastatin, 20 mg, Oral, Daily  cetirizine, 5 mg, Oral, Daily  empagliflozin, 10 mg, Oral,  Daily  erythromycin, 1 Application, Left Eye, BID  famotidine, 20 mg, Oral, Q48H  gabapentin, 100 mg, Oral, Nightly  heparin (porcine), 5,000 Units, Subcutaneous, Q8H  insulin lispro, 2-7 Units, Subcutaneous, 4x Daily AC & at Bedtime  levothyroxine, 75 mcg, Oral, Daily  Lidocaine, 1 patch, Transdermal, Q PM  lubrisoft, 1 Application, Topical, Daily  megestrol, 40 mg, Oral, TID With Meals  metoprolol succinate XL, 12.5 mg, Oral, Nightly  metoprolol tartrate, 25 mg, Oral, On Call  midodrine, 10 mg, Oral, TID AC  multivitamin with minerals, 1 tablet, Oral, Daily  sertraline, 50 mg, Oral, Nightly  sevelamer, 2,400 mg, Oral, TID With Meals  sodium chloride, 10 mL, Intravenous, Q12H  Vancomycin Pharmacy Intermittent/Pulse Dosing, , Does not apply, Daily               Assessment & Plan     -ESKD on intermittent dialysis  - Anemia  - Persistent nausea and vomiting  - Type 2 diabetes mellitus  - Essential hypertension  - MRSA bacteremia  - Left foot wounds    Patient had uneventful dialysis done yesterday will continue on intermittent dialysis Tuesdays Thursdays and Saturdays.  Patient is being treated with vancomycin that can be administered in outpatient dialysis dialysis      Nova Saucedo MD  05/24/24  09:07 EDT

## 2024-05-25 LAB
ANION GAP SERPL CALCULATED.3IONS-SCNC: 15.8 MMOL/L (ref 5–15)
BASOPHILS # BLD AUTO: 0.08 10*3/MM3 (ref 0–0.2)
BASOPHILS NFR BLD AUTO: 0.6 % (ref 0–1.5)
BUN SERPL-MCNC: 38 MG/DL (ref 8–23)
BUN/CREAT SERPL: 5.2 (ref 7–25)
CALCIUM SPEC-SCNC: 8.8 MG/DL (ref 8.6–10.5)
CHLORIDE SERPL-SCNC: 93 MMOL/L (ref 98–107)
CO2 SERPL-SCNC: 28.2 MMOL/L (ref 22–29)
CREAT SERPL-MCNC: 7.28 MG/DL (ref 0.76–1.27)
DEPRECATED RDW RBC AUTO: 49.8 FL (ref 37–54)
EGFRCR SERPLBLD CKD-EPI 2021: 7.2 ML/MIN/1.73
EOSINOPHIL # BLD AUTO: 0.15 10*3/MM3 (ref 0–0.4)
EOSINOPHIL NFR BLD AUTO: 1.2 % (ref 0.3–6.2)
ERYTHROCYTE [DISTWIDTH] IN BLOOD BY AUTOMATED COUNT: 14.6 % (ref 12.3–15.4)
GLUCOSE BLDC GLUCOMTR-MCNC: 133 MG/DL (ref 70–130)
GLUCOSE BLDC GLUCOMTR-MCNC: 153 MG/DL (ref 70–130)
GLUCOSE BLDC GLUCOMTR-MCNC: 202 MG/DL (ref 70–130)
GLUCOSE BLDC GLUCOMTR-MCNC: 240 MG/DL (ref 70–130)
GLUCOSE SERPL-MCNC: 165 MG/DL (ref 65–99)
HCT VFR BLD AUTO: 31.4 % (ref 37.5–51)
HGB BLD-MCNC: 9.9 G/DL (ref 13–17.7)
IMM GRANULOCYTES # BLD AUTO: 0.13 10*3/MM3 (ref 0–0.05)
IMM GRANULOCYTES NFR BLD AUTO: 1 % (ref 0–0.5)
LYMPHOCYTES # BLD AUTO: 1.32 10*3/MM3 (ref 0.7–3.1)
LYMPHOCYTES NFR BLD AUTO: 10.4 % (ref 19.6–45.3)
MCH RBC QN AUTO: 30.1 PG (ref 26.6–33)
MCHC RBC AUTO-ENTMCNC: 31.5 G/DL (ref 31.5–35.7)
MCV RBC AUTO: 95.4 FL (ref 79–97)
MONOCYTES # BLD AUTO: 1.37 10*3/MM3 (ref 0.1–0.9)
MONOCYTES NFR BLD AUTO: 10.8 % (ref 5–12)
NEUTROPHILS NFR BLD AUTO: 76 % (ref 42.7–76)
NEUTROPHILS NFR BLD AUTO: 9.59 10*3/MM3 (ref 1.7–7)
NRBC BLD AUTO-RTO: 0 /100 WBC (ref 0–0.2)
PLATELET # BLD AUTO: 224 10*3/MM3 (ref 140–450)
PMV BLD AUTO: 9.9 FL (ref 6–12)
POTASSIUM SERPL-SCNC: 4.4 MMOL/L (ref 3.5–5.2)
RBC # BLD AUTO: 3.29 10*6/MM3 (ref 4.14–5.8)
SODIUM SERPL-SCNC: 137 MMOL/L (ref 136–145)
WBC NRBC COR # BLD AUTO: 12.64 10*3/MM3 (ref 3.4–10.8)

## 2024-05-25 PROCEDURE — 25810000003 SODIUM CHLORIDE 0.9 % SOLUTION 250 ML FLEX CONT: Performed by: INTERNAL MEDICINE

## 2024-05-25 PROCEDURE — 80048 BASIC METABOLIC PNL TOTAL CA: CPT | Performed by: INTERNAL MEDICINE

## 2024-05-25 PROCEDURE — 99232 SBSQ HOSP IP/OBS MODERATE 35: CPT | Performed by: INTERNAL MEDICINE

## 2024-05-25 PROCEDURE — 25010000002 HEPARIN (PORCINE) PER 1000 UNITS: Performed by: INTERNAL MEDICINE

## 2024-05-25 PROCEDURE — 85025 COMPLETE CBC W/AUTO DIFF WBC: CPT | Performed by: INTERNAL MEDICINE

## 2024-05-25 PROCEDURE — 63710000001 INSULIN LISPRO (HUMAN) PER 5 UNITS: Performed by: INTERNAL MEDICINE

## 2024-05-25 PROCEDURE — 25010000002 VANCOMYCIN 1 G RECONSTITUTED SOLUTION 1 EACH VIAL: Performed by: INTERNAL MEDICINE

## 2024-05-25 PROCEDURE — 99232 SBSQ HOSP IP/OBS MODERATE 35: CPT | Performed by: NURSE PRACTITIONER

## 2024-05-25 PROCEDURE — 82948 REAGENT STRIP/BLOOD GLUCOSE: CPT

## 2024-05-25 RX ORDER — MIDODRINE HYDROCHLORIDE 2.5 MG/1
5 TABLET ORAL
Status: DISCONTINUED | OUTPATIENT
Start: 2024-05-25 | End: 2024-05-26

## 2024-05-25 RX ADMIN — INSULIN LISPRO 2 UNITS: 100 INJECTION, SOLUTION INTRAVENOUS; SUBCUTANEOUS at 12:46

## 2024-05-25 RX ADMIN — ERYTHROMYCIN 1 APPLICATION: 5 OINTMENT OPHTHALMIC at 12:47

## 2024-05-25 RX ADMIN — LEVOTHYROXINE SODIUM 75 MCG: 0.07 TABLET ORAL at 12:47

## 2024-05-25 RX ADMIN — Medication 1 APPLICATION: at 12:46

## 2024-05-25 RX ADMIN — HEPARIN SODIUM 5000 UNITS: 5000 INJECTION INTRAVENOUS; SUBCUTANEOUS at 22:22

## 2024-05-25 RX ADMIN — CARBIDOPA AND LEVODOPA 5 MG: 50; 200 TABLET, EXTENDED RELEASE ORAL at 17:10

## 2024-05-25 RX ADMIN — Medication 1 TABLET: at 12:47

## 2024-05-25 RX ADMIN — ERYTHROMYCIN 1 APPLICATION: 5 OINTMENT OPHTHALMIC at 20:58

## 2024-05-25 RX ADMIN — CETIRIZINE HYDROCHLORIDE 5 MG: 10 TABLET, FILM COATED ORAL at 12:47

## 2024-05-25 RX ADMIN — GABAPENTIN 100 MG: 100 CAPSULE ORAL at 20:57

## 2024-05-25 RX ADMIN — AMMONIUM LACTATE 1 APPLICATION: 120 CREAM TOPICAL at 20:58

## 2024-05-25 RX ADMIN — ASPIRIN 81 MG: 81 TABLET, COATED ORAL at 12:47

## 2024-05-25 RX ADMIN — CARBIDOPA AND LEVODOPA 5 MG: 50; 200 TABLET, EXTENDED RELEASE ORAL at 12:47

## 2024-05-25 RX ADMIN — SERTRALINE 50 MG: 50 TABLET, FILM COATED ORAL at 20:57

## 2024-05-25 RX ADMIN — INSULIN LISPRO 3 UNITS: 100 INJECTION, SOLUTION INTRAVENOUS; SUBCUTANEOUS at 17:10

## 2024-05-25 RX ADMIN — MEGESTROL ACETATE 40 MG: 40 TABLET ORAL at 17:10

## 2024-05-25 RX ADMIN — Medication 10 ML: at 20:55

## 2024-05-25 RX ADMIN — ACETAMINOPHEN 650 MG: 325 TABLET ORAL at 20:57

## 2024-05-25 RX ADMIN — METOPROLOL SUCCINATE 12.5 MG: 25 TABLET, FILM COATED, EXTENDED RELEASE ORAL at 21:03

## 2024-05-25 RX ADMIN — MEGESTROL ACETATE 40 MG: 40 TABLET ORAL at 12:46

## 2024-05-25 RX ADMIN — SEVELAMER CARBONATE 2400 MG: 800 TABLET, FILM COATED ORAL at 12:46

## 2024-05-25 RX ADMIN — SEVELAMER CARBONATE 2400 MG: 800 TABLET, FILM COATED ORAL at 17:10

## 2024-05-25 RX ADMIN — FAMOTIDINE 20 MG: 20 TABLET, FILM COATED ORAL at 22:22

## 2024-05-25 RX ADMIN — HEPARIN SODIUM 5000 UNITS: 5000 INJECTION INTRAVENOUS; SUBCUTANEOUS at 06:09

## 2024-05-25 RX ADMIN — INSULIN LISPRO 3 UNITS: 100 INJECTION, SOLUTION INTRAVENOUS; SUBCUTANEOUS at 20:58

## 2024-05-25 RX ADMIN — Medication 10 ML: at 12:49

## 2024-05-25 RX ADMIN — VANCOMYCIN HYDROCHLORIDE 1000 MG: 1 INJECTION, POWDER, LYOPHILIZED, FOR SOLUTION INTRAVENOUS at 14:52

## 2024-05-25 RX ADMIN — EMPAGLIFLOZIN 10 MG: 10 TABLET, FILM COATED ORAL at 12:47

## 2024-05-25 RX ADMIN — ATORVASTATIN CALCIUM 20 MG: 20 TABLET, FILM COATED ORAL at 12:47

## 2024-05-25 RX ADMIN — HEPARIN SODIUM 5000 UNITS: 5000 INJECTION INTRAVENOUS; SUBCUTANEOUS at 14:52

## 2024-05-25 NOTE — PROGRESS NOTES
PROGRESS NOTE         Patient Identification:  Name:  Kennedy Prescott  Age:  73 y.o.  Sex:  male  :  1950  MRN:  [id number]  Visit Number:  [id number]  Primary Care Provider:  Jag Guillaume MD         LOS: 9 days       ----------------------------------------------------------------------------------------------------------------------  Subjective       Chief Complaints:    Nausea and Vomiting        Interval History:      Patient resting in bed this morning.  Currently on 2 L per nasal cannula with no apparent distress.  No issues reported overnight.  Lung sounds diminished bilaterally.  Afebrile, no reported diarrhea.  WBC slightly elevated 12.64.      Review of Systems:    Constitutional: no fever, chills and night sweats.  Generalized fatigue.  Eyes: no eye drainage, itching or redness.  HEENT: no mouth sores, dysphagia or nose bleed.  Respiratory: no for shortness of breath, cough or production of sputum.  Cardiovascular: no chest pain, no palpitations, no orthopnea.  Gastrointestinal: no nausea, vomiting or diarrhea. No abdominal pain, hematemesis or rectal bleeding.  Genitourinary: no dysuria or polyuria.  Hematologic/lymphatic: no lymph node abnormalities, no easy bruising or easy bleeding.  Musculoskeletal: no muscle or joint pain.  Skin: No rash and no itching.  Neurological: no loss of consciousness, no seizure, no headache.  Behavioral/Psych: no depression or suicidal ideation.  Endocrine: no hot flashes.  Immunologic: negative.    ----------------------------------------------------------------------------------------------------------------------      Objective       [facility] Meds:  acetaminophen, 650 mg, Oral, Once per day on   ammonium lactate, 1 Application, Topical, Nightly  aspirin, 81 mg, Oral, Daily  atorvastatin, 20 mg, Oral, Daily  cetirizine, 5 mg, Oral, Daily  empagliflozin, 10 mg, Oral, Daily  erythromycin, 1 Application, Left Eye,  BID  famotidine, 20 mg, Oral, Q48H  gabapentin, 100 mg, Oral, Nightly  heparin (porcine), 5,000 Units, Subcutaneous, Q8H  insulin lispro, 2-7 Units, Subcutaneous, 4x Daily AC & at Bedtime  levothyroxine, 75 mcg, Oral, Daily  Lidocaine, 1 patch, Transdermal, Q PM  lubrisoft, 1 Application, Topical, Daily  megestrol, 40 mg, Oral, TID With Meals  metoprolol succinate XL, 12.5 mg, Oral, Nightly  midodrine, 5 mg, Oral, TID AC  multivitamin with minerals, 1 tablet, Oral, Daily  sertraline, 50 mg, Oral, Nightly  sevelamer, 2,400 mg, Oral, TID With Meals  sodium chloride, 10 mL, Intravenous, Q12H  vancomycin, 1,000 mg, Intravenous, Once  Vancomycin Pharmacy Intermittent/Pulse Dosing, , Does not apply, Daily           ----------------------------------------------------------------------------------------------------------------------    Vital Signs:  Temp:  [97.4 °F (36.3 °C)-98.2 °F (36.8 °C)] 98.2 °F (36.8 °C)  Heart Rate:  [59-80] 75  Resp:  [14-18] 14  BP: (128-173)/(63-76) 128/76  No data found.    SpO2 Percentage    05/23/24 0300 05/24/24 0300 05/24/24 1900   SpO2: 96% 95% 99%     SpO2:  [99 %] 99 %  on  Flow (L/min):  [2] 2;   Device (Oxygen Therapy): nasal cannula    Body mass index is 31.09 kg/m².  Wt Readings from Last 3 Encounters:   05/25/24 101 kg (222 lb 14.2 oz)   10/02/23 87.1 kg (192 lb)   09/22/23 87.1 kg (192 lb)        Intake/Output Summary (Last 24 hours) at 5/25/2024 1158  Last data filed at 5/24/2024 2152  Gross per 24 hour   Intake 60 ml   Output 305 ml   Net -245 ml     Diet: Regular/House; Fluid Consistency: Thin (IDDSI 0)  ----------------------------------------------------------------------------------------------------------------------      Physical Exam:    Constitutional: Chronically ill-appearing elderly gentleman.  2 L per nasal cannula this morning.  No complaints.  HENT:  Head: Normocephalic and atraumatic.  Mouth:  Moist mucous membranes.    Eyes:  Conjunctivae and EOM are normal.  No  scleral icterus.  Neck:  Neck supple.  No JVD present.    Cardiovascular:  Normal rate, regular rhythm and normal heart sounds with 3/6 murmur. No edema.  Pulmonary/Chest: Diminished lung sounds bilaterally.  abdominal:  Soft.  Bowel sounds are normal.  No distension and no tenderness.   Musculoskeletal:  No edema, no tenderness, and no deformity.  No swelling or redness of joints.  Neurological:  Alert and oriented to person, place, and time.  No facial droop.  No slurred speech.   Skin:  Skin is warm and dry.  No rash noted.  No pallor.  Right subclavian HD cath site dressed with occlusive dressing, no surrounding erythema or edema.  No drainage on the dressing.  LUE AVF with no erythema/edema, thrill and bruit.  Bilateral lower extremity chronic ischemic changes.  Left pedal pulse faint.  Dry ulcers noted to the left lateral foot and left heel.  Psychiatric:  Normal mood and affect.  Behavior is normal.        ----------------------------------------------------------------------------------------------------------------------                      Results from last 7 days   Lab Units 05/25/24  0033 05/24/24  0113 05/23/24  0153 05/20/24  0109 05/19/24  0102   CRP mg/dL  --   --   --   --  15.62*   LACTATE mmol/L  --   --   --   --  1.0   WBC 10*3/mm3 12.64* 9.98 9.24   < > 11.08*   HEMOGLOBIN g/dL 9.9* 9.8* 9.2*   < > 8.7*   HEMATOCRIT % 31.4* 30.6* 28.4*   < > 26.3*   MCV fL 95.4 93.9 93.4   < > 90.7   MCHC g/dL 31.5 32.0 32.4   < > 33.1   PLATELETS 10*3/mm3 224 203 189   < > 97*   INR   --   --   --   --  1.31*    < > = values in this interval not displayed.     Results from last 7 days   Lab Units 05/25/24  0033 05/24/24  0114 05/23/24  0153 05/21/24  0321 05/20/24  0109 05/19/24  0102   SODIUM mmol/L 137 137 134*   < > 129* 127*   POTASSIUM mmol/L 4.4 3.8 4.5   < > 3.9 3.8   MAGNESIUM mg/dL  --   --   --   --  2.1 2.0   CHLORIDE mmol/L 93* 93* 93*   < > 87* 86*   CO2 mmol/L 28.2 31.3* 26.5   < > 25.9 27.4  "  BUN mg/dL 38* 27* 55*   < > 56* 45*   CREATININE mg/dL 7.28* 5.14* 8.48*   < > 8.29* 7.18*   CALCIUM mg/dL 8.8 8.5* 8.3*   < > 7.9* 8.1*   GLUCOSE mg/dL 165* 225* 213*   < > 128* 96   ALBUMIN g/dL  --  3.4*  --   --  3.1* 3.1*   BILIRUBIN mg/dL  --  0.4  --   --  0.4 0.4   ALK PHOS U/L  --  216*  --   --  197* 163*   AST (SGOT) U/L  --  30  --   --  23 27   ALT (SGPT) U/L  --  22  --   --  11 11    < > = values in this interval not displayed.   Estimated Creatinine Clearance: 10.6 mL/min (A) (by C-G formula based on SCr of 7.28 mg/dL (H)).  No results found for: \"AMMONIA\"    Glucose   Date/Time Value Ref Range Status   05/25/2024 0620 133 (H) 70 - 130 mg/dL Final   05/24/2024 2017 215 (H) 70 - 130 mg/dL Final   05/24/2024 1654 138 (H) 70 - 130 mg/dL Final   05/24/2024 1129 149 (H) 70 - 130 mg/dL Final   05/24/2024 0623 147 (H) 70 - 130 mg/dL Final   05/23/2024 2143 167 (H) 70 - 130 mg/dL Final   05/23/2024 1723 153 (H) 70 - 130 mg/dL Final   05/23/2024 1141 167 (H) 70 - 130 mg/dL Final     Lab Results   Component Value Date    HGBA1C 6.40 (H) 03/21/2024     Lab Results   Component Value Date    TSH 2.490 03/21/2024    FREET4 1.43 05/29/2022       Blood Culture   Date Value Ref Range Status   05/15/2024 Staphylococcus aureus, MRSA (C)  Preliminary     Comment:       Infectious disease consultation is highly recommended to rule out distant foci of infection.  Methicillin resistant Staphylococcus aureus, Patient may be an isolation risk.   05/15/2024 Staphylococcus aureus, MRSA (C)  Preliminary     Comment:       Infectious disease consultation is highly recommended to rule out distant foci of infection.  Methicillin resistant Staphylococcus aureus, Patient may be an isolation risk.     No results found for: \"URINECX\"  No results found for: \"WOUNDCX\"  No results found for: \"STOOLCX\"  No results found for: \"RESPCX\"  Pain Management Panel           No data to display          "         ----------------------------------------------------------------------------------------------------------------------  Imaging Results (Last 24 Hours)       Procedure Component Value Units Date/Time    CT Angiogram Heart With 3D Image [[id number]] Collected: 05/24/24 1621     Updated: 05/24/24 1629    Narrative:      PROCEDURE: CT ANGIOGRAM HEART W 3D IMAGE-     HISTORY: CAD screening, intermediate CAD risk, not treadmill candidate     COMPARISON: None .     TECHNIQUE: Multiple axial CT images were obtained from the aortic arch  branch vessels through the upper abdomen following the administration of  Isovue 300 per the CTA protocol. 3D MIP images were reconstructed from  the original axial data set. This study was performed with techniques to  keep radiation doses as low as reasonably achievable (ALARA).  Individualized dose reduction techniques using automated exposure  control or adjustment of mA and/or kV according to the patient size were  employed.     FINDINGS:     Coronary Calcium Score (Agatson):  LM:    133  RCA: 1098  LAD: 636  LCX: 1215  Other: 1254     TOTAL: 4651     Coronary Angiography:     Left Main: The left main originates from the left coronary cusp. It is  normal in caliber. There is calcific plaque within the left main which  produces a 50-70% stenosis (CAD RADS 3)..     Left Anterior Descending: There is diffuse calcific plaque in the LAD  which limits evaluation. Note is made of a prior venous bypass graft  which appears occluded. The patient is status post left internal mammary  bypass of the LAD with patency of the LIMA.     Left Circumflex: The circumflex is diffusely atherosclerotic which  limits evaluation. The degree of stenosis is difficult to determine.     Right Coronary Artery: The RCA is diffusely atherosclerotic and the  degree of stenosis is difficult to determine.     Left Atrium: The left atrium is dilated. No filling defects are seen in  the left atrium.     Left  Ventricle: The ventricular cavity size is mildly dilated. There are  no stigmata of prior infarction. There are no filling defects.     Pulmonary Arteries: Normal with no filling defects.     Pulmonary Veins: Normal venous drainage.     Pericardium: Normal thickness with no significant effusion.     Cardiac Valves: There is no thickening or calcifications involving the  aortic or mitral valves.     Aorta: Normal in caliber with no evidence of dissection.     Lungs and Mediastinum: There are small bilateral pleural effusions and  basilar atelectasis.       Impression:      1. Diffuse atherosclerotic disease with a coronary artery calcium score  of 4651 which limits evaluation of the coronary arteries. The patient is  status post prior venous bypass graft which is occluded. The patient is  also status post left internal mammary bypass which is patent.  2. Mild cardiomegaly.  3. Bilateral pleural effusions and basilar atelectasis.     This report was finalized on 5/24/2024 4:27 PM by Kayleigh New M.D..               ----------------------------------------------------------------------------------------------------------------------    Pertinent Infectious Disease Results                Assessment/Plan       Assessment     Sepsis on admission  MRSA bacteremia  Chronic left foot wounds        Plan      Patient resting in bed this morning.  Currently on 2 L per nasal cannula with no apparent distress.  No issues reported overnight.  Lung sounds diminished bilaterally.  Afebrile, no reported diarrhea.  WBC slightly elevated 12.64.    GAVI from 5/22/2024 reports no evidence of vegetation.      In the setting of negative GAVI would recommend to continue vancomycin pharmacy to dose post hemodialysis to continue through 6/15/2024. We will continue to monitor closely.  Patient will require outpatient infectious disease follow-up.      ANTIMICROBIAL THERAPY    vancomycin (VANCOCIN) 1000mg in NS 250ml (CD)  Vancomycin  Pharmacy Intermittent/Pulse Dosing     Code Status:   Code Status and Medical Interventions:   Ordered at: 05/15/24 2974     Level Of Support Discussed With:    Patient     Code Status (Patient has no pulse and is not breathing):    CPR (Attempt to Resuscitate)     Medical Interventions (Patient has pulse or is breathing):    Full Support       DORA Wagner  05/25/24  11:58 EDT

## 2024-05-25 NOTE — PROGRESS NOTES
[facility] HOSPITALIST PROGRESS NOTE    Subjective     History:   Kennedy Prescott is a 73 y.o. male admitted on 5/15/2024 secondary to SIRS (systemic inflammatory response syndrome)     Procedures:   5/17/24: Removal of tunneled HD catheter   5/22/24: GAVI    Left ventricular systolic function is moderately decreased. Estimated left ventricular EF = 35%    No Vegetations noted on any valve    CC: Follow up sepsis, bacteremia     Patient seen and examined with dialysis RN. Awake and alert. No reported CP, dyspnea or palpitations. No reported vomiting. No acute events overnight per RN.     History taken from: patient, chart, and RN.      Objective     Vital Signs  Temp:  [97.4 °F (36.3 °C)-98.2 °F (36.8 °C)] 98.2 °F (36.8 °C)  Heart Rate:  [59-77] 77  Resp:  [14-18] 16  BP: (128-156)/(62-76) 139/62    Intake/Output Summary (Last 24 hours) at 5/25/2024 1628  Last data filed at 5/24/2024 2152  Gross per 24 hour   Intake 60 ml   Output 305 ml   Net -245 ml         Physical Exam: Unchanged from previous.   General:    Awake, alert, in no acute distress, chronically ill appearing   Heart:      Normal S1 and S2. Irregular.    Lungs:     Respirations regular, even and unlabored. Lungs clear to auscultation B/L. No wheezes, rales or rhonchi.   Abdomen:   Soft and nontender. No guarding, rebound tenderness or  organomegaly noted. Bowel sounds present x 4.   Extremities:  No clubbing, cyanosis or edema noted.      Results Review:    Results from last 7 days   Lab Units 05/25/24  0033 05/24/24  0113 05/23/24  0153 05/22/24  0036 05/21/24  0321 05/20/24  0109 05/19/24  0102   WBC 10*3/mm3 12.64* 9.98 9.24 10.41 12.12* 13.00* 11.08*   HEMOGLOBIN g/dL 9.9* 9.8* 9.2* 8.6* 8.5* 8.5* 8.7*   PLATELETS 10*3/mm3 224 203 189 159 140 116* 97*     Results from last 7 days   Lab Units 05/25/24  0033 05/24/24  0114 05/23/24  0153 05/22/24  0036 05/21/24  0321 05/20/24  0109 05/19/24  0102   SODIUM mmol/L 137 137 134* 132* 131*  129* 127*   POTASSIUM mmol/L 4.4 3.8 4.5 4.1 4.1 3.9 3.8   CHLORIDE mmol/L 93* 93* 93* 91* 90* 87* 86*   CO2 mmol/L 28.2 31.3* 26.5 27.7 25.1 25.9 27.4   BUN mg/dL 38* 27* 55* 40* 70* 56* 45*   CREATININE mg/dL 7.28* 5.14* 8.48* 6.82* 10.34* 8.29* 7.18*   CALCIUM mg/dL 8.8 8.5* 8.3* 8.2* 8.1* 7.9* 8.1*   GLUCOSE mg/dL 165* 225* 213* 204* 99 128* 96     Results from last 7 days   Lab Units 05/24/24  0114 05/20/24  0109 05/19/24  0102   BILIRUBIN mg/dL 0.4 0.4 0.4   ALK PHOS U/L 216* 197* 163*   AST (SGOT) U/L 30 23 27   ALT (SGPT) U/L 22 11 11     Results from last 7 days   Lab Units 05/20/24  0109 05/19/24  0102   MAGNESIUM mg/dL 2.1 2.0     Results from last 7 days   Lab Units 05/19/24  0102   INR  1.31*             Imaging Results (Last 24 Hours)       Procedure Component Value Units Date/Time    CT Angiogram Heart With 3D Image [[id number]] Collected: 05/24/24 1621     Updated: 05/24/24 1629    Narrative:      PROCEDURE: CT ANGIOGRAM HEART W 3D IMAGE-     HISTORY: CAD screening, intermediate CAD risk, not treadmill candidate     COMPARISON: None .     TECHNIQUE: Multiple axial CT images were obtained from the aortic arch  branch vessels through the upper abdomen following the administration of  Isovue 300 per the CTA protocol. 3D MIP images were reconstructed from  the original axial data set. This study was performed with techniques to  keep radiation doses as low as reasonably achievable (ALARA).  Individualized dose reduction techniques using automated exposure  control or adjustment of mA and/or kV according to the patient size were  employed.     FINDINGS:     Coronary Calcium Score (Agatson):  LM:    133  RCA: 1098  LAD: 636  LCX: 1215  Other: 1254     TOTAL: 4651     Coronary Angiography:     Left Main: The left main originates from the left coronary cusp. It is  normal in caliber. There is calcific plaque within the left main which  produces a 50-70% stenosis (CAD RADS 3)..     Left Anterior Descending:  There is diffuse calcific plaque in the LAD  which limits evaluation. Note is made of a prior venous bypass graft  which appears occluded. The patient is status post left internal mammary  bypass of the LAD with patency of the LIMA.     Left Circumflex: The circumflex is diffusely atherosclerotic which  limits evaluation. The degree of stenosis is difficult to determine.     Right Coronary Artery: The RCA is diffusely atherosclerotic and the  degree of stenosis is difficult to determine.     Left Atrium: The left atrium is dilated. No filling defects are seen in  the left atrium.     Left Ventricle: The ventricular cavity size is mildly dilated. There are  no stigmata of prior infarction. There are no filling defects.     Pulmonary Arteries: Normal with no filling defects.     Pulmonary Veins: Normal venous drainage.     Pericardium: Normal thickness with no significant effusion.     Cardiac Valves: There is no thickening or calcifications involving the  aortic or mitral valves.     Aorta: Normal in caliber with no evidence of dissection.     Lungs and Mediastinum: There are small bilateral pleural effusions and  basilar atelectasis.       Impression:      1. Diffuse atherosclerotic disease with a coronary artery calcium score  of 4651 which limits evaluation of the coronary arteries. The patient is  status post prior venous bypass graft which is occluded. The patient is  also status post left internal mammary bypass which is patent.  2. Mild cardiomegaly.  3. Bilateral pleural effusions and basilar atelectasis.     This report was finalized on 5/24/2024 4:27 PM by Kayleigh New M.D..                 Medications:  acetaminophen, 650 mg, Oral, Once per day on Tuesday Thursday Saturday  ammonium lactate, 1 Application, Topical, Nightly  aspirin, 81 mg, Oral, Daily  atorvastatin, 20 mg, Oral, Daily  cetirizine, 5 mg, Oral, Daily  empagliflozin, 10 mg, Oral, Daily  erythromycin, 1 Application, Left Eye,  BID  famotidine, 20 mg, Oral, Q48H  gabapentin, 100 mg, Oral, Nightly  heparin (porcine), 5,000 Units, Subcutaneous, Q8H  insulin lispro, 2-7 Units, Subcutaneous, 4x Daily AC & at Bedtime  levothyroxine, 75 mcg, Oral, Daily  Lidocaine, 1 patch, Transdermal, Q PM  lubrisoft, 1 Application, Topical, Daily  megestrol, 40 mg, Oral, TID With Meals  metoprolol succinate XL, 12.5 mg, Oral, Nightly  midodrine, 5 mg, Oral, TID AC  multivitamin with minerals, 1 tablet, Oral, Daily  sertraline, 50 mg, Oral, Nightly  sevelamer, 2,400 mg, Oral, TID With Meals  sodium chloride, 10 mL, Intravenous, Q12H  Vancomycin Pharmacy Intermittent/Pulse Dosing, , Does not apply, Daily               Assessment & Plan   Septic shock: Likely 2/2 MRSA bacteremia with suspected infected HD catheter. Afebrile. Previously required Levophed but BP now stable off vasopressor support. WBC mildly elevated today. Lactate has normalized. Cultures as below. Cont Vanc. Decrease midodrine. Follow cultures and repeat labs in the AM.     MRSA bacteremia: Likely 2/2 infected HD catheter which has been removed. TTE revealed an EF of 51-55% but valves not well visualized. Blood cultures from 5/15 and 5/17 positive. Cultures from 5/18 and 5/19 with NGTD. S/P GAVI with no evidence of vegetations. Cont Vanc through 6/15. Cont to follow cultures. ID input appreciated.     Acute diastolic CHF: Echo reveals normal EF. Improved with HD. Cont to monitor volume status.     Acute hypoxic respiratory failure: Likely 2/2 above. No evidence of PE or pneumonia on CT. Currently stable on 2L's NC.     Newly diagnosed cardiomyopathy: TTE revealed EF of 51-55% as above. However, GAVI reveals EF of 30%. Ischemic workup with CTA coronary per cards which revealed diffuse atherosclerotic disease with a coronary artery calcium score of 4651. LifeVest delivered but pt has been refusing and reinforced importance of compliance. Attempt to optimize GDMT but limited by BP and ESRD on HD.  Cont Toprol-XL and Jardiance. HD per nephrology. Cardiology planning LHC on 5/28. Cardiology input appreciated.     NSTEMI: Suspected type II in the setting of above. Normal EF on TTE but reduced EF of 30% noted on GAVI.  Ischemic eval per cards. Cont ASA and statin. Cardiology input appreciated.     ESRD on HD: Tunneled HD catheter removed. Tolerating HD via LUE AV fistula. Management per nephrology with input appreciated.     Paroxysmal Afib: Currently rate controlled. Cont metoprolol. Monitor on telemetry.     DM II, insulin dependent: Episodes of hypoglycemia improved. Cont sliding scale insulin.  Cont to monitor.     PVD: Cont ASA and statin.     DVT PPX: SQ heparin    Discussed with Dr. Garcia.       Disposition Likely return to SNF pending additional cardiac eval with LHC planned for 5/28.    Donnell Hines DO  05/25/24  16:28 EDT

## 2024-05-25 NOTE — PLAN OF CARE
Goal Outcome Evaluation:           Progress: no change  Outcome Evaluation: Pt has been resting in bed through the night. VSS, 2L NC currently. No complaints of pain from the pt. No acute changes or concerns at this time.

## 2024-05-25 NOTE — PROGRESS NOTES
"Nephrology Progress Note  Chief complaint, nausea and vomiting    Subjective     05/24/2024 patient seen on dialysis stable denies any shortness of breath    Objective       Vital signs :     Temp:  [97.4 °F (36.3 °C)-98.2 °F (36.8 °C)] 98.2 °F (36.8 °C)  Heart Rate:  [59-80] 75  Resp:  [14-18] 14  BP: (128-173)/(63-76) 128/76    Intake/Output                         05/23/24 0701 - 05/24/24 0700 05/24/24 0701 - 05/25/24 0700     0812-3803 5012-0336 Total 2041-4086 4053-6939 Total                 Intake    P.O.  260  240 500  0  60 60    Total Intake 260 240 500 0 60 60       Output    Urine  --  100 100  250  55 305    Dialysis  2000  -- 2000  --  -- --    Total Output 2000 100 2100 250 55 305             Physical Exam:    General Appearance : Not in acute distress  Lungs : Bilateral decreased intensity of breath sounds  Heart :  regular rhythm & normal rate, normal S1, S2 and no murmur, no rub  Abdomen : Soft, nondistended  Extremities : No edema,   Neurologic :   No apparent focal neurological deficit, unable to assess fully  Brachiocephalic AV fistula, good bruit and thrill      Laboratory Data :     Albumin Albumin   Date Value Ref Range Status   05/24/2024 3.4 (L) 3.5 - 5.2 g/dL Final      Magnesium No results found for: \"MG\"         PTH               No results found for: \"PTH\"    CBC and coagulation:  Results from last 7 days   Lab Units 05/25/24  0033 05/24/24  0113 05/23/24  0153 05/20/24  0109 05/19/24  0102   LACTATE mmol/L  --   --   --   --  1.0   CRP mg/dL  --   --   --   --  15.62*   WBC 10*3/mm3 12.64* 9.98 9.24   < > 11.08*   HEMOGLOBIN g/dL 9.9* 9.8* 9.2*   < > 8.7*   HEMATOCRIT % 31.4* 30.6* 28.4*   < > 26.3*   MCV fL 95.4 93.9 93.4   < > 90.7   MCHC g/dL 31.5 32.0 32.4   < > 33.1   PLATELETS 10*3/mm3 224 203 189   < > 97*   INR   --   --   --   --  1.31*    < > = values in this interval not displayed.     Acid/base balance:        Renal and electrolytes:    Results from last 7 days   Lab Units " 05/25/24  0033 05/24/24  0114 05/23/24  0153 05/22/24  0036 05/21/24  0321 05/20/24  0109 05/19/24  0102   SODIUM mmol/L 137 137 134* 132* 131* 129* 127*   POTASSIUM mmol/L 4.4 3.8 4.5 4.1 4.1 3.9 3.8   MAGNESIUM mg/dL  --   --   --   --   --  2.1 2.0   CHLORIDE mmol/L 93* 93* 93* 91* 90* 87* 86*   CO2 mmol/L 28.2 31.3* 26.5 27.7 25.1 25.9 27.4   BUN mg/dL 38* 27* 55* 40* 70* 56* 45*   CREATININE mg/dL 7.28* 5.14* 8.48* 6.82* 10.34* 8.29* 7.18*   CALCIUM mg/dL 8.8 8.5* 8.3* 8.2* 8.1* 7.9* 8.1*   PHOSPHORUS mg/dL  --   --   --   --   --  4.5 3.6     Estimated Creatinine Clearance: 10.6 mL/min (A) (by C-G formula based on SCr of 7.28 mg/dL (H)).  @GFRCG:3@   Liver and pancreatic function:  Results from last 7 days   Lab Units 05/24/24  0114 05/20/24  0109 05/19/24  0102   ALBUMIN g/dL 3.4* 3.1* 3.1*   BILIRUBIN mg/dL 0.4 0.4 0.4   ALK PHOS U/L 216* 197* 163*   AST (SGOT) U/L 30 23 27   ALT (SGPT) U/L 22 11 11         Cardiac:      Liver and pancreatic function:  Results from last 7 days   Lab Units 05/24/24  0114 05/20/24  0109 05/19/24  0102   ALBUMIN g/dL 3.4* 3.1* 3.1*   BILIRUBIN mg/dL 0.4 0.4 0.4   ALK PHOS U/L 216* 197* 163*   AST (SGOT) U/L 30 23 27   ALT (SGPT) U/L 22 11 11       Medications :     acetaminophen, 650 mg, Oral, Once per day on Tuesday Thursday Saturday  ammonium lactate, 1 Application, Topical, Nightly  aspirin, 81 mg, Oral, Daily  atorvastatin, 20 mg, Oral, Daily  cetirizine, 5 mg, Oral, Daily  empagliflozin, 10 mg, Oral, Daily  erythromycin, 1 Application, Left Eye, BID  famotidine, 20 mg, Oral, Q48H  gabapentin, 100 mg, Oral, Nightly  heparin (porcine), 5,000 Units, Subcutaneous, Q8H  insulin lispro, 2-7 Units, Subcutaneous, 4x Daily AC & at Bedtime  levothyroxine, 75 mcg, Oral, Daily  Lidocaine, 1 patch, Transdermal, Q PM  lubrisoft, 1 Application, Topical, Daily  megestrol, 40 mg, Oral, TID With Meals  metoprolol succinate XL, 12.5 mg, Oral, Nightly  midodrine, 5 mg, Oral, TID  AC  multivitamin with minerals, 1 tablet, Oral, Daily  sertraline, 50 mg, Oral, Nightly  sevelamer, 2,400 mg, Oral, TID With Meals  sodium chloride, 10 mL, Intravenous, Q12H               Assessment & Plan     05/25/2024 reviewed and updated    -ESKD on intermittent dialysis  - Anemia  - Persistent nausea and vomiting  - Type 2 diabetes mellitus  - Essential hypertension  - MRSA bacteremia  - Left foot wounds    05/24/2024   Dr Saucedo  patient had uneventful dialysis done yesterday will continue on intermittent dialysis Tuesdays Thursdays and Saturdays.  Patient is being treated with vancomycin that can be administered in outpatient dialysis dialysis    05/25/2024  Patient seen on dialysis stable tolerating it well is on antibiotic vancomycin which we can continue as outpatient if plans for discharge please call nephrology    Please avoid nephrotoxic medication and pharmacy to adjust the medication according to the GFR      Plan of care was discussed with the patient, understood verbalized agreed      S Jonathan Soto MD  05/25/24  09:34 EDT

## 2024-05-25 NOTE — PROGRESS NOTES
LOS: 9 days     Name: Kennedy Prescott  Age/Sex: 73 y.o. male  :  1950        PCP: Jag Guillaume MD    Principal Problem:    SIRS (systemic inflammatory response syndrome)  Active Problems:    Sepsis        Following for: acute HFrEF    Interval history:resting in bed actively getting dialysis.  Denies any chest pains overnight.  CTA showed diffuse atherosclerotic disease.    Subjective     ROS    Vital Signs  Vitals:    24 0300 24 0500 24 0617 24 0750   BP: 144/69  136/65 128/76   BP Location: Right arm  Right arm Right arm   Patient Position: Lying  Lying Lying   Pulse: 59  69 75   Resp: 18  18 14   Temp: 97.8 °F (36.6 °C)  98 °F (36.7 °C) 98.2 °F (36.8 °C)   TempSrc: Oral  Oral Temporal   SpO2:       Weight:  101 kg (222 lb 14.2 oz)     Height:         Body mass index is 31.09 kg/m².      Intake/Output Summary (Last 24 hours) at 2024 1306  Last data filed at 2024 2152  Gross per 24 hour   Intake 60 ml   Output 305 ml   Net -245 ml       Constitutional:       General: Not in acute distress.     Appearance: Healthy appearance. Well-developed and not in distress. Not diaphoretic.      Comments: Patient was actively getting dialysis during exam   Eyes:      Conjunctiva/sclera: Conjunctivae normal.      Pupils: Pupils are equal, round, and reactive to light.   HENT:      Head: Normocephalic and atraumatic.   Neck:      Vascular: No carotid bruit or JVD.   Pulmonary:      Effort: Pulmonary effort is normal. No respiratory distress.      Breath sounds: Normal breath sounds.   Cardiovascular:      Normal rate. Regular rhythm.   Edema:     Peripheral edema absent.   Skin:     General: Skin is cool.   Neurological:      Mental Status: Alert, oriented to person, place, and time and oriented to person, place and time.         Results Review:     Results from last 7 days   Lab Units 24  0033 24  0113 24  0153 24  0036 24  0321 24  0109  05/19/24  0102   WBC 10*3/mm3 12.64* 9.98 9.24 10.41 12.12* 13.00* 11.08*   HEMOGLOBIN g/dL 9.9* 9.8* 9.2* 8.6* 8.5* 8.5* 8.7*   PLATELETS 10*3/mm3 224 203 189 159 140 116* 97*     Results from last 7 days   Lab Units 05/25/24  0033 05/24/24  0114 05/23/24  0153 05/22/24  0036 05/21/24  0321 05/20/24  0109 05/19/24  0102   SODIUM mmol/L 137 137 134* 132* 131* 129* 127*   POTASSIUM mmol/L 4.4 3.8 4.5 4.1 4.1 3.9 3.8   CHLORIDE mmol/L 93* 93* 93* 91* 90* 87* 86*   CO2 mmol/L 28.2 31.3* 26.5 27.7 25.1 25.9 27.4   BUN mg/dL 38* 27* 55* 40* 70* 56* 45*   CREATININE mg/dL 7.28* 5.14* 8.48* 6.82* 10.34* 8.29* 7.18*   CALCIUM mg/dL 8.8 8.5* 8.3* 8.2* 8.1* 7.9* 8.1*   GLUCOSE mg/dL 165* 225* 213* 204* 99 128* 96   ALT (SGPT) U/L  --  22  --   --   --  11 11   AST (SGOT) U/L  --  30  --   --   --  23 27           Results from last 7 days   Lab Units 05/19/24  0102   INR  1.31*       I reviewed the patient's new clinical results.  I reviewed the patient's new imaging results and agree with the interpretation.  I personally viewed and interpreted the patient's EKG/Telemetry data    Medication Review:   acetaminophen, 650 mg, Oral, Once per day on Tuesday Thursday Saturday  ammonium lactate, 1 Application, Topical, Nightly  aspirin, 81 mg, Oral, Daily  atorvastatin, 20 mg, Oral, Daily  cetirizine, 5 mg, Oral, Daily  empagliflozin, 10 mg, Oral, Daily  erythromycin, 1 Application, Left Eye, BID  famotidine, 20 mg, Oral, Q48H  gabapentin, 100 mg, Oral, Nightly  heparin (porcine), 5,000 Units, Subcutaneous, Q8H  insulin lispro, 2-7 Units, Subcutaneous, 4x Daily AC & at Bedtime  levothyroxine, 75 mcg, Oral, Daily  Lidocaine, 1 patch, Transdermal, Q PM  lubrisoft, 1 Application, Topical, Daily  megestrol, 40 mg, Oral, TID With Meals  metoprolol succinate XL, 12.5 mg, Oral, Nightly  midodrine, 5 mg, Oral, TID AC  multivitamin with minerals, 1 tablet, Oral, Daily  sertraline, 50 mg, Oral, Nightly  sevelamer, 2,400 mg, Oral, TID With  Meals  sodium chloride, 10 mL, Intravenous, Q12H  vancomycin, 1,000 mg, Intravenous, Once  Vancomycin Pharmacy Intermittent/Pulse Dosing, , Does not apply, Daily           Assessment:  New HFrEF  ESRD on HD        Recommendations:  Plan to have [facility] Tuesday.   Continue aspirin, lipitor, metoprolol.     I discussed the patients findings and my recommendations with patient and family    Simone Spear PA-C  05/25/24  13:06 EDT  Electronically signed by Simone Spear PA-C, 05/25/24, 1:06 PM EDT.     Patient seen and examined on rounds.  Chart reviewed.  Agree with physician assistant charting and assessment and plan  On exam patient is laying in bed in no distress, sensorimotor system are intact.  Patient undergoing dialysis at time of examination  Chest no wheezing or crackles  Regular rate rhythm, normal S1-S2  Abdomen nontender nondistended  No edema  Assessment and plan  #1 cardiac.  Patient with new onset of decreased ejection fraction on GAVI.  History of coronary artery disease with bypass surgery in the past.  Will schedule for cath for Tuesday.  Patient agreeable for same.  Will discuss again about putting LifeVest on patient prior to discharge.  #2 cardiomyopathy.  Patient is on beta-blockers and empagliflozin.  Can consider adding angiotensin receptor blocker as tolerated  #3 renal.  Patient is on hemodialysis.    .Electronically signed by Tommy Garcia MD, 05/25/24, 4:40 PM EDT.

## 2024-05-25 NOTE — PLAN OF CARE
Goal Outcome Evaluation:  Plan of Care Reviewed With: patient        Progress: no change  Outcome Evaluation: Patient resting in bed at this time. VSS on 2L NC currently. No acute changes noted at this time. Will continue with POC.

## 2024-05-26 LAB
ALBUMIN SERPL-MCNC: 3.3 G/DL (ref 3.5–5.2)
ALBUMIN/GLOB SERPL: 1.2 G/DL
ALP SERPL-CCNC: 176 U/L (ref 39–117)
ALT SERPL W P-5'-P-CCNC: 39 U/L (ref 1–41)
ANION GAP SERPL CALCULATED.3IONS-SCNC: 13.7 MMOL/L (ref 5–15)
AST SERPL-CCNC: 45 U/L (ref 1–40)
BASOPHILS # BLD AUTO: 0.06 10*3/MM3 (ref 0–0.2)
BASOPHILS NFR BLD AUTO: 0.5 % (ref 0–1.5)
BILIRUB SERPL-MCNC: 0.4 MG/DL (ref 0–1.2)
BUN SERPL-MCNC: 25 MG/DL (ref 8–23)
BUN/CREAT SERPL: 4.8 (ref 7–25)
CALCIUM SPEC-SCNC: 8.7 MG/DL (ref 8.6–10.5)
CHLORIDE SERPL-SCNC: 94 MMOL/L (ref 98–107)
CO2 SERPL-SCNC: 31.3 MMOL/L (ref 22–29)
CREAT SERPL-MCNC: 5.2 MG/DL (ref 0.76–1.27)
DEPRECATED RDW RBC AUTO: 50.6 FL (ref 37–54)
EGFRCR SERPLBLD CKD-EPI 2021: 10.9 ML/MIN/1.73
EOSINOPHIL # BLD AUTO: 0.12 10*3/MM3 (ref 0–0.4)
EOSINOPHIL NFR BLD AUTO: 1 % (ref 0.3–6.2)
ERYTHROCYTE [DISTWIDTH] IN BLOOD BY AUTOMATED COUNT: 14.7 % (ref 12.3–15.4)
GLOBULIN UR ELPH-MCNC: 2.8 GM/DL
GLUCOSE BLDC GLUCOMTR-MCNC: 188 MG/DL (ref 70–130)
GLUCOSE BLDC GLUCOMTR-MCNC: 200 MG/DL (ref 70–130)
GLUCOSE BLDC GLUCOMTR-MCNC: 228 MG/DL (ref 70–130)
GLUCOSE BLDC GLUCOMTR-MCNC: 264 MG/DL (ref 70–130)
GLUCOSE SERPL-MCNC: 226 MG/DL (ref 65–99)
HCT VFR BLD AUTO: 30.4 % (ref 37.5–51)
HGB BLD-MCNC: 9.4 G/DL (ref 13–17.7)
IMM GRANULOCYTES # BLD AUTO: 0.1 10*3/MM3 (ref 0–0.05)
IMM GRANULOCYTES NFR BLD AUTO: 0.8 % (ref 0–0.5)
LYMPHOCYTES # BLD AUTO: 1.23 10*3/MM3 (ref 0.7–3.1)
LYMPHOCYTES NFR BLD AUTO: 9.9 % (ref 19.6–45.3)
MCH RBC QN AUTO: 29.9 PG (ref 26.6–33)
MCHC RBC AUTO-ENTMCNC: 30.9 G/DL (ref 31.5–35.7)
MCV RBC AUTO: 96.8 FL (ref 79–97)
MONOCYTES # BLD AUTO: 1.27 10*3/MM3 (ref 0.1–0.9)
MONOCYTES NFR BLD AUTO: 10.2 % (ref 5–12)
NEUTROPHILS NFR BLD AUTO: 77.6 % (ref 42.7–76)
NEUTROPHILS NFR BLD AUTO: 9.63 10*3/MM3 (ref 1.7–7)
NRBC BLD AUTO-RTO: 0 /100 WBC (ref 0–0.2)
PLATELET # BLD AUTO: 204 10*3/MM3 (ref 140–450)
PMV BLD AUTO: 9.8 FL (ref 6–12)
POTASSIUM SERPL-SCNC: 4 MMOL/L (ref 3.5–5.2)
PROT SERPL-MCNC: 6.1 G/DL (ref 6–8.5)
RBC # BLD AUTO: 3.14 10*6/MM3 (ref 4.14–5.8)
SODIUM SERPL-SCNC: 139 MMOL/L (ref 136–145)
WBC NRBC COR # BLD AUTO: 12.41 10*3/MM3 (ref 3.4–10.8)

## 2024-05-26 PROCEDURE — 80053 COMPREHEN METABOLIC PANEL: CPT | Performed by: INTERNAL MEDICINE

## 2024-05-26 PROCEDURE — 63710000001 INSULIN LISPRO (HUMAN) PER 5 UNITS: Performed by: INTERNAL MEDICINE

## 2024-05-26 PROCEDURE — 82948 REAGENT STRIP/BLOOD GLUCOSE: CPT

## 2024-05-26 PROCEDURE — 25010000002 HEPARIN (PORCINE) PER 1000 UNITS: Performed by: INTERNAL MEDICINE

## 2024-05-26 PROCEDURE — 85025 COMPLETE CBC W/AUTO DIFF WBC: CPT | Performed by: INTERNAL MEDICINE

## 2024-05-26 PROCEDURE — 99232 SBSQ HOSP IP/OBS MODERATE 35: CPT | Performed by: INTERNAL MEDICINE

## 2024-05-26 PROCEDURE — 99232 SBSQ HOSP IP/OBS MODERATE 35: CPT | Performed by: NURSE PRACTITIONER

## 2024-05-26 RX ADMIN — Medication 1 TABLET: at 08:28

## 2024-05-26 RX ADMIN — ERYTHROMYCIN 1 APPLICATION: 5 OINTMENT OPHTHALMIC at 08:28

## 2024-05-26 RX ADMIN — METOPROLOL SUCCINATE 12.5 MG: 25 TABLET, FILM COATED, EXTENDED RELEASE ORAL at 21:59

## 2024-05-26 RX ADMIN — HEPARIN SODIUM 5000 UNITS: 5000 INJECTION INTRAVENOUS; SUBCUTANEOUS at 15:17

## 2024-05-26 RX ADMIN — MEGESTROL ACETATE 40 MG: 40 TABLET ORAL at 08:36

## 2024-05-26 RX ADMIN — SEVELAMER CARBONATE 2400 MG: 800 TABLET, FILM COATED ORAL at 08:27

## 2024-05-26 RX ADMIN — HEPARIN SODIUM 5000 UNITS: 5000 INJECTION INTRAVENOUS; SUBCUTANEOUS at 22:16

## 2024-05-26 RX ADMIN — MEGESTROL ACETATE 40 MG: 40 TABLET ORAL at 11:34

## 2024-05-26 RX ADMIN — ACETAMINOPHEN 500 MG: 500 TABLET ORAL at 05:54

## 2024-05-26 RX ADMIN — CETIRIZINE HYDROCHLORIDE 5 MG: 10 TABLET, FILM COATED ORAL at 08:28

## 2024-05-26 RX ADMIN — Medication 10 ML: at 21:59

## 2024-05-26 RX ADMIN — MEGESTROL ACETATE 40 MG: 40 TABLET ORAL at 17:35

## 2024-05-26 RX ADMIN — ERYTHROMYCIN 1 APPLICATION: 5 OINTMENT OPHTHALMIC at 21:59

## 2024-05-26 RX ADMIN — Medication 1 APPLICATION: at 08:28

## 2024-05-26 RX ADMIN — SERTRALINE 50 MG: 50 TABLET, FILM COATED ORAL at 21:30

## 2024-05-26 RX ADMIN — ATORVASTATIN CALCIUM 20 MG: 20 TABLET, FILM COATED ORAL at 08:28

## 2024-05-26 RX ADMIN — INSULIN LISPRO 3 UNITS: 100 INJECTION, SOLUTION INTRAVENOUS; SUBCUTANEOUS at 11:34

## 2024-05-26 RX ADMIN — SEVELAMER CARBONATE 2400 MG: 800 TABLET, FILM COATED ORAL at 17:35

## 2024-05-26 RX ADMIN — INSULIN LISPRO 3 UNITS: 100 INJECTION, SOLUTION INTRAVENOUS; SUBCUTANEOUS at 08:36

## 2024-05-26 RX ADMIN — LEVOTHYROXINE SODIUM 75 MCG: 0.07 TABLET ORAL at 08:28

## 2024-05-26 RX ADMIN — INSULIN LISPRO 2 UNITS: 100 INJECTION, SOLUTION INTRAVENOUS; SUBCUTANEOUS at 21:30

## 2024-05-26 RX ADMIN — EMPAGLIFLOZIN 10 MG: 10 TABLET, FILM COATED ORAL at 08:28

## 2024-05-26 RX ADMIN — ASPIRIN 81 MG: 81 TABLET, COATED ORAL at 08:28

## 2024-05-26 RX ADMIN — AMMONIUM LACTATE 1 APPLICATION: 120 CREAM TOPICAL at 21:59

## 2024-05-26 RX ADMIN — HEPARIN SODIUM 5000 UNITS: 5000 INJECTION INTRAVENOUS; SUBCUTANEOUS at 05:54

## 2024-05-26 RX ADMIN — SEVELAMER CARBONATE 2400 MG: 800 TABLET, FILM COATED ORAL at 11:34

## 2024-05-26 RX ADMIN — CARBIDOPA AND LEVODOPA 5 MG: 50; 200 TABLET, EXTENDED RELEASE ORAL at 08:28

## 2024-05-26 RX ADMIN — LIDOCAINE 1 PATCH: 4 PATCH TOPICAL at 17:35

## 2024-05-26 RX ADMIN — GABAPENTIN 100 MG: 100 CAPSULE ORAL at 21:59

## 2024-05-26 RX ADMIN — INSULIN LISPRO 4 UNITS: 100 INJECTION, SOLUTION INTRAVENOUS; SUBCUTANEOUS at 17:34

## 2024-05-26 NOTE — PROGRESS NOTES
LOS: 10 days     Name: Kennedy Prescott  Age/Sex: 73 y.o. male  :  1950        PCP: Jag Guillaume MD    Principal Problem:    SIRS (systemic inflammatory response syndrome)  Active Problems:    Sepsis          Following for: New HFrEF        Interval history: Patient resting comfortably in bed.  Denies any chest pains or worsening shortness of breath from baseline.    Subjective     ROS    Vital Signs  Vitals:    24 0300 24 0500 24 0724 24 1200   BP: 130/62  139/63 154/65   BP Location: Right arm  Right arm Right arm   Patient Position: Lying  Lying Lying   Pulse: 75  69 79   Resp: 16  17 18   Temp: 98.6 °F (37 °C)  98.5 °F (36.9 °C) 98.1 °F (36.7 °C)   TempSrc: Oral  Oral Oral   SpO2:   93% 94%   Weight:  99.5 kg (219 lb 5.7 oz)     Height:         Body mass index is 30.59 kg/m².      Intake/Output Summary (Last 24 hours) at 2024 1313  Last data filed at 2024 0830  Gross per 24 hour   Intake 737 ml   Output 25 ml   Net 712 ml       Constitutional:       General: Not in acute distress.     Appearance: Healthy appearance. Well-developed and not in distress. Not diaphoretic.   Eyes:      Conjunctiva/sclera: Conjunctivae normal.      Pupils: Pupils are equal, round, and reactive to light.   HENT:      Head: Normocephalic and atraumatic.   Neck:      Vascular: No carotid bruit or JVD.   Pulmonary:      Effort: Pulmonary effort is normal. No respiratory distress.      Breath sounds: Normal breath sounds.   Cardiovascular:      Normal rate. Regular rhythm.   Edema:     Peripheral edema absent.   Skin:     General: Skin is cool.   Neurological:      Mental Status: Alert, oriented to person, place, and time and oriented to person, place and time.         Results Review:     Results from last 7 days   Lab Units 24  0110 24  0033 24  0113 24  0153 24  0036 24  0321 24  0109   WBC 10*3/mm3 12.41* 12.64* 9.98 9.24 10.41 12.12* 13.00*    HEMOGLOBIN g/dL 9.4* 9.9* 9.8* 9.2* 8.6* 8.5* 8.5*   PLATELETS 10*3/mm3 204 224 203 189 159 140 116*     Results from last 7 days   Lab Units 05/26/24  0110 05/25/24  0033 05/24/24  0114 05/23/24  0153 05/22/24  0036 05/21/24  0321 05/20/24  0109   SODIUM mmol/L 139 137 137 134* 132* 131* 129*   POTASSIUM mmol/L 4.0 4.4 3.8 4.5 4.1 4.1 3.9   CHLORIDE mmol/L 94* 93* 93* 93* 91* 90* 87*   CO2 mmol/L 31.3* 28.2 31.3* 26.5 27.7 25.1 25.9   BUN mg/dL 25* 38* 27* 55* 40* 70* 56*   CREATININE mg/dL 5.20* 7.28* 5.14* 8.48* 6.82* 10.34* 8.29*   CALCIUM mg/dL 8.7 8.8 8.5* 8.3* 8.2* 8.1* 7.9*   GLUCOSE mg/dL 226* 165* 225* 213* 204* 99 128*   ALT (SGPT) U/L 39  --  22  --   --   --  11   AST (SGOT) U/L 45*  --  30  --   --   --  23                 I reviewed the patient's new clinical results.  I reviewed the patient's new imaging results and agree with the interpretation.  I personally viewed and interpreted the patient's EKG/Telemetry data    Medication Review:   acetaminophen, 650 mg, Oral, Once per day on Tuesday Thursday Saturday  ammonium lactate, 1 Application, Topical, Nightly  aspirin, 81 mg, Oral, Daily  atorvastatin, 20 mg, Oral, Daily  cetirizine, 5 mg, Oral, Daily  empagliflozin, 10 mg, Oral, Daily  erythromycin, 1 Application, Left Eye, BID  famotidine, 20 mg, Oral, Q48H  gabapentin, 100 mg, Oral, Nightly  heparin (porcine), 5,000 Units, Subcutaneous, Q8H  insulin lispro, 2-7 Units, Subcutaneous, 4x Daily AC & at Bedtime  levothyroxine, 75 mcg, Oral, Daily  Lidocaine, 1 patch, Transdermal, Q PM  lubrisoft, 1 Application, Topical, Daily  megestrol, 40 mg, Oral, TID With Meals  metoprolol succinate XL, 12.5 mg, Oral, Nightly  multivitamin with minerals, 1 tablet, Oral, Daily  sertraline, 50 mg, Oral, Nightly  sevelamer, 2,400 mg, Oral, TID With Meals  sodium chloride, 10 mL, Intravenous, Q12H  Vancomycin Pharmacy Intermittent/Pulse Dosing, , Does not apply, Daily           Assessment:  New HFrEF,  compensated  End-stage renal disease on hemodialysis      Recommendations:  Plan is to proceed with left heart catheterization Tuesday.  Continue current GDMT.    I discussed the patients findings and my recommendations with patient and family    Simone Spear PA-C  05/26/24  13:13 EDT  Electronically signed by Simone Spear PA-C, 05/26/24, 1:13 PM EDT.     Patient seen and examined on rounds.  Chart reviewed.  Agree with assessment and plan and patient charting by ELICIA    Patient laying in bed in no distress, sensorimotor system are intact  Chest no wheezing or crackles  Regular rate rhythm, normal S1-S2  Abdomen nontender nondistended bowel sounds present  No edema  Assessment and plan  #1 Cardiac. patient with history of coronary disease with bypass surgery in the past.  New onset of decreased ejection fraction.  CT abnormal.  Patient agreeable for cath.  Will proceed with same on Tuesday.  Discussed about LifeVest.  Patient does not want to wear it.  It is available and present in patient's room if he wants to wait it.  Will follow closely and clinically  .Electronically signed by Tommy Garcia MD, 05/26/24, 3:07 PM EDT.

## 2024-05-26 NOTE — PLAN OF CARE
Goal Outcome Evaluation:  Plan of Care Reviewed With: patient           Outcome Evaluation: has rested well today vss c/o pain in middle back is now wearing his life vest and is scheduled for heart cath on tuesday no acute changes will continue with plan of care

## 2024-05-26 NOTE — PLAN OF CARE
Goal Outcome Evaluation:         Patient resting in bed during shift. VSS on room air. No complaints or concerns noted at this time. Will continue with plan of care.

## 2024-05-26 NOTE — PROGRESS NOTES
[facility] HOSPITALIST PROGRESS NOTE    Subjective     History:   Kennedy Prescott is a 73 y.o. male admitted on 5/15/2024 secondary to SIRS (systemic inflammatory response syndrome)     Procedures:   5/17/24: Removal of tunneled HD catheter   5/22/24: GAVI    Left ventricular systolic function is moderately decreased. Estimated left ventricular EF = 35%    No Vegetations noted on any valve    CC: Follow up sepsis, bacteremia     Patient seen and examined with BHANU Vaz. Awake and alert. Appears to be resting comfortably during my encounter. No reported CP, dyspnea or palpitations. No reported vomiting. No acute events overnight per RN.     History taken from: patient, chart, and RN.      Objective     Vital Signs  Temp:  [98.1 °F (36.7 °C)-98.6 °F (37 °C)] 98.1 °F (36.7 °C)  Heart Rate:  [69-79] 79  Resp:  [16-18] 18  BP: (116-154)/(61-65) 154/65    Intake/Output Summary (Last 24 hours) at 5/26/2024 1516  Last data filed at 5/26/2024 0830  Gross per 24 hour   Intake 737 ml   Output 25 ml   Net 712 ml         Physical Exam: Unchanged from previous.   General:    Awake, alert, in no acute distress, chronically ill appearing   Heart:      Normal S1 and S2. Irregular.    Lungs:     Respirations regular, even and unlabored. Lungs clear to auscultation B/L. No wheezes, rales or rhonchi.   Abdomen:   Soft and nontender. No guarding, rebound tenderness or  organomegaly noted. Bowel sounds present x 4.   Extremities:  No clubbing, cyanosis or edema noted.      Results Review:    Results from last 7 days   Lab Units 05/26/24  0110 05/25/24  0033 05/24/24  0113 05/23/24  0153 05/22/24  0036 05/21/24  0321 05/20/24  0109   WBC 10*3/mm3 12.41* 12.64* 9.98 9.24 10.41 12.12* 13.00*   HEMOGLOBIN g/dL 9.4* 9.9* 9.8* 9.2* 8.6* 8.5* 8.5*   PLATELETS 10*3/mm3 204 224 203 189 159 140 116*     Results from last 7 days   Lab Units 05/26/24  0110 05/25/24  0033 05/24/24  0114 05/23/24  0153 05/22/24  0036 05/21/24  0321  05/20/24  0109   SODIUM mmol/L 139 137 137 134* 132* 131* 129*   POTASSIUM mmol/L 4.0 4.4 3.8 4.5 4.1 4.1 3.9   CHLORIDE mmol/L 94* 93* 93* 93* 91* 90* 87*   CO2 mmol/L 31.3* 28.2 31.3* 26.5 27.7 25.1 25.9   BUN mg/dL 25* 38* 27* 55* 40* 70* 56*   CREATININE mg/dL 5.20* 7.28* 5.14* 8.48* 6.82* 10.34* 8.29*   CALCIUM mg/dL 8.7 8.8 8.5* 8.3* 8.2* 8.1* 7.9*   GLUCOSE mg/dL 226* 165* 225* 213* 204* 99 128*     Results from last 7 days   Lab Units 05/26/24  0110 05/24/24  0114 05/20/24  0109   BILIRUBIN mg/dL 0.4 0.4 0.4   ALK PHOS U/L 176* 216* 197*   AST (SGOT) U/L 45* 30 23   ALT (SGPT) U/L 39 22 11     Results from last 7 days   Lab Units 05/20/24  0109   MAGNESIUM mg/dL 2.1                   Imaging Results (Last 24 Hours)       ** No results found for the last 24 hours. **              Medications:  acetaminophen, 650 mg, Oral, Once per day on Tuesday Thursday Saturday  ammonium lactate, 1 Application, Topical, Nightly  aspirin, 81 mg, Oral, Daily  atorvastatin, 20 mg, Oral, Daily  cetirizine, 5 mg, Oral, Daily  empagliflozin, 10 mg, Oral, Daily  erythromycin, 1 Application, Left Eye, BID  famotidine, 20 mg, Oral, Q48H  gabapentin, 100 mg, Oral, Nightly  heparin (porcine), 5,000 Units, Subcutaneous, Q8H  insulin lispro, 2-7 Units, Subcutaneous, 4x Daily AC & at Bedtime  levothyroxine, 75 mcg, Oral, Daily  Lidocaine, 1 patch, Transdermal, Q PM  lubrisoft, 1 Application, Topical, Daily  megestrol, 40 mg, Oral, TID With Meals  metoprolol succinate XL, 12.5 mg, Oral, Nightly  multivitamin with minerals, 1 tablet, Oral, Daily  sertraline, 50 mg, Oral, Nightly  sevelamer, 2,400 mg, Oral, TID With Meals  sodium chloride, 10 mL, Intravenous, Q12H  Vancomycin Pharmacy Intermittent/Pulse Dosing, , Does not apply, Daily               Assessment & Plan   Septic shock: Likely 2/2 MRSA bacteremia with suspected infected HD catheter. Afebrile. Previously required Levophed but BP now stable off vasopressor support. WBC mildly  elevated  but stable today. Lactate has normalized. Cultures as below. Cont Vanc. Stop midodrine. Follow cultures and repeat labs in the AM.     MRSA bacteremia: Likely 2/2 infected HD catheter which has been removed. TTE revealed an EF of 51-55% but valves not well visualized. Blood cultures from 5/15 and 5/17 positive. Cultures from 5/18 and 5/19 with NGTD. S/P GAVI with no evidence of vegetations. Cont Vanc through 6/15. Cont to follow cultures. ID input appreciated.     Acute diastolic CHF: Echo reveals normal EF. Improved with HD. Cont to monitor volume status.     Acute hypoxic respiratory failure: Likely 2/2 above. No evidence of PE or pneumonia on CT. Currently stable on 2L's NC.     Newly diagnosed cardiomyopathy: TTE revealed EF of 51-55% as above. However, GAVI revealed EF of 30%. Ischemic workup with CTA coronary per cards which revealed diffuse atherosclerotic disease with a coronary artery calcium score of 4651. LifeVest delivered but pt has been refusing and reinforced importance of compliance again today. Attempt to optimize GDMT but limited by BP and ESRD on HD. Cont Toprol-XL and Jardiance. HD per nephrology. Cardiology planning LHC on 5/28. Cardiology input appreciated.     NSTEMI: Suspected type II in the setting of above. Normal EF on TTE but reduced EF of 30% noted on GAVI.  Ischemic eval per cards. Cont ASA and statin. Cardiology input appreciated.     ESRD on HD: Tunneled HD catheter removed. Tolerating HD via LUE AV fistula. Management per nephrology with input appreciated.     Paroxysmal Afib: Currently rate controlled. Cont metoprolol. Monitor on telemetry.     DM II, insulin dependent: Episodes of hypoglycemia improved. Cont sliding scale insulin.  Cont to monitor.     PVD: Cont ASA and statin.     DVT PPX: SQ heparin       Disposition Likely return to SNF pending additional cardiac eval with LHC planned for 5/28.    Donnell Hines DO  05/26/24  15:16 EDT

## 2024-05-26 NOTE — PROGRESS NOTES
"Nephrology Progress Note  Chief complaint, nausea and vomiting    Subjective     05/25/2024 patient seen on dialysis stable denies any shortness of breath    05/26/2024 patient is stable denies any complaints    Objective       Vital signs :     Temp:  [98.5 °F (36.9 °C)-98.6 °F (37 °C)] 98.5 °F (36.9 °C)  Heart Rate:  [69-79] 69  Resp:  [16-18] 17  BP: (116-142)/(61-63) 139/63    Intake/Output                         05/24/24 0701 - 05/25/24 0700 05/25/24 0701 - 05/26/24 0700     [id number] 5086-0542 Total 9341-8595 6015-3921 Total                 Intake    P.O.  0  60 60  620  60 680    I.V.  --  -- --  497  -- 497    Total Intake 0 60 60 1117 60 1177       Output    Urine  250  55 305  25  -- 25    Total Output 250 55 305 25 -- 25          05/26/2024 reviewed and examined      Physical Exam:    General Appearance : Not in acute distress  Lungs : Bilateral decreased intensity of breath sounds  Heart :  regular rhythm & normal rate, normal S1, S2 and no murmur, no rub  Abdomen : Soft, nondistended  Extremities : No edema,   Neurologic :   No apparent focal neurological deficit, unable to assess fully  Brachiocephalic AV fistula, good bruit and thrill      Laboratory Data :     Albumin Albumin   Date Value Ref Range Status   05/26/2024 3.3 (L) 3.5 - 5.2 g/dL Final   05/24/2024 3.4 (L) 3.5 - 5.2 g/dL Final      Magnesium No results found for: \"MG\"         PTH               No results found for: \"PTH\"    CBC and coagulation:  Results from last 7 days   Lab Units 05/26/24  0110 05/25/24  0033 05/24/24  0113   WBC 10*3/mm3 12.41* 12.64* 9.98   HEMOGLOBIN g/dL 9.4* 9.9* 9.8*   HEMATOCRIT % 30.4* 31.4* 30.6*   MCV fL 96.8 95.4 93.9   MCHC g/dL 30.9* 31.5 32.0   PLATELETS 10*3/mm3 204 224 203     Acid/base balance:        Renal and electrolytes:    Results from last 7 days   Lab Units 05/26/24  0110 05/25/24  0033 05/24/24  0114 05/23/24  0153 05/22/24  0036 05/21/24  0321 05/20/24  0109   SODIUM mmol/L 139 137 137 134* " 132*   < > 129*   POTASSIUM mmol/L 4.0 4.4 3.8 4.5 4.1   < > 3.9   MAGNESIUM mg/dL  --   --   --   --   --   --  2.1   CHLORIDE mmol/L 94* 93* 93* 93* 91*   < > 87*   CO2 mmol/L 31.3* 28.2 31.3* 26.5 27.7   < > 25.9   BUN mg/dL 25* 38* 27* 55* 40*   < > 56*   CREATININE mg/dL 5.20* 7.28* 5.14* 8.48* 6.82*   < > 8.29*   CALCIUM mg/dL 8.7 8.8 8.5* 8.3* 8.2*   < > 7.9*   PHOSPHORUS mg/dL  --   --   --   --   --   --  4.5    < > = values in this interval not displayed.     Estimated Creatinine Clearance: 14.8 mL/min (A) (by C-G formula based on SCr of 5.2 mg/dL (H)).  @GFRCG:3@   Liver and pancreatic function:  Results from last 7 days   Lab Units 05/26/24  0110 05/24/24  0114 05/20/24  0109   ALBUMIN g/dL 3.3* 3.4* 3.1*   BILIRUBIN mg/dL 0.4 0.4 0.4   ALK PHOS U/L 176* 216* 197*   AST (SGOT) U/L 45* 30 23   ALT (SGPT) U/L 39 22 11         Cardiac:      Liver and pancreatic function:  Results from last 7 days   Lab Units 05/26/24  0110 05/24/24  0114 05/20/24  0109   ALBUMIN g/dL 3.3* 3.4* 3.1*   BILIRUBIN mg/dL 0.4 0.4 0.4   ALK PHOS U/L 176* 216* 197*   AST (SGOT) U/L 45* 30 23   ALT (SGPT) U/L 39 22 11       Medications :     acetaminophen, 650 mg, Oral, Once per day on Tuesday Thursday Saturday  ammonium lactate, 1 Application, Topical, Nightly  aspirin, 81 mg, Oral, Daily  atorvastatin, 20 mg, Oral, Daily  cetirizine, 5 mg, Oral, Daily  empagliflozin, 10 mg, Oral, Daily  erythromycin, 1 Application, Left Eye, BID  famotidine, 20 mg, Oral, Q48H  gabapentin, 100 mg, Oral, Nightly  heparin (porcine), 5,000 Units, Subcutaneous, Q8H  insulin lispro, 2-7 Units, Subcutaneous, 4x Daily AC & at Bedtime  levothyroxine, 75 mcg, Oral, Daily  Lidocaine, 1 patch, Transdermal, Q PM  lubrisoft, 1 Application, Topical, Daily  megestrol, 40 mg, Oral, TID With Meals  metoprolol succinate XL, 12.5 mg, Oral, Nightly  midodrine, 5 mg, Oral, TID AC  multivitamin with minerals, 1 tablet, Oral, Daily  sertraline, 50 mg, Oral,  Nightly  sevelamer, 2,400 mg, Oral, TID With Meals  sodium chloride, 10 mL, Intravenous, Q12H  Vancomycin Pharmacy Intermittent/Pulse Dosing, , Does not apply, Daily               Assessment & Plan     05/25/2024 reviewed and updated  05/26/2024 reviewed and updated    -ESKD on intermittent dialysis  - Anemia  - Persistent nausea and vomiting  - Type 2 diabetes mellitus  - Essential hypertension  - MRSA bacteremia  - Left foot wounds    05/24/2024   Dr Saucedo  patient had uneventful dialysis done yesterday will continue on intermittent dialysis Tuesdays Thursdays and Saturdays.  Patient is being treated with vancomycin that can be administered in outpatient dialysis dialysis    05/25/2024  Patient seen on dialysis stable tolerating it well is on antibiotic vancomycin which we can continue as outpatient if plans for discharge please call nephrology    05/26/2024  Patient has been tolerating dialysis well we will continue Tuesday Thursday Saturday schedule of dialysis    Please avoid nephrotoxic medication and pharmacy to adjust the medication according to the GFR      Plan of care was discussed with the patient, understood verbalized agreed      S Jonathan Soto MD  05/26/24  08:58 EDT

## 2024-05-27 ENCOUNTER — APPOINTMENT (OUTPATIENT)
Dept: GENERAL RADIOLOGY | Facility: HOSPITAL | Age: 74
DRG: 321 | End: 2024-05-27
Payer: MEDICARE

## 2024-05-27 LAB
ANION GAP SERPL CALCULATED.3IONS-SCNC: 13.2 MMOL/L (ref 5–15)
BASOPHILS # BLD AUTO: 0.06 10*3/MM3 (ref 0–0.2)
BASOPHILS NFR BLD AUTO: 0.5 % (ref 0–1.5)
BUN SERPL-MCNC: 40 MG/DL (ref 8–23)
BUN/CREAT SERPL: 5.5 (ref 7–25)
CALCIUM SPEC-SCNC: 9.2 MG/DL (ref 8.6–10.5)
CHLORIDE SERPL-SCNC: 95 MMOL/L (ref 98–107)
CO2 SERPL-SCNC: 30.8 MMOL/L (ref 22–29)
CREAT SERPL-MCNC: 7.23 MG/DL (ref 0.76–1.27)
DEPRECATED RDW RBC AUTO: 52.4 FL (ref 37–54)
EGFRCR SERPLBLD CKD-EPI 2021: 7.3 ML/MIN/1.73
EOSINOPHIL # BLD AUTO: 0.13 10*3/MM3 (ref 0–0.4)
EOSINOPHIL NFR BLD AUTO: 1.1 % (ref 0.3–6.2)
ERYTHROCYTE [DISTWIDTH] IN BLOOD BY AUTOMATED COUNT: 14.7 % (ref 12.3–15.4)
GEN 5 2HR TROPONIN T REFLEX: 140 NG/L
GLUCOSE BLDC GLUCOMTR-MCNC: 139 MG/DL (ref 70–130)
GLUCOSE BLDC GLUCOMTR-MCNC: 183 MG/DL (ref 70–130)
GLUCOSE BLDC GLUCOMTR-MCNC: 233 MG/DL (ref 70–130)
GLUCOSE BLDC GLUCOMTR-MCNC: 273 MG/DL (ref 70–130)
GLUCOSE SERPL-MCNC: 157 MG/DL (ref 65–99)
HCT VFR BLD AUTO: 31.4 % (ref 37.5–51)
HGB BLD-MCNC: 9.6 G/DL (ref 13–17.7)
IMM GRANULOCYTES # BLD AUTO: 0.11 10*3/MM3 (ref 0–0.05)
IMM GRANULOCYTES NFR BLD AUTO: 0.9 % (ref 0–0.5)
LYMPHOCYTES # BLD AUTO: 1.42 10*3/MM3 (ref 0.7–3.1)
LYMPHOCYTES NFR BLD AUTO: 11.8 % (ref 19.6–45.3)
MCH RBC QN AUTO: 29.8 PG (ref 26.6–33)
MCHC RBC AUTO-ENTMCNC: 30.6 G/DL (ref 31.5–35.7)
MCV RBC AUTO: 97.5 FL (ref 79–97)
MONOCYTES # BLD AUTO: 1.17 10*3/MM3 (ref 0.1–0.9)
MONOCYTES NFR BLD AUTO: 9.7 % (ref 5–12)
NEUTROPHILS NFR BLD AUTO: 76 % (ref 42.7–76)
NEUTROPHILS NFR BLD AUTO: 9.18 10*3/MM3 (ref 1.7–7)
NRBC BLD AUTO-RTO: 0 /100 WBC (ref 0–0.2)
PLATELET # BLD AUTO: 187 10*3/MM3 (ref 140–450)
PMV BLD AUTO: 9.4 FL (ref 6–12)
POTASSIUM SERPL-SCNC: 4.1 MMOL/L (ref 3.5–5.2)
RBC # BLD AUTO: 3.22 10*6/MM3 (ref 4.14–5.8)
SODIUM SERPL-SCNC: 139 MMOL/L (ref 136–145)
TROPONIN T DELTA: 13 NG/L
TROPONIN T SERPL HS-MCNC: 127 NG/L
WBC NRBC COR # BLD AUTO: 12.07 10*3/MM3 (ref 3.4–10.8)

## 2024-05-27 PROCEDURE — 99232 SBSQ HOSP IP/OBS MODERATE 35: CPT | Performed by: INTERNAL MEDICINE

## 2024-05-27 PROCEDURE — 93010 ELECTROCARDIOGRAM REPORT: CPT | Performed by: SPECIALIST

## 2024-05-27 PROCEDURE — 93005 ELECTROCARDIOGRAM TRACING: CPT | Performed by: INTERNAL MEDICINE

## 2024-05-27 PROCEDURE — 85025 COMPLETE CBC W/AUTO DIFF WBC: CPT | Performed by: INTERNAL MEDICINE

## 2024-05-27 PROCEDURE — 72020 X-RAY EXAM OF SPINE 1 VIEW: CPT | Performed by: RADIOLOGY

## 2024-05-27 PROCEDURE — 72020 X-RAY EXAM OF SPINE 1 VIEW: CPT

## 2024-05-27 PROCEDURE — 63710000001 INSULIN LISPRO (HUMAN) PER 5 UNITS: Performed by: INTERNAL MEDICINE

## 2024-05-27 PROCEDURE — 82948 REAGENT STRIP/BLOOD GLUCOSE: CPT

## 2024-05-27 PROCEDURE — 84484 ASSAY OF TROPONIN QUANT: CPT | Performed by: INTERNAL MEDICINE

## 2024-05-27 PROCEDURE — 25010000002 HEPARIN (PORCINE) PER 1000 UNITS: Performed by: INTERNAL MEDICINE

## 2024-05-27 PROCEDURE — 80048 BASIC METABOLIC PNL TOTAL CA: CPT | Performed by: INTERNAL MEDICINE

## 2024-05-27 RX ORDER — HYDROCODONE BITARTRATE AND ACETAMINOPHEN 10; 325 MG/1; MG/1
1 TABLET ORAL ONCE
Status: COMPLETED | OUTPATIENT
Start: 2024-05-27 | End: 2024-05-27

## 2024-05-27 RX ORDER — HYDROCODONE BITARTRATE AND ACETAMINOPHEN 5; 325 MG/1; MG/1
1 TABLET ORAL ONCE
Status: COMPLETED | OUTPATIENT
Start: 2024-05-27 | End: 2024-05-27

## 2024-05-27 RX ADMIN — EMPAGLIFLOZIN 10 MG: 10 TABLET, FILM COATED ORAL at 08:38

## 2024-05-27 RX ADMIN — HYDROCODONE BITARTRATE AND ACETAMINOPHEN 1 TABLET: 10; 325 TABLET ORAL at 18:27

## 2024-05-27 RX ADMIN — Medication 1 APPLICATION: at 08:42

## 2024-05-27 RX ADMIN — FAMOTIDINE 20 MG: 20 TABLET, FILM COATED ORAL at 20:59

## 2024-05-27 RX ADMIN — LEVOTHYROXINE SODIUM 75 MCG: 0.07 TABLET ORAL at 08:38

## 2024-05-27 RX ADMIN — SEVELAMER CARBONATE 2400 MG: 800 TABLET, FILM COATED ORAL at 08:37

## 2024-05-27 RX ADMIN — MEGESTROL ACETATE 40 MG: 40 TABLET ORAL at 17:07

## 2024-05-27 RX ADMIN — ACETAMINOPHEN 500 MG: 500 TABLET ORAL at 17:56

## 2024-05-27 RX ADMIN — HEPARIN SODIUM 5000 UNITS: 5000 INJECTION INTRAVENOUS; SUBCUTANEOUS at 13:13

## 2024-05-27 RX ADMIN — GABAPENTIN 100 MG: 100 CAPSULE ORAL at 20:57

## 2024-05-27 RX ADMIN — SERTRALINE 50 MG: 50 TABLET, FILM COATED ORAL at 20:57

## 2024-05-27 RX ADMIN — SEVELAMER CARBONATE 2400 MG: 800 TABLET, FILM COATED ORAL at 11:17

## 2024-05-27 RX ADMIN — Medication 1 TABLET: at 08:38

## 2024-05-27 RX ADMIN — AMMONIUM LACTATE 1 APPLICATION: 120 CREAM TOPICAL at 20:57

## 2024-05-27 RX ADMIN — METOPROLOL SUCCINATE 12.5 MG: 25 TABLET, FILM COATED, EXTENDED RELEASE ORAL at 20:57

## 2024-05-27 RX ADMIN — INSULIN LISPRO 2 UNITS: 100 INJECTION, SOLUTION INTRAVENOUS; SUBCUTANEOUS at 11:17

## 2024-05-27 RX ADMIN — MEGESTROL ACETATE 40 MG: 40 TABLET ORAL at 08:37

## 2024-05-27 RX ADMIN — HEPARIN SODIUM 5000 UNITS: 5000 INJECTION INTRAVENOUS; SUBCUTANEOUS at 20:56

## 2024-05-27 RX ADMIN — ERYTHROMYCIN 1 APPLICATION: 5 OINTMENT OPHTHALMIC at 20:57

## 2024-05-27 RX ADMIN — SEVELAMER CARBONATE 2400 MG: 800 TABLET, FILM COATED ORAL at 17:07

## 2024-05-27 RX ADMIN — HEPARIN SODIUM 5000 UNITS: 5000 INJECTION INTRAVENOUS; SUBCUTANEOUS at 05:59

## 2024-05-27 RX ADMIN — HYDROCODONE BITARTRATE AND ACETAMINOPHEN 1 TABLET: 5; 325 TABLET ORAL at 11:25

## 2024-05-27 RX ADMIN — INSULIN LISPRO 3 UNITS: 100 INJECTION, SOLUTION INTRAVENOUS; SUBCUTANEOUS at 17:07

## 2024-05-27 RX ADMIN — INSULIN LISPRO 4 UNITS: 100 INJECTION, SOLUTION INTRAVENOUS; SUBCUTANEOUS at 20:57

## 2024-05-27 RX ADMIN — ERYTHROMYCIN 1 APPLICATION: 5 OINTMENT OPHTHALMIC at 08:38

## 2024-05-27 RX ADMIN — Medication 10 ML: at 08:38

## 2024-05-27 RX ADMIN — CETIRIZINE HYDROCHLORIDE 5 MG: 10 TABLET, FILM COATED ORAL at 08:37

## 2024-05-27 RX ADMIN — LIDOCAINE 1 PATCH: 4 PATCH TOPICAL at 16:29

## 2024-05-27 RX ADMIN — ASPIRIN 81 MG: 81 TABLET, COATED ORAL at 08:38

## 2024-05-27 RX ADMIN — Medication 10 ML: at 20:58

## 2024-05-27 RX ADMIN — MEGESTROL ACETATE 40 MG: 40 TABLET ORAL at 11:17

## 2024-05-27 RX ADMIN — ATORVASTATIN CALCIUM 20 MG: 20 TABLET, FILM COATED ORAL at 08:38

## 2024-05-27 NOTE — PLAN OF CARE
Goal Outcome Evaluation:   Pt resting in bed at this time. Pt had complaint of pain this morning, scheduled pain med given. Wound care completed per order. No acute changes noted at this time. Will continue to follow plan of care.

## 2024-05-27 NOTE — PROGRESS NOTES
LOS: 11 days     Name: Kennedy Prescott  Age/Sex: 73 y.o. male  :  1950        PCP: Jag Guillaume MD    Principal Problem:    SIRS (systemic inflammatory response syndrome)  Active Problems:    Sepsis      Following for: New HFrEF        Interval history: Patient denies any chest pains or shortness of breath overnight.  Resting comfortably in bed.  Plan is to proceed with heart cath in the morning.    Subjective     ROS    Vital Signs  Vitals:    24 1200 24 1900 24 0300 24 0600   BP: 154/65 148/65 149/66 164/72   BP Location: Right arm Right arm Right arm Right arm   Patient Position: Lying Lying Lying Lying   Pulse: 79 72 69 76   Resp: 18 18 20 18   Temp: 98.1 °F (36.7 °C) 98.1 °F (36.7 °C) 98.2 °F (36.8 °C) 98 °F (36.7 °C)   TempSrc: Oral Oral Oral Oral   SpO2: 94% 93% 95%    Weight:   99.2 kg (218 lb 9.6 oz)    Height:         Body mass index is 30.49 kg/m².      Intake/Output Summary (Last 24 hours) at 2024 1324  Last data filed at 2024 1300  Gross per 24 hour   Intake 600 ml   Output 500 ml   Net 100 ml       Constitutional:       General: Not in acute distress.     Appearance: Healthy appearance. Well-developed and not in distress. Not diaphoretic.   Eyes:      Conjunctiva/sclera: Conjunctivae normal.      Pupils: Pupils are equal, round, and reactive to light.   HENT:      Head: Normocephalic and atraumatic.   Neck:      Vascular: No carotid bruit or JVD.   Pulmonary:      Effort: Pulmonary effort is normal. No respiratory distress.      Breath sounds: Normal breath sounds.   Cardiovascular:      Normal rate. Regular rhythm.   Edema:     Peripheral edema absent.   Skin:     General: Skin is cool.   Neurological:      Mental Status: Alert, oriented to person, place, and time and oriented to person, place and time.         Results Review:     Results from last 7 days   Lab Units 24  0113 24  0110 24  0033 24  0113 24  0153  05/22/24  0036 05/21/24  0321   WBC 10*3/mm3 12.07* 12.41* 12.64* 9.98 9.24 10.41 12.12*   HEMOGLOBIN g/dL 9.6* 9.4* 9.9* 9.8* 9.2* 8.6* 8.5*   PLATELETS 10*3/mm3 187 204 224 203 189 159 140     Results from last 7 days   Lab Units 05/27/24  0113 05/26/24  0110 05/25/24  0033 05/24/24  0114 05/23/24  0153 05/22/24  0036 05/21/24  0321   SODIUM mmol/L 139 139 137 137 134* 132* 131*   POTASSIUM mmol/L 4.1 4.0 4.4 3.8 4.5 4.1 4.1   CHLORIDE mmol/L 95* 94* 93* 93* 93* 91* 90*   CO2 mmol/L 30.8* 31.3* 28.2 31.3* 26.5 27.7 25.1   BUN mg/dL 40* 25* 38* 27* 55* 40* 70*   CREATININE mg/dL 7.23* 5.20* 7.28* 5.14* 8.48* 6.82* 10.34*   CALCIUM mg/dL 9.2 8.7 8.8 8.5* 8.3* 8.2* 8.1*   GLUCOSE mg/dL 157* 226* 165* 225* 213* 204* 99   ALT (SGPT) U/L  --  39  --  22  --   --   --    AST (SGOT) U/L  --  45*  --  30  --   --   --                  I reviewed the patient's new clinical results.  I reviewed the patient's new imaging results and agree with the interpretation.  I personally viewed and interpreted the patient's EKG/Telemetry data    Medication Review:   acetaminophen, 650 mg, Oral, Once per day on Tuesday Thursday Saturday  ammonium lactate, 1 Application, Topical, Nightly  aspirin, 81 mg, Oral, Daily  atorvastatin, 20 mg, Oral, Daily  cetirizine, 5 mg, Oral, Daily  empagliflozin, 10 mg, Oral, Daily  erythromycin, 1 Application, Left Eye, BID  famotidine, 20 mg, Oral, Q48H  gabapentin, 100 mg, Oral, Nightly  heparin (porcine), 5,000 Units, Subcutaneous, Q8H  insulin lispro, 2-7 Units, Subcutaneous, 4x Daily AC & at Bedtime  levothyroxine, 75 mcg, Oral, Daily  Lidocaine, 1 patch, Transdermal, Q PM  lubrisoft, 1 Application, Topical, Daily  megestrol, 40 mg, Oral, TID With Meals  metoprolol succinate XL, 12.5 mg, Oral, Nightly  multivitamin with minerals, 1 tablet, Oral, Daily  sertraline, 50 mg, Oral, Nightly  sevelamer, 2,400 mg, Oral, TID With Meals  sodium chloride, 10 mL, Intravenous, Q12H  Vancomycin Pharmacy  Intermittent/Pulse Dosing, , Does not apply, Daily           Assessment:  New HFrEF, compensated  End-stage renal disease on hemodialysis      Recommendations:  Patient still stable denies any chest pains.  Plan is to proceed with left heart catheterization in the morning.  Continue with GDMT.    I discussed the patients findings and my recommendations with patient and family    Simone Spear PA-C  05/27/24  13:24 EDT  Electronically signed by Simone Spear PA-C, 05/27/24, 1:24 PM EDT.     Seen and examined on rounds.  Chart reviewed.  Agree with patient's charting, assessment plan by ELICIA    On exam patient laying in bed in no distress, sensorimotor system grossly intact  Chest no wheezing or crackles  Regular rate rhythm, normal S1-S2, no rub gallop murmur, no S3  Abdomen distended  No edema  Assessment and plan  #1 Cardioblate patient with history of bypass surgery with abnormal CTA current admission.  Patient is noted to have new drop in EF on GAVI.  Patient is wearing LifeVest.  Patient scheduled for cath for Tuesday.  Guideline guided medical therapy for heart failure will be continued.  .Electronically signed by Tommy Garcia MD, 05/27/24, 3:06 PM EDT.
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
## 2024-05-27 NOTE — PROGRESS NOTES
"Nephrology Progress Note  Chief complaint, nausea and vomiting    Subjective     05/25/2024 patient seen on dialysis stable denies any shortness of breath    05/26/2024 patient is stable denies any complaints    05/27/2024 patient complains of back pain    Objective       Vital signs :     Temp:  [98 °F (36.7 °C)-98.2 °F (36.8 °C)] 98 °F (36.7 °C)  Heart Rate:  [69-79] 76  Resp:  [18-20] 18  BP: (148-164)/(65-72) 164/72    Intake/Output                         05/25/24 0701 - 05/26/24 0700 05/26/24 0701 - 05/27/24 0700     [id number] 8280-4976 Total 6618-3695 1162-2636 Total                 Intake    P.O.  620  60 680  120  120 240    I.V.  497  -- 497  --  -- --    Total Intake 1117 60 1177 120 120 240       Output    Urine  25  -- 25  100  -- 100    Total Output 25 -- 25 100 -- 100          05/26/2024 examined and reviewed  05/27/2024 examined and reviewed      Physical Exam:    General Appearance : Not in acute distress  Lungs : Bilateral decreased intensity of breath sounds  Heart :  regular rhythm & normal rate, normal S1, S2 and no murmur, no rub  Abdomen : Soft, nondistended  Extremities : No edema,   Neurologic :   No apparent focal neurological deficit, unable to assess fully  Brachiocephalic AV fistula, good bruit and thrill      Laboratory Data :     Albumin Albumin   Date Value Ref Range Status   05/26/2024 3.3 (L) 3.5 - 5.2 g/dL Final      Magnesium No results found for: \"MG\"         PTH               No results found for: \"PTH\"    CBC and coagulation:  Results from last 7 days   Lab Units 05/27/24  0113 05/26/24  0110 05/25/24  0033   WBC 10*3/mm3 12.07* 12.41* 12.64*   HEMOGLOBIN g/dL 9.6* 9.4* 9.9*   HEMATOCRIT % 31.4* 30.4* 31.4*   MCV fL 97.5* 96.8 95.4   MCHC g/dL 30.6* 30.9* 31.5   PLATELETS 10*3/mm3 187 204 224     Acid/base balance:        Renal and electrolytes:    Results from last 7 days   Lab Units 05/27/24  0113 05/26/24  0110 05/25/24  0033 05/24/24  0114 05/23/24  0153   SODIUM mmol/L 139 " 139 137 137 134*   POTASSIUM mmol/L 4.1 4.0 4.4 3.8 4.5   CHLORIDE mmol/L 95* 94* 93* 93* 93*   CO2 mmol/L 30.8* 31.3* 28.2 31.3* 26.5   BUN mg/dL 40* 25* 38* 27* 55*   CREATININE mg/dL 7.23* 5.20* 7.28* 5.14* 8.48*   CALCIUM mg/dL 9.2 8.7 8.8 8.5* 8.3*     Estimated Creatinine Clearance: 10.6 mL/min (A) (by C-G formula based on SCr of 7.23 mg/dL (H)).  @GFRCG:3@   Liver and pancreatic function:  Results from last 7 days   Lab Units 05/26/24  0110 05/24/24  0114   ALBUMIN g/dL 3.3* 3.4*   BILIRUBIN mg/dL 0.4 0.4   ALK PHOS U/L 176* 216*   AST (SGOT) U/L 45* 30   ALT (SGPT) U/L 39 22         Cardiac:      Liver and pancreatic function:  Results from last 7 days   Lab Units 05/26/24  0110 05/24/24  0114   ALBUMIN g/dL 3.3* 3.4*   BILIRUBIN mg/dL 0.4 0.4   ALK PHOS U/L 176* 216*   AST (SGOT) U/L 45* 30   ALT (SGPT) U/L 39 22       Medications :     acetaminophen, 650 mg, Oral, Once per day on Tuesday Thursday Saturday  ammonium lactate, 1 Application, Topical, Nightly  aspirin, 81 mg, Oral, Daily  atorvastatin, 20 mg, Oral, Daily  cetirizine, 5 mg, Oral, Daily  empagliflozin, 10 mg, Oral, Daily  erythromycin, 1 Application, Left Eye, BID  famotidine, 20 mg, Oral, Q48H  gabapentin, 100 mg, Oral, Nightly  heparin (porcine), 5,000 Units, Subcutaneous, Q8H  insulin lispro, 2-7 Units, Subcutaneous, 4x Daily AC & at Bedtime  levothyroxine, 75 mcg, Oral, Daily  Lidocaine, 1 patch, Transdermal, Q PM  lubrisoft, 1 Application, Topical, Daily  megestrol, 40 mg, Oral, TID With Meals  metoprolol succinate XL, 12.5 mg, Oral, Nightly  multivitamin with minerals, 1 tablet, Oral, Daily  sertraline, 50 mg, Oral, Nightly  sevelamer, 2,400 mg, Oral, TID With Meals  sodium chloride, 10 mL, Intravenous, Q12H  Vancomycin Pharmacy Intermittent/Pulse Dosing, , Does not apply, Daily               Assessment & Plan     05/25/2024 reviewed and updated  05/26/2024 reviewed and updated   05/27/2024 reviewed and updated    -ESKD on intermittent  dialysis  -Back pain  - Anemia  - Persistent nausea and vomiting  - Type 2 diabetes mellitus  - Essential hypertension  - MRSA bacteremia  - Left foot wounds    05/24/2024   Dr Saucedo  patient had uneventful dialysis done yesterday will continue on intermittent dialysis Tuesdays Thursdays and Saturdays.  Patient is being treated with vancomycin that can be administered in outpatient dialysis dialysis    05/25/2024  Patient seen on dialysis stable tolerating it well is on antibiotic vancomycin which we can continue as outpatient if plans for discharge please call nephrology    05/26/2024  Patient has been tolerating dialysis well we will continue Tuesday Thursday Saturday schedule of dialysis    05/27/2024  Patient had dialysis yesterday and tolerated well we will continue Tuesday Thursday Saturday schedule of dialysis  Patient has back pain and has bacteremia , x-rays were done if still persists suggest doing CT scan to rule out discitis    Prognosis guarded    Please avoid nephrotoxic medication and pharmacy to adjust the medication according to the GFR      Plan of care was discussed with the patient, understood verbalized agreed      S Jonathan Soto MD  05/27/24  08:55 EDT

## 2024-05-28 PROBLEM — I25.5 ISCHEMIC CARDIOMYOPATHY: Status: ACTIVE | Noted: 2024-05-15

## 2024-05-28 LAB
ANION GAP SERPL CALCULATED.3IONS-SCNC: 12.8 MMOL/L (ref 5–15)
BUN SERPL-MCNC: 52 MG/DL (ref 8–23)
BUN/CREAT SERPL: 6.1 (ref 7–25)
CALCIUM SPEC-SCNC: 8.9 MG/DL (ref 8.6–10.5)
CHLORIDE SERPL-SCNC: 98 MMOL/L (ref 98–107)
CO2 SERPL-SCNC: 28.2 MMOL/L (ref 22–29)
CREAT SERPL-MCNC: 8.51 MG/DL (ref 0.76–1.27)
EGFRCR SERPLBLD CKD-EPI 2021: 6 ML/MIN/1.73
GLUCOSE BLDC GLUCOMTR-MCNC: 126 MG/DL (ref 70–130)
GLUCOSE BLDC GLUCOMTR-MCNC: 208 MG/DL (ref 70–130)
GLUCOSE BLDC GLUCOMTR-MCNC: 235 MG/DL (ref 70–130)
GLUCOSE SERPL-MCNC: 249 MG/DL (ref 65–99)
HBA1C MFR BLD: 6.5 % (ref 4.8–5.6)
HCT VFR BLD AUTO: 29.7 % (ref 37.5–51)
HGB BLD-MCNC: 9.2 G/DL (ref 13–17.7)
MAGNESIUM SERPL-MCNC: 2.2 MG/DL (ref 1.6–2.4)
PHOSPHATE SERPL-MCNC: 5.1 MG/DL (ref 2.5–4.5)
POTASSIUM SERPL-SCNC: 4.7 MMOL/L (ref 3.5–5.2)
QT INTERVAL: 432 MS
QTC INTERVAL: 479 MS
SODIUM SERPL-SCNC: 139 MMOL/L (ref 136–145)
TROPONIN T SERPL HS-MCNC: 135 NG/L
VANCOMYCIN SERPL-MCNC: 31 MCG/ML (ref 5–40)

## 2024-05-28 PROCEDURE — C1769 GUIDE WIRE: HCPCS | Performed by: INTERNAL MEDICINE

## 2024-05-28 PROCEDURE — 82948 REAGENT STRIP/BLOOD GLUCOSE: CPT

## 2024-05-28 PROCEDURE — 83036 HEMOGLOBIN GLYCOSYLATED A1C: CPT | Performed by: INTERNAL MEDICINE

## 2024-05-28 PROCEDURE — C1887 CATHETER, GUIDING: HCPCS | Performed by: INTERNAL MEDICINE

## 2024-05-28 PROCEDURE — C1894 INTRO/SHEATH, NON-LASER: HCPCS | Performed by: INTERNAL MEDICINE

## 2024-05-28 PROCEDURE — 99232 SBSQ HOSP IP/OBS MODERATE 35: CPT | Performed by: INTERNAL MEDICINE

## 2024-05-28 PROCEDURE — C9600 PERC DRUG-EL COR STENT SING: HCPCS | Performed by: INTERNAL MEDICINE

## 2024-05-28 PROCEDURE — 80202 ASSAY OF VANCOMYCIN: CPT | Performed by: INTERNAL MEDICINE

## 2024-05-28 PROCEDURE — 25010000002 FENTANYL CITRATE (PF) 50 MCG/ML SOLUTION: Performed by: INTERNAL MEDICINE

## 2024-05-28 PROCEDURE — 85014 HEMATOCRIT: CPT | Performed by: INTERNAL MEDICINE

## 2024-05-28 PROCEDURE — B2131ZZ FLUOROSCOPY OF MULTIPLE CORONARY ARTERY BYPASS GRAFTS USING LOW OSMOLAR CONTRAST: ICD-10-PCS | Performed by: INTERNAL MEDICINE

## 2024-05-28 PROCEDURE — 94761 N-INVAS EAR/PLS OXIMETRY MLT: CPT

## 2024-05-28 PROCEDURE — C1725 CATH, TRANSLUMIN NON-LASER: HCPCS | Performed by: INTERNAL MEDICINE

## 2024-05-28 PROCEDURE — 93459 L HRT ART/GRFT ANGIO: CPT | Performed by: INTERNAL MEDICINE

## 2024-05-28 PROCEDURE — 94799 UNLISTED PULMONARY SVC/PX: CPT

## 2024-05-28 PROCEDURE — B2181ZZ FLUOROSCOPY OF LEFT INTERNAL MAMMARY BYPASS GRAFT USING LOW OSMOLAR CONTRAST: ICD-10-PCS | Performed by: INTERNAL MEDICINE

## 2024-05-28 PROCEDURE — 80048 BASIC METABOLIC PNL TOTAL CA: CPT | Performed by: INTERNAL MEDICINE

## 2024-05-28 PROCEDURE — C1760 CLOSURE DEV, VASC: HCPCS | Performed by: INTERNAL MEDICINE

## 2024-05-28 PROCEDURE — 85018 HEMOGLOBIN: CPT | Performed by: INTERNAL MEDICINE

## 2024-05-28 PROCEDURE — B2151ZZ FLUOROSCOPY OF LEFT HEART USING LOW OSMOLAR CONTRAST: ICD-10-PCS | Performed by: INTERNAL MEDICINE

## 2024-05-28 PROCEDURE — 84484 ASSAY OF TROPONIN QUANT: CPT | Performed by: INTERNAL MEDICINE

## 2024-05-28 PROCEDURE — 99232 SBSQ HOSP IP/OBS MODERATE 35: CPT | Performed by: NURSE PRACTITIONER

## 2024-05-28 PROCEDURE — 92928 PRQ TCAT PLMT NTRAC ST 1 LES: CPT | Performed by: INTERNAL MEDICINE

## 2024-05-28 PROCEDURE — 25010000002 HEPARIN (PORCINE) PER 1000 UNITS: Performed by: INTERNAL MEDICINE

## 2024-05-28 PROCEDURE — 25810000003 SODIUM CHLORIDE 0.9 % SOLUTION: Performed by: INTERNAL MEDICINE

## 2024-05-28 PROCEDURE — 027034Z DILATION OF CORONARY ARTERY, ONE ARTERY WITH DRUG-ELUTING INTRALUMINAL DEVICE, PERCUTANEOUS APPROACH: ICD-10-PCS | Performed by: INTERNAL MEDICINE

## 2024-05-28 PROCEDURE — 4A023N7 MEASUREMENT OF CARDIAC SAMPLING AND PRESSURE, LEFT HEART, PERCUTANEOUS APPROACH: ICD-10-PCS | Performed by: INTERNAL MEDICINE

## 2024-05-28 PROCEDURE — 83735 ASSAY OF MAGNESIUM: CPT | Performed by: INTERNAL MEDICINE

## 2024-05-28 PROCEDURE — 63710000001 INSULIN GLARGINE PER 5 UNITS: Performed by: INTERNAL MEDICINE

## 2024-05-28 PROCEDURE — C1874 STENT, COATED/COV W/DEL SYS: HCPCS | Performed by: INTERNAL MEDICINE

## 2024-05-28 PROCEDURE — 25510000001 IOPAMIDOL PER 1 ML: Performed by: INTERNAL MEDICINE

## 2024-05-28 PROCEDURE — 63710000001 INSULIN LISPRO (HUMAN) PER 5 UNITS: Performed by: INTERNAL MEDICINE

## 2024-05-28 PROCEDURE — B2111ZZ FLUOROSCOPY OF MULTIPLE CORONARY ARTERIES USING LOW OSMOLAR CONTRAST: ICD-10-PCS | Performed by: INTERNAL MEDICINE

## 2024-05-28 PROCEDURE — 84100 ASSAY OF PHOSPHORUS: CPT | Performed by: INTERNAL MEDICINE

## 2024-05-28 DEVICE — XIENCE SKYPOINT™ EVEROLIMUS ELUTING CORONARY STENT SYSTEM 3.50 MM X 15 MM / RAPID-EXCHANGE
Type: IMPLANTABLE DEVICE | Status: FUNCTIONAL
Brand: XIENCE SKYPOINT™

## 2024-05-28 RX ORDER — NITROGLYCERIN 0.4 MG/1
0.4 TABLET SUBLINGUAL
Status: DISCONTINUED | OUTPATIENT
Start: 2024-05-28 | End: 2024-05-30 | Stop reason: HOSPADM

## 2024-05-28 RX ORDER — HEPARIN SODIUM 1000 [USP'U]/ML
INJECTION, SOLUTION INTRAVENOUS; SUBCUTANEOUS
Status: DISCONTINUED | OUTPATIENT
Start: 2024-05-28 | End: 2024-05-28 | Stop reason: HOSPADM

## 2024-05-28 RX ORDER — SODIUM CHLORIDE 9 MG/ML
INJECTION, SOLUTION INTRAVENOUS
Status: COMPLETED | OUTPATIENT
Start: 2024-05-28 | End: 2024-05-28

## 2024-05-28 RX ORDER — ACETAMINOPHEN 325 MG/1
650 TABLET ORAL EVERY 4 HOURS PRN
Status: DISCONTINUED | OUTPATIENT
Start: 2024-05-28 | End: 2024-05-30 | Stop reason: HOSPADM

## 2024-05-28 RX ORDER — FENTANYL CITRATE 50 UG/ML
INJECTION, SOLUTION INTRAMUSCULAR; INTRAVENOUS
Status: DISCONTINUED | OUTPATIENT
Start: 2024-05-28 | End: 2024-05-28 | Stop reason: HOSPADM

## 2024-05-28 RX ORDER — ASPIRIN 81 MG/1
TABLET, CHEWABLE ORAL
Status: DISCONTINUED | OUTPATIENT
Start: 2024-05-28 | End: 2024-05-28 | Stop reason: HOSPADM

## 2024-05-28 RX ORDER — CLOPIDOGREL BISULFATE 75 MG/1
75 TABLET ORAL DAILY
Status: DISCONTINUED | OUTPATIENT
Start: 2024-05-29 | End: 2024-05-30 | Stop reason: HOSPADM

## 2024-05-28 RX ORDER — CLOPIDOGREL BISULFATE 75 MG/1
TABLET ORAL
Status: DISCONTINUED | OUTPATIENT
Start: 2024-05-28 | End: 2024-05-28 | Stop reason: HOSPADM

## 2024-05-28 RX ORDER — LIDOCAINE HYDROCHLORIDE 20 MG/ML
INJECTION, SOLUTION INFILTRATION; PERINEURAL
Status: DISCONTINUED | OUTPATIENT
Start: 2024-05-28 | End: 2024-05-28 | Stop reason: HOSPADM

## 2024-05-28 RX ADMIN — ERYTHROMYCIN 1 APPLICATION: 5 OINTMENT OPHTHALMIC at 21:10

## 2024-05-28 RX ADMIN — METOPROLOL SUCCINATE 12.5 MG: 25 TABLET, FILM COATED, EXTENDED RELEASE ORAL at 21:11

## 2024-05-28 RX ADMIN — SEVELAMER CARBONATE 2400 MG: 800 TABLET, FILM COATED ORAL at 17:21

## 2024-05-28 RX ADMIN — Medication 1 APPLICATION: at 08:13

## 2024-05-28 RX ADMIN — GABAPENTIN 100 MG: 100 CAPSULE ORAL at 21:10

## 2024-05-28 RX ADMIN — HEPARIN SODIUM 5000 UNITS: 5000 INJECTION INTRAVENOUS; SUBCUTANEOUS at 21:10

## 2024-05-28 RX ADMIN — SERTRALINE 50 MG: 50 TABLET, FILM COATED ORAL at 22:31

## 2024-05-28 RX ADMIN — INSULIN LISPRO 3 UNITS: 100 INJECTION, SOLUTION INTRAVENOUS; SUBCUTANEOUS at 22:32

## 2024-05-28 RX ADMIN — ACETAMINOPHEN 650 MG: 325 TABLET ORAL at 21:11

## 2024-05-28 RX ADMIN — Medication 10 ML: at 21:11

## 2024-05-28 RX ADMIN — INSULIN GLARGINE 10 UNITS: 100 INJECTION, SOLUTION SUBCUTANEOUS at 22:32

## 2024-05-28 RX ADMIN — ERYTHROMYCIN 1 APPLICATION: 5 OINTMENT OPHTHALMIC at 08:13

## 2024-05-28 RX ADMIN — Medication 10 ML: at 08:15

## 2024-05-28 RX ADMIN — HEPARIN SODIUM 5000 UNITS: 5000 INJECTION INTRAVENOUS; SUBCUTANEOUS at 05:48

## 2024-05-28 RX ADMIN — AMMONIUM LACTATE 1 APPLICATION: 120 CREAM TOPICAL at 22:34

## 2024-05-28 RX ADMIN — MEGESTROL ACETATE 40 MG: 40 TABLET ORAL at 17:21

## 2024-05-28 RX ADMIN — INSULIN LISPRO 3 UNITS: 100 INJECTION, SOLUTION INTRAVENOUS; SUBCUTANEOUS at 08:13

## 2024-05-28 NOTE — PLAN OF CARE
Goal Outcome Evaluation:  Plan of Care Reviewed With: patient        Progress: no change  Outcome Evaluation: Patient resting in bed. Patient is alert and oriented. VSS on RA. Life vest remain on patient at this time. Patient has been NPO since midnight. Surgery bath completed and consent signed. Wound care completed per orders.  No acute changes or complaints. Will continue with plan of care.

## 2024-05-28 NOTE — PROGRESS NOTES
PROGRESS NOTE         Patient Identification:  Name:  Kennedy Prescott  Age:  73 y.o.  Sex:  male  :  1950  MRN:  [id number]  Visit Number:  [id number]  Primary Care Provider:  Jag Guillaume MD         LOS: 12 days       ----------------------------------------------------------------------------------------------------------------------  Subjective       Chief Complaints:    Nausea and Vomiting        Interval History:      Patient resting comfortably in bed this morning.  Complains of abdominal bloating and mild epigastric pain this morning.  Currently on room air with no apparent distress.  Left heart cath planned for today.  Lungs clear to auscultation bilaterally.  Abdomen soft, nontender.  Afebrile, no reported diarrhea.      Review of Systems:    Constitutional: no fever, chills and night sweats.  Generalized fatigue.  Eyes: no eye drainage, itching or redness.  HEENT: no mouth sores, dysphagia or nose bleed.  Respiratory: no for shortness of breath, cough or production of sputum.  Cardiovascular: no chest pain, no palpitations, no orthopnea.  Gastrointestinal: no nausea, vomiting or diarrhea. No abdominal pain, hematemesis or rectal bleeding.  Complains of abdominal bloating this morning.  Genitourinary: no dysuria or polyuria.  Hematologic/lymphatic: no lymph node abnormalities, no easy bruising or easy bleeding.  Musculoskeletal: no muscle or joint pain.  Skin: No rash and no itching.  Neurological: no loss of consciousness, no seizure, no headache.  Behavioral/Psych: no depression or suicidal ideation.  Endocrine: no hot flashes.  Immunologic: negative.    ----------------------------------------------------------------------------------------------------------------------      Objective       Current [facility] Meds:  [Transfer Hold] acetaminophen, 650 mg, Oral, Once per day on   [Transfer Hold] ammonium lactate, 1 Application, Topical, Nightly  [Transfer  Hold] aspirin, 81 mg, Oral, Daily  [Transfer Hold] atorvastatin, 20 mg, Oral, Daily  [Transfer Hold] cetirizine, 5 mg, Oral, Daily  [Transfer Hold] empagliflozin, 10 mg, Oral, Daily  [Transfer Hold] erythromycin, 1 Application, Left Eye, BID  famotidine, 20 mg, Oral, Q48H  gabapentin, 100 mg, Oral, Nightly  [Transfer Hold] heparin (porcine), 5,000 Units, Subcutaneous, Q8H  [Transfer Hold] insulin lispro, 2-7 Units, Subcutaneous, 4x Daily AC & at Bedtime  [Transfer Hold] levothyroxine, 75 mcg, Oral, Daily  [Transfer Hold] Lidocaine, 1 patch, Transdermal, Q PM  [Transfer Hold] lubrisoft, 1 Application, Topical, Daily  [Transfer Hold] megestrol, 40 mg, Oral, TID With Meals  metoprolol succinate XL, 12.5 mg, Oral, Nightly  [Transfer Hold] multivitamin with minerals, 1 tablet, Oral, Daily  [Transfer Hold] sertraline, 50 mg, Oral, Nightly  [Transfer Hold] sevelamer, 2,400 mg, Oral, TID With Meals  [Transfer Hold] sodium chloride, 10 mL, Intravenous, Q12H  Vancomycin Pharmacy Intermittent/Pulse Dosing, , Does not apply, Daily      sodium chloride, , Last Rate: 75 mL/hr (05/28/24 1004)        ----------------------------------------------------------------------------------------------------------------------    Vital Signs:  Temp:  [98 °F (36.7 °C)-98.5 °F (36.9 °C)] 98 °F (36.7 °C)  Heart Rate:  [70-76] 70  Resp:  [16-18] 16  BP: (114-158)/(57-73) 134/57  No data found.    SpO2 Percentage    05/27/24 1900 05/28/24 0300 05/28/24 0600   SpO2: 96% 98% 98%     SpO2:  [96 %-98 %] 98 %  on  Flow (L/min):  [2] 2;   Device (Oxygen Therapy): nasal cannula    Body mass index is 31.73 kg/m².  Wt Readings from Last 3 Encounters:   05/28/24 103 kg (227 lb 8.2 oz)   10/02/23 87.1 kg (192 lb)   09/22/23 87.1 kg (192 lb)        Intake/Output Summary (Last 24 hours) at 5/28/2024 1029  Last data filed at 5/28/2024 0800  Gross per 24 hour   Intake 600 ml   Output 200 ml   Net 400 ml     NPO Diet NPO Type: Ice Chips  NPO Diet NPO Type:  Strict NPO  ----------------------------------------------------------------------------------------------------------------------      Physical Exam:    Constitutional: Chronically ill-appearing elderly gentleman.  On room air this morning.  Plans for left heart cath this morning.  HENT:  Head: Normocephalic and atraumatic.  Mouth:  Moist mucous membranes.    Eyes:  Conjunctivae and EOM are normal.  No scleral icterus.  Neck:  Neck supple.  No JVD present.    Cardiovascular:  Normal rate, regular rhythm and normal heart sounds with 3/6 murmur. No edema.  Pulmonary/Chest: Clear to auscultation bilaterally.  abdominal:  Soft.  Bowel sounds are normal.  No distension and no tenderness.   Musculoskeletal:  No edema, no tenderness, and no deformity.  No swelling or redness of joints.  Neurological:  Alert and oriented to person, place, and time.  No facial droop.  No slurred speech.   Skin:  Skin is warm and dry.  No rash noted.  No pallor.  Right subclavian HD cath site dressed with occlusive dressing, no surrounding erythema or edema.  No drainage on the dressing.  LUE AVF with no erythema/edema, thrill and bruit.  Bilateral lower extremity chronic ischemic changes.  Left pedal pulse faint.  Dry ulcers noted to the left lateral foot and left heel.  Psychiatric:  Normal mood and affect.  Behavior is normal.        ----------------------------------------------------------------------------------------------------------------------  Results from last 7 days   Lab Units 05/28/24  0118 05/27/24  1840 05/27/24  0113   HSTROP T ng/L 135* 140* 127*                     Results from last 7 days   Lab Units 05/28/24  0118 05/27/24  0113 05/26/24  0110 05/25/24  0033   WBC 10*3/mm3  --  12.07* 12.41* 12.64*   HEMOGLOBIN g/dL 9.2* 9.6* 9.4* 9.9*   HEMATOCRIT % 29.7* 31.4* 30.4* 31.4*   MCV fL  --  97.5* 96.8 95.4   MCHC g/dL  --  30.6* 30.9* 31.5   PLATELETS 10*3/mm3  --  187 204 224     Results from last 7 days   Lab Units  "05/28/24  0118 05/27/24  0113 05/26/24  0110 05/25/24  0033 05/24/24  0114   SODIUM mmol/L 139 139 139   < > 137   POTASSIUM mmol/L 4.7 4.1 4.0   < > 3.8   MAGNESIUM mg/dL 2.2  --   --   --   --    CHLORIDE mmol/L 98 95* 94*   < > 93*   CO2 mmol/L 28.2 30.8* 31.3*   < > 31.3*   BUN mg/dL 52* 40* 25*   < > 27*   CREATININE mg/dL 8.51* 7.23* 5.20*   < > 5.14*   CALCIUM mg/dL 8.9 9.2 8.7   < > 8.5*   GLUCOSE mg/dL 249* 157* 226*   < > 225*   ALBUMIN g/dL  --   --  3.3*  --  3.4*   BILIRUBIN mg/dL  --   --  0.4  --  0.4   ALK PHOS U/L  --   --  176*  --  216*   AST (SGOT) U/L  --   --  45*  --  30   ALT (SGPT) U/L  --   --  39  --  22    < > = values in this interval not displayed.   Estimated Creatinine Clearance: 9.2 mL/min (A) (by C-G formula based on SCr of 8.51 mg/dL (H)).  No results found for: \"AMMONIA\"    Glucose   Date/Time Value Ref Range Status   05/28/2024 0613 208 (H) 70 - 130 mg/dL Final   05/27/2024 2028 273 (H) 70 - 130 mg/dL Final   05/27/2024 1631 233 (H) 70 - 130 mg/dL Final   05/27/2024 1051 183 (H) 70 - 130 mg/dL Final   05/27/2024 0615 139 (H) 70 - 130 mg/dL Final   05/26/2024 1959 188 (H) 70 - 130 mg/dL Final   05/26/2024 1733 264 (H) 70 - 130 mg/dL Final   05/26/2024 1104 200 (H) 70 - 130 mg/dL Final     Lab Results   Component Value Date    HGBA1C 6.40 (H) 03/21/2024     Lab Results   Component Value Date    TSH 2.490 03/21/2024    FREET4 1.43 05/29/2022       Blood Culture   Date Value Ref Range Status   05/15/2024 Staphylococcus aureus, MRSA (C)  Preliminary     Comment:       Infectious disease consultation is highly recommended to rule out distant foci of infection.  Methicillin resistant Staphylococcus aureus, Patient may be an isolation risk.   05/15/2024 Staphylococcus aureus, MRSA (C)  Preliminary     Comment:       Infectious disease consultation is highly recommended to rule out distant foci of infection.  Methicillin resistant Staphylococcus aureus, Patient may be an isolation risk. " "    No results found for: \"URINECX\"  No results found for: \"WOUNDCX\"  No results found for: \"STOOLCX\"  No results found for: \"RESPCX\"  Pain Management Panel           No data to display                  ----------------------------------------------------------------------------------------------------------------------  Imaging Results (Last 24 Hours)       ** No results found for the last 24 hours. **            ----------------------------------------------------------------------------------------------------------------------    Pertinent Infectious Disease Results                Assessment/Plan       Assessment     Sepsis on admission  MRSA bacteremia  Chronic left foot wounds        Plan        Patient resting comfortably in bed this morning.  Complains of abdominal bloating and mild epigastric pain this morning.  Currently on room air with no apparent distress.  Left heart cath planned for today.  Lungs clear to auscultation bilaterally.  Abdomen soft, nontender.  Afebrile, no reported diarrhea.    GAVI from 5/22/2024 reports no evidence of vegetation.      In the setting of negative GAVI would recommend to continue vancomycin pharmacy to dose post hemodialysis to continue through 6/15/2024. We will continue to monitor closely.  Patient will require outpatient infectious disease follow-up.      ANTIMICROBIAL THERAPY    Vancomycin Pharmacy Intermittent/Pulse Dosing     Code Status:   Code Status and Medical Interventions:   Ordered at: 05/15/24 7611     Level Of Support Discussed With:    Patient     Code Status (Patient has no pulse and is not breathing):    CPR (Attempt to Resuscitate)     Medical Interventions (Patient has pulse or is breathing):    Full Support       DORA Wagner  05/28/24  10:29 EDT    "
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
## 2024-05-28 NOTE — PROGRESS NOTES
"Nephrology Progress Note      Subjective     Patient patient is scheduled to have cardiac catheterization this morning.  Denied any chest pain but continues to have generalized body weakness and fatigability.    Objective       Vital signs :     Temp:  [98 °F (36.7 °C)-98.5 °F (36.9 °C)] 98 °F (36.7 °C)  Heart Rate:  [70-76] 70  Resp:  [16-18] 16  BP: (114-158)/(57-73) 134/57    Intake/Output                               05/26/24 0701 - 05/27/24 0700 05/27/24 0701 - 05/28/24 0700 05/28/24 0701 - 05/29/24 0700     9890-8204 0586-1681 Total 6451-7499 3812-4303 Total 9955-6664 7832-1005 Total                    Intake    P.O.  120  120 240  480  360 840  0  -- 0    Total Intake 120 120 240 480 360 840 0 -- 0       Output    Urine  100  -- 100  400  -- 400  --  -- --    Total Output 100 -- 100 400 -- 400 -- -- --             Physical Exam:    General Appearance : alert, pleasant, appears stated age, cooperative and oriented  Lungs : clear to auscultation, respirations regular  Heart :  regular rhythm & normal rate, normal S1, S2 and no murmur, no rub  Abdomen : Soft, nondistended  Extremities : Trace edema,   Neurologic :   orientated to person, place, time and situation, Grossly no focal deficits        Laboratory Data :     Albumin Albumin   Date Value Ref Range Status   05/26/2024 3.3 (L) 3.5 - 5.2 g/dL Final      Magnesium Magnesium   Date Value Ref Range Status   05/28/2024 2.2 1.6 - 2.4 mg/dL Final          PTH               No results found for: \"PTH\"    CBC and coagulation:  Results from last 7 days   Lab Units 05/28/24  0118 05/27/24  0113 05/26/24  0110 05/25/24  0033   WBC 10*3/mm3  --  12.07* 12.41* 12.64*   HEMOGLOBIN g/dL 9.2* 9.6* 9.4* 9.9*   HEMATOCRIT % 29.7* 31.4* 30.4* 31.4*   MCV fL  --  97.5* 96.8 95.4   MCHC g/dL  --  30.6* 30.9* 31.5   PLATELETS 10*3/mm3  --  187 204 224     Acid/base balance:      Renal and electrolytes:    Results from last 7 days   Lab Units 05/28/24  0118 05/27/24  0113 " 05/26/24  0110 05/25/24  0033 05/24/24  0114   SODIUM mmol/L 139 139 139 137 137   POTASSIUM mmol/L 4.7 4.1 4.0 4.4 3.8   MAGNESIUM mg/dL 2.2  --   --   --   --    CHLORIDE mmol/L 98 95* 94* 93* 93*   CO2 mmol/L 28.2 30.8* 31.3* 28.2 31.3*   BUN mg/dL 52* 40* 25* 38* 27*   CREATININE mg/dL 8.51* 7.23* 5.20* 7.28* 5.14*   CALCIUM mg/dL 8.9 9.2 8.7 8.8 8.5*   PHOSPHORUS mg/dL 5.1*  --   --   --   --      Estimated Creatinine Clearance: 9.2 mL/min (A) (by C-G formula based on SCr of 8.51 mg/dL (H)).  @GFRCG:3@   Liver and pancreatic function:  Results from last 7 days   Lab Units 05/26/24  0110 05/24/24  0114   ALBUMIN g/dL 3.3* 3.4*   BILIRUBIN mg/dL 0.4 0.4   ALK PHOS U/L 176* 216*   AST (SGOT) U/L 45* 30   ALT (SGPT) U/L 39 22         Cardiac:      Liver and pancreatic function:  Results from last 7 days   Lab Units 05/26/24  0110 05/24/24  0114   ALBUMIN g/dL 3.3* 3.4*   BILIRUBIN mg/dL 0.4 0.4   ALK PHOS U/L 176* 216*   AST (SGOT) U/L 45* 30   ALT (SGPT) U/L 39 22       Medications :     acetaminophen, 650 mg, Oral, Once per day on Tuesday Thursday Saturday  ammonium lactate, 1 Application, Topical, Nightly  aspirin, 81 mg, Oral, Daily  atorvastatin, 20 mg, Oral, Daily  cetirizine, 5 mg, Oral, Daily  empagliflozin, 10 mg, Oral, Daily  erythromycin, 1 Application, Left Eye, BID  famotidine, 20 mg, Oral, Q48H  gabapentin, 100 mg, Oral, Nightly  heparin (porcine), 5,000 Units, Subcutaneous, Q8H  insulin lispro, 2-7 Units, Subcutaneous, 4x Daily AC & at Bedtime  levothyroxine, 75 mcg, Oral, Daily  Lidocaine, 1 patch, Transdermal, Q PM  lubrisoft, 1 Application, Topical, Daily  megestrol, 40 mg, Oral, TID With Meals  metoprolol succinate XL, 12.5 mg, Oral, Nightly  multivitamin with minerals, 1 tablet, Oral, Daily  sertraline, 50 mg, Oral, Nightly  sevelamer, 2,400 mg, Oral, TID With Meals  sodium chloride, 10 mL, Intravenous, Q12H  Vancomycin Pharmacy Intermittent/Pulse Dosing, , Does not apply, Daily              Assessment & Plan     -ESRD on intermittent dialysis  - Anemia likely due to anemia of chronic kidney disease  - New heart failure with reduced ejection fraction, compensated clinically  - Type 2 diabetes mellitus with renal involvement  - Essential hypertension  - MRSA bacteremia    Cardiac catheterization, followed by dialysis today.    Nova Saucedo MD  05/28/24  09:58 EDT

## 2024-05-28 NOTE — PROGRESS NOTES
Patient continues on day 14 vancomycin. Random vancomycin level was reported as 31 mg/L today. Based on this level, will not need vancomycin dose today post dialysis. Will continue to follow and recheck a level when appropriate.    Thank you,    Latoya Roberto, PharmD

## 2024-05-28 NOTE — NURSING NOTE
Order received for Cardiac Rehab Consultation.     Patient was having dialysis at this time . We will contact patient if he does not call us back.        Information discussed with: Patient        Educated on: Benefits of Exercise,  Educated on Cardiac Rehab and Program Protocol, Brochure and/or educational material provided, Contact information given, and Teach Back Verified      Thank you for the referral. Please contact the Cardiac Rehab Dept. (ext. [phone]) with any further questions or concerns.

## 2024-05-28 NOTE — CASE MANAGEMENT/SOCIAL WORK
Discharge Planning Assessment   Elias     Patient Name: Kennedy Prescott  MRN: [id number]  Today's Date: 5/28/2024    Admit Date: 5/15/2024     Discharge Plan       Row Name 05/28/24 1046       Plan    Plan SS contacted [facility] per Trudy who states pt still has a bed available at discharge. Pt will have a bed available at [facility] on 5/29/24 if he isn't stable for discharge today per Trudy. SS to follow.                  Continued Care and Services - Admitted Since 5/15/2024       Destination       Service Provider Request Status Selected Services Address Phone Fax Patient Preferred    [facility] Pending - Request Sent N/A [address] [place] [address] [phone] --                  Expected Discharge Date and Time       Expected Discharge Date Expected Discharge Time    May 27, 2024         OBINNA Schroeder

## 2024-05-28 NOTE — PLAN OF CARE
Problem: Adjustment to Illness (Sepsis/Septic Shock)  Goal: Optimal Coping  Outcome: Ongoing, Progressing     Problem: Bleeding (Sepsis/Septic Shock)  Goal: Absence of Bleeding  Outcome: Ongoing, Progressing     Problem: Glycemic Control Impaired (Sepsis/Septic Shock)  Goal: Blood Glucose Level Within Desired Range  Outcome: Ongoing, Progressing     Problem: Infection Progression (Sepsis/Septic Shock)  Goal: Absence of Infection Signs and Symptoms  Outcome: Ongoing, Progressing  Intervention: Initiate Sepsis Management  Recent Flowsheet Documentation  Taken 5/28/2024 1500 by Chelsi Herrera RN  Infection Prevention:   rest/sleep promoted   single patient room provided  Intervention: Promote Recovery  Recent Flowsheet Documentation  Taken 5/28/2024 1500 by Chelsi Herrera RN  Activity Management: bedrest     Problem: Nutrition Impaired (Sepsis/Septic Shock)  Goal: Optimal Nutrition Intake  Outcome: Ongoing, Progressing     Problem: Adult Inpatient Plan of Care  Goal: Plan of Care Review  Outcome: Ongoing, Progressing  Goal: Patient-Specific Goal (Individualized)  Outcome: Ongoing, Progressing  Goal: Absence of Hospital-Acquired Illness or Injury  Outcome: Ongoing, Progressing  Intervention: Identify and Manage Fall Risk  Recent Flowsheet Documentation  Taken 5/28/2024 1500 by Chelsi Herrera RN  Safety Promotion/Fall Prevention:   activity supervised   assistive device/personal items within reach   clutter free environment maintained   fall prevention program maintained   nonskid shoes/slippers when out of bed   room organization consistent   safety round/check completed  Intervention: Prevent and Manage VTE (Venous Thromboembolism) Risk  Recent Flowsheet Documentation  Taken 5/28/2024 1500 by Chelsi Herrera RN  Activity Management: bedrest  Intervention: Prevent Infection  Recent Flowsheet Documentation  Taken 5/28/2024 1500 by Chelsi Herrera RN  Infection Prevention:   rest/sleep promoted   single patient room  provided  Goal: Optimal Comfort and Wellbeing  Outcome: Ongoing, Progressing  Goal: Readiness for Transition of Care  Outcome: Ongoing, Progressing     Problem: Skin Injury Risk Increased  Goal: Skin Health and Integrity  Outcome: Ongoing, Progressing     Problem: Diabetes Comorbidity  Goal: Blood Glucose Level Within Targeted Range  Outcome: Ongoing, Progressing     Problem: Hypertension Comorbidity  Goal: Blood Pressure in Desired Range  Outcome: Ongoing, Progressing  Intervention: Maintain Blood Pressure Management  Recent Flowsheet Documentation  Taken 5/28/2024 1500 by Chelsi Herrera RN  Medication Review/Management: medications reviewed     Problem: Fall Injury Risk  Goal: Absence of Fall and Fall-Related Injury  Outcome: Ongoing, Progressing  Intervention: Identify and Manage Contributors  Recent Flowsheet Documentation  Taken 5/28/2024 1500 by Chelsi Herrera RN  Medication Review/Management: medications reviewed  Intervention: Promote Injury-Free Environment  Recent Flowsheet Documentation  Taken 5/28/2024 1500 by Chelsi Herrera RN  Safety Promotion/Fall Prevention:   activity supervised   assistive device/personal items within reach   clutter free environment maintained   fall prevention program maintained   nonskid shoes/slippers when out of bed   room organization consistent   safety round/check completed   Goal Outcome Evaluation:   Pt alert and oriented.  VSS and afebrile this shift. Pt receiving dialysis at this time. No complaints from pt. Bed is locked in low position with call light in reach.

## 2024-05-29 LAB
ALBUMIN SERPL-MCNC: 3.2 G/DL (ref 3.5–5.2)
ALBUMIN/GLOB SERPL: 1 G/DL
ALP SERPL-CCNC: 156 U/L (ref 39–117)
ALT SERPL W P-5'-P-CCNC: 29 U/L (ref 1–41)
ANION GAP SERPL CALCULATED.3IONS-SCNC: 12 MMOL/L (ref 5–15)
AST SERPL-CCNC: 27 U/L (ref 1–40)
BILIRUB SERPL-MCNC: 0.4 MG/DL (ref 0–1.2)
BUN SERPL-MCNC: 31 MG/DL (ref 8–23)
BUN/CREAT SERPL: 5.8 (ref 7–25)
CALCIUM SPEC-SCNC: 8.8 MG/DL (ref 8.6–10.5)
CHLORIDE SERPL-SCNC: 93 MMOL/L (ref 98–107)
CHOLEST SERPL-MCNC: 62 MG/DL (ref 0–200)
CO2 SERPL-SCNC: 34 MMOL/L (ref 22–29)
CREAT SERPL-MCNC: 5.36 MG/DL (ref 0.76–1.27)
DEPRECATED RDW RBC AUTO: 50.6 FL (ref 37–54)
EGFRCR SERPLBLD CKD-EPI 2021: 10.5 ML/MIN/1.73
ERYTHROCYTE [DISTWIDTH] IN BLOOD BY AUTOMATED COUNT: 14.8 % (ref 12.3–15.4)
GLOBULIN UR ELPH-MCNC: 3.2 GM/DL
GLUCOSE BLDC GLUCOMTR-MCNC: 152 MG/DL (ref 70–130)
GLUCOSE BLDC GLUCOMTR-MCNC: 210 MG/DL (ref 70–130)
GLUCOSE BLDC GLUCOMTR-MCNC: 227 MG/DL (ref 70–130)
GLUCOSE BLDC GLUCOMTR-MCNC: 243 MG/DL (ref 70–130)
GLUCOSE SERPL-MCNC: 215 MG/DL (ref 65–99)
HCT VFR BLD AUTO: 29.3 % (ref 37.5–51)
HDLC SERPL-MCNC: 25 MG/DL (ref 40–60)
HGB BLD-MCNC: 9.2 G/DL (ref 13–17.7)
INR PPP: 1.14 (ref 0.9–1.1)
LDL/HDL RATIO NULL: ABNORMAL
LDLC SERPL CALC-MCNC: 6 MG/DL (ref 0–100)
MAGNESIUM SERPL-MCNC: 2 MG/DL (ref 1.6–2.4)
MCH RBC QN AUTO: 30 PG (ref 26.6–33)
MCHC RBC AUTO-ENTMCNC: 31.4 G/DL (ref 31.5–35.7)
MCV RBC AUTO: 95.4 FL (ref 79–97)
PLATELET # BLD AUTO: 180 10*3/MM3 (ref 140–450)
PMV BLD AUTO: 10.2 FL (ref 6–12)
POTASSIUM SERPL-SCNC: 4.2 MMOL/L (ref 3.5–5.2)
PROT SERPL-MCNC: 6.4 G/DL (ref 6–8.5)
PROTHROMBIN TIME: 14.8 SECONDS (ref 12.1–14.7)
QT INTERVAL: 422 MS
QTC INTERVAL: 481 MS
RBC # BLD AUTO: 3.07 10*6/MM3 (ref 4.14–5.8)
SODIUM SERPL-SCNC: 139 MMOL/L (ref 136–145)
TRIGL SERPL-MCNC: 199 MG/DL (ref 0–150)
VLDLC SERPL-MCNC: 31 MG/DL (ref 5–40)
WBC NRBC COR # BLD AUTO: 10 10*3/MM3 (ref 3.4–10.8)

## 2024-05-29 PROCEDURE — 99232 SBSQ HOSP IP/OBS MODERATE 35: CPT | Performed by: INTERNAL MEDICINE

## 2024-05-29 PROCEDURE — 99232 SBSQ HOSP IP/OBS MODERATE 35: CPT | Performed by: NURSE PRACTITIONER

## 2024-05-29 PROCEDURE — 63710000001 INSULIN GLARGINE PER 5 UNITS: Performed by: INTERNAL MEDICINE

## 2024-05-29 PROCEDURE — 85610 PROTHROMBIN TIME: CPT | Performed by: INTERNAL MEDICINE

## 2024-05-29 PROCEDURE — 83735 ASSAY OF MAGNESIUM: CPT | Performed by: INTERNAL MEDICINE

## 2024-05-29 PROCEDURE — 80061 LIPID PANEL: CPT | Performed by: INTERNAL MEDICINE

## 2024-05-29 PROCEDURE — 80053 COMPREHEN METABOLIC PANEL: CPT | Performed by: INTERNAL MEDICINE

## 2024-05-29 PROCEDURE — 63710000001 INSULIN LISPRO (HUMAN) PER 5 UNITS: Performed by: INTERNAL MEDICINE

## 2024-05-29 PROCEDURE — 25010000002 HEPARIN (PORCINE) PER 1000 UNITS: Performed by: INTERNAL MEDICINE

## 2024-05-29 PROCEDURE — 93010 ELECTROCARDIOGRAM REPORT: CPT | Performed by: SPECIALIST

## 2024-05-29 PROCEDURE — 85027 COMPLETE CBC AUTOMATED: CPT | Performed by: INTERNAL MEDICINE

## 2024-05-29 PROCEDURE — 82948 REAGENT STRIP/BLOOD GLUCOSE: CPT

## 2024-05-29 PROCEDURE — 93005 ELECTROCARDIOGRAM TRACING: CPT | Performed by: INTERNAL MEDICINE

## 2024-05-29 RX ORDER — ATORVASTATIN CALCIUM 40 MG/1
40 TABLET, FILM COATED ORAL DAILY
Status: DISCONTINUED | OUTPATIENT
Start: 2024-05-30 | End: 2024-05-30 | Stop reason: HOSPADM

## 2024-05-29 RX ORDER — INSULIN LISPRO 100 [IU]/ML
3 INJECTION, SOLUTION INTRAVENOUS; SUBCUTANEOUS
Status: DISCONTINUED | OUTPATIENT
Start: 2024-05-29 | End: 2024-05-30 | Stop reason: HOSPADM

## 2024-05-29 RX ADMIN — LIDOCAINE 1 PATCH: 4 PATCH TOPICAL at 17:57

## 2024-05-29 RX ADMIN — INSULIN LISPRO 3 UNITS: 100 INJECTION, SOLUTION INTRAVENOUS; SUBCUTANEOUS at 20:16

## 2024-05-29 RX ADMIN — HEPARIN SODIUM 5000 UNITS: 5000 INJECTION INTRAVENOUS; SUBCUTANEOUS at 06:31

## 2024-05-29 RX ADMIN — AMMONIUM LACTATE 1 APPLICATION: 120 CREAM TOPICAL at 20:15

## 2024-05-29 RX ADMIN — INSULIN LISPRO 3 UNITS: 100 INJECTION, SOLUTION INTRAVENOUS; SUBCUTANEOUS at 20:15

## 2024-05-29 RX ADMIN — Medication 10 ML: at 20:15

## 2024-05-29 RX ADMIN — CETIRIZINE HYDROCHLORIDE 5 MG: 10 TABLET, FILM COATED ORAL at 09:11

## 2024-05-29 RX ADMIN — SEVELAMER CARBONATE 2400 MG: 800 TABLET, FILM COATED ORAL at 09:10

## 2024-05-29 RX ADMIN — MEGESTROL ACETATE 40 MG: 40 TABLET ORAL at 12:18

## 2024-05-29 RX ADMIN — FAMOTIDINE 20 MG: 20 TABLET, FILM COATED ORAL at 21:19

## 2024-05-29 RX ADMIN — ERYTHROMYCIN 1 APPLICATION: 5 OINTMENT OPHTHALMIC at 09:12

## 2024-05-29 RX ADMIN — LEVOTHYROXINE SODIUM 75 MCG: 0.07 TABLET ORAL at 09:12

## 2024-05-29 RX ADMIN — INSULIN LISPRO 3 UNITS: 100 INJECTION, SOLUTION INTRAVENOUS; SUBCUTANEOUS at 18:32

## 2024-05-29 RX ADMIN — SEVELAMER CARBONATE 2400 MG: 800 TABLET, FILM COATED ORAL at 17:57

## 2024-05-29 RX ADMIN — INSULIN GLARGINE 13 UNITS: 100 INJECTION, SOLUTION SUBCUTANEOUS at 20:15

## 2024-05-29 RX ADMIN — HEPARIN SODIUM 5000 UNITS: 5000 INJECTION INTRAVENOUS; SUBCUTANEOUS at 13:07

## 2024-05-29 RX ADMIN — INSULIN LISPRO 3 UNITS: 100 INJECTION, SOLUTION INTRAVENOUS; SUBCUTANEOUS at 13:07

## 2024-05-29 RX ADMIN — HEPARIN SODIUM 5000 UNITS: 5000 INJECTION INTRAVENOUS; SUBCUTANEOUS at 21:19

## 2024-05-29 RX ADMIN — MEGESTROL ACETATE 40 MG: 40 TABLET ORAL at 09:09

## 2024-05-29 RX ADMIN — EMPAGLIFLOZIN 10 MG: 10 TABLET, FILM COATED ORAL at 09:11

## 2024-05-29 RX ADMIN — ATORVASTATIN CALCIUM 20 MG: 20 TABLET, FILM COATED ORAL at 09:11

## 2024-05-29 RX ADMIN — GABAPENTIN 100 MG: 100 CAPSULE ORAL at 20:16

## 2024-05-29 RX ADMIN — SERTRALINE 50 MG: 50 TABLET, FILM COATED ORAL at 20:14

## 2024-05-29 RX ADMIN — Medication 10 ML: at 09:12

## 2024-05-29 RX ADMIN — ERYTHROMYCIN 1 APPLICATION: 5 OINTMENT OPHTHALMIC at 20:15

## 2024-05-29 RX ADMIN — METOPROLOL SUCCINATE 12.5 MG: 25 TABLET, FILM COATED, EXTENDED RELEASE ORAL at 20:14

## 2024-05-29 RX ADMIN — MEGESTROL ACETATE 40 MG: 40 TABLET ORAL at 17:57

## 2024-05-29 RX ADMIN — Medication 1 TABLET: at 09:11

## 2024-05-29 RX ADMIN — ASPIRIN 81 MG: 81 TABLET, COATED ORAL at 09:11

## 2024-05-29 RX ADMIN — SEVELAMER CARBONATE 2400 MG: 800 TABLET, FILM COATED ORAL at 12:18

## 2024-05-29 RX ADMIN — CLOPIDOGREL BISULFATE 75 MG: 75 TABLET, FILM COATED ORAL at 09:11

## 2024-05-29 RX ADMIN — INSULIN LISPRO 2 UNITS: 100 INJECTION, SOLUTION INTRAVENOUS; SUBCUTANEOUS at 09:08
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
## 2024-05-29 NOTE — PROGRESS NOTES
"Nephrology Progress Note      Subjective     Patient underwent cardiac catheterization yesterday s/p PCI.  Denied any chest pain shortness of breath    Objective       Vital signs :     Temp:  [97.8 °F (36.6 °C)-99.3 °F (37.4 °C)] 99.3 °F (37.4 °C)  Heart Rate:  [70-91] 78  Resp:  [10-36] 12  BP: (105-150)/(54-80) 140/64    Intake/Output                         05/27/24 0701 - 05/28/24 0700 05/28/24 0701 - 05/29/24 0700     3931-5369 2329-4712 Total 6870-2249 7335-9221 Total                 Intake    P.O.  480  360 840  0  -- 0    I.V.  --  -- --  141.6  -- 141.6    Total Intake 480 360 840 141.6 -- 141.6       Output    Urine  400  -- 400  --  -- --    Dialysis  --  -- --  2500  -- 2500    Total Output 400 -- 400 2500 -- 2500             Physical Exam:    General Appearance : alert, pleasant, appears stated age, cooperative and oriented  Lungs : clear to auscultation, respirations regular  Heart :  regular rhythm & normal rate, normal S1, S2 and no murmur, no rub  Abdomen : Soft, nondistended  Extremities : Trace edema,   Neurologic :   orientated to person, place, time and situation, Grossly no focal deficits        Laboratory Data :     Albumin Albumin   Date Value Ref Range Status   05/29/2024 3.2 (L) 3.5 - 5.2 g/dL Final      Magnesium Magnesium   Date Value Ref Range Status   05/29/2024 2.0 1.6 - 2.4 mg/dL Final   05/28/2024 2.2 1.6 - 2.4 mg/dL Final          PTH               No results found for: \"PTH\"    CBC and coagulation:  Results from last 7 days   Lab Units 05/29/24  0053 05/28/24  0118 05/27/24  0113 05/26/24  0110   WBC 10*3/mm3 10.00  --  12.07* 12.41*   HEMOGLOBIN g/dL 9.2* 9.2* 9.6* 9.4*   HEMATOCRIT % 29.3* 29.7* 31.4* 30.4*   MCV fL 95.4  --  97.5* 96.8   MCHC g/dL 31.4*  --  30.6* 30.9*   PLATELETS 10*3/mm3 180  --  187 204   INR  1.14*  --   --   --      Acid/base balance:      Renal and electrolytes:    Results from last 7 days   Lab Units 05/29/24  0053 05/28/24  0118 05/27/24  0113 " 05/26/24  0110 05/25/24  0033   SODIUM mmol/L 139 139 139 139 137   POTASSIUM mmol/L 4.2 4.7 4.1 4.0 4.4   MAGNESIUM mg/dL 2.0 2.2  --   --   --    CHLORIDE mmol/L 93* 98 95* 94* 93*   CO2 mmol/L 34.0* 28.2 30.8* 31.3* 28.2   BUN mg/dL 31* 52* 40* 25* 38*   CREATININE mg/dL 5.36* 8.51* 7.23* 5.20* 7.28*   CALCIUM mg/dL 8.8 8.9 9.2 8.7 8.8   PHOSPHORUS mg/dL  --  5.1*  --   --   --      Estimated Creatinine Clearance: 14.6 mL/min (A) (by C-G formula based on SCr of 5.36 mg/dL (H)).  @GFRCG:3@   Liver and pancreatic function:  Results from last 7 days   Lab Units 05/29/24  0053 05/26/24  0110 05/24/24  0114   ALBUMIN g/dL 3.2* 3.3* 3.4*   BILIRUBIN mg/dL 0.4 0.4 0.4   ALK PHOS U/L 156* 176* 216*   AST (SGOT) U/L 27 45* 30   ALT (SGPT) U/L 29 39 22         Cardiac:      Liver and pancreatic function:  Results from last 7 days   Lab Units 05/29/24  0053 05/26/24  0110 05/24/24  0114   ALBUMIN g/dL 3.2* 3.3* 3.4*   BILIRUBIN mg/dL 0.4 0.4 0.4   ALK PHOS U/L 156* 176* 216*   AST (SGOT) U/L 27 45* 30   ALT (SGPT) U/L 29 39 22       Medications :     acetaminophen, 650 mg, Oral, Once per day on Tuesday Thursday Saturday  ammonium lactate, 1 Application, Topical, Nightly  aspirin, 81 mg, Oral, Daily  atorvastatin, 20 mg, Oral, Daily  cetirizine, 5 mg, Oral, Daily  clopidogrel, 75 mg, Oral, Daily  empagliflozin, 10 mg, Oral, Daily  erythromycin, 1 Application, Left Eye, BID  famotidine, 20 mg, Oral, Q48H  gabapentin, 100 mg, Oral, Nightly  heparin (porcine), 5,000 Units, Subcutaneous, Q8H  insulin glargine, 10 Units, Subcutaneous, Nightly  insulin lispro, 2-7 Units, Subcutaneous, 4x Daily AC & at Bedtime  levothyroxine, 75 mcg, Oral, Daily  Lidocaine, 1 patch, Transdermal, Q PM  lubrisoft, 1 Application, Topical, Daily  megestrol, 40 mg, Oral, TID With Meals  metoprolol succinate XL, 12.5 mg, Oral, Nightly  multivitamin with minerals, 1 tablet, Oral, Daily  sertraline, 50 mg, Oral, Nightly  sevelamer, 2,400 mg, Oral, TID With  Meals  sodium chloride, 10 mL, Intravenous, Q12H  Vancomycin Pharmacy Intermittent/Pulse Dosing, , Does not apply, Daily             Assessment & Plan     -ESRD on intermittent dialysis  - Anemia likely due to anemia of chronic kidney disease  - New heart failure with reduced ejection fraction, compensated clinically  - Type 2 diabetes mellitus with renal involvement  - Essential hypertension  - MRSA bacteremia    S/p PCI yesterday, followed by dialysis.  No emergent indication for dialysis today, continue on intermittent dialysis Tuesdays Thursdays and Saturdays    Nova Saucedo MD  05/29/24  09:02 EDT

## 2024-05-29 NOTE — PROGRESS NOTES
LOS: 13 days     Name: Kennedy Prescott  Age/Sex: 73 y.o. male  :  1950        PCP: Jag Guillaume MD    Principal Problem:    SIRS (systemic inflammatory response syndrome)  Active Problems:    Sepsis    Ischemic cardiomyopathy      Admission Information: Kennedy Prescott is a 73 y.o. male with acoronary artery disease status post CABG in the remote past, HFpEF, insulin-dependent type 2 diabetes mellitus, hypertension, hyperlipidemia, pulmonary hypertension, end-stage renal disease with hemodialysis 3 times per week and obesity.     Chief Complaint: Nausea and vomiting    Interval history: Underwent invasive coronary angiogram yesterday and received a stent to the left circumflex.  His LIMA and SVG grafts are patent    Subjective     Patient's postprocedural course has been unremarkable.  He denies chest pain or shortness of breath.  He has been started on GDMT.    Vital Signs  Vital Signs (last 72 hrs)          0700   0659  07 0659  0700   0659  0700   1603   Most Recent      Temp (°F) 98 -  98.5    98 -  98.5    97.8 -  99.3      98.8     98.8 (37.1)  0800    Heart Rate 69 -  79    70 -  76    70 -  91    73 -  89     80  1500    Resp 17 -  20    16 -  18    10 -  36      21     21  0900    /63 -  164/72    114/66 -  158/73    105/59 -  150/74    99/77 -  142/67     139/72  1500    SpO2 (%) 93 -  95    96 -  98    91 -  99    91 -  100     97  1500    Flow (L/min)   2        2       2  1004          Temp:  [97.9 °F (36.6 °C)-99.3 °F (37.4 °C)] 98.8 °F (37.1 °C)  Heart Rate:  [70-91] 80  Resp:  [10-36] 21  BP: ()/(54-93) 139/72  Body mass index is 31.67 kg/m².      Intake/Output Summary (Last 24 hours) at 2024 1603  Last data filed at 2024 0800  Gross per 24 hour   Intake 200 ml   Output 2500 ml   Net -2300 ml       Constitutional:       General: Not in acute distress.     Appearance: Well-developed and not  in distress. Chronically ill-appearing. Not diaphoretic.   Eyes:      Conjunctiva/sclera: Conjunctivae normal.      Pupils: Pupils are equal, round, and reactive to light.   HENT:      Head: Normocephalic and atraumatic.   Neck:      Vascular: No carotid bruit or JVD.   Pulmonary:      Effort: Pulmonary effort is normal. No respiratory distress.      Breath sounds: Normal breath sounds.   Cardiovascular:      Normal rate. Regular rhythm.   Edema:     Peripheral edema absent.   Skin:     General: Skin is cool.      Comments: Right femoral cath access site dressing with some old sanguinous drainage.  No evidence of hematoma or ecchymosis.   Neurological:      Mental Status: Alert, oriented to person, place, and time and oriented to person, place and time.         Telemetry:  Sinus 70s     Results Review:   Results for orders placed during the hospital encounter of 05/15/24    Cardiac Catheterization/Vascular Study    Conclusion  Images from the original result were not included.  CARDIAC CATHETERIZATION / INTERVENTION REPORT            DATE OF PROCEDURE: 5/28/2024      INDICATION FOR PROCEDURE: chest pain  -68-year-old male who reported chest pain.  As well as recent echocardiogram shows reduced LVEF of 25-30%.  Patient was recommended coronary angiogram to assess his coronary anatomy.  - Patient has known history of CAD with a CABG x 3 (done many years ago) with LIMA to LAD, SVG to OM (presumably) and SVG to RCA.  - His other risk factor includes end-stage renal disease on dialysis, atrial fibrillation, HTN, HLD.        PRE PROCEDURE DIAGNOSIS:  1.  Chest pain  2.  LV dysfunction.        POST PROCEDURE DIAGNOSIS:  Native coronary anatomy:  Left main patent  % occluded after mid segment (after S1).  Mid/distal LAD fills via patent LIMA graft.  80-90% proximal LCx lesion.  PCI was done with a 3.5 x 15 alexy point stent postdilated with a 3.5 and 4.0 NC balloon)  - 90% lesion reduced to 0%.  There is a calcified  segment which still have residual 20% lesion.  DOREEN II flow improved to DOREEN-3 postprocedure.  % occluded at the ostium.  Distal vessel fills via collaterals from the left.    Graft anatomy:  1.  HUIZAR to LAD widely patent.  No lesion at distal anastomosis.  2.  SVG to left side (either OM or diagonal, unclear): Occluded at the ostium  3.  SVG to RCA: Occluded at the ostium.    Left heart catheterization:  LVEF 30-35% inferior wall hypokinesis.  Anterior wall nam well.  2.  LVEDP 20 mmHg.      PROCEDURE PERFORMED:  1. Selective Coronary Angiogram  2. Left heart catheretization  3. Left Ventriculography  4.  Bypass graft angiogram  5.  Status post successful PCI of proximal LCx with a 3.5 x 15 Xience alexy point stent.  (Postdilated to 3.5 and 4.0 NC balloon)  6.  Percutaneous closure of right femoral artery access with Angio-Seal.      DESCRIPTION OF DIAGNOSTIC PROCEDURE:  Prior to the procedure risk, benefits and possible alternative were discussed with the patient and informed consent was obtained. Patient was brought to cardiac cath lab table in post absorbtive state. Patient was prepped and drape in usual sterile fashion. IV Versed and Fentanyl was used for moderate sedation. 2% Lidocaine was used for topical anesthesia.    Right groin area was infiltrated.  Right femoral artery was punctured with a micropuncture needle via modified Seldinger technique, ultrasound guidance was used.  A 6 Pashto arterial sheath was inserted.    Left main engaged with a JL 4 catheter, RCA engaged with a J R4 catheter and angiograms were in multiple views.  Venous grafts were engaged with a JR4 catheter for the right graft and LCB catheter for the left graft and angiograms taken multiple views.    It was difficult to engage LIMA selectively.  Neither IM catheter or JR4 catheter gave a adequate images.  Finally using a 3 DRC catheter I was able to get adequate nonselective image of the LIMA graft and this was  patent.    Pigtail catheter was used to enter the LV, pressures were obtained.  LV gram was done using 30 mL contrast at 10 mL/s injection.  Pigtail catheter was flushed with saline pullback under continuous pressure monitoring there is no gradient across the aortic valve.    DIAGNOSTIC FINDING:  Native coronary angiogram:  LM: Large calibre vessel , normal take off from left cusp, divides into LAD and Lcx.  Left main is free of disease.    LAD: Large to medium caliber vessel.  After short proximal segment right at the beginning of mid LAD this is 100% occluded.  Rest of mid and distal LAD fills via patent LIMA.  Distal LAD is a small caliber vessel wrapping around the apex.    LCX: Moderate calibre vessel.  This consist primarily of a medium caliber obtuse marginal.  There is a 80-90% calcified lesion at the proximal/mid LCx.  PCI was done with a 3.5 x 15 Xience alexy point stent, postdilated with a 3.5 and 4.0 NC balloon.  90% lesion reduced to 0% DOREEN I flow improved to DOREEN-3 postprocedure.  However distal segment of the lesion is severely calcified and there is a residual 20% lesion.    RCA: This was likely a dominant vessel.  100% occluded at the ostium.  Distal segment fills via collaterals from the left    Graft angiogram:  1.  LIMA to LAD: Widely patent attaches at the mid LAD supplies rest of the mid/distal LAD.  There is no significant lesion beyond the graft anastomosis.  Of note LIMA graft was difficult to engage with a IM catheter 3 DRC catheter gave the best nonselective views.    2.  SVG to left coronary artery (either diagonal or OM): Totally occluded at the ostium.  Only stump is visualized.    3.  SVG to RCA: Totally occluded at the ostium.  Only stump was visualized.    Left heart catheterization  1.  Left Ventriculography: LVEF 30-35%, inferior wall moderate to severe hypokinesis, anterior wall nam well.  2.  LV systolic pressure 149 mmHg, LVEDP 20 mmHg.  No gradient across aortic  valve.      DESCRIPTION OF CORONARY INTERVENTION PROCEDURE:    Patient's native LCx had 80-90% calcified severe lesion.  This was the only lesion amenable for PCI.  So we decided to proceed with percutaneous coronary intervention.    Left main was engaged with XB 3.5 guide.  The lesion was crossed with a BMW wire.  Heparin bolus was given and therapeutic ACT was maintained.  Patient was also loaded with Plavix.    The lesion was predilated with a 2.5 x 15 compliant balloon for 12 jose angel for 15 seconds.  Attempt to advance stent at this time was unsuccessful.  The lesion was further predilated with a 3.0 x 15 NC balloon.  Even at this juncture I was unable to advance the stent as there is a significant calcification.    A Godzilla was advanced across the lesion and at this time I was able to deliver a 3.5 x 15 point stent and deployed at 16 jose angel for 15 seconds.  Distal segment of the stent appeared to be underexpanded (due to calcification).    We then advanced a 3.5 x 12 NC balloon and further postdilated at high pressure, 20 jose angel for 30 seconds.  Stent did not appear to be fully expanded.  So we advanced a 4.0 x 8 NC balloon and this segment was further postdilated at 20 jose angel for 15 seconds.    At this time there was DOREEN-3 flow.  There was a residual less than 20% stenosis.  We decided to accept the result.  Patient was chest pain-free.    All catheter and sheath was removed.  He most states he successfully obtained with the Angio-Seal.  Patient was transferred to recovery area in stable condition.    Lesion Type: B2  Lesion length: 15 mm    COMPLICATIONS : None    MISCELLANEOUS:  Clinical frailty: 3  ASA: 3  Mallampati: 3  EBL: Less than 20cc  Face to face mdoerate conscious  sedation time : 11:25 - 11:52      ASSESSMENT and Plan:  1.  Native % occluded after short proximal segment.  Mid to distal LAD fills via patent LIMA.  2.  80-90% calcified proximal native LCx lesion.  Status post PCI with a 3.5 x 15 alexy  point stent (postdilated with a 3.5 then five 4.0 NC balloon)  3.  RCA totally occluded at the ostium.  4.  HUIZAR to LAD patent (only graft is patent)  5.  SVG to left coronaries (either diagonal or OM) totally occluded at the ostium.  6.  SVG to RCA is totally occluded at the ostium.  7.  LVEF 30-35%.  Moderate to severe inferior wall hypokinesis.  Anterior wall nam well.  LVEDP 20 mmHg.  8.  Patient will be on a dual antiplatelet therapy with aspirin and Plavix.  Stop aspirin after 30 days as patient is also on Eliquis.  High-dose high intensity statin  Beta-blocker  Guideline directed medical therapy for coronary artery disease and LV dysfunction.            Mackenzie Ruiz MD  05/28/24  12:59 EDT    Results from last 7 days   Lab Units 05/29/24  0053 05/28/24  0118 05/27/24  0113 05/26/24  0110 05/25/24  0033 05/24/24  0113 05/23/24  0153   WBC 10*3/mm3 10.00  --  12.07* 12.41* 12.64* 9.98 9.24   HEMOGLOBIN g/dL 9.2* 9.2* 9.6* 9.4* 9.9* 9.8* 9.2*   PLATELETS 10*3/mm3 180  --  187 204 224 203 189     Results from last 7 days   Lab Units 05/29/24  0053 05/28/24  0118 05/27/24  0113 05/26/24  0110 05/25/24  0033 05/24/24  0114 05/23/24  0153   SODIUM mmol/L 139 139 139 139 137 137 134*   POTASSIUM mmol/L 4.2 4.7 4.1 4.0 4.4 3.8 4.5   CHLORIDE mmol/L 93* 98 95* 94* 93* 93* 93*   CO2 mmol/L 34.0* 28.2 30.8* 31.3* 28.2 31.3* 26.5   BUN mg/dL 31* 52* 40* 25* 38* 27* 55*   CREATININE mg/dL 5.36* 8.51* 7.23* 5.20* 7.28* 5.14* 8.48*   CALCIUM mg/dL 8.8 8.9 9.2 8.7 8.8 8.5* 8.3*   GLUCOSE mg/dL 215* 249* 157* 226* 165* 225* 213*     Results from last 7 days   Lab Units 05/28/24  0118 05/27/24  1840 05/27/24  0113   HSTROP T ng/L 135* 140* 127*       Results from last 7 days   Lab Units 05/29/24  0053   INR  1.14*       I reviewed the patient's new clinical results.  I reviewed the patient's new imaging results and agree with the interpretation.  I personally viewed and interpreted the patient's EKG/Telemetry  data      Medication Review:   acetaminophen, 650 mg, Oral, Once per day on Tuesday Thursday Saturday  ammonium lactate, 1 Application, Topical, Nightly  aspirin, 81 mg, Oral, Daily  [START ON 5/30/2024] atorvastatin, 40 mg, Oral, Daily  cetirizine, 5 mg, Oral, Daily  clopidogrel, 75 mg, Oral, Daily  empagliflozin, 10 mg, Oral, Daily  erythromycin, 1 Application, Left Eye, BID  famotidine, 20 mg, Oral, Q48H  gabapentin, 100 mg, Oral, Nightly  heparin (porcine), 5,000 Units, Subcutaneous, Q8H  insulin glargine, 10 Units, Subcutaneous, Nightly  insulin lispro, 2-7 Units, Subcutaneous, 4x Daily AC & at Bedtime  levothyroxine, 75 mcg, Oral, Daily  Lidocaine, 1 patch, Transdermal, Q PM  lubrisoft, 1 Application, Topical, Daily  megestrol, 40 mg, Oral, TID With Meals  metoprolol succinate XL, 12.5 mg, Oral, Nightly  multivitamin with minerals, 1 tablet, Oral, Daily  sertraline, 50 mg, Oral, Nightly  sevelamer, 2,400 mg, Oral, TID With Meals  sodium chloride, 10 mL, Intravenous, Q12H  Vancomycin Pharmacy Intermittent/Pulse Dosing, , Does not apply, Daily           Assessment:  CAD s/p CABG x3 in the remote past, now s/p stent to Lcx  New HFrEF, compensated  End-stage renal disease on hemodialysis      Recommendations:  Dual antiplatelet therapy for at least 1 year.  Continue high intensity statin and beta-blocker.  Follow-up in clinic in 1 to 2 weeks  Unable to optimize GDMT due to end-stage renal disease    Stable for discharge from a cardiac standpoint.    I discussed the patients findings and my recommendations with patient.      Electronically signed by DORA Green, 05/29/24, 4:31 PM EDT.

## 2024-05-29 NOTE — PROGRESS NOTES
PROGRESS NOTE         Patient Identification:  Name:  Kennedy Prescott  Age:  73 y.o.  Sex:  male  :  1950  MRN:  [id number]  Visit Number:  [id number]  Primary Care Provider:  Jag Guillaume MD         LOS: 13 days       ----------------------------------------------------------------------------------------------------------------------  Subjective       Chief Complaints:    Nausea and Vomiting        Interval History:      Patient sitting up in bed this morning.  Status post heart cath on 2024.  No issues or complaints.  Anxious to go home soon.  Lungs clear to auscultation bilaterally.  Abdomen soft, nontender.  WBC 10.00.      Review of Systems:    Constitutional: no fever, chills and night sweats.  Generalized fatigue.  Eyes: no eye drainage, itching or redness.  HEENT: no mouth sores, dysphagia or nose bleed.  Respiratory: no for shortness of breath, cough or production of sputum.  Cardiovascular: no chest pain, no palpitations, no orthopnea.  Gastrointestinal: no nausea, vomiting or diarrhea. No abdominal pain, hematemesis or rectal bleeding.    Genitourinary: no dysuria or polyuria.  Hematologic/lymphatic: no lymph node abnormalities, no easy bruising or easy bleeding.  Musculoskeletal: no muscle or joint pain.  Skin: No rash and no itching.  Neurological: no loss of consciousness, no seizure, no headache.  Behavioral/Psych: no depression or suicidal ideation.  Endocrine: no hot flashes.  Immunologic: negative.    ----------------------------------------------------------------------------------------------------------------------      Objective       hospitals Meds:  acetaminophen, 650 mg, Oral, Once per day on   ammonium lactate, 1 Application, Topical, Nightly  aspirin, 81 mg, Oral, Daily  atorvastatin, 20 mg, Oral, Daily  cetirizine, 5 mg, Oral, Daily  clopidogrel, 75 mg, Oral, Daily  empagliflozin, 10 mg, Oral, Daily  erythromycin, 1  Application, Left Eye, BID  famotidine, 20 mg, Oral, Q48H  gabapentin, 100 mg, Oral, Nightly  heparin (porcine), 5,000 Units, Subcutaneous, Q8H  insulin glargine, 10 Units, Subcutaneous, Nightly  insulin lispro, 2-7 Units, Subcutaneous, 4x Daily AC & at Bedtime  levothyroxine, 75 mcg, Oral, Daily  Lidocaine, 1 patch, Transdermal, Q PM  lubrisoft, 1 Application, Topical, Daily  megestrol, 40 mg, Oral, TID With Meals  metoprolol succinate XL, 12.5 mg, Oral, Nightly  multivitamin with minerals, 1 tablet, Oral, Daily  sertraline, 50 mg, Oral, Nightly  sevelamer, 2,400 mg, Oral, TID With Meals  sodium chloride, 10 mL, Intravenous, Q12H  Vancomycin Pharmacy Intermittent/Pulse Dosing, , Does not apply, Daily             ----------------------------------------------------------------------------------------------------------------------    Vital Signs:  Temp:  [97.8 °F (36.6 °C)-99.3 °F (37.4 °C)] 98.8 °F (37.1 °C)  Heart Rate:  [70-91] 89  Resp:  [10-36] 21  BP: (105-150)/(54-80) 122/67  Mean Arterial Pressure (Non-Invasive) for the past 24 hrs (Last 3 readings):   Noninvasive MAP (mmHg)   05/29/24 0900 91   05/29/24 0830 86   05/29/24 0800 78       SpO2 Percentage    05/29/24 0830 05/29/24 0900 05/29/24 0930   SpO2: 95% 95% 96%     SpO2:  [91 %-99 %] 96 %  on   ;   Device (Oxygen Therapy): room air    Body mass index is 31.67 kg/m².  Wt Readings from Last 3 Encounters:   05/29/24 103 kg (227 lb 1.2 oz)   10/02/23 87.1 kg (192 lb)   09/22/23 87.1 kg (192 lb)        Intake/Output Summary (Last 24 hours) at 5/29/2024 1023  Last data filed at 5/29/2024 0800  Gross per 24 hour   Intake 341.63 ml   Output 2500 ml   Net -2158.37 ml     Diet: Cardiac; Healthy Heart (2-3 Na+); Fluid Consistency: Thin (IDDSI 0)  ----------------------------------------------------------------------------------------------------------------------      Physical Exam:    Constitutional: Chronically ill-appearing elderly gentleman.  On room air this  morning.    HENT:  Head: Normocephalic and atraumatic.  Mouth:  Moist mucous membranes.    Eyes:  Conjunctivae and EOM are normal.  No scleral icterus.  Neck:  Neck supple.  No JVD present.    Cardiovascular:  Normal rate, regular rhythm and normal heart sounds with 3/6 murmur. No edema.  Pulmonary/Chest: Clear to auscultation bilaterally.  abdominal:  Soft.  Bowel sounds are normal.  No distension and no tenderness.   Musculoskeletal:  No edema, no tenderness, and no deformity.  No swelling or redness of joints.  Neurological:  Alert and oriented to person, place, and time.  No facial droop.  No slurred speech.   Skin:  Skin is warm and dry.  No rash noted.  No pallor.  Right subclavian HD cath site, dressing in place. No drainage on the dressing.  LUE AVF with no erythema/edema, thrill and bruit.  Bilateral lower extremity chronic ischemic changes.  Left pedal pulse faint.  Dry ulcers noted to the left lateral foot and left heel.  Psychiatric:  Normal mood and affect.  Behavior is normal.        ----------------------------------------------------------------------------------------------------------------------  Results from last 7 days   Lab Units 05/28/24  0118 05/27/24  1840 05/27/24  0113   HSTROP T ng/L 135* 140* 127*         Results from last 7 days   Lab Units 05/29/24  0053   CHOLESTEROL mg/dL 62   TRIGLYCERIDES mg/dL 199*   HDL CHOL mg/dL 25*   LDL CHOL mg/dL 6             Results from last 7 days   Lab Units 05/29/24  0053 05/28/24  0118 05/27/24  0113 05/26/24  0110   WBC 10*3/mm3 10.00  --  12.07* 12.41*   HEMOGLOBIN g/dL 9.2* 9.2* 9.6* 9.4*   HEMATOCRIT % 29.3* 29.7* 31.4* 30.4*   MCV fL 95.4  --  97.5* 96.8   MCHC g/dL 31.4*  --  30.6* 30.9*   PLATELETS 10*3/mm3 180  --  187 204   INR  1.14*  --   --   --      Results from last 7 days   Lab Units 05/29/24  0053 05/28/24  0118 05/27/24  0113 05/26/24  0110 05/25/24  0033 05/24/24  0114   SODIUM mmol/L 139 139 139 139   < > 137   POTASSIUM mmol/L 4.2  "4.7 4.1 4.0   < > 3.8   MAGNESIUM mg/dL 2.0 2.2  --   --   --   --    CHLORIDE mmol/L 93* 98 95* 94*   < > 93*   CO2 mmol/L 34.0* 28.2 30.8* 31.3*   < > 31.3*   BUN mg/dL 31* 52* 40* 25*   < > 27*   CREATININE mg/dL 5.36* 8.51* 7.23* 5.20*   < > 5.14*   CALCIUM mg/dL 8.8 8.9 9.2 8.7   < > 8.5*   GLUCOSE mg/dL 215* 249* 157* 226*   < > 225*   ALBUMIN g/dL 3.2*  --   --  3.3*  --  3.4*   BILIRUBIN mg/dL 0.4  --   --  0.4  --  0.4   ALK PHOS U/L 156*  --   --  176*  --  216*   AST (SGOT) U/L 27  --   --  45*  --  30   ALT (SGPT) U/L 29  --   --  39  --  22    < > = values in this interval not displayed.   Estimated Creatinine Clearance: 14.6 mL/min (A) (by C-G formula based on SCr of 5.36 mg/dL (H)).  No results found for: \"AMMONIA\"    Hemoglobin A1C   Date/Time Value Ref Range Status   05/28/2024 0118 6.50 (H) 4.80 - 5.60 % Final     Glucose   Date/Time Value Ref Range Status   05/29/2024 0628 152 (H) 70 - 130 mg/dL Final   05/28/2024 2129 235 (H) 70 - 130 mg/dL Final   05/28/2024 1623 126 70 - 130 mg/dL Final   05/28/2024 0613 208 (H) 70 - 130 mg/dL Final   05/27/2024 2028 273 (H) 70 - 130 mg/dL Final   05/27/2024 1631 233 (H) 70 - 130 mg/dL Final   05/27/2024 1051 183 (H) 70 - 130 mg/dL Final   05/27/2024 0615 139 (H) 70 - 130 mg/dL Final     Lab Results   Component Value Date    HGBA1C 6.50 (H) 05/28/2024     Lab Results   Component Value Date    TSH 2.490 03/21/2024    FREET4 1.43 05/29/2022       Blood Culture   Date Value Ref Range Status   05/15/2024 Staphylococcus aureus, MRSA (C)  Preliminary     Comment:       Infectious disease consultation is highly recommended to rule out distant foci of infection.  Methicillin resistant Staphylococcus aureus, Patient may be an isolation risk.   05/15/2024 Staphylococcus aureus, MRSA (C)  Preliminary     Comment:       Infectious disease consultation is highly recommended to rule out distant foci of infection.  Methicillin resistant Staphylococcus aureus, Patient may be " "an isolation risk.     No results found for: \"URINECX\"  No results found for: \"WOUNDCX\"  No results found for: \"STOOLCX\"  No results found for: \"RESPCX\"  Pain Management Panel           No data to display                  ----------------------------------------------------------------------------------------------------------------------  Imaging Results (Last 24 Hours)       ** No results found for the last 24 hours. **            ----------------------------------------------------------------------------------------------------------------------    Pertinent Infectious Disease Results                Assessment/Plan       Assessment     Sepsis on admission  MRSA bacteremia  Chronic left foot wounds        Plan        Patient sitting up in bed this morning.  Status post heart cath on 5/28/2024.  No issues or complaints.  Anxious to go home soon.  Lungs clear to auscultation bilaterally.  Abdomen soft, nontender.  WBC 10.00.    GAVI from 5/22/2024 reports no evidence of vegetation.      In the setting of negative GAVI would recommend to continue vancomycin pharmacy to dose post hemodialysis to continue through 6/15/2024. We will continue to monitor closely.  Patient will require outpatient infectious disease follow-up.      ANTIMICROBIAL THERAPY    Vancomycin Pharmacy Intermittent/Pulse Dosing     Code Status:   Code Status and Medical Interventions:   Ordered at: 05/15/24 3194     Level Of Support Discussed With:    Patient     Code Status (Patient has no pulse and is not breathing):    CPR (Attempt to Resuscitate)     Medical Interventions (Patient has pulse or is breathing):    Full Support       Monica Hawkins, DORA  05/29/24  10:23 EDT    "

## 2024-05-29 NOTE — CASE MANAGEMENT/SOCIAL WORK
Continued Stay Note  [facility]     Patient Name: Kennedy Prescott  MRN: [id number]  Today's Date: 5/29/2024    Admit Date: 5/15/2024    Plan: CM was notified that patient has Life Vest in room and Extra Vest was received at [facility] per Trud[facility].  CM contacted Maame @ [employer] and explained that patient was refusing to wear and he was having Heart Cath today.  Maame states that she will send someone to  [facility] and at [facility].  CM contact Joycelyn Primary RN in PCU to take and leave at [facility] and Maureen @ [facility] that someone will .  No other issues or concerns are noted at this time.   Discharge Plan       Row Name 05/29/24 1115       Plan    Plan CM was notified that patient has Life Vest in room and Extra Vest was received at [facility] per [facility].  CM contacted Maame @ [employer] and explained that patient was refusing to wear and he was having Heart Cath today.  Maame states that she will send someone to  [facility] at [facility] and at [facility].  CM contact Nicole Hirsch RN in PCU to take and leave at [facility] and Maureen @ [facility] that someone will .  No other issues or concerns are noted at this time.                   Discharge Codes    No documentation.                 Expected Discharge Date and Time       Expected Discharge Date Expected Discharge Time    May 29, 2024               Maureen Jang, BHANU

## 2024-05-30 VITALS
HEIGHT: 71 IN | OXYGEN SATURATION: 98 % | TEMPERATURE: 98.5 F | HEART RATE: 79 BPM | BODY MASS INDEX: 31.85 KG/M2 | SYSTOLIC BLOOD PRESSURE: 153 MMHG | DIASTOLIC BLOOD PRESSURE: 73 MMHG | RESPIRATION RATE: 18 BRPM | WEIGHT: 227.51 LBS

## 2024-05-30 LAB
ANION GAP SERPL CALCULATED.3IONS-SCNC: 14.4 MMOL/L (ref 5–15)
BUN SERPL-MCNC: 47 MG/DL (ref 8–23)
BUN/CREAT SERPL: 6.7 (ref 7–25)
CALCIUM SPEC-SCNC: 9.2 MG/DL (ref 8.6–10.5)
CHLORIDE SERPL-SCNC: 95 MMOL/L (ref 98–107)
CO2 SERPL-SCNC: 30.6 MMOL/L (ref 22–29)
CREAT SERPL-MCNC: 7.04 MG/DL (ref 0.76–1.27)
EGFRCR SERPLBLD CKD-EPI 2021: 7.5 ML/MIN/1.73
GLUCOSE BLDC GLUCOMTR-MCNC: 236 MG/DL (ref 70–130)
GLUCOSE SERPL-MCNC: 166 MG/DL (ref 65–99)
HCT VFR BLD AUTO: 27.6 % (ref 37.5–51)
HGB BLD-MCNC: 8.5 G/DL (ref 13–17.7)
MAGNESIUM SERPL-MCNC: 2.3 MG/DL (ref 1.6–2.4)
PHOSPHATE SERPL-MCNC: 4.6 MG/DL (ref 2.5–4.5)
POTASSIUM SERPL-SCNC: 4.5 MMOL/L (ref 3.5–5.2)
QT INTERVAL: 424 MS
QTC INTERVAL: 470 MS
SODIUM SERPL-SCNC: 140 MMOL/L (ref 136–145)
VANCOMYCIN SERPL-MCNC: 24.8 MCG/ML (ref 5–40)

## 2024-05-30 PROCEDURE — 99239 HOSP IP/OBS DSCHRG MGMT >30: CPT | Performed by: INTERNAL MEDICINE

## 2024-05-30 PROCEDURE — 82948 REAGENT STRIP/BLOOD GLUCOSE: CPT

## 2024-05-30 PROCEDURE — 93010 ELECTROCARDIOGRAM REPORT: CPT | Performed by: SPECIALIST

## 2024-05-30 PROCEDURE — 85014 HEMATOCRIT: CPT | Performed by: INTERNAL MEDICINE

## 2024-05-30 PROCEDURE — 93005 ELECTROCARDIOGRAM TRACING: CPT | Performed by: INTERNAL MEDICINE

## 2024-05-30 PROCEDURE — 80048 BASIC METABOLIC PNL TOTAL CA: CPT | Performed by: INTERNAL MEDICINE

## 2024-05-30 PROCEDURE — 25010000002 HEPARIN (PORCINE) PER 1000 UNITS: Performed by: INTERNAL MEDICINE

## 2024-05-30 PROCEDURE — 80202 ASSAY OF VANCOMYCIN: CPT

## 2024-05-30 PROCEDURE — 85018 HEMOGLOBIN: CPT | Performed by: INTERNAL MEDICINE

## 2024-05-30 PROCEDURE — 99232 SBSQ HOSP IP/OBS MODERATE 35: CPT | Performed by: NURSE PRACTITIONER

## 2024-05-30 PROCEDURE — 63710000001 INSULIN LISPRO (HUMAN) PER 5 UNITS: Performed by: INTERNAL MEDICINE

## 2024-05-30 PROCEDURE — 84100 ASSAY OF PHOSPHORUS: CPT | Performed by: INTERNAL MEDICINE

## 2024-05-30 PROCEDURE — 83735 ASSAY OF MAGNESIUM: CPT | Performed by: INTERNAL MEDICINE

## 2024-05-30 RX ORDER — NITROGLYCERIN 0.4 MG/1
0.4 TABLET SUBLINGUAL
Qty: 20 TABLET | Refills: 0 | Status: SHIPPED | OUTPATIENT
Start: 2024-05-30

## 2024-05-30 RX ORDER — ATORVASTATIN CALCIUM 40 MG/1
40 TABLET, FILM COATED ORAL DAILY
Qty: 90 TABLET | Refills: 0 | Status: SHIPPED | OUTPATIENT
Start: 2024-05-31

## 2024-05-30 RX ORDER — INSULIN DETEMIR 100 [IU]/ML
13 INJECTION, SOLUTION SUBCUTANEOUS 2 TIMES DAILY
Qty: 3 ML | Refills: 0 | Status: SHIPPED | OUTPATIENT
Start: 2024-05-30

## 2024-05-30 RX ORDER — CLOPIDOGREL BISULFATE 75 MG/1
75 TABLET ORAL DAILY
Qty: 30 TABLET | Refills: 0 | Status: SHIPPED | OUTPATIENT
Start: 2024-05-31

## 2024-05-30 RX ORDER — MEGESTROL ACETATE 40 MG/1
40 TABLET ORAL
Qty: 90 TABLET | Refills: 0 | Status: SHIPPED | OUTPATIENT
Start: 2024-05-30

## 2024-05-30 RX ORDER — LIDOCAINE 4 G/G
2 PATCH TOPICAL EVERY EVENING
Qty: 6 PATCH | Refills: 0 | Status: SHIPPED | OUTPATIENT
Start: 2024-05-30 | End: 2024-06-02

## 2024-05-30 RX ORDER — GABAPENTIN 100 MG/1
200 CAPSULE ORAL NIGHTLY
Qty: 6 CAPSULE | Refills: 0 | Status: SHIPPED | OUTPATIENT
Start: 2024-05-30 | End: 2024-06-02

## 2024-05-30 RX ORDER — FAMOTIDINE 20 MG/1
10 TABLET, FILM COATED ORAL DAILY
Start: 2024-05-30

## 2024-05-30 RX ORDER — METOPROLOL SUCCINATE 25 MG/1
25 TABLET, EXTENDED RELEASE ORAL NIGHTLY
Qty: 30 TABLET | Refills: 0 | Status: SHIPPED | OUTPATIENT
Start: 2024-05-30 | End: 2024-05-30 | Stop reason: HOSPADM

## 2024-05-30 RX ORDER — INSULIN ASPART 100 [IU]/ML
3 INJECTION, SOLUTION INTRAVENOUS; SUBCUTANEOUS
Qty: 3 ML | Refills: 0 | Status: SHIPPED | OUTPATIENT
Start: 2024-05-30

## 2024-05-30 RX ORDER — ASPIRIN 81 MG/1
81 TABLET ORAL DAILY
Qty: 30 TABLET | Refills: 0 | Status: SHIPPED | OUTPATIENT
Start: 2024-05-31

## 2024-05-30 RX ORDER — METOPROLOL SUCCINATE 25 MG/1
25 TABLET, EXTENDED RELEASE ORAL NIGHTLY
Status: DISCONTINUED | OUTPATIENT
Start: 2024-05-30 | End: 2024-05-30 | Stop reason: HOSPADM

## 2024-05-30 RX ORDER — METOPROLOL SUCCINATE 25 MG/1
12.5 TABLET, EXTENDED RELEASE ORAL NIGHTLY
Qty: 15 TABLET | Refills: 0 | Status: SHIPPED | OUTPATIENT
Start: 2024-05-30 | End: 2024-05-30

## 2024-05-30 RX ORDER — LIDOCAINE HYDROCHLORIDE 20 MG/ML
5 SOLUTION OROPHARYNGEAL EVERY 6 HOURS PRN
Qty: 100 ML | Refills: 0 | Status: SHIPPED | OUTPATIENT
Start: 2024-05-30

## 2024-05-30 RX ADMIN — ATORVASTATIN CALCIUM 40 MG: 40 TABLET, FILM COATED ORAL at 08:59

## 2024-05-30 RX ADMIN — SEVELAMER CARBONATE 2400 MG: 800 TABLET, FILM COATED ORAL at 11:23

## 2024-05-30 RX ADMIN — HEPARIN SODIUM 5000 UNITS: 5000 INJECTION INTRAVENOUS; SUBCUTANEOUS at 06:46

## 2024-05-30 RX ADMIN — INSULIN LISPRO 3 UNITS: 100 INJECTION, SOLUTION INTRAVENOUS; SUBCUTANEOUS at 11:22

## 2024-05-30 RX ADMIN — Medication 1 APPLICATION: at 09:00

## 2024-05-30 RX ADMIN — CLOPIDOGREL BISULFATE 75 MG: 75 TABLET, FILM COATED ORAL at 08:59

## 2024-05-30 RX ADMIN — SEVELAMER CARBONATE 2400 MG: 800 TABLET, FILM COATED ORAL at 08:59

## 2024-05-30 RX ADMIN — EMPAGLIFLOZIN 10 MG: 10 TABLET, FILM COATED ORAL at 08:59

## 2024-05-30 RX ADMIN — ASPIRIN 81 MG: 81 TABLET, COATED ORAL at 08:59

## 2024-05-30 RX ADMIN — Medication 10 ML: at 08:59

## 2024-05-30 RX ADMIN — LEVOTHYROXINE SODIUM 75 MCG: 0.07 TABLET ORAL at 08:59

## 2024-05-30 RX ADMIN — ERYTHROMYCIN 1 APPLICATION: 5 OINTMENT OPHTHALMIC at 08:59

## 2024-05-30 RX ADMIN — ACETAMINOPHEN 500 MG: 500 TABLET ORAL at 11:23

## 2024-05-30 RX ADMIN — MEGESTROL ACETATE 40 MG: 40 TABLET ORAL at 08:59

## 2024-05-30 RX ADMIN — Medication 1 TABLET: at 08:59

## 2024-05-30 RX ADMIN — CETIRIZINE HYDROCHLORIDE 5 MG: 10 TABLET, FILM COATED ORAL at 08:59

## 2024-05-30 RX ADMIN — MEGESTROL ACETATE 40 MG: 40 TABLET ORAL at 11:23

## 2024-05-30 RX ADMIN — APIXABAN 5 MG: 5 TABLET, FILM COATED ORAL at 11:23

## 2024-05-30 NOTE — DISCHARGE PLACEMENT REQUEST
"Satinder Holman (73 y.o. Male)       Date of Birth   1950    Social Security Number       Address   [address] [place] [address]    Home Phone   [phone]    MRN   [id number]       Hinduism   None    Marital Status   Unknown                            Admission Date   5/15/24    Admission Type   Emergency    Admitting Provider   Alex Crum MD    Attending Provider   Alex Crum MD    Department, Room/Bed   [facility] PROGRESS CARE, P219/1P       Discharge Date       Discharge Disposition   Skilled Nursing Facility (DC - External)    Discharge Destination                                 Attending Provider: Alex Crum MD    Allergies: No Known Allergies    Isolation: Contact   Infection: MDR Pseudomonas (05/17/23)   Code Status: CPR    Ht: 180.3 cm (71\")   Wt: 103 kg (227 lb 8.2 oz)    Admission Cmt: None   Principal Problem: SIRS (systemic inflammatory response syndrome) [R65.10]                   Active Insurance as of 5/15/2024       Primary Coverage       Payor Plan Insurance Group Employer/Plan Group    MEDICARE MEDICARE A & B        Payor Plan Address Payor Plan Phone Number Payor Plan Fax Number Effective Dates    [address] [phone]  12/1/1996 - None Entered    [facility] [id number]         Subscriber Name Subscriber Birth Date Member ID       SATINDER HOLMAN 1950 [id number]               Secondary Coverage       Payor Plan Insurance Group Employer/Plan Group    [place]        Payor Plan Address Payor Plan Phone Number Payor Plan Fax Number Effective Dates    [address] [phone]  5/2/2024 - None Entered    [facility] [place] [address]         Subscriber Name Subscriber Birth Date Member ID       SATINDER HOLMAN 1950 [id number]                     Emergency Contacts        (Rel.) Home Phone Work Phone Mobile Phone    KOBY HOLMAN (Son) [phone] -- [phone]              Insurance Information  "                 MEDICARE/MEDICARE A & B Phone: [phone]    Subscriber: Kennedy Prescott Subscriber#: [id number]    Group#: -- Precert#: --        [place] Phone: [phone]    Subscriber: Kennedy Prescott Subscriber#: [id number]    Group#: -- Precert#: --             History & Physical        Mackenzie Ruiz MD at 24 1013            H&P reviewed. The patient was examined and there are no changes to the H&P.          Electronically signed by Mackenzie Ruiz MD at 24 1013   Source Note: H&P (View-Only)             LOS: 11 days     Name: Kennedy Prescott  Age/Sex: 73 y.o. male  :  1950        PCP: Jag Guillaume MD    Principal Problem:    SIRS (systemic inflammatory response syndrome)  Active Problems:    Sepsis      Following for: New HFrEF        Interval history: Patient denies any chest pains or shortness of breath overnight.  Resting comfortably in bed.  Plan is to proceed with heart cath in the morning.    Subjective    ROS    Vital Signs  Vitals:    24 1200 24 1900 24 0300 24 0600   BP: 154/65 148/65 149/66 164/72   BP Location: Right arm Right arm Right arm Right arm   Patient Position: Lying Lying Lying Lying   Pulse: 79 72 69 76   Resp: 18 18 20 18   Temp: 98.1 °F (36.7 °C) 98.1 °F (36.7 °C) 98.2 °F (36.8 °C) 98 °F (36.7 °C)   TempSrc: Oral Oral Oral Oral   SpO2: 94% 93% 95%    Weight:   99.2 kg (218 lb 9.6 oz)    Height:         Body mass index is 30.49 kg/m².      Intake/Output Summary (Last 24 hours) at 2024 1324  Last data filed at 2024 1300  Gross per 24 hour   Intake 600 ml   Output 500 ml   Net 100 ml       Constitutional:       General: Not in acute distress.     Appearance: Healthy appearance. Well-developed and not in distress. Not diaphoretic.   Eyes:      Conjunctiva/sclera: Conjunctivae normal.      Pupils: Pupils are equal, round, and reactive to light.   HENT:      Head: Normocephalic and atraumatic.   Neck:       Vascular: No carotid bruit or JVD.   Pulmonary:      Effort: Pulmonary effort is normal. No respiratory distress.      Breath sounds: Normal breath sounds.   Cardiovascular:      Normal rate. Regular rhythm.   Edema:     Peripheral edema absent.   Skin:     General: Skin is cool.   Neurological:      Mental Status: Alert, oriented to person, place, and time and oriented to person, place and time.         Results Review:     Results from last 7 days   Lab Units 05/27/24  0113 05/26/24  0110 05/25/24  0033 05/24/24  0113 05/23/24  0153 05/22/24  0036 05/21/24  0321   WBC 10*3/mm3 12.07* 12.41* 12.64* 9.98 9.24 10.41 12.12*   HEMOGLOBIN g/dL 9.6* 9.4* 9.9* 9.8* 9.2* 8.6* 8.5*   PLATELETS 10*3/mm3 187 204 224 203 189 159 140     Results from last 7 days   Lab Units 05/27/24  0113 05/26/24  0110 05/25/24  0033 05/24/24  0114 05/23/24  0153 05/22/24  0036 05/21/24  0321   SODIUM mmol/L 139 139 137 137 134* 132* 131*   POTASSIUM mmol/L 4.1 4.0 4.4 3.8 4.5 4.1 4.1   CHLORIDE mmol/L 95* 94* 93* 93* 93* 91* 90*   CO2 mmol/L 30.8* 31.3* 28.2 31.3* 26.5 27.7 25.1   BUN mg/dL 40* 25* 38* 27* 55* 40* 70*   CREATININE mg/dL 7.23* 5.20* 7.28* 5.14* 8.48* 6.82* 10.34*   CALCIUM mg/dL 9.2 8.7 8.8 8.5* 8.3* 8.2* 8.1*   GLUCOSE mg/dL 157* 226* 165* 225* 213* 204* 99   ALT (SGPT) U/L  --  39  --  22  --   --   --    AST (SGOT) U/L  --  45*  --  30  --   --   --                  I reviewed the patient's new clinical results.  I reviewed the patient's new imaging results and agree with the interpretation.  I personally viewed and interpreted the patient's EKG/Telemetry data    Medication Review:   acetaminophen, 650 mg, Oral, Once per day on Tuesday Thursday Saturday  ammonium lactate, 1 Application, Topical, Nightly  aspirin, 81 mg, Oral, Daily  atorvastatin, 20 mg, Oral, Daily  cetirizine, 5 mg, Oral, Daily  empagliflozin, 10 mg, Oral, Daily  erythromycin, 1 Application, Left Eye, BID  famotidine, 20 mg, Oral, Q48H  gabapentin, 100  mg, Oral, Nightly  heparin (porcine), 5,000 Units, Subcutaneous, Q8H  insulin lispro, 2-7 Units, Subcutaneous, 4x Daily AC & at Bedtime  levothyroxine, 75 mcg, Oral, Daily  Lidocaine, 1 patch, Transdermal, Q PM  lubrisoft, 1 Application, Topical, Daily  megestrol, 40 mg, Oral, TID With Meals  metoprolol succinate XL, 12.5 mg, Oral, Nightly  multivitamin with minerals, 1 tablet, Oral, Daily  sertraline, 50 mg, Oral, Nightly  sevelamer, 2,400 mg, Oral, TID With Meals  sodium chloride, 10 mL, Intravenous, Q12H  Vancomycin Pharmacy Intermittent/Pulse Dosing, , Does not apply, Daily           Assessment:  New HFrEF, compensated  End-stage renal disease on hemodialysis      Recommendations:  Patient still stable denies any chest pains.  Plan is to proceed with left heart catheterization in the morning.  Continue with GDMT.    I discussed the patients findings and my recommendations with patient and family    Simone Spear PA-C  24  13:24 EDT  Electronically signed by Simone Spear PA-C, 24, 1:24 PM EDT.     Seen and examined on rounds.  Chart reviewed.  Agree with patient's charting, assessment plan by ELICIA    On exam patient laying in bed in no distress, sensorimotor system grossly intact  Chest no wheezing or crackles  Regular rate rhythm, normal S1-S2, no rub gallop murmur, no S3  Abdomen distended  No edema  Assessment and plan  #1 Cardioblate patient with history of bypass surgery with abnormal CTA current admission.  Patient is noted to have new drop in EF on GAVI.  Patient is wearing LifeVest.  Patient scheduled for cath for Tuesday.  Guideline guided medical therapy for heart failure will be continued.  .Electronically signed by Tommy Garcia MD, 24, 3:06 PM EDT.      Electronically signed by Tommy Garcia MD at 24 1506                 Tommy Garcia MD at 24 1324             LOS: 11 days     Name: Kennedy Prescott  Age/Sex: 73 y.o. male  :  1950        PCP: Jag Guillaume  MD GISELLE    Principal Problem:    SIRS (systemic inflammatory response syndrome)  Active Problems:    Sepsis      Following for: New HFrEF        Interval history: Patient denies any chest pains or shortness of breath overnight.  Resting comfortably in bed.  Plan is to proceed with heart cath in the morning.    Subjective    ROS    Vital Signs  Vitals:    05/26/24 1200 05/26/24 1900 05/27/24 0300 05/27/24 0600   BP: 154/65 148/65 149/66 164/72   BP Location: Right arm Right arm Right arm Right arm   Patient Position: Lying Lying Lying Lying   Pulse: 79 72 69 76   Resp: 18 18 20 18   Temp: 98.1 °F (36.7 °C) 98.1 °F (36.7 °C) 98.2 °F (36.8 °C) 98 °F (36.7 °C)   TempSrc: Oral Oral Oral Oral   SpO2: 94% 93% 95%    Weight:   99.2 kg (218 lb 9.6 oz)    Height:         Body mass index is 30.49 kg/m².      Intake/Output Summary (Last 24 hours) at 5/27/2024 1324  Last data filed at 5/27/2024 1300  Gross per 24 hour   Intake 600 ml   Output 500 ml   Net 100 ml       Constitutional:       General: Not in acute distress.     Appearance: Healthy appearance. Well-developed and not in distress. Not diaphoretic.   Eyes:      Conjunctiva/sclera: Conjunctivae normal.      Pupils: Pupils are equal, round, and reactive to light.   HENT:      Head: Normocephalic and atraumatic.   Neck:      Vascular: No carotid bruit or JVD.   Pulmonary:      Effort: Pulmonary effort is normal. No respiratory distress.      Breath sounds: Normal breath sounds.   Cardiovascular:      Normal rate. Regular rhythm.   Edema:     Peripheral edema absent.   Skin:     General: Skin is cool.   Neurological:      Mental Status: Alert, oriented to person, place, and time and oriented to person, place and time.         Results Review:     Results from last 7 days   Lab Units 05/27/24  0113 05/26/24  0110 05/25/24  0033 05/24/24  0113 05/23/24  0153 05/22/24  0036 05/21/24  0321   WBC 10*3/mm3 12.07* 12.41* 12.64* 9.98 9.24 10.41 12.12*   HEMOGLOBIN g/dL 9.6* 9.4* 9.9*  9.8* 9.2* 8.6* 8.5*   PLATELETS 10*3/mm3 187 204 224 203 189 159 140     Results from last 7 days   Lab Units 05/27/24  0113 05/26/24  0110 05/25/24  0033 05/24/24  0114 05/23/24  0153 05/22/24  0036 05/21/24  0321   SODIUM mmol/L 139 139 137 137 134* 132* 131*   POTASSIUM mmol/L 4.1 4.0 4.4 3.8 4.5 4.1 4.1   CHLORIDE mmol/L 95* 94* 93* 93* 93* 91* 90*   CO2 mmol/L 30.8* 31.3* 28.2 31.3* 26.5 27.7 25.1   BUN mg/dL 40* 25* 38* 27* 55* 40* 70*   CREATININE mg/dL 7.23* 5.20* 7.28* 5.14* 8.48* 6.82* 10.34*   CALCIUM mg/dL 9.2 8.7 8.8 8.5* 8.3* 8.2* 8.1*   GLUCOSE mg/dL 157* 226* 165* 225* 213* 204* 99   ALT (SGPT) U/L  --  39  --  22  --   --   --    AST (SGOT) U/L  --  45*  --  30  --   --   --                  I reviewed the patient's new clinical results.  I reviewed the patient's new imaging results and agree with the interpretation.  I personally viewed and interpreted the patient's EKG/Telemetry data    Medication Review:   acetaminophen, 650 mg, Oral, Once per day on Tuesday Thursday Saturday  ammonium lactate, 1 Application, Topical, Nightly  aspirin, 81 mg, Oral, Daily  atorvastatin, 20 mg, Oral, Daily  cetirizine, 5 mg, Oral, Daily  empagliflozin, 10 mg, Oral, Daily  erythromycin, 1 Application, Left Eye, BID  famotidine, 20 mg, Oral, Q48H  gabapentin, 100 mg, Oral, Nightly  heparin (porcine), 5,000 Units, Subcutaneous, Q8H  insulin lispro, 2-7 Units, Subcutaneous, 4x Daily AC & at Bedtime  levothyroxine, 75 mcg, Oral, Daily  Lidocaine, 1 patch, Transdermal, Q PM  lubrisoft, 1 Application, Topical, Daily  megestrol, 40 mg, Oral, TID With Meals  metoprolol succinate XL, 12.5 mg, Oral, Nightly  multivitamin with minerals, 1 tablet, Oral, Daily  sertraline, 50 mg, Oral, Nightly  sevelamer, 2,400 mg, Oral, TID With Meals  sodium chloride, 10 mL, Intravenous, Q12H  Vancomycin Pharmacy Intermittent/Pulse Dosing, , Does not apply, Daily           Assessment:  New HFrEF, compensated  End-stage renal disease on  hemodialysis      Recommendations:  Patient still stable denies any chest pains.  Plan is to proceed with left heart catheterization in the morning.  Continue with GDMT.    I discussed the patients findings and my recommendations with patient and family    Simone Spear PA-C  24  13:24 EDT  Electronically signed by Simone Spear PA-C, 24, 1:24 PM EDT.     Seen and examined on rounds.  Chart reviewed.  Agree with patient's charting, assessment plan by ELICIA    On exam patient laying in bed in no distress, sensorimotor system grossly intact  Chest no wheezing or crackles  Regular rate rhythm, normal S1-S2, no rub gallop murmur, no S3  Abdomen distended  No edema  Assessment and plan  #1 Cardioblate patient with history of bypass surgery with abnormal CTA current admission.  Patient is noted to have new drop in EF on GAVI.  Patient is wearing LifeVest.  Patient scheduled for cath for Tuesday.  Guideline guided medical therapy for heart failure will be continued.  .Electronically signed by Tommy Garcia MD, 24, 3:06 PM EDT.      Electronically signed by Tommy Garcia MD at 24 1506       Alex Crmu MD at 05/15/24 1601              [facility]    Patient Identification:  Name:  Kennedy Prescott  Age:  73 y.o.  Sex:  male  :  1950  MRN:  [id number]   Visit Number:  [id number]  Admit Date: 5/15/2024   Room number:  3329/1P  Primary Care Physician:  Jag Guillaume MD    Date of Admission: 5/15/2024     Subjective     Chief complaint:    Chief Complaint   Patient presents with    Nausea    Vomiting     History of presenting illness:  73 y.o. male who was admitted on 5/15/2024 from the nursing home via the ED with nausea and vomiting; the patient resides at the [facility].  He was a past medical history of insulin dependent type 2 diabetes mellitus, essential hypertension, ESRD with hemodialysis Tuesday,/Thursday/Saturday via a  tunneled hemodialysis catheter, hyperlipidemia, paroxysmal atrial fibrillation, coronary artery disease status post CABG in the distant past, pulmonary hypertension, and heart failure with preserved EF.  The patient was recently admitted to [facility] 4/26/2024-5/1/2024 with hyperkalemia that required emergent hemodialysis.  While the patient was hospitalized, he had a left arm hemodialysis AV fistula placed by Dr. Woodson on 4/29/2024 and he had a tunneled hemodialysis catheter placed on 5/1/2024.    I saw the patient in room 334; bedside nurse Lana was present during my evaluation.    The patient states that he began feeling ill one day ago with nausea and vomiting; he denies any diarrhea or abdominal pain.  He had chills but no sweating and no fever.  He noticed that his left foot was starting to hurt, but he did not disclose this to the ED provider.  In the ED, the patient had a temperature of 100.2 but imaging did not reveal suspected infections.  However, the labs were concerning, as the WBC, CRP, and lactic acid were elevated.  Thus, the patient was placed on the medical surgical floor for further evaluation and treatment.  Please note that the patient is a poor historian and I was thus only able to obtain a limited history of presenting illness.  ---------------------------------------------------------------------------------------------------------------------   Review of Systems   Constitutional:  Positive for chills and fatigue. Negative for diaphoresis and fever.   Respiratory:  Negative for cough and shortness of breath.    Cardiovascular:  Negative for chest pain and leg swelling.   Gastrointestinal:  Positive for nausea and vomiting. Negative for abdominal pain and diarrhea.   Musculoskeletal:         Left foot pain   Skin:  Negative for rash and wound.   Neurological:  Negative for headaches.      ---------------------------------------------------------------------------------------------------------------------   Past Medical History:   Diagnosis Date    CHF (congestive heart failure)     Coronary artery disease     Diabetes mellitus     Dialysis patient     Disease of thyroid gland     Elevated cholesterol     GERD (gastroesophageal reflux disease)     Hypertension     Myocardial infarction     Renal disorder     stage 5     Past Surgical History:   Procedure Laterality Date    CARDIAC SURGERY      cabg    CHOLECYSTECTOMY      COLONOSCOPY N/A 9/22/2023    Procedure: COLONOSCOPY;  Surgeon: Trip Peter MD;  Location: [facility];  Service: Gastroenterology;  Laterality: N/A;    DIALYSIS FISTULA CREATION Left     arm     Family History    Arthritis Mother    Hypertension Father    Hyperlipidemia Father    Heart disease Father    Diabetes Father    COPD Father    Cancer Daughter     Social History     Socioeconomic History    Marital status: Unknown   Tobacco Use    Smoking status: Never    Smokeless tobacco: Never   Vaping Use    Vaping status: Never Used   Substance and Sexual Activity    Alcohol use: Never    Drug use: Never    Sexual activity: Defer     ---------------------------------------------------------------------------------------------------------------------   Allergies:  Patient has no known allergies.  ---------------------------------------------------------------------------------------------------------------------   Medications below are reported home medications pulling from within the system; at this time, these medications have not been reconciled unless otherwise specified and are in the verification process for further verification as current home medications.      Prior to Admission Medications       Prescriptions Last Dose Informant Patient Reported? Taking?    acetaminophen (TYLENOL) 500 MG tablet   Yes No    Take 1 tablet by mouth Every 6 (Six) Hours As Needed for Mild  Pain.    amLODIPine (NORVASC) 5 MG tablet   Yes No    Take 1 tablet by mouth Daily.    atorvastatin (LIPITOR) 20 MG tablet   Yes No    Take 1 tablet by mouth Every Night.    famotidine (PEPCID) 20 MG tablet  Pharmacy Yes No    Take 1 tablet by mouth 2 (Two) Times a Day.    gabapentin (NEURONTIN) 100 MG capsule  Pharmacy Yes No    Take 1 capsule by mouth 3 (Three) Times a Day.    glipizide (GLUCOTROL) 10 MG tablet   Yes No    Take 1 tablet by mouth 2 (Two) Times a Day Before Meals.    hydrALAZINE (APRESOLINE) 50 MG tablet   Yes No    Take 2 tablets by mouth 3 (Three) Times a Day.    hydrOXYzine pamoate (VISTARIL) 25 MG capsule   Yes No    Take 1 capsule by mouth 3 (Three) Times a Day As Needed for Itching.    insulin detemir (LEVEMIR) 100 UNIT/ML injection   Yes No    Inject  under the skin into the appropriate area as directed Daily.    Insulin Lispro (humaLOG) 100 UNIT/ML injection   Yes No    Inject  under the skin into the appropriate area as directed 3 (Three) Times a Day Before Meals.    levocetirizine (XYZAL) 5 MG tablet   Yes No    Take 1 tablet by mouth Every Evening.    levothyroxine (SYNTHROID, LEVOTHROID) 75 MCG tablet   Yes No    Take 1 tablet by mouth Daily.    lidocaine (LMX) 4 % cream   Yes No    Apply 1 application  topically to the appropriate area as directed As Needed for Mild Pain.    loperamide (IMODIUM) 2 MG capsule   Yes No    Take 1 capsule by mouth 4 (Four) Times a Day As Needed for Diarrhea.    metoprolol succinate XL (TOPROL-XL) 25 MG 24 hr tablet   Yes No    Take 0.5 tablets by mouth Daily.    mirtazapine (REMERON) 15 MG tablet  Pharmacy Yes No    Take 1 tablet by mouth Every Night.    multivitamin with minerals tablet tablet   Yes No    Take 1 tablet by mouth Daily.    ondansetron (ZOFRAN) 8 MG tablet   Yes No    Take  by mouth Every 8 (Eight) Hours As Needed for Nausea or Vomiting.    ondansetron ODT (ZOFRAN-ODT) 4 MG disintegrating tablet   No No    Place 1 tablet on the tongue Every 6  (Six) Hours As Needed for Nausea or Vomiting.    sertraline (ZOLOFT) 50 MG tablet   Yes No    Take 1 tablet by mouth Daily.    sevelamer (RENAGEL) 800 MG tablet  Pharmacy Yes No    Take 1 tablet by mouth 3 (Three) Times a Day With Meals.    Suprep Bowel Prep Kit 17.5-3.13-1.6 GM/177ML solution oral solution   No No    Dispense one Kit        Sig: Used as Directed          Objective     Vital Signs:  Temp:  [98.5 °F (36.9 °C)-103 °F (39.4 °C)] 103 °F (39.4 °C)  Heart Rate:  [] 96  Resp:  [18] 18  BP: ()/(53-71) 98/54    Mean Arterial Pressure (Non-Invasive) for the past 24 hrs (Last 3 readings):   Noninvasive MAP (mmHg)   05/15/24 1640 89   05/15/24 1600 75   05/15/24 1520 72     SpO2:  [90 %-96 %] 96 %  on   ;   Device (Oxygen Therapy): room air  Body mass index is 26.78 kg/m².    Wt Readings from Last 3 Encounters:   05/15/24 87.1 kg (192 lb 0.3 oz)   10/02/23 87.1 kg (192 lb)   09/22/23 87.1 kg (192 lb)      ----------------------------------------------------------------------------------------------------------------------  Physical Exam  Vitals and nursing note reviewed. Exam conducted with a chaperone present.   Constitutional:       General: He is awake.      Appearance: He is well-developed and normal weight. He is not toxic-appearing or diaphoretic.   HENT:      Head: Normocephalic and atraumatic.      Right Ear: External ear normal.      Left Ear: External ear normal.      Nose: Nose normal.   Eyes:      Comments: Right eye was patched.  Left pupil reflex was normal; no icterus seen.   Neck:      Comments: Right IJ tunneled HD catheter was present with an occlusive dressing on top of the insertion site.  Cardiovascular:      Rate and Rhythm: Normal rate and regular rhythm.      Pulses: Normal pulses.      Heart sounds: No murmur heard.     Arteriovenous access: Left arteriovenous access is present.     Comments: Good thrill is felt.  Pulmonary:      Effort: Pulmonary effort is normal. No  respiratory distress.      Breath sounds: No wheezing or rales.   Abdominal:      General: Bowel sounds are normal. There is no distension.      Palpations: Abdomen is soft.      Tenderness: There is no abdominal tenderness. There is no guarding.   Musculoskeletal:         General: Signs of injury present. No deformity.      Cervical back: Full passive range of motion without pain and normal range of motion.      Comments: Left foot swollen with some erythema.  See skin exam.  No swollen or red knees, ankles, elbows, wrists bilaterally.  Both feet feel cold and appear to be the same temperature; no cyanosis is seen.   Skin:     Capillary Refill: Capillary refill takes less than 2 seconds.      Coloration: Skin is not jaundiced.      Comments: He has healing abrasions down both anterior legs, with the left leg having more abrasions than the right leg.  The left foot has erythema on the toes, with some extension onto the dorsal foot.  There are some crusted lesions on some of the toes.  I do not see any streaking.  The erythema feels cold.   Neurological:      Mental Status: He is alert and oriented to person, place, and time. Mental status is at baseline.      Cranial Nerves: No cranial nerve deficit.   Psychiatric:         Mood and Affect: Mood normal.         Behavior: Behavior normal. Behavior is cooperative.         Thought Content: Thought content normal.         Judgment: Judgment normal.       ---------------------------------------------------------------------------------------------------------------------  EKG: Atrial fibrillation with a heart rate of 108 and a QTc of 482 ms.  There are inverted T waves in the lateral leads with some mild ST depressions.  There is also poor R wave progression.  When compared to an EKG dated 5/29/2022, the T wave inversions are a new finding and the comparison EKG was in normal sinus rhythm.  Please note that I have personally looked at the EKG from this admission, the  No comparison EKGs in the medical records, and the above is my interpretation of this admission's EKG; I await the final cardiology read.      Telemetry:  Ordered      Last echocardiogram from [facility] 3/17/2023:  TTE procedure: ECHO COMPLETE (DOPPLER / COLOR) W OR WO CONTRAST.    Impression:    Technically difficult study due to poor acoustic window.    Concentric LVH: mild    Visually estimated ejection fraction 55% +/- 5%.    Cannot exclude regional wall motion abnormalities    Indeterminate diastolic function. However there is some degree of    diastolci dysfunction.    LA dilated: moderate    RA dilated: moderate    Mild mitral valve annulus calcification.    Trace mitral regurgitation.    Mild (1+) tricuspid regurgitation.    RVSP 47 mmHg    Mild pulmonary hypertension.    Dilated IVC with no inspiratory collapse.     I have personally read the ECHO final report.  --------------------------------------------------------------------------------------------------------------------  LABS:    CBC and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 0.64.  Lactate 2.3-2.8.  CRP 5.34-7.54.  WBCs 16.83, hemoglobin 8.9, platelets 133.  CMP remarkable for chloride 93, BUN 39, creatinine 6.33, anion gap 15.5-17.5.  High-sensitivity troponins 172-160-221.  Blood cultures grew out MRSA on admission as well as on 05/17/2024, but have since been negative.  His tunneled dialysis catheter was removed on 05/17/2024.  Transthoracic echocardiogram did not provide adequate views of his cardiac valves and so GAVI was requested.    At the time of my visit he is awake in bed, but he is unable to offer much in the way of history.  The majority of the information was taken from the chart.  He does report that he is not having bodyaches or chills.  He also denies chest pain or shortness of breath    Cardiac risk factors:arteriosclerotic heart disease, diabetes mellitus, hypercholesterolemia, hypertension, Sedentary life style, and Obesity    Last Echo:  Results for orders placed during the hospital encounter of 05/15/24    Adult Transthoracic Echo Complete W/ Cont if Necessary Per Protocol    Interpretation Summary    Normal left ventricular cavity size and wall thickness noted. All left ventricular wall segments contract normally.    Left ventricular ejection fraction appears to be 51 - 55%.    The aortic valve is not well visualized.. The aortic valve is grossly normal in structure.    No aortic valve regurgitation or stenosis is present    Mitral valve is not well visualized. There is mild calcification of the mitral valve posterior leaflet(s).    Mild mitral valve regurgitation is present. No significant mitral valve stenosis is present.    There is no evidence of pericardial effusion.    Comments: May consider a transesophageal echocardiographic study to have a better evaluation of the cardiac valves and to rule out endocardial vegetations if clinically  warranted.      Last Stress:       Last Cath:      Past Medical History:   Diagnosis Date    CHF (congestive heart failure)     Coronary artery disease     Diabetes mellitus     Dialysis patient     Disease of thyroid gland     Elevated cholesterol     GERD (gastroesophageal reflux disease)     Hypertension     Myocardial infarction     Renal disorder     stage 5     Past Surgical History:   Procedure Laterality Date    CARDIAC SURGERY      cabg    CHOLECYSTECTOMY      COLONOSCOPY N/A 9/22/2023    Procedure: COLONOSCOPY;  Surgeon: Trip Peter MD;  Location: [facility];  Service: Gastroenterology;  Laterality: N/A;    DIALYSIS FISTULA CREATION Left     arm     Family History   Problem Relation Age of Onset    Arthritis Mother     COPD Father     Diabetes Father     Heart disease Father     Hyperlipidemia Father     Hypertension Father     Cancer Daughter      Social History     Tobacco Use    Smoking status: Never    Smokeless tobacco: Never   Vaping Use    Vaping status: Never Used   Substance Use Topics    Alcohol use: Never    Drug use: Never     Medications Prior to Admission   Medication Sig Dispense Refill Last Dose    ammonium lactate (AMLACTIN) 12 % cream Apply 1 g topically to the appropriate area as directed Every Night. Apply to dry skin on feet   5/14/2024    atorvastatin (LIPITOR) 20 MG tablet Take 1 tablet by mouth Daily.   5/15/2024    cetaphil (CETAPHIL) lotion Apply 1 Application topically to the appropriate area as directed Daily.   5/15/2024    erythromycin (ROMYCIN) 5 MG/GM ophthalmic ointment Administer 1 Application into the left eye 2 (Two) Times a Day.   5/15/2024    famotidine (PEPCID) 20 MG tablet Take 1 tablet by mouth 2 (Two) Times a Day.   5/15/2024    gabapentin (NEURONTIN) 100 MG capsule Take 2 capsules by mouth Every Night.   5/14/2024    glipizide (GLUCOTROL) 10 MG tablet Take 1 tablet by mouth Daily.   5/15/2024    glipizide (GLUCOTROL) 5 MG tablet Take 1 tablet by mouth Daily.    5/15/2024    insulin aspart (novoLOG FLEXPEN) 100 UNIT/ML solution pen-injector sc pen Inject 2-6 Units under the skin into the appropriate area as directed 4 (Four) Times a Day Before Meals & at Bedtime.   5/15/2024    insulin detemir (Levemir FlexPen) 100 UNIT/ML injection Inject 25 Units under the skin into the appropriate area as directed 2 (Two) Times a Day.   5/15/2024    levocetirizine (XYZAL) 5 MG tablet Take 1 tablet by mouth Daily.   5/15/2024    levothyroxine (SYNTHROID, LEVOTHROID) 75 MCG tablet Take 1 tablet by mouth Daily.   5/15/2024    lidocaine 3 % cream cream Apply 1 Application topically Every Night.   5/14/2024    loperamide (IMODIUM) 2 MG capsule Take 1 capsule by mouth 3 (Three) Times a Week.   5/14/2024    loperamide (IMODIUM) 2 MG capsule Take 1 capsule by mouth 2 (Two) Times a Day.   5/15/2024    metoprolol succinate XL (TOPROL-XL) 25 MG 24 hr tablet Take 0.5 tablets by mouth Every Night.   5/14/2024    midodrine (PROAMATINE) 2.5 MG tablet Take 1 tablet by mouth 3 (Three) Times a Week.   5/14/2024    multivitamin with minerals tablet tablet Take 1 tablet by mouth Daily.   5/15/2024    sertraline (ZOLOFT) 50 MG tablet Take 1 tablet by mouth Every Night.   5/14/2024    sevelamer (RENAGEL) 800 MG tablet Take 3 tablets by mouth 3 (Three) Times a Day With Meals.   5/15/2024    acetaminophen (TYLENOL) 325 MG tablet Take 2 tablets by mouth 3 (Three) Times a Week.   5/13/2024    acetaminophen (TYLENOL) 500 MG tablet Take 1 tablet by mouth Every 4 (Four) Hours As Needed for Mild Pain.   Unknown    bisacodyl (Dulcolax) 10 MG suppository Insert 1 suppository into the rectum Daily As Needed for Constipation.   Unknown    lactulose (CHRONULAC) 10 GM/15ML solution Take 30 mL by mouth Daily As Needed (constipation).   Unknown    Lidocaine Viscous HCl (XYLOCAINE) 2 % solution Take 5 mL by mouth Every 6 (Six) Hours As Needed for Mild Pain.   Unknown    loperamide (IMODIUM) 2 MG capsule Take 2 capsules by  mouth Every 6 (Six) Hours As Needed for Diarrhea.   Unknown    ondansetron (ZOFRAN) 4 MG tablet Take 1 tablet by mouth Every 8 (Eight) Hours As Needed for Nausea or Vomiting.   Unknown    polyethylene glycol (MIRALAX) 17 GM/SCOOP powder Take 17 g by mouth 2 (Two) Times a Day As Needed (constipation).   Unknown     Allergies:  Patient has no known allergies.    Review of Systems   Constitutional:  Positive for fatigue. Negative for chills and fever.   Respiratory:  Negative for shortness of breath.    Cardiovascular:  Negative for chest pain.   Gastrointestinal:  Positive for nausea and vomiting.   Neurological:  Positive for weakness.        Objective      Vital Signs  Temp:  [97.8 °F (36.6 °C)-98.8 °F (37.1 °C)] 98.4 °F (36.9 °C)  Heart Rate:  [68-81] 68  Resp:  [16-20] 18  BP: (117-138)/(56-76) 117/56  Body mass index is 30.78 kg/m².    Intake/Output Summary (Last 24 hours) at 5/22/2024 1112  Last data filed at 5/21/2024 2300  Gross per 24 hour   Intake 540 ml   Output 3100 ml   Net -2560 ml       Physical Exam  Vitals and nursing note reviewed.   Constitutional:       General: He is not in acute distress.     Appearance: He is obese. He is ill-appearing (Chronically).   HENT:      Head: Normocephalic and atraumatic.      Nose: Nose normal.   Eyes:      Conjunctiva/sclera: Conjunctivae normal.   Cardiovascular:      Rate and Rhythm: Normal rate. Rhythm irregularly irregular.      Heart sounds: No murmur heard.  Pulmonary:      Effort: Pulmonary effort is normal.      Breath sounds: Normal breath sounds.   Musculoskeletal:         General: Normal range of motion.      Cervical back: Normal range of motion.      Right lower leg: No edema.      Left lower leg: No edema.   Skin:     General: Skin is warm and dry.   Neurological:      General: No focal deficit present.      Mental Status: He is alert.   Psychiatric:         Mood and Affect: Mood normal.         Behavior: Behavior normal.         Telemetry: A-fib  "60s    Results Review:   I reviewed the patient's new clinical results.  Results from last 7 days   Lab Units 05/16/24  0128 05/15/24  1954 05/15/24  1639 05/15/24  1415   HSTROP T ng/L 221* 173* 160* 172*     Results from last 7 days   Lab Units 05/22/24  0036 05/21/24  0321 05/20/24  0109 05/19/24  0102 05/18/24  0408 05/17/24  0927 05/17/24  0218   WBC 10*3/mm3 10.41 12.12* 13.00* 11.08* 11.53* 9.52 10.71   HEMOGLOBIN g/dL 8.6* 8.5* 8.5* 8.7* 8.8* 7.2* 7.8*   PLATELETS 10*3/mm3 159 140 116* 97* 116* 95* 108*     Results from last 7 days   Lab Units 05/22/24  0036 05/21/24  0321 05/20/24  0109 05/19/24  0102 05/18/24  0408 05/17/24  0927 05/17/24  0218 05/16/24  0128 05/15/24  1415 05/15/24  1128   SODIUM mmol/L 132* 131* 129* 127* 129* 133* 133* 135* 136 136   POTASSIUM mmol/L 4.1 4.1 3.9 3.8 3.6 4.0 4.4 4.1 4.1 3.9   CHLORIDE mmol/L 91* 90* 87* 86* 87* 93* 93* 95* 93* 93*   CO2 mmol/L 27.7 25.1 25.9 27.4 27.9 23.3 22.7 24.7 25.5 27.5   BUN mg/dL 40* 70* 56* 45* 30* 65* 63* 50* 41* 39*   CREATININE mg/dL 6.82* 10.34* 8.29* 7.18* 5.54* 9.44* 8.89* 7.86* 7.03* 6.33*   CALCIUM mg/dL 8.2* 8.1* 7.9* 8.1* 8.2* 8.1* 8.1* 8.3* 9.0 8.7   GLUCOSE mg/dL 204* 99 128* 96 164* 51* 68 94 96 83   ALT (SGPT) U/L  --   --  11 11 12  --  10 7 8 9   AST (SGOT) U/L  --   --  23 27 31  --  27 15 16 16     Lab Results   Component Value Date    INR 1.31 (H) 05/19/2024    INR 1.58 (H) 05/18/2024    INR 1.52 (H) 05/17/2024    INR 1.54 (H) 05/16/2024    INR 1.35 (H) 05/15/2024    INR 1.04 03/17/2023     Lab Results   Component Value Date    MG 2.1 05/20/2024    MG 2.0 05/19/2024    MG 1.8 05/18/2024     Lab Results   Component Value Date    TSH 2.490 03/21/2024    PSA 1.630 06/12/2023    CHLPL 119 10/23/2014    TRIG 99 05/16/2024    HDL 22 (L) 05/16/2024    LDL 6 05/16/2024      No results found for: \"PROBNP\"  Blood Culture   Date Value Ref Range Status   05/19/2024 No growth at 2 days  Preliminary   05/19/2024 No growth at 2 days  " Preliminary   05/18/2024 No growth at 4 days  Preliminary   05/18/2024 No growth at 4 days  Preliminary   05/17/2024 Staphylococcus aureus, MRSA (C)  Final     Comment:       Infectious disease consultation is highly recommended to rule out distant foci of infection.  Methicillin resistant Staphylococcus aureus, Patient may be an isolation risk.   05/17/2024 Staphylococcus aureus, MRSA (C)  Final     Comment:       Infectious disease consultation is highly recommended to rule out distant foci of infection.  Methicillin resistant Staphylococcus aureus, Patient may be an isolation risk.   05/15/2024 Staphylococcus aureus, MRSA (C)  Final     Comment:       Infectious disease consultation is highly recommended to rule out distant foci of infection.  Methicillin resistant Staphylococcus aureus, Patient may be an isolation risk.   05/15/2024 Staphylococcus aureus, MRSA (C)  Final     Comment:       Infectious disease consultation is highly recommended to rule out distant foci of infection.  Methicillin resistant Staphylococcus aureus, Patient may be an isolation risk.        EKG:         Imaging Results (Last 72 Hours)       ** No results found for the last 72 hours. **             Assessment -as related to consult:  1.  Bacteremia        Plan:  1.  As valves were poorly visualized on the transthoracic echocardiogram, we will proceed with transesophageal echocardiogram to rule out vegetation.    Further decision making based on Dr. Garcia's assessment and recommendations.    Thank you very much for asking us to be involved in this patient's care.  We will follow along with you.      Electronically signed by DORA Green, 05/22/24, 11:27 AM EDT.    Patient seen and examined on rounds.  Chart reviewed.  Agree with nurse practitioners assessment and plan and charting  Patient underwent transesophageal echocardiogram today.  Patient EF was noted to be markedly decreased from previous echo.  No vegetations were noted  on the valves.  Guideline guided medical therapy will be advised for cardiomyopathy.  Patient may need evaluation for ischemic reasons for heart failure also.  Will recommend a CTA coronaries versus stress test once stable.  .Electronically signed by Tommy Garcia MD, 24, 7:11 PM EDT.            Electronically signed by Tommy Garcia MD at 24 1911       Nutrition Notes (most recent note)    No notes exist for this encounter.          Physical Therapy Notes (most recent note)        Zahraa Almaraz, PT at 24 1315  Version 1 of 1         Acute Care - Physical Therapy Initial Evaluation   [place]     Patient Name: Kennedy Prescott  : 1950  MRN: [id number]  Today's Date: 2024   Onset of Illness/Injury or Date of Surgery: 05/15/24  Visit Dx:     ICD-10-CM ICD-9-CM   1. SIRS (systemic inflammatory response syndrome)  R65.10 995.90     Patient Active Problem List   Diagnosis    Scalp lesion    Skin ulcer of right heel, limited to breakdown of skin    Hidradenoma    Functional diarrhea    Adenomatous polyp of ascending colon    SIRS (systemic inflammatory response syndrome)    Sepsis     Past Medical History:   Diagnosis Date    CHF (congestive heart failure)     Coronary artery disease     Diabetes mellitus     Dialysis patient     Disease of thyroid gland     Elevated cholesterol     GERD (gastroesophageal reflux disease)     Hypertension     Myocardial infarction     Renal disorder     stage 5     Past Surgical History:   Procedure Laterality Date    CARDIAC SURGERY      cabg    CHOLECYSTECTOMY      COLONOSCOPY N/A 2023    Procedure: COLONOSCOPY;  Surgeon: Trip Peter MD;  Location: [facility];  Service: Gastroenterology;  Laterality: N/A;    DIALYSIS FISTULA CREATION Left     arm     PT Assessment (Last 12 Hours)       PT Evaluation and Treatment       Row Name 24 1301          Physical Therapy Time and Intention    Subjective Information complains of;weakness  -AG      Document Type evaluation  -AG     Mode of Treatment physical therapy  -AG     Patient Effort good  -AG     Symptoms Noted During/After Treatment fatigue  -AG       Row Name 05/16/24 1301          General Information    Patient Profile Reviewed yes  -AG     Onset of Illness/Injury or Date of Surgery 05/15/24  -AG     Referring Physician Dr. Crum  -AG     Patient Observations agree to therapy;cooperative;alert  -AG     Patient/Family/Caregiver Comments/Observations pt. supine in bed; L UE HD fistula noted.  Pt. cooperative, agreeable to participation.  -AG     Prior Level of Function --  pt. reports being independent w/ fxnl transfers at SNF; ambulates with therapist assistance and with what is described as an ARJO walker.  -AG     Equipment Currently Used at Home shower chair;walker, rolling  -AG     Pertinent History of Current Functional Problem pt. admitted with SIRS; hx ESRD w/ HD; hx medical noncompliance  -AG     Existing Precautions/Restrictions fall  -AG     Equipment Issued to Patient gait belt  -AG     Risks Reviewed patient:;LOB  -AG     Benefits Reviewed patient:;improve function;increase independence;increase strength;increase balance;increase knowledge;decrease risk of DVT  -AG     Barriers to Rehab previous functional deficit  -AG       Row Name 05/16/24 1301          Living Environment    Current Living Arrangements extended care facility  -AG       Row Name 05/16/24 1301          Home Use of Assistive/Adaptive Equipment    Equipment Currently Used at Home walker, rolling  -AG       Row Name 05/16/24 1301          Pain    Pretreatment Pain Rating 0/10 - no pain  -AG     Posttreatment Pain Rating 0/10 - no pain  -AG       Row Name 05/16/24 1301          Cognition    Affect/Mental Status (Cognition) WFL  -AG     Orientation Status (Cognition) oriented x 3  -AG     Follows Commands (Cognition) verbal cues/prompting required  -AG     Personal Safety Interventions fall prevention program maintained;gait  belt;nonskid shoes/slippers when out of bed;supervised activity  -AG       Row Name 05/16/24 1301          Range of Motion (ROM)    Range of Motion ROM is WFL;bilateral lower extremities  -AG       Row Name 05/16/24 1301          Strength (Manual Muscle Testing)    Strength (Manual Muscle Testing) --  B quads 4+/5; R psoas 4/5; L psoas 4-/5  -AG       Row Name 05/16/24 1301          Sensory    Hearing Status WFL  -AG       Row Name 05/16/24 1301          Vision Assessment/Intervention    Visual Impairment/Limitations corrective lenses full-time  -AG       Row Name 05/16/24 1301          Sensory Assessment (Somatosensory)    Sensory Subjective Reports numbness  hands, feet d/t neuropathy  -AG       Row Name 05/16/24 1301          Bed Mobility    Bed Mobility rolling left;rolling right;scooting/bridging;supine-sit;sit-supine  -     Scooting/Bridging [place] (Bed Mobility) standby assist  -     Supine-Sit [place] (Bed Mobility) minimum assist (75% patient effort);nonverbal cues (demo/gesture);verbal cues  -     Sit-Supine [place] (Bed Mobility) minimum assist (75% patient effort);nonverbal cues (demo/gesture);verbal cues  -     Bed Mobility, Safety Issues decreased use of legs for bridging/pushing;decreased use of arms for pushing/pulling  -AG       Row Name 05/16/24 1301          Transfers    Transfers sit-stand transfer;stand-sit transfer  -AG       Row Name 05/16/24 1301          Sit-Stand Transfer    Sit-Stand [place] (Transfers) verbal cues;nonverbal cues (demo/gesture);moderate assist (50% patient effort)  -     Assistive Device (Sit-Stand Transfers) walker, front-wheeled  -AG     Comment, (Sit-Stand Transfer) once standing, pt. is able to briefly maintain before knees/ hips abruptly buckle.  Performed 2 x STS w/ RW.  -[facility] 05/16/24 1301          Stand-Sit Transfer    Stand-Sit [place] (Transfers) verbal cues;nonverbal cues (demo/gesture);moderate assist (50%  patient effort)  -AG     Assistive Device (Stand-Sit Transfers) walker, front-wheeled  -AG       Row Name 05/16/24 1301          Gait/Stairs (Locomotion)    Patient was able to Ambulate no, other medical factors prevent ambulation  -AG     Reason Patient was unable to Ambulate Excessive Weakness  -AG       Row Name 05/16/24 1301          Safety Issues, Functional Mobility    Impairments Affecting Function (Mobility) balance;endurance/activity tolerance;strength  -AG       Row Name 05/16/24 1301          Balance    Balance Assessment sitting static balance;sitting dynamic balance;sit to stand dynamic balance;standing static balance  -AG     Static Sitting Balance standby assist  -AG     Position, Sitting Balance unsupported;sitting edge of bed  -AG     Static Standing Balance verbal cues;non-verbal cues (demo/gesture);moderate assist  -AG     Position/Device Used, Standing Balance walker, front-wheeled  -AG       Row Name             Wound 05/15/24 2225 Left posterior heel Diabetic Ulcer    Wound - Properties Group Placement Date: 05/15/24  -CL Placement Time: 2225  -CL Side: Left  -CL Orientation: posterior  -CL Location: heel  -CL Primary Wound Type: Diabetic ulc  -CL    Retired Wound - Properties Group Placement Date: 05/15/24  -CL Placement Time: 2225  -CL Side: Left  -CL Orientation: posterior  -CL Location: heel  -CL Primary Wound Type: Diabetic ulc  -CL    Retired Wound - Properties Group Date first assessed: 05/15/24  -CL Time first assessed: 2225  -CL Side: Left  -CL Location: heel  -CL Primary Wound Type: Diabetic ulc  -CL      Row Name             Wound 05/15/24 2230 Left posterior fifth toe Diabetic Ulcer    Wound - Properties Group Placement Date: 05/15/24  -CL Placement Time: 2230  -CL Side: Left  -CL Orientation: posterior  -CL Location: fifth toe  -CL Primary Wound Type: Diabetic ulc  -CL    Retired Wound - Properties Group Placement Date: 05/15/24  -CL Placement Time: 2230  -CL Side: Left  -CL  Orientation: posterior  -CL Location: fifth toe  -CL Primary Wound Type: Diabetic ulc  -CL    Retired Wound - Properties Group Date first assessed: 05/15/24  -CL Time first assessed: 2230  -CL Side: Left  -CL Location: fifth toe  -CL Primary Wound Type: Diabetic ulc  -CL      Row Name             Wound 05/15/24 2230 Left lower leg Other (comment)    Wound - Properties Group Placement Date: 05/15/24  -CL Placement Time: 2230  -CL Side: Left  -CL Orientation: lower  -CL Location: leg  -CL Primary Wound Type: Other  -CL, scab diabteic ulcer?     Retired Wound - Properties Group Placement Date: 05/15/24  -CL Placement Time: 2230  -CL Side: Left  -CL Orientation: lower  -CL Location: leg  -CL Primary Wound Type: Other  -CL, scab diabteic ulcer?     Retired Wound - Properties Group Date first assessed: 05/15/24  -CL Time first assessed: 2230  -CL Side: Left  -CL Location: leg  -CL Primary Wound Type: Other  -CL, scab diabteic ulcer?       Row Name             Wound 05/29/22 1726 Left gluteal    Wound - Properties Group Placement Date: 05/29/22  -AS Placement Time: 1726  -AS Present on Original Admission: Y  -AS Side: Left  -AS Location: gluteal  -AS    Retired Wound - Properties Group Placement Date: 05/29/22  -AS Placement Time: 1726  -AS Present on Original Admission: Y  -AS Side: Left  -AS Location: gluteal  -AS    Retired Wound - Properties Group Date first assessed: 05/29/22  -AS Time first assessed: 1726  -AS Present on Original Admission: Y  -AS Side: Left  -AS Location: gluteal  -AS      Row Name 05/16/24 1301          Coping    Observed Emotional State calm;cooperative  -AG     Verbalized Emotional State acceptance  -AG     Trust Relationship/Rapport care explained;choices provided;thoughts/feelings acknowledged  -     Family/Support Persons family  -AG     Involvement in Care not present at bedside  -     Family/Support System Care self-care encouraged;support provided  -       Row Name 05/16/24 1301           Plan of Care Review    Plan of Care Reviewed With patient  -AG     Outcome Evaluation PT evaluation complete.  Pt. exhibits B hip weakness, L>R LE; min A for bed mobilty skills; able to perform 2 x brief STS w/ RW at bedside but sits abruptly d/t LE buckling.  Will continue to follow and progress as tolerated.  -AG       Row Name 05/16/24 1301          Positioning and Restraints    Pre-Treatment Position in bed  -AG     Post Treatment Position bed  -AG     In Bed fowlers;call light within reach;encouraged to call for assist;exit alarm on;side rails up x3  -AG       Row Name 05/16/24 1301          Therapy Assessment/Plan (PT)    Patient/Family Therapy Goals Statement (PT) return to SNF  -AG     PT Diagnosis (PT) impaired functional mobility; LE weakness  -AG     Rehab Potential (PT) good, to achieve stated therapy goals  -AG     Therapy Frequency (PT) other (see comments)  1-5 times/ week per priority  -AG     Predicted Duration of Therapy Intervention (PT) LOS  -AG     Problem List (PT) problems related to;balance;mobility;strength  -AG     Activity Limitations Related to Problem List (PT) unable to ambulate safely;unable to transfer safely;BADLs not performed adequately or safely  -AG       Row Name 05/16/24 1301          Therapy Plan Review/Discharge Plan (PT)    Therapy Plan Review (PT) evaluation/treatment results reviewed;care plan/treatment goals reviewed;risks/benefits reviewed;current/potential barriers reviewed;participants voiced agreement with care plan;participants included;patient  -AG       Row Name 05/16/24 1301          Physical Therapy Goals    Bed Mobility Goal Selection (PT) bed mobility, PT goal 1  -AG     Transfer Goal Selection (PT) transfer, PT goal 1  -AG     Gait Training Goal Selection (PT) gait training, PT goal 1  -AG       Row Name 05/16/24 1301          Bed Mobility Goal 1 (PT)    Activity/Assistive Device (Bed Mobility Goal 1, PT) rolling to left;rolling to right;scooting;sit to  supine;supine to sit  -AG     [place] Level/Cues Needed (Bed Mobility Goal 1, PT) standby assist  -AG     Time Frame (Bed Mobility Goal 1, PT) by discharge  -       Row Name 05/16/24 1301          Transfer Goal 1 (PT)    Activity/Assistive Device (Transfer Goal 1, PT) sit-to-stand/stand-to-sit;bed-to-chair/chair-to-bed;toilet;walker, rolling  -AG     [place] Level/Cues Needed (Transfer Goal 1, PT) contact guard required  -AG     Time Frame (Transfer Goal 1, PT) by discharge  -AG               User Key  (r) = Recorded By, (t) = Taken By, (c) = Cosigned By      Initials Name Provider Type    AS Sierra Goel, RN Registered Nurse    Zahraa Desai, PT Physical Therapist    Lana Corea RN Registered Nurse                    Physical Therapy Education       Title: PT OT SLP Therapies (Done)       Topic: Physical Therapy (Done)       Point: Mobility training (Done)       Learning Progress Summary             Patient Acceptance, E,D, VU,NR by  at 5/16/2024 1300                         Point: Home exercise program (Done)       Learning Progress Summary             Patient Acceptance, E,D, VU,NR by  at 5/16/2024 1300                         Point: Body mechanics (Done)       Learning Progress Summary             Patient Acceptance, E,D, VU,NR by  at 5/16/2024 1300                         Point: Precautions (Done)       Learning Progress Summary             Patient Acceptance, E,D, VU,NR by  at 5/16/2024 1300                                         User Key       Initials Effective Dates Name Provider Type [facility] 06/16/21 -  Zahraa Almaraz, PT Physical Therapist PT                  PT Recommendation and Plan  Anticipated Discharge Disposition (PT): skilled nursing facility  Planned Therapy Interventions (PT): balance training, bed mobility training, gait training, home exercise program, transfer training, strengthening, patient/family education  Therapy Frequency (PT): other (see  comments) (1-5 times/ week per priority)  Plan of Care Reviewed With: patient  Outcome Evaluation: PT evaluation complete.  Pt. exhibits B hip weakness, L>R LE; min A for bed mobilty skills; able to perform 2 x brief STS w/ RW at bedside but sits abruptly d/t LE buckling.  Will continue to follow and progress as tolerated.       Time Calculation:    PT Charges       Row Name 24 1300             Time Calculation    PT Received On 24  -      PT Goal Re-Cert Due Date 24  -                User Key  (r) = Recorded By, (t) = Taken By, (c) = Cosigned By      Initials Name Provider Type     Zahraa Almaraz, PT Physical Therapist                  Therapy Charges for Today       Code Description Service Date Service Provider Modifiers Qty    38416299771 HC PT EVAL MOD COMPLEXITY 4 2024 Zahraa Almaraz, PT GP 1            PT G-Codes  AM-PAC 6 Clicks Score (PT): 6    Zahraa Almaraz PT  2024      Electronically signed by Zahraa Almaraz, PT at 24 1316          Occupational Therapy Notes (most recent note)        Jose Ramon Potts, OT at 24 1802          Acute Care - Occupational Therapy Initial Evaluation   [facility]     Patient Name: Kennedy Prescott  : 1950  MRN: [id number]  Today's Date: 2024  Onset of Illness/Injury or Date of Surgery: 05/15/24     Referring Physician: Dr. Crum    Admit Date: 5/15/2024       ICD-10-CM ICD-9-CM   1. SIRS (systemic inflammatory response syndrome)  R65.10 995.90     Patient Active Problem List   Diagnosis    Scalp lesion    Skin ulcer of right heel, limited to breakdown of skin    Hidradenoma    Functional diarrhea    Adenomatous polyp of ascending colon    SIRS (systemic inflammatory response syndrome)    Sepsis     Past Medical History:   Diagnosis Date    CHF (congestive heart failure)     Coronary artery disease     Diabetes mellitus     Dialysis patient     Disease of thyroid gland     Elevated cholesterol     GERD (gastroesophageal  reflux disease)     Hypertension     Myocardial infarction     Renal disorder     stage 5     Past Surgical History:   Procedure Laterality Date    CARDIAC SURGERY      cabg    CHOLECYSTECTOMY      COLONOSCOPY N/A 9/22/2023    Procedure: COLONOSCOPY;  Surgeon: Trip Peter MD;  Location: [facility];  Service: Gastroenterology;  Laterality: N/A;    DIALYSIS FISTULA CREATION Left     arm         OT ASSESSMENT FLOWSHEET (Last 12 Hours)       OT Evaluation and Treatment       Row Name 05/17/24 1748                   OT Time and Intention    Document Type evaluation  -KR        Mode of Treatment occupational therapy  -KR        Patient Effort fair  -KR        Symptoms Noted During/After Treatment nausea  -KR           Living Environment    Current Living Arrangements residential facility  -KR        People in Home facility resident  -KR        Primary Care Provided by --  self/staff  -KR           Cognition    Cognitive Status difficult to obtain accurate data at this time due to pt experiencing nausea  -KR        Affect/Mental Status (Cognition) flat/blunted affect  -KR        Orientation Status (Cognition) oriented to;person  -KR           Range of Motion Comprehensive    Comment, General Range of Motion BUE WFL  -KR           Strength Comprehensive (MMT)    Comment, General Manual Muscle Testing (MMT) Assessment BUE 3-/5  -KR           Activities of Daily Living    BADL Assessment/Intervention --  mod/max assist at this time due to nausea/general malaise  -KR           Wound 05/15/24 2225 Left posterior heel Diabetic Ulcer    Wound - Properties Group Placement Date: 05/15/24  -CL Placement Time: 2225  -CL Side: Left  -CL Orientation: posterior  -CL Location: heel  -CL Primary Wound Type: Diabetic ulc  -CL    Retired Wound - Properties Group Placement Date: 05/15/24  -CL Placement Time: 2225  -CL Side: Left  -CL Orientation: posterior  -CL Location: heel  -CL Primary Wound Type: Diabetic ulc  -CL    Retired Wound -  Properties Group Date first assessed: 05/15/24  -CL Time first assessed: 2225  -CL Side: Left  -CL Location: heel  -CL Primary Wound Type: Diabetic ulc  -CL       Wound 05/15/24 2230 Left posterior fifth toe Diabetic Ulcer    Wound - Properties Group Placement Date: 05/15/24  -CL Placement Time: 2230  -CL Side: Left  -CL Orientation: posterior  -CL Location: fifth toe  -CL Primary Wound Type: Diabetic ulc  -CL    Retired Wound - Properties Group Placement Date: 05/15/24  -CL Placement Time: 2230  -CL Side: Left  -CL Orientation: posterior  -CL Location: fifth toe  -CL Primary Wound Type: Diabetic ulc  -CL    Retired Wound - Properties Group Date first assessed: 05/15/24  -CL Time first assessed: 2230  -CL Side: Left  -CL Location: fifth toe  -CL Primary Wound Type: Diabetic ulc  -CL       Wound 05/15/24 2230 Left lower leg Other (comment)    Wound - Properties Group Placement Date: 05/15/24  -CL Placement Time: 2230  -CL Side: Left  -CL Orientation: lower  -CL Location: leg  -CL Primary Wound Type: Other  -CL, scab diabteic ulcer?     Retired Wound - Properties Group Placement Date: 05/15/24  -CL Placement Time: 2230  -CL Side: Left  -CL Orientation: lower  -CL Location: leg  -CL Primary Wound Type: Other  -CL, scab diabteic ulcer?     Retired Wound - Properties Group Date first assessed: 05/15/24  -CL Time first assessed: 2230  -CL Side: Left  -CL Location: leg  -CL Primary Wound Type: Other  -CL, scab diabteic ulcer?        Plan of Care Review    Plan of Care Reviewed With patient;son;family  -KR           Therapy Assessment/Plan (OT)    Planned Therapy Interventions (OT) activity tolerance training  -KR           Therapy Plan Review/Discharge Plan (OT)    Anticipated Discharge Disposition (OT) skilled nursing facility  -KR           OT Goals    Activity Tolerance Goal Selection (OT) activity tolerance, OT goal 1  -KR            Activity Tolerance Goal 1 (OT)    Activity Tolerance Goal 1 (OT) Increase to enhance  self care/mobility performance  -KR        Activity Level (Endurance Goal 1, OT) 15 min activity  -KR        Time Frame (Activity Tolerance Goal 1, OT) by discharge  -KR                  User Key  (r) = Recorded By, (t) = Taken By, (c) = Cosigned By      Initials Name Effective Dates    KR Jose Ramon Potts OT 06/16/21 -     CL Lana Carney RN 11/29/22 -                            OT Recommendation and Plan  Planned Therapy Interventions (OT): activity tolerance training  Plan of Care Review  Plan of Care Reviewed With: patient, son, family  Plan of Care Reviewed With: patient, son, family        Time Calculation:     Therapy Charges for Today       Code Description Service Date Service Provider Modifiers Qty    50339699858 HC OT EVAL MOD COMPLEXITY 4 5/17/2024 Jose Ramon Potts OT GO 1                 Jose Ramon Potts OT  5/17/2024    Electronically signed by Jose Ramon Potts OT at 05/17/24 1805       Discharge Summary    No notes of this type exist for this encounter.       Discharge Order (From admission, onward)       Start     Ordered    05/30/24 0929  Discharge patient  Once        Expected Discharge Date: 05/30/24   Discharge Disposition: Skilled Nursing Facility (DC - External)   Physician of Record for Attribution - Please select from Treatment Team: YADIRA DARLING [[id number]]   Review needed by CMO to determine Physician of Record: No      Question Answer Comment   Physician of Record for Attribution - Please select from Treatment Team YADIRA DARLING    Review needed by CMO to determine Physician of Record No        05/30/24 0949                  Start     05/30/24 0929  Discharge patient  Once        Expected Discharge Date: 05/30/24   Discharge Disposition: Skilled Nursing Facility (DC - External)   Physician of Record for Attribution - Please select from Treatment Team: YADIRA DARLING [[id number]]   Review needed by CMO to determine Physician of Record: No    References:    Link to Physician of Record Policy            Orders For After Discharge    Ordered     05/30/24 0949  aspirin 81 MG EC tablet  Daily           05/30/24 0949  Vancomycin HCl (VANCOMYCIN PHARMACY INTERMITTENT/PULSE DOSING)  Daily           05/30/24 0949  empagliflozin (JARDIANCE) 10 MG tablet tablet  Daily           05/30/24 0949  atorvastatin (LIPITOR) 40 MG tablet  Daily           05/30/24 0949  clopidogrel (PLAVIX) 75 MG tablet  Daily           05/30/24 0949  apixaban (ELIQUIS) 5 MG tablet tablet  Every 12 Hours Scheduled           05/30/24 0949  nitroglycerin (NITROSTAT) 0.4 MG SL tablet  Every 5 Minutes PRN           05/30/24 0949  gabapentin (NEURONTIN) 100 MG capsule  Nightly           05/30/24 0949  insulin detemir (Levemir FlexPen) 100 UNIT/ML injection  2 Times Daily           05/30/24 0949  Insulin Aspart (novoLOG) 100 UNIT/ML injection  3 Times Daily Before Meals           05/30/24 0949  megestrol (MEGACE) 40 MG tablet  3 Times Daily With Meals           05/30/24 0949  Lidocaine 4 %  Every Evening           05/30/24 0949  Lidocaine Viscous HCl (XYLOCAINE) 2 % solution  Every 6 Hours PRN           05/30/24 0949  metoprolol succinate XL (TOPROL-XL) 25 MG 24 hr tablet  Nightly           05/30/24 0949  Activity as Tolerated           05/30/24 0949  Diet: Regular/House Diet, Diabetic Diets, Renal Diets, Cardiac Diets; Healthy Heart (2-3 Na+); Regular (IDDSI 7); Thin (IDDSI 0); Consistent Carbohydrate; Low Phosphorus, Low Sodium (2-3g), Low Potassium           05/30/24 0949  Discharge Follow-up with Specified Provider: Hemodialysis clinic tomorrow           05/30/24 0949  Discharge Follow-up with Specified Provider: Interventional cardiology; 1 Week           05/30/24 0949  Discharge Follow-up with PCP           05/30/24 0949  Discharge Follow-up with Specified Provider: Cardiology for AICD (Amanda Gallardo); 1 Month           05/23/24 0814  Vancomycin HCl (VANCOMYCIN PHARMACY INTERMITTENT/PULSE DOSING)  Daily

## 2024-05-30 NOTE — CASE MANAGEMENT/SOCIAL WORK
Discharge Planning Assessment   [facility]     Patient Name: Kennedy Prescott  MRN: [id number]  Today's Date: 5/30/2024    Admit Date: 5/15/2024     Discharge Plan       Row Name 05/30/24 1111       Plan    Final Discharge Disposition Code 03 - skilled nursing facility (SNF)    Final Note Pt is being discharged back to [facility] on this date. SS faxerd dc orders and AVS to [phone]. SS notified Trudy at NH of  discharge. SS contacted pt son Joaquina who is aware and agreeable for discharge plans.  provided RN with report number [phone]. SS completed [facility] form and will arrange transportation once report has been called. SS to follow.    11:50:  was notified by RN that NH had questions about pt Vancomycin being received with HD. SS contacted [facility] per Vera who states pt does receive Vancomycin with HD and requested SS faxed AVS to [phone].  provided call back number if AVS was not received to contact SS back. SS contacted [facility] and arranged transportation via  EMS. No other needs identified.                 Destination Coordination complete.      Service Provider Request Status Selected Services Address Phone Fax Patient Preferred    [facility]  Selected [facility] [address] [place] [address] [phone] --               Expected Discharge Date and Time       Expected Discharge Date Expected Discharge Time    May 30, 2024        MONA Long

## 2024-05-30 NOTE — PROGRESS NOTES
"Enter Query Response Below      Query Response: Type 1 MI due to plaque erosion.           If applicable, please update the problem list.     Patient: Kennedy Prescott        : 1950  Account: [id number]           Admit Date:         How to Respond to this query:       a. Click New Note     b. Answer query within the yellow box.                c. Update the Problem List, if applicable.      If you have any questions about this query contact me at: [phone]     Dr. Garcia:    73 year old male with history of coronary artery disease, CABG, ESRD, diabetes mellitus type 2, admitted 5/15 with sepsis and infected tunneled HD catheter. Labs include HS Troponin T 172, 160, 173, 221, 127, 140, 135.  GAVI showed new onset of decreased ejection fraction of 25-30%.  Cardiology was consulted and patient underwent cardiac cath  with findings of SVG to left side (either OM or diagonal, unclear): Occluded at the ostium, SVG to RCA: Occluded at the ostium, 80-90% proximal LCx lesion and PCI with stent performed.  Patient ordered LifeVest, aspirin, Plavix, high dose statin, beta blocker.  Progress notes - include \"NSTEMI type 2 suspected that was present on admission due to the sepsis and acute hypoxic respiratory failure.\"      Please clarify the type of NSTEMI the patient was treated/monitored for:    Type 1 MI due to ______ (please specify- plaque erosion, rupture, fissure or thrombus)  Type 2 MI due to sepsis and acute hypoxic respiratory failure  Type 2 MI due to ______ (please specify)  Other- specify______  Unable to determine    By submitting this query, we are merely seeking further clarification of documentation to accurately reflect all conditions that you are monitoring, evaluating, treating or that extend the hospitalization or utilize additional resources of care. Please utilize your independent clinical judgment when addressing the question(s) above.     This query and your response, once " completed, will be entered into the legal medical record.    Sincerely,  Dayanara Hooper RN, MSN  Clinical Documentation Integrity Program   [email or web address]
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
## 2024-05-30 NOTE — PLAN OF CARE
Goal Outcome Evaluation:  Plan of Care Reviewed With: patient        Progress: improving  Outcome Evaluation: Pt A&Ox4; VSS; call light in reach; no additional distress noted and no other needs requested. POC ongoing.

## 2024-05-30 NOTE — PROGRESS NOTES
"Nephrology Progress Note      Subjective   No chest pain, shortness of breath    Objective       Vital signs :     Temp:  [97.4 °F (36.3 °C)-98.9 °F (37.2 °C)] 98.4 °F (36.9 °C)  Heart Rate:  [67-82] 72  Resp:  [8-27] 12  BP: ()/(60-93) 144/73    Intake/Output                               05/28/24 0701 - 05/29/24 0700 05/29/24 0701 - 05/30/24 0700 05/30/24 0701 - 05/31/24 0700     [id number] 0254-8702 Total 0095-5875 2354-6276 Total 7518-5597 5135-0290 Total                    Intake    P.O.  0  -- 0  436  -- 436  118  -- 118    I.V.  141.6  -- 141.6  --  -- --  --  -- --    Total Intake 141.6 -- 141.6 436 -- 436 118 -- 118       Output    Dialysis  2500  -- 2500  --  -- --  --  -- --    Total Output 2500 -- 2500 -- -- -- -- -- --             Physical Exam:    General Appearance : alert, pleasant, appears stated age, cooperative and oriented  Lungs : clear to auscultation, respirations regular  Heart :  regular rhythm & normal rate, normal S1, S2 and no murmur, no rub  Abdomen : Soft, nondistended  Extremities : Trace edema,   Neurologic :   orientated to person, place, time and situation, Grossly no focal deficits        Laboratory Data :     Albumin Albumin   Date Value Ref Range Status   05/29/2024 3.2 (L) 3.5 - 5.2 g/dL Final      Magnesium Magnesium   Date Value Ref Range Status   05/30/2024 2.3 1.6 - 2.4 mg/dL Final   05/29/2024 2.0 1.6 - 2.4 mg/dL Final   05/28/2024 2.2 1.6 - 2.4 mg/dL Final          PTH               No results found for: \"PTH\"    CBC and coagulation:  Results from last 7 days   Lab Units 05/30/24  0017 05/29/24  0053 05/28/24  0118 05/27/24  0113 05/26/24  0110   WBC 10*3/mm3  --  10.00  --  12.07* 12.41*   HEMOGLOBIN g/dL 8.5* 9.2* 9.2* 9.6* 9.4*   HEMATOCRIT % 27.6* 29.3* 29.7* 31.4* 30.4*   MCV fL  --  95.4  --  97.5* 96.8   MCHC g/dL  --  31.4*  --  30.6* 30.9*   PLATELETS 10*3/mm3  --  180  --  187 204   INR   --  1.14*  --   --   --      Acid/base balance:      Renal and " electrolytes:    Results from last 7 days   Lab Units 05/30/24  0017 05/29/24  0053 05/28/24  0118 05/27/24  0113 05/26/24  0110   SODIUM mmol/L 140 139 139 139 139   POTASSIUM mmol/L 4.5 4.2 4.7 4.1 4.0   MAGNESIUM mg/dL 2.3 2.0 2.2  --   --    CHLORIDE mmol/L 95* 93* 98 95* 94*   CO2 mmol/L 30.6* 34.0* 28.2 30.8* 31.3*   BUN mg/dL 47* 31* 52* 40* 25*   CREATININE mg/dL 7.04* 5.36* 8.51* 7.23* 5.20*   CALCIUM mg/dL 9.2 8.8 8.9 9.2 8.7   PHOSPHORUS mg/dL 4.6*  --  5.1*  --   --      Estimated Creatinine Clearance: 11.1 mL/min (A) (by C-G formula based on SCr of 7.04 mg/dL (H)).  @GFRCG:3@   Liver and pancreatic function:  Results from last 7 days   Lab Units 05/29/24  0053 05/26/24  0110 05/24/24  0114   ALBUMIN g/dL 3.2* 3.3* 3.4*   BILIRUBIN mg/dL 0.4 0.4 0.4   ALK PHOS U/L 156* 176* 216*   AST (SGOT) U/L 27 45* 30   ALT (SGPT) U/L 29 39 22         Cardiac:      Liver and pancreatic function:  Results from last 7 days   Lab Units 05/29/24  0053 05/26/24  0110 05/24/24  0114   ALBUMIN g/dL 3.2* 3.3* 3.4*   BILIRUBIN mg/dL 0.4 0.4 0.4   ALK PHOS U/L 156* 176* 216*   AST (SGOT) U/L 27 45* 30   ALT (SGPT) U/L 29 39 22       Medications :     acetaminophen, 650 mg, Oral, Once per day on Tuesday Thursday Saturday  ammonium lactate, 1 Application, Topical, Nightly  apixaban, 5 mg, Oral, Q12H  aspirin, 81 mg, Oral, Daily  atorvastatin, 40 mg, Oral, Daily  cetirizine, 5 mg, Oral, Daily  clopidogrel, 75 mg, Oral, Daily  empagliflozin, 10 mg, Oral, Daily  erythromycin, 1 Application, Left Eye, BID  famotidine, 20 mg, Oral, Q48H  gabapentin, 100 mg, Oral, Nightly  insulin glargine, 13 Units, Subcutaneous, Nightly  insulin lispro, 2-7 Units, Subcutaneous, 4x Daily AC & at Bedtime  insulin lispro, 3 Units, Subcutaneous, TID With Meals  levothyroxine, 75 mcg, Oral, Daily  Lidocaine, 1 patch, Transdermal, Q PM  lubrisoft, 1 Application, Topical, Daily  megestrol, 40 mg, Oral, TID With Meals  metoprolol succinate XL, 12.5 mg,  Oral, Nightly  multivitamin with minerals, 1 tablet, Oral, Daily  sertraline, 50 mg, Oral, Nightly  sevelamer, 2,400 mg, Oral, TID With Meals  sodium chloride, 10 mL, Intravenous, Q12H  Vancomycin Pharmacy Intermittent/Pulse Dosing, , Does not apply, Daily             Assessment & Plan     -ESRD on intermittent dialysis  - Anemia likely due to anemia of chronic kidney disease  - New heart failure with reduced ejection fraction, compensated clinically  - 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  Patient's outpatient dialysis schedule is Monday Wednesday and Friday will skip dialysis today and to be followed up in clinic as an outpatient dialysis    Nova Saucedo MD  05/30/24  09:46 EDT

## 2024-05-30 NOTE — PHARMACY PATIENT ASSISTANCE
Pharmacy evaluated the cost of DOACs for patient. Both Eliquis and Xarelto are covered on the patient's insurance with no copay.     Thank you,    Mikayla Golden, PharmD  05/30/24  08:56 EDT

## 2024-05-30 NOTE — PROGRESS NOTES
LOS: 14 days     Name: Kennedy Prescott  Age/Sex: 73 y.o. male  :  1950        PCP: Jag Guillaume MD    Principal Problem:    SIRS (systemic inflammatory response syndrome)  Active Problems:    Sepsis    Ischemic cardiomyopathy      Admission Information: Kennedy Prescott is a 73 y.o. male with coronary artery disease, status post CABG in the remote past, HFpEF, insulin-dependent type 2 diabetes, hypertension, hyperlipidemia, pulmonary hypertension, end-stage renal disease with hemodialysis 3 times per week, and obesity.    Chief Complaint: Nausea and vomiting    Interval history: Telemetry reviewed.  No events through the night.  Patient is in atrial fibrillation 70s to 80s    The patient was seen and examined.  He is getting ready to go back to the nursing home.  He denies any chest pain or palpitations.  He denies shortness of breath.    Subjective         Vital Signs  Vital Signs (last 72 hrs)          0700   0659  0700   0659  07 0659  0700   1240   Most Recent      Temp (°F) 98 -  98.5    97.8 -  99.3    97.4 -  98.9      98.4     98.4 (36.9)  0819    Heart Rate 70 -  76    70 -  91    67 -  89    72 -  84     79  1200    Resp 16 -  18    10 -  36        12 -  18     18  1200    /66 -  158/73    105/59 -  150/74    99/77 -  160/63    103/65 -  153/73     153/73  1200    SpO2 (%) 96 -  98    91 -  99    91 -  100    95 -  98     98  1200    Flow (L/min)     2         2  1004          Temp:  [97.4 °F (36.3 °C)-98.9 °F (37.2 °C)] 98.4 °F (36.9 °C)  Heart Rate:  [67-84] 79  Resp:  [] 18  BP: (103-160)/(60-80) 153/73  Body mass index is 31.73 kg/m².      Intake/Output Summary (Last 24 hours) at 2024 1240  Last data filed at 2024 0819  Gross per 24 hour   Intake 354 ml   Output --   Net 354 ml       Vitals reviewed.   Constitutional:       Appearance: Well-developed.   Neck:      Vascular: No carotid  bruit or JVD.   Pulmonary:      Effort: Pulmonary effort is normal. No respiratory distress.      Breath sounds: Normal breath sounds. No wheezing. No rales.   Cardiovascular:      Normal rate. Irregular rhythm.      Comments: No lower extremity edema  Skin:     General: Skin is warm and dry.   Neurological:      Mental Status: Alert and oriented to person, place, and time.         Telemetry: Atrial fibrillation 70s and 80s       Results Review:     Results from last 7 days   Lab Units 05/30/24  0017 05/29/24  0053 05/28/24  0118 05/27/24  0113 05/26/24  0110 05/25/24  0033 05/24/24  0113   WBC 10*3/mm3  --  10.00  --  12.07* 12.41* 12.64* 9.98   HEMOGLOBIN g/dL 8.5* 9.2* 9.2* 9.6* 9.4* 9.9* 9.8*   PLATELETS 10*3/mm3  --  180  --  187 204 224 203     Results from last 7 days   Lab Units 05/30/24  0017 05/29/24  0053 05/28/24  0118 05/27/24  0113 05/26/24  0110 05/25/24  0033 05/24/24  0114   SODIUM mmol/L 140 139 139 139 139 137 137   POTASSIUM mmol/L 4.5 4.2 4.7 4.1 4.0 4.4 3.8   CHLORIDE mmol/L 95* 93* 98 95* 94* 93* 93*   CO2 mmol/L 30.6* 34.0* 28.2 30.8* 31.3* 28.2 31.3*   BUN mg/dL 47* 31* 52* 40* 25* 38* 27*   CREATININE mg/dL 7.04* 5.36* 8.51* 7.23* 5.20* 7.28* 5.14*   CALCIUM mg/dL 9.2 8.8 8.9 9.2 8.7 8.8 8.5*   GLUCOSE mg/dL 166* 215* 249* 157* 226* 165* 225*     Results from last 7 days   Lab Units 05/28/24  0118 05/27/24  1840 05/27/24  0113   HSTROP T ng/L 135* 140* 127*       Results from last 7 days   Lab Units 05/29/24  0053   INR  1.14*       I reviewed the patient's new clinical results.  I reviewed the patient's new imaging results and agree with the interpretation.  I personally viewed and interpreted the patient's EKG/Telemetry data      Medication Review:   acetaminophen, 650 mg, Oral, Once per day on Tuesday Thursday Saturday  ammonium lactate, 1 Application, Topical, Nightly  apixaban, 5 mg, Oral, Q12H  aspirin, 81 mg, Oral, Daily  atorvastatin, 40 mg, Oral, Daily  cetirizine, 5 mg, Oral,  Daily  clopidogrel, 75 mg, Oral, Daily  empagliflozin, 10 mg, Oral, Daily  erythromycin, 1 Application, Left Eye, BID  famotidine, 20 mg, Oral, Q48H  gabapentin, 100 mg, Oral, Nightly  insulin glargine, 13 Units, Subcutaneous, Nightly  insulin lispro, 2-7 Units, Subcutaneous, 4x Daily AC & at Bedtime  insulin lispro, 3 Units, Subcutaneous, TID With Meals  levothyroxine, 75 mcg, Oral, Daily  Lidocaine, 1 patch, Transdermal, Q PM  lubrisoft, 1 Application, Topical, Daily  megestrol, 40 mg, Oral, TID With Meals  metoprolol succinate XL, 12.5 mg, Oral, Nightly  multivitamin with minerals, 1 tablet, Oral, Daily  sertraline, 50 mg, Oral, Nightly  sevelamer, 2,400 mg, Oral, TID With Meals  sodium chloride, 10 mL, Intravenous, Q12H  Vancomycin Pharmacy Intermittent/Pulse Dosing, , Does not apply, Daily           Assessment:  Coronary artery disease, status post three-vessel CABG in around past now status post stent to LCx on 5/28/2024  New HFrEF,, compensated      Recommendations:  Patient has been placed on GDMT for coronary artery disease and new stent including aspirin, Plavix, atorvastatin, metoprolol.  He will continue with triple therapy for 30 days and then aspirin can be discontinued.  HFrEF is stable.  Continue current medications.  From a cardiac standpoint patient is stable for discharge.  Follow-up with him in 2 weeks.    I discussed the patients findings and my recommendations with patient and family    Electronically signed by DORA Blair, 05/30/24, 12:46 PM EDT.

## 2024-05-31 LAB — GLUCOSE BLDC GLUCOMTR-MCNC: 144 MG/DL (ref 70–130)

## 2024-06-01 ENCOUNTER — APPOINTMENT (OUTPATIENT)
Dept: CT IMAGING | Facility: HOSPITAL | Age: 74
End: 2024-06-01
Payer: MEDICARE

## 2024-06-01 ENCOUNTER — HOSPITAL ENCOUNTER (EMERGENCY)
Facility: HOSPITAL | Age: 74
Discharge: ANOTHER HOSPITAL | End: 2024-06-02
Attending: STUDENT IN AN ORGANIZED HEALTH CARE EDUCATION/TRAINING PROGRAM | Admitting: STUDENT IN AN ORGANIZED HEALTH CARE EDUCATION/TRAINING PROGRAM
Payer: MEDICARE

## 2024-06-01 DIAGNOSIS — K92.2 LOWER GI BLEED: Primary | ICD-10-CM

## 2024-06-01 LAB
ALBUMIN SERPL-MCNC: 3.3 G/DL (ref 3.5–5.2)
ALBUMIN/GLOB SERPL: 1.1 G/DL
ALP SERPL-CCNC: 134 U/L (ref 39–117)
ALT SERPL W P-5'-P-CCNC: 17 U/L (ref 1–41)
ANION GAP SERPL CALCULATED.3IONS-SCNC: 18.5 MMOL/L (ref 5–15)
APTT PPP: 32.3 SECONDS (ref 26.5–34.5)
AST SERPL-CCNC: 19 U/L (ref 1–40)
BASOPHILS # BLD AUTO: 0.05 10*3/MM3 (ref 0–0.2)
BASOPHILS NFR BLD AUTO: 0.5 % (ref 0–1.5)
BILIRUB SERPL-MCNC: 0.4 MG/DL (ref 0–1.2)
BUN SERPL-MCNC: 93 MG/DL (ref 8–23)
BUN/CREAT SERPL: 9.3 (ref 7–25)
CALCIUM SPEC-SCNC: 8.7 MG/DL (ref 8.6–10.5)
CHLORIDE SERPL-SCNC: 90 MMOL/L (ref 98–107)
CO2 SERPL-SCNC: 25.5 MMOL/L (ref 22–29)
CREAT SERPL-MCNC: 10.04 MG/DL (ref 0.76–1.27)
DEPRECATED RDW RBC AUTO: 51.5 FL (ref 37–54)
EGFRCR SERPLBLD CKD-EPI 2021: 4.9 ML/MIN/1.73
EOSINOPHIL # BLD AUTO: 0.22 10*3/MM3 (ref 0–0.4)
EOSINOPHIL NFR BLD AUTO: 2.3 % (ref 0.3–6.2)
ERYTHROCYTE [DISTWIDTH] IN BLOOD BY AUTOMATED COUNT: 14.7 % (ref 12.3–15.4)
GLOBULIN UR ELPH-MCNC: 2.9 GM/DL
GLUCOSE SERPL-MCNC: 177 MG/DL (ref 65–99)
HCT VFR BLD AUTO: 23.1 % (ref 37.5–51)
HGB BLD-MCNC: 7.2 G/DL (ref 13–17.7)
IMM GRANULOCYTES # BLD AUTO: 0.07 10*3/MM3 (ref 0–0.05)
IMM GRANULOCYTES NFR BLD AUTO: 0.7 % (ref 0–0.5)
INR PPP: 1.77 (ref 0.9–1.1)
LIPASE SERPL-CCNC: 80 U/L (ref 13–60)
LYMPHOCYTES # BLD AUTO: 0.9 10*3/MM3 (ref 0.7–3.1)
LYMPHOCYTES NFR BLD AUTO: 9.4 % (ref 19.6–45.3)
MCH RBC QN AUTO: 30 PG (ref 26.6–33)
MCHC RBC AUTO-ENTMCNC: 31.2 G/DL (ref 31.5–35.7)
MCV RBC AUTO: 96.3 FL (ref 79–97)
MONOCYTES # BLD AUTO: 0.86 10*3/MM3 (ref 0.1–0.9)
MONOCYTES NFR BLD AUTO: 9 % (ref 5–12)
NEUTROPHILS NFR BLD AUTO: 7.49 10*3/MM3 (ref 1.7–7)
NEUTROPHILS NFR BLD AUTO: 78.1 % (ref 42.7–76)
NRBC BLD AUTO-RTO: 0 /100 WBC (ref 0–0.2)
PLATELET # BLD AUTO: 172 10*3/MM3 (ref 140–450)
PMV BLD AUTO: 9.7 FL (ref 6–12)
POTASSIUM SERPL-SCNC: 5.5 MMOL/L (ref 3.5–5.2)
PROT SERPL-MCNC: 6.2 G/DL (ref 6–8.5)
PROTHROMBIN TIME: 20.7 SECONDS (ref 12.1–14.7)
RBC # BLD AUTO: 2.4 10*6/MM3 (ref 4.14–5.8)
SODIUM SERPL-SCNC: 134 MMOL/L (ref 136–145)
WBC NRBC COR # BLD AUTO: 9.59 10*3/MM3 (ref 3.4–10.8)

## 2024-06-01 PROCEDURE — 36415 COLL VENOUS BLD VENIPUNCTURE: CPT | Performed by: STUDENT IN AN ORGANIZED HEALTH CARE EDUCATION/TRAINING PROGRAM

## 2024-06-01 PROCEDURE — 93005 ELECTROCARDIOGRAM TRACING: CPT | Performed by: STUDENT IN AN ORGANIZED HEALTH CARE EDUCATION/TRAINING PROGRAM

## 2024-06-01 PROCEDURE — 86901 BLOOD TYPING SEROLOGIC RH(D): CPT | Performed by: STUDENT IN AN ORGANIZED HEALTH CARE EDUCATION/TRAINING PROGRAM

## 2024-06-01 PROCEDURE — 85610 PROTHROMBIN TIME: CPT | Performed by: STUDENT IN AN ORGANIZED HEALTH CARE EDUCATION/TRAINING PROGRAM

## 2024-06-01 PROCEDURE — 85025 COMPLETE CBC W/AUTO DIFF WBC: CPT | Performed by: STUDENT IN AN ORGANIZED HEALTH CARE EDUCATION/TRAINING PROGRAM

## 2024-06-01 PROCEDURE — 99291 CRITICAL CARE FIRST HOUR: CPT

## 2024-06-01 PROCEDURE — 83690 ASSAY OF LIPASE: CPT | Performed by: STUDENT IN AN ORGANIZED HEALTH CARE EDUCATION/TRAINING PROGRAM

## 2024-06-01 PROCEDURE — 80053 COMPREHEN METABOLIC PANEL: CPT | Performed by: STUDENT IN AN ORGANIZED HEALTH CARE EDUCATION/TRAINING PROGRAM

## 2024-06-01 PROCEDURE — 86850 RBC ANTIBODY SCREEN: CPT | Performed by: STUDENT IN AN ORGANIZED HEALTH CARE EDUCATION/TRAINING PROGRAM

## 2024-06-01 PROCEDURE — 86923 COMPATIBILITY TEST ELECTRIC: CPT

## 2024-06-01 PROCEDURE — 86900 BLOOD TYPING SEROLOGIC ABO: CPT | Performed by: STUDENT IN AN ORGANIZED HEALTH CARE EDUCATION/TRAINING PROGRAM

## 2024-06-01 PROCEDURE — 85730 THROMBOPLASTIN TIME PARTIAL: CPT | Performed by: STUDENT IN AN ORGANIZED HEALTH CARE EDUCATION/TRAINING PROGRAM

## 2024-06-02 ENCOUNTER — APPOINTMENT (OUTPATIENT)
Dept: GENERAL RADIOLOGY | Facility: HOSPITAL | Age: 74
End: 2024-06-02
Payer: MEDICARE

## 2024-06-02 ENCOUNTER — APPOINTMENT (OUTPATIENT)
Dept: CT IMAGING | Facility: HOSPITAL | Age: 74
End: 2024-06-02
Payer: MEDICARE

## 2024-06-02 VITALS
OXYGEN SATURATION: 96 % | HEART RATE: 73 BPM | SYSTOLIC BLOOD PRESSURE: 143 MMHG | RESPIRATION RATE: 14 BRPM | WEIGHT: 220 LBS | BODY MASS INDEX: 30.8 KG/M2 | TEMPERATURE: 97.9 F | HEIGHT: 71 IN | DIASTOLIC BLOOD PRESSURE: 70 MMHG

## 2024-06-02 LAB
ABO GROUP BLD: NORMAL
BLD GP AB SCN SERPL QL: NEGATIVE
RH BLD: NEGATIVE
T&S EXPIRATION DATE: NORMAL

## 2024-06-02 PROCEDURE — 73060 X-RAY EXAM OF HUMERUS: CPT

## 2024-06-02 PROCEDURE — 74177 CT ABD & PELVIS W/CONTRAST: CPT

## 2024-06-02 PROCEDURE — 36430 TRANSFUSION BLD/BLD COMPNT: CPT

## 2024-06-02 PROCEDURE — 25010000002 HYALURONIDASE (HUMAN) 150 UNIT/ML SOLUTION 1 ML VIAL: Performed by: STUDENT IN AN ORGANIZED HEALTH CARE EDUCATION/TRAINING PROGRAM

## 2024-06-02 PROCEDURE — 74178 CT ABD&PLV WO CNTR FLWD CNTR: CPT

## 2024-06-02 PROCEDURE — 25510000001 IOPAMIDOL 61 % SOLUTION: Performed by: STUDENT IN AN ORGANIZED HEALTH CARE EDUCATION/TRAINING PROGRAM

## 2024-06-02 PROCEDURE — 96372 THER/PROPH/DIAG INJ SC/IM: CPT

## 2024-06-02 PROCEDURE — 86900 BLOOD TYPING SEROLOGIC ABO: CPT

## 2024-06-02 PROCEDURE — 71045 X-RAY EXAM CHEST 1 VIEW: CPT | Performed by: RADIOLOGY

## 2024-06-02 PROCEDURE — 71045 X-RAY EXAM CHEST 1 VIEW: CPT

## 2024-06-02 PROCEDURE — 73060 X-RAY EXAM OF HUMERUS: CPT | Performed by: RADIOLOGY

## 2024-06-02 PROCEDURE — P9016 RBC LEUKOCYTES REDUCED: HCPCS

## 2024-06-02 PROCEDURE — 74178 CT ABD&PLV WO CNTR FLWD CNTR: CPT | Performed by: RADIOLOGY

## 2024-06-02 PROCEDURE — C1751 CATH, INF, PER/CENT/MIDLINE: HCPCS

## 2024-06-02 RX ADMIN — IOPAMIDOL 80 ML: 612 INJECTION, SOLUTION INTRAVENOUS at 03:38

## 2024-06-02 RX ADMIN — HYALURONIDASE (HUMAN RECOMBINANT) 150 UNITS: 150 INJECTION, SOLUTION SUBCUTANEOUS at 02:32

## 2024-06-02 RX ADMIN — IOPAMIDOL 80 ML: 612 INJECTION, SOLUTION INTRAVENOUS at 01:53

## 2024-06-02 NOTE — ED NOTES
Called [facility] requesting possible transfer. Spoke with Greta PARKER. Awaiting return call from GI Physician at this time.

## 2024-06-02 NOTE — ED NOTES
Pt taken off bed pan. Pt cleaned and new brief placed on pt. Pt stool is liquid wit bright red blood. Provider Pancho made aware.

## 2024-06-02 NOTE — ED NOTES
Received call From [facility]. Spoke with [facility] Patients Bed assignment [id number] number to call report to [phone] face sheet faxed to [phone]

## 2024-06-02 NOTE — ED NOTES
Called [facility] requesting [employer] truck to transport patient to [facility]. [facility]

## 2024-06-02 NOTE — ED PROVIDER NOTES
Subjective   History of Present Illness  73-year-old male history of CHF, CAD, diabetes, ESRD on dialysis, hypertension presents to the ED with a large bloody bowel movement per nursing home.  He was just discharged from this hospital 2 days ago after a long inpatient stay and treated for CHF exacerbation, sepsis, A-fib RVR, NSTEMI.  Denies any history of liver disease.  No nausea, vomiting, hematemesis.  States he has some mild right upper quadrant abdominal pain.  Reports history of cholecystectomy.  Patient is on Eliquis.    History provided by:  Patient   used: No        Review of Systems    Past Medical History:   Diagnosis Date    CHF (congestive heart failure)     Coronary artery disease     Diabetes mellitus     Dialysis patient     Disease of thyroid gland     Elevated cholesterol     GERD (gastroesophageal reflux disease)     Hypertension     Myocardial infarction     Renal disorder     stage 5       No Known Allergies    Past Surgical History:   Procedure Laterality Date    CARDIAC CATHETERIZATION N/A 5/28/2024    Procedure: Left Heart Cath;  Surgeon: Mackenzie Ruiz MD;  Location: [facility] CATH INVASIVE LOCATION;  Service: Cardiology;  Laterality: N/A;    CARDIAC SURGERY      cabg    CHOLECYSTECTOMY      COLONOSCOPY N/A 9/22/2023    Procedure: COLONOSCOPY;  Surgeon: Trip Peter MD;  Location: [facility] OR;  Service: Gastroenterology;  Laterality: N/A;    DIALYSIS FISTULA CREATION Left     arm       Family History   Problem Relation Age of Onset    Arthritis Mother     COPD Father     Diabetes Father     Heart disease Father     Hyperlipidemia Father     Hypertension Father     Cancer Daughter        Social History     Socioeconomic History    Marital status: Unknown   Tobacco Use    Smoking status: Never    Smokeless tobacco: Never   Vaping Use    Vaping status: Never Used   Substance and Sexual Activity    Alcohol use: Never    Drug use: Never    Sexual activity: Defer            Objective   Physical Exam  Constitutional:       General: He is not in acute distress.     Appearance: He is not ill-appearing.   HENT:      Head: Normocephalic and atraumatic.   Eyes:      Conjunctiva/sclera: Conjunctivae normal.   Cardiovascular:      Rate and Rhythm: Normal rate and regular rhythm.   Pulmonary:      Effort: Pulmonary effort is normal.   Abdominal:      General: Abdomen is flat.      Palpations: Abdomen is soft.      Tenderness: There is abdominal tenderness.      Comments: Mild right upper quadrant tenderness to palpation   Musculoskeletal:      Right lower leg: No edema.      Left lower leg: No edema.   Neurological:      General: No focal deficit present.      Mental Status: He is alert and oriented to person, place, and time. Mental status is at baseline.         Central Line At Bedside    Date/Time: 6/2/2024 3:18 AM    Performed by: Adama Deleon MD  Authorized by: Adama Deleon MD    Consent:     Consent obtained:  Written    Consent given by:  Patient    Risks, benefits, and alternatives were discussed: yes      Risks discussed:  Arterial puncture, bleeding, infection, incorrect placement, nerve damage and pneumothorax    Alternatives discussed:  No treatment  Universal protocol:     Procedure explained and questions answered to patient or proxy's satisfaction: yes      Relevant documents present and verified: yes      Test results available: yes      Imaging studies available: yes      Required blood products, implants, devices, and special equipment available: yes      Immediately prior to procedure, a time out was called: yes      Patient identity confirmed:  Verbally with patient  Pre-procedure details:     Indication(s): central venous access and insufficient peripheral access      Hand hygiene: Hand hygiene performed prior to insertion      Sterile barrier technique: All elements of maximal sterile technique followed      Skin preparation:  Chlorhexidine    Skin  preparation agent: Skin preparation agent completely dried prior to procedure    Sedation:     Sedation type:  None  Anesthesia:     Anesthesia method:  Local infiltration    Local anesthetic:  Lidocaine 1% w/o epi  Procedure details:     Location:  L internal jugular    Patient position:  Supine    Procedural supplies:  Triple lumen    Catheter size:  7 Fr    Landmarks identified: yes      Ultrasound guidance: yes      Ultrasound guidance timing: prior to insertion and real time      Sterile ultrasound techniques: Sterile gel and sterile probe covers were used      Number of attempts:  1    Successful placement: yes    Post-procedure details:     Post-procedure:  Dressing applied and line sutured    Assessment:  Blood return through all ports, free fluid flow, placement verified by x-ray and no pneumothorax on x-ray    Procedure completion:  Tolerated well, no immediate complications             ED Course  ED Course as of 06/02/24 0430   Sat Jun 01, 2024   2335 Hemoglobin(!): 7.2 [AS]   2346 EKG obtained and independently interpreted as atrial fibrillation rate 83 without acute ischemic findings  Electronically signed by Adama Deleon MD, 06/01/24, 11:47 PM EDT.   [AS]      ED Course User Index  [AS] Adama Deleon MD                                             Medical Decision Making  73-year-old male presents to the ED after large bloody bowel movement.  Vital signs stable on arrival.  Labs showed a hemoglobin level of 7.2 which is decreased from her baseline of about 9.  Patient had another large bloody bowel movement with bright red blood while in the ED. INR 1.7 consistent with patient's blood thinner use.  Multiple peripheral IVs were obtained which subsequently blew.  I personally placed to which later blew.  CT scan with and without contrast obtained, contrast infiltrated during contrasted study.  Patient had another large bloody bowel movement as well.  Given active lower GI bleeding and low hemoglobin  level, 2 units of blood were ordered and a central line was placed given inability to get peripheral access.  Blood was started and CT with contrast was repeated.  CT showed no active bleeding.  Patient was discussed with Dr. Feldman at [facility] who accepted the patient for transfer for lower GI bleed as we do not have GI coverage here.    40 minutes of critical care provided. This time excludes other billable procedures. Time does include preparation of documents, medical consultations, review of old records, and direct bedside care. Patient is at high risk for life-threatening deterioration due to lower GI bleeding.       Problems Addressed:  Lower GI bleed: complicated acute illness or injury    Amount and/or Complexity of Data Reviewed  Labs: ordered. Decision-making details documented in ED Course.  Radiology: ordered.  ECG/medicine tests: ordered and independent interpretation performed.    Risk  Prescription drug management.        Final diagnoses:   Lower GI bleed       ED Disposition  ED Disposition       ED Disposition   Transfer to Another Facility     Condition   --    Comment   --               No follow-up provider specified.       Medication List      No changes were made to your prescriptions during this visit.            Adama Deleon MD  06/02/24 9944

## 2024-06-03 LAB
BH BB BLOOD EXPIRATION DATE: NORMAL
BH BB BLOOD EXPIRATION DATE: NORMAL
BH BB BLOOD TYPE BARCODE: 2800
BH BB BLOOD TYPE BARCODE: 2800
BH BB DISPENSE STATUS: NORMAL
BH BB DISPENSE STATUS: NORMAL
BH BB PRODUCT CODE: NORMAL
BH BB PRODUCT CODE: NORMAL
BH BB UNIT NUMBER: NORMAL
BH BB UNIT NUMBER: NORMAL
CROSSMATCH INTERPRETATION: NORMAL
CROSSMATCH INTERPRETATION: NORMAL
QT INTERVAL: 410 MS
QTC INTERVAL: 481 MS
UNIT  ABO: NORMAL
UNIT  ABO: NORMAL
UNIT  RH: NORMAL
UNIT  RH: NORMAL

## 2024-06-17 ENCOUNTER — LAB REQUISITION (OUTPATIENT)
Dept: LAB | Facility: HOSPITAL | Age: 74
End: 2024-06-17
Payer: MEDICARE

## 2024-06-17 DIAGNOSIS — D64.9 ANEMIA, UNSPECIFIED: ICD-10-CM

## 2024-06-17 LAB
BASOPHILS # BLD AUTO: 0.05 10*3/MM3 (ref 0–0.2)
BASOPHILS NFR BLD AUTO: 0.5 % (ref 0–1.5)
DEPRECATED RDW RBC AUTO: 56.9 FL (ref 37–54)
EOSINOPHIL # BLD AUTO: 0.18 10*3/MM3 (ref 0–0.4)
EOSINOPHIL NFR BLD AUTO: 1.7 % (ref 0.3–6.2)
ERYTHROCYTE [DISTWIDTH] IN BLOOD BY AUTOMATED COUNT: 17.2 % (ref 12.3–15.4)
HCT VFR BLD AUTO: 29.3 % (ref 37.5–51)
HGB BLD-MCNC: 9.3 G/DL (ref 13–17.7)
IMM GRANULOCYTES # BLD AUTO: 0.05 10*3/MM3 (ref 0–0.05)
IMM GRANULOCYTES NFR BLD AUTO: 0.5 % (ref 0–0.5)
LYMPHOCYTES # BLD AUTO: 1 10*3/MM3 (ref 0.7–3.1)
LYMPHOCYTES NFR BLD AUTO: 9.5 % (ref 19.6–45.3)
MCH RBC QN AUTO: 30.6 PG (ref 26.6–33)
MCHC RBC AUTO-ENTMCNC: 31.7 G/DL (ref 31.5–35.7)
MCV RBC AUTO: 96.4 FL (ref 79–97)
MONOCYTES # BLD AUTO: 1.11 10*3/MM3 (ref 0.1–0.9)
MONOCYTES NFR BLD AUTO: 10.5 % (ref 5–12)
NEUTROPHILS NFR BLD AUTO: 77.3 % (ref 42.7–76)
NEUTROPHILS NFR BLD AUTO: 8.17 10*3/MM3 (ref 1.7–7)
NRBC BLD AUTO-RTO: 0.4 /100 WBC (ref 0–0.2)
PLATELET # BLD AUTO: 121 10*3/MM3 (ref 140–450)
PMV BLD AUTO: 11.2 FL (ref 6–12)
RBC # BLD AUTO: 3.04 10*6/MM3 (ref 4.14–5.8)
WBC NRBC COR # BLD AUTO: 10.56 10*3/MM3 (ref 3.4–10.8)

## 2024-06-17 PROCEDURE — 85025 COMPLETE CBC W/AUTO DIFF WBC: CPT | Performed by: INTERNAL MEDICINE

## 2024-06-21 ENCOUNTER — LAB REQUISITION (OUTPATIENT)
Dept: LAB | Facility: HOSPITAL | Age: 74
End: 2024-06-21
Payer: MEDICARE

## 2024-06-21 DIAGNOSIS — I10 ESSENTIAL (PRIMARY) HYPERTENSION: ICD-10-CM

## 2024-06-21 LAB
BASOPHILS # BLD AUTO: 0.02 10*3/MM3 (ref 0–0.2)
BASOPHILS NFR BLD AUTO: 0.4 % (ref 0–1.5)
DEPRECATED RDW RBC AUTO: 61.2 FL (ref 37–54)
EOSINOPHIL # BLD AUTO: 0.24 10*3/MM3 (ref 0–0.4)
EOSINOPHIL NFR BLD AUTO: 4.6 % (ref 0.3–6.2)
ERYTHROCYTE [DISTWIDTH] IN BLOOD BY AUTOMATED COUNT: 17.3 % (ref 12.3–15.4)
HCT VFR BLD AUTO: 29.6 % (ref 37.5–51)
HGB BLD-MCNC: 9.1 G/DL (ref 13–17.7)
IMM GRANULOCYTES # BLD AUTO: 0.02 10*3/MM3 (ref 0–0.05)
IMM GRANULOCYTES NFR BLD AUTO: 0.4 % (ref 0–0.5)
LYMPHOCYTES # BLD AUTO: 0.88 10*3/MM3 (ref 0.7–3.1)
LYMPHOCYTES NFR BLD AUTO: 17 % (ref 19.6–45.3)
MCH RBC QN AUTO: 30.2 PG (ref 26.6–33)
MCHC RBC AUTO-ENTMCNC: 30.7 G/DL (ref 31.5–35.7)
MCV RBC AUTO: 98.3 FL (ref 79–97)
MONOCYTES # BLD AUTO: 0.99 10*3/MM3 (ref 0.1–0.9)
MONOCYTES NFR BLD AUTO: 19.1 % (ref 5–12)
NEUTROPHILS NFR BLD AUTO: 3.03 10*3/MM3 (ref 1.7–7)
NEUTROPHILS NFR BLD AUTO: 58.5 % (ref 42.7–76)
NRBC BLD AUTO-RTO: 0 /100 WBC (ref 0–0.2)
PLATELET # BLD AUTO: 99 10*3/MM3 (ref 140–450)
PMV BLD AUTO: 12.5 FL (ref 6–12)
RBC # BLD AUTO: 3.01 10*6/MM3 (ref 4.14–5.8)
WBC NRBC COR # BLD AUTO: 5.18 10*3/MM3 (ref 3.4–10.8)

## 2024-06-21 PROCEDURE — 85025 COMPLETE CBC W/AUTO DIFF WBC: CPT | Performed by: NURSE PRACTITIONER

## 2024-06-27 ENCOUNTER — LAB REQUISITION (OUTPATIENT)
Dept: LAB | Facility: HOSPITAL | Age: 74
End: 2024-06-27
Payer: MEDICARE

## 2024-06-27 DIAGNOSIS — I10 ESSENTIAL (PRIMARY) HYPERTENSION: ICD-10-CM

## 2024-06-27 LAB
BASOPHILS # BLD AUTO: 0.05 10*3/MM3 (ref 0–0.2)
BASOPHILS NFR BLD AUTO: 0.5 % (ref 0–1.5)
DEPRECATED RDW RBC AUTO: 57.1 FL (ref 37–54)
EOSINOPHIL # BLD AUTO: 0.24 10*3/MM3 (ref 0–0.4)
EOSINOPHIL NFR BLD AUTO: 2.4 % (ref 0.3–6.2)
ERYTHROCYTE [DISTWIDTH] IN BLOOD BY AUTOMATED COUNT: 16.5 % (ref 12.3–15.4)
HCT VFR BLD AUTO: 30.2 % (ref 37.5–51)
HGB BLD-MCNC: 8.9 G/DL (ref 13–17.7)
IMM GRANULOCYTES # BLD AUTO: 0.05 10*3/MM3 (ref 0–0.05)
IMM GRANULOCYTES NFR BLD AUTO: 0.5 % (ref 0–0.5)
LYMPHOCYTES # BLD AUTO: 1.11 10*3/MM3 (ref 0.7–3.1)
LYMPHOCYTES NFR BLD AUTO: 11 % (ref 19.6–45.3)
MCH RBC QN AUTO: 28.4 PG (ref 26.6–33)
MCHC RBC AUTO-ENTMCNC: 29.5 G/DL (ref 31.5–35.7)
MCV RBC AUTO: 96.5 FL (ref 79–97)
MONOCYTES # BLD AUTO: 0.98 10*3/MM3 (ref 0.1–0.9)
MONOCYTES NFR BLD AUTO: 9.7 % (ref 5–12)
NEUTROPHILS NFR BLD AUTO: 7.64 10*3/MM3 (ref 1.7–7)
NEUTROPHILS NFR BLD AUTO: 75.9 % (ref 42.7–76)
NRBC BLD AUTO-RTO: 0 /100 WBC (ref 0–0.2)
PLATELET # BLD AUTO: 197 10*3/MM3 (ref 140–450)
PMV BLD AUTO: 11.3 FL (ref 6–12)
RBC # BLD AUTO: 3.13 10*6/MM3 (ref 4.14–5.8)
WBC NRBC COR # BLD AUTO: 10.07 10*3/MM3 (ref 3.4–10.8)

## 2024-06-27 PROCEDURE — 85025 COMPLETE CBC W/AUTO DIFF WBC: CPT | Performed by: NURSE PRACTITIONER

## 2024-07-09 ENCOUNTER — APPOINTMENT (OUTPATIENT)
Dept: CT IMAGING | Facility: HOSPITAL | Age: 74
End: 2024-07-09
Payer: MEDICARE

## 2024-07-09 ENCOUNTER — HOSPITAL ENCOUNTER (EMERGENCY)
Facility: HOSPITAL | Age: 74
Discharge: HOME | End: 2024-07-09
Attending: STUDENT IN AN ORGANIZED HEALTH CARE EDUCATION/TRAINING PROGRAM
Payer: MEDICARE

## 2024-07-09 VITALS
DIASTOLIC BLOOD PRESSURE: 78 MMHG | OXYGEN SATURATION: 96 % | SYSTOLIC BLOOD PRESSURE: 143 MMHG | HEART RATE: 95 BPM | RESPIRATION RATE: 16 BRPM | WEIGHT: 220.02 LBS | BODY MASS INDEX: 30.8 KG/M2 | HEIGHT: 71 IN | TEMPERATURE: 97.5 F

## 2024-07-09 DIAGNOSIS — J18.9 PNEUMONIA DUE TO INFECTIOUS ORGANISM, UNSPECIFIED LATERALITY, UNSPECIFIED PART OF LUNG: ICD-10-CM

## 2024-07-09 DIAGNOSIS — I50.9 CHRONIC CONGESTIVE HEART FAILURE, UNSPECIFIED HEART FAILURE TYPE: ICD-10-CM

## 2024-07-09 DIAGNOSIS — R10.84 GENERALIZED ABDOMINAL PAIN: Primary | ICD-10-CM

## 2024-07-09 LAB
ALBUMIN SERPL-MCNC: 3.3 G/DL (ref 3.5–5.2)
ALBUMIN/GLOB SERPL: 1.1 G/DL
ALP SERPL-CCNC: 163 U/L (ref 39–117)
ALT SERPL W P-5'-P-CCNC: 16 U/L (ref 1–41)
ANION GAP SERPL CALCULATED.3IONS-SCNC: 14 MMOL/L (ref 5–15)
AST SERPL-CCNC: 21 U/L (ref 1–40)
BACTERIA UR QL AUTO: ABNORMAL /HPF
BASOPHILS # BLD AUTO: 0.03 10*3/MM3 (ref 0–0.2)
BASOPHILS NFR BLD AUTO: 0.4 % (ref 0–1.5)
BILIRUB SERPL-MCNC: 0.7 MG/DL (ref 0–1.2)
BILIRUB UR QL STRIP: NEGATIVE
BUN SERPL-MCNC: 37 MG/DL (ref 8–23)
BUN/CREAT SERPL: 8 (ref 7–25)
CALCIUM SPEC-SCNC: 9.3 MG/DL (ref 8.6–10.5)
CHLORIDE SERPL-SCNC: 99 MMOL/L (ref 98–107)
CLARITY UR: ABNORMAL
CO2 SERPL-SCNC: 29 MMOL/L (ref 22–29)
COLOR UR: ABNORMAL
CREAT SERPL-MCNC: 4.6 MG/DL (ref 0.76–1.27)
D-LACTATE SERPL-SCNC: 1.4 MMOL/L (ref 0.5–2)
DEPRECATED RDW RBC AUTO: 58.9 FL (ref 37–54)
EGFRCR SERPLBLD CKD-EPI 2021: 12.6 ML/MIN/1.73
EOSINOPHIL # BLD AUTO: 0.16 10*3/MM3 (ref 0–0.4)
EOSINOPHIL NFR BLD AUTO: 1.9 % (ref 0.3–6.2)
ERYTHROCYTE [DISTWIDTH] IN BLOOD BY AUTOMATED COUNT: 17.1 % (ref 12.3–15.4)
GLOBULIN UR ELPH-MCNC: 3.1 GM/DL
GLUCOSE SERPL-MCNC: 172 MG/DL (ref 65–99)
GLUCOSE UR STRIP-MCNC: ABNORMAL MG/DL
HCT VFR BLD AUTO: 32.5 % (ref 37.5–51)
HGB BLD-MCNC: 9.8 G/DL (ref 13–17.7)
HGB UR QL STRIP.AUTO: ABNORMAL
HOLD SPECIMEN: NORMAL
HYALINE CASTS UR QL AUTO: ABNORMAL /LPF
IMM GRANULOCYTES # BLD AUTO: 0.03 10*3/MM3 (ref 0–0.05)
IMM GRANULOCYTES NFR BLD AUTO: 0.4 % (ref 0–0.5)
KETONES UR QL STRIP: NEGATIVE
LEUKOCYTE ESTERASE UR QL STRIP.AUTO: ABNORMAL
LIPASE SERPL-CCNC: 14 U/L (ref 13–60)
LYMPHOCYTES # BLD AUTO: 0.84 10*3/MM3 (ref 0.7–3.1)
LYMPHOCYTES NFR BLD AUTO: 9.9 % (ref 19.6–45.3)
MCH RBC QN AUTO: 28.6 PG (ref 26.6–33)
MCHC RBC AUTO-ENTMCNC: 30.2 G/DL (ref 31.5–35.7)
MCV RBC AUTO: 94.8 FL (ref 79–97)
MONOCYTES # BLD AUTO: 0.9 10*3/MM3 (ref 0.1–0.9)
MONOCYTES NFR BLD AUTO: 10.6 % (ref 5–12)
NEUTROPHILS NFR BLD AUTO: 6.53 10*3/MM3 (ref 1.7–7)
NEUTROPHILS NFR BLD AUTO: 76.8 % (ref 42.7–76)
NITRITE UR QL STRIP: NEGATIVE
NRBC BLD AUTO-RTO: 0 /100 WBC (ref 0–0.2)
PH UR STRIP.AUTO: 6.5 [PH] (ref 5–8)
PLATELET # BLD AUTO: 178 10*3/MM3 (ref 140–450)
PMV BLD AUTO: 9.8 FL (ref 6–12)
POTASSIUM SERPL-SCNC: 4.4 MMOL/L (ref 3.5–5.2)
PROT SERPL-MCNC: 6.4 G/DL (ref 6–8.5)
PROT UR QL STRIP: ABNORMAL
RBC # BLD AUTO: 3.43 10*6/MM3 (ref 4.14–5.8)
RBC # UR STRIP: ABNORMAL /HPF
REF LAB TEST METHOD: ABNORMAL
SODIUM SERPL-SCNC: 142 MMOL/L (ref 136–145)
SP GR UR STRIP: 1.02 (ref 1–1.03)
SQUAMOUS #/AREA URNS HPF: ABNORMAL /HPF
UROBILINOGEN UR QL STRIP: ABNORMAL
WBC # UR STRIP: ABNORMAL /HPF
WBC NRBC COR # BLD AUTO: 8.49 10*3/MM3 (ref 3.4–10.8)
WHOLE BLOOD HOLD COAG: NORMAL
WHOLE BLOOD HOLD COAG: NORMAL
WHOLE BLOOD HOLD SPECIMEN: NORMAL
WHOLE BLOOD HOLD SPECIMEN: NORMAL

## 2024-07-09 PROCEDURE — 99284 EMERGENCY DEPT VISIT MOD MDM: CPT

## 2024-07-09 PROCEDURE — 83605 ASSAY OF LACTIC ACID: CPT | Performed by: STUDENT IN AN ORGANIZED HEALTH CARE EDUCATION/TRAINING PROGRAM

## 2024-07-09 PROCEDURE — 71250 CT THORAX DX C-: CPT

## 2024-07-09 PROCEDURE — P9612 CATHETERIZE FOR URINE SPEC: HCPCS

## 2024-07-09 PROCEDURE — 36415 COLL VENOUS BLD VENIPUNCTURE: CPT | Performed by: STUDENT IN AN ORGANIZED HEALTH CARE EDUCATION/TRAINING PROGRAM

## 2024-07-09 PROCEDURE — 81001 URINALYSIS AUTO W/SCOPE: CPT | Performed by: STUDENT IN AN ORGANIZED HEALTH CARE EDUCATION/TRAINING PROGRAM

## 2024-07-09 PROCEDURE — 74176 CT ABD & PELVIS W/O CONTRAST: CPT

## 2024-07-09 PROCEDURE — 74176 CT ABD & PELVIS W/O CONTRAST: CPT | Performed by: RADIOLOGY

## 2024-07-09 PROCEDURE — 71250 CT THORAX DX C-: CPT | Performed by: RADIOLOGY

## 2024-07-09 PROCEDURE — 83690 ASSAY OF LIPASE: CPT | Performed by: STUDENT IN AN ORGANIZED HEALTH CARE EDUCATION/TRAINING PROGRAM

## 2024-07-09 PROCEDURE — 85025 COMPLETE CBC W/AUTO DIFF WBC: CPT | Performed by: STUDENT IN AN ORGANIZED HEALTH CARE EDUCATION/TRAINING PROGRAM

## 2024-07-09 PROCEDURE — 80053 COMPREHEN METABOLIC PANEL: CPT | Performed by: STUDENT IN AN ORGANIZED HEALTH CARE EDUCATION/TRAINING PROGRAM

## 2024-07-09 RX ORDER — CEFUROXIME AXETIL 500 MG/1
500 TABLET ORAL 2 TIMES DAILY
Qty: 13 TABLET | Refills: 0 | Status: ON HOLD | OUTPATIENT
Start: 2024-07-09 | End: 2024-07-11

## 2024-07-09 RX ORDER — CEPHALEXIN 250 MG/1
500 CAPSULE ORAL ONCE
Status: COMPLETED | OUTPATIENT
Start: 2024-07-09 | End: 2024-07-09

## 2024-07-09 RX ORDER — SODIUM CHLORIDE 0.9 % (FLUSH) 0.9 %
10 SYRINGE (ML) INJECTION AS NEEDED
Status: DISCONTINUED | OUTPATIENT
Start: 2024-07-09 | End: 2024-07-09 | Stop reason: HOSPADM

## 2024-07-09 RX ADMIN — CEPHALEXIN 500 MG: 250 CAPSULE ORAL at 16:33

## 2024-07-09 NOTE — ED PROVIDER NOTES
Subjective   History of Present Illness  73-year-old male with past medical history of end-stage renal disease requiring dialysis, congestive heart failure, diabetes, and hypertension presents to the ER due to concerns for abdominal pain.  Patient noted symptoms been present for the past several years.  Patient worsening today.  Confirmed nausea.  Confirmed back pain as well.  No vomiting.  No changes in bowel habits.  No fever or chills.  No chest pain.  Chronic shortness of breath.  Vital signs stable.      Review of Systems   Gastrointestinal:  Positive for abdominal pain.   Musculoskeletal:  Positive for back pain.   All other systems reviewed and are negative.      Past Medical History:   Diagnosis Date    CHF (congestive heart failure)     Coronary artery disease     Diabetes mellitus     Dialysis patient     Disease of thyroid gland     Elevated cholesterol     GERD (gastroesophageal reflux disease)     Hypertension     Myocardial infarction     Renal disorder     stage 5       No Known Allergies    Past Surgical History:   Procedure Laterality Date    CARDIAC CATHETERIZATION N/A 5/28/2024    Procedure: Left Heart Cath;  Surgeon: Mackenzie Ruiz MD;  Location: [facility] CATH INVASIVE LOCATION;  Service: Cardiology;  Laterality: N/A;    CARDIAC SURGERY      cabg    CHOLECYSTECTOMY      COLONOSCOPY N/A 9/22/2023    Procedure: COLONOSCOPY;  Surgeon: Trip Peter MD;  Location: [facility] OR;  Service: Gastroenterology;  Laterality: N/A;    DIALYSIS FISTULA CREATION Left     arm       Family History   Problem Relation Age of Onset    Arthritis Mother     COPD Father     Diabetes Father     Heart disease Father     Hyperlipidemia Father     Hypertension Father     Cancer Daughter        Social History     Socioeconomic History    Marital status: Unknown   Tobacco Use    Smoking status: Never    Smokeless tobacco: Never   Vaping Use    Vaping status: Never Used   Substance and Sexual Activity    Alcohol use: Never     Drug use: Never    Sexual activity: Defer           Objective   Physical Exam  Constitutional:       General: He is not in acute distress.     Appearance: He is well-developed. He is not ill-appearing.   HENT:      Head: Normocephalic and atraumatic.   Eyes:      Extraocular Movements: Extraocular movements intact.      Pupils: Pupils are equal, round, and reactive to light.   Neck:      Vascular: No JVD.   Cardiovascular:      Rate and Rhythm: Normal rate and regular rhythm.      Heart sounds: Normal heart sounds. No murmur heard.  Pulmonary:      Effort: No tachypnea, accessory muscle usage or respiratory distress.      Breath sounds: Normal breath sounds. No stridor. No decreased breath sounds, wheezing, rhonchi or rales.   Chest:      Chest wall: No deformity, tenderness or crepitus.   Abdominal:      General: Abdomen is protuberant. Bowel sounds are normal.      Palpations: Abdomen is soft.      Tenderness: There is generalized abdominal tenderness. There is no guarding or rebound.   Musculoskeletal:         General: Normal range of motion.      Cervical back: Normal range of motion and neck supple.      Right lower leg: No tenderness. No edema.      Left lower leg: No tenderness. No edema.   Lymphadenopathy:      Cervical: No cervical adenopathy.   Skin:     General: Skin is warm and dry.      Coloration: Skin is not cyanotic.      Findings: No ecchymosis or erythema.   Neurological:      General: No focal deficit present.      Mental Status: He is alert and oriented to person, place, and time.      Cranial Nerves: No cranial nerve deficit.      Motor: No weakness.   Psychiatric:         Mood and Affect: Mood normal. Mood is not anxious.         Behavior: Behavior normal. Behavior is not agitated.         Procedures           ED Course                                 CT Chest Without Contrast Diagnostic    Result Date: 7/9/2024  1.  CHF/edema. 2.  Right greater than left pleural effusions. 3.  Bibasilar  consolidative airspace disease. May represent atelectasis combined with pneumonia. 4.  Cardiomegaly and prior CABG. 5.  No pneumothorax. 6.  Chronic L2 compression fracture. 7.  Other incidental/nonacute findings above stable from previous.   This report was finalized on 7/9/2024 2:54 PM by Dr. Jose Ramon Hutchison MD.      CT Abdomen Pelvis Without Contrast    Result Date: 7/9/2024  1.  CHF/edema with right greater than left nonloculated pleural effusions. 2.  Bibasilar partially consolidative airspace disease. Atelectasis and/or pneumonia. 3.  Cholecystectomy. 4.  Degenerative changes lumbar spine with chronic compression fractures and multilevel stenosis. 5.  Diverticulosis without diverticulitis. No bowel obstruction. 6.  Other incidental/nonacute findings described above.   This report was finalized on 7/9/2024 2:53 PM by Dr. Jose Ramon Hutchison MD.         Results for orders placed or performed during the hospital encounter of 07/09/24   Comprehensive Metabolic Panel    Specimen: Blood   Result Value Ref Range    Glucose 172 (H) 65 - 99 mg/dL    BUN 37 (H) 8 - 23 mg/dL    Creatinine 4.60 (H) 0.76 - 1.27 mg/dL    Sodium 142 136 - 145 mmol/L    Potassium 4.4 3.5 - 5.2 mmol/L    Chloride 99 98 - 107 mmol/L    CO2 29.0 22.0 - 29.0 mmol/L    Calcium 9.3 8.6 - 10.5 mg/dL    Total Protein 6.4 6.0 - 8.5 g/dL    Albumin 3.3 (L) 3.5 - 5.2 g/dL    ALT (SGPT) 16 1 - 41 U/L    AST (SGOT) 21 1 - 40 U/L    Alkaline Phosphatase 163 (H) 39 - 117 U/L    Total Bilirubin 0.7 0.0 - 1.2 mg/dL    Globulin 3.1 gm/dL    A/G Ratio 1.1 g/dL    BUN/Creatinine Ratio 8.0 7.0 - 25.0    Anion Gap 14.0 5.0 - 15.0 mmol/L    eGFR 12.6 (L) >60.0 mL/min/1.73   Lipase    Specimen: Blood   Result Value Ref Range    Lipase 14 13 - 60 U/L   Lactic Acid, Plasma    Specimen: Blood   Result Value Ref Range    Lactate 1.4 0.5 - 2.0 mmol/L   CBC Auto Differential    Specimen: Blood   Result Value Ref Range    WBC 8.49 3.40 - 10.80 10*3/mm3    RBC 3.43 (L) 4.14 -  5.80 10*6/mm3    Hemoglobin 9.8 (L) 13.0 - 17.7 g/dL    Hematocrit 32.5 (L) 37.5 - 51.0 %    MCV 94.8 79.0 - 97.0 fL    MCH 28.6 26.6 - 33.0 pg    MCHC 30.2 (L) 31.5 - 35.7 g/dL    RDW 17.1 (H) 12.3 - 15.4 %    RDW-SD 58.9 (H) 37.0 - 54.0 fl    MPV 9.8 6.0 - 12.0 fL    Platelets 178 140 - 450 10*3/mm3    Neutrophil % 76.8 (H) 42.7 - 76.0 %    Lymphocyte % 9.9 (L) 19.6 - 45.3 %    Monocyte % 10.6 5.0 - 12.0 %    Eosinophil % 1.9 0.3 - 6.2 %    Basophil % 0.4 0.0 - 1.5 %    Immature Grans % 0.4 0.0 - 0.5 %    Neutrophils, Absolute 6.53 1.70 - 7.00 10*3/mm3    Lymphocytes, Absolute 0.84 0.70 - 3.10 10*3/mm3    Monocytes, Absolute 0.90 0.10 - 0.90 10*3/mm3    Eosinophils, Absolute 0.16 0.00 - 0.40 10*3/mm3    Basophils, Absolute 0.03 0.00 - 0.20 10*3/mm3    Immature Grans, Absolute 0.03 0.00 - 0.05 10*3/mm3    nRBC 0.0 0.0 - 0.2 /100 WBC   Green Top (Gel)   Result Value Ref Range    Extra Tube Hold for add-ons.    Lavender Top   Result Value Ref Range    Extra Tube hold for add-on    Gold Top - SST   Result Value Ref Range    Extra Tube Hold for add-ons.    Gray Top   Result Value Ref Range    Extra Tube Hold for add-ons.    Light Blue Top   Result Value Ref Range    Extra Tube Hold for add-ons.    Green Top (Gel)   Result Value Ref Range    Extra Tube Hold for add-ons.    Lavender Top   Result Value Ref Range    Extra Tube hold for add-on    Gold Top - SST   Result Value Ref Range    Extra Tube Hold for add-ons.    Light Blue Top   Result Value Ref Range    Extra Tube Hold for add-ons.                  Medical Decision Making  CBC listed.  CMP consistent with patient's end-stage renal disease.  CT imaging of the chest noted concerns for congestive changes within the chest likely consistent with CHF.  Cannot rule out bilateral lower lobe pneumonia.  However, patient has no symptoms consistent with pneumonia.  No fever.  No leukocytosis.  Keflex will be initiated for prophylaxis.  CT imaging of the abdomen and pelvis  noted no acute abnormality.  Chronic lumbar changes listed.  Likely consistent with patient's lower back pain.  Work up and results were discussed throughly with the patient.  The patient will be discharged for further monitoring and management with their PCP.  Red flags, warning signs, worsening symptoms, and when to return to the ER discussed with and understood by the patient.  Patient will follow up with their PCP in a timely manner.  Vitals stable at discharge.    Amount and/or Complexity of Data Reviewed  Labs: ordered. Decision-making details documented in ED Course.  Radiology: ordered. Decision-making details documented in ED Course.    Risk  Prescription drug management.        Final diagnoses:   Generalized abdominal pain   Chronic congestive heart failure, unspecified heart failure type   Pneumonia due to infectious organism, unspecified laterality, unspecified part of lung       ED Disposition  ED Disposition       ED Disposition   Discharge    Condition   Stable    Comment   --               No follow-up provider specified.       Medication List        New Prescriptions      cefuroxime 500 MG tablet  Commonly known as: CEFTIN  Take 1 tablet by mouth 2 (Two) Times a Day.               Where to Get Your Medications        These medications were sent to [facility] - [place], [place] - [address] - [phone] [facility] [phone]   [address] [place] , [facility] [address]      Phone: [phone]   cefuroxime 500 MG tablet            Saul Decker DO  07/09/24 2328

## 2024-07-10 ENCOUNTER — APPOINTMENT (OUTPATIENT)
Dept: CT IMAGING | Facility: HOSPITAL | Age: 74
End: 2024-07-10
Payer: MEDICARE

## 2024-07-10 ENCOUNTER — HOSPITAL ENCOUNTER (INPATIENT)
Facility: HOSPITAL | Age: 74
LOS: 1 days | Discharge: HOME | End: 2024-07-12
Admitting: INTERNAL MEDICINE
Payer: MEDICARE

## 2024-07-10 ENCOUNTER — LAB REQUISITION (OUTPATIENT)
Dept: LAB | Facility: HOSPITAL | Age: 74
DRG: 091 | End: 2024-07-10
Payer: MEDICARE

## 2024-07-10 ENCOUNTER — APPOINTMENT (OUTPATIENT)
Dept: GENERAL RADIOLOGY | Facility: HOSPITAL | Age: 74
End: 2024-07-10
Payer: MEDICARE

## 2024-07-10 DIAGNOSIS — R41.82 ALTERED MENTAL STATUS, UNSPECIFIED ALTERED MENTAL STATUS TYPE: Primary | ICD-10-CM

## 2024-07-10 DIAGNOSIS — D64.9 ANEMIA, UNSPECIFIED: ICD-10-CM

## 2024-07-10 LAB
A-A DO2: 43.7 MMHG (ref 0–300)
ALBUMIN SERPL-MCNC: 3.4 G/DL (ref 3.5–5.2)
ALBUMIN/GLOB SERPL: 1.1 G/DL
ALP SERPL-CCNC: 168 U/L (ref 39–117)
ALT SERPL W P-5'-P-CCNC: 17 U/L (ref 1–41)
AMMONIA BLD-SCNC: 14 UMOL/L (ref 16–60)
AMPHET+METHAMPHET UR QL: NEGATIVE
AMPHETAMINES UR QL: NEGATIVE
ANION GAP SERPL CALCULATED.3IONS-SCNC: 11 MMOL/L (ref 5–15)
APTT PPP: 34.8 SECONDS (ref 26.5–34.5)
ARTERIAL PATENCY WRIST A: POSITIVE
AST SERPL-CCNC: 19 U/L (ref 1–40)
ATMOSPHERIC PRESS: 727 MMHG
B PARAPERT DNA SPEC QL NAA+PROBE: NOT DETECTED
B PERT DNA SPEC QL NAA+PROBE: NOT DETECTED
BACTERIA UR QL AUTO: ABNORMAL /HPF
BARBITURATES UR QL SCN: NEGATIVE
BASE EXCESS BLDA CALC-SCNC: 4.9 MMOL/L (ref 0–2)
BASOPHILS # BLD AUTO: 0.02 10*3/MM3 (ref 0–0.2)
BASOPHILS # BLD AUTO: 0.04 10*3/MM3 (ref 0–0.2)
BASOPHILS NFR BLD AUTO: 0.2 % (ref 0–1.5)
BASOPHILS NFR BLD AUTO: 0.5 % (ref 0–1.5)
BDY SITE: ABNORMAL
BENZODIAZ UR QL SCN: NEGATIVE
BILIRUB SERPL-MCNC: 0.6 MG/DL (ref 0–1.2)
BILIRUB UR QL STRIP: ABNORMAL
BUN SERPL-MCNC: 17 MG/DL (ref 8–23)
BUN/CREAT SERPL: 6.3 (ref 7–25)
BUPRENORPHINE SERPL-MCNC: NEGATIVE NG/ML
C PNEUM DNA NPH QL NAA+NON-PROBE: NOT DETECTED
CALCIUM SPEC-SCNC: 9.2 MG/DL (ref 8.6–10.5)
CANNABINOIDS SERPL QL: NEGATIVE
CHLORIDE SERPL-SCNC: 98 MMOL/L (ref 98–107)
CLARITY UR: ABNORMAL
CO2 BLDA-SCNC: 30.2 MMOL/L (ref 22–33)
CO2 SERPL-SCNC: 31 MMOL/L (ref 22–29)
COCAINE UR QL: NEGATIVE
COHGB MFR BLD: 2.1 % (ref 0–5)
COLOR UR: ABNORMAL
CREAT SERPL-MCNC: 2.7 MG/DL (ref 0.76–1.27)
D-LACTATE SERPL-SCNC: 1.3 MMOL/L (ref 0.5–2)
DEPRECATED RDW RBC AUTO: 58.4 FL (ref 37–54)
DEPRECATED RDW RBC AUTO: 59.9 FL (ref 37–54)
EGFRCR SERPLBLD CKD-EPI 2021: 23.8 ML/MIN/1.73
EOSINOPHIL # BLD AUTO: 0.23 10*3/MM3 (ref 0–0.4)
EOSINOPHIL # BLD AUTO: 0.24 10*3/MM3 (ref 0–0.4)
EOSINOPHIL NFR BLD AUTO: 2.3 % (ref 0.3–6.2)
EOSINOPHIL NFR BLD AUTO: 2.8 % (ref 0.3–6.2)
ERYTHROCYTE [DISTWIDTH] IN BLOOD BY AUTOMATED COUNT: 16.6 % (ref 12.3–15.4)
ERYTHROCYTE [DISTWIDTH] IN BLOOD BY AUTOMATED COUNT: 16.9 % (ref 12.3–15.4)
FENTANYL UR-MCNC: NEGATIVE NG/ML
FLUAV SUBTYP SPEC NAA+PROBE: NOT DETECTED
FLUBV RNA ISLT QL NAA+PROBE: NOT DETECTED
GEN 5 2HR TROPONIN T REFLEX: 143 NG/L
GLOBULIN UR ELPH-MCNC: 3.1 GM/DL
GLUCOSE SERPL-MCNC: 72 MG/DL (ref 65–99)
GLUCOSE UR STRIP-MCNC: ABNORMAL MG/DL
GRAN CASTS URNS QL MICRO: ABNORMAL /LPF
HADV DNA SPEC NAA+PROBE: NOT DETECTED
HCO3 BLDA-SCNC: 28.9 MMOL/L (ref 20–26)
HCOV 229E RNA SPEC QL NAA+PROBE: NOT DETECTED
HCOV HKU1 RNA SPEC QL NAA+PROBE: NOT DETECTED
HCOV NL63 RNA SPEC QL NAA+PROBE: NOT DETECTED
HCOV OC43 RNA SPEC QL NAA+PROBE: NOT DETECTED
HCT VFR BLD AUTO: 31.6 % (ref 37.5–51)
HCT VFR BLD AUTO: 33.6 % (ref 37.5–51)
HCT VFR BLD CALC: 31 % (ref 38–51)
HGB BLD-MCNC: 10.1 G/DL (ref 13–17.7)
HGB BLD-MCNC: 9.3 G/DL (ref 13–17.7)
HGB BLDA-MCNC: 10.1 G/DL (ref 14–18)
HGB UR QL STRIP.AUTO: ABNORMAL
HMPV RNA NPH QL NAA+NON-PROBE: NOT DETECTED
HOLD SPECIMEN: NORMAL
HPIV1 RNA ISLT QL NAA+PROBE: NOT DETECTED
HPIV2 RNA SPEC QL NAA+PROBE: NOT DETECTED
HPIV3 RNA NPH QL NAA+PROBE: NOT DETECTED
HPIV4 P GENE NPH QL NAA+PROBE: NOT DETECTED
HYALINE CASTS UR QL AUTO: ABNORMAL /LPF
IMM GRANULOCYTES # BLD AUTO: 0.02 10*3/MM3 (ref 0–0.05)
IMM GRANULOCYTES # BLD AUTO: 0.04 10*3/MM3 (ref 0–0.05)
IMM GRANULOCYTES NFR BLD AUTO: 0.2 % (ref 0–0.5)
IMM GRANULOCYTES NFR BLD AUTO: 0.4 % (ref 0–0.5)
INHALED O2 CONCENTRATION: 21 %
INR PPP: 1.86 (ref 0.9–1.1)
KETONES UR QL STRIP: NEGATIVE
LEUKOCYTE ESTERASE UR QL STRIP.AUTO: ABNORMAL
LYMPHOCYTES # BLD AUTO: 0.9 10*3/MM3 (ref 0.7–3.1)
LYMPHOCYTES # BLD AUTO: 1.03 10*3/MM3 (ref 0.7–3.1)
LYMPHOCYTES NFR BLD AUTO: 10.4 % (ref 19.6–45.3)
LYMPHOCYTES NFR BLD AUTO: 10.5 % (ref 19.6–45.3)
Lab: ABNORMAL
Lab: ABNORMAL
M PNEUMO IGG SER IA-ACNC: NOT DETECTED
MCH RBC QN AUTO: 28.5 PG (ref 26.6–33)
MCH RBC QN AUTO: 28.5 PG (ref 26.6–33)
MCHC RBC AUTO-ENTMCNC: 29.4 G/DL (ref 31.5–35.7)
MCHC RBC AUTO-ENTMCNC: 30.1 G/DL (ref 31.5–35.7)
MCV RBC AUTO: 94.9 FL (ref 79–97)
MCV RBC AUTO: 96.9 FL (ref 79–97)
METHADONE UR QL SCN: NEGATIVE
METHGB BLD QL: 0.1 % (ref 0–3)
MODALITY: ABNORMAL
MONOCYTES # BLD AUTO: 1.02 10*3/MM3 (ref 0.1–0.9)
MONOCYTES # BLD AUTO: 1.06 10*3/MM3 (ref 0.1–0.9)
MONOCYTES NFR BLD AUTO: 10.8 % (ref 5–12)
MONOCYTES NFR BLD AUTO: 11.8 % (ref 5–12)
NEUTROPHILS NFR BLD AUTO: 6.43 10*3/MM3 (ref 1.7–7)
NEUTROPHILS NFR BLD AUTO: 7.43 10*3/MM3 (ref 1.7–7)
NEUTROPHILS NFR BLD AUTO: 74.3 % (ref 42.7–76)
NEUTROPHILS NFR BLD AUTO: 75.8 % (ref 42.7–76)
NITRITE UR QL STRIP: NEGATIVE
NOTIFIED BY: ABNORMAL
NOTIFIED WHO: ABNORMAL
NRBC BLD AUTO-RTO: 0 /100 WBC (ref 0–0.2)
NRBC BLD AUTO-RTO: 0 /100 WBC (ref 0–0.2)
NT-PROBNP SERPL-MCNC: ABNORMAL PG/ML (ref 0–1800)
OPIATES UR QL: POSITIVE
OXYCODONE UR QL SCN: NEGATIVE
OXYHGB MFR BLDV: 87.1 % (ref 94–99)
PCO2 BLDA: 39.7 MM HG (ref 35–45)
PCO2 TEMP ADJ BLD: ABNORMAL MM[HG]
PCP UR QL SCN: NEGATIVE
PH BLDA: 7.47 PH UNITS (ref 7.35–7.45)
PH UR STRIP.AUTO: 6.5 [PH] (ref 5–8)
PH, TEMP CORRECTED: ABNORMAL
PLATELET # BLD AUTO: 190 10*3/MM3 (ref 140–450)
PLATELET # BLD AUTO: 194 10*3/MM3 (ref 140–450)
PMV BLD AUTO: 11.1 FL (ref 6–12)
PMV BLD AUTO: 11.9 FL (ref 6–12)
PO2 BLDA: 54.2 MM HG (ref 83–108)
PO2 TEMP ADJ BLD: ABNORMAL MM[HG]
POTASSIUM SERPL-SCNC: 3.4 MMOL/L (ref 3.5–5.2)
PROT SERPL-MCNC: 6.5 G/DL (ref 6–8.5)
PROT UR QL STRIP: ABNORMAL
PROTHROMBIN TIME: 21.5 SECONDS (ref 12.1–14.7)
RBC # BLD AUTO: 3.26 10*6/MM3 (ref 4.14–5.8)
RBC # BLD AUTO: 3.54 10*6/MM3 (ref 4.14–5.8)
RBC # UR STRIP: ABNORMAL /HPF
REF LAB TEST METHOD: ABNORMAL
RHINOVIRUS RNA SPEC NAA+PROBE: NOT DETECTED
RSV RNA NPH QL NAA+NON-PROBE: NOT DETECTED
SAO2 % BLDCOA: 89.1 % (ref 94–99)
SARS-COV-2 RNA NPH QL NAA+NON-PROBE: NOT DETECTED
SODIUM SERPL-SCNC: 140 MMOL/L (ref 136–145)
SP GR UR STRIP: 1.02 (ref 1–1.03)
SQUAMOUS #/AREA URNS HPF: ABNORMAL /HPF
TRICYCLICS UR QL SCN: NEGATIVE
TROPONIN T DELTA: 17 NG/L
TROPONIN T SERPL HS-MCNC: 126 NG/L
UROBILINOGEN UR QL STRIP: ABNORMAL
VENTILATOR MODE: ABNORMAL
WBC # UR STRIP: ABNORMAL /HPF
WBC NRBC COR # BLD AUTO: 8.65 10*3/MM3 (ref 3.4–10.8)
WBC NRBC COR # BLD AUTO: 9.81 10*3/MM3 (ref 3.4–10.8)

## 2024-07-10 PROCEDURE — 83880 ASSAY OF NATRIURETIC PEPTIDE: CPT

## 2024-07-10 PROCEDURE — G0378 HOSPITAL OBSERVATION PER HR: HCPCS

## 2024-07-10 PROCEDURE — 84484 ASSAY OF TROPONIN QUANT: CPT

## 2024-07-10 PROCEDURE — 70450 CT HEAD/BRAIN W/O DYE: CPT | Performed by: RADIOLOGY

## 2024-07-10 PROCEDURE — 71045 X-RAY EXAM CHEST 1 VIEW: CPT

## 2024-07-10 PROCEDURE — 74176 CT ABD & PELVIS W/O CONTRAST: CPT | Performed by: RADIOLOGY

## 2024-07-10 PROCEDURE — 0202U NFCT DS 22 TRGT SARS-COV-2: CPT | Performed by: STUDENT IN AN ORGANIZED HEALTH CARE EDUCATION/TRAINING PROGRAM

## 2024-07-10 PROCEDURE — 82375 ASSAY CARBOXYHB QUANT: CPT

## 2024-07-10 PROCEDURE — 94799 UNLISTED PULMONARY SVC/PX: CPT

## 2024-07-10 PROCEDURE — 85610 PROTHROMBIN TIME: CPT

## 2024-07-10 PROCEDURE — 71250 CT THORAX DX C-: CPT | Performed by: RADIOLOGY

## 2024-07-10 PROCEDURE — 99285 EMERGENCY DEPT VISIT HI MDM: CPT

## 2024-07-10 PROCEDURE — 70450 CT HEAD/BRAIN W/O DYE: CPT

## 2024-07-10 PROCEDURE — 25010000002 NALOXONE HCL 2 MG/2ML SOLUTION PREFILLED SYRINGE

## 2024-07-10 PROCEDURE — 25010000002 PIPERACILLIN SOD-TAZOBACTAM PER 1 G

## 2024-07-10 PROCEDURE — 74176 CT ABD & PELVIS W/O CONTRAST: CPT

## 2024-07-10 PROCEDURE — 93005 ELECTROCARDIOGRAM TRACING: CPT

## 2024-07-10 PROCEDURE — 83605 ASSAY OF LACTIC ACID: CPT

## 2024-07-10 PROCEDURE — 82805 BLOOD GASES W/O2 SATURATION: CPT

## 2024-07-10 PROCEDURE — 85730 THROMBOPLASTIN TIME PARTIAL: CPT

## 2024-07-10 PROCEDURE — 81001 URINALYSIS AUTO W/SCOPE: CPT

## 2024-07-10 PROCEDURE — 36600 WITHDRAWAL OF ARTERIAL BLOOD: CPT

## 2024-07-10 PROCEDURE — 71250 CT THORAX DX C-: CPT

## 2024-07-10 PROCEDURE — 71045 X-RAY EXAM CHEST 1 VIEW: CPT | Performed by: RADIOLOGY

## 2024-07-10 PROCEDURE — 93010 ELECTROCARDIOGRAM REPORT: CPT | Performed by: SPECIALIST

## 2024-07-10 PROCEDURE — 80053 COMPREHEN METABOLIC PANEL: CPT

## 2024-07-10 PROCEDURE — 36415 COLL VENOUS BLD VENIPUNCTURE: CPT

## 2024-07-10 PROCEDURE — 25810000003 SODIUM CHLORIDE 0.9 % SOLUTION

## 2024-07-10 PROCEDURE — 87040 BLOOD CULTURE FOR BACTERIA: CPT

## 2024-07-10 PROCEDURE — 25010000002 VANCOMYCIN 5 G RECONSTITUTED SOLUTION

## 2024-07-10 PROCEDURE — 25010000002 ONDANSETRON PER 1 MG

## 2024-07-10 PROCEDURE — 99223 1ST HOSP IP/OBS HIGH 75: CPT

## 2024-07-10 PROCEDURE — 87086 URINE CULTURE/COLONY COUNT: CPT

## 2024-07-10 PROCEDURE — 85025 COMPLETE CBC W/AUTO DIFF WBC: CPT

## 2024-07-10 PROCEDURE — 85025 COMPLETE CBC W/AUTO DIFF WBC: CPT | Performed by: NURSE PRACTITIONER

## 2024-07-10 PROCEDURE — 82140 ASSAY OF AMMONIA: CPT

## 2024-07-10 PROCEDURE — 83050 HGB METHEMOGLOBIN QUAN: CPT

## 2024-07-10 PROCEDURE — 80307 DRUG TEST PRSMV CHEM ANLYZR: CPT

## 2024-07-10 RX ORDER — SODIUM CHLORIDE 9 MG/ML
40 INJECTION, SOLUTION INTRAVENOUS AS NEEDED
Status: DISCONTINUED | OUTPATIENT
Start: 2024-07-10 | End: 2024-07-13 | Stop reason: HOSPADM

## 2024-07-10 RX ORDER — GLUCAGON 1 MG/ML
1 KIT INJECTION
Status: DISCONTINUED | OUTPATIENT
Start: 2024-07-10 | End: 2024-07-13 | Stop reason: HOSPADM

## 2024-07-10 RX ORDER — NALOXONE HYDROCHLORIDE 1 MG/ML
2 INJECTION INTRAMUSCULAR; INTRAVENOUS; SUBCUTANEOUS ONCE
Status: COMPLETED | OUTPATIENT
Start: 2024-07-10 | End: 2024-07-10

## 2024-07-10 RX ORDER — POLYETHYLENE GLYCOL 3350 17 G/17G
17 POWDER, FOR SOLUTION ORAL DAILY PRN
Status: DISCONTINUED | OUTPATIENT
Start: 2024-07-10 | End: 2024-07-13 | Stop reason: HOSPADM

## 2024-07-10 RX ORDER — SODIUM CHLORIDE 0.9 % (FLUSH) 0.9 %
10 SYRINGE (ML) INJECTION AS NEEDED
Status: DISCONTINUED | OUTPATIENT
Start: 2024-07-10 | End: 2024-07-13 | Stop reason: HOSPADM

## 2024-07-10 RX ORDER — NITROGLYCERIN 0.4 MG/1
0.4 TABLET SUBLINGUAL
Status: DISCONTINUED | OUTPATIENT
Start: 2024-07-10 | End: 2024-07-13 | Stop reason: HOSPADM

## 2024-07-10 RX ORDER — ONDANSETRON 2 MG/ML
4 INJECTION INTRAMUSCULAR; INTRAVENOUS ONCE
Status: COMPLETED | OUTPATIENT
Start: 2024-07-10 | End: 2024-07-10

## 2024-07-10 RX ORDER — VANCOMYCIN 2 GRAM/500 ML IN 0.9 % SODIUM CHLORIDE INTRAVENOUS
2000 ONCE
Status: COMPLETED | OUTPATIENT
Start: 2024-07-10 | End: 2024-07-11

## 2024-07-10 RX ORDER — ONDANSETRON 2 MG/ML
INJECTION INTRAMUSCULAR; INTRAVENOUS
Status: COMPLETED
Start: 2024-07-10 | End: 2024-07-10

## 2024-07-10 RX ORDER — BISACODYL 5 MG/1
5 TABLET, DELAYED RELEASE ORAL DAILY PRN
Status: DISCONTINUED | OUTPATIENT
Start: 2024-07-10 | End: 2024-07-13 | Stop reason: HOSPADM

## 2024-07-10 RX ORDER — NALOXONE HYDROCHLORIDE 1 MG/ML
INJECTION INTRAMUSCULAR; INTRAVENOUS; SUBCUTANEOUS
Status: COMPLETED
Start: 2024-07-10 | End: 2024-07-10

## 2024-07-10 RX ORDER — AMOXICILLIN 250 MG
2 CAPSULE ORAL 2 TIMES DAILY PRN
Status: DISCONTINUED | OUTPATIENT
Start: 2024-07-10 | End: 2024-07-13 | Stop reason: HOSPADM

## 2024-07-10 RX ORDER — NICOTINE POLACRILEX 4 MG
15 LOZENGE BUCCAL
Status: DISCONTINUED | OUTPATIENT
Start: 2024-07-10 | End: 2024-07-13 | Stop reason: HOSPADM

## 2024-07-10 RX ORDER — DEXTROSE MONOHYDRATE 25 G/50ML
25 INJECTION, SOLUTION INTRAVENOUS
Status: DISCONTINUED | OUTPATIENT
Start: 2024-07-10 | End: 2024-07-13 | Stop reason: HOSPADM

## 2024-07-10 RX ORDER — INSULIN LISPRO 100 [IU]/ML
2-7 INJECTION, SOLUTION INTRAVENOUS; SUBCUTANEOUS
Status: DISCONTINUED | OUTPATIENT
Start: 2024-07-11 | End: 2024-07-13 | Stop reason: HOSPADM

## 2024-07-10 RX ORDER — SODIUM CHLORIDE 0.9 % (FLUSH) 0.9 %
10 SYRINGE (ML) INJECTION EVERY 12 HOURS SCHEDULED
Status: DISCONTINUED | OUTPATIENT
Start: 2024-07-11 | End: 2024-07-13 | Stop reason: HOSPADM

## 2024-07-10 RX ORDER — BISACODYL 10 MG
10 SUPPOSITORY, RECTAL RECTAL DAILY PRN
Status: DISCONTINUED | OUTPATIENT
Start: 2024-07-10 | End: 2024-07-13 | Stop reason: HOSPADM

## 2024-07-10 RX ADMIN — NALOXONE HYDROCHLORIDE 2 MG: 1 INJECTION PARENTERAL at 19:09

## 2024-07-10 RX ADMIN — NALOXONE HYDROCHLORIDE 2 MG: 1 INJECTION INTRAMUSCULAR; INTRAVENOUS; SUBCUTANEOUS at 17:01

## 2024-07-10 RX ADMIN — NALOXONE HYDROCHLORIDE 2 MG: 1 INJECTION INTRAMUSCULAR; INTRAVENOUS; SUBCUTANEOUS at 19:09

## 2024-07-10 RX ADMIN — ONDANSETRON 4 MG: 2 INJECTION INTRAMUSCULAR; INTRAVENOUS at 18:09

## 2024-07-10 RX ADMIN — PIPERACILLIN SODIUM AND TAZOBACTAM SODIUM 3.38 G: 3; .375 INJECTION, POWDER, LYOPHILIZED, FOR SOLUTION INTRAVENOUS at 21:12

## 2024-07-10 RX ADMIN — NALOXONE HYDROCHLORIDE 2 MG: 1 INJECTION PARENTERAL at 17:01

## 2024-07-10 RX ADMIN — VANCOMYCIN HYDROCHLORIDE 2000 MG: 5 INJECTION, POWDER, LYOPHILIZED, FOR SOLUTION INTRAVENOUS at 21:48

## 2024-07-10 NOTE — ED NOTES
Spoke with [facility] that advises they administer a Hydrocodone 5mg to patient before dialysis, I requested that she fax over patient's NH paper work with MAR list. She advises she has already sent it, in which I told her we did not receive and to refax. She advises that she will send it over in the next few minutes. Patient's son and family at bedside that are not aware of patient being prescribed any pain medication. Lead RN made aware, and ED provider Dr. Gaming.

## 2024-07-11 PROBLEM — G93.41 ACUTE METABOLIC ENCEPHALOPATHY: Status: ACTIVE | Noted: 2024-07-11

## 2024-07-11 LAB
ALBUMIN SERPL-MCNC: 3 G/DL (ref 3.5–5.2)
ALBUMIN/GLOB SERPL: 1 G/DL
ALP SERPL-CCNC: 163 U/L (ref 39–117)
ALT SERPL W P-5'-P-CCNC: 14 U/L (ref 1–41)
ANION GAP SERPL CALCULATED.3IONS-SCNC: 17.4 MMOL/L (ref 5–15)
AST SERPL-CCNC: 21 U/L (ref 1–40)
BASOPHILS # BLD AUTO: 0.04 10*3/MM3 (ref 0–0.2)
BASOPHILS NFR BLD AUTO: 0.4 % (ref 0–1.5)
BILIRUB SERPL-MCNC: 0.6 MG/DL (ref 0–1.2)
BUN SERPL-MCNC: 21 MG/DL (ref 8–23)
BUN/CREAT SERPL: 6.3 (ref 7–25)
CALCIUM SPEC-SCNC: 9.2 MG/DL (ref 8.6–10.5)
CHLORIDE SERPL-SCNC: 99 MMOL/L (ref 98–107)
CO2 SERPL-SCNC: 24.6 MMOL/L (ref 22–29)
CREAT SERPL-MCNC: 3.35 MG/DL (ref 0.76–1.27)
DEPRECATED RDW RBC AUTO: 58.5 FL (ref 37–54)
EGFRCR SERPLBLD CKD-EPI 2021: 18.4 ML/MIN/1.73
EOSINOPHIL # BLD AUTO: 0.19 10*3/MM3 (ref 0–0.4)
EOSINOPHIL NFR BLD AUTO: 1.8 % (ref 0.3–6.2)
ERYTHROCYTE [DISTWIDTH] IN BLOOD BY AUTOMATED COUNT: 16.7 % (ref 12.3–15.4)
GLOBULIN UR ELPH-MCNC: 3 GM/DL
GLUCOSE BLDC GLUCOMTR-MCNC: 117 MG/DL (ref 70–130)
GLUCOSE BLDC GLUCOMTR-MCNC: 142 MG/DL (ref 70–130)
GLUCOSE BLDC GLUCOMTR-MCNC: 222 MG/DL (ref 70–130)
GLUCOSE BLDC GLUCOMTR-MCNC: 73 MG/DL (ref 70–130)
GLUCOSE BLDC GLUCOMTR-MCNC: 78 MG/DL (ref 70–130)
GLUCOSE SERPL-MCNC: 72 MG/DL (ref 65–99)
HAV IGM SERPL QL IA: NORMAL
HBV CORE IGM SERPL QL IA: NORMAL
HBV SURFACE AG SERPL QL IA: NORMAL
HCT VFR BLD AUTO: 34.5 % (ref 37.5–51)
HCV AB SER QL: NORMAL
HGB BLD-MCNC: 10.2 G/DL (ref 13–17.7)
IMM GRANULOCYTES # BLD AUTO: 0.06 10*3/MM3 (ref 0–0.05)
IMM GRANULOCYTES NFR BLD AUTO: 0.6 % (ref 0–0.5)
LYMPHOCYTES # BLD AUTO: 0.78 10*3/MM3 (ref 0.7–3.1)
LYMPHOCYTES NFR BLD AUTO: 7.2 % (ref 19.6–45.3)
MCH RBC QN AUTO: 28.4 PG (ref 26.6–33)
MCHC RBC AUTO-ENTMCNC: 29.6 G/DL (ref 31.5–35.7)
MCV RBC AUTO: 96.1 FL (ref 79–97)
MONOCYTES # BLD AUTO: 0.76 10*3/MM3 (ref 0.1–0.9)
MONOCYTES NFR BLD AUTO: 7 % (ref 5–12)
NEUTROPHILS NFR BLD AUTO: 8.96 10*3/MM3 (ref 1.7–7)
NEUTROPHILS NFR BLD AUTO: 83 % (ref 42.7–76)
NRBC BLD AUTO-RTO: 0 /100 WBC (ref 0–0.2)
PLATELET # BLD AUTO: 177 10*3/MM3 (ref 140–450)
PMV BLD AUTO: 10.8 FL (ref 6–12)
POTASSIUM SERPL-SCNC: 3.7 MMOL/L (ref 3.5–5.2)
PROT SERPL-MCNC: 6 G/DL (ref 6–8.5)
QT INTERVAL: 386 MS
QT INTERVAL: 402 MS
QTC INTERVAL: 477 MS
QTC INTERVAL: 497 MS
RBC # BLD AUTO: 3.59 10*6/MM3 (ref 4.14–5.8)
SODIUM SERPL-SCNC: 141 MMOL/L (ref 136–145)
WBC NRBC COR # BLD AUTO: 10.79 10*3/MM3 (ref 3.4–10.8)

## 2024-07-11 PROCEDURE — 25010000002 CEFTRIAXONE PER 250 MG: Performed by: INTERNAL MEDICINE

## 2024-07-11 PROCEDURE — 97166 OT EVAL MOD COMPLEX 45 MIN: CPT

## 2024-07-11 PROCEDURE — 80053 COMPREHEN METABOLIC PANEL: CPT | Performed by: INTERNAL MEDICINE

## 2024-07-11 PROCEDURE — 82948 REAGENT STRIP/BLOOD GLUCOSE: CPT

## 2024-07-11 PROCEDURE — 87481 CANDIDA DNA AMP PROBE: CPT

## 2024-07-11 PROCEDURE — 63710000001 INSULIN LISPRO (HUMAN) PER 5 UNITS: Performed by: INTERNAL MEDICINE

## 2024-07-11 PROCEDURE — 80074 ACUTE HEPATITIS PANEL: CPT | Performed by: INTERNAL MEDICINE

## 2024-07-11 PROCEDURE — 85025 COMPLETE CBC W/AUTO DIFF WBC: CPT | Performed by: INTERNAL MEDICINE

## 2024-07-11 PROCEDURE — 93010 ELECTROCARDIOGRAM REPORT: CPT | Performed by: PSYCHIATRY & NEUROLOGY

## 2024-07-11 PROCEDURE — 99232 SBSQ HOSP IP/OBS MODERATE 35: CPT | Performed by: INTERNAL MEDICINE

## 2024-07-11 RX ORDER — INSULIN LISPRO 100 [IU]/ML
0-8 INJECTION, SOLUTION INTRAVENOUS; SUBCUTANEOUS
Status: CANCELLED | OUTPATIENT
Start: 2024-07-11

## 2024-07-11 RX ORDER — LIDOCAINE HYDROCHLORIDE 20 MG/ML
5 SOLUTION OROPHARYNGEAL EVERY 6 HOURS PRN
Status: CANCELLED | OUTPATIENT
Start: 2024-07-11

## 2024-07-11 RX ORDER — GABAPENTIN 100 MG/1
200 CAPSULE ORAL
COMMUNITY

## 2024-07-11 RX ORDER — LEVOTHYROXINE SODIUM 0.07 MG/1
75 TABLET ORAL
Status: CANCELLED | OUTPATIENT
Start: 2024-07-11

## 2024-07-11 RX ORDER — METOPROLOL SUCCINATE 25 MG/1
12.5 TABLET, EXTENDED RELEASE ORAL NIGHTLY
Status: CANCELLED | OUTPATIENT
Start: 2024-07-11

## 2024-07-11 RX ORDER — INSULIN ASPART 100 [IU]/ML
0-8 INJECTION, SOLUTION INTRAVENOUS; SUBCUTANEOUS
COMMUNITY

## 2024-07-11 RX ORDER — POLYETHYLENE GLYCOL 3350 17 G/17G
17 POWDER, FOR SOLUTION ORAL EVERY 12 HOURS PRN
COMMUNITY

## 2024-07-11 RX ORDER — FAMOTIDINE 20 MG/1
20 TABLET, FILM COATED ORAL
Status: CANCELLED | OUTPATIENT
Start: 2024-07-11

## 2024-07-11 RX ORDER — GABAPENTIN 100 MG/1
200 CAPSULE ORAL NIGHTLY
Status: CANCELLED | OUTPATIENT
Start: 2024-07-11

## 2024-07-11 RX ORDER — CEFUROXIME AXETIL 500 MG/1
500 TABLET ORAL 2 TIMES DAILY
COMMUNITY
End: 2024-07-12 | Stop reason: HOSPADM

## 2024-07-11 RX ORDER — VIT E ACET/GLY/DIMETH/WATER
1 LOTION (ML) TOPICAL DAILY
COMMUNITY

## 2024-07-11 RX ORDER — HYDROCODONE BITARTRATE AND ACETAMINOPHEN 5; 325 MG/1; MG/1
1 TABLET ORAL EVERY 12 HOURS PRN
Status: CANCELLED | OUTPATIENT
Start: 2024-07-11

## 2024-07-11 RX ORDER — SUCRALFATE ORAL 1 G/10ML
1 SUSPENSION ORAL 4 TIMES DAILY
COMMUNITY

## 2024-07-11 RX ORDER — ERYTHROMYCIN 5 MG/G
1 OINTMENT OPHTHALMIC 2 TIMES DAILY
Status: CANCELLED | OUTPATIENT
Start: 2024-07-11

## 2024-07-11 RX ORDER — CLOPIDOGREL BISULFATE 75 MG/1
75 TABLET ORAL DAILY
COMMUNITY

## 2024-07-11 RX ORDER — FAMOTIDINE 20 MG/1
20 TABLET, FILM COATED ORAL 2 TIMES DAILY
COMMUNITY

## 2024-07-11 RX ORDER — ONDANSETRON 4 MG/1
4 TABLET, FILM COATED ORAL EVERY 8 HOURS PRN
COMMUNITY

## 2024-07-11 RX ORDER — CETIRIZINE HYDROCHLORIDE 10 MG/1
10 TABLET ORAL DAILY
Status: CANCELLED | OUTPATIENT
Start: 2024-07-11

## 2024-07-11 RX ORDER — PANTOPRAZOLE SODIUM 40 MG/1
40 TABLET, DELAYED RELEASE ORAL 2 TIMES DAILY
COMMUNITY

## 2024-07-11 RX ORDER — ARGININE/GLUTAMINE/CALCIUM BMB 7G-7G-1.5G
1 POWDER IN PACKET (EA) ORAL 2 TIMES DAILY
COMMUNITY

## 2024-07-11 RX ORDER — SEVELAMER CARBONATE 800 MG/1
2400 TABLET, FILM COATED ORAL
Status: CANCELLED | OUTPATIENT
Start: 2024-07-11

## 2024-07-11 RX ORDER — ONDANSETRON 4 MG/1
4 TABLET, ORALLY DISINTEGRATING ORAL EVERY 8 HOURS PRN
Status: CANCELLED | OUTPATIENT
Start: 2024-07-11

## 2024-07-11 RX ORDER — ACETAMINOPHEN 500 MG
500 TABLET ORAL EVERY 4 HOURS PRN
COMMUNITY

## 2024-07-11 RX ORDER — SUCRALFATE ORAL 1 G/10ML
1 SUSPENSION ORAL 4 TIMES DAILY
Status: CANCELLED | OUTPATIENT
Start: 2024-07-11

## 2024-07-11 RX ORDER — NITROGLYCERIN 0.4 MG/1
0.4 TABLET SUBLINGUAL
COMMUNITY

## 2024-07-11 RX ORDER — ACETAMINOPHEN 500 MG
500 TABLET ORAL EVERY 4 HOURS PRN
Status: CANCELLED | OUTPATIENT
Start: 2024-07-11

## 2024-07-11 RX ORDER — SEVELAMER CARBONATE 800 MG/1
2400 TABLET, FILM COATED ORAL
COMMUNITY

## 2024-07-11 RX ORDER — EMOLLIENT COMBINATION NO.92
1 LOTION (ML) TOPICAL DAILY
Status: CANCELLED | OUTPATIENT
Start: 2024-07-11

## 2024-07-11 RX ORDER — ERYTHROMYCIN 5 MG/G
1 OINTMENT OPHTHALMIC 2 TIMES DAILY
COMMUNITY

## 2024-07-11 RX ORDER — LACTULOSE 10 G/15ML
20 SOLUTION ORAL DAILY PRN
Status: CANCELLED | OUTPATIENT
Start: 2024-07-11

## 2024-07-11 RX ORDER — POLYETHYLENE GLYCOL 3350 17 G/17G
17 POWDER, FOR SOLUTION ORAL DAILY
Status: CANCELLED | OUTPATIENT
Start: 2024-07-11

## 2024-07-11 RX ORDER — CLOPIDOGREL BISULFATE 75 MG/1
75 TABLET ORAL DAILY
Status: CANCELLED | OUTPATIENT
Start: 2024-07-11

## 2024-07-11 RX ORDER — AMMONIUM LACTATE 12 G/100G
1 CREAM TOPICAL NIGHTLY
Status: CANCELLED | OUTPATIENT
Start: 2024-07-11

## 2024-07-11 RX ORDER — METOPROLOL SUCCINATE 25 MG/1
12.5 TABLET, EXTENDED RELEASE ORAL
COMMUNITY

## 2024-07-11 RX ORDER — ACETAMINOPHEN 325 MG/1
650 TABLET ORAL 3 TIMES DAILY
Status: CANCELLED | OUTPATIENT
Start: 2024-07-11

## 2024-07-11 RX ORDER — ACETAMINOPHEN 325 MG/1
650 TABLET ORAL 3 TIMES DAILY
COMMUNITY

## 2024-07-11 RX ORDER — AMMONIUM LACTATE 120 MG/G
1 CREAM TOPICAL
COMMUNITY

## 2024-07-11 RX ORDER — LOPERAMIDE HYDROCHLORIDE 2 MG/1
4 CAPSULE ORAL 3 TIMES WEEKLY
Status: CANCELLED | OUTPATIENT
Start: 2024-07-12

## 2024-07-11 RX ORDER — BISACODYL 10 MG
10 SUPPOSITORY, RECTAL RECTAL DAILY PRN
COMMUNITY

## 2024-07-11 RX ORDER — PANTOPRAZOLE SODIUM 40 MG/1
40 TABLET, DELAYED RELEASE ORAL
Status: CANCELLED | OUTPATIENT
Start: 2024-07-11

## 2024-07-11 RX ORDER — ATORVASTATIN CALCIUM 40 MG/1
40 TABLET, FILM COATED ORAL DAILY
COMMUNITY

## 2024-07-11 RX ORDER — HYDROCODONE BITARTRATE AND ACETAMINOPHEN 5; 325 MG/1; MG/1
1 TABLET ORAL EVERY 12 HOURS PRN
COMMUNITY

## 2024-07-11 RX ORDER — ACETAMINOPHEN 325 MG/1
650 TABLET ORAL 3 TIMES WEEKLY
COMMUNITY

## 2024-07-11 RX ORDER — LEVOCETIRIZINE DIHYDROCHLORIDE 5 MG/1
5 TABLET, FILM COATED ORAL DAILY
COMMUNITY

## 2024-07-11 RX ORDER — LOPERAMIDE HYDROCHLORIDE 2 MG/1
4 TABLET ORAL 3 TIMES WEEKLY
COMMUNITY

## 2024-07-11 RX ORDER — MULTIPLE VITAMINS W/ MINERALS TAB 9MG-400MCG
1 TAB ORAL DAILY
Status: CANCELLED | OUTPATIENT
Start: 2024-07-11

## 2024-07-11 RX ORDER — LEVOTHYROXINE SODIUM 0.07 MG/1
75 TABLET ORAL DAILY
COMMUNITY

## 2024-07-11 RX ORDER — NITROGLYCERIN 0.4 MG/1
0.4 TABLET SUBLINGUAL
Status: CANCELLED | OUTPATIENT
Start: 2024-07-11

## 2024-07-11 RX ORDER — MULTIPLE VITAMINS W/ MINERALS TAB 9MG-400MCG
1 TAB ORAL DAILY
COMMUNITY

## 2024-07-11 RX ORDER — LIDOCAINE HYDROCHLORIDE 20 MG/ML
5 SOLUTION OROPHARYNGEAL EVERY 6 HOURS PRN
COMMUNITY

## 2024-07-11 RX ORDER — ACETAMINOPHEN 325 MG/1
650 TABLET ORAL 3 TIMES WEEKLY
Status: CANCELLED | OUTPATIENT
Start: 2024-07-12

## 2024-07-11 RX ORDER — ATORVASTATIN CALCIUM 40 MG/1
40 TABLET, FILM COATED ORAL DAILY
Status: CANCELLED | OUTPATIENT
Start: 2024-07-11

## 2024-07-11 RX ORDER — BISACODYL 10 MG
10 SUPPOSITORY, RECTAL RECTAL DAILY PRN
Status: CANCELLED | OUTPATIENT
Start: 2024-07-11

## 2024-07-11 RX ORDER — INSULIN DETEMIR 100 [IU]/ML
13 INJECTION, SOLUTION SUBCUTANEOUS 2 TIMES DAILY
COMMUNITY

## 2024-07-11 RX ORDER — LACTULOSE 10 G/15ML
20 SOLUTION ORAL DAILY PRN
COMMUNITY

## 2024-07-11 RX ADMIN — Medication 10 ML: at 09:50

## 2024-07-11 RX ADMIN — Medication 10 ML: at 20:19

## 2024-07-11 RX ADMIN — INSULIN LISPRO 3 UNITS: 100 INJECTION, SOLUTION INTRAVENOUS; SUBCUTANEOUS at 20:17

## 2024-07-11 RX ADMIN — CEFTRIAXONE 1000 MG: 1 INJECTION, POWDER, FOR SOLUTION INTRAMUSCULAR; INTRAVENOUS at 12:53

## 2024-07-11 RX ADMIN — Medication 10 ML: at 00:25

## 2024-07-11 NOTE — ED PROVIDER NOTES
Subjective   History of Present Illness  73-year-old male with end-stage renal disease, CHF, CAD, DM presents today from dialysis due to altered mental status.  Patient is well-known to [facility], patient was seen yesterday in the ED.  However today patient is not currently at baseline.  Patient has continued confusion on presentation he was ANO x 0 patient was given 2 mg of Narcan which helped his mentation.  Patient's son is seen at bedside, long discussion with family, per family patient is not currently at baseline and he is usually much more with it.  Additionally patient continued to have fluctuating alertness and a decreased oxygen saturation.      Review of Systems   Constitutional: Negative.  Negative for fever.   HENT: Negative.     Respiratory: Negative.     Cardiovascular: Negative.  Negative for chest pain.   Gastrointestinal: Negative.  Negative for abdominal pain.   Endocrine: Negative.    Genitourinary: Negative.  Negative for dysuria.   Skin: Negative.    Neurological: Negative.    Psychiatric/Behavioral: Negative.  Positive for confusion.    All other systems reviewed and are negative.      Past Medical History:   Diagnosis Date    CHF (congestive heart failure)     Coronary artery disease     Diabetes mellitus     Dialysis patient     Disease of thyroid gland     Elevated cholesterol     GERD (gastroesophageal reflux disease)     Hypertension     Myocardial infarction     Renal disorder     stage 5       No Known Allergies    Past Surgical History:   Procedure Laterality Date    CARDIAC CATHETERIZATION N/A 5/28/2024    Procedure: Left Heart Cath;  Surgeon: Mackenzie Ruiz MD;  Location: [facility] CATH INVASIVE LOCATION;  Service: Cardiology;  Laterality: N/A;    CARDIAC SURGERY      cabg    CHOLECYSTECTOMY      COLONOSCOPY N/A 9/22/2023    Procedure: COLONOSCOPY;  Surgeon: Trip Peter MD;  Location: [facility] OR;  Service: Gastroenterology;  Laterality: N/A;    DIALYSIS FISTULA CREATION  Left     arm       Family History   Problem Relation Age of Onset    Arthritis Mother     COPD Father     Diabetes Father     Heart disease Father     Hyperlipidemia Father     Hypertension Father     Cancer Daughter        Social History     Socioeconomic History    Marital status: Unknown   Tobacco Use    Smoking status: Never    Smokeless tobacco: Never   Vaping Use    Vaping status: Never Used   Substance and Sexual Activity    Alcohol use: Never    Drug use: Never    Sexual activity: Defer           Objective   Physical Exam  Vitals and nursing note reviewed.   Constitutional:       General: He is not in acute distress.     Appearance: He is well-developed. He is obese. He is ill-appearing. He is not diaphoretic.   HENT:      Head: Normocephalic and atraumatic.      Right Ear: External ear normal.      Left Ear: External ear normal.      Nose: Nose normal.   Eyes:      Conjunctiva/sclera: Conjunctivae normal.      Pupils: Pupils are equal, round, and reactive to light.   Neck:      Vascular: No JVD.      Trachea: No tracheal deviation.   Cardiovascular:      Rate and Rhythm: Rhythm irregular.      Heart sounds: Normal heart sounds. No murmur heard.  Pulmonary:      Effort: Pulmonary effort is normal. No respiratory distress.      Breath sounds: Normal breath sounds. No wheezing.   Abdominal:      General: Bowel sounds are normal.      Palpations: Abdomen is soft.      Tenderness: There is no abdominal tenderness.   Musculoskeletal:         General: No deformity. Normal range of motion.      Cervical back: Normal range of motion and neck supple.   Skin:     General: Skin is warm and dry.      Coloration: Skin is not pale.      Findings: No erythema or rash.   Neurological:      Mental Status: He is alert and oriented to person, place, and time.      Cranial Nerves: No cranial nerve deficit.   Psychiatric:         Behavior: Behavior normal.         Thought Content: Thought content normal.         Procedures            ED Course  ED Course as of 07/10/24 2213   Wed Jul 10, 2024   1637 EKG interpretation: Atrial fibrillation with PVCs rate 92 bpm QTc 477 no acute ST elevation or depression.    Electronically signed by Frnacesco Gaming DO, 07/10/24, 4:38 PM EDT.   [GF]   1704 Initially on evaluation of the patient he was ANO x 0, when speaking to the nurse patient was sent from dialysis due to altered mental status.  Patient is ANO x 3 at baseline.  Of note patient had reportedly taken his Norco before getting to dialysis.  Narcan 2 mg was ordered.  And promptly given to the patient.  Patient rapidly responded and is now ANO x 3. [GF]   1739 HS Troponin T(!!): 126  Baseline troponin. [GF]   2212 Case is discussed extensively  with Dr. Galvez, reviewed labs, imaging, prior records.  Plan for admit for observation.. [GF]      ED Course User Index  [GF] Francesco Gaming DO                                             Medical Decision Making  73-year-old male with ESDR and several chronic comorbidities including CHF, A-fib, CAD presenting due to altered mental status.  Patient is not at baseline per family.  Labs relatively at baseline however concerning for possible bacteremia or complicating infection.  Patient has an extensive recent history of antibiotics including antibiotic resistant organisms.  Case was discussed with Dr. Galvez hospitalist labs were reviewed prior notes reviewed decision to admit patient for observation    Amount and/or Complexity of Data Reviewed  Labs: ordered. Decision-making details documented in ED Course.  Radiology: ordered.  ECG/medicine tests: ordered.    Risk  OTC drugs.  Prescription drug management.  Decision regarding hospitalization.        Final diagnoses:   Altered mental status, unspecified altered mental status type       ED Disposition  ED Disposition       ED Disposition   Decision to Admit    Condition   --    Comment   Level of Care: Telemetry [5]   Diagnosis: AMS (altered mental  status) [[id number]]   Admitting Physician: VY BLACK [[id number]]                 No follow-up provider specified.       Medication List      No changes were made to your prescriptions during this visit.            Francesco Gaming,   07/10/24 0519

## 2024-07-11 NOTE — PROGRESS NOTES
[facility] HOSPITALIST PROGRESS NOTE     Patient Identification:  Name:  Kennedy Prescott  Age:  73 y.o.  Sex:  male  :  1950  MRN:  [id number]  Visit Number:  [id number]  Primary Care Provider:  Jag Guillaume MD    Length of stay:  0    Chief complaint: Confusion    Subjective:    Patient seen and examined at bedside with no nursing staff present.  Patient was awake sitting in bed when I walked in the room.  Patient is awake, alert and oriented to person, place and time.  Patient demonstrates a very thorough understanding of all questions asked of him.  However, he does not remember the events surrounding his hospitalization.  Patient is now felt to have medical decision-making capacity.  Per nursing staff, no new events since admission.  ----------------------------------------------------------------------------------------------------------------------  Current Hospital Meds:  insulin lispro, 2-7 Units, Subcutaneous, 4x Daily AC & at Bedtime  sodium chloride, 10 mL, Intravenous, Q12H         ----------------------------------------------------------------------------------------------------------------------  Vital Signs:  Temp:  [97 °F (36.1 °C)-99.2 °F (37.3 °C)] 99.2 °F (37.3 °C)  Heart Rate:  [] 95  Resp:  [16-18] 18  BP: (103-162)/(56-95) 118/56      07/10/24  1620 07/10/24  2314 24  0500   Weight: 99.8 kg (220 lb 0.3 oz) 118 kg (259 lb 4.8 oz) (New admit within 24 hours)     Body mass index is 36.18 kg/m².    Intake/Output Summary (Last 24 hours) at 2024 1052  Last data filed at 2024 0856  Gross per 24 hour   Intake 340 ml   Output --   Net 340 ml     Diet: Cardiac, Diabetic, Renal; Healthy Heart (2-3 Na+); Consistent Carbohydrate; Low Sodium (2-3g), Low Potassium, Low Phosphorus; Fluid Consistency: Thin (IDDSI 0)  ----------------------------------------------------------------------------------------------------------------------  Physical  exam:  Constitutional: Chronically ill-appearing  male in no apparent distress.     HENT:  Head:  Normocephalic and atraumatic.  Mouth:  Moist mucous membranes.    Eyes:  Conjunctivae and EOM are normal.  Pupils are equal, round, and reactive to light.  No scleral icterus.    Neck:  Neck supple. No thyromegaly.  No JVD present.    Cardiovascular:  Regular rate and rhythm with no murmurs, rubs, clicks or gallops appreciated.  Pulmonary/Chest:  Clear to auscultation bilaterally with no crackles, wheezes or rhonchi appreciated.  Abdominal:  Soft. Nontender. Nondistended  Bowel sounds are normal in all four quadrants. No organomegally appreciated.   Musculoskeletal:  No edema, no tenderness, and no deformity.  No red or swollen joints anywhere.    Neurological:  Alert, Cranial nerves II-XII intact with no focal deficits.  No facial droop.  No slurred speech.   Skin:  Warm and dry to palpation with no rashes or lesions appreciated.  Peripheral vascular:  2+ radial and pedal pulses in bilateral upper and lower extremities.  Psychiatric:  Alert and oriented x3, demonstrates appropriate judgment and insight.  -----------------------------------------------------------------------------------  ----------------------------------------------------------------------------------------------------------------------  Results from last 7 days   Lab Units 07/10/24  1957 07/10/24  1702   HSTROP T ng/L 143* 126*     Results from last 7 days   Lab Units 07/11/24  0031 07/10/24  1702 07/10/24  0859 07/09/24  1342   LACTATE mmol/L  --  1.3  --  1.4   WBC 10*3/mm3 10.79 8.65 9.81 8.49   HEMOGLOBIN g/dL 10.2* 10.1* 9.3* 9.8*   HEMATOCRIT % 34.5* 33.6* 31.6* 32.5*   MCV fL 96.1 94.9 96.9 94.8   MCHC g/dL 29.6* 30.1* 29.4* 30.2*   PLATELETS 10*3/mm3 177 190 194 178   INR   --  1.86*  --   --      Results from last 7 days   Lab Units 07/10/24  1914   PH, ARTERIAL pH units 7.471*   PO2 ART mm Hg 54.2*   PCO2, ARTERIAL mm Hg 39.7  "  HCO3 ART mmol/L 28.9*     Results from last 7 days   Lab Units 07/11/24  0031 07/10/24  1702 07/09/24  1342   SODIUM mmol/L 141 140 142   POTASSIUM mmol/L 3.7 3.4* 4.4   CHLORIDE mmol/L 99 98 99   CO2 mmol/L 24.6 31.0* 29.0   BUN mg/dL 21 17 37*   CREATININE mg/dL 3.35* 2.70* 4.60*   CALCIUM mg/dL 9.2 9.2 9.3   GLUCOSE mg/dL 72 72 172*   ALBUMIN g/dL 3.0* 3.4* 3.3*   BILIRUBIN mg/dL 0.6 0.6 0.7   ALK PHOS U/L 163* 168* 163*   AST (SGOT) U/L 21 19 21   ALT (SGPT) U/L 14 17 16   Estimated Creatinine Clearance: 24.9 mL/min (A) (by C-G formula based on SCr of 3.35 mg/dL (H)).    Ammonia   Date Value Ref Range Status   07/10/2024 14 (L) 16 - 60 umol/L Final         No results found for: \"BLOODCX\"  No results found for: \"URINECX\"  No results found for: \"WOUNDCX\"  No results found for: \"STOOLCX\"    I have personally looked at the labs and they are summarized above.  ----------------------------------------------------------------------------------------------------------------------  Imaging Results (Last 24 Hours)       Procedure Component Value Units Date/Time    CT Abdomen Pelvis Without Contrast [[id number]] Collected: 07/10/24 2118     Updated: 07/10/24 2120    Narrative:      Comparison: July 9, 2024.     Liver, spleen and adrenal glands are unremarkable. Gallbladder  surgically absent. Pancreas is atrophic. Moderate to severe renal  atrophy bilaterally with perinephric stranding likely chronic. Renal  cysts noted bilaterally, not with characterized. No obstructive uropathy  is identified. Diverticulosis is appreciated without diverticulitis. No  bowel obstruction, free air or significant free fluid seen. The appendix  is unremarkable. Multilevel chronic compression deformities lumbar  spine. Erosion of the coccyx likely from adjacent decubitus ulcer,  similar to the prior exam.       Impression:      Impression:  1. No acute process or chronic findings above.         This report was finalized on 7/10/2024 9:18 PM " by Brennen Quinn DO.       CT Chest Without Contrast Diagnostic [[id number]] Collected: 07/10/24 2118     Updated: 07/10/24 2120    Narrative:      Comparison: July 9, 2024.      Moderate to large right pleural effusion with mild left pleural effusion  is again seen. Heart is enlarged. Bilateral gynecomastia is noted.  Evidence of coronary artery bypass with coronary artery calcification.  Interlobular septal thickening suggests pulmonary consistent with heart  failure. Atelectasis lower lungs bilaterally. No pneumothorax seen.  Ankylosing spondylitis is demonstrated. No acute fracture seen. Healing  fracture T8 vertebral body. Chronic compression deformity L2. And T12.       Impression:      Impression:  1. Findings compatible with heart failure, similar to the prior exam.  2. Healing T8 vertebral body fracture.        This report was finalized on 7/10/2024 9:18 PM by Brennen Quinn DO.       CT Head Without Contrast [[id number]] Collected: 07/10/24 1910     Updated: 07/10/24 1913    Narrative:      INDICATION: Altered mental status.     COMPARISON: No relevant priors available     TECHNIQUE: Axial CT images of the head were obtained without IV  contrast. Coronal and sagittal reformations were reviewed.     FINDINGS:  Gray-white differentiation is relatively preserved. Chronic ischemic  white matter changes. No mass, mass effect or midline shift. No evidence  of acute large territorial infarction or acute intracranial hemorrhage.  Ventricles appear normal in size. Basal cisterns are patent. No  depressed calvarial fracture.       Impression:      No acute intracranial process.        This report was finalized on 7/10/2024 7:11 PM by Alex Pallas, DO.       XR Chest 1 View [[id number]] Collected: 07/10/24 1821     Updated: 07/10/24 1823    Narrative:      INDICATION: Cough.     TECHNIQUE: Frontal radiograph of the chest.     COMPARISON: 6/2/2024.     FINDINGS:   The cardiomediastinal silhouette and pulmonary  vasculature appear within  normal limits. No infiltrate, pleural effusion or pneumothorax. No acute  osseous abnormality evident.       Impression:      No acute cardiopulmonary process.        This report was finalized on 7/10/2024 6:21 PM by Alex Pallas, DO.             ----------------------------------------------------------------------------------------------------------------------  Assessment and Plan:    Acute metabolic versus toxic encephalopathy -etiology not quite clear but may be related to recent opioid use versus possible underlying urinary tract infection.  Urine analysis with no nitrites but does demonstrate 3+ bacteria with too numerous to count WBC per high-power field.  Patient has been on IV vancomycin for bacteremia for several weeks.  However, patient has had no gram-negative coverage prior to hospitalization.  As such, will initiate Rocephin while awaiting urine cultures    2.  Acute cystitis -continue empiric antibiotic therapy with Rocephin until urine culture is finalized    3.  Type 2 diabetes mellitus -continue Accu-Cheks before every meal and nightly with sliding scale insulin    4.  Hyperlipidemia -statin    5.  Hypothyroidism -Synthroid    6.  Essential hypertension -will await pharmacy to complete medication reconciliation and restart antihypertensive medications once available    7.  Chronic HFpEF -no evidence of acute heart failure at this time    Disposition will await finalization of urine cultures and likely discharge at that time.    Aleksey Storm DO   07/11/24   10:52 EDT

## 2024-07-11 NOTE — CASE MANAGEMENT/SOCIAL WORK
Discharge Planning Assessment   [facility]     Patient Name: Kennedy Prescott  MRN: [id number]  Today's Date: 7/11/2024    Admit Date: 7/10/2024    Plan: SS received a consult for d/c planning. SS contacted [facility] on this date who states pt is a longterm residnt. Pt does not have a bed hold. Per Irma, they will accept pt back at discharge. SS to follow and assist with discharge planning.       Discharge Plan       Row Name 07/11/24 0924       Plan    Plan SS received a consult for d/c planning. SS contacted [facility] on this date who states pt is a longterm residnt. Pt does not have a bed hold. Per Irma, they will accept pt back at discharge. SS to follow and assist with discharge planning.    Patient/Family in Agreement with Plan yes        Continued Care and Services - Admitted Since 7/10/2024    No active coordination exists for this encounter.       Selected Continued Care - Prior Encounters Includes continued care and service providers with selected services from prior encounters from 4/11/2024 to 7/11/2024      Discharged on 5/30/2024 Admission date: 5/15/2024 - Discharge disposition: Skilled Nursing Facility (DC - External)      Destination       Service Provider Selected Services Address Phone Fax Patient Preferred    [facility] [address] [place] [address] [phone] --                        Demographic Summary       Row Name 07/11/24 0924       General Information    Admission Type observation    Referral Source nursing    Reason for Consult discharge planning  SS received a consult for d/c planning.                MONA Jordan

## 2024-07-11 NOTE — ED NOTES
Pt noted to have large bowel movement that is soft and reddish brown in color. Pt changed and cleaned at this time with new bedding.

## 2024-07-11 NOTE — H&P
[facility] Medicine Services  HISTORY & PHYSICAL    Patient Identification:  Name:  Kennedy Prescott  Age:  73 y.o.  Sex:  male  :  1950  MRN:  [id number]   Visit Number:  [id number]  Admit Date: 7/10/2024   Primary Care Physician:  Jag Guillaume MD     Subjective     Chief complaint:   Chief Complaint   Patient presents with    Altered Mental Status     History of presenting illness:   Patient is a 73 y.o. male with past medical history significant for diabetes, ESRD, hypertension, GERD, CAD, CHF, and hypothyroidism that presented to the [facility] emergency department for evaluation of altered mental status.  The patient was sent by [facility] for evaluation of altered mental status.  ED record indicates that the patient had dialysis at the nursing home today.  It was also reported that patient had Norco administered in conjunction with the dialysis treatment.  Patient was unable to answer any questions appropriately on arrival to the emergency department.  On my physical examination, the patient is now alert and oriented x 3.  No acute distress noted.  Patient is slow to respond to my questions, however he answers all questions appropriately.  Patient was unable to tell me the dosages of his pain medication or if he had been prescribed pain medication for any significant amount of time.  He advises that he thought he was taking Tylenol every day at the nursing home.  The patient did receive a one-time dose of vancomycin and Zosyn in the emergency department.  Urinalysis reveals urinary tract infection.  Will follow all cultures.  Patient had extended hospitalization for septic shock in May.  The patient was also admitted to [facility] in  for GI bleed.  The patient is in stable condition upon admission to the hospital.Upon arrival to the ED, vitals were temperature 97.6, /80, heart rate 102, respirations 16, and SpO2 99% on 2 L.Labs obtained  on arrival: Initial troponin 126, reflex troponin 143 with a delta of 17.  proBNP greater than 70,000, potassium 3.4, CO2 31.0, creatinine 2.70, GFR 23.8, alk phos 168, albumin 3.4, ammonia 14, PT 21.5, INR 1.6, PTT 34.8, hemoglobin 10.1, and hematocrit 33.6. Patient has been admitted to the telemetry unit for further evaluation and treatment.     Present during exam: N/A  ---------------------------------------------------------------------------------------------------------------------   Review of Systems   Constitutional: Negative.  Negative for chills, fatigue and fever.   HENT: Negative.  Negative for congestion, sore throat and trouble swallowing.    Eyes: Negative.    Respiratory: Negative.  Negative for cough, shortness of breath and wheezing.    Cardiovascular: Negative.  Negative for chest pain, palpitations and leg swelling.   Gastrointestinal: Negative.  Negative for abdominal pain, diarrhea, nausea and vomiting.   Endocrine: Negative.    Genitourinary: Negative.  Negative for dysuria.   Musculoskeletal: Negative.  Negative for neck pain and neck stiffness.   Skin: Negative.    Allergic/Immunologic: Negative.    Neurological: Negative.  Negative for dizziness, tremors, seizures, syncope, facial asymmetry, speech difficulty, weakness, light-headedness, numbness and headaches.   Hematological: Negative.    Psychiatric/Behavioral: Negative.         Otherwise 10-system ROS reviewed and is negative except as mentioned in the HPI.    ---------------------------------------------------------------------------------------------------------------------   Past Medical History:   Diagnosis Date    CHF (congestive heart failure)     Coronary artery disease     Diabetes mellitus     Dialysis patient     Disease of thyroid gland     Elevated cholesterol     GERD (gastroesophageal reflux disease)     Hypertension     Myocardial infarction     Renal disorder     stage 5     Past Surgical History:   Procedure Laterality  Date    CARDIAC CATHETERIZATION N/A 5/28/2024    Procedure: Left Heart Cath;  Surgeon: Mackenzie Ruiz MD;  Location:  [facility] CATH INVASIVE LOCATION;  Service: Cardiology;  Laterality: N/A;    CARDIAC SURGERY      cabg    CHOLECYSTECTOMY      COLONOSCOPY N/A 9/22/2023    Procedure: COLONOSCOPY;  Surgeon: Trip Peter MD;  Location:  [facility] OR;  Service: Gastroenterology;  Laterality: N/A;    DIALYSIS FISTULA CREATION Left     arm     Family History   Problem Relation Age of Onset    Arthritis Mother     COPD Father     Diabetes Father     Heart disease Father     Hyperlipidemia Father     Hypertension Father     Cancer Daughter      Social History     Socioeconomic History    Marital status: Unknown   Tobacco Use    Smoking status: Never    Smokeless tobacco: Never   Vaping Use    Vaping status: Never Used   Substance and Sexual Activity    Alcohol use: Never    Drug use: Never    Sexual activity: Defer     ---------------------------------------------------------------------------------------------------------------------   Allergies:  Patient has no known allergies.  ---------------------------------------------------------------------------------------------------------------------   Medications below are reported home medications pulling from within the system; at this time, these medications have not been reconciled unless otherwise specified and are in the verification process for further verifcation as current home medications.    Prior to Admission Medications       Prescriptions Last Dose Informant Patient Reported? Taking?    acetaminophen (TYLENOL) 325 MG tablet  Nursing Home Yes No    Take 2 tablets by mouth 3 (Three) Times a Week.    acetaminophen (TYLENOL) 500 MG tablet  Nursing Home Yes No    Take 1 tablet by mouth Every 4 (Four) Hours As Needed for Mild Pain.    ammonium lactate (AMLACTIN) 12 % cream  Nursing Home Yes No    Apply 1 g topically to the appropriate area as directed Every Night. Apply to  dry skin on feet    apixaban (ELIQUIS) 5 MG tablet tablet   No No    Take 1 tablet by mouth Every 12 (Twelve) Hours. Indications: Atrial Fibrillation    aspirin 81 MG EC tablet   No No    Take 1 tablet by mouth Daily. This medication is to stop the aspirin after 30 days and then he is to take the Plavix and Eliquis afterwards together.    atorvastatin (LIPITOR) 40 MG tablet   No No    Take 1 tablet by mouth Daily.    bisacodyl (Dulcolax) 10 MG suppository  Nursing Home Yes No    Insert 1 suppository into the rectum Daily As Needed for Constipation.    cefuroxime (CEFTIN) 500 MG tablet   No No    Take 1 tablet by mouth 2 (Two) Times a Day.    cetaphil (CETAPHIL) lotion  Nursing Home Yes No    Apply 1 Application topically to the appropriate area as directed Daily.    clopidogrel (PLAVIX) 75 MG tablet   No No    Take 1 tablet by mouth Daily.    empagliflozin (JARDIANCE) 10 MG tablet tablet   No No    Take 1 tablet by mouth Daily.    erythromycin (ROMYCIN) 5 MG/GM ophthalmic ointment  Nursing Home Yes No    Administer 1 Application into the left eye 2 (Two) Times a Day.    famotidine (PEPCID) 20 MG tablet   No No    Take 0.5 tablets by mouth Daily.    gabapentin (NEURONTIN) 100 MG capsule   No No    Take 2 capsules by mouth Every Night for 3 days.    insulin aspart (novoLOG FLEXPEN) 100 UNIT/ML solution pen-injector sc pen  Nursing Home Yes No    Inject 2-6 Units under the skin into the appropriate area as directed 4 (Four) Times a Day Before Meals & at Bedtime.    Insulin Aspart (novoLOG) 100 UNIT/ML injection   No No    Inject 3 Units under the skin into the appropriate area as directed 3 (Three) Times a Day Before Meals.    insulin detemir (Levemir FlexPen) 100 UNIT/ML injection   No No    Inject 13 Units under the skin into the appropriate area as directed 2 (Two) Times a Day.    lactulose (CHRONULAC) 10 GM/15ML solution  Nursing Home Yes No    Take 30 mL by mouth Daily As Needed (constipation).    levocetirizine  (XYZAL) 5 MG tablet  Nursing Home Yes No    Take 1 tablet by mouth Daily.    levothyroxine (SYNTHROID, LEVOTHROID) 75 MCG tablet  Nursing Home Yes No    Take 1 tablet by mouth Daily.    Lidocaine Viscous HCl (XYLOCAINE) 2 % solution   No No    Take 5 mL by mouth Every 6 (Six) Hours As Needed for Mild Pain.    megestrol (MEGACE) 40 MG tablet   No No    Take 1 tablet by mouth 3 (Three) Times a Day With Meals.    metoprolol succinate XL (TOPROL-XL) 25 MG 24 hr tablet  Nursing Home Yes No    Take 0.5 tablets by mouth Every Night.    multivitamin with minerals tablet tablet  Nursing Home Yes No    Take 1 tablet by mouth Daily.    nitroglycerin (NITROSTAT) 0.4 MG SL tablet   No No    Place 1 tablet under the tongue Every 5 (Five) Minutes As Needed for Chest Pain (Only if SBP Greater Than 100). Take no more than 3 doses in 15 minutes.    ondansetron (ZOFRAN) 4 MG tablet  Nursing Home Yes No    Take 1 tablet by mouth Every 8 (Eight) Hours As Needed for Nausea or Vomiting.    polyethylene glycol (MIRALAX) 17 GM/SCOOP powder  Nursing Home Yes No    Take 17 g by mouth 2 (Two) Times a Day As Needed (constipation).    sertraline (ZOLOFT) 50 MG tablet  Nursing Home Yes No    Take 1 tablet by mouth Every Night.    sevelamer (RENAGEL) 800 MG tablet  Nursing Home Yes No    Take 3 tablets by mouth 3 (Three) Times a Day With Meals.          ---------------------------------------------------------------------------------------------------------------------    Objective     Hospital Scheduled Meds:  vancomycin, 2,000 mg, Intravenous, Once      Pharmacy Consult,         Current listed hospital scheduled medications may not yet reflect those currently placed in orders that are signed and held, awaiting patient's arrival to floor/unit.    ---------------------------------------------------------------------------------------------------------------------   Vital Signs:  Temp:  [97.6 °F (36.4 °C)] 97.6 °F (36.4 °C)  Heart Rate:  []  102  Resp:  [16] 16  BP: (103-162)/(59-95) 137/80  Mean Arterial Pressure (Non-Invasive) for the past 24 hrs (Last 3 readings):   Noninvasive MAP (mmHg)   07/10/24 2200 101   07/10/24 2145 97   07/10/24 2130 89     SpO2 Percentage    07/10/24 2130 07/10/24 2145 07/10/24 2200   SpO2: 100% 100% 99%     SpO2:  [89 %-100 %] 99 %  on  Flow (L/min):  [2] 2;   Device (Oxygen Therapy): nasal cannula    Body mass index is 30.7 kg/m².  Wt Readings from Last 3 Encounters:   07/10/24 99.8 kg (220 lb 0.3 oz)   07/09/24 99.8 kg (220 lb 0.3 oz)   06/01/24 99.8 kg (220 lb)       ---------------------------------------------------------------------------------------------------------------------   Physical Exam  Vitals and nursing note reviewed.   Constitutional:       General: He is awake. He is not in acute distress.     Appearance: Normal appearance. He is normal weight. He is not ill-appearing or diaphoretic.   HENT:      Head: Normocephalic and atraumatic.      Mouth/Throat:      Mouth: Mucous membranes are moist.      Pharynx: Oropharynx is clear.   Eyes:      Extraocular Movements: Extraocular movements intact.      Pupils: Pupils are equal, round, and reactive to light.   Cardiovascular:      Rate and Rhythm: Normal rate and regular rhythm.      Pulses: Normal pulses.           Dorsalis pedis pulses are 2+ on the right side and 2+ on the left side.      Heart sounds: Normal heart sounds. No murmur heard.     No friction rub.   Pulmonary:      Effort: Pulmonary effort is normal. No accessory muscle usage, respiratory distress or retractions.      Breath sounds: Normal breath sounds. No wheezing, rhonchi or rales.   Abdominal:      General: Bowel sounds are normal. There is no distension.      Palpations: Abdomen is soft.      Tenderness: There is no abdominal tenderness. There is no guarding.   Musculoskeletal:         General: Normal range of motion.      Cervical back: Normal range of motion and neck supple. No rigidity.       Right lower leg: No edema.      Left lower leg: No edema.   Skin:     General: Skin is warm and dry.      Capillary Refill: Capillary refill takes 2 to 3 seconds.   Neurological:      General: No focal deficit present.      Mental Status: He is alert and oriented to person, place, and time. Mental status is at baseline.      Cranial Nerves: No dysarthria or facial asymmetry.      Sensory: Sensation is intact.      Motor: No weakness or tremor.   Psychiatric:         Attention and Perception: Attention normal.         Mood and Affect: Mood normal.         Speech: Speech normal.         Behavior: Behavior normal. Behavior is cooperative.         Thought Content: Thought content normal.         Cognition and Memory: Cognition normal.         Judgment: Judgment normal.        ---------------------------------------------------------------------------------------------------------------------  EKG:    No EKG performed in the emergency department    ---------------------------------------------------------------------------------------------------------------------   Results from last 7 days   Lab Units 07/10/24  1957 07/10/24  1702   HSTROP T ng/L 143* 126*     Results from last 7 days   Lab Units 07/10/24  1702   PROBNP pg/mL >70,000.0*       Results from last 7 days   Lab Units 07/10/24  1914   PH, ARTERIAL pH units 7.471*   PO2 ART mm Hg 54.2*   PCO2, ARTERIAL mm Hg 39.7   HCO3 ART mmol/L 28.9*     Results from last 7 days   Lab Units 07/10/24  1702 07/10/24  0859 07/09/24  1342   LACTATE mmol/L 1.3  --  1.4   WBC 10*3/mm3 8.65 9.81 8.49   HEMOGLOBIN g/dL 10.1* 9.3* 9.8*   HEMATOCRIT % 33.6* 31.6* 32.5*   MCV fL 94.9 96.9 94.8   MCHC g/dL 30.1* 29.4* 30.2*   PLATELETS 10*3/mm3 190 194 178   INR  1.86*  --   --      Results from last 7 days   Lab Units 07/10/24  1702 07/09/24  1342   SODIUM mmol/L 140 142   POTASSIUM mmol/L 3.4* 4.4   CHLORIDE mmol/L 98 99   CO2 mmol/L 31.0* 29.0   BUN mg/dL 17 37*   CREATININE mg/dL  "2.70* 4.60*   CALCIUM mg/dL 9.2 9.3   GLUCOSE mg/dL 72 172*   ALBUMIN g/dL 3.4* 3.3*   BILIRUBIN mg/dL 0.6 0.7   ALK PHOS U/L 168* 163*   AST (SGOT) U/L 19 21   ALT (SGPT) U/L 17 16   Estimated Creatinine Clearance: 28.5 mL/min (A) (by C-G formula based on SCr of 2.7 mg/dL (H)).  Ammonia   Date Value Ref Range Status   07/10/2024 14 (L) 16 - 60 umol/L Final       No results found for: \"HGBA1C\", \"POCGLU\"  Lab Results   Component Value Date    HGBA1C 6.50 (H) 05/28/2024     Lab Results   Component Value Date    TSH 2.490 03/21/2024    FREET4 1.43 05/29/2022       Microbiology Results (last 10 days)       Procedure Component Value - Date/Time    Respiratory Panel PCR w/COVID-19(SARS-CoV-2) [facility] In-House, NP Swab in UTM/VTM, 2 HR TAT - Swab, Nasopharynx [[id number]]  (Normal) Collected: 07/10/24 1957    Lab Status: Final result Specimen: Swab from Nasopharynx Updated: 07/10/24 2052     ADENOVIRUS, PCR Not Detected     Coronavirus 229E Not Detected     Coronavirus HKU1 Not Detected     Coronavirus NL63 Not Detected     Coronavirus OC43 Not Detected     COVID19 Not Detected     Human Metapneumovirus Not Detected     Human Rhinovirus/Enterovirus Not Detected     Influenza A PCR Not Detected     Influenza B PCR Not Detected     Parainfluenza Virus 1 Not Detected     Parainfluenza Virus 2 Not Detected     Parainfluenza Virus 3 Not Detected     Parainfluenza Virus 4 Not Detected     RSV, PCR Not Detected     Bordetella pertussis pcr Not Detected     Bordetella parapertussis PCR Not Detected     Chlamydophila pneumoniae PCR Not Detected     Mycoplasma pneumo by PCR Not Detected    Narrative:      In the setting of a positive respiratory panel with a viral infection PLUS a negative procalcitonin without other underlying concern for bacterial infection, consider observing off antibiotics or discontinuation of antibiotics and continue supportive care. If the respiratory panel is positive for atypical bacterial " infection (Bordetella pertussis, Chlamydophila pneumoniae, or Mycoplasma pneumoniae), consider antibiotic de-escalation to target atypical bacterial infection.           Pain Management Panel          Latest Ref Rng & Units 7/10/2024   Pain Management Panel   Amphetamine, Urine Qual Negative Negative    Barbiturates Screen, Urine Negative Negative    Benzodiazepine Screen, Urine Negative Negative    Buprenorphine, Screen, Urine Negative Negative    Cocaine Screen, Urine Negative Negative    Fentanyl, Urine Negative Negative    Methadone Screen , Urine Negative Negative    Methamphetamine, Ur Negative Negative      I have personally reviewed the above laboratory results.   ---------------------------------------------------------------------------------------------------------------------  Imaging Results (Last 7 Days)       Procedure Component Value Units Date/Time    CT Abdomen Pelvis Without Contrast [[id number]] Collected: 07/10/24 2118     Updated: 07/10/24 2120    Narrative:      Comparison: July 9, 2024.     Liver, spleen and adrenal glands are unremarkable. Gallbladder  surgically absent. Pancreas is atrophic. Moderate to severe renal  atrophy bilaterally with perinephric stranding likely chronic. Renal  cysts noted bilaterally, not with characterized. No obstructive uropathy  is identified. Diverticulosis is appreciated without diverticulitis. No  bowel obstruction, free air or significant free fluid seen. The appendix  is unremarkable. Multilevel chronic compression deformities lumbar  spine. Erosion of the coccyx likely from adjacent decubitus ulcer,  similar to the prior exam.       Impression:      Impression:  1. No acute process or chronic findings above.         This report was finalized on 7/10/2024 9:18 PM by Brennen Quinn DO.       CT Chest Without Contrast Diagnostic [[id number]] Collected: 07/10/24 2118     Updated: 07/10/24 2120    Narrative:      Comparison: July 9, 2024.      Moderate to large  right pleural effusion with mild left pleural effusion  is again seen. Heart is enlarged. Bilateral gynecomastia is noted.  Evidence of coronary artery bypass with coronary artery calcification.  Interlobular septal thickening suggests pulmonary consistent with heart  failure. Atelectasis lower lungs bilaterally. No pneumothorax seen.  Ankylosing spondylitis is demonstrated. No acute fracture seen. Healing  fracture T8 vertebral body. Chronic compression deformity L2. And T12.       Impression:      Impression:  1. Findings compatible with heart failure, similar to the prior exam.  2. Healing T8 vertebral body fracture.        This report was finalized on 7/10/2024 9:18 PM by Brennen Quinn DO.       CT Head Without Contrast [[id number]] Collected: 07/10/24 1910     Updated: 07/10/24 1913    Narrative:      INDICATION: Altered mental status.     COMPARISON: No relevant priors available     TECHNIQUE: Axial CT images of the head were obtained without IV  contrast. Coronal and sagittal reformations were reviewed.     FINDINGS:  Gray-white differentiation is relatively preserved. Chronic ischemic  white matter changes. No mass, mass effect or midline shift. No evidence  of acute large territorial infarction or acute intracranial hemorrhage.  Ventricles appear normal in size. Basal cisterns are patent. No  depressed calvarial fracture.       Impression:      No acute intracranial process.        This report was finalized on 7/10/2024 7:11 PM by Alex Pallas, DO.       XR Chest 1 View [[id number]] Collected: 07/10/24 1821     Updated: 07/10/24 1823    Narrative:      INDICATION: Cough.     TECHNIQUE: Frontal radiograph of the chest.     COMPARISON: 6/2/2024.     FINDINGS:   The cardiomediastinal silhouette and pulmonary vasculature appear within  normal limits. No infiltrate, pleural effusion or pneumothorax. No acute  osseous abnormality evident.       Impression:      No acute cardiopulmonary process.        This report  was finalized on 7/10/2024 6:21 PM by Alex Pallas, DO.             I have personally reviewed the above radiology results.     Last Echocardiogram:  Results for orders placed during the hospital encounter of 05/15/24    Adult Transesophageal Echo (GAVI) W/ Cont if Necessary Per Protocol    Interpretation Summary    Left ventricular systolic function is moderately decreased. Estimated left ventricular EF = 35%    No Vegetations noted on any valve    ---------------------------------------------------------------------------------------------------------------------    Assessment & Plan      ACUTE HOSPITAL PROBLEMS    -Acute altered mental status, on admission  -Acute pressure injury (right buttock) (diabetic ulcer left heel)  -Acute urinary tract infection, on admission    CMP and CBC in the am  Cardiac monitoring  Neuro checks every 4 hours  Patient received one time dose of Vancomycin and Zosyn in the ED  Follow blood and urine cultures  UTI most likely contributed to altered mental status exhibited on arrival to the emergency department  Wound care consult ordered    The patient has had several hospitalizations recently:     Patient hospitalized at [facility] on 5/15/2024 for septic shock/bacteremia and was discharged on 5/30/2024.  Patient received 1 g of IV antibiotic therapy.  Patient admitted to [facility] on 6/2/2024 for GI bleed and discharged back to the nursing home on 6/15/2024.      -F/E/N  Replace electrolytes per protocol as necessary.  Cardiac/diabetic diet.     CHRONIC MEDICAL PROBLEMS    -Coronary artery disease  -Diabetes  -Hypertension  -Heart failure (HFpEF)  -End-stage renal disease  -Hyperlipidemia  -Hypothyroidism  -GERD  -History of myocardial infarction    Vital signs per hospital policy  Insulin sliding scale  Blood glucose checks before meals and at bedtime  ---------------------------------------------------  DVT Prophylaxis: Contraindicated, patient is  anticoagulated  Activity: Strict bedrest  ---------------------------------------------------  The patient is considered to be a high risk patient due to: Past medical history of    OBSERVATION status; however, if further evaluation or treatment plans warrant, status may change.  Based upon current information, I predict patient's care encounter to be less than or equal to 2 midnights     Code Status: Full Code  ---------------------------------------------------  Disposition/Discharge planning: Consult case management for discharge planning.  ---------------------------------------------------  I have discussed the patient's assessment and plan with attending physician Tiffany Tam DO Carl B Gray, APRN     07/10/24  22:45 EDT    Attending Physician: Tiffany Galvez DO

## 2024-07-11 NOTE — PLAN OF CARE
Goal Outcome Evaluation:   Received patient from ED. Resting in bed comfortably.  Tolerating oxygen well on 2 LNC. Denies any acute distress at this time. Patient lethargic at this time but easy to arouse and was able to answer all admission question. ATB running per order. HOB elevated, bed alarm on, and call bell in reach. Care plan started.

## 2024-07-11 NOTE — NURSING NOTE
Wound consult for denuded area to LT heel and a stage 2 pressure ulcer to the RT buttock.     LT heel presents with as a diabetic ulcer with a small amount of creamy drainage. The wound bed is pink and moist and the juanjose wound skin is thick and callused. Offloading boots in place. New order to cleanse with wound cleanser and pat dry. Apply Thera-honey and cover with a non adherent pad. Secure with a kerlix and an ace wrap daily.     RT buttock presents as a stage 2 pressure ulcer with a partial thickness loss of the dermis with a shallow moist wound bed. The juanjose wound skin is dry and flaky. Area measures 2 x 1.5 x 0.1 New order for Magic Barrier cream BID and use Z-guard in between treatments.    07/11/24 0900   Wound 05/15/24 2225 Left posterior heel Diabetic Ulcer   Placement Date/Time: 05/15/24 2225   Side: Left  Orientation: posterior  Location: heel  Primary Wound Type: Diabetic Ulcer   Wound Image   (see media)   Pressure Injury Stage MMPI  (diabetic ulcer)   Dressing Appearance moist drainage   Base red   Periwound warm;dry;redness   Periwound Temperature warm   Periwound Skin Turgor soft  (dry and flaky)   Edges irregular   Wound Length (cm) 3.8 cm   Wound Width (cm) 4.1 cm   Wound Depth (cm) 0.1 cm   Wound Surface Area (cm^2) 15.58 cm^2   Wound Volume (cm^3) 1.558 cm^3   Drainage Characteristics/Odor creamy;yellow   Drainage Amount small   Care, Wound cleansed with  (wound cleanser, Thera-honey , non adherent ace wrap daily)   Dressing Care dressing changed   Wound 07/10/24 2320 Right medial gluteal Pressure Injury   Placement Date/Time: 07/10/24 2320   Present on Original Admission: Yes  Side: Right  Orientation: medial  Location: gluteal  Primary Wound Type: Pressure Injury  Additional Comments: scabbed   Wound Image   (see media)   Pressure Injury Stage 2   Closure Open to air   Base blanchable   Periwound pink;dry   Periwound Temperature warm   Periwound Skin Turgor soft   Edges irregular   Wound  "Length (cm) 2 cm   Wound Width (cm) 1.5 cm   Wound Depth (cm) 0.1 cm   Wound Surface Area (cm^2) 3 cm^2   Wound Volume (cm^3) 0.3 cm^3   Drainage Amount none   Care, Wound cleansed with  (NS and pat dry. Apply magoc Barrier cream BID and use Z-guard in between treatments.)   External Urinary Catheter   Placement date: If unknown, DO NOT use \"Add Comment\" note: 07/10/24     Site Assessment Skin intact;Clean   Application/Removal external catheter changed   Securement Method Securing device   Catheter care complete Yes        "

## 2024-07-11 NOTE — THERAPY EVALUATION
Acute Care - Occupational Therapy Initial Evaluation   [place]     Patient Name: Kennedy Prescott  : 1950  MRN: [id number]  Today's Date: 2024             Admit Date: 7/10/2024       ICD-10-CM ICD-9-CM   1. Altered mental status, unspecified altered mental status type  R41.82 780.97     Patient Active Problem List   Diagnosis    Scalp lesion    Skin ulcer of right heel, limited to breakdown of skin    Hidradenoma    Functional diarrhea    Adenomatous polyp of ascending colon    SIRS (systemic inflammatory response syndrome)    Sepsis    Ischemic cardiomyopathy    AMS (altered mental status)    Acute metabolic encephalopathy     Past Medical History:   Diagnosis Date    CHF (congestive heart failure)     Coronary artery disease     Diabetes mellitus     Dialysis patient     Disease of thyroid gland     Elevated cholesterol     GERD (gastroesophageal reflux disease)     Hypertension     Myocardial infarction     Renal disorder     stage 5     Past Surgical History:   Procedure Laterality Date    CARDIAC CATHETERIZATION N/A 2024    Procedure: Left Heart Cath;  Surgeon: Mackenzie Ruiz MD;  Location: [facility] CATH INVASIVE LOCATION;  Service: Cardiology;  Laterality: N/A;    CARDIAC SURGERY      cabg    CHOLECYSTECTOMY      COLONOSCOPY N/A 2023    Procedure: COLONOSCOPY;  Surgeon: Trip Peter MD;  Location: [facility] OR;  Service: Gastroenterology;  Laterality: N/A;    DIALYSIS FISTULA CREATION Left     arm         OT ASSESSMENT FLOWSHEET (Last 12 Hours)       OT Evaluation and Treatment       Row Name 24 1423                   OT Time and Intention    Document Type evaluation  -KR        Mode of Treatment occupational therapy  -KR        Patient Effort good  -KR           General Information    General Observations of Patient alert/cooperative  -KR           Living Environment    Current Living Arrangements residential facility  -KR        People in Home facility resident  -KR            Cognition    Affect/Mental Status (Cognition) WFL  -KR        Orientation Status (Cognition) oriented to;person;place;situation  -KR        Follows Commands (Cognition) WFL  -KR           Range of Motion Comprehensive    Comment, General Range of Motion BUE [facility]  -KR           Strength Comprehensive (MMT)    Comment, General Manual Muscle Testing (MMT) Assessment E [facility]  -KR           Activities of Daily Living    BADL Assessment/Intervention bathing;upper body dressing;lower body dressing;grooming;feeding;toileting  -KR           Bathing Assessment/Intervention    [place] Level (Bathing) bathing skills;moderate assist (50% patient effort)  -KR           Upper Body Dressing Assessment/Training    [place] Level (Upper Body Dressing) upper body dressing skills;minimum assist (75% patient effort)  -KR           Lower Body Dressing Assessment/Training    [place] Level (Lower Body Dressing) lower body dressing skills;moderate assist (50% patient effort)  -KR           Grooming Assessment/Training    [place] Level (Grooming) grooming skills;minimum assist (75% patient effort)  -KR           Self-Feeding Assessment/Training    [place] Level (Feeding) feeding skills;set up  -KR           Toileting Assessment/Training    [place] Level (Toileting) toileting skills;moderate assist (50% patient effort);maximum assist (25% patient effort)  -KR           Wound 05/15/24 2225 Left posterior heel Diabetic Ulcer    Wound - Properties Group Placement Date: 05/15/24  -CL Placement Time: 2225 -CL Side: Left  -CL Orientation: posterior  -CL Location: heel  -CL Primary Wound Type: Diabetic ulc  -CL    Retired Wound - Properties Group Placement Date: 05/15/24  -CL Placement Time: 2225  -CL Side: Left  -CL Orientation: posterior  -CL Location: heel  -CL Primary Wound Type: Diabetic ulc  -CL    Retired Wound - Properties Group Date first assessed: 05/15/24  -CL Time first assessed: 2225 -CL Side: Left  -CL  Location: heel  -CL Primary Wound Type: Diabetic ulc  -CL       Wound 07/10/24 2320 Right medial gluteal Pressure Injury    Wound - Properties Group Placement Date: 07/10/24  -MA Placement Time: 2320 -MA Present on Original Admission: Y  -MA Side: Right  -MA Orientation: medial  -MA Location: gluteal  -MA Primary Wound Type: Pressure inj  -MA Additional Comments: scabbed  -MA    Retired Wound - Properties Group Placement Date: 07/10/24  -MA Placement Time: 2320 -MA Present on Original Admission: Y  -MA Side: Right  -MA Orientation: medial  -MA Location: gluteal  -MA Primary Wound Type: Pressure inj  -MA Additional Comments: scabbed  -MA    Retired Wound - Properties Group Date first assessed: 07/10/24  -MA Time first assessed: 2320 -MA Present on Original Admission: Y  -MA Side: Right  -MA Location: gluteal  -MA Primary Wound Type: Pressure inj  -MA Additional Comments: scabbed  -MA       Plan of Care Review    Plan of Care Reviewed With patient  -KR           Therapy Assessment/Plan (OT)    Planned Therapy Interventions (OT) activity tolerance training;BADL retraining  -KR           Therapy Plan Review/Discharge Plan (OT)    Anticipated Discharge Disposition (OT) skilled nursing facility  -KR           OT Goals    Dressing Goal Selection (OT) dressing, OT goal 1  -KR        Activity Tolerance Goal Selection (OT) activity tolerance, OT goal 1  -KR           Dressing Goal 1 (OT)    Activity/Device (Dressing Goal 1, OT) dressing skills, all  -KR        Boomer/Cues Needed (Dressing Goal 1, OT) minimum assist (75% or more patient effort)  -KR        Time Frame (Dressing Goal 1, OT) by discharge  -KR            Activity Tolerance Goal 1 (OT)    Activity Tolerance Goal 1 (OT) Increase to enhance ability to assist with ADLs/mobility  -KR        Activity Level (Endurance Goal 1, OT) 15 min activity  -KR        Time Frame (Activity Tolerance Goal 1, OT) by discharge  -KR                  User Key  (r) = Recorded By,  (t) = Taken By, (c) = Cosigned By      Initials Name Effective Dates    KR Jose Ramon Potts OT 06/16/21 -     Lana Corea RN 11/29/22 -     Enrique Cardenas RN 01/14/22 -                            OT Recommendation and Plan  Planned Therapy Interventions (OT): activity tolerance training, BADL retraining  Plan of Care Review  Plan of Care Reviewed With: patient  Plan of Care Reviewed With: patient        Time Calculation:     Therapy Charges for Today       Code Description Service Date Service Provider Modifiers Qty    [id number]  OT EVAL MOD COMPLEXITY 4 7/11/2024 Jose Ramon Potts OT GO 1                 Jose Ramon Potts OT  7/11/2024

## 2024-07-11 NOTE — PLAN OF CARE
Goal Outcome Evaluation:  Patient has been pleasant and complaint with care and medications as ordered. Patient is currently resting in bed. No S&S of distress noted at this time. Bed alarm on, call light within reach, bed in lowest position. Plan of care ongoing.

## 2024-07-12 ENCOUNTER — READMISSION MANAGEMENT (OUTPATIENT)
Dept: CALL CENTER | Facility: HOSPITAL | Age: 74
End: 2024-07-12
Payer: MEDICARE

## 2024-07-12 VITALS
TEMPERATURE: 97.4 F | BODY MASS INDEX: 30.17 KG/M2 | RESPIRATION RATE: 16 BRPM | WEIGHT: 215.5 LBS | DIASTOLIC BLOOD PRESSURE: 60 MMHG | SYSTOLIC BLOOD PRESSURE: 130 MMHG | HEIGHT: 71 IN | OXYGEN SATURATION: 90 % | HEART RATE: 93 BPM

## 2024-07-12 PROBLEM — R41.82 AMS (ALTERED MENTAL STATUS): Status: RESOLVED | Noted: 2024-07-10 | Resolved: 2024-07-12

## 2024-07-12 PROBLEM — G93.41 ACUTE METABOLIC ENCEPHALOPATHY: Status: RESOLVED | Noted: 2024-07-11 | Resolved: 2024-07-12

## 2024-07-12 LAB
ANION GAP SERPL CALCULATED.3IONS-SCNC: 9 MMOL/L (ref 5–15)
BACTERIA SPEC AEROBE CULT: NO GROWTH
BUN SERPL-MCNC: 34 MG/DL (ref 8–23)
BUN/CREAT SERPL: 6.9 (ref 7–25)
CALCIUM SPEC-SCNC: 8.8 MG/DL (ref 8.6–10.5)
CHLORIDE SERPL-SCNC: 102 MMOL/L (ref 98–107)
CO2 SERPL-SCNC: 29 MMOL/L (ref 22–29)
CREAT SERPL-MCNC: 4.94 MG/DL (ref 0.76–1.27)
DEPRECATED RDW RBC AUTO: 58 FL (ref 37–54)
EGFRCR SERPLBLD CKD-EPI 2021: 11.5 ML/MIN/1.73
ERYTHROCYTE [DISTWIDTH] IN BLOOD BY AUTOMATED COUNT: 16.4 % (ref 12.3–15.4)
GLUCOSE BLDC GLUCOMTR-MCNC: 104 MG/DL (ref 70–130)
GLUCOSE BLDC GLUCOMTR-MCNC: 109 MG/DL (ref 70–130)
GLUCOSE BLDC GLUCOMTR-MCNC: 141 MG/DL (ref 70–130)
GLUCOSE BLDC GLUCOMTR-MCNC: 153 MG/DL (ref 70–130)
GLUCOSE BLDC GLUCOMTR-MCNC: 163 MG/DL (ref 70–130)
GLUCOSE SERPL-MCNC: 155 MG/DL (ref 65–99)
HCT VFR BLD AUTO: 30.3 % (ref 37.5–51)
HGB BLD-MCNC: 8.9 G/DL (ref 13–17.7)
MCH RBC QN AUTO: 28.1 PG (ref 26.6–33)
MCHC RBC AUTO-ENTMCNC: 29.4 G/DL (ref 31.5–35.7)
MCV RBC AUTO: 95.6 FL (ref 79–97)
PLATELET # BLD AUTO: 177 10*3/MM3 (ref 140–450)
PMV BLD AUTO: 10.6 FL (ref 6–12)
POTASSIUM SERPL-SCNC: 4.6 MMOL/L (ref 3.5–5.2)
RBC # BLD AUTO: 3.17 10*6/MM3 (ref 4.14–5.8)
SODIUM SERPL-SCNC: 140 MMOL/L (ref 136–145)
WBC NRBC COR # BLD AUTO: 9.31 10*3/MM3 (ref 3.4–10.8)

## 2024-07-12 PROCEDURE — 25010000002 CEFTRIAXONE PER 250 MG: Performed by: INTERNAL MEDICINE

## 2024-07-12 PROCEDURE — 85027 COMPLETE CBC AUTOMATED: CPT | Performed by: INTERNAL MEDICINE

## 2024-07-12 PROCEDURE — 63710000001 INSULIN GLARGINE PER 5 UNITS: Performed by: INTERNAL MEDICINE

## 2024-07-12 PROCEDURE — 63710000001 INSULIN LISPRO (HUMAN) PER 5 UNITS: Performed by: INTERNAL MEDICINE

## 2024-07-12 PROCEDURE — 80048 BASIC METABOLIC PNL TOTAL CA: CPT | Performed by: INTERNAL MEDICINE

## 2024-07-12 PROCEDURE — 82948 REAGENT STRIP/BLOOD GLUCOSE: CPT

## 2024-07-12 PROCEDURE — 25010000002 HEPARIN (PORCINE) PER 1000 UNITS: Performed by: INTERNAL MEDICINE

## 2024-07-12 PROCEDURE — 99239 HOSP IP/OBS DSCHRG MGMT >30: CPT | Performed by: INTERNAL MEDICINE

## 2024-07-12 PROCEDURE — 5A1D70Z PERFORMANCE OF URINARY FILTRATION, INTERMITTENT, LESS THAN 6 HOURS PER DAY: ICD-10-PCS | Performed by: INTERNAL MEDICINE

## 2024-07-12 RX ORDER — LOPERAMIDE HYDROCHLORIDE 2 MG/1
4 CAPSULE ORAL 3 TIMES WEEKLY
Status: DISCONTINUED | OUTPATIENT
Start: 2024-07-12 | End: 2024-07-13 | Stop reason: HOSPADM

## 2024-07-12 RX ORDER — LEVOTHYROXINE SODIUM 0.07 MG/1
75 TABLET ORAL
Status: DISCONTINUED | OUTPATIENT
Start: 2024-07-12 | End: 2024-07-13 | Stop reason: HOSPADM

## 2024-07-12 RX ORDER — HEPARIN SODIUM 5000 [USP'U]/ML
5000 INJECTION, SOLUTION INTRAVENOUS; SUBCUTANEOUS EVERY 8 HOURS SCHEDULED
Status: DISCONTINUED | OUTPATIENT
Start: 2024-07-12 | End: 2024-07-13 | Stop reason: HOSPADM

## 2024-07-12 RX ORDER — ACETAMINOPHEN 325 MG/1
650 TABLET ORAL 3 TIMES WEEKLY
Status: DISCONTINUED | OUTPATIENT
Start: 2024-07-12 | End: 2024-07-13 | Stop reason: HOSPADM

## 2024-07-12 RX ORDER — PANTOPRAZOLE SODIUM 40 MG/1
40 TABLET, DELAYED RELEASE ORAL
Status: DISCONTINUED | OUTPATIENT
Start: 2024-07-12 | End: 2024-07-13 | Stop reason: HOSPADM

## 2024-07-12 RX ORDER — CEFDINIR 300 MG/1
CAPSULE ORAL
Qty: 3 CAPSULE | Refills: 0 | Status: SHIPPED | OUTPATIENT
Start: 2024-07-12

## 2024-07-12 RX ORDER — ERYTHROMYCIN 5 MG/G
1 OINTMENT OPHTHALMIC 2 TIMES DAILY
Status: DISCONTINUED | OUTPATIENT
Start: 2024-07-12 | End: 2024-07-13 | Stop reason: HOSPADM

## 2024-07-12 RX ORDER — ACETAMINOPHEN 325 MG/1
650 TABLET ORAL 3 TIMES DAILY
Status: DISCONTINUED | OUTPATIENT
Start: 2024-07-12 | End: 2024-07-13 | Stop reason: HOSPADM

## 2024-07-12 RX ORDER — AMMONIUM LACTATE 12 G/100G
1 CREAM TOPICAL NIGHTLY
Status: DISCONTINUED | OUTPATIENT
Start: 2024-07-12 | End: 2024-07-13 | Stop reason: HOSPADM

## 2024-07-12 RX ORDER — SEVELAMER CARBONATE 800 MG/1
2400 TABLET, FILM COATED ORAL
Status: DISCONTINUED | OUTPATIENT
Start: 2024-07-12 | End: 2024-07-13 | Stop reason: HOSPADM

## 2024-07-12 RX ORDER — EMOLLIENT COMBINATION NO.92
1 LOTION (ML) TOPICAL DAILY
Status: DISCONTINUED | OUTPATIENT
Start: 2024-07-12 | End: 2024-07-13 | Stop reason: HOSPADM

## 2024-07-12 RX ORDER — ONDANSETRON 4 MG/1
4 TABLET, ORALLY DISINTEGRATING ORAL EVERY 8 HOURS PRN
Status: DISCONTINUED | OUTPATIENT
Start: 2024-07-12 | End: 2024-07-13 | Stop reason: HOSPADM

## 2024-07-12 RX ORDER — CLOPIDOGREL BISULFATE 75 MG/1
75 TABLET ORAL DAILY
Status: DISCONTINUED | OUTPATIENT
Start: 2024-07-12 | End: 2024-07-13 | Stop reason: HOSPADM

## 2024-07-12 RX ORDER — FAMOTIDINE 20 MG/1
10 TABLET, FILM COATED ORAL DAILY
Status: DISCONTINUED | OUTPATIENT
Start: 2024-07-12 | End: 2024-07-13 | Stop reason: HOSPADM

## 2024-07-12 RX ORDER — MULTIPLE VITAMINS W/ MINERALS TAB 9MG-400MCG
1 TAB ORAL DAILY
Status: DISCONTINUED | OUTPATIENT
Start: 2024-07-12 | End: 2024-07-13 | Stop reason: HOSPADM

## 2024-07-12 RX ORDER — CEFDINIR 300 MG/1
300 CAPSULE ORAL DAILY
Qty: 5 CAPSULE | Refills: 0 | Status: SHIPPED | OUTPATIENT
Start: 2024-07-12 | End: 2024-07-12

## 2024-07-12 RX ORDER — GABAPENTIN 100 MG/1
200 CAPSULE ORAL NIGHTLY
Status: DISCONTINUED | OUTPATIENT
Start: 2024-07-12 | End: 2024-07-13 | Stop reason: HOSPADM

## 2024-07-12 RX ORDER — ATORVASTATIN CALCIUM 40 MG/1
40 TABLET, FILM COATED ORAL DAILY
Status: DISCONTINUED | OUTPATIENT
Start: 2024-07-12 | End: 2024-07-13 | Stop reason: HOSPADM

## 2024-07-12 RX ORDER — CETIRIZINE HYDROCHLORIDE 10 MG/1
5 TABLET ORAL DAILY
Status: DISCONTINUED | OUTPATIENT
Start: 2024-07-12 | End: 2024-07-13 | Stop reason: HOSPADM

## 2024-07-12 RX ORDER — METOPROLOL SUCCINATE 25 MG/1
12.5 TABLET, EXTENDED RELEASE ORAL NIGHTLY
Status: DISCONTINUED | OUTPATIENT
Start: 2024-07-12 | End: 2024-07-13 | Stop reason: HOSPADM

## 2024-07-12 RX ORDER — SUCRALFATE ORAL 1 G/10ML
1 SUSPENSION ORAL
Status: DISCONTINUED | OUTPATIENT
Start: 2024-07-12 | End: 2024-07-13 | Stop reason: HOSPADM

## 2024-07-12 RX ADMIN — INSULIN GLARGINE 13 UNITS: 100 INJECTION, SOLUTION SUBCUTANEOUS at 10:00

## 2024-07-12 RX ADMIN — ACETAMINOPHEN 650 MG: 325 TABLET ORAL at 17:48

## 2024-07-12 RX ADMIN — LEVOTHYROXINE SODIUM 75 MCG: 0.07 TABLET ORAL at 09:59

## 2024-07-12 RX ADMIN — SUCRALFATE 1 G: 1 SUSPENSION ORAL at 21:56

## 2024-07-12 RX ADMIN — SUCRALFATE 1 G: 1 SUSPENSION ORAL at 17:28

## 2024-07-12 RX ADMIN — PANTOPRAZOLE SODIUM 40 MG: 40 TABLET, DELAYED RELEASE ORAL at 10:00

## 2024-07-12 RX ADMIN — GABAPENTIN 200 MG: 100 CAPSULE ORAL at 21:57

## 2024-07-12 RX ADMIN — CLOPIDOGREL BISULFATE 75 MG: 75 TABLET, FILM COATED ORAL at 09:59

## 2024-07-12 RX ADMIN — ATORVASTATIN CALCIUM 40 MG: 40 TABLET, FILM COATED ORAL at 10:00

## 2024-07-12 RX ADMIN — ERYTHROMYCIN 1 APPLICATION: 5 OINTMENT OPHTHALMIC at 10:01

## 2024-07-12 RX ADMIN — HEPARIN SODIUM 5000 UNITS: 5000 INJECTION INTRAVENOUS; SUBCUTANEOUS at 09:56

## 2024-07-12 RX ADMIN — ERYTHROMYCIN 1 APPLICATION: 5 OINTMENT OPHTHALMIC at 21:56

## 2024-07-12 RX ADMIN — ACETAMINOPHEN 650 MG: 325 TABLET ORAL at 09:57

## 2024-07-12 RX ADMIN — INSULIN LISPRO 2 UNITS: 100 INJECTION, SOLUTION INTRAVENOUS; SUBCUTANEOUS at 20:05

## 2024-07-12 RX ADMIN — SUCRALFATE 1 G: 1 SUSPENSION ORAL at 10:13

## 2024-07-12 RX ADMIN — HEPARIN SODIUM 5000 UNITS: 5000 INJECTION INTRAVENOUS; SUBCUTANEOUS at 21:57

## 2024-07-12 RX ADMIN — METOPROLOL SUCCINATE 12.5 MG: 25 TABLET, EXTENDED RELEASE ORAL at 21:57

## 2024-07-12 RX ADMIN — Medication 1 TABLET: at 10:00

## 2024-07-12 RX ADMIN — LOPERAMIDE HYDROCHLORIDE 4 MG: 2 CAPSULE ORAL at 10:00

## 2024-07-12 RX ADMIN — INSULIN GLARGINE 13 UNITS: 100 INJECTION, SOLUTION SUBCUTANEOUS at 21:56

## 2024-07-12 RX ADMIN — ACETAMINOPHEN 650 MG: 325 TABLET ORAL at 21:56

## 2024-07-12 RX ADMIN — AMMONIUM LACTATE 1 APPLICATION: 120 CREAM TOPICAL at 21:57

## 2024-07-12 RX ADMIN — Medication 1 APPLICATION: at 10:12

## 2024-07-12 RX ADMIN — HEPARIN SODIUM 5000 UNITS: 5000 INJECTION INTRAVENOUS; SUBCUTANEOUS at 14:01

## 2024-07-12 RX ADMIN — CETIRIZINE HYDROCHLORIDE 5 MG: 10 TABLET, FILM COATED ORAL at 09:57

## 2024-07-12 RX ADMIN — ACETAMINOPHEN 650 MG: 325 TABLET ORAL at 14:01

## 2024-07-12 RX ADMIN — PANTOPRAZOLE SODIUM 40 MG: 40 TABLET, DELAYED RELEASE ORAL at 17:44

## 2024-07-12 RX ADMIN — Medication 10 ML: at 10:01

## 2024-07-12 RX ADMIN — INSULIN LISPRO 2 UNITS: 100 INJECTION, SOLUTION INTRAVENOUS; SUBCUTANEOUS at 12:19

## 2024-07-12 RX ADMIN — FAMOTIDINE 10 MG: 20 TABLET, FILM COATED ORAL at 09:58

## 2024-07-12 RX ADMIN — SEVELAMER CARBONATE 2400 MG: 800 TABLET, FILM COATED ORAL at 17:44

## 2024-07-12 RX ADMIN — SEVELAMER CARBONATE 2400 MG: 800 TABLET, FILM COATED ORAL at 09:58

## 2024-07-12 RX ADMIN — CEFTRIAXONE 1000 MG: 1 INJECTION, POWDER, FOR SOLUTION INTRAMUSCULAR; INTRAVENOUS at 12:10

## 2024-07-12 RX ADMIN — SERTRALINE 50 MG: 50 TABLET, FILM COATED ORAL at 21:57

## 2024-07-12 NOTE — PLAN OF CARE
Pt has been resting in bed this shift. No s/s of acute distress noted at this time. Plan of care ongoing at this time.

## 2024-07-12 NOTE — PLAN OF CARE
Goal Outcome Evaluation:      Pt resting in bed. Alert oriented and in no distress. Dialysis performed today removed 2 L. IV and telemetry removed Will be discharged back to [facility]. Waiting for EMS to transport

## 2024-07-12 NOTE — DISCHARGE SUMMARY
[facility] HOSPITALIST MEDICINE DISCHARGE SUMMARY    Patient Identification:  Name:  Kennedy Prescott  Age:  73 y.o.  Sex:  male  :  1950  MRN:  [id number]  Visit Number:  [id number]    Date of Admission: 7/10/2024  Date of Discharge: 2024    PCP: Jag Guillaume MD    DISCHARGE DIAGNOSIS   Acute metabolic versus toxic encephalopathy  Acute cystitis  Type 2 diabetes mellitus  Hyperlipidemia  Hypothyroidism  Essential hypertension  Chronic HFpEF      CONSULTS  None      PROCEDURES PERFORMED   None      HOSPITAL COURSE  Mr. Prescott is a 73 y.o. male who presented to [facility] ED on 7/10/2024 with a chief complaint of altered mental status.  Patient has a past medical history markable for type 2 diabetes mellitus, ESRD on HD, essential hypertension, GERD, CAD, chronic HFpEF and hypothyroidism.  Patient is a resident of a local nursing home and was brought to the emergency department secondary to altered mental status.  Patient reportedly received Norco prior to going to dialysis on date of admission and since that time was unable to answer any questions appropriately.  However, upon initial exam by admitting hospitalist, patient was alert and oriented to person, place and time.  Initial evaluation in the emergency department did consist of basic laboratory work as well as physical exam and vital signs.  Please see initial H&P for specific details.  After thorough evaluation, patient was diagnosed with suspected acute cystitis and acute metabolic versus toxic encephalopathy and admitted for further treatment and evaluation.    In regards to acute cystitis, patient was started on empiric antibiotic therapy with Rocephin 1 g IV every 24 hours.  Urine culture was obtained which demonstrates no growth to date.  Patient will be transition to Omnicef 300 mg 1 tab daily for the next 5 days to complete a 7-day course of antibiotic therapy for suspected complicated acute cystitis.  In  regards to altered mental status, patient was alert and oriented to person, place and time at every interaction I had with him.  He does appear back to his baseline functioning status.  Etiology of encephalopathy not quite clear but may be medication related versus underlying infection related.  Nevertheless, with mental status returning to normal, it is felt patient has reached maximum medical benefit of current hospitalization and he will be discharged back to his nursing home in stable condition today.  The beforementioned plan was thoroughly discussed with the patient and he expressed his understanding and willingness to proceed with the beforementioned plan.    VITAL SIGNS:      07/10/24  2314 07/11/24  0500 07/12/24  0517   Weight: 118 kg (259 lb 4.8 oz) (New admit within 24 hours) 97.8 kg (215 lb 8 oz) (RN Halina aware of weight loss)     Body mass index is 30.07 kg/m².    PHYSICAL EXAM:  Constitutional: Chronically ill-appearing  male in no apparent distress.     HENT:  Head:  Normocephalic and atraumatic.  Mouth:  Moist mucous membranes.    Eyes:  Conjunctivae and EOM are normal.  Pupils are equal, round, and reactive to light.  No scleral icterus.    Neck:  Neck supple. No thyromegaly.  No JVD present.    Cardiovascular:  Regular rate and rhythm with no murmurs, rubs, clicks or gallops appreciated.  Pulmonary/Chest:  Clear to auscultation bilaterally with no crackles, wheezes or rhonchi appreciated.  Abdominal:  Soft. Nontender. Nondistended  Bowel sounds are normal in all four quadrants. No organomegally appreciated.   Musculoskeletal:  No edema, no tenderness, and no deformity.  No red or swollen joints anywhere.    Neurological:  Alert, Cranial nerves II-XII intact with no focal deficits.  No facial droop.  No slurred speech.   Skin:  Warm and dry to palpation with no rashes or lesions appreciated.  Peripheral vascular:  2+ radial and pedal pulses in bilateral upper and lower  extremities.  Psychiatric:  Alert and oriented x3, demonstrates appropriate judgment and insight.    DISCHARGE DISPOSITION   Stable    DISCHARGE MEDICATIONS:     Discharge Medications        New Medications        Instructions Start Date   cefdinir 300 MG capsule  Commonly known as: OMNICEF   300 mg, Oral, Daily             Continue These Medications        Instructions Start Date   acetaminophen 325 MG tablet  Commonly known as: TYLENOL   650 mg, Oral, 3 Times Weekly      acetaminophen 325 MG tablet  Commonly known as: TYLENOL   650 mg, Oral, 3 times daily      acetaminophen 500 MG tablet  Commonly known as: TYLENOL   500 mg, Oral, Every 4 Hours PRN      ammonium lactate 12 % cream  Commonly known as: AMLACTIN   1 Application, Topical, Every Night at Bedtime      atorvastatin 40 MG tablet  Commonly known as: LIPITOR   40 mg, Oral, Daily      BABY SHAMPOO EX   1 Application, Apply externally, 2 Times Daily      benzocaine 20 % gel  Commonly known as: HURRICAINE/ORAJEL   1 Application, Mouth/Throat, Every 2 Hours PRN      bisacodyl 10 MG suppository  Commonly known as: DULCOLAX   10 mg, Rectal, Daily PRN      cetaphil lotion   1 Application, Topical, Daily      clopidogrel 75 MG tablet  Commonly known as: PLAVIX   75 mg, Oral, Daily      erythromycin 5 MG/GM ophthalmic ointment  Commonly known as: ROMYCIN   1 Application, Left Eye, 2 Times Daily      famotidine 20 MG tablet  Commonly known as: PEPCID   20 mg, Oral, 2 Times Daily      gabapentin 100 MG capsule  Commonly known as: NEURONTIN   200 mg, Oral, Every Night at Bedtime      HYDROcodone-acetaminophen 5-325 MG per tablet  Commonly known as: NORCO   1 tablet, Oral, Every 12 Hours PRN      Insulin Aspart 100 UNIT/ML injection  Commonly known as: novoLOG   0-8 Units, Subcutaneous, 3 Times Daily Before Meals, Inject as per sliding scale: If 0-59 = 0; 201-250 = 2 units;  251-300= 4 units;  301-350 = 6 units; 351-400 = 8 units; 401-1000 = 8 units AND notify MD       Jardiance 10 MG tablet tablet  Generic drug: empagliflozin   10 mg, Oral, Daily      Jayant Nutrivigor pack   1 packet, Oral, 2 Times Daily      lactulose 10 GM/15ML solution  Commonly known as: CHRONULAC   20 g, Oral, Daily PRN      Levemir FlexPen 100 UNIT/ML injection  Generic drug: insulin detemir   13 Units, Subcutaneous, 2 Times Daily      levocetirizine 5 MG tablet  Commonly known as: XYZAL   5 mg, Oral, Daily      levothyroxine 75 MCG tablet  Commonly known as: SYNTHROID, LEVOTHROID   75 mcg, Oral, Daily      Lidocaine Viscous HCl 2 % solution  Commonly known as: XYLOCAINE   5 mL, Oral, Every 6 Hours PRN      loperamide 2 MG tablet  Commonly known as: IMODIUM A-D   4 mg, Oral, 3 Times Weekly      metoprolol succinate XL 25 MG 24 hr tablet  Commonly known as: TOPROL-XL   12.5 mg, Oral, Every Night at Bedtime      MiraLax 17 GM/SCOOP powder  Generic drug: polyethylene glycol   17 g, Oral, Every 12 Hours PRN      multivitamin with minerals tablet tablet   1 tablet, Oral, Daily      nitroglycerin 0.4 MG SL tablet  Commonly known as: NITROSTAT   0.4 mg, Sublingual, Every 5 Minutes PRN, Take no more than 3 doses in 15 minutes.      ondansetron 4 MG tablet  Commonly known as: ZOFRAN   4 mg, Oral, Every 8 Hours PRN      pantoprazole 40 MG EC tablet  Commonly known as: PROTONIX   40 mg, Oral, 2 Times Daily      sertraline 50 MG tablet  Commonly known as: ZOLOFT   50 mg, Oral, Every Night at Bedtime      sevelamer 800 MG tablet  Commonly known as: RENVELA   2,400 mg, Oral, 3 Times Daily With Meals      sucralfate 1 GM/10ML suspension  Commonly known as: CARAFATE   1 g, Oral, 4 Times Daily             Stop These Medications      cefuroxime 500 MG tablet  Commonly known as: CEFTIN                    Additional Instructions for the Follow-ups that You Need to Schedule       Discharge Follow-up with PCP   As directed       Currently Documented PCP:    Jag Guillaume MD    PCP Phone Number:    [phone]     Follow  Up Details: Please follow-up with PCP in 1 week               Follow-up Information       Jag Guillaume MD .    Specialty: Internal Medicine  Why: Please follow-up with PCP in 1 week  Contact information:  [address] [place] [address] [phone]                             TEST  RESULTS PENDING AT DISCHARGE  Pending Labs       Order Current Status    CANDIDA AURIS PCR - Swab, Axilla Right, Axilla Left and Groin In process    Blood Culture - Blood, Arm, Right Preliminary result    Blood Culture - Blood, Hand, Right Preliminary result    Urine Culture - Urine, Urine, Clean Catch Preliminary result             Aleksey Storm DO  07/12/24  11:22 EDT    Please note that this discharge summary required more than 30 minutes to complete.    Please send a copy of this dictation to the following providers:  Jag Guillaume MD

## 2024-07-12 NOTE — PROGRESS NOTES
"Nephrology Progress Note      Subjective     Patient is known to have ESRD on intermittent dialysis, has been admitted with acute metabolic encephalopathy.  Patient is on Monday Wednesday Friday dialysis    Objective       Vital signs :     Temp:  [97.3 °F (36.3 °C)-99.2 °F (37.3 °C)] 98.5 °F (36.9 °C)  Heart Rate:  [87-98] 98  Resp:  [18] 18  BP: (113-155)/(56-79) 155/79    Intake/Output                         07/10/24 0701 - 07/11/24 0700 07/11/24 0701 - 07/12/24 0700     [id number] 0122-2158 Total 9353-3124 8150-1407 Total                 Intake    P.O.  --  -- --  320  -- 320    IV Piggyback  --  100 100  --  -- --    Total Intake -- 100 100 320 -- 320       Output    Urine  --  -- --  --  175 175    Total Output -- -- -- -- 175 175             Physical Exam:    General Appearance : Not in acute distress  Lungs : clear to auscultation, respirations regular  Heart :  regular rhythm & normal rate, normal S1, S2 and no murmur, no rub  Abdomen : Soft, nondistended  Extremities : Trace edema  Neurologic :   Unable to assess    Laboratory Data :     Albumin Albumin   Date Value Ref Range Status   07/11/2024 3.0 (L) 3.5 - 5.2 g/dL Final   07/10/2024 3.4 (L) 3.5 - 5.2 g/dL Final   07/09/2024 3.3 (L) 3.5 - 5.2 g/dL Final      Magnesium No results found for: \"MG\"       PTH               No results found for: \"PTH\"    CBC and coagulation:  Results from last 7 days   Lab Units 07/12/24  0046 07/11/24  0031 07/10/24  1702 07/10/24  0859 07/09/24  1342   LACTATE mmol/L  --   --  1.3  --  1.4   WBC 10*3/mm3 9.31 10.79 8.65   < > 8.49   HEMOGLOBIN g/dL 8.9* 10.2* 10.1*   < > 9.8*   HEMATOCRIT % 30.3* 34.5* 33.6*   < > 32.5*   MCV fL 95.6 96.1 94.9   < > 94.8   MCHC g/dL 29.4* 29.6* 30.1*   < > 30.2*   PLATELETS 10*3/mm3 177 177 190   < > 178   INR   --   --  1.86*  --   --     < > = values in this interval not displayed.     Acid/base balance:  Results from last 7 days   Lab Units 07/10/24  1914   PH, ARTERIAL pH units 7.471* "   PO2 ART mm Hg 54.2*   PCO2, ARTERIAL mm Hg 39.7   HCO3 ART mmol/L 28.9*     Renal and electrolytes:    Results from last 7 days   Lab Units 07/12/24  0046 07/11/24  0031 07/10/24  1702 07/09/24  1342   SODIUM mmol/L 140 141 140 142   POTASSIUM mmol/L 4.6 3.7 3.4* 4.4   CHLORIDE mmol/L 102 99 98 99   CO2 mmol/L 29.0 24.6 31.0* 29.0   BUN mg/dL 34* 21 17 37*   CREATININE mg/dL 4.94* 3.35* 2.70* 4.60*   CALCIUM mg/dL 8.8 9.2 9.2 9.3     Estimated Creatinine Clearance: 15.4 mL/min (A) (by C-G formula based on SCr of 4.94 mg/dL (H)).  @GFRCG:3@   Liver and pancreatic function:  Results from last 7 days   Lab Units 07/11/24  0031 07/10/24  1702 07/09/24  1342   ALBUMIN g/dL 3.0* 3.4* 3.3*   BILIRUBIN mg/dL 0.6 0.6 0.7   ALK PHOS U/L 163* 168* 163*   AST (SGOT) U/L 21 19 21   ALT (SGPT) U/L 14 17 16   AMMONIA umol/L  --  14*  --    LIPASE U/L  --   --  14         Cardiac:  Results from last 7 days   Lab Units 07/10/24  1702   PROBNP pg/mL >70,000.0*     Liver and pancreatic function:  Results from last 7 days   Lab Units 07/11/24  0031 07/10/24  1702 07/09/24  1342   ALBUMIN g/dL 3.0* 3.4* 3.3*   BILIRUBIN mg/dL 0.6 0.6 0.7   ALK PHOS U/L 163* 168* 163*   AST (SGOT) U/L 21 19 21   ALT (SGPT) U/L 14 17 16   AMMONIA umol/L  --  14*  --    LIPASE U/L  --   --  14       Medications :     acetaminophen, 650 mg, Oral, Once per day on Monday Wednesday Friday  acetaminophen, 650 mg, Oral, TID  ammonium lactate, 1 Application, Topical, Nightly  atorvastatin, 40 mg, Oral, Daily  cefTRIAXone, 1,000 mg, Intravenous, Q24H  cetirizine, 5 mg, Oral, Daily  clopidogrel, 75 mg, Oral, Daily  erythromycin, 1 Application, Left Eye, BID  famotidine, 10 mg, Oral, Daily  gabapentin, 200 mg, Oral, Nightly  heparin (porcine), 5,000 Units, Subcutaneous, Q8H  insulin glargine, 13 Units, Subcutaneous, Q12H  insulin lispro, 2-7 Units, Subcutaneous, 4x Daily AC & at Bedtime  levothyroxine, 75 mcg, Oral, QAM AC  loperamide, 4 mg, Oral, Once per day on  Monday Wednesday Friday  lubrisoft, 1 Application, Topical, Daily  metoprolol succinate XL, 12.5 mg, Oral, Nightly  multivitamin with minerals, 1 tablet, Oral, Daily  pantoprazole, 40 mg, Oral, BID AC  sertraline, 50 mg, Oral, Nightly  sevelamer, 2,400 mg, Oral, TID With Meals  sodium chloride, 10 mL, Intravenous, Q12H  sucralfate, 1 g, Oral, 4x Daily AC & at Bedtime             Assessment & Plan     -ESRD on intermittent dialysis  - Anemia of chronic kidney disease  - Secondary hyperparathyroidism with hyperphosphatemia  - Acute metabolic encephalopathy  - Diabetic ulcer left heel  - UTI    Dialysis today with ultrafiltration 2 L  Will check iron stores and to replace IV iron  For hyperphosphatemia due to secondary hyperparathyroidism some elevated 2400 mg 3 times daily with meals    Reviewed clinical notes, reviewed labs, reviewed radiological data.  Discussed the case in detail with primary team      Nova Saucedo MD  07/12/24  08:03 EDT

## 2024-07-12 NOTE — CASE MANAGEMENT/SOCIAL WORK
Discharge Planning Assessment   E[place]     Patient Name: Kennedy Prescott  MRN: [id number]  Today's Date: 7/12/2024    Admit Date: 7/10/2024    Plan: SS received a consult for d/c planning. SS contacted [facility] per Irma on this date who states pt is a longterm residnt. Pt does not have a bed hold. Per Irma, they will accept pt back at discharge. SS to follow and assist with discharge planning.     Discharge Plan       Row Name 07/12/24 1352       Plan    Final Discharge Disposition Code 03 - skilled nursing facility (SNF)    Final Note Pt is being discharged to [facility] on this date.  SS contacted [facility] per Irma who pulled dc oders. SS provided RN with number to call report. PCS form filled out. EMS transportation to be arranged. SS contacted pt's son Joaquina on this date. No other needs identified at this time.        Continued Care and Services - Admitted Since 7/10/2024       Destination       Service Provider Request Status Selected Services Address Phone Fax Patient Preferred    [facility] Pending - Request Sent N/A [address] [place] [address] [phone] --          Expected Discharge Date and Time       Expected Discharge Date Expected Discharge Time    Jul 12, 2024          MONA Jordan

## 2024-07-12 NOTE — NURSING NOTE
Report called to SENAIT Johnston at [facility]. Will call after dialysis to let know when EMS has been called.

## 2024-07-12 NOTE — DISCHARGE PLACEMENT REQUEST
"Satinder Holman (73 y.o. Male)       Date of Birth   1950    Social Security Number       Address   [address] [facility] [address]    Home Phone   [phone]    MRN   [id number]       [employer]   None    Marital Status   Unknown                            Admission Date   7/10/24    Admission Type   Emergency    Admitting Provider   Tiffany Galvez DO    Attending Provider   Aleksey Storm DO    Department, Room/Bed   [address], [address]/       Discharge Date       Discharge Disposition   Home or Self Care    Discharge Destination                                 Attending Provider: Aleksey Storm DO    Allergies: No Known Allergies    Isolation: Contact   Infection: MDR Pseudomonas (05/17/23), Candida Auris (rule out) (07/10/24)   Code Status: CPR    Ht: 180.3 cm (70.98\")   Wt: 97.8 kg (215 lb 8 oz)    Admission Cmt: None   Principal Problem: AMS (altered mental status) [R41.82]                   Active Insurance as of 7/10/2024       Primary Coverage       Payor Plan Insurance Group Employer/Plan Group    MEDICARE MEDICARE A & B        Payor Plan Address Payor Plan Phone Number Payor Plan Fax Number Effective Dates    [address] [phone]  12/1/1996 - None Entered    [facility] [address]         Subscriber Name Subscriber Birth Date Member ID       SATINDER HOLMAN 1950 [id number]               Secondary Coverage       Payor Plan Insurance Group Employer/Plan Group    [place]        Payor Plan Address Payor Plan Phone Number Payor Plan Fax Number Effective Dates    [address] [phone]  5/2/2024 - None Entered    [place] [address]         Subscriber Name Subscriber Birth Date Member ID       SATINDER HOLMAN 1950 [id number]                     Emergency Contacts        (Rel.) Home Phone Work Phone Mobile Phone    KOBY HOLMAN (Son) [phone] -- [phone]              Insurance " Information                  MEDICARE/MEDICARE A & B Phone: [phone]    Subscriber: Kennedy Prescott Subscriber#: [id number]    Group#: -- Precert#: --        [place] Phone: [phone]    Subscriber: Kennedy Prescott Subscriber#: [id number]    Group#: -- Precert#: --             History & Physical        Tobias Mccrary APRN at 07/10/24 2153       Attestation signed by Tiffany Galvez DO at [phone]    I have reviewed this documentation and agree.  Patient briefly seen and examined at bedside.  Patient was sleeping but easily awakens.  Patient is currently alert and oriented x 3 and is in no acute distress.    And no significant pertinent physical exam findings, the patient has a fistula in the left upper extremity with palpable thrill.  The patient's heart is a regular rate with occasional extrasystole.  Reviewed patient's telemetry and patient is noted to have some occasional PVCs.      Suspect patient has some acute metabolic encephalopathy that is either toxic induced or infectious (I suspect the former) and possibly medication induced (opiates).  The ED obtained a catheterized UA via straight catheter and patient noted to have large leukocytes but negative nitrites with 3+ bacteria and too numerous to count WBCs.  Given that patient has been on broad-spectrum IV antibiotics essentially since April/May of this year for bacteremia, I will await urine culture results prior to initiating antibiotic therapy.  The patient did receive a dose of IV vancomycin and Zosyn in the ED.  Will continue with serial neurochecks for now.  If patient does not discharge back to the nursing facility today, then will need to consult nephrology for in-house hemodialysis tomorrow.                     [facility] Medicine Services  HISTORY & PHYSICAL    Patient Identification:  Name:  Kennedy Prescott  Age:  73 y.o.  Sex:  male  :  1950  MRN:  [id number]   Visit Number:   [id number]  Admit Date: 7/10/2024   Primary Care Physician:  Jag Guillaume MD     Subjective     Chief complaint:   Chief Complaint   Patient presents with    Altered Mental Status     History of presenting illness:   Patient is a 73 y.o. male with past medical history significant for diabetes, ESRD, hypertension, GERD, CAD, CHF, and hypothyroidism that presented to the [facility] emergency department for evaluation of altered mental status.  The patient was sent by [facility] for evaluation of altered mental status.  ED record indicates that the patient had dialysis at the nursing home today.  It was also reported that patient had Norco administered in conjunction with the dialysis treatment.  Patient was unable to answer any questions appropriately on arrival to the emergency department.  On my physical examination, the patient is now alert and oriented x 3.  No acute distress noted.  Patient is slow to respond to my questions, however he answers all questions appropriately.  Patient was unable to tell me the dosages of his pain medication or if he had been prescribed pain medication for any significant amount of time.  He advises that he thought he was taking Tylenol every day at the nursing home.  The patient did receive a one-time dose of vancomycin and Zosyn in the emergency department.  Urinalysis reveals urinary tract infection.  Will follow all cultures.  Patient had extended hospitalization for septic shock in May.  The patient was also admitted to [facility] in June for GI bleed.  The patient is in stable condition upon admission to the hospital.Upon arrival to the ED, vitals were temperature 97.6, /80, heart rate 102, respirations 16, and SpO2 99% on 2 L.Labs obtained on arrival: Initial troponin 126, reflex troponin 143 with a delta of 17.  proBNP greater than 70,000, potassium 3.4, CO2 31.0, creatinine 2.70, GFR 23.8, alk phos 168, albumin 3.4, ammonia 14, PT 21.5,  INR 1.6, PTT 34.8, hemoglobin 10.1, and hematocrit 33.6. Patient has been admitted to the telemetry unit for further evaluation and treatment.     Present during exam: N/A  ---------------------------------------------------------------------------------------------------------------------   Review of Systems   Constitutional: Negative.  Negative for chills, fatigue and fever.   HENT: Negative.  Negative for congestion, sore throat and trouble swallowing.    Eyes: Negative.    Respiratory: Negative.  Negative for cough, shortness of breath and wheezing.    Cardiovascular: Negative.  Negative for chest pain, palpitations and leg swelling.   Gastrointestinal: Negative.  Negative for abdominal pain, diarrhea, nausea and vomiting.   Endocrine: Negative.    Genitourinary: Negative.  Negative for dysuria.   Musculoskeletal: Negative.  Negative for neck pain and neck stiffness.   Skin: Negative.    Allergic/Immunologic: Negative.    Neurological: Negative.  Negative for dizziness, tremors, seizures, syncope, facial asymmetry, speech difficulty, weakness, light-headedness, numbness and headaches.   Hematological: Negative.    Psychiatric/Behavioral: Negative.         Otherwise 10-system ROS reviewed and is negative except as mentioned in the HPI.    ---------------------------------------------------------------------------------------------------------------------   Past Medical History:   Diagnosis Date    CHF (congestive heart failure)     Coronary artery disease     Diabetes mellitus     Dialysis patient     Disease of thyroid gland     Elevated cholesterol     GERD (gastroesophageal reflux disease)     Hypertension     Myocardial infarction     Renal disorder     stage 5     Past Surgical History:   Procedure Laterality Date    CARDIAC CATHETERIZATION N/A 5/28/2024    Procedure: Left Heart Cath;  Surgeon: Mackenzie Ruiz MD;  Location: [facility] CATH INVASIVE LOCATION;  Service: Cardiology;  Laterality: N/A;    CARDIAC  SURGERY      cabg    CHOLECYSTECTOMY      COLONOSCOPY N/A 9/22/2023    Procedure: COLONOSCOPY;  Surgeon: Trip Peter MD;  Location: [facility];  Service: Gastroenterology;  Laterality: N/A;    DIALYSIS FISTULA CREATION Left     arm     Family History   Problem Relation Age of Onset    Arthritis Mother     COPD Father     Diabetes Father     Heart disease Father     Hyperlipidemia Father     Hypertension Father     Cancer Daughter      Social History     Socioeconomic History    Marital status: Unknown   Tobacco Use    Smoking status: Never    Smokeless tobacco: Never   Vaping Use    Vaping status: Never Used   Substance and Sexual Activity    Alcohol use: Never    Drug use: Never    Sexual activity: Defer     ---------------------------------------------------------------------------------------------------------------------   Allergies:  Patient has no known allergies.  ---------------------------------------------------------------------------------------------------------------------   Medications below are reported home medications pulling from within the system; at this time, these medications have not been reconciled unless otherwise specified and are in the verification process for further verifcation as current home medications.    Prior to Admission Medications       Prescriptions Last Dose Informant Patient Reported? Taking?    acetaminophen (TYLENOL) 325 MG tablet  Nursing Home Yes No    Take 2 tablets by mouth 3 (Three) Times a Week.    acetaminophen (TYLENOL) 500 MG tablet  Nursing Home Yes No    Take 1 tablet by mouth Every 4 (Four) Hours As Needed for Mild Pain.    ammonium lactate (AMLACTIN) 12 % cream  Nursing Home Yes No    Apply 1 g topically to the appropriate area as directed Every Night. Apply to dry skin on feet    apixaban (ELIQUIS) 5 MG tablet tablet   No No    Take 1 tablet by mouth Every 12 (Twelve) Hours. Indications: Atrial Fibrillation    aspirin 81 MG EC tablet   No No    Take 1  tablet by mouth Daily. This medication is to stop the aspirin after 30 days and then he is to take the Plavix and Eliquis afterwards together.    atorvastatin (LIPITOR) 40 MG tablet   No No    Take 1 tablet by mouth Daily.    bisacodyl (Dulcolax) 10 MG suppository  Nursing Home Yes No    Insert 1 suppository into the rectum Daily As Needed for Constipation.    cefuroxime (CEFTIN) 500 MG tablet   No No    Take 1 tablet by mouth 2 (Two) Times a Day.    cetaphil (CETAPHIL) lotion  Nursing Home Yes No    Apply 1 Application topically to the appropriate area as directed Daily.    clopidogrel (PLAVIX) 75 MG tablet   No No    Take 1 tablet by mouth Daily.    empagliflozin (JARDIANCE) 10 MG tablet tablet   No No    Take 1 tablet by mouth Daily.    erythromycin (ROMYCIN) 5 MG/GM ophthalmic ointment  Nursing Home Yes No    Administer 1 Application into the left eye 2 (Two) Times a Day.    famotidine (PEPCID) 20 MG tablet   No No    Take 0.5 tablets by mouth Daily.    gabapentin (NEURONTIN) 100 MG capsule   No No    Take 2 capsules by mouth Every Night for 3 days.    insulin aspart (novoLOG FLEXPEN) 100 UNIT/ML solution pen-injector sc pen  Nursing Home Yes No    Inject 2-6 Units under the skin into the appropriate area as directed 4 (Four) Times a Day Before Meals & at Bedtime.    Insulin Aspart (novoLOG) 100 UNIT/ML injection   No No    Inject 3 Units under the skin into the appropriate area as directed 3 (Three) Times a Day Before Meals.    insulin detemir (Levemir FlexPen) 100 UNIT/ML injection   No No    Inject 13 Units under the skin into the appropriate area as directed 2 (Two) Times a Day.    lactulose (CHRONULAC) 10 GM/15ML solution  Nursing Home Yes No    Take 30 mL by mouth Daily As Needed (constipation).    levocetirizine (XYZAL) 5 MG tablet  Nursing Home Yes No    Take 1 tablet by mouth Daily.    levothyroxine (SYNTHROID, LEVOTHROID) 75 MCG tablet  Nursing Home Yes No    Take 1 tablet by mouth Daily.    Lidocaine  Viscous HCl (XYLOCAINE) 2 % solution   No No    Take 5 mL by mouth Every 6 (Six) Hours As Needed for Mild Pain.    megestrol (MEGACE) 40 MG tablet   No No    Take 1 tablet by mouth 3 (Three) Times a Day With Meals.    metoprolol succinate XL (TOPROL-XL) 25 MG 24 hr tablet  Nursing Home Yes No    Take 0.5 tablets by mouth Every Night.    multivitamin with minerals tablet tablet  Nursing Home Yes No    Take 1 tablet by mouth Daily.    nitroglycerin (NITROSTAT) 0.4 MG SL tablet   No No    Place 1 tablet under the tongue Every 5 (Five) Minutes As Needed for Chest Pain (Only if SBP Greater Than 100). Take no more than 3 doses in 15 minutes.    ondansetron (ZOFRAN) 4 MG tablet  Nursing Home Yes No    Take 1 tablet by mouth Every 8 (Eight) Hours As Needed for Nausea or Vomiting.    polyethylene glycol (MIRALAX) 17 GM/SCOOP powder  Nursing Home Yes No    Take 17 g by mouth 2 (Two) Times a Day As Needed (constipation).    sertraline (ZOLOFT) 50 MG tablet  Nursing Home Yes No    Take 1 tablet by mouth Every Night.    sevelamer (RENAGEL) 800 MG tablet  Nursing Home Yes No    Take 3 tablets by mouth 3 (Three) Times a Day With Meals.          ---------------------------------------------------------------------------------------------------------------------    Objective     Hospital Scheduled Meds:  vancomycin, 2,000 mg, Intravenous, Once      Pharmacy Consult,         Current listed hospital scheduled medications may not yet reflect those currently placed in orders that are signed and held, awaiting patient's arrival to floor/unit.    ---------------------------------------------------------------------------------------------------------------------   Vital Signs:  Temp:  [97.6 °F (36.4 °C)] 97.6 °F (36.4 °C)  Heart Rate:  [] 102  Resp:  [16] 16  BP: (103-162)/(59-95) 137/80  Mean Arterial Pressure (Non-Invasive) for the past 24 hrs (Last 3 readings):   Noninvasive MAP (mmHg)   07/10/24 2200 101   07/10/24 2145 97    07/10/24 2130 89     SpO2 Percentage    07/10/24 2130 07/10/24 2145 07/10/24 2200   SpO2: 100% 100% 99%     SpO2:  [89 %-100 %] 99 %  on  Flow (L/min):  [2] 2;   Device (Oxygen Therapy): nasal cannula    Body mass index is 30.7 kg/m².  Wt Readings from Last 3 Encounters:   07/10/24 99.8 kg (220 lb 0.3 oz)   07/09/24 99.8 kg (220 lb 0.3 oz)   06/01/24 99.8 kg (220 lb)       ---------------------------------------------------------------------------------------------------------------------   Physical Exam  Vitals and nursing note reviewed.   Constitutional:       General: He is awake. He is not in acute distress.     Appearance: Normal appearance. He is normal weight. He is not ill-appearing or diaphoretic.   HENT:      Head: Normocephalic and atraumatic.      Mouth/Throat:      Mouth: Mucous membranes are moist.      Pharynx: Oropharynx is clear.   Eyes:      Extraocular Movements: Extraocular movements intact.      Pupils: Pupils are equal, round, and reactive to light.   Cardiovascular:      Rate and Rhythm: Normal rate and regular rhythm.      Pulses: Normal pulses.           Dorsalis pedis pulses are 2+ on the right side and 2+ on the left side.      Heart sounds: Normal heart sounds. No murmur heard.     No friction rub.   Pulmonary:      Effort: Pulmonary effort is normal. No accessory muscle usage, respiratory distress or retractions.      Breath sounds: Normal breath sounds. No wheezing, rhonchi or rales.   Abdominal:      General: Bowel sounds are normal. There is no distension.      Palpations: Abdomen is soft.      Tenderness: There is no abdominal tenderness. There is no guarding.   Musculoskeletal:         General: Normal range of motion.      Cervical back: Normal range of motion and neck supple. No rigidity.      Right lower leg: No edema.      Left lower leg: No edema.   Skin:     General: Skin is warm and dry.      Capillary Refill: Capillary refill takes 2 to 3 seconds.   Neurological:       General: No focal deficit present.      Mental Status: He is alert and oriented to person, place, and time. Mental status is at baseline.      Cranial Nerves: No dysarthria or facial asymmetry.      Sensory: Sensation is intact.      Motor: No weakness or tremor.   Psychiatric:         Attention and Perception: Attention normal.         Mood and Affect: Mood normal.         Speech: Speech normal.         Behavior: Behavior normal. Behavior is cooperative.         Thought Content: Thought content normal.         Cognition and Memory: Cognition normal.         Judgment: Judgment normal.        ---------------------------------------------------------------------------------------------------------------------  EKG:    No EKG performed in the emergency department    ---------------------------------------------------------------------------------------------------------------------   Results from last 7 days   Lab Units 07/10/24  1957 07/10/24  1702   HSTROP T ng/L 143* 126*     Results from last 7 days   Lab Units 07/10/24  1702   PROBNP pg/mL >70,000.0*       Results from last 7 days   Lab Units 07/10/24  1914   PH, ARTERIAL pH units 7.471*   PO2 ART mm Hg 54.2*   PCO2, ARTERIAL mm Hg 39.7   HCO3 ART mmol/L 28.9*     Results from last 7 days   Lab Units 07/10/24  1702 07/10/24  0859 07/09/24  1342   LACTATE mmol/L 1.3  --  1.4   WBC 10*3/mm3 8.65 9.81 8.49   HEMOGLOBIN g/dL 10.1* 9.3* 9.8*   HEMATOCRIT % 33.6* 31.6* 32.5*   MCV fL 94.9 96.9 94.8   MCHC g/dL 30.1* 29.4* 30.2*   PLATELETS 10*3/mm3 190 194 178   INR  1.86*  --   --      Results from last 7 days   Lab Units 07/10/24  1702 07/09/24  1342   SODIUM mmol/L 140 142   POTASSIUM mmol/L 3.4* 4.4   CHLORIDE mmol/L 98 99   CO2 mmol/L 31.0* 29.0   BUN mg/dL 17 37*   CREATININE mg/dL 2.70* 4.60*   CALCIUM mg/dL 9.2 9.3   GLUCOSE mg/dL 72 172*   ALBUMIN g/dL 3.4* 3.3*   BILIRUBIN mg/dL 0.6 0.7   ALK PHOS U/L 168* 163*   AST (SGOT) U/L 19 21   ALT (SGPT) U/L 17 16  "  Estimated Creatinine Clearance: 28.5 mL/min (A) (by C-G formula based on SCr of 2.7 mg/dL (H)).  Ammonia   Date Value Ref Range Status   07/10/2024 14 (L) 16 - 60 umol/L Final       No results found for: \"HGBA1C\", \"POCGLU\"  Lab Results   Component Value Date    HGBA1C 6.50 (H) 05/28/2024     Lab Results   Component Value Date    TSH 2.490 03/21/2024    FREET4 1.43 05/29/2022       Microbiology Results (last 10 days)       Procedure Component Value - Date/Time    Respiratory Panel PCR w/COVID-19(SARS-CoV-2) ISMAEL/SHAZIA/SARAH/PAD/COR/QI In-House, NP Swab in UTM/VTM, 2 HR TAT - Swab, Nasopharynx [[id number]]  (Normal) Collected: 07/10/24 1957    Lab Status: Final result Specimen: Swab from Nasopharynx Updated: 07/10/24 2052     ADENOVIRUS, PCR Not Detected     Coronavirus 229E Not Detected     Coronavirus HKU1 Not Detected     Coronavirus NL63 Not Detected     Coronavirus OC43 Not Detected     COVID19 Not Detected     Human Metapneumovirus Not Detected     Human Rhinovirus/Enterovirus Not Detected     Influenza A PCR Not Detected     Influenza B PCR Not Detected     Parainfluenza Virus 1 Not Detected     Parainfluenza Virus 2 Not Detected     Parainfluenza Virus 3 Not Detected     Parainfluenza Virus 4 Not Detected     RSV, PCR Not Detected     Bordetella pertussis pcr Not Detected     Bordetella parapertussis PCR Not Detected     Chlamydophila pneumoniae PCR Not Detected     Mycoplasma pneumo by PCR Not Detected    Narrative:      In the setting of a positive respiratory panel with a viral infection PLUS a negative procalcitonin without other underlying concern for bacterial infection, consider observing off antibiotics or discontinuation of antibiotics and continue supportive care. If the respiratory panel is positive for atypical bacterial infection (Bordetella pertussis, Chlamydophila pneumoniae, or Mycoplasma pneumoniae), consider antibiotic de-escalation to target atypical bacterial infection.           Pain " Management Panel          Latest Ref Rng & Units 7/10/2024   Pain Management Panel   Amphetamine, Urine Qual Negative Negative    Barbiturates Screen, Urine Negative Negative    Benzodiazepine Screen, Urine Negative Negative    Buprenorphine, Screen, Urine Negative Negative    Cocaine Screen, Urine Negative Negative    Fentanyl, Urine Negative Negative    Methadone Screen , Urine Negative Negative    Methamphetamine, Ur Negative Negative      I have personally reviewed the above laboratory results.   ---------------------------------------------------------------------------------------------------------------------  Imaging Results (Last 7 Days)       Procedure Component Value Units Date/Time    CT Abdomen Pelvis Without Contrast [[id number]] Collected: 07/10/24 2118     Updated: 07/10/24 2120    Narrative:      Comparison: July 9, 2024.     Liver, spleen and adrenal glands are unremarkable. Gallbladder  surgically absent. Pancreas is atrophic. Moderate to severe renal  atrophy bilaterally with perinephric stranding likely chronic. Renal  cysts noted bilaterally, not with characterized. No obstructive uropathy  is identified. Diverticulosis is appreciated without diverticulitis. No  bowel obstruction, free air or significant free fluid seen. The appendix  is unremarkable. Multilevel chronic compression deformities lumbar  spine. Erosion of the coccyx likely from adjacent decubitus ulcer,  similar to the prior exam.       Impression:      Impression:  1. No acute process or chronic findings above.         This report was finalized on 7/10/2024 9:18 PM by Brennen Quinn DO.       CT Chest Without Contrast Diagnostic [[id number]] Collected: 07/10/24 2118     Updated: 07/10/24 2120    Narrative:      Comparison: July 9, 2024.      Moderate to large right pleural effusion with mild left pleural effusion  is again seen. Heart is enlarged. Bilateral gynecomastia is noted.  Evidence of coronary artery bypass with coronary  artery calcification.  Interlobular septal thickening suggests pulmonary consistent with heart  failure. Atelectasis lower lungs bilaterally. No pneumothorax seen.  Ankylosing spondylitis is demonstrated. No acute fracture seen. Healing  fracture T8 vertebral body. Chronic compression deformity L2. And T12.       Impression:      Impression:  1. Findings compatible with heart failure, similar to the prior exam.  2. Healing T8 vertebral body fracture.        This report was finalized on 7/10/2024 9:18 PM by Brennen Quinn DO.       CT Head Without Contrast [[id number]] Collected: 07/10/24 1910     Updated: 07/10/24 1913    Narrative:      INDICATION: Altered mental status.     COMPARISON: No relevant priors available     TECHNIQUE: Axial CT images of the head were obtained without IV  contrast. Coronal and sagittal reformations were reviewed.     FINDINGS:  Gray-white differentiation is relatively preserved. Chronic ischemic  white matter changes. No mass, mass effect or midline shift. No evidence  of acute large territorial infarction or acute intracranial hemorrhage.  Ventricles appear normal in size. Basal cisterns are patent. No  depressed calvarial fracture.       Impression:      No acute intracranial process.        This report was finalized on 7/10/2024 7:11 PM by Alex Pallas, DO.       XR Chest 1 View [[id number]] Collected: 07/10/24 1821     Updated: 07/10/24 1823    Narrative:      INDICATION: Cough.     TECHNIQUE: Frontal radiograph of the chest.     COMPARISON: 6/2/2024.     FINDINGS:   The cardiomediastinal silhouette and pulmonary vasculature appear within  normal limits. No infiltrate, pleural effusion or pneumothorax. No acute  osseous abnormality evident.       Impression:      No acute cardiopulmonary process.        This report was finalized on 7/10/2024 6:21 PM by Alex Pallas, DO.             I have personally reviewed the above radiology results.     Last Echocardiogram:  Results for orders  placed during the hospital encounter of 05/15/24    Adult Transesophageal Echo (GAVI) W/ Cont if Necessary Per Protocol    Interpretation Summary    Left ventricular systolic function is moderately decreased. Estimated left ventricular EF = 35%    No Vegetations noted on any valve    ---------------------------------------------------------------------------------------------------------------------    Assessment & Plan      ACUTE HOSPITAL PROBLEMS    -Acute altered mental status, on admission  -Acute pressure injury (right buttock) (diabetic ulcer left heel)  -Acute urinary tract infection, on admission    CMP and CBC in the am  Cardiac monitoring  Neuro checks every 4 hours  Patient received one time dose of Vancomycin and Zosyn in the ED  Follow blood and urine cultures  UTI most likely contributed to altered mental status exhibited on arrival to the emergency department  Wound care consult ordered    The patient has had several hospitalizations recently:     Patient hospitalized at [facility] on 5/15/2024 for septic shock/bacteremia and was discharged on 5/30/2024.  Patient received 1 g of IV antibiotic therapy.  Patient admitted to [facility] on 6/2/2024 for GI bleed and discharged back to the nursing home on 6/15/2024.      -F/E/N  Replace electrolytes per protocol as necessary.  Cardiac/diabetic diet.     CHRONIC MEDICAL PROBLEMS    -Coronary artery disease  -Diabetes  -Hypertension  -Heart failure (HFpEF)  -End-stage renal disease  -Hyperlipidemia  -Hypothyroidism  -GERD  -History of myocardial infarction    Vital signs per hospital policy  Insulin sliding scale  Blood glucose checks before meals and at bedtime  ---------------------------------------------------  DVT Prophylaxis: Contraindicated, patient is anticoagulated  Activity: Strict bedrest  ---------------------------------------------------  The patient is considered to be a high risk patient due to: Past medical history  of    OBSERVATION status; however, if further evaluation or treatment plans warrant, status may change.  Based upon current information, I predict patient's care encounter to be less than or equal to 2 midnights     Code Status: Full Code  ---------------------------------------------------  Disposition/Discharge planning: Consult case management for discharge planning.  ---------------------------------------------------  I have discussed the patient's assessment and plan with attending physician Tiffany Tam DO Carl B Gray, APRN     07/10/24  22:45 EDT    Attending Physician: Tiffany Galvez DO       Electronically signed by Tiffany Galvez DO at 07/11/24 0509       Current Facility-Administered Medications   Medication Dose Route Frequency Provider Last Rate Last Admin    acetaminophen (TYLENOL) tablet 650 mg  650 mg Oral Once per day on Monday Wednesday Friday Aleksey Storm DO        acetaminophen (TYLENOL) tablet 650 mg  650 mg Oral TID Aleksey Storm DO   650 mg at 07/12/24 0957    ammonium lactate (AMLACTIN) 12 % cream 1 Application  1 Application Topical Nightly Aleksey Storm DO        atorvastatin (LIPITOR) tablet 40 mg  40 mg Oral Daily Aleksey Storm DO   40 mg at 07/12/24 1000    benzocaine (HURRICAINE) 20 % liquid solution 1 spray  1 spray Mouth/Throat Q2H PRN Aleksey Storm DO        sennosides-docusate (PERICOLACE) 8.6-50 MG per tablet 2 tablet  2 tablet Oral BID PRN Tiffany Galvez DO        And    polyethylene glycol (MIRALAX) packet 17 g  17 g Oral Daily PRN Tiffany Galvez DO        And    bisacodyl (DULCOLAX) EC tablet 5 mg  5 mg Oral Daily PRN Tiffany Galvez DO        And    bisacodyl (DULCOLAX) suppository 10 mg  10 mg Rectal Daily PRN Tiffany Galvez,         cefTRIAXone (ROCEPHIN) 1,000 mg in sodium chloride 0.9 % 100 mL IVPB-VTB  1,000 mg Intravenous Q24H Aleksey Storm,   mL/hr at 07/11/24 1253 1,000 mg at 07/11/24 1253    cetirizine (zyrTEC) tablet 5 mg  5 mg Oral Daily Aleksey Storm DO   5 mg at 07/12/24 0957    clopidogrel (PLAVIX) tablet 75 mg  75 mg Oral Daily Aleksey Storm DO   75 mg at 07/12/24 0959    dextrose (D50W) (25 g/50 mL) IV injection 25 g  25 g Intravenous Q15 Min PRN Tiffany Galvez DO        dextrose (GLUTOSE) oral gel 15 g  15 g Oral Q15 Min PRN Tiffany Galvez,         erythromycin (ROMYCIN) ophthalmic ointment 1 Application  1 Application Left Eye BID Aleksey Storm DO   1 Application at 07/12/24 1001    famotidine (PEPCID) tablet 10 mg  10 mg Oral Daily Aleksey Storm DO   10 mg at 07/12/24 0958    gabapentin (NEURONTIN) capsule 200 mg  200 mg Oral Nightly Aleksey Storm DO        glucagon HCl (Diagnostic) injection 1 mg  1 mg Intramuscular Q15 Min PRN Tiffany Galvez,         heparin (porcine) 5000 UNIT/ML injection 5,000 Units  5,000 Units Subcutaneous Q8H Aleksey Storm DO   5,000 Units at 07/12/24 0956    insulin glargine (LANTUS, SEMGLEE) injection 13 Units  13 Units Subcutaneous Q12H Aleksey Storm DO   13 Units at 07/12/24 1000    Insulin Lispro (humaLOG) injection 2-7 Units  2-7 Units Subcutaneous 4x Daily AC & at Bedtime Tiffany Galvez DO   3 Units at 07/11/24 2017    levothyroxine (SYNTHROID, LEVOTHROID) tablet 75 mcg  75 mcg Oral QAM AC Aleksey Storm DO   75 mcg at 07/12/24 0959    loperamide (IMODIUM) capsule 4 mg  4 mg Oral Once per day on Monday Wednesday Friday Aleksey Storm DO   4 mg at 07/12/24 1000    lubrisoft lotion 1 Application  1 Application Topical Daily Aleksey Storm DO   1 Application at 07/12/24 1012    metoprolol succinate XL (TOPROL-XL) 24 hr tablet 12.5 mg  12.5 mg Oral Nightly Aleksey Storm DO        multivitamin with minerals 1 tablet  1 tablet Oral Daily Aleksey Storm,    1  tablet at 07/12/24 1000    nitroglycerin (NITROSTAT) SL tablet 0.4 mg  0.4 mg Sublingual Q5 Min PRN Tiffany Galvez DO        ondansetron ODT (ZOFRAN-ODT) disintegrating tablet 4 mg  4 mg Translingual Q8H PRN Aleksey Storm DO        pantoprazole (PROTONIX) EC tablet 40 mg  40 mg Oral BID AC Aleksey Storm DO   40 mg at 07/12/24 1000    sertraline (ZOLOFT) tablet 50 mg  50 mg Oral Nightly Aleksey Storm DO        sevelamer (RENVELA) tablet 2,400 mg  2,400 mg Oral TID With Meals Aleksey Storm DO   2,400 mg at 07/12/24 0958    sodium chloride 0.9 % flush 10 mL  10 mL Intravenous Q12H Tiffany Galvez DO   10 mL at 07/12/24 1001    sodium chloride 0.9 % flush 10 mL  10 mL Intravenous PRN Tiffany Galvez DO        sodium chloride 0.9 % infusion 40 mL  40 mL Intravenous PRN Tiffany Galvez DO        sucralfate (CARAFATE) 1 GM/10ML suspension 1 g  1 g Oral 4x Daily AC & at Bedtime Aleksey Storm DO   1 g at 07/12/24 1013        Physician Progress Notes (most recent note)        Nova Saucedo MD at 07/12/24 0803          Nephrology Progress Note      Subjective     Patient is known to have ESRD on intermittent dialysis, has been admitted with acute metabolic encephalopathy.  Patient is on Monday Wednesday Friday dialysis    Objective       Vital signs :     Temp:  [97.3 °F (36.3 °C)-99.2 °F (37.3 °C)] 98.5 °F (36.9 °C)  Heart Rate:  [87-98] 98  Resp:  [18] 18  BP: (113-155)/(56-79) 155/79    Intake/Output                         07/10/24 0701 - 07/11/24 0700 07/11/24 0701 - 07/12/24 0700     9813-8716 5358-3012 Total 9514-3199 1054-9469 Total                 Intake    P.O.  --  -- --  320  -- 320    IV Piggyback  --  100 100  --  -- --    Total Intake -- 100 100 320 -- 320       Output    Urine  --  -- --  --  175 175    Total Output -- -- -- -- 175 175             Physical Exam:    General Appearance : Not in acute distress  Lungs : clear to  "auscultation, respirations regular  Heart :  regular rhythm & normal rate, normal S1, S2 and no murmur, no rub  Abdomen : Soft, nondistended  Extremities : Trace edema  Neurologic :   Unable to assess    Laboratory Data :     Albumin Albumin   Date Value Ref Range Status   07/11/2024 3.0 (L) 3.5 - 5.2 g/dL Final   07/10/2024 3.4 (L) 3.5 - 5.2 g/dL Final   07/09/2024 3.3 (L) 3.5 - 5.2 g/dL Final      Magnesium No results found for: \"MG\"       PTH               No results found for: \"PTH\"    CBC and coagulation:  Results from last 7 days   Lab Units 07/12/24  0046 07/11/24  0031 07/10/24  1702 07/10/24  0859 07/09/24  1342   LACTATE mmol/L  --   --  1.3  --  1.4   WBC 10*3/mm3 9.31 10.79 8.65   < > 8.49   HEMOGLOBIN g/dL 8.9* 10.2* 10.1*   < > 9.8*   HEMATOCRIT % 30.3* 34.5* 33.6*   < > 32.5*   MCV fL 95.6 96.1 94.9   < > 94.8   MCHC g/dL 29.4* 29.6* 30.1*   < > 30.2*   PLATELETS 10*3/mm3 177 177 190   < > 178   INR   --   --  1.86*  --   --     < > = values in this interval not displayed.     Acid/base balance:  Results from last 7 days   Lab Units 07/10/24  1914   PH, ARTERIAL pH units 7.471*   PO2 ART mm Hg 54.2*   PCO2, ARTERIAL mm Hg 39.7   HCO3 ART mmol/L 28.9*     Renal and electrolytes:    Results from last 7 days   Lab Units 07/12/24  0046 07/11/24  0031 07/10/24  1702 07/09/24  1342   SODIUM mmol/L 140 141 140 142   POTASSIUM mmol/L 4.6 3.7 3.4* 4.4   CHLORIDE mmol/L 102 99 98 99   CO2 mmol/L 29.0 24.6 31.0* 29.0   BUN mg/dL 34* 21 17 37*   CREATININE mg/dL 4.94* 3.35* 2.70* 4.60*   CALCIUM mg/dL 8.8 9.2 9.2 9.3     Estimated Creatinine Clearance: 15.4 mL/min (A) (by C-G formula based on SCr of 4.94 mg/dL (H)).  @GFRCG:3@   Liver and pancreatic function:  Results from last 7 days   Lab Units 07/11/24  0031 07/10/24  1702 07/09/24  1342   ALBUMIN g/dL 3.0* 3.4* 3.3*   BILIRUBIN mg/dL 0.6 0.6 0.7   ALK PHOS U/L 163* 168* 163*   AST (SGOT) U/L 21 19 21   ALT (SGPT) U/L 14 17 16   AMMONIA umol/L  --  14*  --  "   LIPASE U/L  --   --  14         Cardiac:  Results from last 7 days   Lab Units 07/10/24  1702   PROBNP pg/mL >70,000.0*     Liver and pancreatic function:  Results from last 7 days   Lab Units 07/11/24  0031 07/10/24  1702 07/09/24  1342   ALBUMIN g/dL 3.0* 3.4* 3.3*   BILIRUBIN mg/dL 0.6 0.6 0.7   ALK PHOS U/L 163* 168* 163*   AST (SGOT) U/L 21 19 21   ALT (SGPT) U/L 14 17 16   AMMONIA umol/L  --  14*  --    LIPASE U/L  --   --  14       Medications :     acetaminophen, 650 mg, Oral, Once per day on Monday Wednesday Friday  acetaminophen, 650 mg, Oral, TID  ammonium lactate, 1 Application, Topical, Nightly  atorvastatin, 40 mg, Oral, Daily  cefTRIAXone, 1,000 mg, Intravenous, Q24H  cetirizine, 5 mg, Oral, Daily  clopidogrel, 75 mg, Oral, Daily  erythromycin, 1 Application, Left Eye, BID  famotidine, 10 mg, Oral, Daily  gabapentin, 200 mg, Oral, Nightly  heparin (porcine), 5,000 Units, Subcutaneous, Q8H  insulin glargine, 13 Units, Subcutaneous, Q12H  insulin lispro, 2-7 Units, Subcutaneous, 4x Daily AC & at Bedtime  levothyroxine, 75 mcg, Oral, QAM AC  loperamide, 4 mg, Oral, Once per day on Monday Wednesday Friday  lubrisoft, 1 Application, Topical, Daily  metoprolol succinate XL, 12.5 mg, Oral, Nightly  multivitamin with minerals, 1 tablet, Oral, Daily  pantoprazole, 40 mg, Oral, BID AC  sertraline, 50 mg, Oral, Nightly  sevelamer, 2,400 mg, Oral, TID With Meals  sodium chloride, 10 mL, Intravenous, Q12H  sucralfate, 1 g, Oral, 4x Daily AC & at Bedtime             Assessment & Plan     -ESRD on intermittent dialysis  - Anemia of chronic kidney disease  - Secondary hyperparathyroidism with hyperphosphatemia  - Acute metabolic encephalopathy  - Diabetic ulcer left heel  - UTI    Dialysis today with ultrafiltration 2 L  Will check iron stores and to replace IV iron  For hyperphosphatemia due to secondary hyperparathyroidism some elevated 2400 mg 3 times daily with meals    Reviewed clinical notes, reviewed labs,  reviewed radiological data.  Discussed the case in detail with primary team      Nova Saucedo MD  24  08:03 EDT      Electronically signed by Nova Saucedo MD at 24 0857       Physical Therapy Notes (most recent note)    No notes exist for this encounter.          Occupational Therapy Notes (most recent note)        Jose Ramon Potts, OT at 24 5037          Acute Care - Occupational Therapy Initial Evaluation   [place]     Patient Name: Kennedy Prescott  : 1950  MRN: [id number]  Today's Date: 2024             Admit Date: 7/10/2024       ICD-10-CM ICD-9-CM   1. Altered mental status, unspecified altered mental status type  R41.82 780.97     Patient Active Problem List   Diagnosis    Scalp lesion    Skin ulcer of right heel, limited to breakdown of skin    Hidradenoma    Functional diarrhea    Adenomatous polyp of ascending colon    SIRS (systemic inflammatory response syndrome)    Sepsis    Ischemic cardiomyopathy    AMS (altered mental status)    Acute metabolic encephalopathy     Past Medical History:   Diagnosis Date    CHF (congestive heart failure)     Coronary artery disease     Diabetes mellitus     Dialysis patient     Disease of thyroid gland     Elevated cholesterol     GERD (gastroesophageal reflux disease)     Hypertension     Myocardial infarction     Renal disorder     stage 5     Past Surgical History:   Procedure Laterality Date    CARDIAC CATHETERIZATION N/A 2024    Procedure: Left Heart Cath;  Surgeon: Mackenzie Ruiz MD;  Location: [facility] CATH INVASIVE LOCATION;  Service: Cardiology;  Laterality: N/A;    CARDIAC SURGERY      cabg    CHOLECYSTECTOMY      COLONOSCOPY N/A 2023    Procedure: COLONOSCOPY;  Surgeon: Trip Peter MD;  Location: [facility] OR;  Service: Gastroenterology;  Laterality: N/A;    DIALYSIS FISTULA CREATION Left     arm         OT ASSESSMENT FLOWSHEET (Last 12 Hours)       OT Evaluation and Treatment       Row Name 24 142                    OT Time and Intention    Document Type evaluation  -KR        Mode of Treatment occupational therapy  -KR        Patient Effort good  -KR           General Information    General Observations of Patient alert/cooperative  -KR           Living Environment    Current Living Arrangements residential facility  -KR        People in Home facility resident  -KR           Cognition    Affect/Mental Status (Cognition) WFL  -KR        Orientation Status (Cognition) oriented to;person;place;situation  -KR        Follows Commands (Cognition) WFL  -KR           Range of Motion Comprehensive    Comment, General Range of Motion BUE WFL  -KR           Strength Comprehensive (MMT)    Comment, General Manual Muscle Testing (MMT) Assessment BUE WFL  -KR           Activities of Daily Living    BADL Assessment/Intervention bathing;upper body dressing;lower body dressing;grooming;feeding;toileting  -KR           Bathing Assessment/Intervention    [place] Level (Bathing) bathing skills;moderate assist (50% patient effort)  -KR           Upper Body Dressing Assessment/Training    [place] Level (Upper Body Dressing) upper body dressing skills;minimum assist (75% patient effort)  -KR           Lower Body Dressing Assessment/Training    [place] Level (Lower Body Dressing) lower body dressing skills;moderate assist (50% patient effort)  -KR           Grooming Assessment/Training    [place] Level (Grooming) grooming skills;minimum assist (75% patient effort)  -KR           Self-Feeding Assessment/Training    [place] Level (Feeding) feeding skills;set up  -KR           Toileting Assessment/Training    [place] Level (Toileting) toileting skills;moderate assist (50% patient effort);maximum assist (25% patient effort)  -KR           Wound 05/15/24 2225 Left posterior heel Diabetic Ulcer    Wound - Properties Group Placement Date: 05/15/24  -CL Placement Time: 2225 -CL Side: Left  -CL Orientation: posterior  -CL  Location: heel  -CL Primary Wound Type: Diabetic ulc  -CL    Retired Wound - Properties Group Placement Date: 05/15/24  -CL Placement Time: 2225  -CL Side: Left  -CL Orientation: posterior  -CL Location: heel  -CL Primary Wound Type: Diabetic ulc  -CL    Retired Wound - Properties Group Date first assessed: 05/15/24  -CL Time first assessed: 2225  -CL Side: Left  -CL Location: heel  -CL Primary Wound Type: Diabetic ulc  -CL       Wound 07/10/24 2320 Right medial gluteal Pressure Injury    Wound - Properties Group Placement Date: 07/10/24  -MA Placement Time: 2320  -MA Present on Original Admission: Y  -MA Side: Right  -MA Orientation: medial  -MA Location: gluteal  -MA Primary Wound Type: Pressure inj  -MA Additional Comments: scabbed  -MA    Retired Wound - Properties Group Placement Date: 07/10/24  -MA Placement Time: 2320  -MA Present on Original Admission: Y  -MA Side: Right  -MA Orientation: medial  -MA Location: gluteal  -MA Primary Wound Type: Pressure inj  -MA Additional Comments: scabbed  -MA    Retired Wound - Properties Group Date first assessed: 07/10/24  -MA Time first assessed: 2320  -MA Present on Original Admission: Y  -MA Side: Right  -MA Location: gluteal  -MA Primary Wound Type: Pressure inj  -MA Additional Comments: scabbed  -MA       Plan of Care Review    Plan of Care Reviewed With patient  -KR           Therapy Assessment/Plan (OT)    Planned Therapy Interventions (OT) activity tolerance training;BADL retraining  -KR           Therapy Plan Review/Discharge Plan (OT)    Anticipated Discharge Disposition (OT) skilled nursing facility  -KR           OT Goals    Dressing Goal Selection (OT) dressing, OT goal 1  -KR        Activity Tolerance Goal Selection (OT) activity tolerance, OT goal 1  -KR           Dressing Goal 1 (OT)    Activity/Device (Dressing Goal 1, OT) dressing skills, all  -KR        [place]/Cues Needed (Dressing Goal 1, OT) minimum assist (75% or more patient effort)  -KR         Time Frame (Dressing Goal 1, OT) by discharge  -KR            Activity Tolerance Goal 1 (OT)    Activity Tolerance Goal 1 (OT) Increase to enhance ability to assist with ADLs/mobility  -KR        Activity Level (Endurance Goal 1, OT) 15 min activity  -KR        Time Frame (Activity Tolerance Goal 1, OT) by discharge  -KR                  User Key  (r) = Recorded By, (t) = Taken By, (c) = Cosigned By      Initials Name Effective Dates    Jose Ramon Lomas OT 21 -     Lana Corea RN 22 -     Enrique Cardenas RN 22 -                            OT Recommendation and Plan  Planned Therapy Interventions (OT): activity tolerance training, BADL retraining  Plan of Care Review  Plan of Care Reviewed With: patient  Plan of Care Reviewed With: patient        Time Calculation:     Therapy Charges for Today       Code Description Service Date Service Provider Modifiers Qty    [id number] HC OT EVAL MOD COMPLEXITY 4 2024 Jose Ramon Potts OT GO 1                 Jose Ramon Potts OT  2024    Electronically signed by Jose Ramon Potts OT at 24 1429       Speech Language Pathology Notes (most recent note)    No notes exist for this encounter.       ADL Documentation (most recent)      Flowsheet Row Most Recent Value   Transferring 4 - completely dependent   Toileting 4 - completely dependent   Bathing 2 - assistive person   Dressing 2 - assistive person   Eating 0 - independent   Communication 0 - understands/communicates without difficulty   Swallowing 0 - swallows foods/liquids without difficulty   Equipment Currently Used at Home oxygen               Discharge Summary        Aleksey Storm DO at 24 1122              [facility] MEDICINE DISCHARGE SUMMARY    Patient Identification:  Name:  Kennedy Prescott  Age:  73 y.o.  Sex:  male  :  1950  MRN:  [id number]  Visit Number:  [id number]    Date of Admission: 7/10/2024  Date of Discharge:  7/12/2024    PCP: Jag Guillaume MD    DISCHARGE DIAGNOSIS   Acute metabolic versus toxic encephalopathy  Acute cystitis  Type 2 diabetes mellitus  Hyperlipidemia  Hypothyroidism  Essential hypertension  Chronic HFpEF      CONSULTS  None      PROCEDURES PERFORMED   None      HOSPITAL COURSE  Mr. Prescott is a 73 y.o. male who presented to [facility] ED on 7/10/2024 with a chief complaint of altered mental status.  Patient has a past medical history markable for type 2 diabetes mellitus, ESRD on HD, essential hypertension, GERD, CAD, chronic HFpEF and hypothyroidism.  Patient is a resident of a local nursing home and was brought to the emergency department secondary to altered mental status.  Patient reportedly received Norco prior to going to dialysis on date of admission and since that time was unable to answer any questions appropriately.  However, upon initial exam by admitting hospitalist, patient was alert and oriented to person, place and time.  Initial evaluation in the emergency department did consist of basic laboratory work as well as physical exam and vital signs.  Please see initial H&P for specific details.  After thorough evaluation, patient was diagnosed with suspected acute cystitis and acute metabolic versus toxic encephalopathy and admitted for further treatment and evaluation.    In regards to acute cystitis, patient was started on empiric antibiotic therapy with Rocephin 1 g IV every 24 hours.  Urine culture was obtained which demonstrates no growth to date.  Patient will be transition to Omnicef 300 mg 1 tab daily for the next 5 days to complete a 7-day course of antibiotic therapy for suspected complicated acute cystitis.  In regards to altered mental status, patient was alert and oriented to person, place and time at every interaction I had with him.  He does appear back to his baseline functioning status.  Etiology of encephalopathy not quite clear but may be medication related  versus underlying infection related.  Nevertheless, with mental status returning to normal, it is felt patient has reached maximum medical benefit of current hospitalization and he will be discharged back to his nursing home in stable condition today.  The beforementioned plan was thoroughly discussed with the patient and he expressed his understanding and willingness to proceed with the beforementioned plan.    VITAL SIGNS:      07/10/24  2314 07/11/24  0500 07/12/24  0517   Weight: 118 kg (259 lb 4.8 oz) (New admit within 24 hours) 97.8 kg (215 lb 8 oz) (RN Halina aware of weight loss)     Body mass index is 30.07 kg/m².    PHYSICAL EXAM:  Constitutional: Chronically ill-appearing  male in no apparent distress.     HENT:  Head:  Normocephalic and atraumatic.  Mouth:  Moist mucous membranes.    Eyes:  Conjunctivae and EOM are normal.  Pupils are equal, round, and reactive to light.  No scleral icterus.    Neck:  Neck supple. No thyromegaly.  No JVD present.    Cardiovascular:  Regular rate and rhythm with no murmurs, rubs, clicks or gallops appreciated.  Pulmonary/Chest:  Clear to auscultation bilaterally with no crackles, wheezes or rhonchi appreciated.  Abdominal:  Soft. Nontender. Nondistended  Bowel sounds are normal in all four quadrants. No organomegally appreciated.   Musculoskeletal:  No edema, no tenderness, and no deformity.  No red or swollen joints anywhere.    Neurological:  Alert, Cranial nerves II-XII intact with no focal deficits.  No facial droop.  No slurred speech.   Skin:  Warm and dry to palpation with no rashes or lesions appreciated.  Peripheral vascular:  2+ radial and pedal pulses in bilateral upper and lower extremities.  Psychiatric:  Alert and oriented x3, demonstrates appropriate judgment and insight.    DISCHARGE DISPOSITION   Stable    DISCHARGE MEDICATIONS:     Discharge Medications        New Medications        Instructions Start Date   cefdinir 300 MG capsule  Commonly  known as: OMNICEF   300 mg, Oral, Daily             Continue These Medications        Instructions Start Date   acetaminophen 325 MG tablet  Commonly known as: TYLENOL   650 mg, Oral, 3 Times Weekly      acetaminophen 325 MG tablet  Commonly known as: TYLENOL   650 mg, Oral, 3 times daily      acetaminophen 500 MG tablet  Commonly known as: TYLENOL   500 mg, Oral, Every 4 Hours PRN      ammonium lactate 12 % cream  Commonly known as: AMLACTIN   1 Application, Topical, Every Night at Bedtime      atorvastatin 40 MG tablet  Commonly known as: LIPITOR   40 mg, Oral, Daily      BABY SHAMPOO EX   1 Application, Apply externally, 2 Times Daily      benzocaine 20 % gel  Commonly known as: HURRICAINE/ORAJEL   1 Application, Mouth/Throat, Every 2 Hours PRN      bisacodyl 10 MG suppository  Commonly known as: DULCOLAX   10 mg, Rectal, Daily PRN      cetaphil lotion   1 Application, Topical, Daily      clopidogrel 75 MG tablet  Commonly known as: PLAVIX   75 mg, Oral, Daily      erythromycin 5 MG/GM ophthalmic ointment  Commonly known as: ROMYCIN   1 Application, Left Eye, 2 Times Daily      famotidine 20 MG tablet  Commonly known as: PEPCID   20 mg, Oral, 2 Times Daily      gabapentin 100 MG capsule  Commonly known as: NEURONTIN   200 mg, Oral, Every Night at Bedtime      HYDROcodone-acetaminophen 5-325 MG per tablet  Commonly known as: NORCO   1 tablet, Oral, Every 12 Hours PRN      Insulin Aspart 100 UNIT/ML injection  Commonly known as: novoLOG   0-8 Units, Subcutaneous, 3 Times Daily Before Meals, Inject as per sliding scale: If 0-59 = 0; 201-250 = 2 units;  251-300= 4 units;  301-350 = 6 units; 351-400 = 8 units; 401-1000 = 8 units AND notify MD      Jardiance 10 MG tablet tablet  Generic drug: empagliflozin   10 mg, Oral, Daily      Jayant Nutrivigor pack   1 packet, Oral, 2 Times Daily      lactulose 10 GM/15ML solution  Commonly known as: CHRONULAC   20 g, Oral, Daily PRN      Levemir FlexPen 100 UNIT/ML  injection  Generic drug: insulin detemir   13 Units, Subcutaneous, 2 Times Daily      levocetirizine 5 MG tablet  Commonly known as: XYZAL   5 mg, Oral, Daily      levothyroxine 75 MCG tablet  Commonly known as: SYNTHROID, LEVOTHROID   75 mcg, Oral, Daily      Lidocaine Viscous HCl 2 % solution  Commonly known as: XYLOCAINE   5 mL, Oral, Every 6 Hours PRN      loperamide 2 MG tablet  Commonly known as: IMODIUM A-D   4 mg, Oral, 3 Times Weekly      metoprolol succinate XL 25 MG 24 hr tablet  Commonly known as: TOPROL-XL   12.5 mg, Oral, Every Night at Bedtime      MiraLax 17 GM/SCOOP powder  Generic drug: polyethylene glycol   17 g, Oral, Every 12 Hours PRN      multivitamin with minerals tablet tablet   1 tablet, Oral, Daily      nitroglycerin 0.4 MG SL tablet  Commonly known as: NITROSTAT   0.4 mg, Sublingual, Every 5 Minutes PRN, Take no more than 3 doses in 15 minutes.      ondansetron 4 MG tablet  Commonly known as: ZOFRAN   4 mg, Oral, Every 8 Hours PRN      pantoprazole 40 MG EC tablet  Commonly known as: PROTONIX   40 mg, Oral, 2 Times Daily      sertraline 50 MG tablet  Commonly known as: ZOLOFT   50 mg, Oral, Every Night at Bedtime      sevelamer 800 MG tablet  Commonly known as: RENVELA   2,400 mg, Oral, 3 Times Daily With Meals      sucralfate 1 GM/10ML suspension  Commonly known as: CARAFATE   1 g, Oral, 4 Times Daily             Stop These Medications      cefuroxime 500 MG tablet  Commonly known as: CEFTIN                    Additional Instructions for the Follow-ups that You Need to Schedule       Discharge Follow-up with PCP   As directed       Currently Documented PCP:    Jag Guillaume MD    PCP Phone Number:    [phone]     Follow Up Details: Please follow-up with PCP in 1 week               Follow-up Information       Jag Guillaume MD .    Specialty: Internal Medicine  Why: Please follow-up with PCP in 1 week  Contact information:  [address] [facility]  [place]  [address]  [phone]                             TEST  RESULTS PENDING AT DISCHARGE  Pending Labs       Order Current Status    [facility] PCR - Swab, Axilla Right, Axilla Left and Groin In process    Blood Culture - Blood, Arm, Right Preliminary result    Blood Culture - Blood, Hand, Right Preliminary result    Urine Culture - Urine, Urine, Clean Catch Preliminary result             Tracy Mitchell DO  07/12/24  11:22 EDT    Please note that this discharge summary required more than 30 minutes to complete.    Please send a copy of this dictation to the following providers:  Jag Guillaume MD    Electronically signed by Tracy Mitchell DO at 07/12/24 1126       Discharge Order (From admission, onward)       Start     Ordered    07/12/24 1118  Discharge patient  Once        Expected Discharge Date: 07/12/24   Expected Discharge Time: Morning   Discharge Disposition: Home or Self Care   Physician of Record for Attribution - Please select from Treatment Team: TRACY MITCHELL [[id number]]   Review needed by CMO to determine Physician of Record: No      Question Answer Comment   Physician of Record for Attribution - Please select from Treatment Team TRACY MITCHELL    Review needed by CMO to determine Physician of Record No        07/12/24 1121

## 2024-07-13 NOTE — OUTREACH NOTE
Prep Survey      Flowsheet Row Responses   [facility] facility patient discharged from? [facility]   Is LACE score < 7 ? No   Eligibility Not Eligible   What are the reasons patient is not eligible? [facility]   Does the patient have one of the following disease processes/diagnoses(primary or secondary)? Other   Prep survey completed? Yes            Duina HESS - Registered Nurse

## 2024-07-13 NOTE — NURSING NOTE
[facility] pt. No signs and symptoms of acute distress noted. No complaints of chest pain or SOB reported.

## 2024-07-15 LAB
BACTERIA SPEC AEROBE CULT: NORMAL
BACTERIA SPEC AEROBE CULT: NORMAL

## 2024-07-16 LAB — C AURIS DNA SPEC QL NAA+NON-PROBE: NOT DETECTED

## 2024-07-25 ENCOUNTER — LAB REQUISITION (OUTPATIENT)
Dept: LAB | Facility: HOSPITAL | Age: 74
End: 2024-07-25
Payer: MEDICARE

## 2024-07-25 DIAGNOSIS — D63.1 ANEMIA IN CHRONIC KIDNEY DISEASE (CODE): ICD-10-CM

## 2024-07-25 LAB
BASOPHILS # BLD AUTO: 0.03 10*3/MM3 (ref 0–0.2)
BASOPHILS NFR BLD AUTO: 0.3 % (ref 0–1.5)
DEPRECATED RDW RBC AUTO: 64.7 FL (ref 37–54)
EOSINOPHIL # BLD AUTO: 0.25 10*3/MM3 (ref 0–0.4)
EOSINOPHIL NFR BLD AUTO: 2.2 % (ref 0.3–6.2)
ERYTHROCYTE [DISTWIDTH] IN BLOOD BY AUTOMATED COUNT: 19.1 % (ref 12.3–15.4)
HCT VFR BLD AUTO: 31.9 % (ref 37.5–51)
HGB BLD-MCNC: 9.4 G/DL (ref 13–17.7)
IMM GRANULOCYTES # BLD AUTO: 0.05 10*3/MM3 (ref 0–0.05)
IMM GRANULOCYTES NFR BLD AUTO: 0.4 % (ref 0–0.5)
LYMPHOCYTES # BLD AUTO: 0.83 10*3/MM3 (ref 0.7–3.1)
LYMPHOCYTES NFR BLD AUTO: 7.4 % (ref 19.6–45.3)
MCH RBC QN AUTO: 28.1 PG (ref 26.6–33)
MCHC RBC AUTO-ENTMCNC: 29.5 G/DL (ref 31.5–35.7)
MCV RBC AUTO: 95.2 FL (ref 79–97)
MONOCYTES # BLD AUTO: 1.1 10*3/MM3 (ref 0.1–0.9)
MONOCYTES NFR BLD AUTO: 9.8 % (ref 5–12)
NEUTROPHILS NFR BLD AUTO: 79.9 % (ref 42.7–76)
NEUTROPHILS NFR BLD AUTO: 9.01 10*3/MM3 (ref 1.7–7)
NRBC BLD AUTO-RTO: 0 /100 WBC (ref 0–0.2)
PLATELET # BLD AUTO: 188 10*3/MM3 (ref 140–450)
PMV BLD AUTO: 11.3 FL (ref 6–12)
RBC # BLD AUTO: 3.35 10*6/MM3 (ref 4.14–5.8)
WBC NRBC COR # BLD AUTO: 11.27 10*3/MM3 (ref 3.4–10.8)

## 2024-07-25 PROCEDURE — 85025 COMPLETE CBC W/AUTO DIFF WBC: CPT | Performed by: INTERNAL MEDICINE

## 2024-07-26 ENCOUNTER — LAB REQUISITION (OUTPATIENT)
Dept: LAB | Facility: HOSPITAL | Age: 74
End: 2024-07-26
Payer: MEDICARE

## 2024-07-26 DIAGNOSIS — L97.421 NON-PRESSURE CHRONIC ULCER OF LEFT HEEL AND MIDFOOT LIMITED TO BREAKDOWN OF SKIN: ICD-10-CM

## 2024-07-26 PROCEDURE — 87186 SC STD MICRODIL/AGAR DIL: CPT | Performed by: INTERNAL MEDICINE

## 2024-07-26 PROCEDURE — 87070 CULTURE OTHR SPECIMN AEROBIC: CPT | Performed by: INTERNAL MEDICINE

## 2024-07-26 PROCEDURE — 87205 SMEAR GRAM STAIN: CPT | Performed by: INTERNAL MEDICINE

## 2024-07-26 PROCEDURE — 87077 CULTURE AEROBIC IDENTIFY: CPT | Performed by: INTERNAL MEDICINE

## 2024-07-26 PROCEDURE — 87147 CULTURE TYPE IMMUNOLOGIC: CPT | Performed by: INTERNAL MEDICINE

## 2024-07-28 ENCOUNTER — HOSPITAL ENCOUNTER (EMERGENCY)
Facility: HOSPITAL | Age: 74
Discharge: ANOTHER HOSPITAL | End: 2024-07-28
Attending: STUDENT IN AN ORGANIZED HEALTH CARE EDUCATION/TRAINING PROGRAM | Admitting: STUDENT IN AN ORGANIZED HEALTH CARE EDUCATION/TRAINING PROGRAM
Payer: MEDICARE

## 2024-07-28 ENCOUNTER — APPOINTMENT (OUTPATIENT)
Dept: CT IMAGING | Facility: HOSPITAL | Age: 74
End: 2024-07-28
Payer: MEDICARE

## 2024-07-28 ENCOUNTER — APPOINTMENT (OUTPATIENT)
Dept: ULTRASOUND IMAGING | Facility: HOSPITAL | Age: 74
End: 2024-07-28
Payer: MEDICARE

## 2024-07-28 ENCOUNTER — APPOINTMENT (OUTPATIENT)
Dept: GENERAL RADIOLOGY | Facility: HOSPITAL | Age: 74
End: 2024-07-28
Payer: MEDICARE

## 2024-07-28 VITALS
BODY MASS INDEX: 29.4 KG/M2 | HEIGHT: 71 IN | TEMPERATURE: 98.4 F | RESPIRATION RATE: 24 BRPM | SYSTOLIC BLOOD PRESSURE: 139 MMHG | WEIGHT: 210 LBS | OXYGEN SATURATION: 98 % | HEART RATE: 90 BPM | DIASTOLIC BLOOD PRESSURE: 83 MMHG

## 2024-07-28 DIAGNOSIS — N39.0 URINARY TRACT INFECTION WITH HEMATURIA, SITE UNSPECIFIED: ICD-10-CM

## 2024-07-28 DIAGNOSIS — L97.529 TYPE 2 DIABETES MELLITUS WITH LEFT DIABETIC FOOT ULCER: Primary | ICD-10-CM

## 2024-07-28 DIAGNOSIS — R31.9 URINARY TRACT INFECTION WITH HEMATURIA, SITE UNSPECIFIED: ICD-10-CM

## 2024-07-28 DIAGNOSIS — Z99.2 HEMODIALYSIS PATIENT: ICD-10-CM

## 2024-07-28 DIAGNOSIS — E11.621 TYPE 2 DIABETES MELLITUS WITH LEFT DIABETIC FOOT ULCER: Primary | ICD-10-CM

## 2024-07-28 DIAGNOSIS — I70.90 ARTERIAL OCCLUSION: ICD-10-CM

## 2024-07-28 LAB
A-A DO2: 37 MMHG (ref 0–300)
ALBUMIN SERPL-MCNC: 3.2 G/DL (ref 3.5–5.2)
ALBUMIN/GLOB SERPL: 1.2 G/DL
ALP SERPL-CCNC: 158 U/L (ref 39–117)
ALT SERPL W P-5'-P-CCNC: 17 U/L (ref 1–41)
ANION GAP SERPL CALCULATED.3IONS-SCNC: 18 MMOL/L (ref 5–15)
ARTERIAL PATENCY WRIST A: ABNORMAL
AST SERPL-CCNC: 17 U/L (ref 1–40)
ATMOSPHERIC PRESS: 729 MMHG
BACTERIA SPEC AEROBE CULT: ABNORMAL
BACTERIA SPEC AEROBE CULT: ABNORMAL
BACTERIA UR QL AUTO: ABNORMAL /HPF
BASE EXCESS BLDA CALC-SCNC: -0.6 MMOL/L (ref 0–2)
BASOPHILS # BLD AUTO: 0.06 10*3/MM3 (ref 0–0.2)
BASOPHILS NFR BLD AUTO: 0.4 % (ref 0–1.5)
BDY SITE: ABNORMAL
BILIRUB SERPL-MCNC: 0.6 MG/DL (ref 0–1.2)
BILIRUB UR QL STRIP: ABNORMAL
BUN SERPL-MCNC: 61 MG/DL (ref 8–23)
BUN/CREAT SERPL: 9 (ref 7–25)
CALCIUM SPEC-SCNC: 9.4 MG/DL (ref 8.6–10.5)
CHLORIDE SERPL-SCNC: 100 MMOL/L (ref 98–107)
CLARITY UR: ABNORMAL
CO2 BLDA-SCNC: 25 MMOL/L (ref 22–33)
CO2 SERPL-SCNC: 25 MMOL/L (ref 22–29)
COHGB MFR BLD: 2 % (ref 0–5)
COLOR UR: ABNORMAL
CREAT SERPL-MCNC: 6.77 MG/DL (ref 0.76–1.27)
CRP SERPL-MCNC: 4.27 MG/DL (ref 0–0.5)
D-LACTATE SERPL-SCNC: 1.3 MMOL/L (ref 0.5–2)
DEPRECATED RDW RBC AUTO: 64.8 FL (ref 37–54)
EGFRCR SERPLBLD CKD-EPI 2021: 7.9 ML/MIN/1.73
EOSINOPHIL # BLD AUTO: 0.13 10*3/MM3 (ref 0–0.4)
EOSINOPHIL NFR BLD AUTO: 0.9 % (ref 0.3–6.2)
ERYTHROCYTE [DISTWIDTH] IN BLOOD BY AUTOMATED COUNT: 19.4 % (ref 12.3–15.4)
ERYTHROCYTE [SEDIMENTATION RATE] IN BLOOD: 48 MM/HR (ref 0–20)
GLOBULIN UR ELPH-MCNC: 2.7 GM/DL
GLUCOSE SERPL-MCNC: 99 MG/DL (ref 65–99)
GLUCOSE UR STRIP-MCNC: ABNORMAL MG/DL
GRAM STN SPEC: ABNORMAL
GRAM STN SPEC: ABNORMAL
HCO3 BLDA-SCNC: 23.9 MMOL/L (ref 20–26)
HCT VFR BLD AUTO: 32.8 % (ref 37.5–51)
HCT VFR BLD CALC: 31.5 % (ref 38–51)
HGB BLD-MCNC: 9.9 G/DL (ref 13–17.7)
HGB BLDA-MCNC: 10.3 G/DL (ref 14–18)
HGB UR QL STRIP.AUTO: ABNORMAL
HOLD SPECIMEN: NORMAL
HYALINE CASTS UR QL AUTO: ABNORMAL /LPF
IMM GRANULOCYTES # BLD AUTO: 0.09 10*3/MM3 (ref 0–0.05)
IMM GRANULOCYTES NFR BLD AUTO: 0.7 % (ref 0–0.5)
INHALED O2 CONCENTRATION: 21 %
KETONES UR QL STRIP: NEGATIVE
LEUKOCYTE ESTERASE UR QL STRIP.AUTO: ABNORMAL
LYMPHOCYTES # BLD AUTO: 0.94 10*3/MM3 (ref 0.7–3.1)
LYMPHOCYTES NFR BLD AUTO: 6.8 % (ref 19.6–45.3)
Lab: ABNORMAL
MCH RBC QN AUTO: 28.1 PG (ref 26.6–33)
MCHC RBC AUTO-ENTMCNC: 30.2 G/DL (ref 31.5–35.7)
MCV RBC AUTO: 93.2 FL (ref 79–97)
METHGB BLD QL: 0 % (ref 0–3)
MODALITY: ABNORMAL
MONOCYTES # BLD AUTO: 0.96 10*3/MM3 (ref 0.1–0.9)
MONOCYTES NFR BLD AUTO: 6.9 % (ref 5–12)
NEUTROPHILS NFR BLD AUTO: 11.66 10*3/MM3 (ref 1.7–7)
NEUTROPHILS NFR BLD AUTO: 84.3 % (ref 42.7–76)
NITRITE UR QL STRIP: POSITIVE
NRBC BLD AUTO-RTO: 0 /100 WBC (ref 0–0.2)
OXYHGB MFR BLDV: 90.1 % (ref 94–99)
PCO2 BLDA: 37.5 MM HG (ref 35–45)
PCO2 TEMP ADJ BLD: ABNORMAL MM[HG]
PH BLDA: 7.41 PH UNITS (ref 7.35–7.45)
PH UR STRIP.AUTO: <=5 [PH] (ref 5–8)
PH, TEMP CORRECTED: ABNORMAL
PHOSPHATE SERPL-MCNC: 5.8 MG/DL (ref 2.5–4.5)
PLATELET # BLD AUTO: 225 10*3/MM3 (ref 140–450)
PMV BLD AUTO: 10.4 FL (ref 6–12)
PO2 BLDA: 63.8 MM HG (ref 83–108)
PO2 TEMP ADJ BLD: ABNORMAL MM[HG]
POTASSIUM SERPL-SCNC: 4.4 MMOL/L (ref 3.5–5.2)
PROT SERPL-MCNC: 5.9 G/DL (ref 6–8.5)
PROT UR QL STRIP: ABNORMAL
RBC # BLD AUTO: 3.52 10*6/MM3 (ref 4.14–5.8)
RBC # UR STRIP: ABNORMAL /HPF
REF LAB TEST METHOD: ABNORMAL
SAO2 % BLDCOA: 91.8 % (ref 94–99)
SODIUM SERPL-SCNC: 143 MMOL/L (ref 136–145)
SP GR UR STRIP: 1.02 (ref 1–1.03)
SQUAMOUS #/AREA URNS HPF: ABNORMAL /HPF
UROBILINOGEN UR QL STRIP: ABNORMAL
VENTILATOR MODE: ABNORMAL
WBC # UR STRIP: ABNORMAL /HPF
WBC CLUMPS # UR AUTO: ABNORMAL /HPF
WBC NRBC COR # BLD AUTO: 13.84 10*3/MM3 (ref 3.4–10.8)

## 2024-07-28 PROCEDURE — 96365 THER/PROPH/DIAG IV INF INIT: CPT

## 2024-07-28 PROCEDURE — 73630 X-RAY EXAM OF FOOT: CPT | Performed by: RADIOLOGY

## 2024-07-28 PROCEDURE — 73630 X-RAY EXAM OF FOOT: CPT

## 2024-07-28 PROCEDURE — 85025 COMPLETE CBC W/AUTO DIFF WBC: CPT | Performed by: STUDENT IN AN ORGANIZED HEALTH CARE EDUCATION/TRAINING PROGRAM

## 2024-07-28 PROCEDURE — P9612 CATHETERIZE FOR URINE SPEC: HCPCS

## 2024-07-28 PROCEDURE — 25010000002 CEFEPIME PER 500 MG: Performed by: STUDENT IN AN ORGANIZED HEALTH CARE EDUCATION/TRAINING PROGRAM

## 2024-07-28 PROCEDURE — 36600 WITHDRAWAL OF ARTERIAL BLOOD: CPT

## 2024-07-28 PROCEDURE — 82375 ASSAY CARBOXYHB QUANT: CPT

## 2024-07-28 PROCEDURE — 36415 COLL VENOUS BLD VENIPUNCTURE: CPT

## 2024-07-28 PROCEDURE — 25810000003 SODIUM CHLORIDE 0.9 % SOLUTION: Performed by: STUDENT IN AN ORGANIZED HEALTH CARE EDUCATION/TRAINING PROGRAM

## 2024-07-28 PROCEDURE — 93923 UPR/LXTR ART STDY 3+ LVLS: CPT | Performed by: RADIOLOGY

## 2024-07-28 PROCEDURE — 85652 RBC SED RATE AUTOMATED: CPT | Performed by: STUDENT IN AN ORGANIZED HEALTH CARE EDUCATION/TRAINING PROGRAM

## 2024-07-28 PROCEDURE — 84100 ASSAY OF PHOSPHORUS: CPT | Performed by: STUDENT IN AN ORGANIZED HEALTH CARE EDUCATION/TRAINING PROGRAM

## 2024-07-28 PROCEDURE — 71250 CT THORAX DX C-: CPT

## 2024-07-28 PROCEDURE — 75635 CT ANGIO ABDOMINAL ARTERIES: CPT | Performed by: RADIOLOGY

## 2024-07-28 PROCEDURE — 81001 URINALYSIS AUTO W/SCOPE: CPT | Performed by: STUDENT IN AN ORGANIZED HEALTH CARE EDUCATION/TRAINING PROGRAM

## 2024-07-28 PROCEDURE — 83050 HGB METHEMOGLOBIN QUAN: CPT

## 2024-07-28 PROCEDURE — 25010000002 VANCOMYCIN 5 G RECONSTITUTED SOLUTION: Performed by: STUDENT IN AN ORGANIZED HEALTH CARE EDUCATION/TRAINING PROGRAM

## 2024-07-28 PROCEDURE — 25510000001 IOPAMIDOL PER 1 ML: Performed by: STUDENT IN AN ORGANIZED HEALTH CARE EDUCATION/TRAINING PROGRAM

## 2024-07-28 PROCEDURE — 99291 CRITICAL CARE FIRST HOUR: CPT

## 2024-07-28 PROCEDURE — 25010000002 METRONIDAZOLE 500 MG/100ML SOLUTION: Performed by: STUDENT IN AN ORGANIZED HEALTH CARE EDUCATION/TRAINING PROGRAM

## 2024-07-28 PROCEDURE — 75635 CT ANGIO ABDOMINAL ARTERIES: CPT

## 2024-07-28 PROCEDURE — 87481 CANDIDA DNA AMP PROBE: CPT | Performed by: STUDENT IN AN ORGANIZED HEALTH CARE EDUCATION/TRAINING PROGRAM

## 2024-07-28 PROCEDURE — 80053 COMPREHEN METABOLIC PANEL: CPT | Performed by: STUDENT IN AN ORGANIZED HEALTH CARE EDUCATION/TRAINING PROGRAM

## 2024-07-28 PROCEDURE — 99285 EMERGENCY DEPT VISIT HI MDM: CPT

## 2024-07-28 PROCEDURE — 96368 THER/DIAG CONCURRENT INF: CPT

## 2024-07-28 PROCEDURE — 71250 CT THORAX DX C-: CPT | Performed by: RADIOLOGY

## 2024-07-28 PROCEDURE — 82805 BLOOD GASES W/O2 SATURATION: CPT

## 2024-07-28 PROCEDURE — 83605 ASSAY OF LACTIC ACID: CPT | Performed by: STUDENT IN AN ORGANIZED HEALTH CARE EDUCATION/TRAINING PROGRAM

## 2024-07-28 PROCEDURE — 86140 C-REACTIVE PROTEIN: CPT | Performed by: STUDENT IN AN ORGANIZED HEALTH CARE EDUCATION/TRAINING PROGRAM

## 2024-07-28 PROCEDURE — 87040 BLOOD CULTURE FOR BACTERIA: CPT | Performed by: STUDENT IN AN ORGANIZED HEALTH CARE EDUCATION/TRAINING PROGRAM

## 2024-07-28 PROCEDURE — 93923 UPR/LXTR ART STDY 3+ LVLS: CPT

## 2024-07-28 PROCEDURE — 96366 THER/PROPH/DIAG IV INF ADDON: CPT

## 2024-07-28 PROCEDURE — 96367 TX/PROPH/DG ADDL SEQ IV INF: CPT

## 2024-07-28 RX ORDER — METRONIDAZOLE 500 MG/100ML
500 INJECTION, SOLUTION INTRAVENOUS ONCE
Status: COMPLETED | OUTPATIENT
Start: 2024-07-28 | End: 2024-07-28

## 2024-07-28 RX ORDER — VANCOMYCIN 2 GRAM/500 ML IN 0.9 % SODIUM CHLORIDE INTRAVENOUS
20 ONCE
Status: COMPLETED | OUTPATIENT
Start: 2024-07-28 | End: 2024-07-28

## 2024-07-28 RX ADMIN — VANCOMYCIN HYDROCHLORIDE 2000 MG: 5 INJECTION, POWDER, LYOPHILIZED, FOR SOLUTION INTRAVENOUS at 13:31

## 2024-07-28 RX ADMIN — METRONIDAZOLE 500 MG: 500 INJECTION, SOLUTION INTRAVENOUS at 12:44

## 2024-07-28 RX ADMIN — CEFEPIME 2000 MG: 2 INJECTION, POWDER, FOR SOLUTION INTRAVENOUS at 13:01

## 2024-07-28 RX ADMIN — IOPAMIDOL 80 ML: 755 INJECTION, SOLUTION INTRAVENOUS at 18:20

## 2024-07-28 NOTE — ED PROVIDER NOTES
Subjective   History of Present Illness  Patient is a 73-year-old currently a long-term resident of local nursing home with a history of congestive heart failure, hemodialysis patient, diabetes mellitus, coronary artery disease and hypertension was sent from the nursing home for evaluation of lower extremity foot wound.  At this point in time patient provides no history of present illness appears very chronically ill and no family or medical personnel is present to add any additional recent history.      Review of Systems    Past Medical History:   Diagnosis Date    CHF (congestive heart failure)     Coronary artery disease     Diabetes mellitus     Dialysis patient     Disease of thyroid gland     Elevated cholesterol     GERD (gastroesophageal reflux disease)     Hypertension     Myocardial infarction     Renal disorder     stage 5       No Known Allergies    Past Surgical History:   Procedure Laterality Date    CARDIAC CATHETERIZATION N/A 5/28/2024    Procedure: Left Heart Cath;  Surgeon: Mackenzie Ruiz MD;  Location: [facility] CATH INVASIVE LOCATION;  Service: Cardiology;  Laterality: N/A;    CARDIAC SURGERY      cabg    CHOLECYSTECTOMY      COLONOSCOPY N/A 9/22/2023    Procedure: COLONOSCOPY;  Surgeon: Trip Peter MD;  Location: [facility] OR;  Service: Gastroenterology;  Laterality: N/A;    DIALYSIS FISTULA CREATION Left     arm       Family History   Problem Relation Age of Onset    Arthritis Mother     COPD Father     Diabetes Father     Heart disease Father     Hyperlipidemia Father     Hypertension Father     Cancer Daughter        Social History     Socioeconomic History    Marital status: Unknown   Tobacco Use    Smoking status: Never    Smokeless tobacco: Never   Vaping Use    Vaping status: Never Used   Substance and Sexual Activity    Alcohol use: Never    Drug use: Never    Sexual activity: Defer           Objective   Physical Exam  Vitals and nursing note reviewed.   Constitutional:       General: He  is not in acute distress.     Appearance: He is well-developed. He is ill-appearing. He is not diaphoretic.      Comments: Elderly chronically very ill-appearing 73-year-old male resting in bed.     HENT:      Head: Normocephalic and atraumatic.      Right Ear: External ear normal.      Left Ear: External ear normal.      Nose: Nose normal.   Eyes:      Conjunctiva/sclera: Conjunctivae normal.      Pupils: Pupils are equal, round, and reactive to light.   Neck:      Vascular: No JVD.      Trachea: No tracheal deviation.   Cardiovascular:      Rate and Rhythm: Normal rate and regular rhythm.      Heart sounds: Normal heart sounds. No murmur heard.  Pulmonary:      Effort: Pulmonary effort is normal. No respiratory distress.      Breath sounds: Normal breath sounds. No wheezing.   Abdominal:      General: Bowel sounds are normal.      Palpations: Abdomen is soft.      Tenderness: There is no abdominal tenderness.   Musculoskeletal:         General: No deformity. Normal range of motion.      Cervical back: Normal range of motion and neck supple.   Skin:     Coloration: Skin is not pale.      Findings: No erythema or rash.      Comments: Left heel ulceration roughly 4 cm in diameter with surrounding erythema and edema  Unable to palpate dorsalis pulse  Left lower extremity appears cooler to touch     Right lower extremity appears slightly warmer to touch.      Patient has bilateral shallow areas of black/deep purple scabs on the anterior shins bilaterally:appearing like calciphylaxis lesions     Neurological:      Mental Status: He is alert.      GCS: GCS eye subscore is 4. GCS verbal subscore is 3. GCS motor subscore is 5.      Cranial Nerves: No cranial nerve deficit.      Comments: Pt is oriented to person          Critical Care    Performed by: Ade Mendoza DO  Authorized by: Ade Mendoza DO    Critical care provider statement:     Critical care time (minutes):  60    Critical care time was exclusive of:   Separately billable procedures and treating other patients and teaching time    Critical care was necessary to treat or prevent imminent or life-threatening deterioration of the following conditions:  Renal failure, sepsis, endocrine crisis, dehydration, CNS failure or compromise, metabolic crisis and hepatic failure    Critical care was time spent personally by me on the following activities:  Blood draw for specimens, discussions with consultants, development of treatment plan with patient or surrogate, evaluation of patient's response to treatment, examination of patient, interpretation of cardiac output measurements, ordering and performing treatments and interventions, ordering and review of laboratory studies, ordering and review of radiographic studies, pulse oximetry, re-evaluation of patient's condition and review of old charts    I assumed direction of critical care for this patient from another provider in my specialty: no           Results for orders placed or performed during the hospital encounter of 07/28/24   Comprehensive Metabolic Panel    Specimen: Arm, Right; Blood   Result Value Ref Range    Glucose 99 65 - 99 mg/dL    BUN 61 (H) 8 - 23 mg/dL    Creatinine 6.77 (H) 0.76 - 1.27 mg/dL    Sodium 143 136 - 145 mmol/L    Potassium 4.4 3.5 - 5.2 mmol/L    Chloride 100 98 - 107 mmol/L    CO2 25.0 22.0 - 29.0 mmol/L    Calcium 9.4 8.6 - 10.5 mg/dL    Total Protein 5.9 (L) 6.0 - 8.5 g/dL    Albumin 3.2 (L) 3.5 - 5.2 g/dL    ALT (SGPT) 17 1 - 41 U/L    AST (SGOT) 17 1 - 40 U/L    Alkaline Phosphatase 158 (H) 39 - 117 U/L    Total Bilirubin 0.6 0.0 - 1.2 mg/dL    Globulin 2.7 gm/dL    A/G Ratio 1.2 g/dL    BUN/Creatinine Ratio 9.0 7.0 - 25.0    Anion Gap 18.0 (H) 5.0 - 15.0 mmol/L    eGFR 7.9 (L) >60.0 mL/min/1.73   C-reactive Protein    Specimen: Arm, Right; Blood   Result Value Ref Range    C-Reactive Protein 4.27 (H) 0.00 - 0.50 mg/dL   Sedimentation Rate    Specimen: Arm, Right; Blood   Result Value  Ref Range    Sed Rate 48 (H) 0 - 20 mm/hr   Urinalysis With Microscopic If Indicated (No Culture) - Straight Cath    Specimen: Straight Cath; Urine   Result Value Ref Range    Color, UA Orange (A) Yellow, Straw    Appearance, UA Turbid (A) Clear    pH, UA <=5.0 5.0 - 8.0    Specific Gravity, UA 1.021 1.005 - 1.030    Glucose,  mg/dL (2+) (A) Negative    Ketones, UA Negative Negative    Bilirubin, UA Moderate (2+) (A) Negative    Blood, UA Large (3+) (A) Negative    Protein,  mg/dL (2+) (A) Negative    Leuk Esterase, UA Large (3+) (A) Negative    Nitrite, UA Positive (A) Negative    Urobilinogen, UA 0.2 E.U./dL 0.2 - 1.0 E.U./dL   CBC Auto Differential    Specimen: Arm, Right; Blood   Result Value Ref Range    WBC 13.84 (H) 3.40 - 10.80 10*3/mm3    RBC 3.52 (L) 4.14 - 5.80 10*6/mm3    Hemoglobin 9.9 (L) 13.0 - 17.7 g/dL    Hematocrit 32.8 (L) 37.5 - 51.0 %    MCV 93.2 79.0 - 97.0 fL    MCH 28.1 26.6 - 33.0 pg    MCHC 30.2 (L) 31.5 - 35.7 g/dL    RDW 19.4 (H) 12.3 - 15.4 %    RDW-SD 64.8 (H) 37.0 - 54.0 fl    MPV 10.4 6.0 - 12.0 fL    Platelets 225 140 - 450 10*3/mm3    Neutrophil % 84.3 (H) 42.7 - 76.0 %    Lymphocyte % 6.8 (L) 19.6 - 45.3 %    Monocyte % 6.9 5.0 - 12.0 %    Eosinophil % 0.9 0.3 - 6.2 %    Basophil % 0.4 0.0 - 1.5 %    Immature Grans % 0.7 (H) 0.0 - 0.5 %    Neutrophils, Absolute 11.66 (H) 1.70 - 7.00 10*3/mm3    Lymphocytes, Absolute 0.94 0.70 - 3.10 10*3/mm3    Monocytes, Absolute 0.96 (H) 0.10 - 0.90 10*3/mm3    Eosinophils, Absolute 0.13 0.00 - 0.40 10*3/mm3    Basophils, Absolute 0.06 0.00 - 0.20 10*3/mm3    Immature Grans, Absolute 0.09 (H) 0.00 - 0.05 10*3/mm3    nRBC 0.0 0.0 - 0.2 /100 WBC   Lactic Acid, Plasma    Specimen: Arm, Right; Blood   Result Value Ref Range    Lactate 1.3 0.5 - 2.0 mmol/L   Urinalysis, Microscopic Only - Straight Cath    Specimen: Straight Cath; Urine   Result Value Ref Range    RBC, UA Too Numerous to Count (A) None Seen, 0-2 /HPF    WBC, UA Too Numerous  to Count (A) None Seen, 0-2 /HPF    Bacteria, UA 1+ (A) None Seen /HPF    Squamous Epithelial Cells, UA 7-12 (A) None Seen, 0-2 /HPF    Hyaline Casts, UA 0-2 None Seen /LPF    WBC Clumps, UA Moderate/2+ None Seen /HPF    Methodology Manual Light Microscopy    [place] Urine Culture Tube - Straight Cath    Specimen: Straight Cath; Urine   Result Value Ref Range    Extra Tube Hold for add-ons.    Phosphorus    Specimen: Arm, Right; Blood   Result Value Ref Range    Phosphorus 5.8 (H) 2.5 - 4.5 mg/dL   Blood Gas, Arterial With Co-Ox    Specimen: Arterial Blood   Result Value Ref Range    Site Right Brachial     René's Test N/A     pH, Arterial 7.412 7.350 - 7.450 pH units    pCO2, Arterial 37.5 35.0 - 45.0 mm Hg    pO2, Arterial 63.8 (L) 83.0 - 108.0 mm Hg    HCO3, Arterial 23.9 20.0 - 26.0 mmol/L    Base Excess, Arterial -0.6 (L) 0.0 - 2.0 mmol/L    O2 Saturation, Arterial 91.8 (L) 94.0 - 99.0 %    Hemoglobin, Blood Gas 10.3 (L) 14 - 18 g/dL    Hematocrit, Blood Gas 31.5 (L) 38.0 - 51.0 %    Oxyhemoglobin 90.1 (L) 94 - 99 %    Methemoglobin 0.00 0.00 - 3.00 %    Carboxyhemoglobin 2.0 0 - 5 %    A-a DO2 37.0 0.0 - 300.0 mmHg    CO2 Content 25.0 22 - 33 mmol/L    Barometric Pressure for Blood Gas 729 mmHg    Modality Room Air     FIO2 21 %    Ventilator Mode NA     Collected by [id number]     pH, Temp Corrected      pCO2, Temperature Corrected      pO2, Temperature Corrected       CT Chest Without Contrast Diagnostic   Final Result   Impression:   1. Findings suggest heart failure, confirm clinically with bilateral   pleural effusions and atelectasis.   2. Chronic findings above.           This report was finalized on 7/28/2024 8:58 PM by Brennen Quinn DO.          CT Angio Abdominal Aorta Bilateral Iliofem Runoff   Final Result   Impression:   1. Right renal cyst not well evaluated, recommend renal ultrasound.   2. Mild fecal impaction at the rectum with potential stercoral colitis.    3. Limited evaluation renal  arteries, suspect high-grade stenosis   particularly on the right.   4. High grade stenosis left internal iliac artery.   5. High-grade stenosis distal right superficial femoral artery and right   popliteal artery with occlusion right tibioperitoneal trunk and   high-grade stenosis with possible occlusion proximal right anterior   tibial artery. No significant reconstitution of the right posterior or   right fibular artery.   6. Occlusion distal left superficial femoral artery with reconstitution.   Decreased perfusion below the left knee arteries with high-grade   stenosis and possible areas of occlusion.            This report was finalized on 7/28/2024 9:06 PM by Brennen Quinn DO.          XR Foot 3+ View Left   Final Result   Impression:       No acute bony process.       No evidence of osteomyelitis.        This report was finalized on 7/28/2024 2:34 PM by Afsaneh Griffith MD.          US Arterial Doppler Lower Extremity Bilateral   Final Result   Minimal right-sided atherosclerotic disease in the popliteal artery and   the PTA.   Moderate atherosclerotic disease involving the LEFT external iliac   artery, distal superficial femoral artery, popliteal and the runoff   vessels.  Moderate stenosis is suspected.       ZIP Code [id number].   [id number]               This report was finalized on 7/28/2024 3:21 PM by Dr. Lisa Levy MD.                ED Course  ED Course as of 07/28/24 2229   Sun Jul 28, 2024   1635 Patient met severe sepsis criteria and was treated with broad-spectrum antibiotics.  Patient is a hemodialysis patient is in makes minimal urine.  Straight cath was performed which shows urinary tract infection.    Patient also has a large left heel diabetic ulcerative wound at this time x-ray shows no acute osteomyelitis.      Pt will require antibiotics and assessment for debridement and wound care long-term: Likely only General surgery consult with possibility for vascular surgery consult.   [LK]   0156  Spoke with Dr Jerome, as well as Dr. Campbell who recommended proceeding with CT angio aortic runoff to assess vascular patency.  Depending imaging findings will determine whether patient is admitted to the medical service or potential need for transfer.  Patient will undergo dialysis within the next 24 hours CT angio was performed at the request of general surgery initially was not performed due to last known dialysis treatment which is now confirmed as of Friday, roughly 2 days ago [LK]   1928 Patient presentation medical management and current pending imaging has been discussed with Dr. Decker.  Patient care was transferred at shift change to the on-coming attending physician. Patient presentation, labs and advanced imaging, if any, overall plan of care and  disposition was discussed during sign-out.  Electronically signed by Ade Mendoza DO, 07/28/24, 7:29 PM EDT.   [LK]      ED Course User Index  [LK] Ade Mendoza DO                                 CT Angio Abdominal Aorta Bilateral Iliofem Runoff    Result Date: 7/28/2024  Impression: 1. Right renal cyst not well evaluated, recommend renal ultrasound. 2. Mild fecal impaction at the rectum with potential stercoral colitis. 3. Limited evaluation renal arteries, suspect high-grade stenosis particularly on the right. 4. High grade stenosis left internal iliac artery. 5. High-grade stenosis distal right superficial femoral artery and right popliteal artery with occlusion right tibioperitoneal trunk and high-grade stenosis with possible occlusion proximal right anterior tibial artery. No significant reconstitution of the right posterior or right fibular artery. 6. Occlusion distal left superficial femoral artery with reconstitution. Decreased perfusion below the left knee arteries with high-grade stenosis and possible areas of occlusion.   This report was finalized on 7/28/2024 9:06 PM by Brennen Quinn DO.      CT Chest Without Contrast Diagnostic    Result Date:  7/28/2024  Impression: 1. Findings suggest heart failure, confirm clinically with bilateral pleural effusions and atelectasis. 2. Chronic findings above.   This report was finalized on 7/28/2024 8:58 PM by Brennen Quinn DO.      US Arterial Doppler Lower Extremity Bilateral    Result Date: 7/28/2024  Minimal right-sided atherosclerotic disease in the popliteal artery and the PTA. Moderate atherosclerotic disease involving the LEFT external iliac artery, distal superficial femoral artery, popliteal and the runoff vessels.  Moderate stenosis is suspected.  ZIP Code [id number]. [id number]    This report was finalized on 7/28/2024 3:21 PM by Dr. Lisa Levy MD.      XR Foot 3+ View Left    Result Date: 7/28/2024  Impression:  No acute bony process.  No evidence of osteomyelitis.  This report was finalized on 7/28/2024 2:34 PM by Afsaneh Griffith MD.         Results for orders placed or performed during the hospital encounter of 07/28/24   Comprehensive Metabolic Panel    Specimen: Arm, Right; Blood   Result Value Ref Range    Glucose 99 65 - 99 mg/dL    BUN 61 (H) 8 - 23 mg/dL    Creatinine 6.77 (H) 0.76 - 1.27 mg/dL    Sodium 143 136 - 145 mmol/L    Potassium 4.4 3.5 - 5.2 mmol/L    Chloride 100 98 - 107 mmol/L    CO2 25.0 22.0 - 29.0 mmol/L    Calcium 9.4 8.6 - 10.5 mg/dL    Total Protein 5.9 (L) 6.0 - 8.5 g/dL    Albumin 3.2 (L) 3.5 - 5.2 g/dL    ALT (SGPT) 17 1 - 41 U/L    AST (SGOT) 17 1 - 40 U/L    Alkaline Phosphatase 158 (H) 39 - 117 U/L    Total Bilirubin 0.6 0.0 - 1.2 mg/dL    Globulin 2.7 gm/dL    A/G Ratio 1.2 g/dL    BUN/Creatinine Ratio 9.0 7.0 - 25.0    Anion Gap 18.0 (H) 5.0 - 15.0 mmol/L    eGFR 7.9 (L) >60.0 mL/min/1.73   C-reactive Protein    Specimen: Arm, Right; Blood   Result Value Ref Range    C-Reactive Protein 4.27 (H) 0.00 - 0.50 mg/dL   Sedimentation Rate    Specimen: Arm, Right; Blood   Result Value Ref Range    Sed Rate 48 (H) 0 - 20 mm/hr   Urinalysis With Microscopic If Indicated (No Culture)  - Straight Cath    Specimen: Straight Cath; Urine   Result Value Ref Range    Color, UA Orange (A) Yellow, Straw    Appearance, UA Turbid (A) Clear    pH, UA <=5.0 5.0 - 8.0    Specific Gravity, UA 1.021 1.005 - 1.030    Glucose,  mg/dL (2+) (A) Negative    Ketones, UA Negative Negative    Bilirubin, UA Moderate (2+) (A) Negative    Blood, UA Large (3+) (A) Negative    Protein,  mg/dL (2+) (A) Negative    Leuk Esterase, UA Large (3+) (A) Negative    Nitrite, UA Positive (A) Negative    Urobilinogen, UA 0.2 E.U./dL 0.2 - 1.0 E.U./dL   CBC Auto Differential    Specimen: Arm, Right; Blood   Result Value Ref Range    WBC 13.84 (H) 3.40 - 10.80 10*3/mm3    RBC 3.52 (L) 4.14 - 5.80 10*6/mm3    Hemoglobin 9.9 (L) 13.0 - 17.7 g/dL    Hematocrit 32.8 (L) 37.5 - 51.0 %    MCV 93.2 79.0 - 97.0 fL    MCH 28.1 26.6 - 33.0 pg    MCHC 30.2 (L) 31.5 - 35.7 g/dL    RDW 19.4 (H) 12.3 - 15.4 %    RDW-SD 64.8 (H) 37.0 - 54.0 fl    MPV 10.4 6.0 - 12.0 fL    Platelets 225 140 - 450 10*3/mm3    Neutrophil % 84.3 (H) 42.7 - 76.0 %    Lymphocyte % 6.8 (L) 19.6 - 45.3 %    Monocyte % 6.9 5.0 - 12.0 %    Eosinophil % 0.9 0.3 - 6.2 %    Basophil % 0.4 0.0 - 1.5 %    Immature Grans % 0.7 (H) 0.0 - 0.5 %    Neutrophils, Absolute 11.66 (H) 1.70 - 7.00 10*3/mm3    Lymphocytes, Absolute 0.94 0.70 - 3.10 10*3/mm3    Monocytes, Absolute 0.96 (H) 0.10 - 0.90 10*3/mm3    Eosinophils, Absolute 0.13 0.00 - 0.40 10*3/mm3    Basophils, Absolute 0.06 0.00 - 0.20 10*3/mm3    Immature Grans, Absolute 0.09 (H) 0.00 - 0.05 10*3/mm3    nRBC 0.0 0.0 - 0.2 /100 WBC   Lactic Acid, Plasma    Specimen: Arm, Right; Blood   Result Value Ref Range    Lactate 1.3 0.5 - 2.0 mmol/L   Urinalysis, Microscopic Only - Straight Cath    Specimen: Straight Cath; Urine   Result Value Ref Range    RBC, UA Too Numerous to Count (A) None Seen, 0-2 /HPF    WBC, UA Too Numerous to Count (A) None Seen, 0-2 /HPF    Bacteria, UA 1+ (A) None Seen /HPF    Squamous Epithelial  Cells, UA 7-12 (A) None Seen, 0-2 /HPF    Hyaline Casts, UA 0-2 None Seen /LPF    WBC Clumps, UA Moderate/2+ None Seen /HPF    Methodology Manual Light Microscopy    [place] Urine Culture Tube - Straight Cath    Specimen: Straight Cath; Urine   Result Value Ref Range    Extra Tube Hold for add-ons.    Phosphorus    Specimen: Arm, Right; Blood   Result Value Ref Range    Phosphorus 5.8 (H) 2.5 - 4.5 mg/dL   Blood Gas, Arterial With Co-Ox    Specimen: Arterial Blood   Result Value Ref Range    Site Right Brachial     René's Test N/A     pH, Arterial 7.412 7.350 - 7.450 pH units    pCO2, Arterial 37.5 35.0 - 45.0 mm Hg    pO2, Arterial 63.8 (L) 83.0 - 108.0 mm Hg    HCO3, Arterial 23.9 20.0 - 26.0 mmol/L    Base Excess, Arterial -0.6 (L) 0.0 - 2.0 mmol/L    O2 Saturation, Arterial 91.8 (L) 94.0 - 99.0 %    Hemoglobin, Blood Gas 10.3 (L) 14 - 18 g/dL    Hematocrit, Blood Gas 31.5 (L) 38.0 - 51.0 %    Oxyhemoglobin 90.1 (L) 94 - 99 %    Methemoglobin 0.00 0.00 - 3.00 %    Carboxyhemoglobin 2.0 0 - 5 %    A-a DO2 37.0 0.0 - 300.0 mmHg    CO2 Content 25.0 22 - 33 mmol/L    Barometric Pressure for Blood Gas 729 mmHg    Modality Room Air     FIO2 21 %    Ventilator Mode NA     Collected by [id number]     pH, Temp Corrected      pCO2, Temperature Corrected      pO2, Temperature Corrected                   Medical Decision Making  Assumed care of this patient at shift change.  Please see original note from Dr. Mendoza for HPI details and physical exam findings.  Laboratory workup and CT imaging listed.  Dr. Mendoza had spoken to the hospitalist team and on-call surgery team.  CT angiograms of the abdomen with aorta runoff requested.  Concerns for multiple levels of occlusion in the right and left lower extremities.  Main concern with the left lower extremity with the nonhealing left heel ulcer and areas of bluish discoloration in the left lower extremity.  The patient was accepted to the [facility] for [facility].   Vital signs stable at transfer    Problems Addressed:  Hemodialysis patient: complicated acute illness or injury  Type 2 diabetes mellitus with left diabetic foot ulcer: complicated acute illness or injury  Urinary tract infection with hematuria, site unspecified: complicated acute illness or injury    Amount and/or Complexity of Data Reviewed  Labs: ordered. Decision-making details documented in ED Course.  Radiology: ordered. Decision-making details documented in ED Course.    Risk  OTC drugs.  Prescription drug management.    Critical Care  Total time providing critical care: 60 minutes        Final diagnoses:   Hemodialysis patient   Type 2 diabetes mellitus with left diabetic foot ulcer   Urinary tract infection with hematuria, site unspecified   Arterial occlusion       ED Disposition  ED Disposition       ED Disposition   Transfer to Another Facility     Condition   --    Comment   --               No follow-up provider specified.       Medication List      No changes were made to your prescriptions during this visit.            Saul Decker,   07/28/24 [id number]

## 2024-07-29 NOTE — ED NOTES
I spoke with pt's son Joaquina to get permission to transfer pt to [facility], and to notify pt's family that he was being transferred

## 2024-07-30 LAB — C AURIS DNA SPEC QL NAA+NON-PROBE: NOT DETECTED

## 2024-08-02 LAB
BACTERIA SPEC AEROBE CULT: NORMAL
BACTERIA SPEC AEROBE CULT: NORMAL

## 2024-08-13 ENCOUNTER — APPOINTMENT (OUTPATIENT)
Dept: GENERAL RADIOLOGY | Facility: HOSPITAL | Age: 74
End: 2024-08-13
Payer: MEDICARE

## 2024-08-13 ENCOUNTER — HOSPITAL ENCOUNTER (EMERGENCY)
Facility: HOSPITAL | Age: 74
Discharge: HOME | End: 2024-08-13
Payer: MEDICARE

## 2024-08-13 ENCOUNTER — APPOINTMENT (OUTPATIENT)
Dept: CT IMAGING | Facility: HOSPITAL | Age: 74
End: 2024-08-13
Payer: MEDICARE

## 2024-08-13 VITALS
TEMPERATURE: 97.8 F | OXYGEN SATURATION: 100 % | DIASTOLIC BLOOD PRESSURE: 92 MMHG | HEIGHT: 71 IN | WEIGHT: 210.1 LBS | HEART RATE: 79 BPM | BODY MASS INDEX: 29.41 KG/M2 | RESPIRATION RATE: 20 BRPM | SYSTOLIC BLOOD PRESSURE: 113 MMHG

## 2024-08-13 DIAGNOSIS — R11.10 VOMITING, UNSPECIFIED VOMITING TYPE, UNSPECIFIED WHETHER NAUSEA PRESENT: Primary | ICD-10-CM

## 2024-08-13 LAB
A-A DO2: 38.7 MMHG (ref 0–300)
ABO GROUP BLD: NORMAL
ABO GROUP BLD: NORMAL
ACANTHOCYTES BLD QL SMEAR: NORMAL
ALBUMIN SERPL-MCNC: 3.3 G/DL (ref 3.5–5.2)
ALBUMIN/GLOB SERPL: 1.2 G/DL
ALP SERPL-CCNC: 141 U/L (ref 39–117)
ALT SERPL W P-5'-P-CCNC: 18 U/L (ref 1–41)
ANION GAP SERPL CALCULATED.3IONS-SCNC: 18.1 MMOL/L (ref 5–15)
ANISOCYTOSIS BLD QL: NORMAL
ARTERIAL PATENCY WRIST A: ABNORMAL
AST SERPL-CCNC: 16 U/L (ref 1–40)
ATMOSPHERIC PRESS: 728 MMHG
BASE EXCESS BLDA CALC-SCNC: -1.3 MMOL/L (ref 0–2)
BASOPHILS # BLD AUTO: 0.06 10*3/MM3 (ref 0–0.2)
BASOPHILS NFR BLD AUTO: 0.7 % (ref 0–1.5)
BDY SITE: ABNORMAL
BILIRUB SERPL-MCNC: 0.5 MG/DL (ref 0–1.2)
BLD GP AB SCN SERPL QL: NEGATIVE
BUN SERPL-MCNC: 69 MG/DL (ref 8–23)
BUN/CREAT SERPL: 11.4 (ref 7–25)
CALCIUM SPEC-SCNC: 9.2 MG/DL (ref 8.6–10.5)
CHLORIDE SERPL-SCNC: 96 MMOL/L (ref 98–107)
CO2 BLDA-SCNC: 26.6 MMOL/L (ref 22–33)
CO2 SERPL-SCNC: 25.9 MMOL/L (ref 22–29)
COHGB MFR BLD: 2.6 % (ref 0–5)
CREAT SERPL-MCNC: 6.05 MG/DL (ref 0.76–1.27)
DEPRECATED RDW RBC AUTO: 81.1 FL (ref 37–54)
EGFRCR SERPLBLD CKD-EPI 2021: 9 ML/MIN/1.73
EOSINOPHIL # BLD AUTO: 0.16 10*3/MM3 (ref 0–0.4)
EOSINOPHIL NFR BLD AUTO: 1.8 % (ref 0.3–6.2)
ERYTHROCYTE [DISTWIDTH] IN BLOOD BY AUTOMATED COUNT: 22.5 % (ref 12.3–15.4)
GEN 5 2HR TROPONIN T REFLEX: 183 NG/L
GLOBULIN UR ELPH-MCNC: 2.7 GM/DL
GLUCOSE SERPL-MCNC: 176 MG/DL (ref 65–99)
HCO3 BLDA-SCNC: 25.1 MMOL/L (ref 20–26)
HCT VFR BLD AUTO: 37.1 % (ref 37.5–51)
HCT VFR BLD CALC: 32.3 % (ref 38–51)
HGB BLD-MCNC: 10.9 G/DL (ref 13–17.7)
HGB BLDA-MCNC: 10.5 G/DL (ref 14–18)
HOLD SPECIMEN: NORMAL
HOLD SPECIMEN: NORMAL
HYPOCHROMIA BLD QL: NORMAL
IMM GRANULOCYTES # BLD AUTO: 0.04 10*3/MM3 (ref 0–0.05)
IMM GRANULOCYTES NFR BLD AUTO: 0.5 % (ref 0–0.5)
INHALED O2 CONCENTRATION: 21 %
INR PPP: 2.5 (ref 0.9–1.1)
LARGE PLATELETS: NORMAL
LYMPHOCYTES # BLD AUTO: 0.76 10*3/MM3 (ref 0.7–3.1)
LYMPHOCYTES NFR BLD AUTO: 8.7 % (ref 19.6–45.3)
Lab: ABNORMAL
Lab: ABNORMAL
MCH RBC QN AUTO: 29.2 PG (ref 26.6–33)
MCHC RBC AUTO-ENTMCNC: 29.4 G/DL (ref 31.5–35.7)
MCV RBC AUTO: 99.5 FL (ref 79–97)
METHGB BLD QL: 0.4 % (ref 0–3)
MODALITY: ABNORMAL
MONOCYTES # BLD AUTO: 0.76 10*3/MM3 (ref 0.1–0.9)
MONOCYTES NFR BLD AUTO: 8.7 % (ref 5–12)
NEUTROPHILS NFR BLD AUTO: 7 10*3/MM3 (ref 1.7–7)
NEUTROPHILS NFR BLD AUTO: 79.6 % (ref 42.7–76)
NOTE: ABNORMAL
NOTIFIED BY: ABNORMAL
NOTIFIED WHO: ABNORMAL
NRBC BLD AUTO-RTO: 0 /100 WBC (ref 0–0.2)
OVALOCYTES BLD QL SMEAR: NORMAL
OXYHGB MFR BLDV: 76.4 % (ref 94–99)
PCO2 BLDA: 49 MM HG (ref 35–45)
PCO2 TEMP ADJ BLD: ABNORMAL MM[HG]
PH BLDA: 7.32 PH UNITS (ref 7.35–7.45)
PH, TEMP CORRECTED: ABNORMAL
PLATELET # BLD AUTO: 178 10*3/MM3 (ref 140–450)
PMV BLD AUTO: 10.8 FL (ref 6–12)
PO2 BLDA: 49.1 MM HG (ref 83–108)
PO2 TEMP ADJ BLD: ABNORMAL MM[HG]
POLYCHROMASIA BLD QL SMEAR: NORMAL
POTASSIUM SERPL-SCNC: 4.5 MMOL/L (ref 3.5–5.2)
PROT SERPL-MCNC: 6 G/DL (ref 6–8.5)
PROTHROMBIN TIME: 27 SECONDS (ref 12.1–14.7)
RBC # BLD AUTO: 3.73 10*6/MM3 (ref 4.14–5.8)
RH BLD: NEGATIVE
RH BLD: NEGATIVE
SAO2 % BLDCOA: 78.8 % (ref 94–99)
SODIUM SERPL-SCNC: 140 MMOL/L (ref 136–145)
T&S EXPIRATION DATE: NORMAL
TROPONIN T DELTA: -10 NG/L
TROPONIN T SERPL HS-MCNC: 193 NG/L
VENTILATOR MODE: ABNORMAL
WBC NRBC COR # BLD AUTO: 8.78 10*3/MM3 (ref 3.4–10.8)
WHOLE BLOOD HOLD COAG: NORMAL
WHOLE BLOOD HOLD SPECIMEN: NORMAL

## 2024-08-13 PROCEDURE — 74175 CTA ABDOMEN W/CONTRAST: CPT

## 2024-08-13 PROCEDURE — 71045 X-RAY EXAM CHEST 1 VIEW: CPT

## 2024-08-13 PROCEDURE — 36415 COLL VENOUS BLD VENIPUNCTURE: CPT

## 2024-08-13 PROCEDURE — 93005 ELECTROCARDIOGRAM TRACING: CPT

## 2024-08-13 PROCEDURE — 96374 THER/PROPH/DIAG INJ IV PUSH: CPT

## 2024-08-13 PROCEDURE — 80053 COMPREHEN METABOLIC PANEL: CPT

## 2024-08-13 PROCEDURE — 85007 BL SMEAR W/DIFF WBC COUNT: CPT

## 2024-08-13 PROCEDURE — 86901 BLOOD TYPING SEROLOGIC RH(D): CPT

## 2024-08-13 PROCEDURE — 94799 UNLISTED PULMONARY SVC/PX: CPT

## 2024-08-13 PROCEDURE — 25510000001 IOPAMIDOL PER 1 ML

## 2024-08-13 PROCEDURE — 82375 ASSAY CARBOXYHB QUANT: CPT

## 2024-08-13 PROCEDURE — 82805 BLOOD GASES W/O2 SATURATION: CPT

## 2024-08-13 PROCEDURE — 85025 COMPLETE CBC W/AUTO DIFF WBC: CPT

## 2024-08-13 PROCEDURE — 86900 BLOOD TYPING SEROLOGIC ABO: CPT

## 2024-08-13 PROCEDURE — 36600 WITHDRAWAL OF ARTERIAL BLOOD: CPT

## 2024-08-13 PROCEDURE — 84484 ASSAY OF TROPONIN QUANT: CPT

## 2024-08-13 PROCEDURE — 99285 EMERGENCY DEPT VISIT HI MDM: CPT

## 2024-08-13 PROCEDURE — 86850 RBC ANTIBODY SCREEN: CPT

## 2024-08-13 PROCEDURE — 83050 HGB METHEMOGLOBIN QUAN: CPT

## 2024-08-13 PROCEDURE — 85610 PROTHROMBIN TIME: CPT

## 2024-08-13 PROCEDURE — 71250 CT THORAX DX C-: CPT

## 2024-08-13 PROCEDURE — 93010 ELECTROCARDIOGRAM REPORT: CPT | Performed by: INTERNAL MEDICINE

## 2024-08-13 PROCEDURE — 71045 X-RAY EXAM CHEST 1 VIEW: CPT | Performed by: RADIOLOGY

## 2024-08-13 RX ORDER — IOPAMIDOL 755 MG/ML
100 INJECTION, SOLUTION INTRAVASCULAR
Status: COMPLETED | OUTPATIENT
Start: 2024-08-13 | End: 2024-08-13

## 2024-08-13 RX ORDER — SODIUM CHLORIDE 0.9 % (FLUSH) 0.9 %
10 SYRINGE (ML) INJECTION AS NEEDED
Status: DISCONTINUED | OUTPATIENT
Start: 2024-08-13 | End: 2024-08-13 | Stop reason: HOSPADM

## 2024-08-13 RX ORDER — PANTOPRAZOLE SODIUM 40 MG/10ML
80 INJECTION, POWDER, LYOPHILIZED, FOR SOLUTION INTRAVENOUS ONCE
Status: COMPLETED | OUTPATIENT
Start: 2024-08-13 | End: 2024-08-13

## 2024-08-13 RX ADMIN — IOPAMIDOL 70 ML: 755 INJECTION, SOLUTION INTRAVENOUS at 18:45

## 2024-08-13 RX ADMIN — PANTOPRAZOLE SODIUM 80 MG: 40 INJECTION, POWDER, FOR SOLUTION INTRAVENOUS at 17:47

## 2024-08-14 ENCOUNTER — APPOINTMENT (OUTPATIENT)
Dept: CT IMAGING | Facility: HOSPITAL | Age: 74
End: 2024-08-14
Payer: MEDICARE

## 2024-08-14 ENCOUNTER — APPOINTMENT (OUTPATIENT)
Dept: GENERAL RADIOLOGY | Facility: HOSPITAL | Age: 74
End: 2024-08-14
Payer: MEDICARE

## 2024-08-14 ENCOUNTER — HOSPITAL ENCOUNTER (EMERGENCY)
Facility: HOSPITAL | Age: 74
Discharge: HOME | End: 2024-08-14
Attending: STUDENT IN AN ORGANIZED HEALTH CARE EDUCATION/TRAINING PROGRAM
Payer: MEDICARE

## 2024-08-14 VITALS
SYSTOLIC BLOOD PRESSURE: 111 MMHG | RESPIRATION RATE: 14 BRPM | DIASTOLIC BLOOD PRESSURE: 60 MMHG | HEIGHT: 71 IN | BODY MASS INDEX: 29.41 KG/M2 | HEART RATE: 107 BPM | OXYGEN SATURATION: 88 % | TEMPERATURE: 97.6 F | WEIGHT: 210.1 LBS

## 2024-08-14 DIAGNOSIS — Z99.2 HEMODIALYSIS PATIENT: ICD-10-CM

## 2024-08-14 DIAGNOSIS — N18.6 END STAGE RENAL DISEASE: Primary | ICD-10-CM

## 2024-08-14 LAB
ALBUMIN SERPL-MCNC: 3.1 G/DL (ref 3.5–5.2)
ALBUMIN/GLOB SERPL: 1.2 G/DL
ALP SERPL-CCNC: 138 U/L (ref 39–117)
ALT SERPL W P-5'-P-CCNC: 15 U/L (ref 1–41)
AMMONIA BLD-SCNC: 36 UMOL/L (ref 16–60)
ANION GAP SERPL CALCULATED.3IONS-SCNC: 17.8 MMOL/L (ref 5–15)
ANISOCYTOSIS BLD QL: NORMAL
AST SERPL-CCNC: 17 U/L (ref 1–40)
BASOPHILS # BLD AUTO: 0.05 10*3/MM3 (ref 0–0.2)
BASOPHILS NFR BLD AUTO: 0.4 % (ref 0–1.5)
BILIRUB SERPL-MCNC: 0.5 MG/DL (ref 0–1.2)
BUN SERPL-MCNC: 74 MG/DL (ref 8–23)
BUN/CREAT SERPL: 11.5 (ref 7–25)
CALCIUM SPEC-SCNC: 9.1 MG/DL (ref 8.6–10.5)
CHLORIDE SERPL-SCNC: 97 MMOL/L (ref 98–107)
CO2 SERPL-SCNC: 26.2 MMOL/L (ref 22–29)
CREAT SERPL-MCNC: 6.46 MG/DL (ref 0.76–1.27)
DEPRECATED RDW RBC AUTO: 80.6 FL (ref 37–54)
EGFRCR SERPLBLD CKD-EPI 2021: 8.3 ML/MIN/1.73
EOSINOPHIL # BLD AUTO: 0.22 10*3/MM3 (ref 0–0.4)
EOSINOPHIL NFR BLD AUTO: 1.9 % (ref 0.3–6.2)
ERYTHROCYTE [DISTWIDTH] IN BLOOD BY AUTOMATED COUNT: 22.1 % (ref 12.3–15.4)
ETHANOL BLD-MCNC: <10 MG/DL (ref 0–10)
ETHANOL UR QL: <0.01 %
GLOBULIN UR ELPH-MCNC: 2.5 GM/DL
GLUCOSE BLDC GLUCOMTR-MCNC: 131 MG/DL (ref 70–130)
GLUCOSE SERPL-MCNC: 129 MG/DL (ref 65–99)
HCT VFR BLD AUTO: 36.4 % (ref 37.5–51)
HGB BLD-MCNC: 10.6 G/DL (ref 13–17.7)
HYPOCHROMIA BLD QL: NORMAL
IMM GRANULOCYTES # BLD AUTO: 0.06 10*3/MM3 (ref 0–0.05)
IMM GRANULOCYTES NFR BLD AUTO: 0.5 % (ref 0–0.5)
LARGE PLATELETS: NORMAL
LYMPHOCYTES # BLD AUTO: 0.6 10*3/MM3 (ref 0.7–3.1)
LYMPHOCYTES NFR BLD AUTO: 5.1 % (ref 19.6–45.3)
MACROCYTES BLD QL SMEAR: NORMAL
MCH RBC QN AUTO: 29.5 PG (ref 26.6–33)
MCHC RBC AUTO-ENTMCNC: 29.1 G/DL (ref 31.5–35.7)
MCV RBC AUTO: 101.4 FL (ref 79–97)
MONOCYTES # BLD AUTO: 0.78 10*3/MM3 (ref 0.1–0.9)
MONOCYTES NFR BLD AUTO: 6.7 % (ref 5–12)
NEUTROPHILS NFR BLD AUTO: 85.4 % (ref 42.7–76)
NEUTROPHILS NFR BLD AUTO: 9.95 10*3/MM3 (ref 1.7–7)
NRBC BLD AUTO-RTO: 0.3 /100 WBC (ref 0–0.2)
OVALOCYTES BLD QL SMEAR: NORMAL
PLATELET # BLD AUTO: 176 10*3/MM3 (ref 140–450)
PMV BLD AUTO: 11.1 FL (ref 6–12)
POLYCHROMASIA BLD QL SMEAR: NORMAL
POTASSIUM SERPL-SCNC: 5.1 MMOL/L (ref 3.5–5.2)
PROT SERPL-MCNC: 5.6 G/DL (ref 6–8.5)
QT INTERVAL: 406 MS
QTC INTERVAL: 468 MS
RBC # BLD AUTO: 3.59 10*6/MM3 (ref 4.14–5.8)
SODIUM SERPL-SCNC: 141 MMOL/L (ref 136–145)
WBC NRBC COR # BLD AUTO: 11.66 10*3/MM3 (ref 3.4–10.8)

## 2024-08-14 PROCEDURE — 82077 ASSAY SPEC XCP UR&BREATH IA: CPT | Performed by: STUDENT IN AN ORGANIZED HEALTH CARE EDUCATION/TRAINING PROGRAM

## 2024-08-14 PROCEDURE — 99284 EMERGENCY DEPT VISIT MOD MDM: CPT

## 2024-08-14 PROCEDURE — 82140 ASSAY OF AMMONIA: CPT | Performed by: STUDENT IN AN ORGANIZED HEALTH CARE EDUCATION/TRAINING PROGRAM

## 2024-08-14 PROCEDURE — 85007 BL SMEAR W/DIFF WBC COUNT: CPT | Performed by: STUDENT IN AN ORGANIZED HEALTH CARE EDUCATION/TRAINING PROGRAM

## 2024-08-14 PROCEDURE — 36415 COLL VENOUS BLD VENIPUNCTURE: CPT

## 2024-08-14 PROCEDURE — 71045 X-RAY EXAM CHEST 1 VIEW: CPT

## 2024-08-14 PROCEDURE — 82948 REAGENT STRIP/BLOOD GLUCOSE: CPT

## 2024-08-14 PROCEDURE — G0257 UNSCHED DIALYSIS ESRD PT HOS: HCPCS

## 2024-08-14 PROCEDURE — 85025 COMPLETE CBC W/AUTO DIFF WBC: CPT | Performed by: STUDENT IN AN ORGANIZED HEALTH CARE EDUCATION/TRAINING PROGRAM

## 2024-08-14 PROCEDURE — 70450 CT HEAD/BRAIN W/O DYE: CPT | Performed by: RADIOLOGY

## 2024-08-14 PROCEDURE — 71045 X-RAY EXAM CHEST 1 VIEW: CPT | Performed by: RADIOLOGY

## 2024-08-14 PROCEDURE — 70450 CT HEAD/BRAIN W/O DYE: CPT

## 2024-08-14 PROCEDURE — 80053 COMPREHEN METABOLIC PANEL: CPT | Performed by: STUDENT IN AN ORGANIZED HEALTH CARE EDUCATION/TRAINING PROGRAM

## 2024-08-14 NOTE — ED PROVIDER NOTES
Subjective   History of Present Illness  74-year-old male with past medical history of end-stage renal disease requiring dialysis, diabetes, hypertension, congestive heart failure presents to the ER due to nursing home concerns for altered mental status.  Nursing of staff noted the patient was unresponsive and EMS was called.  EMS reported to the scene and found the patient performing his physical therapy testing with the physical therapy staff.  On arrival to the ER, the patient is awake and alert, oriented x 3.  No obvious focal neurological deficits or signs of lethargy.  Patient did reveal that he  has not had dialysis in several days.  No chest pain or shortness of breath.  Vitals stable.      Review of Systems   Constitutional:  Positive for fatigue.   All other systems reviewed and are negative.      Past Medical History:   Diagnosis Date    CHF (congestive heart failure)     Coronary artery disease     Diabetes mellitus     Dialysis patient     Disease of thyroid gland     Elevated cholesterol     GERD (gastroesophageal reflux disease)     Hypertension     Myocardial infarction     Renal disorder     stage 5       No Known Allergies    Past Surgical History:   Procedure Laterality Date    CARDIAC CATHETERIZATION N/A 5/28/2024    Procedure: Left Heart Cath;  Surgeon: Mackenzie Ruiz MD;  Location: [facility] CATH INVASIVE LOCATION;  Service: Cardiology;  Laterality: N/A;    CARDIAC SURGERY      cabg    CHOLECYSTECTOMY      COLONOSCOPY N/A 9/22/2023    Procedure: COLONOSCOPY;  Surgeon: Trip Peter MD;  Location: [facility] OR;  Service: Gastroenterology;  Laterality: N/A;    DIALYSIS FISTULA CREATION Left     arm       Family History   Problem Relation Age of Onset    Arthritis Mother     COPD Father     Diabetes Father     Heart disease Father     Hyperlipidemia Father     Hypertension Father     Cancer Daughter        Social History     Socioeconomic History    Marital status:    Tobacco Use    Smoking  status: Never    Smokeless tobacco: Never   Vaping Use    Vaping status: Never Used   Substance and Sexual Activity    Alcohol use: Never    Drug use: Never    Sexual activity: Defer           Objective   Physical Exam  Constitutional:       General: He is not in acute distress.     Appearance: He is well-developed. He is not ill-appearing.   HENT:      Head: Normocephalic and atraumatic.   Eyes:      Extraocular Movements: Extraocular movements intact.      Pupils: Pupils are equal, round, and reactive to light.   Neck:      Vascular: No JVD.   Cardiovascular:      Rate and Rhythm: Normal rate and regular rhythm.      Heart sounds: Normal heart sounds. No murmur heard.  Pulmonary:      Effort: No tachypnea, accessory muscle usage or respiratory distress.      Breath sounds: Normal breath sounds. No stridor. No decreased breath sounds, wheezing, rhonchi or rales.   Chest:      Chest wall: No deformity, tenderness or crepitus.   Abdominal:      General: Bowel sounds are normal.      Palpations: Abdomen is soft.      Tenderness: There is no abdominal tenderness. There is no guarding or rebound.   Musculoskeletal:         General: Normal range of motion.      Cervical back: Normal range of motion and neck supple.      Right lower leg: No tenderness. No edema.      Left lower leg: No tenderness. No edema.   Lymphadenopathy:      Cervical: No cervical adenopathy.   Skin:     General: Skin is warm and dry.      Coloration: Skin is not cyanotic.      Findings: No ecchymosis or erythema.   Neurological:      General: No focal deficit present.      Mental Status: He is alert and oriented to person, place, and time.      Cranial Nerves: No cranial nerve deficit.      Motor: No weakness.   Psychiatric:         Mood and Affect: Mood normal. Mood is not anxious.         Behavior: Behavior normal. Behavior is not agitated.         Procedures           ED Course  ED Course as of 08/14/24 1859   Wed Aug 14, 2024   1300 Laboratory  workup has been completed.  CT imaging of the head revealed no acute abnormality.  Patient's mental status has remained stable throughout ER course.  However, patient has not received dialysis in the past several days due to not feeling well and not being able to make his dialysis appointments.  I discussed this case with the on-call nephrologist Dr. Saucedo and he was agreeable to complete dialysis for this patient.  Patient will be discharged after dialysis is completed. [SF]      ED Course User Index  [SF] Saul Decker, DO                                       Results for orders placed or performed during the hospital encounter of 08/14/24   Comprehensive Metabolic Panel    Specimen: Blood   Result Value Ref Range    Glucose 129 (H) 65 - 99 mg/dL    BUN 74 (H) 8 - 23 mg/dL    Creatinine 6.46 (H) 0.76 - 1.27 mg/dL    Sodium 141 136 - 145 mmol/L    Potassium 5.1 3.5 - 5.2 mmol/L    Chloride 97 (L) 98 - 107 mmol/L    CO2 26.2 22.0 - 29.0 mmol/L    Calcium 9.1 8.6 - 10.5 mg/dL    Total Protein 5.6 (L) 6.0 - 8.5 g/dL    Albumin 3.1 (L) 3.5 - 5.2 g/dL    ALT (SGPT) 15 1 - 41 U/L    AST (SGOT) 17 1 - 40 U/L    Alkaline Phosphatase 138 (H) 39 - 117 U/L    Total Bilirubin 0.5 0.0 - 1.2 mg/dL    Globulin 2.5 gm/dL    A/G Ratio 1.2 g/dL    BUN/Creatinine Ratio 11.5 7.0 - 25.0    Anion Gap 17.8 (H) 5.0 - 15.0 mmol/L    eGFR 8.3 (L) >60.0 mL/min/1.73   Ethanol    Specimen: Blood   Result Value Ref Range    Ethanol <10 0 - 10 mg/dL    Ethanol % <0.010 %   Ammonia    Specimen: Blood   Result Value Ref Range    Ammonia 36 16 - 60 umol/L   CBC Auto Differential    Specimen: Blood   Result Value Ref Range    WBC 11.66 (H) 3.40 - 10.80 10*3/mm3    RBC 3.59 (L) 4.14 - 5.80 10*6/mm3    Hemoglobin 10.6 (L) 13.0 - 17.7 g/dL    Hematocrit 36.4 (L) 37.5 - 51.0 %    .4 (H) 79.0 - 97.0 fL    MCH 29.5 26.6 - 33.0 pg    MCHC 29.1 (L) 31.5 - 35.7 g/dL    RDW 22.1 (H) 12.3 - 15.4 %    RDW-SD 80.6 (H) 37.0 - 54.0 fl    MPV 11.1 6.0 -  12.0 fL    Platelets 176 140 - 450 10*3/mm3    Neutrophil % 85.4 (H) 42.7 - 76.0 %    Lymphocyte % 5.1 (L) 19.6 - 45.3 %    Monocyte % 6.7 5.0 - 12.0 %    Eosinophil % 1.9 0.3 - 6.2 %    Basophil % 0.4 0.0 - 1.5 %    Immature Grans % 0.5 0.0 - 0.5 %    Neutrophils, Absolute 9.95 (H) 1.70 - 7.00 10*3/mm3    Lymphocytes, Absolute 0.60 (L) 0.70 - 3.10 10*3/mm3    Monocytes, Absolute 0.78 0.10 - 0.90 10*3/mm3    Eosinophils, Absolute 0.22 0.00 - 0.40 10*3/mm3    Basophils, Absolute 0.05 0.00 - 0.20 10*3/mm3    Immature Grans, Absolute 0.06 (H) 0.00 - 0.05 10*3/mm3    nRBC 0.3 (H) 0.0 - 0.2 /100 WBC   Scan Slide    Specimen: Blood   Result Value Ref Range    Anisocytosis Large/3+ None Seen    Hypochromia Mod/2+ None Seen    Macrocytes Slight/1+ None Seen    Ovalocytes Slight/1+ None Seen    Polychromasia Slight/1+ None Seen    Large Platelets Slight/1+ None Seen   POC Glucose Once    Specimen: Blood   Result Value Ref Range    Glucose 131 (H) 70 - 130 mg/dL                 Medical Decision Making  CBC listed.  CMP notes an elevated creatinine greater than 6.  Head CT without contrast revealed no acute abnormality.  Ethanol glucose within normal limits.  Chest x-ray noted small right pleural effusion.  Given the patient has not received dialysis in several days, I contacted on-call nephrologist, Dr. Saucedo, and he was agreeable to dialyze the patient during his ER stay.    Patient received dialysis.  Tolerated well.    Patient will be discharged back to the nursing home for further treatment and care.  Work up and results were discussed throughly with the patient.  The patient will be discharged for further monitoring and management with their PCP.  Red flags, warning signs, worsening symptoms, and when to return to the ER discussed with and understood by the patient.  Patient will follow up with their PCP in a timely manner.  Vitals stable at discharge.    Problems Addressed:  End stage renal disease: complicated acute  illness or injury  Hemodialysis patient: complicated acute illness or injury    Amount and/or Complexity of Data Reviewed  Labs: ordered. Decision-making details documented in ED Course.  Radiology: ordered. Decision-making details documented in ED Course.        Final diagnoses:   End stage renal disease   Hemodialysis patient       ED Disposition  ED Disposition       ED Disposition   Discharge    Condition   Stable    Comment   --               Jag Guillaume MD  [address] [facility] [address]  [phone]    In 1 week      [facility] EMERGENCY DEPARTMENT  [address] [place] [id number]  [phone]    If symptoms worsen         Medication List      No changes were made to your prescriptions during this visit.            Saul Decker,   08/14/24 1837       Saul Decker,   08/14/24 1852

## 2024-08-14 NOTE — ED PROVIDER NOTES
Subjective   History of Present Illness  74-year-old male who is well-known to our service at the ED he has several chronic comorbidities including CHF CAD diabetes mellitus and chronic pulmonary effusions presenting to the ED due to concerns for vomiting blood.  At bedside patient is ANO x 3.  As stated before he is chronically ill but appears to be at his baseline.  There is no vomiting or diarrhea seen at bedside.  Patient is on 2 L nasal cannula breathing easily without complaint.      Review of Systems   Constitutional: Negative.  Negative for fever.   HENT: Negative.     Respiratory: Negative.     Cardiovascular: Negative.  Negative for chest pain.   Gastrointestinal: Negative.  Positive for vomiting. Negative for abdominal distention, abdominal pain, anal bleeding, blood in stool, constipation, diarrhea and nausea.   Endocrine: Negative.    Genitourinary: Negative.  Negative for dysuria.   Skin: Negative.    Neurological: Negative.    Psychiatric/Behavioral: Negative.     All other systems reviewed and are negative.      Past Medical History:   Diagnosis Date    CHF (congestive heart failure)     Coronary artery disease     Diabetes mellitus     Dialysis patient     Disease of thyroid gland     Elevated cholesterol     GERD (gastroesophageal reflux disease)     Hypertension     Myocardial infarction     Renal disorder     stage 5       No Known Allergies    Past Surgical History:   Procedure Laterality Date    CARDIAC CATHETERIZATION N/A 5/28/2024    Procedure: Left Heart Cath;  Surgeon: Mackenzie Ruiz MD;  Location: [facility] CATH INVASIVE LOCATION;  Service: Cardiology;  Laterality: N/A;    CARDIAC SURGERY      cabg    CHOLECYSTECTOMY      COLONOSCOPY N/A 9/22/2023    Procedure: COLONOSCOPY;  Surgeon: Trip Peter MD;  Location: [facility] OR;  Service: Gastroenterology;  Laterality: N/A;    DIALYSIS FISTULA CREATION Left     arm       Family History   Problem Relation Age of Onset    Arthritis Mother     COPD  Father     Diabetes Father     Heart disease Father     Hyperlipidemia Father     Hypertension Father     Cancer Daughter        Social History     Socioeconomic History    Marital status:    Tobacco Use    Smoking status: Never    Smokeless tobacco: Never   Vaping Use    Vaping status: Never Used   Substance and Sexual Activity    Alcohol use: Never    Drug use: Never    Sexual activity: Defer           Objective   Physical Exam  Vitals and nursing note reviewed.   Constitutional:       General: He is not in acute distress.     Appearance: He is well-developed. He is not diaphoretic.   HENT:      Head: Normocephalic and atraumatic.      Right Ear: External ear normal.      Left Ear: External ear normal.      Nose: Nose normal.   Eyes:      Conjunctiva/sclera: Conjunctivae normal.      Pupils: Pupils are equal, round, and reactive to light.   Neck:      Vascular: No JVD.      Trachea: No tracheal deviation.   Cardiovascular:      Rate and Rhythm: Normal rate and regular rhythm.      Heart sounds: Normal heart sounds. No murmur heard.  Pulmonary:      Effort: Pulmonary effort is normal. No respiratory distress.      Breath sounds: Examination of the right-upper field reveals decreased breath sounds. Examination of the left-upper field reveals decreased breath sounds. Examination of the right-middle field reveals decreased breath sounds. Examination of the left-middle field reveals decreased breath sounds. Examination of the right-lower field reveals decreased breath sounds. Examination of the left-lower field reveals decreased breath sounds. Decreased breath sounds present. No wheezing.   Abdominal:      General: Bowel sounds are normal.      Palpations: Abdomen is soft.      Tenderness: There is no abdominal tenderness.   Musculoskeletal:         General: No deformity. Normal range of motion.      Cervical back: Normal range of motion and neck supple.      Right lower leg: Edema present.      Left lower leg:  Edema present.   Skin:     General: Skin is warm and dry.      Coloration: Skin is not pale.      Findings: No erythema or rash.   Neurological:      Mental Status: He is alert and oriented to person, place, and time.      Cranial Nerves: No cranial nerve deficit.   Psychiatric:         Behavior: Behavior normal.         Thought Content: Thought content normal.         Procedures           ED Course  ED Course as of 08/13/24 2101 Tue Aug 13, 2024   1729 EKG interpretation: Atrial fibrillation with competing junctional pacemaker 80 bpm QTc 468 nonspecific changes.    Electronically signed by Francesco Gaming DO, 08/13/24, 5:29 PM EDT.   [GF]      ED Course User Index  [GF] Francesco Gaming DO                                             Medical Decision Making  Imaging and laboratory studies consistent with patient's baseline.  There is no signs of an acute bleed his anemia is improved from his previous visits.  Patient has had no nausea vomiting or diarrhea since presenting to our ED.  Patient can be seen resting peacefully.  Called patient's son Joaquina and discussed the case and findings with him.  All relevant information was reviewed and all questions were answered.  Plan to send patient back to nursing home there is no acute pathology at this time.    Problems Addressed:  Vomiting, unspecified vomiting type, unspecified whether nausea present: complicated acute illness or injury    Amount and/or Complexity of Data Reviewed  Labs: ordered.  Radiology: ordered.  ECG/medicine tests: ordered.    Risk  Prescription drug management.    CT Angiogram Abdomen    Result Date: 8/13/2024   1.  Large bilateral pleural effusions, right greater than left, unchanged.  Bibasilar compressive atelectasis.  2.  Mild ascites.  3.  No acute vascular abnormality.   This report was finalized on 8/13/2024 8:18 PM by Rosario Rivera MD.      CT Chest Without Contrast Diagnostic    Result Date: 8/13/2024   1.  Large bilateral pleural  effusions, right greater than left, unchanged.  Bibasilar compressive atelectasis.  2.  Mild ascites.  3.  No acute vascular abnormality.   This report was finalized on 8/13/2024 8:18 PM by Rosario Rivera MD.     Results for orders placed or performed during the hospital encounter of 08/13/24   Comprehensive Metabolic Panel    Specimen: Arm, Right; Blood   Result Value Ref Range    Glucose 176 (H) 65 - 99 mg/dL    BUN 69 (H) 8 - 23 mg/dL    Creatinine 6.05 (H) 0.76 - 1.27 mg/dL    Sodium 140 136 - 145 mmol/L    Potassium 4.5 3.5 - 5.2 mmol/L    Chloride 96 (L) 98 - 107 mmol/L    CO2 25.9 22.0 - 29.0 mmol/L    Calcium 9.2 8.6 - 10.5 mg/dL    Total Protein 6.0 6.0 - 8.5 g/dL    Albumin 3.3 (L) 3.5 - 5.2 g/dL    ALT (SGPT) 18 1 - 41 U/L    AST (SGOT) 16 1 - 40 U/L    Alkaline Phosphatase 141 (H) 39 - 117 U/L    Total Bilirubin 0.5 0.0 - 1.2 mg/dL    Globulin 2.7 gm/dL    A/G Ratio 1.2 g/dL    BUN/Creatinine Ratio 11.4 7.0 - 25.0    Anion Gap 18.1 (H) 5.0 - 15.0 mmol/L    eGFR 9.0 (L) >60.0 mL/min/1.73   Protime-INR    Specimen: Arm, Right; Blood   Result Value Ref Range    Protime 27.0 (H) 12.1 - 14.7 Seconds    INR 2.50 (H) 0.90 - 1.10   CBC Auto Differential    Specimen: Arm, Right; Blood   Result Value Ref Range    WBC 8.78 3.40 - 10.80 10*3/mm3    RBC 3.73 (L) 4.14 - 5.80 10*6/mm3    Hemoglobin 10.9 (L) 13.0 - 17.7 g/dL    Hematocrit 37.1 (L) 37.5 - 51.0 %    MCV 99.5 (H) 79.0 - 97.0 fL    MCH 29.2 26.6 - 33.0 pg    MCHC 29.4 (L) 31.5 - 35.7 g/dL    RDW 22.5 (H) 12.3 - 15.4 %    RDW-SD 81.1 (H) 37.0 - 54.0 fl    MPV 10.8 6.0 - 12.0 fL    Platelets 178 140 - 450 10*3/mm3    Neutrophil % 79.6 (H) 42.7 - 76.0 %    Lymphocyte % 8.7 (L) 19.6 - 45.3 %    Monocyte % 8.7 5.0 - 12.0 %    Eosinophil % 1.8 0.3 - 6.2 %    Basophil % 0.7 0.0 - 1.5 %    Immature Grans % 0.5 0.0 - 0.5 %    Neutrophils, Absolute 7.00 1.70 - 7.00 10*3/mm3    Lymphocytes, Absolute 0.76 0.70 - 3.10 10*3/mm3    Monocytes, Absolute 0.76 0.10 - 0.90  10*3/mm3    Eosinophils, Absolute 0.16 0.00 - 0.40 10*3/mm3    Basophils, Absolute 0.06 0.00 - 0.20 10*3/mm3    Immature Grans, Absolute 0.04 0.00 - 0.05 10*3/mm3    nRBC 0.0 0.0 - 0.2 /100 WBC   Blood Gas, Arterial With Co-Ox    Specimen: Arterial Blood   Result Value Ref Range    Site Right Brachial     René's Test N/A     pH, Arterial 7.319 (L) 7.350 - 7.450 pH units    pCO2, Arterial 49.0 (H) 35.0 - 45.0 mm Hg    pO2, Arterial 49.1 (C) 83.0 - 108.0 mm Hg    HCO3, Arterial 25.1 20.0 - 26.0 mmol/L    Base Excess, Arterial -1.3 (L) 0.0 - 2.0 mmol/L    O2 Saturation, Arterial 78.8 (L) 94.0 - 99.0 %    Hemoglobin, Blood Gas 10.5 (L) 14 - 18 g/dL    Hematocrit, Blood Gas 32.3 (L) 38.0 - 51.0 %    Oxyhemoglobin 76.4 (L) 94 - 99 %    Methemoglobin 0.40 0.00 - 3.00 %    Carboxyhemoglobin 2.6 0 - 5 %    A-a DO2 38.7 0.0 - 300.0 mmHg    CO2 Content 26.6 22 - 33 mmol/L    Barometric Pressure for Blood Gas 728 mmHg    Modality Room Air     FIO2 21 %    Ventilator Mode NA     Note Read back and acknowledge     Notified Who DR LOPEZ // RN     Notified By [id number]     Notified Time 08/13/2024 17:23     Collected by [id number]     pH, Temp Corrected      pCO2, Temperature Corrected      pO2, Temperature Corrected     High Sensitivity Troponin T    Specimen: Arm, Right; Blood   Result Value Ref Range    HS Troponin T 193 (C) <22 ng/L   Scan Slide    Specimen: Arm, Right; Blood   Result Value Ref Range    Acanthocytes Slight/1+ None Seen    Anisocytosis Slight/1+ None Seen    Hypochromia Slight/1+ None Seen    Ovalocytes Slight/1+ None Seen    Polychromasia Slight/1+ None Seen    Large Platelets Slight/1+ None Seen   High Sensitivity Troponin T 2Hr    Specimen: Arm, Right; Blood   Result Value Ref Range    HS Troponin T 183 (C) <22 ng/L    Troponin T Delta -10 (L) >=-4 - <+4 ng/L   ECG 12 Lead QT Measurement   Result Value Ref Range    QT Interval 406 ms    QTC Interval 468 ms   Type & Screen    Specimen: Arm, Right; Blood   Result  Value Ref Range    ABO Type NO TYPE DETERMINED     RH type Negative     Antibody Screen Negative     T&S Expiration Date 8/16/2024 11:59:59 PM    ABO RH Specimen Verification    Specimen: Arm, Right; Blood   Result Value Ref Range    ABO Type AB     RH type Negative    Green Top (Gel)   Result Value Ref Range    Extra Tube Hold for add-ons.    Lavender Top   Result Value Ref Range    Extra Tube hold for add-on    Gold Top - SST   Result Value Ref Range    Extra Tube Hold for add-ons.    Light Blue Top   Result Value Ref Range    Extra Tube Hold for add-ons.          Final diagnoses:   Vomiting, unspecified vomiting type, unspecified whether nausea present       ED Disposition  ED Disposition       ED Disposition   Discharge    Condition   Stable    Comment   --               Jag Guillaume MD  [address] [facility] [place] [address] [phone]    Schedule an appointment as soon as possible for a visit in 1 week           Medication List      No changes were made to your prescriptions during this visit.            Francesco Gaming, DO  08/13/24 [id number]

## 2024-08-14 NOTE — DISCHARGE INSTRUCTIONS
It is unclear what is causing vomiting at this time.  However based on our studies you do not have a bleed.  If symptoms worsen or return please return to ED.

## 2024-08-15 ENCOUNTER — APPOINTMENT (OUTPATIENT)
Dept: WOUND CARE | Facility: HOSPITAL | Age: 74
End: 2024-08-15
Payer: MEDICARE

## 2024-08-15 ENCOUNTER — LAB REQUISITION (OUTPATIENT)
Dept: LAB | Facility: HOSPITAL | Age: 74
End: 2024-08-15
Payer: MEDICARE

## 2024-08-15 DIAGNOSIS — I10 ESSENTIAL (PRIMARY) HYPERTENSION: ICD-10-CM

## 2024-08-15 LAB
ALBUMIN SERPL-MCNC: 3.2 G/DL (ref 3.5–5.2)
ALBUMIN/GLOB SERPL: 1.3 G/DL
ALP SERPL-CCNC: 148 U/L (ref 39–117)
ALT SERPL W P-5'-P-CCNC: 18 U/L (ref 1–41)
ANION GAP SERPL CALCULATED.3IONS-SCNC: 15.3 MMOL/L (ref 5–15)
ANISOCYTOSIS BLD QL: NORMAL
AST SERPL-CCNC: 21 U/L (ref 1–40)
BASOPHILS # BLD AUTO: 0.03 10*3/MM3 (ref 0–0.2)
BASOPHILS NFR BLD AUTO: 0.3 % (ref 0–1.5)
BILIRUB SERPL-MCNC: 0.5 MG/DL (ref 0–1.2)
BUN SERPL-MCNC: 44 MG/DL (ref 8–23)
BUN/CREAT SERPL: 10.1 (ref 7–25)
CALCIUM SPEC-SCNC: 8.8 MG/DL (ref 8.6–10.5)
CHLORIDE SERPL-SCNC: 95 MMOL/L (ref 98–107)
CHOLEST SERPL-MCNC: 52 MG/DL (ref 0–200)
CO2 SERPL-SCNC: 28.7 MMOL/L (ref 22–29)
CREAT SERPL-MCNC: 4.34 MG/DL (ref 0.76–1.27)
DEPRECATED RDW RBC AUTO: 77.4 FL (ref 37–54)
EGFRCR SERPLBLD CKD-EPI 2021: 13.4 ML/MIN/1.73
EOSINOPHIL # BLD AUTO: 0.22 10*3/MM3 (ref 0–0.4)
EOSINOPHIL NFR BLD AUTO: 2.4 % (ref 0.3–6.2)
ERYTHROCYTE [DISTWIDTH] IN BLOOD BY AUTOMATED COUNT: 22.1 % (ref 12.3–15.4)
GLOBULIN UR ELPH-MCNC: 2.4 GM/DL
GLUCOSE SERPL-MCNC: 138 MG/DL (ref 65–99)
HBA1C MFR BLD: 5.7 % (ref 4.8–5.6)
HCT VFR BLD AUTO: 31.9 % (ref 37.5–51)
HDLC SERPL-MCNC: 25 MG/DL (ref 40–60)
HGB BLD-MCNC: 9.5 G/DL (ref 13–17.7)
HYPOCHROMIA BLD QL: NORMAL
IMM GRANULOCYTES # BLD AUTO: 0.05 10*3/MM3 (ref 0–0.05)
IMM GRANULOCYTES NFR BLD AUTO: 0.6 % (ref 0–0.5)
LDLC SERPL CALC-MCNC: <5 MG/DL (ref 0–100)
LDLC/HDLC SERPL: 0.04 {RATIO}
LYMPHOCYTES # BLD AUTO: 0.5 10*3/MM3 (ref 0.7–3.1)
LYMPHOCYTES NFR BLD AUTO: 5.5 % (ref 19.6–45.3)
MACROCYTES BLD QL SMEAR: NORMAL
MCH RBC QN AUTO: 29.3 PG (ref 26.6–33)
MCHC RBC AUTO-ENTMCNC: 29.8 G/DL (ref 31.5–35.7)
MCV RBC AUTO: 98.5 FL (ref 79–97)
MONOCYTES # BLD AUTO: 0.76 10*3/MM3 (ref 0.1–0.9)
MONOCYTES NFR BLD AUTO: 8.4 % (ref 5–12)
NEUTROPHILS NFR BLD AUTO: 7.51 10*3/MM3 (ref 1.7–7)
NEUTROPHILS NFR BLD AUTO: 82.8 % (ref 42.7–76)
NRBC BLD AUTO-RTO: 0 /100 WBC (ref 0–0.2)
PLAT MORPH BLD: NORMAL
PLATELET # BLD AUTO: 155 10*3/MM3 (ref 140–450)
PMV BLD AUTO: 11.3 FL (ref 6–12)
POIKILOCYTOSIS BLD QL SMEAR: NORMAL
POTASSIUM SERPL-SCNC: 3.8 MMOL/L (ref 3.5–5.2)
PROT SERPL-MCNC: 5.6 G/DL (ref 6–8.5)
RBC # BLD AUTO: 3.24 10*6/MM3 (ref 4.14–5.8)
SODIUM SERPL-SCNC: 139 MMOL/L (ref 136–145)
TRIGL SERPL-MCNC: 130 MG/DL (ref 0–150)
TSH SERPL DL<=0.05 MIU/L-ACNC: 8.29 UIU/ML (ref 0.27–4.2)
VLDLC SERPL-MCNC: ABNORMAL MG/DL
WBC NRBC COR # BLD AUTO: 9.07 10*3/MM3 (ref 3.4–10.8)

## 2024-08-15 PROCEDURE — 80053 COMPREHEN METABOLIC PANEL: CPT | Performed by: NURSE PRACTITIONER

## 2024-08-15 PROCEDURE — 80061 LIPID PANEL: CPT | Performed by: NURSE PRACTITIONER

## 2024-08-15 PROCEDURE — 85025 COMPLETE CBC W/AUTO DIFF WBC: CPT | Performed by: NURSE PRACTITIONER

## 2024-08-15 PROCEDURE — 85007 BL SMEAR W/DIFF WBC COUNT: CPT | Performed by: NURSE PRACTITIONER

## 2024-08-15 PROCEDURE — 83036 HEMOGLOBIN GLYCOSYLATED A1C: CPT | Performed by: NURSE PRACTITIONER

## 2024-08-15 PROCEDURE — 84443 ASSAY THYROID STIM HORMONE: CPT | Performed by: NURSE PRACTITIONER

## 2024-08-18 ENCOUNTER — HOSPITAL ENCOUNTER (EMERGENCY)
Facility: HOSPITAL | Age: 74
Discharge: NURSING, REHAB OR LONG-TERM CARE | End: 2024-08-18
Attending: EMERGENCY MEDICINE | Admitting: EMERGENCY MEDICINE
Payer: MEDICARE

## 2024-08-18 ENCOUNTER — APPOINTMENT (OUTPATIENT)
Dept: GENERAL RADIOLOGY | Facility: HOSPITAL | Age: 74
End: 2024-08-18
Payer: MEDICARE

## 2024-08-18 VITALS
BODY MASS INDEX: 29.41 KG/M2 | HEART RATE: 76 BPM | HEIGHT: 71 IN | RESPIRATION RATE: 16 BRPM | WEIGHT: 210.1 LBS | OXYGEN SATURATION: 98 % | SYSTOLIC BLOOD PRESSURE: 122 MMHG | DIASTOLIC BLOOD PRESSURE: 79 MMHG | TEMPERATURE: 98.1 F

## 2024-08-18 DIAGNOSIS — N18.6 END STAGE RENAL DISEASE: Primary | ICD-10-CM

## 2024-08-18 LAB
ALBUMIN SERPL-MCNC: 3 G/DL (ref 3.5–5.2)
ALBUMIN/GLOB SERPL: 1.1 G/DL
ALP SERPL-CCNC: 137 U/L (ref 39–117)
ALT SERPL W P-5'-P-CCNC: 13 U/L (ref 1–41)
ANION GAP SERPL CALCULATED.3IONS-SCNC: 20.7 MMOL/L (ref 5–15)
ANISOCYTOSIS BLD QL: NORMAL
AST SERPL-CCNC: 18 U/L (ref 1–40)
BACTERIA UR QL AUTO: ABNORMAL /HPF
BASOPHILS # BLD AUTO: 0.06 10*3/MM3 (ref 0–0.2)
BASOPHILS NFR BLD AUTO: 0.6 % (ref 0–1.5)
BILIRUB SERPL-MCNC: 0.5 MG/DL (ref 0–1.2)
BILIRUB UR QL STRIP: ABNORMAL
BUN SERPL-MCNC: 83 MG/DL (ref 8–23)
BUN/CREAT SERPL: 11.7 (ref 7–25)
CALCIUM SPEC-SCNC: 8.9 MG/DL (ref 8.6–10.5)
CHLORIDE SERPL-SCNC: 95 MMOL/L (ref 98–107)
CLARITY UR: ABNORMAL
CO2 SERPL-SCNC: 23.3 MMOL/L (ref 22–29)
COLOR UR: ABNORMAL
CREAT SERPL-MCNC: 7.07 MG/DL (ref 0.76–1.27)
DEPRECATED RDW RBC AUTO: 78.2 FL (ref 37–54)
EGFRCR SERPLBLD CKD-EPI 2021: 7.5 ML/MIN/1.73
EOSINOPHIL # BLD AUTO: 0.24 10*3/MM3 (ref 0–0.4)
EOSINOPHIL NFR BLD AUTO: 2.4 % (ref 0.3–6.2)
ERYTHROCYTE [DISTWIDTH] IN BLOOD BY AUTOMATED COUNT: 21.4 % (ref 12.3–15.4)
FLUAV RNA RESP QL NAA+PROBE: NOT DETECTED
FLUBV RNA RESP QL NAA+PROBE: NOT DETECTED
GEN 5 2HR TROPONIN T REFLEX: 190 NG/L
GLOBULIN UR ELPH-MCNC: 2.7 GM/DL
GLUCOSE SERPL-MCNC: 165 MG/DL (ref 65–99)
GLUCOSE UR STRIP-MCNC: NEGATIVE MG/DL
HCT VFR BLD AUTO: 34.1 % (ref 37.5–51)
HGB BLD-MCNC: 10 G/DL (ref 13–17.7)
HGB UR QL STRIP.AUTO: ABNORMAL
HOLD SPECIMEN: NORMAL
HOLD SPECIMEN: NORMAL
HYALINE CASTS UR QL AUTO: ABNORMAL /LPF
HYPOCHROMIA BLD QL: NORMAL
IMM GRANULOCYTES # BLD AUTO: 0.05 10*3/MM3 (ref 0–0.05)
IMM GRANULOCYTES NFR BLD AUTO: 0.5 % (ref 0–0.5)
KETONES UR QL STRIP: NEGATIVE
LEUKOCYTE ESTERASE UR QL STRIP.AUTO: ABNORMAL
LYMPHOCYTES # BLD AUTO: 0.81 10*3/MM3 (ref 0.7–3.1)
LYMPHOCYTES NFR BLD AUTO: 8.1 % (ref 19.6–45.3)
MACROCYTES BLD QL SMEAR: NORMAL
MCH RBC QN AUTO: 29.2 PG (ref 26.6–33)
MCHC RBC AUTO-ENTMCNC: 29.3 G/DL (ref 31.5–35.7)
MCV RBC AUTO: 99.4 FL (ref 79–97)
MONOCYTES # BLD AUTO: 0.81 10*3/MM3 (ref 0.1–0.9)
MONOCYTES NFR BLD AUTO: 8.1 % (ref 5–12)
NEUTROPHILS NFR BLD AUTO: 8.05 10*3/MM3 (ref 1.7–7)
NEUTROPHILS NFR BLD AUTO: 80.3 % (ref 42.7–76)
NITRITE UR QL STRIP: POSITIVE
NRBC BLD AUTO-RTO: 0 /100 WBC (ref 0–0.2)
NT-PROBNP SERPL-MCNC: ABNORMAL PG/ML (ref 0–1800)
PH UR STRIP.AUTO: <=5 [PH] (ref 5–8)
PLAT MORPH BLD: NORMAL
PLATELET # BLD AUTO: 153 10*3/MM3 (ref 140–450)
PMV BLD AUTO: 10.4 FL (ref 6–12)
POIKILOCYTOSIS BLD QL SMEAR: NORMAL
POTASSIUM SERPL-SCNC: 5.1 MMOL/L (ref 3.5–5.2)
PROT SERPL-MCNC: 5.7 G/DL (ref 6–8.5)
PROT UR QL STRIP: ABNORMAL
QT INTERVAL: 400 MS
QTC INTERVAL: 458 MS
RBC # BLD AUTO: 3.43 10*6/MM3 (ref 4.14–5.8)
RBC # UR STRIP: ABNORMAL /HPF
REF LAB TEST METHOD: ABNORMAL
SARS-COV-2 RNA RESP QL NAA+PROBE: NOT DETECTED
SODIUM SERPL-SCNC: 139 MMOL/L (ref 136–145)
SP GR UR STRIP: 1.02 (ref 1–1.03)
SQUAMOUS #/AREA URNS HPF: ABNORMAL /HPF
TROPONIN T DELTA: -17 NG/L
TROPONIN T SERPL HS-MCNC: 207 NG/L
UROBILINOGEN UR QL STRIP: ABNORMAL
WBC # UR STRIP: ABNORMAL /HPF
WBC NRBC COR # BLD AUTO: 10.02 10*3/MM3 (ref 3.4–10.8)
WHOLE BLOOD HOLD COAG: NORMAL
WHOLE BLOOD HOLD SPECIMEN: NORMAL
YEAST URNS QL MICRO: ABNORMAL /HPF

## 2024-08-18 PROCEDURE — 51798 US URINE CAPACITY MEASURE: CPT

## 2024-08-18 PROCEDURE — 85007 BL SMEAR W/DIFF WBC COUNT: CPT

## 2024-08-18 PROCEDURE — 83880 ASSAY OF NATRIURETIC PEPTIDE: CPT

## 2024-08-18 PROCEDURE — 96365 THER/PROPH/DIAG IV INF INIT: CPT

## 2024-08-18 PROCEDURE — 84484 ASSAY OF TROPONIN QUANT: CPT

## 2024-08-18 PROCEDURE — 71045 X-RAY EXAM CHEST 1 VIEW: CPT | Performed by: RADIOLOGY

## 2024-08-18 PROCEDURE — 85025 COMPLETE CBC W/AUTO DIFF WBC: CPT

## 2024-08-18 PROCEDURE — 87636 SARSCOV2 & INF A&B AMP PRB: CPT

## 2024-08-18 PROCEDURE — 71045 X-RAY EXAM CHEST 1 VIEW: CPT

## 2024-08-18 PROCEDURE — 25010000002 METHYLPREDNISOLONE PER 125 MG

## 2024-08-18 PROCEDURE — 96375 TX/PRO/DX INJ NEW DRUG ADDON: CPT

## 2024-08-18 PROCEDURE — 80053 COMPREHEN METABOLIC PANEL: CPT

## 2024-08-18 PROCEDURE — 25010000002 CEFTRIAXONE PER 250 MG

## 2024-08-18 PROCEDURE — 93010 ELECTROCARDIOGRAM REPORT: CPT | Performed by: INTERNAL MEDICINE

## 2024-08-18 PROCEDURE — 87086 URINE CULTURE/COLONY COUNT: CPT

## 2024-08-18 PROCEDURE — P9612 CATHETERIZE FOR URINE SPEC: HCPCS

## 2024-08-18 PROCEDURE — 81001 URINALYSIS AUTO W/SCOPE: CPT

## 2024-08-18 PROCEDURE — 93005 ELECTROCARDIOGRAM TRACING: CPT

## 2024-08-18 PROCEDURE — 99284 EMERGENCY DEPT VISIT MOD MDM: CPT

## 2024-08-18 PROCEDURE — 36415 COLL VENOUS BLD VENIPUNCTURE: CPT

## 2024-08-18 RX ORDER — METHYLPREDNISOLONE SODIUM SUCCINATE 125 MG/2ML
80 INJECTION, POWDER, LYOPHILIZED, FOR SOLUTION INTRAMUSCULAR; INTRAVENOUS ONCE
Status: COMPLETED | OUTPATIENT
Start: 2024-08-18 | End: 2024-08-18

## 2024-08-18 RX ADMIN — METHYLPREDNISOLONE SODIUM SUCCINATE 80 MG: 125 INJECTION, POWDER, FOR SOLUTION INTRAMUSCULAR; INTRAVENOUS at 16:07

## 2024-08-18 RX ADMIN — CEFTRIAXONE 1000 MG: 1 INJECTION, POWDER, FOR SOLUTION INTRAMUSCULAR; INTRAVENOUS at 15:03

## 2024-08-18 NOTE — ED PROVIDER NOTES
"Subjective   History of Present Illness  Patient is a 74-year-old male with a past medical history of hypertension, renal disease, hyperlipidemia, thyroid disease, diabetes, CHF, and coronary artery disease.  Patient presents from [facility] after nursing home reported that he was \"unresponsive\" this morning.  Patient however presents alert and oriented and in no acute distress to the ER.  Patient reports that he feels weak and tired however does not complain of any pain and has no complaints upon arrival.  Nursing home reports patient last had dialysis on Wednesday of last week and would not go on Friday of last week.  Patient reports \"I do not remember them asking me to go to dialysis and that is why have not went.\"  Patient's family at bedside reporting the patient does like to decline going to dialysis quite often.  Patient presents in no acute distress via EMS from [facility].        Review of Systems   Constitutional: Negative.  Negative for fever.   HENT: Negative.     Respiratory: Negative.     Cardiovascular: Negative.  Negative for chest pain.   Gastrointestinal: Negative.  Negative for abdominal pain.   Endocrine: Negative.    Genitourinary: Negative.  Negative for dysuria.   Skin: Negative.    Neurological: Negative.  Positive for weakness.   Psychiatric/Behavioral: Negative.     All other systems reviewed and are negative.      Past Medical History:   Diagnosis Date    CHF (congestive heart failure)     Coronary artery disease     Diabetes mellitus     Dialysis patient     Disease of thyroid gland     Elevated cholesterol     GERD (gastroesophageal reflux disease)     Hypertension     Myocardial infarction     Renal disorder     stage 5       No Known Allergies    Past Surgical History:   Procedure Laterality Date    CARDIAC CATHETERIZATION N/A 5/28/2024    Procedure: Left Heart Cath;  Surgeon: Mackenzie Ruiz MD;  Location: [facility] CATH INVASIVE LOCATION;  Service: Cardiology;  " Laterality: N/A;    CARDIAC SURGERY      cabg    CHOLECYSTECTOMY      COLONOSCOPY N/A 9/22/2023    Procedure: COLONOSCOPY;  Surgeon: Trip Peter MD;  Location: [facility];  Service: Gastroenterology;  Laterality: N/A;    DIALYSIS FISTULA CREATION Left     arm       Family History   Problem Relation Age of Onset    Arthritis Mother     COPD Father     Diabetes Father     Heart disease Father     Hyperlipidemia Father     Hypertension Father     Cancer Daughter        Social History     Socioeconomic History    Marital status:    Tobacco Use    Smoking status: Never    Smokeless tobacco: Never   Vaping Use    Vaping status: Never Used   Substance and Sexual Activity    Alcohol use: Never    Drug use: Never    Sexual activity: Defer           Objective   Physical Exam  Vitals and nursing note reviewed.   Constitutional:       General: He is not in acute distress.     Appearance: He is well-developed. He is not diaphoretic.   HENT:      Head: Normocephalic and atraumatic.      Right Ear: External ear normal.      Left Ear: External ear normal.      Nose: Nose normal.   Eyes:      Conjunctiva/sclera: Conjunctivae normal.      Pupils: Pupils are equal, round, and reactive to light.   Neck:      Vascular: No JVD.      Trachea: No tracheal deviation.   Cardiovascular:      Rate and Rhythm: Normal rate and regular rhythm.      Heart sounds: Normal heart sounds. No murmur heard.  Pulmonary:      Effort: Pulmonary effort is normal. No respiratory distress.      Breath sounds: Normal breath sounds. No wheezing.   Abdominal:      General: Bowel sounds are normal.      Palpations: Abdomen is soft.      Tenderness: There is no abdominal tenderness.   Musculoskeletal:         General: No deformity. Normal range of motion.      Cervical back: Normal range of motion and neck supple.   Skin:     General: Skin is warm and dry.      Coloration: Skin is not pale.      Findings: No erythema or rash.   Neurological:      Mental  Status: He is alert and oriented to person, place, and time.      Cranial Nerves: No cranial nerve deficit.   Psychiatric:         Behavior: Behavior normal.         Thought Content: Thought content normal.       Results for orders placed or performed during the hospital encounter of 08/18/24   COVID-19 and FLU A/B PCR, 1 HR TAT - Swab, Nasopharynx    Specimen: Nasopharynx; Swab   Result Value Ref Range    COVID19 Not Detected Not Detected - Ref. Range    Influenza A PCR Not Detected Not Detected    Influenza B PCR Not Detected Not Detected   Comprehensive Metabolic Panel    Specimen: Arm, Right; Blood   Result Value Ref Range    Glucose 165 (H) 65 - 99 mg/dL    BUN 83 (H) 8 - 23 mg/dL    Creatinine 7.07 (H) 0.76 - 1.27 mg/dL    Sodium 139 136 - 145 mmol/L    Potassium 5.1 3.5 - 5.2 mmol/L    Chloride 95 (L) 98 - 107 mmol/L    CO2 23.3 22.0 - 29.0 mmol/L    Calcium 8.9 8.6 - 10.5 mg/dL    Total Protein 5.7 (L) 6.0 - 8.5 g/dL    Albumin 3.0 (L) 3.5 - 5.2 g/dL    ALT (SGPT) 13 1 - 41 U/L    AST (SGOT) 18 1 - 40 U/L    Alkaline Phosphatase 137 (H) 39 - 117 U/L    Total Bilirubin 0.5 0.0 - 1.2 mg/dL    Globulin 2.7 gm/dL    A/G Ratio 1.1 g/dL    BUN/Creatinine Ratio 11.7 7.0 - 25.0    Anion Gap 20.7 (H) 5.0 - 15.0 mmol/L    eGFR 7.5 (L) >60.0 mL/min/1.73   Urinalysis With Culture If Indicated - Straight Cath    Specimen: Straight Cath; Urine   Result Value Ref Range    Color, UA Brown (A) Yellow, Straw    Appearance, UA Turbid (A) Clear    pH, UA <=5.0 5.0 - 8.0    Specific Gravity, UA 1.023 1.005 - 1.030    Glucose, UA Negative Negative    Ketones, UA Negative Negative    Bilirubin, UA Moderate (2+) (A) Negative    Blood, UA Large (3+) (A) Negative    Protein,  mg/dL (2+) (A) Negative    Leuk Esterase, UA Large (3+) (A) Negative    Nitrite, UA Positive (A) Negative    Urobilinogen, UA 0.2 E.U./dL 0.2 - 1.0 E.U./dL   Single High Sensitivity Troponin T    Specimen: Arm, Right; Blood   Result Value Ref Range    HS  Troponin T 207 (C) <22 ng/L   BNP    Specimen: Arm, Right; Blood   Result Value Ref Range    proBNP >70,000.0 (H) 0.0 - 1,800.0 pg/mL   CBC Auto Differential    Specimen: Arm, Right; Blood   Result Value Ref Range    WBC 10.02 3.40 - 10.80 10*3/mm3    RBC 3.43 (L) 4.14 - 5.80 10*6/mm3    Hemoglobin 10.0 (L) 13.0 - 17.7 g/dL    Hematocrit 34.1 (L) 37.5 - 51.0 %    MCV 99.4 (H) 79.0 - 97.0 fL    MCH 29.2 26.6 - 33.0 pg    MCHC 29.3 (L) 31.5 - 35.7 g/dL    RDW 21.4 (H) 12.3 - 15.4 %    RDW-SD 78.2 (H) 37.0 - 54.0 fl    MPV 10.4 6.0 - 12.0 fL    Platelets 153 140 - 450 10*3/mm3    Neutrophil % 80.3 (H) 42.7 - 76.0 %    Lymphocyte % 8.1 (L) 19.6 - 45.3 %    Monocyte % 8.1 5.0 - 12.0 %    Eosinophil % 2.4 0.3 - 6.2 %    Basophil % 0.6 0.0 - 1.5 %    Immature Grans % 0.5 0.0 - 0.5 %    Neutrophils, Absolute 8.05 (H) 1.70 - 7.00 10*3/mm3    Lymphocytes, Absolute 0.81 0.70 - 3.10 10*3/mm3    Monocytes, Absolute 0.81 0.10 - 0.90 10*3/mm3    Eosinophils, Absolute 0.24 0.00 - 0.40 10*3/mm3    Basophils, Absolute 0.06 0.00 - 0.20 10*3/mm3    Immature Grans, Absolute 0.05 0.00 - 0.05 10*3/mm3    nRBC 0.0 0.0 - 0.2 /100 WBC   Scan Slide    Specimen: Arm, Right; Blood   Result Value Ref Range    Anisocytosis Large/3+ None Seen    Hypochromia Slight/1+ None Seen    Macrocytes Slight/1+ None Seen    Poikilocytes Slight/1+ None Seen    Platelet Morphology Normal Normal   High Sensitivity Troponin T 2Hr    Specimen: Blood   Result Value Ref Range    HS Troponin T 190 (C) <22 ng/L    Troponin T Delta -17 (L) >=-4 - <+4 ng/L   Urinalysis, Microscopic Only - Straight Cath    Specimen: Straight Cath; Urine   Result Value Ref Range    RBC, UA Too Numerous to Count (A) None Seen, 0-2 /HPF    WBC, UA Too Numerous to Count (A) None Seen, 0-2 /HPF    Bacteria, UA 3+ (A) None Seen /HPF    Squamous Epithelial Cells, UA 3-6 (A) None Seen, 0-2 /HPF    Yeast, UA Moderate/2+ Yeast (A) None Seen /HPF    Hyaline Casts, UA 3-6 None Seen /LPF     "Methodology Manual Light Microscopy    ECG 12 Lead Other; Generalized weakness   Result Value Ref Range    QT Interval 400 ms    QTC Interval 458 ms   Green Top (Gel)   Result Value Ref Range    Extra Tube Hold for add-ons.    Lavender Top   Result Value Ref Range    Extra Tube hold for add-on    Gold Top - SST   Result Value Ref Range    Extra Tube Hold for add-ons.    Light Blue Top   Result Value Ref Range    Extra Tube Hold for add-ons.      XR Chest 1 View    Result Date: 8/18/2024   Stable exam  This report was finalized on 8/18/2024 2:50 PM by Afsaneh Griffith MD.         Procedures           ED Course  ED Course as of 08/18/24 1526   Sun Aug 18, 2024   1247 Spoke with Dr. Suacedo who states patient may wait until tomorrow and go to the dialysis clinic for dialysis tomorrow.  [KH]   1454 ECG 12 Lead Other; Generalized weakness  Atrial fibrillation.  Rate 79.  Normal axis.  Normal QT interval.  Q wave in lead V1.  Low voltage limb leads.  Nonspecific T wave changes.  No acute ischemic changes.  Abnormal EKG [BC]      ED Course User Index  [BC] Tevin Mcginnis MD  [KH] Tessy Wellington, APRN                                             Medical Decision Making  Patient is a 74-year-old male with a past medical history of hypertension, renal disease, hyperlipidemia, thyroid disease, diabetes, CHF, and coronary artery disease.  Patient presents from [facility] after nursing home reported that he was \"unresponsive\" this morning.  Patient however presents alert and oriented and in no acute distress to the ER.  Patient reports that he feels weak and tired however does not complain of any pain and has no complaints upon arrival.  Nursing home reports patient last had dialysis on Wednesday of last week and would not go on Friday of last week.  Patient reports \"I do not remember them asking me to go to dialysis and that is why have not went.\"  Patient's family at bedside reporting the patient does like to " decline going to dialysis quite often.  Patient presents in no acute distress via EMS from [facility].    Problems Addressed:  End stage renal disease: complicated acute illness or injury    Amount and/or Complexity of Data Reviewed  Labs: ordered.  Radiology: ordered.  ECG/medicine tests: ordered. Decision-making details documented in ED Course.        Final diagnoses:   End stage renal disease       ED Disposition  ED Disposition       ED Disposition   Discharge    Condition   Stable    Comment   --               Jag Guillaume MD  [address] [place] [address]  [phone]    Schedule an appointment as soon as possible for a visit   As needed    [facility] EMERGENCY DEPARTMENT  [address] [id number] [phone]  Go to   If symptoms worsen         Medication List      No changes were made to your prescriptions during this visit.            Tessy Wellington, APRN  08/18/24 1526

## 2024-08-18 NOTE — DISCHARGE INSTRUCTIONS
Please go to your dialysis appointments as scheduled.  Next appointment is 08/19/2024 please ensure you go to this appointment.

## 2024-08-18 NOTE — ED NOTES
[facility] at bedside to transport pt back to nursing home, pt was discharged after being cleaned up and changed. Pt was A&0x4 with VSS and NADN. Pt wears 4L NC at all times. EMS made aware and verbalized understanding. Pt to be transported at this time.

## 2024-08-19 PROBLEM — K92.2 GI BLEED: Status: ACTIVE | Noted: 2024-01-01

## 2024-08-19 NOTE — ED NOTES
Attempted to call patient's son to discuss patient care as well as if patient has had a blood transfusion at another facility previously, son did not answer at this time.

## 2024-08-19 NOTE — ED NOTES
Patient had large amount of loose black/bloody stool noted to brief. Chiquis care performed, clean linens and chucks placed under patient at this time.

## 2024-08-19 NOTE — ED NOTES
Patient's protonix infusion stopped at this time, IV flushed in order to transfuse blood products until 2nd IV access can be obtained.

## 2024-08-19 NOTE — ED NOTES
Spoke with patient's son at this time, discussed plan of care and results of tests after permission from patient to speak with son.

## 2024-08-19 NOTE — H&P
[facility]    Patient Identification:  Name:  Kennedy Prescott  Age:  74 y.o.  Sex:  male  :  1950  MRN:  [id number]   Visit Number:  [id number]  Admit Date: 2024   Room number:  103/03  Primary Care Physician:  Jag Guillaume MD     Subjective     Chief complaint:    Chief Complaint   Patient presents with    Shortness of Breath       History of presenting illness:  74 y.o. male with hx of CHF, CAD, diabetes mellitus, ESRD, hypertension and GERD who presents from nursing home with shortness of breath. No noted blood or black BM. He does report diarrhea. Patient seen yesterday in ED for episode of AMS but had returned to normal and sent back to NH. Due to diarrhea patient reports he has been refusing dialysis but is agreeable to have while he is inpatient. Patient reports hospitalization for bleeding in last few months at either [facility] or [facility]. No family bedside on my evaluation, patient still awaiting for blood for transfusion. In the ED patient was started on IV protonix. I called and consulted general surgery who recommended NG tube and planning on endoscopic evaluation in AM. No familyi bedside. Patient did have bloody BM while in the emergency room.   ---------------------------------------------------------------------------------------------------------------------   Review of Systems   Constitutional:  Positive for fatigue.   HENT: Negative.     Eyes: Negative.    Respiratory: Negative.     Cardiovascular: Negative.    Gastrointestinal:  Positive for blood in stool.   Endocrine: Negative.    Genitourinary: Negative.    Musculoskeletal: Negative.    Skin: Negative.    Allergic/Immunologic: Negative.    Neurological: Negative.    Hematological: Negative.    Psychiatric/Behavioral: Negative.       ---------------------------------------------------------------------------------------------------------------------   Past  Medical History:   Diagnosis Date    CHF (congestive heart failure)     Coronary artery disease     Diabetes mellitus     Dialysis patient     Disease of thyroid gland     Elevated cholesterol     GERD (gastroesophageal reflux disease)     Hypertension     Myocardial infarction     Renal disorder     stage 5     Past Surgical History:   Procedure Laterality Date    CARDIAC CATHETERIZATION N/A 5/28/2024    Procedure: Left Heart Cath;  Surgeon: Mackenzie Ruiz MD;  Location:  [facility];  Service: Cardiology;  Laterality: N/A;    CARDIAC SURGERY      cabg    CHOLECYSTECTOMY      COLONOSCOPY N/A 9/22/2023    Procedure: COLONOSCOPY;  Surgeon: Trip Peter MD;  Location: [facility] OR;  Service: Gastroenterology;  Laterality: N/A;    DIALYSIS FISTULA CREATION Left     arm     Family History   Problem Relation Age of Onset    Arthritis Mother     COPD Father     Diabetes Father     Heart disease Father     Hyperlipidemia Father     Hypertension Father     Cancer Daughter      Social History     Socioeconomic History    Marital status:    Tobacco Use    Smoking status: Never    Smokeless tobacco: Never   Vaping Use    Vaping status: Never Used   Substance and Sexual Activity    Alcohol use: Never    Drug use: Never    Sexual activity: Defer     ---------------------------------------------------------------------------------------------------------------------   Allergies:  Patient has no known allergies.  ---------------------------------------------------------------------------------------------------------------------   Medications below are reported home medications pulling from within the system; at this time, these medications have not been reconciled unless otherwise specified and are in the verification process for further verifcation as current home medications.    Prior to Admission Medications       Prescriptions Last Dose Informant Patient Reported? Taking?    acetaminophen (TYLENOL) 325  MG tablet  Nursing Home Yes No    Take 2 tablets by mouth 3 (Three) Times a Week.    acetaminophen (TYLENOL) 325 MG tablet  Nursing Home Yes No    Take 2 tablets by mouth 3 times a day.    acetaminophen (TYLENOL) 500 MG tablet  Nursing Home Yes No    Take 1 tablet by mouth Every 4 (Four) Hours As Needed for Mild Pain or Fever.    ammonium lactate (AMLACTIN) 12 % cream  Nursing Home Yes No    Apply 1 g topically to the appropriate area as directed every night at bedtime.    atorvastatin (LIPITOR) 40 MG tablet  Nursing Home Yes No    Take 1 tablet by mouth Daily.    benzocaine (HURRICAINE/ORAJEL) 20 % gel  Nursing Home Yes No    Apply 1 Application to the mouth or throat Every 2 (Two) Hours As Needed for Mucositis.    bisacodyl (DULCOLAX) 10 MG suppository  Nursing Home Yes No    Insert 1 suppository into the rectum Daily As Needed for Constipation.    cefdinir (OMNICEF) 300 MG capsule   No No    Take by mouth once daily on Sunday, postdialysis on Monday, and postdialysis on Wednesday.    cetaphil (CETAPHIL) lotion  Nursing Home Yes No    Apply 1 Application topically to the appropriate area as directed Daily.    clopidogrel (PLAVIX) 75 MG tablet  Nursing Home Yes No    Take 1 tablet by mouth Daily.    empagliflozin (Jardiance) 10 MG tablet tablet  Nursing Home Yes No    Take 1 tablet by mouth Daily.    erythromycin (ROMYCIN) 5 MG/GM ophthalmic ointment  Nursing Home Yes No    Administer 1 Application into the left eye 2 (Two) Times a Day.    famotidine (PEPCID) 20 MG tablet  Nursing Home Yes No    Take 1 tablet by mouth 2 (Two) Times a Day.    gabapentin (NEURONTIN) 100 MG capsule  Nursing Home Yes No    Take 2 capsules by mouth every night at bedtime.    HYDROcodone-acetaminophen (NORCO) 5-325 MG per tablet  Nursing Home Yes No    Take 1 tablet by mouth Every 12 (Twelve) Hours As Needed for Mild Pain.    Infant Care Products (BABY SHAMPOO EX)  Nursing Home Yes No    Apply 1 Application topically 2 (Two) Times a Day.     Insulin Aspart (novoLOG) 100 UNIT/ML injection  Nursing Home Yes No    Inject 0-8 Units under the skin into the appropriate area as directed 3 (Three) Times a Day Before Meals. Inject as per sliding scale:  If 0-59 = 0;  201-250 = 2 units;   251-300= 4 units;   301-350 = 6 units;  351-400 = 8 units;  401-1000 = 8 units AND notify MD    insulin detemir (Levemir FlexPen) 100 UNIT/ML injection  Nursing Home Yes No    Inject 13 Units under the skin into the appropriate area as directed 2 (Two) Times a Day.    lactulose (CHRONULAC) 10 GM/15ML solution  Nursing Home Yes No    Take 30 mL by mouth Daily As Needed (for constipation).    levocetirizine (XYZAL) 5 MG tablet  Nursing Home Yes No    Take 1 tablet by mouth Daily.    levothyroxine (SYNTHROID, LEVOTHROID) 75 MCG tablet  Nursing Home Yes No    Take 1 tablet by mouth Daily.    Lidocaine Viscous HCl (XYLOCAINE) 2 % solution  Nursing Home Yes No    Take 5 mL by mouth Every 6 (Six) Hours As Needed for Mild Pain.    loperamide (IMODIUM A-D) 2 MG tablet  Nursing Home Yes No    Take 2 tablets by mouth 3 (Three) Times a Week.    metoprolol succinate XL (TOPROL-XL) 25 MG 24 hr tablet  Nursing Home Yes No    Take 0.5 tablets by mouth every night at bedtime.    multivitamin with minerals tablet tablet  Nursing Home Yes No    Take 1 tablet by mouth Daily.    nitroglycerin (NITROSTAT) 0.4 MG SL tablet  Nursing Home Yes No    Place 1 tablet under the tongue Every 5 (Five) Minutes As Needed for Chest Pain. Take no more than 3 doses in 15 minutes.    Nutritional Supplements (Jayant Nutrivigor) pack  Nursing Home Yes No    Take 1 packet by mouth 2 (Two) Times a Day.    ondansetron (ZOFRAN) 4 MG tablet  Nursing Home Yes No    Take 1 tablet by mouth Every 8 (Eight) Hours As Needed for Nausea or Vomiting.    pantoprazole (PROTONIX) 40 MG EC tablet  Nursing Home Yes No    Take 1 tablet by mouth 2 (Two) Times a Day.    polyethylene glycol (MiraLax) 17 GM/SCOOP powder  Nursing Home Yes  No    Take 17 g by mouth Every 12 (Twelve) Hours As Needed (for constipation).    sertraline (ZOLOFT) 50 MG tablet  Nursing Home Yes No    Take 1 tablet by mouth every night at bedtime.    sevelamer (RENVELA) 800 MG tablet  Nursing Home Yes No    Take 3 tablets by mouth 3 (Three) Times a Day With Meals.    sucralfate (CARAFATE) 1 GM/10ML suspension  Nursing Home Yes No    Take 10 mL by mouth 4 (Four) Times a Day.          Objective     Vital Signs:  Temp:  [97.4 °F (36.3 °C)-98.5 °F (36.9 °C)] 97.9 °F (36.6 °C)  Heart Rate:  [78-81] 80  Resp:  [16-18] 16  BP: (111-122)/(56-64) 113/57    No data found.  SpO2:  [99 %-100 %] 99 %  on  Flow (L/min):  [2] 2;   Device (Oxygen Therapy): nasal cannula  Body mass index is 30.68 kg/m².    Wt Readings from Last 3 Encounters:   08/19/24 99.8 kg (220 lb)   08/18/24 95.3 kg (210 lb 1.6 oz)   08/14/24 95.3 kg (210 lb 1.6 oz)      ---------------------------------------------------------------------------------------------------------------------   Physical Exam:  Constitutional:  Pale. Chronically ill appearing elderly male. Obese.   HENT:  Head: Normocephalic and atraumatic.  Mouth:  Moist mucous membranes.    Eyes:  Conjunctivae and EOM are normal.  Pupils are equal, round, and reactive to light.  No scleral icterus.  Cardiovascular:  Normal rate, regular rhythm and normal heart sounds with no murmur.  Pulmonary/Chest:  No respiratory distress, no wheezes, no crackles, with normal breath sounds and good air movement.  Abdominal:  Soft.  Bowel sounds are normal.  No distension and no tenderness.   Musculoskeletal:  No edema, no tenderness, and no deformity.  No red or swollen joints anywhere.    Neurological:  Alert and oriented to person, place, and time.  No cranial nerve deficit.  No focal neurological deficit.   Skin:  Skin is warm and dry.  No rash noted.  No pallor.   Peripheral vascular:  No edema and strong pulses on all 4  extremities.    ---------------------------------------------------------------------------------------------------------------------  EKG:    ECG 12 Lead Dyspnea   Preliminary Result   Test Reason : Dyspnea   Blood Pressure :   */*   mmHG   Vent. Rate :  86 BPM     Atrial Rate : 357 BPM      P-R Int :   * ms          QRS Dur : 118 ms       QT Int : 392 ms       P-R-T Axes :   *  22 -71 degrees      QTc Int : 469 ms      Atrial fibrillation with a competing junctional pacemaker with premature    ventricular or aberrantly conducted complexes   Low voltage QRS   Incomplete left bundle branch block   T wave abnormality, consider inferior ischemia   Prolonged QT   Abnormal ECG   When compared with ECG of 18-AUG-2024 11:53,   Inverted T waves have replaced nonspecific T wave abnormality in Inferior    leads      Referred By: FORD           Confirmed By:           Telemetry:      I have personally looked at both the EKG and the telemetry strips.    Last echocardiogram:  Results for orders placed during the hospital encounter of 05/15/24    Adult Transesophageal Echo (GAVI) W/ Cont if Necessary Per Protocol    Interpretation Summary    Left ventricular systolic function is moderately decreased. Estimated left ventricular EF = 35%    No Vegetations noted on any valve    --------------------------------------------------------------------------------------------------------------------  Labs:  Results from last 7 days   Lab Units 08/19/24  1446 08/18/24  1210 08/15/24  1030 08/14/24  1043 08/13/24  1723   CRP mg/dL 1.72*  --   --   --   --    WBC 10*3/mm3 8.58 10.02 9.07   < > 8.78   HEMOGLOBIN g/dL 5.9* 10.0* 9.5*   < > 10.9*   HEMATOCRIT % 19.5* 34.1* 31.9*   < > 37.1*   MCV fL 97.5* 99.4* 98.5*   < > 99.5*   MCHC g/dL 30.3* 29.3* 29.8*   < > 29.4*   PLATELETS 10*3/mm3 159 153 155   < > 178   INR  2.14*  --   --   --  2.50*    < > = values in this interval not displayed.     Results from last 7 days   Lab Units  "08/13/24  1720   PH, ARTERIAL pH units 7.319*   PO2 ART mm Hg 49.1*   PCO2, ARTERIAL mm Hg 49.0*   HCO3 ART mmol/L 25.1     Results from last 7 days   Lab Units 08/19/24  1446 08/18/24  1210 08/15/24  1030   SODIUM mmol/L 137 139 139   POTASSIUM mmol/L 5.8* 5.1 3.8   MAGNESIUM mg/dL 2.3  --   --    CHLORIDE mmol/L 96* 95* 95*   CO2 mmol/L 21.7* 23.3 28.7   BUN mg/dL 110* 83* 44*   CREATININE mg/dL 7.65* 7.07* 4.34*   CALCIUM mg/dL 8.3* 8.9 8.8   PHOSPHORUS mg/dL 7.3*  --   --    GLUCOSE mg/dL 190* 165* 138*   ALBUMIN g/dL 2.7* 3.0* 3.2*   BILIRUBIN mg/dL 0.4 0.5 0.5   ALK PHOS U/L 109 137* 148*   AST (SGOT) U/L 14 18 21   ALT (SGPT) U/L 11 13 18   Estimated Creatinine Clearance: 9.9 mL/min (A) (by C-G formula based on SCr of 7.65 mg/dL (H)).    No results found for: \"AMMONIA\"  Results from last 7 days   Lab Units 08/19/24  1446 08/18/24  1423 08/18/24  1210   HSTROP T ng/L 183* 190* 207*   PROBNP pg/mL >70,000.0*  --  >70,000.0*     Results from last 7 days   Lab Units 08/15/24  1030   CHOLESTEROL mg/dL 52   TRIGLYCERIDES mg/dL 130   HDL CHOL mg/dL 25*   LDL CHOL mg/dL <5     No results found for: \"HGBA1C\", \"POCGLU\"  Lab Results   Component Value Date    TSH 5.160 (H) 08/19/2024    FREET4 0.9 07/29/2024     No results found for: \"PREGTESTUR\", \"PREGSERUM\", \"HCG\", \"HCGQUANT\"  Pain Management Panel          Latest Ref Rng & Units 7/10/2024   Pain Management Panel   Amphetamine, Urine Qual Negative Negative    Barbiturates Screen, Urine Negative Negative    Benzodiazepine Screen, Urine Negative Negative    Buprenorphine, Screen, Urine Negative Negative    Cocaine Screen, Urine Negative Negative    Fentanyl, Urine Negative Negative    Methadone Screen , Urine Negative Negative    Methamphetamine, Ur Negative Negative       Details                 Brief Urine Lab Results  (Last result in the past 365 days)        Color   Clarity   Blood   Leuk Est   Nitrite   Protein   CREAT   Urine HCG        08/18/24 1348 " "Brown  Comment: Dipstick results may be inaccurate due to color interference.       Turbid   Large (3+)   Large (3+)   Positive   100 mg/dL (2+)                 No results found for: \"BLOODCX\"  Urine Culture   Date Value Ref Range Status   08/18/2024 No growth  Preliminary     No results found for: \"WOUNDCX\"  No results found for: \"STOOLCX\"    I have personally looked at the labs and they are summarized above.  ----------------------------------------------------------------------------------------------------------------------  Detailed radiology reports for the last 24 hours:    Imaging Results (Last 24 Hours)       ** No results found for the last 24 hours. **          Final impressions for the last 30 days of radiology reports:    XR Chest 1 View    Result Date: 8/18/2024   Stable exam  This report was finalized on 8/18/2024 2:50 PM by Afsaneh Griffith MD.      CT Head Without Contrast    Result Date: 8/14/2024    Unremarkable exam demonstrating no CT evidence of acute intracranial findings.   This report was finalized on 8/14/2024 12:24 PM by Dr. Rodrigo Kemp MD.      XR Chest 1 View    Result Date: 8/14/2024    Small right and tiny left pleural effusion.   This report was finalized on 8/14/2024 11:14 AM by Dr. Rodrigo Kemp MD.      XR Chest 1 View    Result Date: 8/13/2024   1.  Enlarged heart size 2.  Sternotomy 3.  Moderate size right pleural effusion. 4.  Compressive atelectasis at the right lung base 5.  Central pulmonary vascular congestion. 6.  Mild edema. 7.  No pneumothorax    This report was finalized on 8/13/2024 9:09 PM by Michele Liu MD.      CT Angiogram Abdomen    Result Date: 8/13/2024   1.  Large bilateral pleural effusions, right greater than left, unchanged.  Bibasilar compressive atelectasis.  2.  Mild ascites.  3.  No acute vascular abnormality.   This report was finalized on 8/13/2024 8:18 PM by Rosario Rivera MD.      CT Chest Without Contrast Diagnostic    Result Date: 8/13/2024   " 1.  Large bilateral pleural effusions, right greater than left, unchanged.  Bibasilar compressive atelectasis.  2.  Mild ascites.  3.  No acute vascular abnormality.   This report was finalized on 8/13/2024 8:18 PM by Rosario Rivera MD.      DUPLEX LOWER EXTREMITY ART/GRAFT LEFT CAR    Result Date: 7/30/2024  Left: No evidence of a hemodynamically significant stenosis is identified. COMMUNICATION: Per this written report. Preliminary report signed by THANG SHAW on 7/29/2024 12:54 PM By electronically signing this report, I, the attending physician, attest that I have personally reviewed the images/data for the above examination(s) and I agree with the final edited report. Drafted by THANG SHAW on 7/29/2024 12:34 PM Final report signed by Sanjuana Wei MD on 7/30/2024 9:22 AM    Duplex Renal Artery - Bilateral Complete CAR    Result Date: 7/30/2024  Right:  Kidney not visualized due to overlying bowel gas. Left: Kidney size is within normal limits. Unable to detect flow within the kidney. COMMUNICATION: Per this written report. Preliminary report signed by THANG SHAW on 7/29/2024 3:57 PM By electronically signing this report, I, the attending physician, attest that I have personally reviewed the images/data for the above examination(s) and I agree with the final edited report. Drafted by THANG SHAW on 7/29/2024 3:51 PM Final report signed by Sanjuana Wei MD on 7/30/2024 9:22 AM    Doppler Ankle Brachial Index Single Level CAR    Result Date: 7/30/2024  Right: Normal study; No evidence of hemodynamically significant arterial disease at rest. Left: Normal study; No evidence of hemodynamically significant arterial disease at rest. COMMUNICATION: Per this written report. Preliminary report signed by THANG SHAW on 7/29/2024 1:00 PM By electronically signing this report, I, the attending physician, attest that I have personally reviewed the images/data for the above  examination(s) and I agree with the final edited report. Drafted by THANG SHAW on 7/29/2024 12:57 PM Final report signed by Sanjuana Wei MD on 7/30/2024 9:22 AM    CT Angio Abdominal Aorta Bilateral Iliofem Runoff    Result Date: 7/28/2024  Impression: 1. Right renal cyst not well evaluated, recommend renal ultrasound. 2. Mild fecal impaction at the rectum with potential stercoral colitis. 3. Limited evaluation renal arteries, suspect high-grade stenosis particularly on the right. 4. High grade stenosis left internal iliac artery. 5. High-grade stenosis distal right superficial femoral artery and right popliteal artery with occlusion right tibioperitoneal trunk and high-grade stenosis with possible occlusion proximal right anterior tibial artery. No significant reconstitution of the right posterior or right fibular artery. 6. Occlusion distal left superficial femoral artery with reconstitution. Decreased perfusion below the left knee arteries with high-grade stenosis and possible areas of occlusion.   This report was finalized on 7/28/2024 9:06 PM by Brennen Quinn DO.      CT Chest Without Contrast Diagnostic    Result Date: 7/28/2024  Impression: 1. Findings suggest heart failure, confirm clinically with bilateral pleural effusions and atelectasis. 2. Chronic findings above.   This report was finalized on 7/28/2024 8:58 PM by Brennen Quinn DO.      US Arterial Doppler Lower Extremity Bilateral    Result Date: 7/28/2024  Minimal right-sided atherosclerotic disease in the popliteal artery and the PTA. Moderate atherosclerotic disease involving the LEFT external iliac artery, distal superficial femoral artery, popliteal and the runoff vessels.  Moderate stenosis is suspected.  ZIP Code [id number]    This report was finalized on 7/28/2024 3:21 PM by Dr. Lisa Levy MD.      XR Foot 3+ View Left    Result Date: 7/28/2024  Impression:  No acute bony process.  No evidence of osteomyelitis.  This  report was finalized on 7/28/2024 2:34 PM by Afsaneh Griffith MD.     I have personally looked at the radiology images and read the final radiology report.    Assessment & Plan       #Acute blood loss anemia   - Hgb baseline around 10. Hemoglobin presentation 5.9 on presentation. 2 units pRBC ordered in setting of active bleeding.   - INR 2.14, PTT 34.8. Will hold home plavix. FFP if patient declines.   - Will continue BID IV protonix. No hx of liver disease   - Recent GI bleed at OSH? Will request records   - Surgery consulted. Will place NG tube. NPO at midnight for possible endoscopies tomorrow     #ESRD in setting of missed dialysis sessions   #Hyperkalemia   - Nephrology consulted. Planning on dialysis this PM.     #UTI  - Significant pyuria on 8/18. Will follow cultures. Will continue ceftriaxone.     Chronic medical problems   CAD  Hypothyroidism- TSH improved at 5.1. Recent free T4 wnl.   GERD  HTN    Code status: Full     Dispo: Admit to PCU      Dar Cochran MD  [facility] Hospitalist  08/19/24  17:18 EDT

## 2024-08-19 NOTE — ED PROVIDER NOTES
Subjective   History of Present Illness  Mr. Prescott is a 74 year old male with past medical history significant for CHF, CAD, diabetes mellitus, stage 5 renal disease, hypertension and GERD. He was seen in the ER yesterday after being sent by the nursing home and recommended by nephrology to have dialysis the following day but developed shortness of breath today and was sent back to the ER for evaluation today. The nursing home staff report he has refused dialysis for the last 5 days. He denies any shortness of breath, fever, chills, abdominal pain or nausea. He does report diarrhea today. He is a poor historian.       Review of Systems   Unable to perform ROS: Age       Past Medical History:   Diagnosis Date    CHF (congestive heart failure)     Coronary artery disease     Diabetes mellitus     Dialysis patient     Disease of thyroid gland     Elevated cholesterol     GERD (gastroesophageal reflux disease)     Hypertension     Myocardial infarction     Renal disorder     stage 5       No Known Allergies    Past Surgical History:   Procedure Laterality Date    CARDIAC CATHETERIZATION N/A 5/28/2024    Procedure: Left Heart Cath;  Surgeon: Mackenzie Ruiz MD;  Location: [facility] CATH INVASIVE LOCATION;  Service: Cardiology;  Laterality: N/A;    CARDIAC SURGERY      cabg    CHOLECYSTECTOMY      COLONOSCOPY N/A 9/22/2023    Procedure: COLONOSCOPY;  Surgeon: Trip Peter MD;  Location: [facility] OR;  Service: Gastroenterology;  Laterality: N/A;    DIALYSIS FISTULA CREATION Left     arm       Family History   Problem Relation Age of Onset    Arthritis Mother     COPD Father     Diabetes Father     Heart disease Father     Hyperlipidemia Father     Hypertension Father     Cancer Daughter        Social History     Socioeconomic History    Marital status:    Tobacco Use    Smoking status: Never    Smokeless tobacco: Never   Vaping Use    Vaping status: Never Used   Substance and Sexual Activity    Alcohol use: Never     Drug use: Never    Sexual activity: Defer           Objective   Physical Exam  Vitals and nursing note reviewed.   Constitutional:       General: He is not in acute distress.     Appearance: He is well-developed. He is not diaphoretic.   HENT:      Head: Normocephalic and atraumatic.      Right Ear: External ear normal.      Left Ear: External ear normal.      Nose: Nose normal.   Eyes:      Conjunctiva/sclera: Conjunctivae normal.      Pupils: Pupils are equal, round, and reactive to light.   Neck:      Vascular: No JVD.      Trachea: No tracheal deviation.   Cardiovascular:      Rate and Rhythm: Normal rate and regular rhythm.      Heart sounds: Normal heart sounds. No murmur heard.  Pulmonary:      Effort: Pulmonary effort is normal. No respiratory distress.      Breath sounds: Normal breath sounds. No wheezing.   Abdominal:      General: Bowel sounds are normal.      Palpations: Abdomen is soft.      Tenderness: There is no abdominal tenderness.      Comments: distended   Musculoskeletal:         General: No deformity. Normal range of motion.      Cervical back: Normal range of motion and neck supple.      Right lower leg: Edema present.      Left lower leg: Edema present.   Skin:     General: Skin is warm and dry.      Coloration: Skin is not pale.      Findings: No erythema or rash.   Neurological:      Mental Status: He is alert and oriented to person, place, and time.      Cranial Nerves: No cranial nerve deficit.   Psychiatric:         Behavior: Behavior normal.         Thought Content: Thought content normal.         Procedures        XR Chest 1 View    Result Date: 8/18/2024   Stable exam  This report was finalized on 8/18/2024 2:50 PM by Afsaneh Griffith MD.        Results for orders placed or performed during the hospital encounter of 08/19/24   Comprehensive Metabolic Panel    Specimen: Arm, Left; Blood   Result Value Ref Range    Glucose 190 (H) 65 - 99 mg/dL     (H) 8 - 23 mg/dL    Creatinine  7.65 (H) 0.76 - 1.27 mg/dL    Sodium 137 136 - 145 mmol/L    Potassium 5.8 (H) 3.5 - 5.2 mmol/L    Chloride 96 (L) 98 - 107 mmol/L    CO2 21.7 (L) 22.0 - 29.0 mmol/L    Calcium 8.3 (L) 8.6 - 10.5 mg/dL    Total Protein 4.8 (L) 6.0 - 8.5 g/dL    Albumin 2.7 (L) 3.5 - 5.2 g/dL    ALT (SGPT) 11 1 - 41 U/L    AST (SGOT) 14 1 - 40 U/L    Alkaline Phosphatase 109 39 - 117 U/L    Total Bilirubin 0.4 0.0 - 1.2 mg/dL    Globulin 2.1 gm/dL    A/G Ratio 1.3 g/dL    BUN/Creatinine Ratio 14.4 7.0 - 25.0    Anion Gap 19.3 (H) 5.0 - 15.0 mmol/L    eGFR 6.8 (L) >60.0 mL/min/1.73   BNP    Specimen: Arm, Left; Blood   Result Value Ref Range    proBNP >70,000.0 (H) 0.0 - 1,800.0 pg/mL   D-dimer, Quantitative    Specimen: Arm, Left; Blood   Result Value Ref Range    D-Dimer, Quantitative 5.26 (H) 0.00 - 0.76 MCGFEU/mL   C-reactive Protein    Specimen: Arm, Left; Blood   Result Value Ref Range    C-Reactive Protein 1.72 (H) 0.00 - 0.50 mg/dL   TSH    Specimen: Arm, Left; Blood   Result Value Ref Range    TSH 5.160 (H) 0.270 - 4.200 uIU/mL   High Sensitivity Troponin T    Specimen: Arm, Left; Blood   Result Value Ref Range    HS Troponin T 183 (C) <22 ng/L   CBC Auto Differential    Specimen: Arm, Left; Blood   Result Value Ref Range    WBC 8.58 3.40 - 10.80 10*3/mm3    RBC 2.00 (L) 4.14 - 5.80 10*6/mm3    Hemoglobin 5.9 (C) 13.0 - 17.7 g/dL    Hematocrit 19.5 (C) 37.5 - 51.0 %    MCV 97.5 (H) 79.0 - 97.0 fL    MCH 29.5 26.6 - 33.0 pg    MCHC 30.3 (L) 31.5 - 35.7 g/dL    RDW 20.9 (H) 12.3 - 15.4 %    RDW-SD 73.7 (H) 37.0 - 54.0 fl    MPV 10.2 6.0 - 12.0 fL    Platelets 159 140 - 450 10*3/mm3    Neutrophil % 87.3 (H) 42.7 - 76.0 %    Lymphocyte % 5.1 (L) 19.6 - 45.3 %    Monocyte % 6.8 5.0 - 12.0 %    Eosinophil % 0.0 (L) 0.3 - 6.2 %    Basophil % 0.1 0.0 - 1.5 %    Immature Grans % 0.7 (H) 0.0 - 0.5 %    Neutrophils, Absolute 7.49 (H) 1.70 - 7.00 10*3/mm3    Lymphocytes, Absolute 0.44 (L) 0.70 - 3.10 10*3/mm3    Monocytes, Absolute 0.58  0.10 - 0.90 10*3/mm3    Eosinophils, Absolute 0.00 0.00 - 0.40 10*3/mm3    Basophils, Absolute 0.01 0.00 - 0.20 10*3/mm3    Immature Grans, Absolute 0.06 (H) 0.00 - 0.05 10*3/mm3    nRBC 0.0 0.0 - 0.2 /100 WBC   Scan Slide    Specimen: Arm, Left; Blood   Result Value Ref Range    Anisocytosis Large/3+ None Seen    Dacrocytes Slight/1+ None Seen    Hypochromia Mod/2+ None Seen    Macrocytes Slight/1+ None Seen    Poikilocytes Slight/1+ None Seen    Polychromasia Slight/1+ None Seen    Large Platelets Slight/1+ None Seen   Phosphorus    Specimen: Arm, Left; Blood   Result Value Ref Range    Phosphorus 7.3 (H) 2.5 - 4.5 mg/dL   Magnesium    Specimen: Arm, Left; Blood   Result Value Ref Range    Magnesium 2.3 1.6 - 2.4 mg/dL   ECG 12 Lead Dyspnea   Result Value Ref Range    QT Interval 392 ms    QTC Interval 469 ms   Type & Screen    Specimen: Arm, Right; Blood   Result Value Ref Range    ABO Type NO TYPE DETERMINED     RH type Negative    ABO RH Specimen Verification    Specimen: Arm, Right; Blood   Result Value Ref Range    ABO Type AB     RH type Negative    Green Top (Gel)   Result Value Ref Range    Extra Tube Hold for add-ons.    Lavender Top   Result Value Ref Range    Extra Tube hold for add-on    Gold Top - SST   Result Value Ref Range    Extra Tube Hold for add-ons.    Light Blue Top   Result Value Ref Range    Extra Tube Hold for add-ons.        ED Course  ED Course as of 08/19/24 1635   Mon Aug 19, 2024   1503 EKG notes atrial fibrillation.  86 bpm.  No acute ST elevation.  QTc 469. [SF]   1625 Discussed patient with Dr. Cochran and he is agreeable for admission. [NU]      ED Course User Index  [NU] Phoebe Wray PA-C  [SF] Saul Decker DO                HEART Score: 9                              Medical Decision Making  Mr. Prescott is a 74 year old male with past medical history significant for CHF, CAD, diabetes mellitus, stage 5 renal disease, hypertension and GERD. He presents from the nursing  home due to shortness of breath. It is reported that he refused dialysis for the last 5 days. He is a poor historian.       Amount and/or Complexity of Data Reviewed  Labs: ordered.  ECG/medicine tests: ordered.    Risk  Risk Details: Discussed patient with Dr. Cochran and he is agreeable to admit.         Final diagnoses:   Diarrhea, unspecified type   Stage 5 chronic kidney disease on chronic dialysis   Anemia, unspecified type       ED Disposition  ED Disposition       ED Disposition   Decision to Admit    Condition   --    Comment   Level of Care: Progressive Care [20]   Diagnosis: GI bleed [779003]   Certification: I Certify That Inpatient Hospital Services Are Medically Necessary For Greater Than 2 Midnights                 No follow-up provider specified.       Medication List      No changes were made to your prescriptions during this visit.            Phoebe Wray PA-C  08/19/24 7471

## 2024-08-19 NOTE — CASE MANAGEMENT/SOCIAL WORK
Discharge Planning Assessment  [facility]     Patient Name: Kennedy Prescott  MRN: [id number]  Today's Date: 8/19/2024    Admit Date: 8/19/2024    Plan: Patient is a resident of [facility] and will return at discharge. Patient will need EMS transport at discharge.   Discharge Needs Assessment       Row Name 08/19/24 1559       Living Environment    People in Home facility resident    Potentially Unsafe Housing Conditions none    Provides Primary Care For no one    Family Caregiver if Needed child(jie), adult    Family Caregiver Names KOBY PRESCOTT Son [phone]    Quality of Family Relationships unable to assess    Able to Return to Prior Arrangements yes       Resource/Environmental Concerns    Resource/Environmental Concerns none    Transportation Concerns none       Transportation Needs    In the past 12 months, has lack of transportation kept you from medical appointments or from getting medications? no    In the past 12 months, has lack of transportation kept you from meetings, work, or from getting things needed for daily living? No       Food Insecurity    Within the past 12 months, you worried that your food would run out before you got the money to buy more. Never true    Within the past 12 months, the food you bought just didn't last and you didn't have money to get more. Never true       Transition Planning    Patient/Family Anticipates Transition to long-term care facility    Patient/Family Anticipated Services at Transition none    Transportation Anticipated public transportation       Discharge Needs Assessment    Readmission Within the Last 30 Days no previous admission in last 30 days    Concerns to be Addressed discharge planning    Anticipated Changes Related to Illness none    Equipment Needed After Discharge none                   Discharge Plan       Row Name 08/19/24 1605       Plan    Plan Patient is a resident of [facility] and will return at discharge. Patient will need  EMS transport at discharge.    Patient/Family in Agreement with Plan yes                  Continued Care and Services - Admitted Since 8/19/2024    No active coordination exists for this encounter.       Selected Continued Care - Prior Encounters Includes continued care and service providers with selected services from prior encounters from 5/21/2024 to 8/19/2024      Discharged on 5/30/2024 Admission date: 5/15/2024 - Discharge disposition: Skilled Nursing Facility (DC - External)      Destination       Service Provider Selected Services Address Phone Fax Patient Preferred    [facility] [address], [place] [address] [phone] --                         Kierra Cruz

## 2024-08-20 ENCOUNTER — ANESTHESIA (OUTPATIENT)
Dept: PERIOP | Facility: HOSPITAL | Age: 74
End: 2024-08-20
Payer: MEDICARE

## 2024-08-20 ENCOUNTER — ANESTHESIA EVENT (OUTPATIENT)
Dept: PERIOP | Facility: HOSPITAL | Age: 74
End: 2024-08-20
Payer: MEDICARE

## 2024-08-20 PROBLEM — D64.9 ANEMIA: Status: ACTIVE | Noted: 2024-01-01

## 2024-08-20 LAB — BACTERIA SPEC AEROBE CULT: NO GROWTH

## 2024-08-20 PROCEDURE — 25010000002 PROPOFOL 200 MG/20ML EMULSION: Performed by: NURSE ANESTHETIST, CERTIFIED REGISTERED

## 2024-08-20 RX ORDER — EPHEDRINE SULFATE 5 MG/ML
INJECTION INTRAVENOUS AS NEEDED
Status: DISCONTINUED | OUTPATIENT
Start: 2024-08-20 | End: 2024-08-20 | Stop reason: SURG

## 2024-08-20 RX ORDER — PROPOFOL 10 MG/ML
INJECTION, EMULSION INTRAVENOUS AS NEEDED
Status: DISCONTINUED | OUTPATIENT
Start: 2024-08-20 | End: 2024-08-20 | Stop reason: SURG

## 2024-08-20 RX ADMIN — PROPOFOL 50 MG: 10 INJECTION, EMULSION INTRAVENOUS at 14:38

## 2024-08-20 RX ADMIN — EPHEDRINE SULFATE 10 MG: 5 INJECTION INTRAVENOUS at 14:55

## 2024-08-20 RX ADMIN — PROPOFOL 200 MCG/KG/MIN: 10 INJECTION, EMULSION INTRAVENOUS at 14:39

## 2024-08-20 NOTE — PROGRESS NOTES
[facility] HOSPITALIST PROGRESS NOTE     Patient Identification:  Name:  Kennedy Prescott  Age:  74 y.o.  Sex:  male  :  1950  MRN:  [id number]  Visit Number:  [id number]  ROOM: [place] [address]     Primary Care Provider:  Jag Guillaume MD    Length of stay in inpatient status:  1    Subjective     Chief Compliant:    Chief Complaint   Patient presents with    Shortness of Breath       History of Presenting Illness:    Patient reports fatigue, not resting well last night. Was sleeping on my exam but woke up and was interactive. Small amount of blood tingued fluid in NG tube canister with blood in tubing. Patient still having black Bms per nursing staff. Patient awaiting endoscopic evaluation.     ROS:  Otherwise 10 point ROS negative other than documented above in HPI.     Objective     Current Hospital Meds:acetaminophen, 650 mg, Oral, Once per day on   acetaminophen, 650 mg, Oral, TID  ammonium lactate, 1 Application, Topical, Nightly  [Held by provider] apixaban, 5 mg, Oral, BID  atorvastatin, 40 mg, Oral, Nightly  cefTRIAXone, 2,000 mg, Intravenous, Q24H  [Held by provider] clopidogrel, 75 mg, Oral, Daily  doxycycline, 100 mg, Intravenous, Q12H  gabapentin, 200 mg, Oral, Nightly  insulin lispro, 2-7 Units, Subcutaneous, 4x Daily AC & at Bedtime  levothyroxine, 75 mcg, Oral, QAM  [Held by provider] metoprolol succinate XL, 12.5 mg, Oral, Nightly  multivitamin with minerals, 1 tablet, Oral, Daily  pantoprazole, 40 mg, Intravenous, BID AC  sertraline, 50 mg, Oral, Nightly  sevelamer, 800 mg, Oral, TID With Meals  sodium chloride, 10 mL, Intravenous, Q12H         Current Antimicrobial Therapy:  Anti-Infectives (From admission, onward)      Ordered     Dose/Rate Route Frequency Start Stop    24 0704  doxycycline (VIBRAMYCIN) 100 mg in sodium chloride 0.9 % 100 mL IVPB-VTB        Ordering Provider: Dar Cochran MD    100 mg Intravenous Every 12 Hours 24 0800  08/25/24 0759    08/19/24 2226  cefTRIAXone (ROCEPHIN) 2,000 mg in sodium chloride 0.9 % 100 mL IVPB-VTB        Ordering Provider: Dar Cochran MD    2,000 mg  200 mL/hr over 30 Minutes Intravenous Every 24 Hours 08/19/24 2315 08/24/24 2314          Current Diuretic Therapy:  Diuretics (From admission, onward)      None          ----------------------------------------------------------------------------------------------------------------------  Vital Signs:  Temp:  [96.9 °F (36.1 °C)-98.5 °F (36.9 °C)] 97.9 °F (36.6 °C)  Heart Rate:  [] 103  Resp:  [9-18] 11  BP: ()/(56-97) 106/63  SpO2:  [92 %-100 %] 98 %  on  Flow (L/min):  [2-3] 3;   Device (Oxygen Therapy): nasal cannula  Body mass index is 30.75 kg/m².    Wt Readings from Last 3 Encounters:   08/20/24 100 kg (220 lb 7.4 oz)   08/18/24 95.3 kg (210 lb 1.6 oz)   08/14/24 95.3 kg (210 lb 1.6 oz)     Intake & Output (last 3 days)         08/17 0701 08/18 0700 08/18 0701 08/19 0700 08/19 0701 08/20 0700 08/20 0701 08/21 0700    P.O.    0    Blood   600     Total Intake(mL/kg)   600 (6) 0 (0)    Stool   0     Dialysis   0     Total Output   0     Net   +600 0            Stool Unmeasured Occurrence   4 x 1 x          NPO Diet NPO Type: Strict NPO  ----------------------------------------------------------------------------------------------------------------------  Physical exam:  Constitutional:  Chronically ill appearing.   HENT:  Head:  Normocephalic and atraumatic.  Mouth:  Moist mucous membranes.    Eyes:  Conjunctivae and EOM are normal. No scleral icterus.    Neck:  Neck supple.  No JVD present.    Cardiovascular:  Normal rate, regular rhythm and normal heart sounds with no murmur.  Pulmonary/Chest:  No respiratory distress, no wheezes, no crackles, with normal breath sounds and good air movement.  Abdominal:  Soft.  Bowel sounds are normal.  No distension and no tenderness.   Musculoskeletal:  No edema, no tenderness, and no deformity.  No  red or swollen joints anywhere.    Neurological:  Alert and oriented to person, place, and time.  No cranial nerve deficit.  No tongue deviation.  No facial droop.  No slurred speech.   Skin:  Skin is warm and dry. No rash noted. No pallor.   Peripheral vascular:  Pulses in all 4 extremities with no clubbing, no cyanosis, no edema.  ----------------------------------------------------------------------------------------------------------------------  Tele:    ----------------------------------------------------------------------------------------------------------------------  Results from last 7 days   Lab Units 08/20/24  0742 08/20/24  0116 08/19/24  1446 08/18/24  1210 08/14/24  1043 08/13/24  1723   CRP mg/dL  --   --  1.72*  --   --   --    WBC 10*3/mm3 10.14  --  8.58 10.02   < > 8.78   HEMOGLOBIN g/dL 7.9* 7.9* 5.9* 10.0*   < > 10.9*   HEMATOCRIT % 25.3* 24.7* 19.5* 34.1*   < > 37.1*   MCV fL 92.7  --  97.5* 99.4*   < > 99.5*   MCHC g/dL 31.2*  --  30.3* 29.3*   < > 29.4*   PLATELETS 10*3/mm3 166  --  159 153   < > 178   INR   --   --  2.14*  --   --  2.50*    < > = values in this interval not displayed.     Results from last 7 days   Lab Units 08/13/24  1720   PH, ARTERIAL pH units 7.319*   PO2 ART mm Hg 49.1*   PCO2, ARTERIAL mm Hg 49.0*   HCO3 ART mmol/L 25.1     Results from last 7 days   Lab Units 08/20/24  0742 08/19/24  1446 08/18/24  1210 08/15/24  1030   SODIUM mmol/L 136 137 139 139   POTASSIUM mmol/L 4.6 5.8* 5.1 3.8   MAGNESIUM mg/dL  --  2.3  --   --    CHLORIDE mmol/L 96* 96* 95* 95*   CO2 mmol/L 25.0 21.7* 23.3 28.7   BUN mg/dL 61* 110* 83* 44*   CREATININE mg/dL 4.68* 7.65* 7.07* 4.34*   CALCIUM mg/dL 8.2* 8.3* 8.9 8.8   PHOSPHORUS mg/dL  --  7.3*  --   --    GLUCOSE mg/dL 127* 190* 165* 138*   ALBUMIN g/dL  --  2.7* 3.0* 3.2*   BILIRUBIN mg/dL  --  0.4 0.5 0.5   ALK PHOS U/L  --  109 137* 148*   AST (SGOT) U/L  --  14 18 21   ALT (SGPT) U/L  --  11 13 18   Estimated Creatinine Clearance: 16.2  "mL/min (A) (by C-G formula based on SCr of 4.68 mg/dL (H)).  No results found for: \"AMMONIA\"  Results from last 7 days   Lab Units 08/19/24  1744 08/19/24  1446 08/18/24  1423   HSTROP T ng/L 150* 183* 190*     Results from last 7 days   Lab Units 08/19/24  1446 08/18/24  1210   PROBNP pg/mL >70,000.0* >70,000.0*     Results from last 7 days   Lab Units 08/15/24  1030   CHOLESTEROL mg/dL 52   TRIGLYCERIDES mg/dL 130   HDL CHOL mg/dL 25*   LDL CHOL mg/dL <5     Glucose   Date/Time Value Ref Range Status   08/20/2024 0639 151 (H) 70 - 130 mg/dL Final     Lab Results   Component Value Date    TSH 5.160 (H) 08/19/2024    FREET4 0.9 07/29/2024     No results found for: \"PREGTESTUR\", \"PREGSERUM\", \"HCG\", \"HCGQUANT\"  Pain Management Panel          Latest Ref Rng & Units 7/10/2024   Pain Management Panel   Amphetamine, Urine Qual Negative Negative    Barbiturates Screen, Urine Negative Negative    Benzodiazepine Screen, Urine Negative Negative    Buprenorphine, Screen, Urine Negative Negative    Cocaine Screen, Urine Negative Negative    Fentanyl, Urine Negative Negative    Methadone Screen , Urine Negative Negative    Methamphetamine, Ur Negative Negative       Details                 Brief Urine Lab Results  (Last result in the past 365 days)        Color   Clarity   Blood   Leuk Est   Nitrite   Protein   CREAT   Urine HCG        08/18/24 1348 Brown  Comment: Dipstick results may be inaccurate due to color interference.       Turbid   Large (3+)   Large (3+)   Positive   100 mg/dL (2+)                 No results found for: \"BLOODCX\"  Urine Culture   Date Value Ref Range Status   08/18/2024 No growth  Final     No results found for: \"WOUNDCX\"  No results found for: \"STOOLCX\"  No results found for: \"RESPCX\"  No results found for: \"AFBCX\"  Results from last 7 days   Lab Units 08/19/24  1446   CRP mg/dL 1.72*       I have personally looked at the labs and they are summarized " above.  ----------------------------------------------------------------------------------------------------------------------  Detailed radiology reports for the last 24 hours:    Imaging Results (Last 24 Hours)       Procedure Component Value Units Date/Time    XR Chest 1 View [[id number]] Collected: 08/20/24 0446     Updated: 08/20/24 0449    Narrative:      PROCEDURE: Portable chest x-ray examination performed on August 20, 2024. Single view. Supine position.     HISTORY: NG tube placement. Confirm position.     COMPARISON: None.     FINDINGS:     Enlarged heart size.  Sternotomy.  Central pulmonary vascular congestion.  Enteric drain in place with tip to the stomach.  Small to medium size right pleural effusion.  Compressive atelectasis at the right lung base and right middle lobe.  Possible pneumonia in the right lung base.  No pneumothorax  Fixation at the proximal left humerus with plate and screws.       Impression:         1.  Enlarged heart size.  2.  Sternotomy.  3.  Enteric drain in place with tip to the stomach.  4.  Small to medium size right pleural effusion.  5.  Possible right lower lobe and right middle lobe pneumonia.  6.  No pneumothorax.        This report was finalized on 8/20/2024 4:47 AM by Michele Liu MD.       XR Chest 1 View [[id number]] Collected: 08/19/24 2247     Updated: 08/19/24 2250    Narrative:      INDICATION: NG tube placement.     TECHNIQUE: Frontal radiograph of the chest.     COMPARISON: Radiograph from earlier the same day.       Impression:      FINDINGS/IMPRESSION: Enteric tube in the stomach.         This report was finalized on 8/19/2024 10:47 PM by Alex Pallas, DO.       XR Chest 1 View [[id number]] Collected: 08/19/24 1931     Updated: 08/19/24 1936    Narrative:      PROCEDURE: Portable chest x-ray examination performed on August 19, 2024. Single view. Upright position.     HISTORY: NG tube placement.     COMPARISON: None.     FINDINGS:     Enlarged heart  size.  Sternotomy.  Enteric drain in place with tip to the stomach.  Lateral fixation plate and screws at the proximal left humerus.  Central pulmonary vascular congestion with edema.  Hypoinflated lungs.  Small right pleural effusion.  No pneumothorax.  Cholecystectomy clips in the right upper quadrant.  No free air in the upper abdomen.       Impression:         1.  Enlarged heart size.  2.  Hypoinflated lungs.  3.  Enteric drain in place with tip to the stomach.  4.  Sternotomy.  5.  Central pulmonary vascular congestion with edema.  6.  Small right pleural effusion.   7.  No pneumothorax.        This report was finalized on 8/19/2024 7:34 PM by Michele Liu MD.             Assessment & Plan    #Acute blood loss anemia   #pAfib on home apixiban with resultant coagulopathy   - Hgb baseline around 10. Hemoglobin presentation 5.9 on presentation. 2 units pRBC ordered in setting of active bleeding. Hemoglobin stable this AM at 7.9    - INR 2.14, PTT 34.8. FFP if patient declines.   - Will continue BID IV protonix. No hx of liver disease   - Recent GI bleed at OSH? Will request records   - Surgery consulted. NG tube in place and blood drainage suspicious of upper GI bleed. Still signs of bleeding.   - Holding home apxibian and plavix.      #ESRD in setting of missed dialysis sessions   #Hyperkalemia   - Nephrology consulted. Planning on dialysis this PM.      #UTI  - Significant pyuria on 8/18. Will follow cultures. Will continue ceftriaxone.     #Recent foot ulceration w/ associated cellulitis   - Admitted to  7/29-8/2: Vascular surgery recommended non-operative management. Superficial ulcer with some maceration but no purulent discharge, chronic draining sinuses, deep tunneling, or other evidence of osteomyelitis.   - Finished abx course      Chronic medical problems   CAD  Hypothyroidism- TSH improved at 5.1. Recent free T4 wnl.   GERD  HTN     Code status: Full      Dispo: Admit to PCU       Dar Cochran  MD  [facility] Hospitalist  08/20/24  10:28 EDT

## 2024-08-20 NOTE — CASE MANAGEMENT/SOCIAL WORK
Discharge Planning Assessment   [place]     Patient Name: Kennedy Prescott  MRN: [id number]  Today's Date: 8/20/2024    Admit Date: 8/19/2024     Discharge Plan       Row Name 08/20/24 1109       Plan    Plan Pt admitted on 8/19/24. SS received nursing consult for BPA. SS contacted [facility] per Irma who states pt has a 24 day bed hold prior to admission and will accept pt back at discharge. SS to follow and assist with discharge planning.                Destination       Service Provider Request Status Selected Services Address Phone Fax Patient Preferred    [facility] Pending - No Request Sent N/A [address], [place] [address] [phone] --               Expected Discharge Date and Time       Expected Discharge Date Expected Discharge Time    Aug 22, 2024        MONA Long

## 2024-08-20 NOTE — CONSULTS
Patient Name:  Kennedy Prescott  YOB: 1950  [id number]       Patient Care Team:  Jag Guillaume MD as PCP - General (Internal Medicine)      General Surgery Consult Note     Date of Consultation: 08/20/24    Consulting Physician - Dar Cochran MD    Reason for Consult - Concern for GI bleed    Subjective   Limited history can be obtained from patient  I have been asked to see  Kennedy Prescott , a 74 y.o. male nursing home resident in consultation for possible GI bleed.     Reportedly sent from NH with SOA. Hgb on presentation 5.9. Reported prior history of GI bleeds.   Reportedly had scopes at prior outside hospitalization    On eliquis and plavix      Allergy: No Known Allergies    Medications:  acetaminophen, 650 mg, Oral, Once per day on Monday Wednesday Friday  acetaminophen, 650 mg, Oral, TID  ammonium lactate, 1 Application, Topical, Nightly  atorvastatin, 40 mg, Oral, Nightly  cefTRIAXone, 2,000 mg, Intravenous, Q24H  [Held by provider] clopidogrel, 75 mg, Oral, Daily  doxycycline, 100 mg, Intravenous, Q12H  gabapentin, 200 mg, Oral, Nightly  insulin lispro, 2-7 Units, Subcutaneous, 4x Daily AC & at Bedtime  lactated ringers, 125 mL/hr, Intravenous, Once  levothyroxine, 75 mcg, Oral, QAM  [Held by provider] metoprolol succinate XL, 12.5 mg, Oral, Nightly  multivitamin with minerals, 1 tablet, Oral, Daily  pantoprazole, 40 mg, Intravenous, BID AC  sertraline, 50 mg, Oral, Nightly  sevelamer, 800 mg, Oral, TID With Meals  sodium chloride, 10 mL, Intravenous, Q12H  sodium chloride, 10 mL, Intravenous, Q12H         No current facility-administered medications on file prior to encounter.     Current Outpatient Medications on File Prior to Encounter   Medication Sig    acetaminophen (TYLENOL) 325 MG tablet Take 2 tablets by mouth 3 (Three) Times a Week.    acetaminophen (TYLENOL) 325 MG tablet Take 2 tablets by mouth 3 (Three) Times a Day.    acetaminophen (TYLENOL) 500 MG tablet Take 1 tablet  by mouth Every 4 (Four) Hours As Needed for Mild Pain or Fever.    ammonium lactate (AMLACTIN) 12 % cream Apply 1 g topically to the appropriate area as directed Every Night.    apixaban (ELIQUIS) 5 MG tablet tablet Take 1 tablet by mouth 2 (Two) Times a Day.    atorvastatin (LIPITOR) 40 MG tablet Take 1 tablet by mouth Every Night.    B COMPLEX-C-FOLIC ACID PO Take 0.8 tablets by mouth Every Morning.    clopidogrel (PLAVIX) 75 MG tablet Take 1 tablet by mouth Daily.    gabapentin (NEURONTIN) 100 MG capsule Take 2 capsules by mouth every night at bedtime.    HYDROcodone-acetaminophen (NORCO) 5-325 MG per tablet Take 1 tablet by mouth Every 12 (Twelve) Hours As Needed for Mild Pain.    Insulin Aspart (novoLOG) 100 UNIT/ML injection Inject 0-8 Units under the skin into the appropriate area as directed 4 (Four) Times a Day Before Meals & at Bedtime. Inject as per sliding scale:  If 0-59 = 0;  201-250 = 2 units;   251-300= 4 units;   301-350 = 6 units;  351-400 = 8 units;  401-1000 = 8 units AND notify MD    lactulose (CHRONULAC) 10 GM/15ML solution Take 30 mL by mouth Daily As Needed (for constipation).    levothyroxine (SYNTHROID, LEVOTHROID) 75 MCG tablet Take 1 tablet by mouth Every Morning.    metoprolol succinate XL (TOPROL-XL) 25 MG 24 hr tablet Take 0.5 tablets by mouth every night at bedtime.    multivitamin with minerals tablet tablet Take 1 tablet by mouth Daily.    Nutritional Supplements (Jayant Nutrivigor) pack Take 1 packet by mouth 2 (Two) Times a Day.    ondansetron (ZOFRAN) 4 MG tablet Take 1 tablet by mouth Every 8 (Eight) Hours As Needed for Nausea or Vomiting.    pantoprazole (PROTONIX) 40 MG EC tablet Take 1 tablet by mouth Every Morning.    sertraline (ZOLOFT) 50 MG tablet Take 1 tablet by mouth every night at bedtime.    sevelamer (RENVELA) 800 MG tablet Take 1 tablet by mouth 3 (Three) Times a Day With Meals.    benzocaine (HURRICAINE/ORAJEL) 20 % gel Apply 1 Application to the mouth or throat  "Every 2 (Two) Hours As Needed (teeth pain).    bisacodyl (DULCOLAX) 10 MG suppository Insert 1 suppository into the rectum Daily As Needed for Constipation.    Lidocaine Viscous HCl (XYLOCAINE) 2 % solution Take 5 mL by mouth Every 6 (Six) Hours As Needed for Mild Pain.    nitroglycerin (NITROSTAT) 0.4 MG SL tablet Place 1 tablet under the tongue Every 5 (Five) Minutes As Needed for Chest Pain. Take no more than 3 doses in 15 minutes.    polyethylene glycol (MiraLax) 17 GM/SCOOP powder Take 17 g by mouth Every 12 (Twelve) Hours As Needed (for constipation).       PMHx:   Past Medical History:   Diagnosis Date    CHF (congestive heart failure)     Coronary artery disease     Diabetes mellitus     Dialysis patient     Disease of thyroid gland     Elevated cholesterol     GERD (gastroesophageal reflux disease)     Hypertension     Myocardial infarction     Renal disorder     stage 5         Past Surgical History:   CARDIAC CATHETERIZATION N/A 5/28/2024     Procedure: Left Heart Cath;  Surgeon: Mackenzie Ruiz MD;  Location: [facility] CATH INVASIVE LOCATION;  Service: Cardiology;  Laterality: N/A;    CARDIAC SURGERY         cabg    CHOLECYSTECTOMY        COLONOSCOPY N/A 9/22/2023     Procedure: COLONOSCOPY;  Surgeon: Trip Peter MD;  Location: [facility] OR;  Service: Gastroenterology;  Laterality: N/A;    DIALYSIS FISTULA CREATION Left        Family History:    Arthritis Mother      COPD Father      Diabetes Father      Heart disease Father      Hyperlipidemia Father      Hypertension Father      Cancer Daughter        Social History: Nursing home resident     Review of Systems   Unable to obtain due to baseline mental status             Objective     Physical Exam:      Vital Signs  /56 (BP Location: Right arm, Patient Position: Lying)   Pulse 101   Temp 97.3 °F (36.3 °C) (Temporal)   Resp 11   Ht 180.3 cm (71\")   Wt 100 kg (220 lb 7.4 oz)   SpO2 100%   BMI 30.75 kg/m²     Intake/Output Summary (Last 24 " hours) at 8/20/2024 1403  Last data filed at 8/20/2024 1200  Gross per 24 hour   Intake 640 ml   Output 0 ml   Net 640 ml         Physical Exam:      08/19/24  1419 08/20/24  0400   Weight: 99.8 kg (220 lb) 100 kg (220 lb 7.4 oz)    Body mass index is 30.75 kg/m².  Constitution: No acute distress.   Head: Normocephalic, atraumatic.   Eyes: Aligned without strabismus. Conjunctiva noninjected   Ears, Nose, Mouth: No lesions appreciated   Respiratory: Symmetric chest expansion. No respiratory distress.   Gastrointestinal:  soft, NT, ND  Neurologic: No gross deficits.  Alert and awake  Vascular: LUE AVF with palpable thrill  Psychiatric: Normal mood        Results Review: I have personally reviewed all of the recent lab and imaging results available at this time.       Hgb 7.9 today, 5.9 on presentation            Assessment and Plan:    74 y.o. male with likely upper GI bleed    To endoscopy for EGD          This document has been electronically signed by Edgar Sloan MD   August 20, 2024 14:03 EDT    [facility]

## 2024-08-20 NOTE — PROGRESS NOTES
"Nephrology Progress Note      Subjective     Patient patient is known to have ESRD on intermittent dialysis, has been admitted with shortness of breath has been missing dialysis.  On arrival patient had hyperkalemia and was emergently dialyzed last night.    Objective       Vital signs :     Temp:  [96.9 °F (36.1 °C)-98.5 °F (36.9 °C)] 97.9 °F (36.6 °C)  Heart Rate:  [] 90  Resp:  [9-18] 10  BP: ()/(56-97) 93/57    Intake/Output                         08/19/24 0701 - 08/20/24 0700 08/20/24 0701 - 08/21/24 0700     [id number] [id number] Total 5802-3677 7175-3278 Total                 Intake    P.O.  --  -- --  0  -- 0    Blood  --  600 600  --  -- --    Volume (Transfuse RBC Infuse Each Unit Over: 2H) -- 300 300 -- -- --    Volume (Transfuse RBC Infuse Each Unit Over: 2H) -- 300 300 -- -- --    Total Intake -- 600 600 0 -- 0       Output    Dialysis  --  0 0  --  -- --    Total Output -- 0 0 -- -- --             Physical Exam:    General Appearance : Not in acute distress  Lungs : bilateral decreased intensity of breath sounds with bibasilar crackles  Heart :  regular rhythm & normal rate, normal S1, S2 and no murmur, no rub  Abdomen : Soft, nondistended  Extremities : 1+ edema,   Neurologic :   Unable to assess      Laboratory Data :     Albumin Albumin   Date Value Ref Range Status   08/19/2024 2.7 (L) 3.5 - 5.2 g/dL Final   08/18/2024 3.0 (L) 3.5 - 5.2 g/dL Final      Magnesium Magnesium   Date Value Ref Range Status   08/19/2024 2.3 1.6 - 2.4 mg/dL Final          PTH               No results found for: \"PTH\"    CBC and coagulation:  Results from last 7 days   Lab Units 08/20/24  0116 08/19/24  1446 08/18/24  1210 08/15/24  1030 08/14/24  1043 08/13/24  1723   CRP mg/dL  --  1.72*  --   --   --   --    WBC 10*3/mm3  --  8.58 10.02 9.07   < > 8.78   HEMOGLOBIN g/dL 7.9* 5.9* 10.0* 9.5*   < > 10.9*   HEMATOCRIT % 24.7* 19.5* 34.1* 31.9*   < > 37.1*   MCV fL  --  97.5* 99.4* 98.5*   < > 99.5*   MCHC g/dL  " --  30.3* 29.3* 29.8*   < > 29.4*   PLATELETS 10*3/mm3  --  159 153 155   < > 178   INR   --  2.14*  --   --   --  2.50*   D DIMER QUANT MCGFEU/mL  --  5.26*  --   --   --   --     < > = values in this interval not displayed.     Acid/base balance:  Results from last 7 days   Lab Units 08/13/24  1720   PH, ARTERIAL pH units 7.319*   PO2 ART mm Hg 49.1*   PCO2, ARTERIAL mm Hg 49.0*   HCO3 ART mmol/L 25.1     Renal and electrolytes:    Results from last 7 days   Lab Units 08/19/24  1446 08/18/24  1210 08/15/24  1030 08/14/24  1043 08/13/24  1723   SODIUM mmol/L 137 139 139 141 140   POTASSIUM mmol/L 5.8* 5.1 3.8 5.1 4.5   MAGNESIUM mg/dL 2.3  --   --   --   --    CHLORIDE mmol/L 96* 95* 95* 97* 96*   CO2 mmol/L 21.7* 23.3 28.7 26.2 25.9   BUN mg/dL 110* 83* 44* 74* 69*   CREATININE mg/dL 7.65* 7.07* 4.34* 6.46* 6.05*   CALCIUM mg/dL 8.3* 8.9 8.8 9.1 9.2   PHOSPHORUS mg/dL 7.3*  --   --   --   --      Estimated Creatinine Clearance: 9.9 mL/min (A) (by C-G formula based on SCr of 7.65 mg/dL (H)).  @GFRCG:3@   Liver and pancreatic function:  Results from last 7 days   Lab Units 08/19/24  1446 08/18/24  1210 08/15/24  1030 08/14/24  1043   ALBUMIN g/dL 2.7* 3.0* 3.2* 3.1*   BILIRUBIN mg/dL 0.4 0.5 0.5 0.5   ALK PHOS U/L 109 137* 148* 138*   AST (SGOT) U/L 14 18 21 17   ALT (SGPT) U/L 11 13 18 15   AMMONIA umol/L  --   --   --  36         Cardiac:  Results from last 7 days   Lab Units 08/19/24  1446 08/18/24  1210   PROBNP pg/mL >70,000.0* >70,000.0*     Liver and pancreatic function:  Results from last 7 days   Lab Units 08/19/24  1446 08/18/24  1210 08/15/24  1030 08/14/24  1043   ALBUMIN g/dL 2.7* 3.0* 3.2* 3.1*   BILIRUBIN mg/dL 0.4 0.5 0.5 0.5   ALK PHOS U/L 109 137* 148* 138*   AST (SGOT) U/L 14 18 21 17   ALT (SGPT) U/L 11 13 18 15   AMMONIA umol/L  --   --   --  36       Medications :     acetaminophen, 650 mg, Oral, Once per day on Monday Wednesday Friday  acetaminophen, 650 mg, Oral, TID  ammonium lactate, 1  Application, Topical, Nightly  [Held by provider] apixaban, 5 mg, Oral, BID  atorvastatin, 40 mg, Oral, Nightly  cefTRIAXone, 2,000 mg, Intravenous, Q24H  [Held by provider] clopidogrel, 75 mg, Oral, Daily  doxycycline, 100 mg, Intravenous, Q12H  gabapentin, 200 mg, Oral, Nightly  insulin lispro, 2-7 Units, Subcutaneous, 4x Daily AC & at Bedtime  levothyroxine, 75 mcg, Oral, QAM  [Held by provider] metoprolol succinate XL, 12.5 mg, Oral, Nightly  multivitamin with minerals, 1 tablet, Oral, Daily  pantoprazole, 40 mg, Intravenous, BID AC  sertraline, 50 mg, Oral, Nightly  sevelamer, 800 mg, Oral, TID With Meals  sodium chloride, 10 mL, Intravenous, Q12H             Assessment & Plan     -ESRD on intermittent dialysis  - Acute hypoxic respiratory failure likely due to fluid overload in settings of dialysis noncompliance  - Hyperkalemia  - Acute on chronic anemia  - Urinary tract infection    Patient was emergently dialyzed yesterday.  Will continue on intermittent dialysis Mondays Wednesdays and Fridays and    Reviewed clinical notes, radiological data, clinical notes.  Discussed the case in detail with primary team      Nova Saucedo MD  08/20/24  09:26 EDT

## 2024-08-20 NOTE — ANESTHESIA POSTPROCEDURE EVALUATION
Patient: Kennedy Prescott    Procedure Summary       Date: 08/20/24 Room / Location: [facility] OR [address] [place] [address] COR OR    Anesthesia Start: 1436 Anesthesia Stop: 1502    Procedure: ESOPHAGOGASTRODUODENOSCOPY (Esophagus) Diagnosis:       Anemia, unspecified type      Gastrointestinal hemorrhage associated with gastritis, unspecified gastritis type      (Anemia, unspecified type [D64.9])      (Gastrointestinal hemorrhage associated with gastritis, unspecified gastritis type [K29.71])    Surgeons: Edgar Sloan MD Provider: Andrés Soliz MD    Anesthesia Type: general ASA Status: 4            Anesthesia Type: general    Vitals  Vitals Value Taken Time   /68 08/20/24 1540   Temp 98.2 °F (36.8 °C) 08/20/24 1502   Pulse 102 08/20/24 1543   Resp 16 08/20/24 1535   SpO2 98 % 08/20/24 1543   Vitals shown include unfiled device data.        Post Anesthesia Care and Evaluation    Patient location during evaluation: bedside  Patient participation: complete - patient participated  Level of consciousness: awake and alert  Pain score: 1  Pain management: adequate    Airway patency: patent  Anesthetic complications: No anesthetic complications  PONV Status: none  Cardiovascular status: acceptable  Respiratory status: acceptable  Hydration status: acceptable

## 2024-08-20 NOTE — ANESTHESIA PREPROCEDURE EVALUATION
Anesthesia Evaluation     Patient summary reviewed and Nursing notes reviewed   no history of anesthetic complications:   NPO Solid Status: > 8 hours  NPO Liquid Status: > 8 hours           Airway   Mallampati: II  TM distance: >3 FB  Neck ROM: full  No difficulty expected  Dental    (+) poor dentition    Pulmonary - normal exam    breath sounds clear to auscultation  (+) ,shortness of breath  Cardiovascular   Exercise tolerance: poor (<4 METS)    ECG reviewed  Patient on routine beta blocker and Beta blocker given within 24 hours of surgery  Rhythm: irregular  Rate: normal    (+) hypertension, past MI , CAD, CABG, CHF Systolic <55%, RIVERA, hyperlipidemia      Neuro/Psych- negative ROS  GI/Hepatic/Renal/Endo    (+) obesity, GERD, GI bleeding , renal disease- CRI, diabetes mellitus type 2 poorly controlled using insulin, thyroid problem hypothyroidism    Musculoskeletal     Abdominal  - normal exam   Substance History - negative use     OB/GYN negative ob/gyn ROS         Other   arthritis, blood dyscrasia anemia,     ROS/Med Hx Other: Echo 5/20/2024:    Normal left ventricular cavity size and wall thickness noted. All left ventricular wall segments contract normally.  ·  Left ventricular ejection fraction appears to be 51 - 55%.  ·  The aortic valve is not well visualized.. The aortic valve is grossly normal in structure.  ·  No aortic valve regurgitation or stenosis is present  ·  Mitral valve is not well visualized. There is mild calcification of the mitral valve posterior leaflet(s).  ·  Mild mitral valve regurgitation is present. No significant mitral valve stenosis is present.  ·  There is no evidence of pericardial effusion.  ·  Comments: May consider a transesophageal echocardiographic study to have a better evaluation of the cardiac valves and to rule out endocardial vegetations if clinically warranted.                    Anesthesia Plan    ASA 4     general   total IV anesthesia  intravenous induction      Anesthetic plan, risks, benefits, and alternatives have been provided, discussed and informed consent has been obtained with: patient.  Pre-procedure education provided  Plan discussed with CRNA.      CODE STATUS:    Medical Intervention Limits: No intubation (DNI)  Level Of Support Discussed With: Health Care Surrogate  Code Status (Patient has no pulse and is not breathing): No CPR (Do Not Attempt to Resuscitate)  Medical Interventions (Patient has pulse or is breathing): Limited Support

## 2024-08-20 NOTE — PLAN OF CARE
Goal Outcome Evaluation:      Patient VSS on 3L NC. Patient alert and confused. Patient strict NPO since midnight for EGD. Patient having black stools since arrival to PCU. Patient had 1 unit of PRBCs infusing at arrival and received 1 more unit through dialysis. Patient has NG tube currently secured at 60cm, x-ray verified placement. Patient safety measures maintained, bed alarm set, bed in low position, call light within reach.

## 2024-08-20 NOTE — PLAN OF CARE
Goal Outcome Evaluation:           Progress: no change  Outcome Evaluation: pt is currently resting in bed on 4L NC, EGD done today with finding of bleeding ulcer, NG tube removed in OR, NPO lifted, Clear liquid diet, no further pt complaints at this time. bed in lowest position, wheels locked, id band on and call light with in reach.

## 2024-08-20 NOTE — NURSING NOTE
Unstageable PI to left heel.  4x6cm.  Wound covered in moist, yellow slough.  Small amount of serous drainage noted to cover dressing.  Chiquis wound with white maceration noted.  Order placed to cleanse wound with wound cleanser, paint macerated edges with betadine, apply TheraHoney gel directly to wound bed and cover with silicone bordered dressing BID.    Unstageable PI to right lateral foot just below the ankle.1x1cm.  Wound bed covered in dry scab.  No drainage.  No erythema.  Unstageable PI to right lateral ankle.  1.5x1.5cm.  Wound bed covered in dry scab.  No drainage.  Chiquis wound is red but blanchable.  Order placed to cleanse both wounds with wound cleanser and cover with silicone bordered dressing BID.    Jett score 10.  PI prevention orders initiated.       08/20/24 1423   Wound 08/20/24 0738 Right anterior foot Pressure Injury   Placement Date/Time: 08/20/24 0738   Present on Original Admission: Yes  Side: Right  Orientation: anterior  Location: foot  Primary Wound Type: Pressure Injury  Additional Comments: RIGHT LATERAL FOOT JUST BELOW THE ANKLE   Pressure Injury Stage U   Dressing Appearance open to air   Closure None   Base dry;red;scab   Periwound intact;dry;blanchable   Periwound Temperature warm   Periwound Skin Turgor soft   Edges rolled/closed   Wound Length (cm) 1 cm   Wound Width (cm) 1 cm   Wound Depth (cm) 0 cm   Wound Surface Area (cm^2) 1 cm^2   Wound Volume (cm^3) 0 cm^3   Drainage Amount none   Wound 08/20/24 0738 Right anterior ankle Pressure Injury   Placement Date/Time: 08/20/24 0738   Present on Original Admission: Yes  Side: Right  Orientation: anterior  Location: ankle  Primary Wound Type: Pressure Injury  Additional Comments: RIGHT LATERAL ANKLE   Pressure Injury Stage U   Dressing Appearance open to air   Closure None   Base dry;yellow;scab   Periwound intact;redness   Periwound Temperature warm   Periwound Skin Turgor soft   Edges rolled/closed   Drainage Amount none   Wound  08/20/24 1420 Left heel Pressure Injury   Placement Date/Time: 08/20/24 1420   Present on Original Admission: (c) Yes  Side: Left  Location: heel  Primary Wound Type: Pressure Injury   Wound Image   (see photo under media tab)   Pressure Injury Stage U   Dressing Appearance moist drainage   Closure None   Base moist;yellow;slough   Periwound macerated   Periwound Temperature warm   Periwound Skin Turgor soft   Edges open   Wound Length (cm) 4 cm   Wound Width (cm) 6 cm   Wound Surface Area (cm^2) 24 cm^2   Drainage Characteristics/Odor serous   Drainage Amount small

## 2024-08-21 NOTE — PLAN OF CARE
Goal Outcome Evaluation:           Progress: improving  Outcome Evaluation: Pt is currently resting bed on humidified 4L NC, 1700 removed from dialysis, 1 unit of blood given. Pt has been awake most of the day. Clear liquid diet. NO further pt complaints at this time. Bed in lowest position, wheels locked, wheels locked, ID band on, bed alarm set and call light with in reach. POC ongoing

## 2024-08-21 NOTE — PROGRESS NOTES
[facility] HOSPITALIST PROGRESS NOTE     Patient Identification:  Name:  Kennedy Prescott  Age:  74 y.o.  Sex:  male  :  1950  MRN:  [id number]  Visit Number:  [id number]  ROOM: [place] [address]     Primary Care Provider:  Jag Guillaume MD    Length of stay in inpatient status:  2    Subjective     Chief Compliant:    Chief Complaint   Patient presents with    Shortness of Breath       History of Presenting Illness:    Patient denies any new complaints. Reports feeling better. He was getting a unit of blood while getting dialysis. Dialysis nurse noted they were able to get 2L off patient.     ROS:  Otherwise 10 point ROS negative other than documented above in HPI.     Objective     Current Hospital Meds:acetaminophen, 650 mg, Oral, Once per day on   acetaminophen, 650 mg, Oral, TID  ammonium lactate, 1 Application, Topical, Nightly  atorvastatin, 40 mg, Oral, Nightly  cefTRIAXone, 2,000 mg, Intravenous, Q24H  [Held by provider] clopidogrel, 75 mg, Oral, Daily  doxycycline, 100 mg, Intravenous, Q12H  gabapentin, 200 mg, Oral, Nightly  insulin lispro, 2-7 Units, Subcutaneous, 4x Daily AC & at Bedtime  levothyroxine, 75 mcg, Oral, QAM  [Held by provider] metoprolol succinate XL, 12.5 mg, Oral, Nightly  multivitamin with minerals, 1 tablet, Oral, Daily  pantoprazole, 40 mg, Intravenous, BID AC  sertraline, 50 mg, Oral, Nightly  sevelamer, 800 mg, Oral, TID With Meals  sodium chloride, 10 mL, Intravenous, Q12H  sucralfate, 1 g, Oral, 4x Daily AC & at Bedtime         Current Antimicrobial Therapy:  Anti-Infectives (From admission, onward)      Ordered     Dose/Rate Route Frequency Start Stop    24 0704  doxycycline (VIBRAMYCIN) 100 mg in sodium chloride 0.9 % 100 mL IVPB-VTB        Ordering Provider: Edgar Sloan MD    100 mg Intravenous Every 12 Hours 24 0800 24 0759    24 7666  cefTRIAXone (ROCEPHIN) 2,000 mg in sodium chloride 0.9 % 100 mL IVPB-VTB         Ordering Provider: Edgar Sloan MD    2,000 mg  200 mL/hr over 30 Minutes Intravenous Every 24 Hours 08/19/24 2315 08/24/24 2314          Current Diuretic Therapy:  Diuretics (From admission, onward)      None          ----------------------------------------------------------------------------------------------------------------------  Vital Signs:  Temp:  [97.6 °F (36.4 °C)-98.6 °F (37 °C)] 98 °F (36.7 °C)  Heart Rate:  [] 98  Resp:  [10-18] 18  BP: ()/(47-96) 109/47  SpO2:  [78 %-100 %] 98 %  on  Flow (L/min):  [2-4] 4;   Device (Oxygen Therapy): humidified;nasal cannula  Body mass index is 31.73 kg/m².    Wt Readings from Last 3 Encounters:   08/21/24 103 kg (227 lb 8.2 oz)   08/18/24 95.3 kg (210 lb 1.6 oz)   08/14/24 95.3 kg (210 lb 1.6 oz)     Intake & Output (last 3 days)         08/18 0701 08/19 0700 08/19 0701 08/20 0700 08/20 0701 08/21 0700 08/21 0701 08/22 0700    P.O.   0 826    I.V. (mL/kg)   105.6 (1)     Blood  600  300    Other   40     Total Intake(mL/kg)  600 (6) 145.6 (1.4) 1126 (10.9)    Urine (mL/kg/hr)    0 (0)    Stool  0  0    Dialysis  0  1700    Total Output  0  1700    Net  +600 +145.6 -574            Stool Unmeasured Occurrence  4 x 3 x 2 x          Diet: Liquid, Renal; Clear Liquid; Low Potassium; Fluid Consistency: Thin (IDDSI 0)  ----------------------------------------------------------------------------------------------------------------------  Physical exam:  Constitutional:  Well-developed and well-nourished.  No respiratory distress.      HENT:  Head:  Normocephalic and atraumatic.  Mouth:  Moist mucous membranes.    Eyes:  Conjunctivae and EOM are normal. No scleral icterus.    Neck:  Neck supple.  No JVD present.    Cardiovascular:  Normal rate, regular rhythm and normal heart sounds with no murmur.  Pulmonary/Chest:  No respiratory distress, no wheezes, no crackles, with normal breath sounds and good air movement.  Abdominal:  Soft.  Bowel sounds  are normal.  No distension and no tenderness.   Musculoskeletal:  No edema, no tenderness, and no deformity.  No red or swollen joints anywhere.    Neurological:  Alert and oriented to person, place, and time.  No cranial nerve deficit.  No tongue deviation.  No facial droop.  No slurred speech.   Skin:  Skin is warm and dry. No rash noted. No pallor.   Peripheral vascular:  Pulses in all 4 extremities with no clubbing, no cyanosis, no edema.  ----------------------------------------------------------------------------------------------------------------------  Tele:    ----------------------------------------------------------------------------------------------------------------------  Results from last 7 days   Lab Units 08/21/24  1214 08/21/24  0811 08/20/24  0742 08/20/24  0116 08/19/24  1446   CRP mg/dL  --   --   --   --  1.72*   WBC 10*3/mm3 10.02 10.14 10.14  --  8.58   HEMOGLOBIN g/dL 8.0* 6.0* 7.9*   < > 5.9*   HEMATOCRIT % 25.6* 19.4* 25.3*   < > 19.5*   MCV fL 92.1 92.8 92.7  --  97.5*   MCHC g/dL 31.3* 30.9* 31.2*  --  30.3*   PLATELETS 10*3/mm3 146 158 166  --  159   INR   --   --   --   --  2.14*    < > = values in this interval not displayed.         Results from last 7 days   Lab Units 08/21/24  1214 08/21/24  0039 08/20/24  0742 08/19/24  1446 08/18/24  1210   SODIUM mmol/L 137 137 136 137 139   POTASSIUM mmol/L 3.4* 6.2* 4.6 5.8* 5.1   MAGNESIUM mg/dL  --   --   --  2.3  --    CHLORIDE mmol/L 95* 98 96* 96* 95*   CO2 mmol/L 28.0 20.2* 25.0 21.7* 23.3   BUN mg/dL 31* 72* 61* 110* 83*   CREATININE mg/dL 2.70* 5.39* 4.68* 7.65* 7.07*   CALCIUM mg/dL 8.3* 8.2* 8.2* 8.3* 8.9   PHOSPHORUS mg/dL  --   --   --  7.3*  --    GLUCOSE mg/dL 119* 132* 127* 190* 165*   ALBUMIN g/dL 3.0*  --   --  2.7* 3.0*   BILIRUBIN mg/dL 0.5  --   --  0.4 0.5   ALK PHOS U/L 110  --   --  109 137*   AST (SGOT) U/L 22  --   --  14 18   ALT (SGPT) U/L 15  --   --  11 13   Estimated Creatinine Clearance: 28.4 mL/min (A) (by C-G  "formula based on SCr of 2.7 mg/dL (H)).  No results found for: \"AMMONIA\"  Results from last 7 days   Lab Units 08/19/24  1744 08/19/24  1446 08/18/24  1423   HSTROP T ng/L 150* 183* 190*     Results from last 7 days   Lab Units 08/19/24  1446 08/18/24  1210   PROBNP pg/mL >70,000.0* >70,000.0*     Results from last 7 days   Lab Units 08/15/24  1030   CHOLESTEROL mg/dL 52   TRIGLYCERIDES mg/dL 130   HDL CHOL mg/dL 25*   LDL CHOL mg/dL <5     Glucose   Date/Time Value Ref Range Status   08/21/2024 1601 135 (H) 70 - 130 mg/dL Final   08/21/2024 1217 128 70 - 130 mg/dL Final   08/21/2024 0658 143 (H) 70 - 130 mg/dL Final   08/20/2024 2149 120 70 - 130 mg/dL Final   08/20/2024 1641 159 (H) 70 - 130 mg/dL Final   08/20/2024 1123 132 (H) 70 - 130 mg/dL Final   08/20/2024 0639 151 (H) 70 - 130 mg/dL Final     Lab Results   Component Value Date    TSH 5.160 (H) 08/19/2024    FREET4 0.9 07/29/2024     No results found for: \"PREGTESTUR\", \"PREGSERUM\", \"HCG\", \"HCGQUANT\"  Pain Management Panel          Latest Ref Rng & Units 7/10/2024   Pain Management Panel   Amphetamine, Urine Qual Negative Negative    Barbiturates Screen, Urine Negative Negative    Benzodiazepine Screen, Urine Negative Negative    Buprenorphine, Screen, Urine Negative Negative    Cocaine Screen, Urine Negative Negative    Fentanyl, Urine Negative Negative    Methadone Screen , Urine Negative Negative    Methamphetamine, Ur Negative Negative       Details                 Brief Urine Lab Results  (Last result in the past 365 days)        Color   Clarity   Blood   Leuk Est   Nitrite   Protein   CREAT   Urine HCG        08/18/24 1348 Brown  Comment: Dipstick results may be inaccurate due to color interference.       Turbid   Large (3+)   Large (3+)   Positive   100 mg/dL (2+)                 No results found for: \"BLOODCX\"  Urine Culture   Date Value Ref Range Status   08/18/2024 No growth  Final     No results found for: \"WOUNDCX\"  No results found for: " "\"STOOLCX\"  No results found for: \"RESPCX\"  No results found for: \"AFBCX\"  Results from last 7 days   Lab Units 08/19/24  1446   CRP mg/dL 1.72*       I have personally looked at the labs and they are summarized above.  ----------------------------------------------------------------------------------------------------------------------  Detailed radiology reports for the last 24 hours:    Imaging Results (Last 24 Hours)       ** No results found for the last 24 hours. **          Assessment & Plan    #Acute blood loss anemia 2/2 duodenal ulcer   #pAfib on home apixiban with resultant coagulopathy   - Hx of significant bleed earlier this year requiring 6 units pRBC. Unclear etiology.   - Hgb baseline around 10. Hemoglobin presentation 5.9 on presentation. 2 units pRBC ordered in setting of active bleeding. Hemoglobin stable this AM at 7.9    - INR 2.14, PTT 34.8. FFP if patient declines.   - Will continue BID IV protonix. No hx of liver disease Will need 72 hours post endoscopic intervention.   - Surgery consulted. He had NG tube in place and blood drainage suspicious of upper GI bleed. Duodenal ulcer noted on EGD on 8/20 requiring epinephrine injection   - Holding home apxibian and plavix. Will plan to restart plavix but old apixiban at discharge.   - Continue to monitor hemoglobin. Given 3rd unit of pRBC this morning.      #ESRD in setting of missed dialysis sessions   #Hyperkalemia   - Nephrology consulted. Dialysis this AM.      #UTI  - Significant pyuria on 8/18. Will follow cultures. Will continue ceftriaxone.      #Recent foot ulceration w/ associated cellulitis   - Admitted to  7/29-8/2: Vascular surgery recommended non-operative management. Superficial ulcer with some maceration but no purulent discharge, chronic draining sinuses, deep tunneling, or other evidence of osteomyelitis.   - Finished abx course      Chronic medical problems   CAD  Hypothyroidism- TSH improved at 5.1. Recent free T4 wnl. "   GERD  HTN     Code status: Full      Dispo: Admit to PCU    Dar Cochran MD  [facility] Hospitalist  08/21/24  18:23 EDT

## 2024-08-21 NOTE — PROGRESS NOTES
"Nephrology Progress Note      Subjective     Patient denied any chest pain, shortness of breath.    Objective       Vital signs :     Temp:  [97.3 °F (36.3 °C)-98.6 °F (37 °C)] 97.6 °F (36.4 °C)  Heart Rate:  [] 87  Resp:  [9-16] 16  BP: ()/(44-88) 100/61    Intake/Output                               08/19/24 0701 - 08/20/24 0700 08/20/24 0701 - 08/21/24 0700 08/21/24 0701 - 08/22/24 0700     [id number] 8475-8118 Total 2226-2526 5948-0401 Total 6413-1013 5654-0186 Total                    Intake    P.O.  --  -- --  0  -- 0  --  -- --    I.V.  --  -- --  105.6  -- 105.6  --  -- --    Blood  --  600 600  --  -- --  --  -- --    Volume (Transfuse RBC Infuse Each Unit Over: 2H) -- 300 300 -- -- -- -- -- --    Volume (Transfuse RBC Infuse Each Unit Over: 2H) -- 300 300 -- -- -- -- -- --    Other  --  -- --  40  -- 40  --  -- --    Flush/ Irrigation Intake (mL) ([REMOVED] NG/OG Tube Nasogastric 14 Fr Right nostril) -- -- -- 40 -- 40 -- -- --    Total Intake -- 600 600 145.6 -- 145.6 -- -- --       Output    Urine  --  -- --  --  -- --  0  -- 0    Dialysis  --  0 0  --  -- --  --  -- --    Total Output -- 0 0 -- -- -- 0 -- 0             Physical Exam:    General Appearance : Not in acute distress  Lungs : bilateral decreased intensity of breath sounds with bibasilar crackles  Heart :  regular rhythm & normal rate, normal S1, S2 and no murmur, no rub  Abdomen : Soft, nondistended  Extremities : 1+ edema,   Neurologic :   Unable to assess      Laboratory Data :     Albumin Albumin   Date Value Ref Range Status   08/19/2024 2.7 (L) 3.5 - 5.2 g/dL Final   08/18/2024 3.0 (L) 3.5 - 5.2 g/dL Final      Magnesium Magnesium   Date Value Ref Range Status   08/19/2024 2.3 1.6 - 2.4 mg/dL Final          PTH               No results found for: \"PTH\"    CBC and coagulation:  Results from last 7 days   Lab Units 08/20/24  0742 08/20/24  0116 08/19/24  1446 08/18/24  1210   CRP mg/dL  --   --  1.72*  --    WBC 10*3/mm3 10.14  " --  8.58 10.02   HEMOGLOBIN g/dL 7.9* 7.9* 5.9* 10.0*   HEMATOCRIT % 25.3* 24.7* 19.5* 34.1*   MCV fL 92.7  --  97.5* 99.4*   MCHC g/dL 31.2*  --  30.3* 29.3*   PLATELETS 10*3/mm3 166  --  159 153   INR   --   --  2.14*  --    D DIMER QUANT MCGFEU/mL  --   --  5.26*  --      Acid/base balance:        Renal and electrolytes:    Results from last 7 days   Lab Units 08/21/24  0039 08/20/24  0742 08/19/24  1446 08/18/24  1210 08/15/24  1030   SODIUM mmol/L 137 136 137 139 139   POTASSIUM mmol/L 6.2* 4.6 5.8* 5.1 3.8   MAGNESIUM mg/dL  --   --  2.3  --   --    CHLORIDE mmol/L 98 96* 96* 95* 95*   CO2 mmol/L 20.2* 25.0 21.7* 23.3 28.7   BUN mg/dL 72* 61* 110* 83* 44*   CREATININE mg/dL 5.39* 4.68* 7.65* 7.07* 4.34*   CALCIUM mg/dL 8.2* 8.2* 8.3* 8.9 8.8   PHOSPHORUS mg/dL  --   --  7.3*  --   --      Estimated Creatinine Clearance: 14.2 mL/min (A) (by C-G formula based on SCr of 5.39 mg/dL (H)).  @GFRCG:3@   Liver and pancreatic function:  Results from last 7 days   Lab Units 08/19/24  1446 08/18/24  1210 08/15/24  1030 08/14/24  1043   ALBUMIN g/dL 2.7* 3.0* 3.2* 3.1*   BILIRUBIN mg/dL 0.4 0.5 0.5 0.5   ALK PHOS U/L 109 137* 148* 138*   AST (SGOT) U/L 14 18 21 17   ALT (SGPT) U/L 11 13 18 15   AMMONIA umol/L  --   --   --  36         Cardiac:  Results from last 7 days   Lab Units 08/19/24  1446 08/18/24  1210   PROBNP pg/mL >70,000.0* >70,000.0*     Liver and pancreatic function:  Results from last 7 days   Lab Units 08/19/24  1446 08/18/24  1210 08/15/24  1030 08/14/24  1043   ALBUMIN g/dL 2.7* 3.0* 3.2* 3.1*   BILIRUBIN mg/dL 0.4 0.5 0.5 0.5   ALK PHOS U/L 109 137* 148* 138*   AST (SGOT) U/L 14 18 21 17   ALT (SGPT) U/L 11 13 18 15   AMMONIA umol/L  --   --   --  36       Medications :     acetaminophen, 650 mg, Oral, Once per day on Monday Wednesday Friday  acetaminophen, 650 mg, Oral, TID  ammonium lactate, 1 Application, Topical, Nightly  atorvastatin, 40 mg, Oral, Nightly  cefTRIAXone, 2,000 mg, Intravenous,  Q24H  [Held by provider] clopidogrel, 75 mg, Oral, Daily  doxycycline, 100 mg, Intravenous, Q12H  gabapentin, 200 mg, Oral, Nightly  insulin lispro, 2-7 Units, Subcutaneous, 4x Daily AC & at Bedtime  levothyroxine, 75 mcg, Oral, QAM  [Held by provider] metoprolol succinate XL, 12.5 mg, Oral, Nightly  multivitamin with minerals, 1 tablet, Oral, Daily  pantoprazole, 40 mg, Intravenous, BID AC  sertraline, 50 mg, Oral, Nightly  sevelamer, 800 mg, Oral, TID With Meals  sodium chloride, 10 mL, Intravenous, Q12H  sucralfate, 1 g, Oral, 4x Daily AC & at Bedtime             Assessment & Plan     -ESRD on intermittent dialysis  - Acute hypoxic respiratory failure likely due to fluid overload in settings of dialysis noncompliance  - Hyperkalemia  - Acute on chronic anemia  - Urinary tract infection    Dialysis today and continue on Mondays Wednesdays and Fridays dialysis schedule    Reviewed clinical notes, radiological data, clinical notes.  Discussed the case in detail with primary team      Nova Saucedo MD  08/21/24  08:17 EDT

## 2024-08-21 NOTE — PLAN OF CARE
Goal Outcome Evaluation:      Patient VSS on 4L humidified NC. Patient wound care completed. Skin tear added to LDA, standard wound care orders followed. Patient safety measures maintained, bed alarm set, bed in low position, call light within reach.

## 2024-08-21 NOTE — CASE MANAGEMENT/SOCIAL WORK
Discharge Planning Assessment   [place]     Patient Name: Kennedy Prescott  MRN: [id number]  Today's Date: 8/21/2024    Admit Date: 8/19/2024     Discharge Plan       Row Name 08/21/24 1535       Plan    Plan Pt admitted on 8/19/24. Pt is a resident of [facility]. Per Irma who states pt had a 24 day bed hold prior to admission and will accept pt back at discharge. SS to follow and assist with discharge planning.                Destination       Service Provider Request Status Selected Services Address Phone Fax Patient Preferred    [facility] Pending - No Request Sent N/A [address], [place] [address] [phone] --               MONA Long

## 2024-08-22 NOTE — PLAN OF CARE
Problem: Adult Inpatient Plan of Care  Goal: Plan of Care Review  Outcome: Ongoing, Progressing  Flowsheets  Taken 8/22/2024 0331 by Sraa Dior RN  Progress: no change  Taken 8/20/2024 1532 by Josee Kramer RN  Plan of Care Reviewed With: patient  Goal: Patient-Specific Goal (Individualized)  Outcome: Ongoing, Progressing  Goal: Absence of Hospital-Acquired Illness or Injury  Outcome: Ongoing, Progressing  Intervention: Identify and Manage Fall Risk  Recent Flowsheet Documentation  Taken 8/22/2024 0300 by Sara Dior RN  Safety Promotion/Fall Prevention: safety round/check completed  Taken 8/21/2024 2300 by Sara Dior RN  Safety Promotion/Fall Prevention: safety round/check completed  Taken 8/21/2024 2100 by Sara Dior RN  Safety Promotion/Fall Prevention: safety round/check completed  Taken 8/21/2024 1900 by Sara Dior RN  Safety Promotion/Fall Prevention: safety round/check completed  Intervention: Prevent Skin Injury  Recent Flowsheet Documentation  Taken 8/21/2024 2000 by Sara Dior RN  Skin Protection: adhesive use limited  Intervention: Prevent and Manage VTE (Venous Thromboembolism) Risk  Recent Flowsheet Documentation  Taken 8/21/2024 2000 by Sara Dior RN  VTE Prevention/Management: (scuds ordered) other (see comments)  Range of Motion: active ROM (range of motion) encouraged  Intervention: Prevent Infection  Recent Flowsheet Documentation  Taken 8/22/2024 0300 by Sara Dior RN  Infection Prevention: single patient room provided  Taken 8/21/2024 2300 by Sara Dior RN  Infection Prevention: single patient room provided  Taken 8/21/2024 2100 by Sara Dior RN  Infection Prevention: single patient room provided  Taken 8/21/2024 1900 by Sara Dior RN  Infection Prevention: single patient room provided  Goal: Optimal Comfort and Wellbeing  Outcome: Ongoing, Progressing  Intervention: Provide Person-Centered Care  Recent Flowsheet Documentation  Taken 8/21/2024 2000 by  Sara Dior RN  Trust Relationship/Rapport:   care explained   choices provided   thoughts/feelings acknowledged  Goal: Readiness for Transition of Care  Outcome: Ongoing, Progressing     Problem: Skin Injury Risk Increased  Goal: Skin Health and Integrity  Outcome: Ongoing, Progressing  Intervention: Optimize Skin Protection  Recent Flowsheet Documentation  Taken 8/21/2024 2000 by Sara Dior RN  Pressure Reduction Techniques:   heels elevated off bed   positioned off wounds   pressure points protected   weight shift assistance provided  Pressure Reduction Devices: pressure-redistributing mattress utilized  Skin Protection: adhesive use limited     Problem: Diabetes Comorbidity  Goal: Blood Glucose Level Within Targeted Range  Outcome: Ongoing, Progressing     Problem: Heart Failure Comorbidity  Goal: Maintenance of Heart Failure Symptom Control  Outcome: Ongoing, Progressing  Intervention: Maintain Heart Failure-Management  Recent Flowsheet Documentation  Taken 8/22/2024 0300 by Sara Dior RN  Medication Review/Management: medications reviewed  Taken 8/21/2024 2300 by Sara Dior RN  Medication Review/Management: medications reviewed  Taken 8/21/2024 2100 by Sara Dior RN  Medication Review/Management: medications reviewed  Taken 8/21/2024 1900 by Sara Dior RN  Medication Review/Management: medications reviewed     Problem: Hypertension Comorbidity  Goal: Blood Pressure in Desired Range  Outcome: Ongoing, Progressing  Intervention: Maintain Blood Pressure Management  Recent Flowsheet Documentation  Taken 8/22/2024 0300 by Sara Dior RN  Medication Review/Management: medications reviewed  Taken 8/21/2024 2300 by Sara Dior RN  Medication Review/Management: medications reviewed  Taken 8/21/2024 2100 by Sara Dior RN  Medication Review/Management: medications reviewed  Taken 8/21/2024 1900 by Sara Dior RN  Medication Review/Management: medications reviewed     Problem: Adjustment to  Illness (Gastrointestinal Bleeding)  Goal: Optimal Coping with Acute Illness  Outcome: Ongoing, Progressing     Problem: Bleeding (Gastrointestinal Bleeding)  Goal: Hemostasis  Outcome: Ongoing, Progressing     Problem: Anemia  Goal: Anemia Symptom Improvement  Outcome: Ongoing, Progressing  Intervention: Monitor and Manage Anemia  Recent Flowsheet Documentation  Taken 8/22/2024 0300 by Sara Dior RN  Safety Promotion/Fall Prevention: safety round/check completed  Taken 8/21/2024 2300 by Sara Dior RN  Safety Promotion/Fall Prevention: safety round/check completed  Taken 8/21/2024 2100 by Sara Dior RN  Safety Promotion/Fall Prevention: safety round/check completed  Taken 8/21/2024 1900 by Sara Dior RN  Safety Promotion/Fall Prevention: safety round/check completed     Problem: Fall Injury Risk  Goal: Absence of Fall and Fall-Related Injury  Outcome: Ongoing, Progressing  Intervention: Identify and Manage Contributors  Recent Flowsheet Documentation  Taken 8/22/2024 0300 by Sara Dior RN  Medication Review/Management: medications reviewed  Taken 8/21/2024 2300 by Sara Dior RN  Medication Review/Management: medications reviewed  Taken 8/21/2024 2100 by Sara Dior RN  Medication Review/Management: medications reviewed  Taken 8/21/2024 1900 by Sara Dior RN  Medication Review/Management: medications reviewed  Intervention: Promote Injury-Free Environment  Recent Flowsheet Documentation  Taken 8/22/2024 0300 by Sara Dior RN  Safety Promotion/Fall Prevention: safety round/check completed  Taken 8/21/2024 2300 by Sara Dior RN  Safety Promotion/Fall Prevention: safety round/check completed  Taken 8/21/2024 2100 by Sara Dior RN  Safety Promotion/Fall Prevention: safety round/check completed  Taken 8/21/2024 1900 by Sara Dior RN  Safety Promotion/Fall Prevention: safety round/check completed   Goal Outcome Evaluation:           Progress: no change     Patient is in bed at this time.  Patient remains on 4 L/min via nasal cannula. Afib on monitor. Patient denies questions or concerns at this time. Bed is low & locked w/ bed alarm set. Plan of care ongoing.

## 2024-08-22 NOTE — PROGRESS NOTES
[facility] HOSPITALIST PROGRESS NOTE     Patient Identification:  Name:  Kennedy Prescott  Age:  74 y.o.  Sex:  male  :  1950  MRN:  [id number]  Visit Number:  [id number]  ROOM: [place] [address]     Primary Care Provider:  Jag Guillaume MD    Length of stay in inpatient status:  3    Subjective     Chief Compliant:    Chief Complaint   Patient presents with    Shortness of Breath       History of Presenting Illness:    Patient reports feeling some better. Still with black BM but are becoming less frequent and less blood.     ROS:  Otherwise 10 point ROS negative other than documented above in HPI.     Objective     Current Hospital Meds:acetaminophen, 650 mg, Oral, Once per day on   acetaminophen, 650 mg, Oral, TID  ammonium lactate, 1 Application, Topical, Nightly  atorvastatin, 40 mg, Oral, Nightly  cefTRIAXone, 2,000 mg, Intravenous, Q24H  [Held by provider] clopidogrel, 75 mg, Oral, Daily  doxycycline, 100 mg, Intravenous, Q12H  gabapentin, 200 mg, Oral, Nightly  insulin lispro, 2-7 Units, Subcutaneous, 4x Daily AC & at Bedtime  levothyroxine, 75 mcg, Oral, QAM  [Held by provider] metoprolol succinate XL, 12.5 mg, Oral, Nightly  multivitamin with minerals, 1 tablet, Oral, Daily  nicotine, 1 patch, Transdermal, Q24H  pantoprazole, 40 mg, Intravenous, BID AC  sertraline, 50 mg, Oral, Nightly  sevelamer, 800 mg, Oral, TID With Meals  sodium chloride, 10 mL, Intravenous, Q12H  sucralfate, 1 g, Oral, 4x Daily AC & at Bedtime         Current Antimicrobial Therapy:  Anti-Infectives (From admission, onward)      Ordered     Dose/Rate Route Frequency Start Stop    24 0704  doxycycline (VIBRAMYCIN) 100 mg in sodium chloride 0.9 % 100 mL IVPB-VTB        Ordering Provider: Edgar Sloan MD    100 mg Intravenous Every 12 Hours 24 0800 24 0759    24 2226  cefTRIAXone (ROCEPHIN) 2,000 mg in sodium chloride 0.9 % 100 mL IVPB-VTB        Ordering Provider:  Edgar Sloan MD    2,000 mg  200 mL/hr over 30 Minutes Intravenous Every 24 Hours 08/19/24 2315 08/24/24 2314          Current Diuretic Therapy:  Diuretics (From admission, onward)      None          ----------------------------------------------------------------------------------------------------------------------  Vital Signs:  Temp:  [97.6 °F (36.4 °C)-98.6 °F (37 °C)] 97.8 °F (36.6 °C)  Heart Rate:  [] 111  Resp:  [10-18] 14  BP: ()/(47-96) 134/65  SpO2:  [90 %-100 %] 100 %  on  Flow (L/min):  [4] 4;   Device (Oxygen Therapy): humidified;nasal cannula  Body mass index is 32.19 kg/m².    Wt Readings from Last 3 Encounters:   08/22/24 105 kg (230 lb 13.2 oz)   08/18/24 95.3 kg (210 lb 1.6 oz)   08/14/24 95.3 kg (210 lb 1.6 oz)     Intake & Output (last 3 days)         08/19 0701 08/20 0700 08/20 0701 08/21 0700 08/21 0701 08/22 0700 08/22 0701 08/23 0700    P.O.  0 826 240    I.V. (mL/kg)  105.6 (1) 411.9 (3.9)     Blood 600  300     Other  40      IV Piggyback   100 100    Total Intake(mL/kg) 600 (6) 145.6 (1.4) 1637.9 (15.6) 340 (3.2)    Urine (mL/kg/hr)   0 (0)     Stool 0  0     Dialysis 0  1700     Total Output 0  1700     Net +600 +145.6 -62.1 +340            Stool Unmeasured Occurrence 4 x 3 x 3 x           Diet: Liquid, Renal; Clear Liquid; Low Potassium; Fluid Consistency: Thin (IDDSI 0)  ----------------------------------------------------------------------------------------------------------------------  Physical exam:  Constitutional:  Well-developed and well-nourished.  No respiratory distress.      HENT:  Head:  Normocephalic and atraumatic.  Mouth:  Moist mucous membranes.    Eyes:  Conjunctivae and EOM are normal. No scleral icterus.    Neck:  Neck supple.  No JVD present.    Cardiovascular:  Normal rate, regular rhythm and normal heart sounds with no murmur.  Pulmonary/Chest:  No respiratory distress, no wheezes, no crackles, with normal breath sounds and good air  movement.  Abdominal:  Soft.  Bowel sounds are normal.  No distension and no tenderness.   Musculoskeletal:  No edema, no tenderness, and no deformity.  No red or swollen joints anywhere.    Neurological:  Alert and oriented to person, place, and time.  No cranial nerve deficit.  No tongue deviation.  No facial droop.  No slurred speech.   Skin:  Skin is warm and dry. No rash noted. No pallor.   Peripheral vascular:  Pulses in all 4 extremities with no clubbing, no cyanosis, no edema.  ----------------------------------------------------------------------------------------------------------------------  Tele:    ----------------------------------------------------------------------------------------------------------------------  Results from last 7 days   Lab Units 08/22/24  1108 08/22/24  0029 08/21/24  1214 08/20/24  0116 08/19/24  1446   CRP mg/dL  --   --   --   --  1.72*   WBC 10*3/mm3 11.35* 10.10 10.02   < > 8.58   HEMOGLOBIN g/dL 7.5* 7.7* 8.0*   < > 5.9*   HEMATOCRIT % 24.4* 25.7* 25.6*   < > 19.5*   MCV fL 92.1 96.3 92.1   < > 97.5*   MCHC g/dL 30.7* 30.0* 31.3*   < > 30.3*   PLATELETS 10*3/mm3 171 149 146   < > 159   INR   --   --   --   --  2.14*    < > = values in this interval not displayed.         Results from last 7 days   Lab Units 08/22/24  0411 08/21/24  1214 08/21/24  0039 08/20/24  0742 08/19/24  1446 08/18/24  1210   SODIUM mmol/L 138 137 137   < > 137 139   POTASSIUM mmol/L 3.7 3.4* 6.2*   < > 5.8* 5.1   MAGNESIUM mg/dL  --   --   --   --  2.3  --    CHLORIDE mmol/L 95* 95* 98   < > 96* 95*   CO2 mmol/L 27.1 28.0 20.2*   < > 21.7* 23.3   BUN mg/dL 20 31* 72*   < > 110* 83*   CREATININE mg/dL 3.54* 2.70* 5.39*   < > 7.65* 7.07*   CALCIUM mg/dL 8.3* 8.3* 8.2*   < > 8.3* 8.9   PHOSPHORUS mg/dL  --   --   --   --  7.3*  --    GLUCOSE mg/dL 114* 119* 132*   < > 190* 165*   ALBUMIN g/dL  --  3.0*  --   --  2.7* 3.0*   BILIRUBIN mg/dL  --  0.5  --   --  0.4 0.5   ALK PHOS U/L  --  110  --   --  109  "137*   AST (SGOT) U/L  --  22  --   --  14 18   ALT (SGPT) U/L  --  15  --   --  11 13    < > = values in this interval not displayed.   Estimated Creatinine Clearance: 21.9 mL/min (A) (by C-G formula based on SCr of 3.54 mg/dL (H)).  No results found for: \"AMMONIA\"  Results from last 7 days   Lab Units 08/19/24  1744 08/19/24  1446 08/18/24  1423   HSTROP T ng/L 150* 183* 190*     Results from last 7 days   Lab Units 08/19/24  1446 08/18/24  1210   PROBNP pg/mL >70,000.0* >70,000.0*         Glucose   Date/Time Value Ref Range Status   08/22/2024 1111 135 (H) 70 - 130 mg/dL Final   08/22/2024 0616 110 70 - 130 mg/dL Final   08/21/2024 2046 131 (H) 70 - 130 mg/dL Final   08/21/2024 1601 135 (H) 70 - 130 mg/dL Final   08/21/2024 1217 128 70 - 130 mg/dL Final   08/21/2024 0658 143 (H) 70 - 130 mg/dL Final   08/20/2024 2149 120 70 - 130 mg/dL Final   08/20/2024 1641 159 (H) 70 - 130 mg/dL Final     Lab Results   Component Value Date    TSH 5.160 (H) 08/19/2024    FREET4 0.9 07/29/2024     No results found for: \"PREGTESTUR\", \"PREGSERUM\", \"HCG\", \"HCGQUANT\"  Pain Management Panel          Latest Ref Rng & Units 7/10/2024   Pain Management Panel   Amphetamine, Urine Qual Negative Negative    Barbiturates Screen, Urine Negative Negative    Benzodiazepine Screen, Urine Negative Negative    Buprenorphine, Screen, Urine Negative Negative    Cocaine Screen, Urine Negative Negative    Fentanyl, Urine Negative Negative    Methadone Screen , Urine Negative Negative    Methamphetamine, Ur Negative Negative       Details                 Brief Urine Lab Results  (Last result in the past 365 days)        Color   Clarity   Blood   Leuk Est   Nitrite   Protein   CREAT   Urine HCG        08/18/24 1348 Brown  Comment: Dipstick results may be inaccurate due to color interference.       Turbid   Large (3+)   Large (3+)   Positive   100 mg/dL (2+)                 No results found for: \"BLOODCX\"  Urine Culture   Date Value Ref Range Status " "  08/18/2024 No growth  Final     No results found for: \"WOUNDCX\"  No results found for: \"STOOLCX\"  No results found for: \"RESPCX\"  No results found for: \"AFBCX\"  Results from last 7 days   Lab Units 08/19/24  1446   CRP mg/dL 1.72*       I have personally looked at the labs and they are summarized above.  ----------------------------------------------------------------------------------------------------------------------  Detailed radiology reports for the last 24 hours:    Imaging Results (Last 24 Hours)       ** No results found for the last 24 hours. **          Assessment & Plan    #Acute blood loss anemia   #pAfib on home apixiban with resultant coagulopathy   - Hgb baseline around 10. Hemoglobin presentation 5.9 on presentation. 2 units pRBC ordered in setting of active bleeding. Hemoglobin stable this AM at 7.9    - INR 2.14, PTT 34.8. FFP if patient declines.   - Will continue BID IV protonix. No hx of liver disease   - Recent GI bleed at OSH? Will request records   - Surgery consulted. NG tube in place and blood drainage suspicious of upper GI bleed. Still signs of bleeding.   - Holding home apxibian and plavix until bleeding stops.      #ESRD in setting of missed dialysis sessions   #Hyperkalemia   - Nephrology consulted. Planning on dialysis this PM.     #RML/RLL pneumonia   - Continue ceftriaxone/doxy.   - Concern for aspiration. SLP consulted.      #UTI  - Significant pyuria on 8/18. Will follow cultures. Currently no growth. Will continue ceftriaxone.      #Recent foot ulceration w/ associated cellulitis   - Admitted to  7/29-8/2: Vascular surgery recommended non-operative management. Superficial ulcer with some maceration but no purulent discharge, chronic draining sinuses, deep tunneling, or other evidence of osteomyelitis.   - Finished abx course      Chronic medical problems   CAD  Hypothyroidism- TSH improved at 5.1. Recent free T4 wnl.   GERD  HTN     Code status: Full      Dispo: Given " improvement, will transfer to med/surg.     Dar Cochran MD  [facility]  08/22/24  11:58 EDT

## 2024-08-22 NOTE — PLAN OF CARE
Goal Outcome Evaluation:  Plan of Care Reviewed With: patient        Progress: no change  Outcome Evaluation: Pt is AOx4 on 2 L NC. Clear liquid diet and tolerating well. Afebrile throughout shift. Multiple small black BMs. MD aware. Waiting for transfer to Med/surg. Son at bedside. Alarms set. Call light in reach. Care ongoing.

## 2024-08-22 NOTE — CASE MANAGEMENT/SOCIAL WORK
traZODone (DESYREL) 100 MG tablet    Last Written Prescription Date:  07/29/2020  Last Fill Quantity: 120,   # refills: 3  Last Office Visit: 10/27/2020      tamsulosin (FLOMAX) 0.4 MG capsule    Last Written Prescription Date:  03/12/2020  Last Fill Quantity: 30,   # refills: 8  Last Office Visit:   Future Office visit:    Next 5 appointments (look out 90 days)    Nov 24, 2020  4:00 PM  (Arrive by 3:45 PM)  [facility] [address] [address]  [phone]   Dec 29, 2020  3:20 PM  (Arrive by 3:05 PM)  Return Visit with Laquita Fernandez MD  [facility] [address] [place] [address]  [phone]   Feb 16, 2021  2:15 PM  (Arrive by 2:00 PM)  Nurse Only with Bettye Jules  [facility] [address] [address]  [phone]           Routing refill request to provider for review/approval because:         Discharge Planning Assessment   [place]     Patient Name: Kennedy Prescott  MRN: [id number]  Today's Date: 8/22/2024    Admit Date: 8/19/2024     Discharge Plan       Row Name 08/22/24 1419       Plan    Plan Pt was discussed in rounds if the NH would accept pt back on saturday. SS attempted to contact Irma at [facility] without success. SS left voicemail to return call. SS to follow.    14:40: SS received call back from [facility] per Irma who states NH will accept pt back on Saturday, but will need to have discharge orders by 13:00pm. SS notified provider via secure chat. SS to follow.                 Destination       Service Provider Request Status Selected Services Address Phone Fax Patient Preferred    [facility] Pending - No Request Sent N/A [address] [place] [address] [phone] --               MONA Long

## 2024-08-22 NOTE — PROGRESS NOTES
"Nephrology Progress Note      Subjective     No chest pain, shortness of breath    Objective       Vital signs :     Temp:  [97.6 °F (36.4 °C)-98.6 °F (37 °C)] 97.6 °F (36.4 °C)  Heart Rate:  [] 100  Resp:  [10-18] 13  BP: ()/(47-96) 110/67    Intake/Output                         08/20/24 0701 - 08/21/24 0700 08/21/24 0701 - 08/22/24 0700 0701-1900 1312-4777 Total 4778-9491 6805-4229 Total                 Intake    P.O.  0  -- 0  826  -- 826    I.V.  105.6  -- 105.6  0  411.9 411.9    Blood  --  -- --  300  -- 300    Volume (Transfuse RBC Infuse Each Unit Over: 3.5H) -- -- -- 300 -- 300    Other  40  -- 40  --  -- --    Flush/ Irrigation Intake (mL) ([REMOVED] NG/OG Tube Nasogastric 14 Fr Right nostril) 40 -- 40 -- -- --    IV Piggyback  --  -- --  100  -- 100    Total Intake 145.6 -- 145.6 1226 411.9 1637.9       Output    Urine  --  -- --  0  -- 0    Dialysis  --  -- --  1700  -- 1700    Total Output -- -- -- 1700 -- 1700             Physical Exam:    General Appearance : Not in acute distress  Lungs : bilateral decreased intensity of breath sounds with bibasilar crackles  Heart :  regular rhythm & normal rate, normal S1, S2 and no murmur, no rub  Abdomen : Soft, nondistended  Extremities : 1+ edema,   Neurologic :   Unable to assess      Laboratory Data :     Albumin Albumin   Date Value Ref Range Status   08/21/2024 3.0 (L) 3.5 - 5.2 g/dL Final   08/19/2024 2.7 (L) 3.5 - 5.2 g/dL Final      Magnesium Magnesium   Date Value Ref Range Status   08/19/2024 2.3 1.6 - 2.4 mg/dL Final          PTH               No results found for: \"PTH\"    CBC and coagulation:  Results from last 7 days   Lab Units 08/22/24  0029 08/21/24  1214 08/21/24  0811 08/20/24  0116 08/19/24  1446   CRP mg/dL  --   --   --   --  1.72*   WBC 10*3/mm3 10.10 10.02 10.14   < > 8.58   HEMOGLOBIN g/dL 7.7* 8.0* 6.0*   < > 5.9*   HEMATOCRIT % 25.7* 25.6* 19.4*   < > 19.5*   MCV fL 96.3 92.1 92.8   < > 97.5*   MCHC g/dL 30.0* 31.3* " 30.9*   < > 30.3*   PLATELETS 10*3/mm3 149 146 158   < > 159   INR   --   --   --   --  2.14*   D DIMER QUANT MCGFEU/mL  --   --   --   --  5.26*    < > = values in this interval not displayed.     Acid/base balance:        Renal and electrolytes:    Results from last 7 days   Lab Units 08/22/24  0411 08/21/24  1214 08/21/24  0039 08/20/24  0742 08/19/24  1446   SODIUM mmol/L 138 137 137 136 137   POTASSIUM mmol/L 3.7 3.4* 6.2* 4.6 5.8*   MAGNESIUM mg/dL  --   --   --   --  2.3   CHLORIDE mmol/L 95* 95* 98 96* 96*   CO2 mmol/L 27.1 28.0 20.2* 25.0 21.7*   BUN mg/dL 20 31* 72* 61* 110*   CREATININE mg/dL 3.54* 2.70* 5.39* 4.68* 7.65*   CALCIUM mg/dL 8.3* 8.3* 8.2* 8.2* 8.3*   PHOSPHORUS mg/dL  --   --   --   --  7.3*     Estimated Creatinine Clearance: 21.9 mL/min (A) (by C-G formula based on SCr of 3.54 mg/dL (H)).  @GFRCG:3@   Liver and pancreatic function:  Results from last 7 days   Lab Units 08/21/24  1214 08/19/24  1446 08/18/24  1210   ALBUMIN g/dL 3.0* 2.7* 3.0*   BILIRUBIN mg/dL 0.5 0.4 0.5   ALK PHOS U/L 110 109 137*   AST (SGOT) U/L 22 14 18   ALT (SGPT) U/L 15 11 13         Cardiac:  Results from last 7 days   Lab Units 08/19/24  1446 08/18/24  1210   PROBNP pg/mL >70,000.0* >70,000.0*     Liver and pancreatic function:  Results from last 7 days   Lab Units 08/21/24  1214 08/19/24  1446 08/18/24  1210   ALBUMIN g/dL 3.0* 2.7* 3.0*   BILIRUBIN mg/dL 0.5 0.4 0.5   ALK PHOS U/L 110 109 137*   AST (SGOT) U/L 22 14 18   ALT (SGPT) U/L 15 11 13       Medications :     acetaminophen, 650 mg, Oral, Once per day on Monday Wednesday Friday  acetaminophen, 650 mg, Oral, TID  ammonium lactate, 1 Application, Topical, Nightly  atorvastatin, 40 mg, Oral, Nightly  cefTRIAXone, 2,000 mg, Intravenous, Q24H  [Held by provider] clopidogrel, 75 mg, Oral, Daily  doxycycline, 100 mg, Intravenous, Q12H  gabapentin, 200 mg, Oral, Nightly  insulin lispro, 2-7 Units, Subcutaneous, 4x Daily AC & at Bedtime  levothyroxine, 75 mcg,  Oral, QAM  [Held by provider] metoprolol succinate XL, 12.5 mg, Oral, Nightly  multivitamin with minerals, 1 tablet, Oral, Daily  pantoprazole, 40 mg, Intravenous, BID AC  sertraline, 50 mg, Oral, Nightly  sevelamer, 800 mg, Oral, TID With Meals  sodium chloride, 10 mL, Intravenous, Q12H  sucralfate, 1 g, Oral, 4x Daily AC & at Bedtime             Assessment & Plan     -ESRD on intermittent dialysis  - Acute hypoxic respiratory failure likely due to fluid overload in settings of dialysis noncompliance  - Hyperkalemia  - Acute on chronic anemia  - Urinary tract infection    Continue on intermittent dialysis Mondays Wednesdays and Fridays next dialysis is tomorrow    Reviewed clinical notes, radiological data, clinical notes.  Discussed the case in detail with primary team      Nova Saucedo MD  08/22/24  08:08 EDT

## 2024-08-23 ENCOUNTER — ANESTHESIA EVENT (OUTPATIENT)
Dept: PERIOP | Facility: HOSPITAL | Age: 74
End: 2024-08-23
Payer: MEDICARE

## 2024-08-23 ENCOUNTER — ANESTHESIA (OUTPATIENT)
Dept: PERIOP | Facility: HOSPITAL | Age: 74
End: 2024-08-23
Payer: MEDICARE

## 2024-08-23 PROCEDURE — 25810000003 SODIUM CHLORIDE 0.9 % SOLUTION: Performed by: NURSE ANESTHETIST, CERTIFIED REGISTERED

## 2024-08-23 PROCEDURE — 25010000002 GLYCOPYRROLATE 0.4 MG/2ML SOLUTION: Performed by: NURSE ANESTHETIST, CERTIFIED REGISTERED

## 2024-08-23 PROCEDURE — 25010000002 PROPOFOL 200 MG/20ML EMULSION: Performed by: NURSE ANESTHETIST, CERTIFIED REGISTERED

## 2024-08-23 RX ORDER — SODIUM CHLORIDE 9 MG/ML
INJECTION, SOLUTION INTRAVENOUS CONTINUOUS PRN
Status: DISCONTINUED | OUTPATIENT
Start: 2024-08-23 | End: 2024-08-23 | Stop reason: SURG

## 2024-08-23 RX ORDER — GLYCOPYRROLATE 0.2 MG/ML
INJECTION INTRAMUSCULAR; INTRAVENOUS AS NEEDED
Status: DISCONTINUED | OUTPATIENT
Start: 2024-08-23 | End: 2024-08-23 | Stop reason: SURG

## 2024-08-23 RX ORDER — EPHEDRINE SULFATE 5 MG/ML
INJECTION INTRAVENOUS AS NEEDED
Status: DISCONTINUED | OUTPATIENT
Start: 2024-08-23 | End: 2024-08-23 | Stop reason: SURG

## 2024-08-23 RX ORDER — PROPOFOL 10 MG/ML
INJECTION, EMULSION INTRAVENOUS AS NEEDED
Status: DISCONTINUED | OUTPATIENT
Start: 2024-08-23 | End: 2024-08-23 | Stop reason: SURG

## 2024-08-23 RX ADMIN — GLYCOPYRROLATE 0.4 MG: 0.4 INJECTION INTRAMUSCULAR; INTRAVENOUS at 13:35

## 2024-08-23 RX ADMIN — SODIUM CHLORIDE: 9 INJECTION, SOLUTION INTRAVENOUS at 13:22

## 2024-08-23 RX ADMIN — EPHEDRINE SULFATE 20 MG: 5 INJECTION INTRAVENOUS at 13:36

## 2024-08-23 RX ADMIN — PROPOFOL 50 MG: 10 INJECTION, EMULSION INTRAVENOUS at 13:34

## 2024-08-23 RX ADMIN — PROPOFOL 50 MG: 10 INJECTION, EMULSION INTRAVENOUS at 13:28

## 2024-08-23 RX ADMIN — PROPOFOL 50 MG: 10 INJECTION, EMULSION INTRAVENOUS at 13:31

## 2024-08-23 NOTE — PROGRESS NOTES
Diagnosis: GI bleed    To endoscopy for upper endoscopy.     Lorrie Amos MD       General Surgeon  [facility]

## 2024-08-23 NOTE — ANESTHESIA POSTPROCEDURE EVALUATION
Patient: Kennedy Prescott    Procedure Summary       Date: 08/23/24 Room / Location:  [facility]  /  [facility]    Anesthesia Start: 1322 Anesthesia Stop:     Procedure: ESOPHAGOGASTRODUODENOSCOPY (Esophagus) Diagnosis:       Gastrointestinal hemorrhage associated with gastritis, unspecified gastritis type      Gastrointestinal hemorrhage, unspecified gastrointestinal hemorrhage type      (Gastrointestinal hemorrhage associated with gastritis, unspecified gastritis type [K29.71])      (Gastrointestinal hemorrhage, unspecified gastrointestinal hemorrhage type [K92.2])    Surgeons: Lorrie Steiner MD Provider: Evelyn Tomas CRNA    Anesthesia Type: general ASA Status: 4            Anesthesia Type: general    Vitals  Vitals Value Taken Time   /77 08/23/24 1554   Temp 98.5 °F (36.9 °C) 08/23/24 1530   Pulse 108 08/23/24 1603   Resp 20 08/23/24 1524   SpO2 100 % 08/23/24 1603   Vitals shown include unfiled device data.        Post Anesthesia Care and Evaluation    Patient location during evaluation: ICU  Patient participation: complete - patient cannot participate  Level of consciousness: obtunded/minimal responses  Pain score: 0  Pain management: adequate    Airway patency: patent  Anesthetic complications: No anesthetic complications  PONV Status: none  Cardiovascular status: hemodynamically stable  Respiratory status: intubated, ETT and ventilator  Hydration status: acceptable

## 2024-08-23 NOTE — PROGRESS NOTES
[facility] HOSPITALIST PROGRESS NOTE     Patient Identification:  Name:  Kennedy Prescott  Age:  74 y.o.  Sex:  male  :  1950  MRN:  [id number]  Visit Number:  [id number]  ROOM: [address] [place] [address]     Primary Care Provider:  Jag Guillaume MD    Length of stay in inpatient status:  4    Subjective     Chief Compliant:    Chief Complaint   Patient presents with    Shortness of Breath       History of Presenting Illness:    Overnight patient turned up to 7L NC for hypoxia. Down to 6L NC this morning. Patient was getting dialysis. Denied any new complaints. Reports he has had a cough. Tolerating dialysis well, nurse reporting goal was to remove 2L.     Patient taken for repeat EGD today given continued evidence of bleeding. No bleeding noted on EGD but when endoscope was being removed patient became bradycardic requiring short round of ACLS. He then had what appeared to be wide comlex tachycardia requiring cardioversion. Loaded on lidocaine and amioderone requiring pressor pushes in endoscopy suite. I called and updated patient's son who is is MARY. He reported he was on his way from [facility] and would like his dad to remain full code. Patient transferred to CCU and initially little neurological response without sedation but has since increased. Patient in afib with HR in low 100's. Not on pressors and have been weaning ventilator settings. No blood from OG tube. STAT EKG performed to rule out STEMI as cardiac meds have been held for bleeding but showed only LHC. Discussed with interventional cardiology and no emergent LHC recommended at this time.     ROS:  Otherwise 10 point ROS negative other than documented above in HPI.     Objective     Current Hospital Meds:acetaminophen, 650 mg, Oral, Once per day on   acetaminophen, 650 mg, Oral, TID  ammonium lactate, 1 Application, Topical, Nightly  atorvastatin, 40 mg, Oral, Nightly  cefTRIAXone, 2,000 mg, Intravenous, Q24H  [Held by  provider] clopidogrel, 75 mg, Oral, Daily  doxycycline, 100 mg, Intravenous, Q12H  gabapentin, 200 mg, Oral, Nightly  insulin lispro, 2-7 Units, Subcutaneous, 4x Daily AC & at Bedtime  levothyroxine, 75 mcg, Oral, QAM  [Held by provider] metoprolol succinate XL, 12.5 mg, Oral, Nightly  multivitamin with minerals, 1 tablet, Oral, Daily  nicotine, 1 patch, Transdermal, Q24H  pantoprazole, 40 mg, Intravenous, BID AC  sertraline, 50 mg, Oral, Nightly  sevelamer, 800 mg, Oral, TID With Meals  sodium chloride, 10 mL, Intravenous, Q12H  sucralfate, 1 g, Oral, 4x Daily AC & at Bedtime         Current Antimicrobial Therapy:  Anti-Infectives (From admission, onward)      Ordered     Dose/Rate Route Frequency Start Stop    08/20/24 0704  doxycycline (VIBRAMYCIN) 100 mg in sodium chloride 0.9 % 100 mL IVPB-VTB        Ordering Provider: Dar Cochran MD    100 mg Intravenous Every 12 Hours 08/20/24 0800 08/25/24 0759    08/19/24 2226  cefTRIAXone (ROCEPHIN) 2,000 mg in sodium chloride 0.9 % 100 mL IVPB-VTB        Ordering Provider: Edgar Sloan MD    2,000 mg  200 mL/hr over 30 Minutes Intravenous Every 24 Hours 08/19/24 2315 08/24/24 2314          Current Diuretic Therapy:  Diuretics (From admission, onward)      None          ----------------------------------------------------------------------------------------------------------------------  Vital Signs:  Temp:  [98.2 °F (36.8 °C)-99.1 °F (37.3 °C)] 98.7 °F (37.1 °C)  Heart Rate:  [] 98  Resp:  [16-19] 18  BP: (110-139)/(55-86) 116/62  SpO2:  [88 %-100 %] 99 %  on  Flow (L/min):  [4-7] 6;   Device (Oxygen Therapy): high-flow nasal cannula  Body mass index is 32.16 kg/m².    Wt Readings from Last 3 Encounters:   08/22/24 105 kg (230 lb 9.6 oz)   08/18/24 95.3 kg (210 lb 1.6 oz)   08/14/24 95.3 kg (210 lb 1.6 oz)     Intake & Output (last 3 days)         08/20 0701  08/21 0700 08/21 0701  08/22 0700 08/22 0701  08/23 0700 08/23 0701  08/24 0700    P.O. 0 826 600      I.V. (mL/kg) 105.6 (1) 411.9 (3.9)      Blood  300      Other 40       IV Piggyback  100 100     Total Intake(mL/kg) 145.6 (1.4) 1637.9 (15.6) 700 (6.7)     Urine (mL/kg/hr)  0 (0) 0 (0)     Stool  0 0     Dialysis  1700      Total Output  1700 0     Net +145.6 -62.1 +700             Urine Unmeasured Occurrence   0 x     Stool Unmeasured Occurrence 3 x 3 x 5 x           NPO Diet NPO Type: Sips with Meds  ----------------------------------------------------------------------------------------------------------------------  Physical exam:  GENERAL:  Patient intubated and sedated.   SKIN:  Warm, dry without rashes, purpura or petechiae.  EYES:  Pupils equal, round and reactive to light.  Conjunctivae normal.  HEAD:  Normocephalic. Atraumatic.   EARS/NOSE/MOUTH/THROAT:  Septum midline.  No excoriations or nasal discharge. No stomatitis or ulcers.  Lips normal. Oropharynx without lesions or exudates.  NECK:  Supple with good range of motion; no thyromegaly or masses  LYMPHATICS:  No cervical, supraclavicular, axillary adenopathy.  RESP:  Lungs clear to auscultation. Good airflow. Intubated receiving mechanical ventilation.   CARDIAC:  Regular rate and rhythm without murmurs, rubs or gallops. Normal S1,S2. No edema  GI:  Soft, nontender, no hepatosplenomegaly, normal bowel sounds  MSK:  No clubbing or cyanosis, No joint swelling noted in hands  NEUROLOGICAL:  No focal deficit. Pupils equal and reactive.   ----------------------------------------------------------------------------------------------------------------------  Tele:    ----------------------------------------------------------------------------------------------------------------------  Results from last 7 days   Lab Units 08/22/24  2323 08/22/24  1108 08/22/24  0029 08/20/24  0116 08/19/24  1446   CRP mg/dL  --   --   --   --  1.72*   WBC 10*3/mm3 10.50 11.35* 10.10   < > 8.58   HEMOGLOBIN g/dL 7.3* 7.5* 7.7*   < > 5.9*   HEMATOCRIT % 23.6* 24.4* 25.7*    "< > 19.5*   MCV fL 92.2 92.1 96.3   < > 97.5*   MCHC g/dL 30.9* 30.7* 30.0*   < > 30.3*   PLATELETS 10*3/mm3 183 171 149   < > 159   INR   --   --   --   --  2.14*    < > = values in this interval not displayed.     Results from last 7 days   Lab Units 08/22/24  2224   PH, ARTERIAL pH units 7.437   PO2 ART mm Hg 59.0*   PCO2, ARTERIAL mm Hg 43.4   HCO3 ART mmol/L 29.3*     Results from last 7 days   Lab Units 08/22/24  2323 08/22/24  0411 08/21/24  1214 08/20/24  0742 08/19/24  1446   SODIUM mmol/L 138 138 137   < > 137   POTASSIUM mmol/L 3.6 3.7 3.4*   < > 5.8*   MAGNESIUM mg/dL  --   --   --   --  2.3   CHLORIDE mmol/L 95* 95* 95*   < > 96*   CO2 mmol/L 25.5 27.1 28.0   < > 21.7*   BUN mg/dL 39* 20 31*   < > 110*   CREATININE mg/dL 4.23* 3.54* 2.70*   < > 7.65*   CALCIUM mg/dL 8.4* 8.3* 8.3*   < > 8.3*   PHOSPHORUS mg/dL  --   --   --   --  7.3*   GLUCOSE mg/dL 106* 114* 119*   < > 190*   ALBUMIN g/dL 2.9*  --  3.0*  --  2.7*   BILIRUBIN mg/dL 0.4  --  0.5  --  0.4   ALK PHOS U/L 102  --  110  --  109   AST (SGOT) U/L 18  --  22  --  14   ALT (SGPT) U/L 11  --  15  --  11    < > = values in this interval not displayed.   Estimated Creatinine Clearance: 18.3 mL/min (A) (by C-G formula based on SCr of 4.23 mg/dL (H)).  No results found for: \"AMMONIA\"  Results from last 7 days   Lab Units 08/19/24  1744 08/19/24  1446 08/18/24  1423   HSTROP T ng/L 150* 183* 190*     Results from last 7 days   Lab Units 08/19/24  1446 08/18/24  1210   PROBNP pg/mL >70,000.0* >70,000.0*         Glucose   Date/Time Value Ref Range Status   08/23/2024 0629 130 70 - 130 mg/dL Final   08/22/2024 1936 175 (H) 70 - 130 mg/dL Final   08/22/2024 1620 145 (H) 70 - 130 mg/dL Final   08/22/2024 1111 135 (H) 70 - 130 mg/dL Final   08/22/2024 0616 110 70 - 130 mg/dL Final   08/21/2024 2046 131 (H) 70 - 130 mg/dL Final   08/21/2024 1601 135 (H) 70 - 130 mg/dL Final   08/21/2024 1217 128 70 - 130 mg/dL Final     Lab Results   Component Value Date    " "TSH 5.160 (H) 08/19/2024    FREET4 0.9 07/29/2024     No results found for: \"PREGTESTUR\", \"PREGSERUM\", \"HCG\", \"HCGQUANT\"  Pain Management Panel          Latest Ref Rng & Units 7/10/2024   Pain Management Panel   Amphetamine, Urine Qual Negative Negative    Barbiturates Screen, Urine Negative Negative    Benzodiazepine Screen, Urine Negative Negative    Buprenorphine, Screen, Urine Negative Negative    Cocaine Screen, Urine Negative Negative    Fentanyl, Urine Negative Negative    Methadone Screen , Urine Negative Negative    Methamphetamine, Ur Negative Negative       Details                 Brief Urine Lab Results  (Last result in the past 365 days)        Color   Clarity   Blood   Leuk Est   Nitrite   Protein   CREAT   Urine HCG        08/18/24 1348 Brown  Comment: Dipstick results may be inaccurate due to color interference.       Turbid   Large (3+)   Large (3+)   Positive   100 mg/dL (2+)                 No results found for: \"BLOODCX\"  Urine Culture   Date Value Ref Range Status   08/18/2024 No growth  Final     No results found for: \"WOUNDCX\"  No results found for: \"STOOLCX\"  No results found for: \"RESPCX\"  No results found for: \"AFBCX\"  Results from last 7 days   Lab Units 08/19/24  1446   CRP mg/dL 1.72*       I have personally looked at the labs and they are summarized above.  ----------------------------------------------------------------------------------------------------------------------  Detailed radiology reports for the last 24 hours:    Imaging Results (Last 24 Hours)       Procedure Component Value Units Date/Time    XR Chest 1 View [[id number]] Resulted: 08/23/24 0749     Updated: 08/23/24 1027          Assessment & Plan    #Acute blood loss anemia   #pAfib on home apixiban with resultant coagulopathy   - Hgb baseline around 10. Hemoglobin presentation 5.9 on presentation. 2 units pRBC ordered in setting of active bleeding. Hemoglobin stable this AM at 7.9    - INR 2.14, PTT 34.8. FFP if patient " declines.   - Will continue BID IV protonix. No hx of liver disease   - Surgery consulted. NG tube in place and blood drainage suspicious of upper GI bleed on admission. Went for EGD on 8/20 with intervention on  duodenal ulcer.   - Bleeding symptoms did not improve with continued anemia. Taken back for EGD on   - Holding home apxibian and plavix until bleeding stops.   - No bleeding on repeat EGD on 8/23. Hemoglobin actually improved at 7.8. No blood with OG to suction. Patient still having black BM but suspect not bleeding. Will start heparin gtt as below.     #Cardiac arrest   #Acute hypoxic respiratory failure   #CAD w/ PCI to LAD on 5/28  #Vtach  - Patient had PEA/bradycardia arrest then wide complex arrhythmia directly after EGD on 8/23  - Appears patient was on apixiban/plavix at nursing home. Held for above bleed with plan to restart antiplatelets when bleeding stopped.   - Unfortunately patient had cardiac arrest with episode of wide complex tachycardia. No STEMI on EKB but incomplete LBBB is now complete. HS troponin is suprisingly the same as it has been.   - Discussed with interventional cardiology. No need for emergent LHC  - AS above, will start heparin gtt w/o bolus.   - Wean vent as able. Fenatnyl/propofol for sedation.   - Cerebell did not show seizures, will await neurological recovery   - Consult cardiology   - Consult pulmonology      #ESRD in setting of missed dialysis sessions   #Hyperkalemia   - Nephrology consulted. Dialysis as per nephrology service.      #RML/RLL pneumonia   - Concern for aspiration. SLP consulted.   - Broaden to Zosyn/doxy.      #UTI  - Significant pyuria on 8/18. Will follow cultures. Currently no growth. Will continue abx as above.      #Recent foot ulceration w/ associated cellulitis   - Admitted to  7/29-8/2: Vascular surgery recommended non-operative management. Superficial ulcer with some maceration but no purulent discharge, chronic draining sinuses, deep  tunneling, or other evidence of osteomyelitis.   - Finished abx course      Chronic medical problems   Hypothyroidism- TSH improved at 5.1. Recent free T4 wnl.   GERD  HTN     Code status: Full      Dispo: Transferred to ICU after cardiac arrest after endoscopy. Previously noted to have advanced directive that showed DNR/DNI but was ok with life support for surgery. I had long conversation with family who would like patient to be full code for now as we see how he does th enext couple of days.     Dar Cochran MD  [facility] Hospitalist  08/23/24  11:08 EDT

## 2024-08-23 NOTE — CASE MANAGEMENT/SOCIAL WORK
Discharge Planning Assessment   [place]     Patient Name: Kennedy Prescott  MRN: [id number]  Today's Date: 8/23/2024    Admit Date: 8/19/2024    Plan: Pt admitted on 8/19/24. Pt is a resident of [facility]. Per Irma who states pt had a 24 day bed hold prior to admission and will accept pt back at discharge. Facility will accept pt back at discharge over the weeked. Discharge orders to be faxed to [phone]. Report to be called to [phone] once orders are received. SS to follow.       Discharge Plan       Row Name 08/23/24 1054       Plan    Plan Pt admitted on 8/19/24. Pt is a resident of [facility]. Per Irma who states pt had a 24 day bed hold prior to admission and will accept pt back at discharge. Facility will accept pt back at discharge over the weeked. Discharge orders to be faxed to [phone]. Report to be called to [phone] once orders are received. SS to follow.        Continued Care and Services - Admitted Since 8/19/2024       Destination       Service Provider Request Status Selected Services Address Phone Fax Patient Preferred    [facility] Pending - No Request Sent N/A [address] [place] [address] [phone] --          Expected Discharge Date and Time       Expected Discharge Date Expected Discharge Time    Aug 24, 2024        MONA Jordan

## 2024-08-23 NOTE — PLAN OF CARE
Goal Outcome Evaluation:     Pt O2 was not coming above 89, hospitalist made aware, evaluated pt ordered covid test and ABG. Covid negative, pt is now on 7L nasal High flow cannula sat >90. Pt is resting in bed at this time without complaint, VSS, A/O, will cont with POC.

## 2024-08-23 NOTE — SIGNIFICANT NOTE
08/23/24 0806   OTHER   Discipline speech language pathologist   Rehab Time/Intention   Session Not Performed unable to evaluate, medical status change  (Consult received for dysphagia assessment. Noted patient placed NPO except meds this am with plan for EGD post dialysis today with Dr. Sloan per communication with RN.SLP to defer assessment, pending clearance for po intake to participate. Thank you)

## 2024-08-23 NOTE — PLAN OF CARE
Goal Outcome Evaluation:           Problem: Communication Impairment (Mechanical Ventilation, Invasive)  Goal: Effective Communication  Outcome: Ongoing, Progressing     Problem: Device-Related Complication Risk (Mechanical Ventilation, Invasive)  Goal: Optimal Device Function  Outcome: Ongoing, Progressing     Problem: Inability to Wean (Mechanical Ventilation, Invasive)  Goal: Mechanical Ventilation Liberation  Outcome: Ongoing, Progressing     Problem: Skin and Tissue Injury (Mechanical Ventilation, Invasive)  Goal: Absence of Device-Related Skin and Tissue Injury  Outcome: Ongoing, Progressing     Problem: Ventilator-Induced Lung Injury (Mechanical Ventilation, Invasive)  Goal: Absence of Ventilator-Induced Lung Injury  Outcome: Ongoing, Progressing  Intervention: Prevent Ventilator-Associated Pneumonia  Flowsheets  Taken 8/23/2024 1745  VAP Prevention Bundle:   HOB elevation maintained   oral care regularly provided  Oral Care: suction provided  Taken 8/23/2024 1722  Head of Bed (HOB) Positioning: HOB at 30-45 degrees  Taken 8/23/2024 1524  Head of Bed (HOB) Positioning: HOB elevated  Intervention: Facilitate Lung-Protection Measures  Flowsheets (Taken 8/23/2024 1524)  Lung Protection Measures:   low tidal volume provided   lung compliance monitored   plateau/inspiratory pressure monitored   ventilator synchrony promoted   ventilator waveforms monitored

## 2024-08-23 NOTE — PROGRESS NOTES
"Nephrology Progress Note      Subjective     No chest pain, shortness of breath lying on bed comfortably, seen on dialysis tolerating very well    Objective       Vital signs :     Temp:  [98.2 °F (36.8 °C)-99.1 °F (37.3 °C)] 98.7 °F (37.1 °C)  Heart Rate:  [] 98  Resp:  [14-19] 18  BP: (100-139)/(55-86) 116/62    Intake/Output                         08/21/24 0701 - 08/22/24 0700 08/22/24 0701 - 08/23/24 0700     9524-1374 2192-2350 Total 3998-8401 2095-0726 Total                 Intake    P.O.  826  -- 826  480  120 600    I.V.  0  411.9 411.9  --  -- --    Blood  300  -- 300  --  -- --    Volume (Transfuse RBC Infuse Each Unit Over: 3.5H) 300 -- 300 -- -- --    IV Piggyback  100  -- 100  100  -- 100    Total Intake 1226 411.9 1637.9 580 120 700       Output    Urine  0  -- 0  0  -- 0    Dialysis  1700  -- 1700  --  -- --    Total Output 1700 -- 1700 0 -- 0             Physical Exam:    General Appearance : Not in acute distress  Lungs : bilateral decreased intensity of breath sounds with bibasilar crackles  Heart :  regular rhythm & normal rate, normal S1, S2 and no murmur, no rub  Abdomen : Soft, nondistended  Extremities : 1+ edema,   Neurologic :   Unable to assess      Laboratory Data :     Albumin Albumin   Date Value Ref Range Status   08/22/2024 2.9 (L) 3.5 - 5.2 g/dL Final   08/21/2024 3.0 (L) 3.5 - 5.2 g/dL Final      Magnesium No results found for: \"MG\"         PTH               No results found for: \"PTH\"    CBC and coagulation:  Results from last 7 days   Lab Units 08/22/24  2323 08/22/24  1108 08/22/24  0029 08/20/24  0116 08/19/24  1446   CRP mg/dL  --   --   --   --  1.72*   WBC 10*3/mm3 10.50 11.35* 10.10   < > 8.58   HEMOGLOBIN g/dL 7.3* 7.5* 7.7*   < > 5.9*   HEMATOCRIT % 23.6* 24.4* 25.7*   < > 19.5*   MCV fL 92.2 92.1 96.3   < > 97.5*   MCHC g/dL 30.9* 30.7* 30.0*   < > 30.3*   PLATELETS 10*3/mm3 183 171 149   < > 159   INR   --   --   --   --  2.14*   D DIMER QUANT MCGFEU/mL  --   --   " --   --  5.26*    < > = values in this interval not displayed.     Acid/base balance:  Results from last 7 days   Lab Units 08/22/24  2224   PH, ARTERIAL pH units 7.437   PO2 ART mm Hg 59.0*   PCO2, ARTERIAL mm Hg 43.4   HCO3 ART mmol/L 29.3*       Renal and electrolytes:    Results from last 7 days   Lab Units 08/22/24 2323 08/22/24  0411 08/21/24  1214 08/21/24  0039 08/20/24  0742 08/19/24  1446   SODIUM mmol/L 138 138 137 137 136 137   POTASSIUM mmol/L 3.6 3.7 3.4* 6.2* 4.6 5.8*   MAGNESIUM mg/dL  --   --   --   --   --  2.3   CHLORIDE mmol/L 95* 95* 95* 98 96* 96*   CO2 mmol/L 25.5 27.1 28.0 20.2* 25.0 21.7*   BUN mg/dL 39* 20 31* 72* 61* 110*   CREATININE mg/dL 4.23* 3.54* 2.70* 5.39* 4.68* 7.65*   CALCIUM mg/dL 8.4* 8.3* 8.3* 8.2* 8.2* 8.3*   PHOSPHORUS mg/dL  --   --   --   --   --  7.3*     Estimated Creatinine Clearance: 18.3 mL/min (A) (by C-G formula based on SCr of 4.23 mg/dL (H)).  @GFRCG:3@   Liver and pancreatic function:  Results from last 7 days   Lab Units 08/22/24 2323 08/21/24  1214 08/19/24  1446   ALBUMIN g/dL 2.9* 3.0* 2.7*   BILIRUBIN mg/dL 0.4 0.5 0.4   ALK PHOS U/L 102 110 109   AST (SGOT) U/L 18 22 14   ALT (SGPT) U/L 11 15 11         Cardiac:  Results from last 7 days   Lab Units 08/19/24  1446 08/18/24  1210   PROBNP pg/mL >70,000.0* >70,000.0*     Liver and pancreatic function:  Results from last 7 days   Lab Units 08/22/24 2323 08/21/24  1214 08/19/24  1446   ALBUMIN g/dL 2.9* 3.0* 2.7*   BILIRUBIN mg/dL 0.4 0.5 0.4   ALK PHOS U/L 102 110 109   AST (SGOT) U/L 18 22 14   ALT (SGPT) U/L 11 15 11       Medications :     acetaminophen, 650 mg, Oral, Once per day on Monday Wednesday Friday  acetaminophen, 650 mg, Oral, TID  ammonium lactate, 1 Application, Topical, Nightly  atorvastatin, 40 mg, Oral, Nightly  cefTRIAXone, 2,000 mg, Intravenous, Q24H  [Held by provider] clopidogrel, 75 mg, Oral, Daily  doxycycline, 100 mg, Intravenous, Q12H  gabapentin, 200 mg, Oral, Nightly  insulin  lispro, 2-7 Units, Subcutaneous, 4x Daily AC & at Bedtime  levothyroxine, 75 mcg, Oral, QAM  [Held by provider] metoprolol succinate XL, 12.5 mg, Oral, Nightly  multivitamin with minerals, 1 tablet, Oral, Daily  nicotine, 1 patch, Transdermal, Q24H  pantoprazole, 40 mg, Intravenous, BID AC  sertraline, 50 mg, Oral, Nightly  sevelamer, 800 mg, Oral, TID With Meals  sodium chloride, 10 mL, Intravenous, Q12H  sucralfate, 1 g, Oral, 4x Daily AC & at Bedtime             Assessment & Plan     -ESRD on intermittent dialysis  - Acute hypoxic respiratory failure likely due to fluid overload in settings of dialysis noncompliance  - Hyperkalemia  - Acute on chronic anemia  - Urinary tract infection    Evaluated on dialysis, tolerating very well.  No intradialytic hypotension.  Ordered ultrafiltration 2 L if t tolerates  Continue on intermittent dialysis Mondays Wednesdays and Fridays next dialysis is tomorrow    Reviewed clinical notes, radiological data, clinical notes.  Discussed the case in detail with primary team      Nova Saucedo MD  08/23/24  08:35 EDT

## 2024-08-23 NOTE — PROGRESS NOTES
Adult Nutrition  Assessment/PES    Patient Name:  Kennedy Prescott  YOB: 1950  MRN: [id number]  Admit Date:  8/19/2024    Assessment Date:  8/23/2024    Comments:  Seen for NPO    Adv diet as clinically indicated     Noted SLP pending and EGD as well.     Multiple skin issues noted.     Recommend: Add MVT daily    [employer] and/or [facility] could assist with wound healing    Will cont to follow and monitor.      Reason for Assessment       Row Name 08/23/24 142          Reason for Assessment    Reason For Assessment per organizational policy  NPO     Diagnosis gastrointestinal disease;pulmonary disease;renal disease  ABLA, Suspected GIB, Pnemonia, UTI, ESRD on HD                    Nutrition/Diet History       Row Name 08/23/24 1426          Nutrition/Diet History    Typical Intake (Food/Fluid/EN/PN) Pt in OR.                    Labs/Tests/Procedures/Meds       Row Name 08/23/24 1427          Labs/Procedures/Meds    Lab Results Reviewed reviewed     Lab Results Comments BUn 39/creat 4.2 c/w ESRD HD        Diagnostic Tests/Procedures    Diagnostic Test/Procedure Reviewed reviewed     Diagnostic Test/Procedures Comments SLP pending. EGD pending        Medications    Pertinent Medications Reviewed reviewed     Pertinent Medications Comments humalog                    Physical Findings       Row Name 08/23/24 1427          Physical Findings    Overall Physical Appearance multiple wounds gluteal, ankle, foot etc.                    Estimated/Assessed Needs - Anthropometrics       Row Name 08/23/24 1425          Estimated/Assessed Needs    Additional Documentation Estimated Calorie Needs (Group);Fluid Requirements (Group);Protein Requirements (Group)        Estimated Calorie Needs    Estimated Calorie Requirement (kcal/day) 3145-4302 kcal ( mifflin 1.2-1.4)     Estimated Calorie Need Method [place]        Protein Requirements    Est Protein Requirement Amount (gms/kg) 1.2 gm protein  126 gm pro ( for  HD)- 156 gm pro ( 2 gm/kg IBW)        Fluid Requirements    Estimated Fluid Requirement Method RDA Method  1000 ml + urine output                    Nutrition Prescription Ordered       Row Name 08/23/24 1429          Nutrition Prescription PO    Current PO Diet NPO                    Evaluation of Received Nutrient/Fluid Intake       Row Name 08/23/24 1429          Fluid Intake Evaluation    Oral Fluid (mL) 515  ave x 3, 23%        PO Evaluation    Number of Meals 4     % PO Intake 50                       Problem/Interventions:   Problem 1       Row Name 08/23/24 1433          Nutrition Diagnoses Problem 1    Problem 1 Other (comment)  Altered GI function related to GIB as evidenced by NPO and ABLA                          Intervention Goal       Row Name 08/23/24 1436          Intervention Goal    General Meet nutritional needs for age/condition     PO Establish PO;PO intake (%)     PO Intake % 50 %                    Nutrition Intervention       Row Name 08/23/24 1436          Nutrition Intervention    RD/Tech Action Follow Tx progress                      Education/Evaluation       Row Name 08/23/24 1436          Education    Education Education not appropriate at this time        Monitor/Evaluation    Monitor Per protocol                     Electronically signed by:  Fany Vences RD  08/23/24 14:37 EDT

## 2024-08-23 NOTE — ANESTHESIA PREPROCEDURE EVALUATION
Anesthesia Evaluation     Patient summary reviewed and Nursing notes reviewed   no history of anesthetic complications:   NPO Solid Status: > 8 hours  NPO Liquid Status: > 8 hours           Airway   Mallampati: II  TM distance: >3 FB  Neck ROM: full  No difficulty expected  Dental    (+) poor dentition    Pulmonary - normal exam    breath sounds clear to auscultation  (+) ,shortness of breath  Cardiovascular   Exercise tolerance: poor (<4 METS)    ECG reviewed  Patient on routine beta blocker and Beta blocker given within 24 hours of surgery  Rhythm: irregular  Rate: normal    (+) hypertension, past MI , CAD, CABG, CHF Systolic <55%, RIVERA, hyperlipidemia      Neuro/Psych- negative ROS  GI/Hepatic/Renal/Endo    (+) obesity, GERD, GI bleeding , renal disease- CRI, diabetes mellitus type 2 poorly controlled using insulin, thyroid problem hypothyroidism    Musculoskeletal     Abdominal  - normal exam    Abdomen: soft.   Substance History - negative use     OB/GYN negative ob/gyn ROS         Other   arthritis, blood dyscrasia anemia,     ROS/Med Hx Other: Echo 5/20/2024:    Normal left ventricular cavity size and wall thickness noted. All left ventricular wall segments contract normally.  ·  Left ventricular ejection fraction appears to be 51 - 55%.  ·  The aortic valve is not well visualized.. The aortic valve is grossly normal in structure.  ·  No aortic valve regurgitation or stenosis is present  ·  Mitral valve is not well visualized. There is mild calcification of the mitral valve posterior leaflet(s).  ·  Mild mitral valve regurgitation is present. No significant mitral valve stenosis is present.  ·  There is no evidence of pericardial effusion.  ·  Comments: May consider a transesophageal echocardiographic study to have a better evaluation of the cardiac valves and to rule out endocardial vegetations if clinically warranted.                Anesthesia Plan    ASA 4     general     intravenous induction      Anesthetic plan, risks, benefits, and alternatives have been provided, discussed and informed consent has been obtained with: patient.  Pre-procedure education provided  Use of blood products discussed with  Consented to blood products.    Plan discussed with CRNA.    CODE STATUS:    While under anesthesia and immediate perioperative period:  Code Status: CPR - Full Support

## 2024-08-24 NOTE — PROGRESS NOTES
[facility] INFUSION  Kennedy Prescott is a  74 y.o. male receiving heparin infusion.     Therapy for (VTE/Cardiac):   Cardiac  Patient Dosing Weight: 105 kg  Initial Bolus (Y/N):   N  Any Bolus (Y/N):   Y        Signs or Symptoms of Bleeding: N    Cardiac or Other (Not VTE)   Initial Bolus: 60 units/kg (Max 4,000 units)  Initial rate: 12 units/kg/hr (Max 1,000 units/hr)   Anti Xa Rebolus Infusion Hold time Change infusion Dose (Units/kg/hr) Next Anti Xa or aPTT Level Due   < 0.11 50 Units/kg  (4000 Units Max) None Increase by  3 Units/kg/hr 6 hours   0.11- 0.19 25 Units/kg  (2000 Units Max) None Increase by  2 Units/kg/hr 6 hours   0.2 - 0.29 0 None Increase by  1 Units/kg/hr 6 hours   0.3 - 0.5 0 None No Change 6 hours (after 2 consecutive levels in range check q24h @0700)   0.51 - 0.6 0 None Decrease by  1 Units/kg/hr 6 hours   0.61 - 0.8 0 30 Minutes Decrease by  2 Units/kg/hr 6 hours   0.81 - 1 0 60 Minutes Decrease by  3 Units/kg/hr 6 hours   >1 0 Hold  After Anti Xa less than 0.5 decrease previous rate by  4 Units/kg/hr  Every 2 hours until Anti Xa  less than 0.5 then when infusion restarts in 6 hours         Recommend anti-Xa every 6 hours.          Date   Time   Anti-Xa Current Rate (Unit/kg/hr) Bolus   (Units) Rate Change   (Unit/kg/hr) New Rate (Unit/kg/hr) Next   Anti-Xa Comments  Pump Check Daily   8/23 1541       Eliquis on home med list but not reordered while admitted since 8/19 08/24 0000 0.48 7.5 - - 7.5 0700 Tx x1, no changes, no s/s bleeding per BHANU Esquivel    8/24 0751 0.39 7.5 - - 7.5 0700 on 8/25 Therapeutic x 2. No s/s bleeding. Continue same. Spoke with BHANU Yost.                                                                                                                                                                                                            Pharmacy will continue to follow anti-Xa results and monitor for signs and symptoms of bleeding or thrombosis.    Thank you.  Zahra  JAVIER Silva, Pharm.D.  8/24/2024  07:52 EDT

## 2024-08-24 NOTE — PROGRESS NOTES
[facility] HOSPITALIST PROGRESS NOTE     Patient Identification:  Name:  Kennedy Prescott  Age:  74 y.o.  Sex:  male  :  1950  MRN:  [id number]  Visit Number:  [id number]  ROOM: [address]     Primary Care Provider:  Jag Guillaume MD    Length of stay in inpatient status:  5    Subjective     Chief Compliant:    Chief Complaint   Patient presents with    Shortness of Breath       History of Presenting Illness:    Patient remains intubated this AM. BP low at times but not required pressor support. No signs of bleeding or Bms overnight per nursing staff. Sedation turned off on my evaluation (was only on 10 or propofol, 50 of fentanyl) and placed on pressure support. Initially low tidal volumes. After patient woke up more he took better tidal volumes but got agitated, tachycardic. He was not following commands or tracking with eyes.     Patient's son bedside. We had another [facility] conversation in anticipation of possibly extubating if patient's mental status improves. Provided EMS DNR that patient was sent with from NH. He would like patient to remain full code for time being and consider further what his dad would want.       ROS:  Otherwise 10 point ROS negative other than documented above in HPI.     Objective     Current Hospital Meds:acetaminophen, 650 mg, Oral, Once per day on   acetaminophen, 650 mg, Oral, TID  ammonium lactate, 1 Application, Topical, Nightly  atorvastatin, 40 mg, Oral, Nightly  chlorhexidine, 15 mL, Mouth/Throat, Q12H  doxycycline, 100 mg, Intravenous, Q12H  gabapentin, 200 mg, Oral, Nightly  insulin lispro, 2-7 Units, Subcutaneous, 4x Daily AC & at Bedtime  levothyroxine, 75 mcg, Oral, QAM  multivitamin with minerals, 1 tablet, Oral, Daily  nicotine, 1 patch, Transdermal, Q24H  pantoprazole, 40 mg, Intravenous, BID AC  piperacillin-tazobactam, 3.375 g, Intravenous, Q8H  senna-docusate sodium, 2 tablet, Oral, BID  sertraline, 50 mg, Oral,  Nightly  sevelamer, 800 mg, Oral, TID With Meals  sodium chloride, 10 mL, Intravenous, Q12H  sodium chloride, 10 mL, Intravenous, Q12H  sodium chloride, 10 mL, Intravenous, Q12H  sodium chloride, 10 mL, Intravenous, Q12H  sucralfate, 1 g, Oral, 4x Daily AC & at Bedtime    dexmedetomidine, 0.2-1.5 mcg/kg/hr  fentanyl 10 mcg/mL,  mcg/hr, Last Rate: Stopped (08/24/24 0815)  heparin, 7.5 Units/kg/hr (Dosing Weight), Last Rate: 7.5 Units/kg/hr (08/23/24 1639)  Pharmacy to Dose Heparin,   propofol, 5-50 mcg/kg/min (Dosing Weight), Last Rate: Stopped (08/24/24 0815)        Current Antimicrobial Therapy:  Anti-Infectives (From admission, onward)      Ordered     Dose/Rate Route Frequency Start Stop    08/23/24 1609  piperacillin-tazobactam (ZOSYN) IVPB 3.375 g IVPB in 100 mL NS (VTB)        Ordering Provider: Dar Cochran MD    3.375 g  over 4 Hours Intravenous Every 8 Hours 08/23/24 2300 08/28/24 2259    08/23/24 1609  piperacillin-tazobactam (ZOSYN) IVPB 3.375 g IVPB in 100 mL NS (VTB)        Ordering Provider: Dar Cochran MD    3.375 g  over 30 Minutes Intravenous Once 08/23/24 1700 08/23/24 1703    08/20/24 0704  doxycycline (VIBRAMYCIN) 100 mg in sodium chloride 0.9 % 100 mL IVPB-VTB        Ordering Provider: Dar Cochran MD    100 mg Intravenous Every 12 Hours 08/20/24 0800 08/28/24 1608          Current Diuretic Therapy:  Diuretics (From admission, onward)      None          ----------------------------------------------------------------------------------------------------------------------  Vital Signs:  Temp:  [98.1 °F (36.7 °C)-99.7 °F (37.6 °C)] 99.7 °F (37.6 °C)  Heart Rate:  [] 118  Resp:  [16-21] 17  BP: ()/(44-82) 106/66  FiO2 (%):  [30 %-100 %] 30 %  SpO2:  [96 %-100 %] 100 %  on  Flow (L/min):  [6] 6;   Device (Oxygen Therapy): ventilator  Body mass index is 31.89 kg/m².    Wt Readings from Last 3 Encounters:   08/24/24 104 kg (228 lb 9.9 oz)   08/18/24 95.3 kg (210 lb 1.6 oz)    08/14/24 95.3 kg (210 lb 1.6 oz)     Intake & Output (last 3 days)         08/21 0701 08/22 0700 08/22 0701 08/23 0700 08/23 0701 08/24 0700 08/24 0701 08/25 0700    P.O. 826 600 120     I.V. (mL/kg) 411.9 (3.9)  991.4 (9.4)     Blood 300       Other        IV Piggyback 100 100 300     Total Intake(mL/kg) 1637.9 (15.6) 700 (6.7) 1411.4 (13.4)     Urine (mL/kg/hr) 0 (0) 0 (0) 75 (0)     Emesis/NG output   100     Stool 0 0 0     Dialysis 1700  2000     Total Output 1700 0 2175     Net -62.1 +700 -763.7             Urine Unmeasured Occurrence  0 x      Stool Unmeasured Occurrence 3 x 5 x 1 x           NPO Diet NPO Type: Strict NPO  ----------------------------------------------------------------------------------------------------------------------  Physical exam:  GENERAL:  Patient intubated and sedated.   SKIN:  Warm, dry without rashes, purpura or petechiae.  EYES:  Pupils equal, round and reactive to light.  Conjunctivae normal.  HEAD:  Normocephalic. Atraumatic.   EARS/NOSE/MOUTH/THROAT:  Septum midline.  No excoriations or nasal discharge. No stomatitis or ulcers.  Lips normal. Oropharynx without lesions or exudates.  NECK:  Supple with good range of motion; no thyromegaly or masses  LYMPHATICS:  No cervical, supraclavicular, axillary adenopathy.  RESP:  Lungs clear to auscultation. Good airflow. Intubated receiving mechanical ventilation.   CARDIAC:  Irregularly irregular rhythm.   GI:  Soft, nontender, no hepatosplenomegaly, normal bowel sounds  MSK:  No clubbing or cyanosis, No joint swelling noted in hands  NEUROLOGICAL:  No focal deficit. Pupils equal and reactive. ---------------------------------------------------------------------------------------------------------  Tele:    ----------------------------------------------------------------------------------------------------------------------  Results from last 7 days   Lab Units 08/23/24  2306 08/23/24  1541 08/22/24  2323 08/20/24  0116  "08/19/24  1446   CRP mg/dL  --   --   --   --  1.72*   WBC 10*3/mm3 8.16 16.97* 10.50   < > 8.58   HEMOGLOBIN g/dL 7.3* 7.8* 7.3*   < > 5.9*   HEMATOCRIT % 23.8* 25.2* 23.6*   < > 19.5*   MCV fL 93.0 94.4 92.2   < > 97.5*   MCHC g/dL 30.7* 31.0* 30.9*   < > 30.3*   PLATELETS 10*3/mm3 148 193 183   < > 159   INR   --  1.31*  --   --  2.14*    < > = values in this interval not displayed.     Results from last 7 days   Lab Units 08/24/24  0429   PH, ARTERIAL pH units 7.558*   PO2 ART mm Hg 67.3*   PCO2, ARTERIAL mm Hg 32.9*   HCO3 ART mmol/L 29.3*     Results from last 7 days   Lab Units 08/23/24  2306 08/23/24  1541 08/22/24  2323 08/20/24  0742 08/19/24  1446   SODIUM mmol/L 141 138 138   < > 137   POTASSIUM mmol/L 3.4* 3.4* 3.6   < > 5.8*   MAGNESIUM mg/dL  --  2.0  --   --  2.3   CHLORIDE mmol/L 97* 94* 95*   < > 96*   CO2 mmol/L 26.7 28.5 25.5   < > 21.7*   BUN mg/dL 19 20 39*   < > 110*   CREATININE mg/dL 2.97* 2.65* 4.23*   < > 7.65*   CALCIUM mg/dL 8.2* 8.2* 8.4*   < > 8.3*   PHOSPHORUS mg/dL  --   --   --   --  7.3*   GLUCOSE mg/dL 112* 148* 106*   < > 190*   ALBUMIN g/dL 2.6* 3.0* 2.9*   < > 2.7*   BILIRUBIN mg/dL 0.4 0.5 0.4   < > 0.4   ALK PHOS U/L 97 109 102   < > 109   AST (SGOT) U/L 21 24 18   < > 14   ALT (SGPT) U/L 14 15 11   < > 11    < > = values in this interval not displayed.   Estimated Creatinine Clearance: 26 mL/min (A) (by C-G formula based on SCr of 2.97 mg/dL (H)).  No results found for: \"AMMONIA\"  Results from last 7 days   Lab Units 08/23/24  1541 08/22/24  2323 08/19/24  1744   HSTROP T ng/L 188* 178* 150*     Results from last 7 days   Lab Units 08/19/24  1446 08/18/24  1210   PROBNP pg/mL >70,000.0* >70,000.0*         Glucose   Date/Time Value Ref Range Status   08/24/2024 0721 104 70 - 130 mg/dL Final   08/23/2024 2200 128 70 - 130 mg/dL Final   08/23/2024 1601 149 (H) 70 - 130 mg/dL Final   08/23/2024 1137 99 70 - 130 mg/dL Final   08/23/2024 0629 130 70 - 130 mg/dL Final   08/22/2024 " "1936 175 (H) 70 - 130 mg/dL Final   08/22/2024 1620 145 (H) 70 - 130 mg/dL Final   08/22/2024 1111 135 (H) 70 - 130 mg/dL Final     Lab Results   Component Value Date    TSH 5.160 (H) 08/19/2024    FREET4 0.9 07/29/2024     No results found for: \"PREGTESTUR\", \"PREGSERUM\", \"HCG\", \"HCGQUANT\"  Pain Management Panel          Latest Ref Rng & Units 7/10/2024   Pain Management Panel   Amphetamine, Urine Qual Negative Negative    Barbiturates Screen, Urine Negative Negative    Benzodiazepine Screen, Urine Negative Negative    Buprenorphine, Screen, Urine Negative Negative    Cocaine Screen, Urine Negative Negative    Fentanyl, Urine Negative Negative    Methadone Screen , Urine Negative Negative    Methamphetamine, Ur Negative Negative       Details                 Brief Urine Lab Results  (Last result in the past 365 days)        Color   Clarity   Blood   Leuk Est   Nitrite   Protein   CREAT   Urine HCG        08/18/24 1348 Brown  Comment: Dipstick results may be inaccurate due to color interference.       Turbid   Large (3+)   Large (3+)   Positive   100 mg/dL (2+)                 No results found for: \"BLOODCX\"  Urine Culture   Date Value Ref Range Status   08/18/2024 No growth  Final     No results found for: \"WOUNDCX\"  No results found for: \"STOOLCX\"  No results found for: \"RESPCX\"  No results found for: \"AFBCX\"  Results from last 7 days   Lab Units 08/19/24  1446   CRP mg/dL 1.72*       I have personally looked at the labs and they are summarized above.  ----------------------------------------------------------------------------------------------------------------------  Detailed radiology reports for the last 24 hours:    Imaging Results (Last 24 Hours)       Procedure Component Value Units Date/Time    XR Chest 1 View [[id number]] Collected: 08/24/24 0721     Updated: 08/24/24 0726    Narrative:      PROCEDURE: Portable chest x-ray examination performed on August 24, 2024. Single view. Supine position.   "   HISTORY: Respiratory failure.     COMPARISON: None.     FINDINGS:     Enlarged heart size  Sternotomy.  Endotracheal tube in place with tip 4.8 cm above the gloria.  Right neck central vascular catheter with tip to the SVC/RA junction.  Enteric drain in place with tip to the stomach.  Central pulmonary vascular congestion with mild edema.  Hypoinflated lungs  Small right and left pleural effusion. No pneumothorax  Levoconvex curvature at the cervical thoracic spine junction.       Impression:         1.  Mild enlarged heart size  2.  Sternotomy.  3.  Satisfactory position of the tubes and lines.  4.  Coarsened bronchovascular pattern to the lungs with features of mild  edema.  5.  Small right and left pleural effusion.  6.  No pneumothorax.        This report was finalized on 8/24/2024 7:24 AM by Michele Liu MD.       XR Chest 1 View [[id number]] Collected: 08/23/24 1712     Updated: 08/23/24 1717    Narrative:      Examination: Portable view semiupright chest     Comparison: 8/23/2024.        Findings: Findings: An OG tube is present, appearing in the interval  with distal tip in the expected position of the stomach.     The endotracheal tube continues to terminate superior to the gloria,  unchanged.  Right internal jugular central venous catheter appropriately  positioned.     Lung volumes are diminished.  Heart size is enlarged.  Median sternotomy  wires and postsurgical change of CABG present.     Improved appearance of the left upper lobe.  Unchanged right lung.       Impression:         OG tube appearing in the interval with distal tip in the expected  position of the stomach.         This report was finalized on 8/23/2024 5:15 PM by Rosario Rivera MD.       XR Chest 1 View [[id number]] Collected: 08/23/24 1444     Updated: 08/23/24 1453    Narrative:      PROCEDURE: XR CHEST 1 VW-       HISTORY: central line and tube placement; K92.2-Gastrointestinal  hemorrhage, unspecified; R19.7-Diarrhea, unspecified;  N18.6-End stage  renal disease; Z99.2-Dependence on renal dialysis; D64.9-Anemia,  unspecified; K29.71-Gastritis, unspecified, with bleeding     COMPARISON: 8/23/2024.     FINDINGS: An endotracheal tube is in place and is well-positioned with  the tip above the gloria. A right IJ central venous catheter is in place  with the tip in the region of the right atrium. The patient is status  post median sternotomy and CABG procedure. The heart is normal in size.  The mediastinum is unremarkable. There are bilateral pleural effusions  and right greater than left airspace disease. There is no pneumothorax.  There are no acute osseous abnormalities.       Impression:      1. Right IJ central venous catheter in place with the tip in the region  of the right atrium.  2. Endotracheal tube well-positioned.  3. Bilateral pleural effusions and right greater than left airspace  disease.           This report was finalized on 8/23/2024 2:51 PM by Kayleigh New M.D..       XR Chest 1 View [[id number]] Collected: 08/23/24 1252     Updated: 08/23/24 1255    Narrative:      PROCEDURE: XR CHEST 1 VW-       HISTORY: hypoxia; K92.2-Gastrointestinal hemorrhage, unspecified;  R19.7-Diarrhea, unspecified; N18.6-End stage renal disease;  Z99.2-Dependence on renal dialysis; D64.9-Anemia, unspecified;  K29.71-Gastritis, unspecified, with bleeding     COMPARISON: 8/20/2024.     FINDINGS: The patient is status post median sternotomy and CABG  procedure. The heart is normal in size. The mediastinum is unremarkable.  There is a right pleural effusion and basilar opacity similar to the  prior exam. The left lung is clear. There is no pneumothorax. There are  no acute osseous abnormalities. Postsurgical changes are present in the  left humerus.       Impression:      Right pleural effusion and basilar opacity similar to the  prior exam.        This report was finalized on 8/23/2024 12:53 PM by Kayleigh New M.D..             Assessment &  Plan    #Cardiac arrest   #Acute hypoxic respiratory failure   #CAD w/ PCI to LAD on 5/28  #Hypotension related to sedation   #Vtach requiring defibrillation   - Patient had PEA/bradycardia arrest then wide complex arrhythmia directly after EGD on 8/23  - Appears patient was on apixiban/plavix at nursing home. Held for above bleed with plan to restart antiplatelets when bleeding stopped.   - Unfortunately patient had cardiac arrest with episode of wide complex tachycardia. No STEMI on EKB but incomplete LBBB is now complete. HS troponin is suprisingly the same as it has been.   - Discussed with interventional cardiology. No need for emergent LHC  - Have started heparin gtt w/o bolus which patient appears to be tolerating as we await formal cardiology evaluation.   - Patient is on low ventilator settings. Limited by mental status. Will change to precedex/fentanyl and continue daily SBTs.   - Cerebell did not show seizures, will await neurological recovery. May be from sedation sticking around in setting of ESRD.   - Consulted cardiology   - Consulted pulmonology     #RML/RLL pneumonia   - Concern for aspiration. SLP consulted when appropriate.   - Broaden to Zosyn/doxy after code event after evidence of worsening airspace disease in RLL.      #Acute blood loss anemia   #pAfib on home apixiban with resultant coagulopathy   - Hgb baseline around 10. Hemoglobin presentation 5.9 on presentation. 2 units pRBC ordered in setting of active bleeding. Hemoglobin stable this AM at 7.9    - INR 2.14, PTT 34.8. FFP if patient declines.   - Will continue BID IV protonix. No hx of liver disease   - Surgery consulted. NG tube in place and blood drainage suspicious of upper GI bleed on admission. Went for EGD on 8/20 with intervention on  duodenal ulcer.   - Bleeding symptoms did not improve with continued anemia. Taken back for EGD on   - Holding home apxibian and plavix until bleeding stops.   - No bleeding on repeat EGD on 8/23.  Hemoglobin actually improved at 7.8. No blood with OG to suction. Patient still having black BM as of 8/23 but low suspicion for active bleeding currently. Tolerating heparin gtt as above.     #pAfib w/ RVR  - Rate reasonably controlled. May trial BB as Blood pressure tolerates. Cardiology consulted as above. Heparin gtt as above.     #ESRD in setting of missed dialysis sessions   #Hyperkalemia   - Nephrology consulted. Dialysis as per nephrology service.      #UTI  - Significant pyuria on 8/18. Will follow cultures. Currently no growth. Will continue abx as above.      #Recent foot ulceration w/ associated cellulitis   - Admitted to  7/29-8/2: Vascular surgery recommended non-operative management. Superficial ulcer with some maceration but no purulent discharge, chronic draining sinuses, deep tunneling, or other evidence of osteomyelitis.   - Finished abx course      Chronic medical problems   Hypothyroidism- TSH improved at 5.1. Recent free T4 wnl.   GERD  HTN     Code status: Full      Dispo: Continue CCU level of care     40 minutes of critical care time spent in care of patient that is critically ill on ventilator. 50% of this time bedside.     Dar Cochran MD  [facility]  08/24/24  09:06 EDT

## 2024-08-24 NOTE — PLAN OF CARE
Per EMR review this am, Mr Prescott was taken for repeat EGD on 8/23 and experienced PEA/bradycardia arrest then wide complex arrhythmia directly afterward. He remains intubated at this time.     Per this status w/ pt currently requiring mechanical ventilation, SLP to sign off at this time.     Thank you for allowing us to participate in the care of your patient -  Breanne Ngo M.S., CCC-SLP

## 2024-08-24 NOTE — PLAN OF CARE
Problem: Adult Inpatient Plan of Care  Goal: Plan of Care Review  Outcome: Ongoing, Not Progressing  Flowsheets (Taken 8/24/2024 1838)  Progress: no change  Plan of Care Reviewed With: patient  Outcome Evaluation: pt remains on vent, fent infusing, purvi infusing, minimally responsive, does not follow commands but does open eyes when being spoken to.  Goal: Patient-Specific Goal (Individualized)  Outcome: Ongoing, Not Progressing  Goal: Absence of Hospital-Acquired Illness or Injury  Outcome: Ongoing, Not Progressing  Intervention: Identify and Manage Fall Risk  Description: Perform standard risk assessment on admission using a validated tool or comprehensive approach appropriate to the patient; reassess fall risk frequently, with change in status or transfer to another level of care.  Communicate fall injury risk to interprofessional healthcare team.  Determine need for increased observation, equipment and environmental modification, such as low bed, signage and supportive, nonskid footwear.  Adjust safety measures to individual developmental age, stage and identified risk factors.  Reinforce the importance of safety and physical activity with patient and family.  Perform regular intentional rounding to assess need for position change, pain assessment and personal needs, including assistance with toileting.  Recent Flowsheet Documentation  Taken 8/24/2024 1700 by Brennen Bradley, RN  Safety Promotion/Fall Prevention:   safety round/check completed   fall prevention program maintained  Taken 8/24/2024 1600 by Brennen Bradley, RN  Safety Promotion/Fall Prevention:   safety round/check completed   fall prevention program maintained  Taken 8/24/2024 1500 by Brennen Bradley, RN  Safety Promotion/Fall Prevention:   safety round/check completed   fall prevention program maintained  Taken 8/24/2024 1400 by Brennen Bradley, RN  Safety Promotion/Fall Prevention:   safety round/check completed   fall prevention  program maintained  Taken 8/24/2024 1300 by Brennen Bradley RN  Safety Promotion/Fall Prevention:   safety round/check completed   fall prevention program maintained  Taken 8/24/2024 1200 by Brennen Bradley RN  Safety Promotion/Fall Prevention:   safety round/check completed   fall prevention program maintained  Taken 8/24/2024 1100 by Brennen Bradley RN  Safety Promotion/Fall Prevention:   safety round/check completed   fall prevention program maintained  Taken 8/24/2024 1000 by Brenenn Bradley RN  Safety Promotion/Fall Prevention:   safety round/check completed   fall prevention program maintained  Taken 8/24/2024 0900 by Brennen Bradley RN  Safety Promotion/Fall Prevention:   safety round/check completed   fall prevention program maintained  Taken 8/24/2024 0800 by Brennen Bradley RN  Safety Promotion/Fall Prevention:   safety round/check completed   fall prevention program maintained  Taken 8/24/2024 0700 by Brennen Bradley RN  Safety Promotion/Fall Prevention:   safety round/check completed   fall prevention program maintained  Intervention: Prevent Skin Injury  Description: Perform a screening for skin injury risk, such as pressure or moisture associated skin damage on admission and at regular intervals throughout hospital stay.  Keep all areas of skin (especially folds) clean and dry.  Maintain adequate skin hydration.  Relieve and redistribute pressure and protect bony prominences; implement measures based on patient-specific risk factors.  Match turning and repositioning schedule to clinical condition.  Encourage weight shift frequently; assist with reposition if unable to complete independently.  Float heels off bed; avoid pressure on the Achilles tendon.  Keep skin free from extended contact with medical devices.  Encourage functional activity and mobility, as early as tolerated.  Use aids (e.g., slide boards, mechanical lift) during transfer.  Recent Flowsheet Documentation  Taken  8/24/2024 1600 by Brennen Bradley RN  Body Position:   right   side-lying  Taken 8/24/2024 1400 by Brennen Bradley RN  Body Position:   left   side-lying  Taken 8/24/2024 1200 by Brennen Bradley RN  Body Position:   right   side-lying  Taken 8/24/2024 1000 by Brennen Bradley RN  Body Position:   left   side-lying  Taken 8/24/2024 0800 by Brennen Bradley RN  Body Position:   right   side-lying  Intervention: Prevent and Manage VTE (Venous Thromboembolism) Risk  Description: Assess for VTE (venous thromboembolism) risk.  Encourage and assist with early ambulation.  Initiate and maintain compression or other therapy, as indicated, based on identified risk in accordance with organizational protocol and provider order.  Encourage both active and passive leg exercises while in bed, if unable to ambulate.  Recent Flowsheet Documentation  Taken 8/24/2024 1400 by Brennen Bradley RN  Range of Motion: ROM (range of motion) performed  Taken 8/24/2024 0800 by Brennen Bradley RN  Activity Management: bedrest  Range of Motion: ROM (range of motion) performed  Goal: Optimal Comfort and Wellbeing  Outcome: Ongoing, Not Progressing  Intervention: Provide Person-Centered Care  Description: Use a family-focused approach to care.  Develop trust and rapport by proactively providing information, encouraging questions, addressing concerns and offering reassurance.  Acknowledge emotional response to hospitalization.  Recognize and utilize personal coping strategies.  Honor spiritual and cultural preferences.  Recent Flowsheet Documentation  Taken 8/24/2024 0800 by Brennen Bradley RN  Trust Relationship/Rapport:   care explained   reassurance provided  Goal: Readiness for Transition of Care  Outcome: Ongoing, Not Progressing   Goal Outcome Evaluation:  Plan of Care Reviewed With: patient        Progress: no change  Outcome Evaluation: pt remains on vent, fent infusing, purvi infusing, minimally responsive, does not  follow commands but does open eyes when being spoken to.

## 2024-08-24 NOTE — PROGRESS NOTES
Diagnosis: S/p cardiac arrest after EGD    I evaluated Mr. Prescott in the ICU.  He remains intubated and sedated.  He is no longer requiring vasopressors for hemodynamic support.    Has remained anemic, however there was no finding of active bleeding or stigmata of recent bleeding on the upper endoscopy.  Gastric biopsies were obtained during the upper endoscopy, awaiting results.    Lorrie Amos MD       General Surgeon  [facility]

## 2024-08-24 NOTE — PLAN OF CARE
Problem: Inability to Wean (Mechanical Ventilation, Invasive)  Goal: Mechanical Ventilation Liberation  Outcome: Ongoing, Progressing   Goal Outcome Evaluation:      Patient remains on AC 16, 480, 30%, PEEP 5.  No weaning trial performed today.

## 2024-08-24 NOTE — PROGRESS NOTES
"Nephrology Progress Note      Subjective     08/24/2024 patient is on ventilator now status post short round of ACLS as he became bradycardic now on ventilator    Objective       Vital signs :     Temp:  [98.1 °F (36.7 °C)-99.7 °F (37.6 °C)] 99.7 °F (37.6 °C)  Heart Rate:  [] 112  Resp:  [16-21] 17  BP: ()/(38-82) 100/62  FiO2 (%):  [30 %-100 %] 30 %    Intake/Output                         08/22/24 0701 - 08/23/24 0700 08/23/24 0701 - 08/24/24 0700     [id number] 2397-6519 Total 5385-7299 1460-7052 Total                 Intake    P.O.  480  120 600  120  -- 120    I.V.  --  -- --  761  230.4 991.4    IV Piggyback  100  -- 100  100  200 300    Total Intake 580 120 700 981 430.4 1411.4       Output    Urine  0  -- 0  --  75 75    Emesis/NG output  --  -- --  100  0 100    Dialysis  --  -- --  2000  -- 2000    Total Output 0 -- 0 2100 75 2175             Physical Exam:    General Appearance : On ventilator  Lungs : bilateral decreased intensity of breath sounds with bibasilar crackles  Heart :  regular rhythm & normal rate, normal S1, S2 and no murmur, no rub  Abdomen : Nondistended  Extremities : 1+ edema,   Neurologic :   Unable to assess      Laboratory Data :     Albumin Albumin   Date Value Ref Range Status   08/23/2024 2.6 (L) 3.5 - 5.2 g/dL Final   08/23/2024 3.0 (L) 3.5 - 5.2 g/dL Final   08/22/2024 2.9 (L) 3.5 - 5.2 g/dL Final   08/21/2024 3.0 (L) 3.5 - 5.2 g/dL Final      Magnesium Magnesium   Date Value Ref Range Status   08/23/2024 2.0 1.6 - 2.4 mg/dL Final            PTH               No results found for: \"PTH\"    CBC and coagulation:  Results from last 7 days   Lab Units 08/23/24  2306 08/23/24  1541 08/22/24  2323 08/20/24  0116 08/19/24  1446   CRP mg/dL  --   --   --   --  1.72*   WBC 10*3/mm3 8.16 16.97* 10.50   < > 8.58   HEMOGLOBIN g/dL 7.3* 7.8* 7.3*   < > 5.9*   HEMATOCRIT % 23.8* 25.2* 23.6*   < > 19.5*   MCV fL 93.0 94.4 92.2   < > 97.5*   MCHC g/dL 30.7* 31.0* 30.9*   < > 30.3* "   PLATELETS 10*3/mm3 148 193 183   < > 159   INR   --  1.31*  --   --  2.14*   D DIMER QUANT MCGFEU/mL  --   --   --   --  5.26*    < > = values in this interval not displayed.     Acid/base balance:  Results from last 7 days   Lab Units 08/24/24  0429 08/23/24  1615 08/22/24  2224   PH, ARTERIAL pH units 7.558* 7.498* 7.437   PO2 ART mm Hg 67.3* 291.0* 59.0*   PCO2, ARTERIAL mm Hg 32.9* 39.3 43.4   HCO3 ART mmol/L 29.3* 30.5* 29.3*       Renal and electrolytes:    Results from last 7 days   Lab Units 08/23/24 2306 08/23/24  1541 08/22/24  2323 08/22/24  0411 08/21/24  1214 08/20/24  0742 08/19/24  1446   SODIUM mmol/L 141 138 138 138 137   < > 137   POTASSIUM mmol/L 3.4* 3.4* 3.6 3.7 3.4*   < > 5.8*   MAGNESIUM mg/dL  --  2.0  --   --   --   --  2.3   CHLORIDE mmol/L 97* 94* 95* 95* 95*   < > 96*   CO2 mmol/L 26.7 28.5 25.5 27.1 28.0   < > 21.7*   BUN mg/dL 19 20 39* 20 31*   < > 110*   CREATININE mg/dL 2.97* 2.65* 4.23* 3.54* 2.70*   < > 7.65*   CALCIUM mg/dL 8.2* 8.2* 8.4* 8.3* 8.3*   < > 8.3*   PHOSPHORUS mg/dL  --   --   --   --   --   --  7.3*    < > = values in this interval not displayed.     Estimated Creatinine Clearance: 26 mL/min (A) (by C-G formula based on SCr of 2.97 mg/dL (H)).  @GFRCG:3@   Liver and pancreatic function:  Results from last 7 days   Lab Units 08/23/24 2306 08/23/24  1541 08/22/24  2323   ALBUMIN g/dL 2.6* 3.0* 2.9*   BILIRUBIN mg/dL 0.4 0.5 0.4   ALK PHOS U/L 97 109 102   AST (SGOT) U/L 21 24 18   ALT (SGPT) U/L 14 15 11         Cardiac:  Results from last 7 days   Lab Units 08/19/24  1446 08/18/24  1210   PROBNP pg/mL >70,000.0* >70,000.0*     Liver and pancreatic function:  Results from last 7 days   Lab Units 08/23/24  2306 08/23/24  1541 08/22/24  2323   ALBUMIN g/dL 2.6* 3.0* 2.9*   BILIRUBIN mg/dL 0.4 0.5 0.4   ALK PHOS U/L 97 109 102   AST (SGOT) U/L 21 24 18   ALT (SGPT) U/L 14 15 11       Medications :     acetaminophen, 650 mg, Oral, Once per day on Monday Wednesday  Friday  acetaminophen, 650 mg, Oral, TID  ammonium lactate, 1 Application, Topical, Nightly  atorvastatin, 40 mg, Oral, Nightly  chlorhexidine, 15 mL, Mouth/Throat, Q12H  doxycycline, 100 mg, Intravenous, Q12H  gabapentin, 200 mg, Oral, Nightly  insulin lispro, 2-7 Units, Subcutaneous, 4x Daily AC & at Bedtime  levothyroxine, 75 mcg, Oral, QAM  multivitamin with minerals, 1 tablet, Oral, Daily  nicotine, 1 patch, Transdermal, Q24H  pantoprazole, 40 mg, Intravenous, BID AC  piperacillin-tazobactam, 3.375 g, Intravenous, Q8H  senna-docusate sodium, 2 tablet, Oral, BID  sertraline, 50 mg, Oral, Nightly  sevelamer, 800 mg, Oral, TID With Meals  sodium chloride, 10 mL, Intravenous, Q12H  sodium chloride, 10 mL, Intravenous, Q12H  sodium chloride, 10 mL, Intravenous, Q12H  sodium chloride, 10 mL, Intravenous, Q12H  sucralfate, 1 g, Oral, 4x Daily AC & at Bedtime      dexmedetomidine, 0.2-1.5 mcg/kg/hr (Dosing Weight), Last Rate: 0.2 mcg/kg/hr (08/24/24 1116)  fentanyl 10 mcg/mL,  mcg/hr, Last Rate: Stopped (08/24/24 0815)  heparin, 7.5 Units/kg/hr (Dosing Weight), Last Rate: 7.5 Units/kg/hr (08/23/24 1639)  Pharmacy to Dose Heparin,   propofol, 5-50 mcg/kg/min (Dosing Weight), Last Rate: Stopped (08/24/24 0815)          Assessment & Plan     08/24/2024 reviewed and updated    -ESRD on intermittent dialysis  -Status post cardiac arrest  -Shock  -Hypokalemia  - Acute hypoxic respiratory failure likely due to fluid overload in settings of dialysis noncompliance  - Hyperkalemia resolved  - Acute on chronic anemia status post EGD  - Urinary tract infection    08/23/2024   Dr. Saucedo  evaluated on dialysis, tolerating very well.  No intradialytic hypotension.  Ordered ultrafiltration 2 L if t tolerates  Continue on intermittent dialysis Mondays Wednesdays and Fridays next dialysis is tomorrow  Reviewed clinical notes, radiological data, clinical notes.  Discussed the case in detail with primary team    08/24/2024  Patient  had cardiac arrest yesterday a brief episode of PEA and bradycardia and wide-complex arrhythmia after EGD is now on ventilator  Patient's potassium is 3.4 will replace check magnesium  Patient already had dialysis yesterday and tolerated well yesterday    Prognosis critical    Discussed with RN    Plan of care was discussed with the patients family they understood, verbalized, agreed.        DESHAWN Soto MD  08/24/24  11:32 EDT

## 2024-08-24 NOTE — CONSULTS
Inpatient Pulmonology Consult  Consult performed by: Timmy Prieto MD  Consult ordered by: Dar Cochran MD      Consulted for this patient who is admitted 26 care unit after PEA/wide-complex tachycardic arrest just at the end of EGD.  He was treated for GI bleed    Patient Identification:  Name:  Kennedy Prescott  Age:  74 y.o.  Sex:  male  :  1950  MRN:  [id number]  Visit Number:  [id number]  Room number:  CC04/1C  Primary care provider:  Jag Guillaume MD    Chief Complaint:    Chief Complaint   Patient presents with    Shortness of Breath       History of Presenting Illness:  Patient is a 74-year-old male who is intubated sedated.  Information obtained through chart review, nursing and physician communication.    He has history of congestive heart failure, coronary artery disease, diabetes, end-stage renal disease, hypertension and GERD.    Has missed recent dialysis session patient was found to be anemic there was a concern for GI bleed.    Surgery was consulted for endoscopy.    He did have bloody bowel movement while in the emergency room.    Patient had EGD done yesterday.  Said the end of EGD he was found to bradycardia down and had wide-complex tachycardia.  Went into PEA arrest.    Cardioverted once.  ROSC was achieved.  Patient was intubated.  Transferred to intensive care unit for further evaluation.    While in the ICU patient got agitated tachycardic sedation was increased.    No mention of chest pain, orthopnea, PND, leg edema, nausea, vomiting, diarrhea, hemoptysis.  As per chart.      Review of Systems:  Unable to be obtained.  Patient is on ventilator.      Past History:  Family History   Problem Relation Age of Onset    Arthritis Mother     COPD Father     Diabetes Father     Heart disease Father     Hyperlipidemia Father     Hypertension Father     Cancer Daughter      Past Medical History:   Diagnosis Date    CHF (congestive heart failure)     Coronary artery disease     Diabetes  mellitus     Dialysis patient     Disease of thyroid gland     Elevated cholesterol     GERD (gastroesophageal reflux disease)     Hypertension     Myocardial infarction     Renal disorder     stage 5     Past Surgical History:   Procedure Laterality Date    CARDIAC CATHETERIZATION N/A 5/28/2024    Procedure: Left Heart Cath;  Surgeon: Mackenzie Ruiz MD;  Location: [facility] CATH INVASIVE LOCATION;  Service: Cardiology;  Laterality: N/A;    CARDIAC SURGERY      cabg    CHOLECYSTECTOMY      COLONOSCOPY N/A 9/22/2023    Procedure: COLONOSCOPY;  Surgeon: Trip Peter MD;  Location: [facility] OR;  Service: Gastroenterology;  Laterality: N/A;    DIALYSIS FISTULA CREATION Left     arm    ENDOSCOPY N/A 8/20/2024    Procedure: ESOPHAGOGASTRODUODENOSCOPY;  Surgeon: Edgar Sloan MD;  Location: [facility] OR;  Service: Gastroenterology;  Laterality: N/A;     Social History     Socioeconomic History    Marital status:    Tobacco Use    Smoking status: Never    Smokeless tobacco: Never   Vaping Use    Vaping status: Never Used   Substance and Sexual Activity    Alcohol use: Never    Drug use: Never    Sexual activity: Defer       Allergies:  Patient has no known allergies.    Prior to Admission Medications       Prescriptions Last Dose Informant Patient Reported? Taking?    acetaminophen (TYLENOL) 325 MG tablet Past Week  Yes Yes    Take 2 tablets by mouth 3 (Three) Times a Week.    acetaminophen (TYLENOL) 325 MG tablet 8/19/2024  Yes Yes    Take 2 tablets by mouth 3 (Three) Times a Day.    acetaminophen (TYLENOL) 500 MG tablet Past Month Nursing Home Yes Yes    Take 1 tablet by mouth Every 4 (Four) Hours As Needed for Mild Pain or Fever.    ammonium lactate (AMLACTIN) 12 % cream 8/18/2024  Yes Yes    Apply 1 g topically to the appropriate area as directed Every Night.    apixaban (ELIQUIS) 5 MG tablet tablet 8/19/2024  Yes Yes    Take 1 tablet by mouth 2 (Two) Times a Day.    atorvastatin (LIPITOR) 40 MG tablet 8/18/2024  Nursing Home Yes Yes    Take 1 tablet by mouth Every Night.    B COMPLEX-C-FOLIC ACID PO 8/19/2024  Yes Yes    Take 0.8 tablets by mouth Every Morning.    clopidogrel (PLAVIX) 75 MG tablet 8/19/2024 Nursing Home Yes Yes    Take 1 tablet by mouth Daily.    gabapentin (NEURONTIN) 100 MG capsule 8/18/2024 Nursing Home Yes Yes    Take 2 capsules by mouth every night at bedtime.    HYDROcodone-acetaminophen (NORCO) 5-325 MG per tablet Past Month Nursing Home Yes Yes    Take 1 tablet by mouth Every 12 (Twelve) Hours As Needed for Mild Pain.    Insulin Aspart (novoLOG) 100 UNIT/ML injection 8/19/2024 Nursing Home Yes Yes    Inject 0-8 Units under the skin into the appropriate area as directed 4 (Four) Times a Day Before Meals & at Bedtime. Inject as per sliding scale:  If 0-59 = 0;  201-250 = 2 units;   251-300= 4 units;   301-350 = 6 units;  351-400 = 8 units;  401-1000 = 8 units AND notify MD    lactulose (CHRONULAC) 10 GM/15ML solution Past Week Nursing Home Yes Yes    Take 30 mL by mouth Daily As Needed (for constipation).    levothyroxine (SYNTHROID, LEVOTHROID) 75 MCG tablet 8/19/2024 Nursing Home Yes Yes    Take 1 tablet by mouth Every Morning.    metoprolol succinate XL (TOPROL-XL) 25 MG 24 hr tablet 8/18/2024 Nursing Home Yes Yes    Take 0.5 tablets by mouth every night at bedtime.    multivitamin with minerals tablet tablet 8/19/2024 Nursing Home Yes Yes    Take 1 tablet by mouth Daily.    Nutritional Supplements (Jayant Nutrivigor) pack 8/19/2024 Nursing Home Yes Yes    Take 1 packet by mouth 2 (Two) Times a Day.    ondansetron (ZOFRAN) 4 MG tablet Past Week Nursing Home Yes Yes    Take 1 tablet by mouth Every 8 (Eight) Hours As Needed for Nausea or Vomiting.    pantoprazole (PROTONIX) 40 MG EC tablet 8/19/2024 Nursing Home Yes Yes    Take 1 tablet by mouth Every Morning.    sertraline (ZOLOFT) 50 MG tablet 8/18/2024 Nursing Home Yes Yes    Take 1 tablet by mouth every night at bedtime.    sevelamer (RENVELA) 800  MG tablet 8/19/2024 Nursing Home Yes Yes    Take 1 tablet by mouth 3 (Three) Times a Day With Meals.    benzocaine (HURRICAINE/ORAJEL) 20 % gel More than a month Nursing Home Yes No    Apply 1 Application to the mouth or throat Every 2 (Two) Hours As Needed (teeth pain).    bisacodyl (DULCOLAX) 10 MG suppository More than a month Nursing Home Yes No    Insert 1 suppository into the rectum Daily As Needed for Constipation.    Lidocaine Viscous HCl (XYLOCAINE) 2 % solution More than a month Nursing Home Yes No    Take 5 mL by mouth Every 6 (Six) Hours As Needed for Mild Pain.    nitroglycerin (NITROSTAT) 0.4 MG SL tablet More than a month Nursing Home Yes No    Place 1 tablet under the tongue Every 5 (Five) Minutes As Needed for Chest Pain. Take no more than 3 doses in 15 minutes.    polyethylene glycol (MiraLax) 17 GM/SCOOP powder More than a month Nursing Home Yes No    Take 17 g by mouth Every 12 (Twelve) Hours As Needed (for constipation).          Hospital Meds:  acetaminophen, 650 mg, Oral, Once per day on Monday Wednesday Friday  acetaminophen, 650 mg, Oral, TID  ammonium lactate, 1 Application, Topical, Nightly  atorvastatin, 40 mg, Oral, Nightly  chlorhexidine, 15 mL, Mouth/Throat, Q12H  doxycycline, 100 mg, Intravenous, Q12H  gabapentin, 200 mg, Oral, Nightly  insulin lispro, 2-7 Units, Subcutaneous, 4x Daily AC & at Bedtime  levothyroxine, 75 mcg, Oral, QAM  multivitamin with minerals, 1 tablet, Oral, Daily  nicotine, 1 patch, Transdermal, Q24H  pantoprazole, 40 mg, Intravenous, BID AC  piperacillin-tazobactam, 3.375 g, Intravenous, Q8H  senna-docusate sodium, 2 tablet, Oral, BID  sertraline, 50 mg, Oral, Nightly  sevelamer, 800 mg, Oral, TID With Meals  sodium chloride, 10 mL, Intravenous, Q12H  sodium chloride, 10 mL, Intravenous, Q12H  sodium chloride, 10 mL, Intravenous, Q12H  sodium chloride, 10 mL, Intravenous, Q12H  sucralfate, 1 g, Oral, 4x Daily AC & at Bedtime      dexmedetomidine, 0.2-1.5  mcg/kg/hr (Dosing Weight)  fentanyl 10 mcg/mL,  mcg/hr, Last Rate: Stopped (08/24/24 0815)  heparin, 7.5 Units/kg/hr (Dosing Weight), Last Rate: 7.5 Units/kg/hr (08/23/24 1639)  Pharmacy to Dose Heparin,   propofol, 5-50 mcg/kg/min (Dosing Weight), Last Rate: Stopped (08/24/24 0815)      Current listed hospital scheduled medications may not yet reflect those currently placed in orders that are signed and held awaiting patient's arrival to floor.   ---------------------------------------------------------------------------------------------------------------------   Objective     Vital Signs:  Temp:  [98.1 °F (36.7 °C)-99.7 °F (37.6 °C)] 99.7 °F (37.6 °C)  Heart Rate:  [] 118  Resp:  [16-21] 17  BP: ()/(44-82) 106/66  FiO2 (%):  [30 %-100 %] 30 %    Vent Settings          Resp Rate (Set): 16     FiO2 (%): 30 %  PEEP/CPAP (cm H2O): 5 cm H20    Minute Ventilation (L/min) (Obs): 8.15 L/min  Resp Rate (Observed) Vent: 16     I:E Ratio (Obs): 1:3.2    PIP Observed (cm H2O): 20 cm H2O         Mean Arterial Pressure (Non-Invasive) for the past 24 hrs (Last 3 readings):   Noninvasive MAP (mmHg)   08/24/24 0630 86   08/24/24 0615 71   08/24/24 0600 75     SpO2:  [96 %-100 %] 100 %  on  Flow (L/min):  [6] 6;   Device (Oxygen Therapy): ventilator  Body mass index is 31.89 kg/m².    Wt Readings from Last 1 Encounters:   08/24/24 0605 104 kg (228 lb 9.9 oz)   08/23/24 1613 103 kg (227 lb 1.2 oz)   08/22/24 1815 105 kg (230 lb 9.6 oz)   08/22/24 0400 105 kg (230 lb 13.2 oz)   08/21/24 0400 103 kg (227 lb 8.2 oz)   08/20/24 0400 100 kg (220 lb 7.4 oz)   08/19/24 1419 99.8 kg (220 lb)     Wt Readings from Last 3 Encounters:   08/24/24 104 kg (228 lb 9.9 oz)   08/18/24 95.3 kg (210 lb 1.6 oz)   08/14/24 95.3 kg (210 lb 1.6 oz)           ---------------------------------------------------------------------------------------------------------------------   Physical Exam:   GENERAL APPEARANCE: Intubated and sedated.   Appears to be resting comfortably in bed.        HEAD: normocephalic.    EYES: PERRLA.    THROAT: ET tube in place. Oral cavity and pharynx normal. No inflammation, swelling, exudate, or lesions.     Neck: Supple.     CARDIAC: Normal S1 and S2. No S3, S4 or murmurs. Rhythm is regular.     LUNGS: Clear to auscultation and percussion without rales, rhonchi, wheezing or diminished breath sounds.    ABDOMEN: Positive bowel sounds. Soft, nondistended, nontender.     EXTREMITIES: No significant deformity or joint abnormality. No edema. Peripheral pulses intact.    NEUROLOGICAL: Unable to assess due to sedation status.     PSYCHIATRIC: Unable to assess due to sedation status     ---------------------------------------------------------------------------------------------------------------------   EKG:    Please note that I have personally looked at the EKG from this admission, the comparison EKGs in the medical records, and the above is my interpretation of this admission's EKG; I await the final cardiology read.        Results Review:      Results from last 7 days   Lab Units 08/23/24  2306 08/23/24  1541 08/22/24  2323   WBC 10*3/mm3 8.16 16.97* 10.50   HEMOGLOBIN g/dL 7.3* 7.8* 7.3*   PLATELETS 10*3/mm3 148 193 183     Results from last 7 days   Lab Units 08/23/24  2306 08/23/24  1541 08/22/24  2323 08/20/24  0742 08/19/24  1446   SODIUM mmol/L 141 138 138   < > 137   POTASSIUM mmol/L 3.4* 3.4* 3.6   < > 5.8*   CHLORIDE mmol/L 97* 94* 95*   < > 96*   CO2 mmol/L 26.7 28.5 25.5   < > 21.7*   BUN mg/dL 19 20 39*   < > 110*   CREATININE mg/dL 2.97* 2.65* 4.23*   < > 7.65*   CALCIUM mg/dL 8.2* 8.2* 8.4*   < > 8.3*   GLUCOSE mg/dL 112* 148* 106*   < > 190*   MAGNESIUM mg/dL  --  2.0  --   --  2.3    < > = values in this interval not displayed.     Lab Results   Component Value Date    INR 1.31 (H) 08/23/2024    INR 2.14 (H) 08/19/2024    INR 2.50 (H) 08/13/2024    PROTIME 16.4 (H) 08/23/2024    PROTIME 23.9 (H) 08/19/2024     PROTIME 27.0 (H) 08/13/2024     Results from last 7 days   Lab Units 08/23/24  2306 08/23/24  1541 08/22/24  2323   ALK PHOS U/L 97 109 102   BILIRUBIN mg/dL 0.4 0.5 0.4   ALT (SGPT) U/L 14 15 11   AST (SGOT) U/L 21 24 18     Results from last 7 days   Lab Units 08/24/24  0429   PH, ARTERIAL pH units 7.558*   PO2 ART mm Hg 67.3*   PCO2, ARTERIAL mm Hg 32.9*   HCO3 ART mmol/L 29.3*     Imaging Results (Last 24 Hours)       Procedure Component Value Units Date/Time    XR Chest 1 View [[id number]] Collected: 08/24/24 0721     Updated: 08/24/24 0726    Narrative:      PROCEDURE: Portable chest x-ray examination performed on August 24, 2024. Single view. Supine position.     HISTORY: Respiratory failure.     COMPARISON: None.     FINDINGS:     Enlarged heart size  Sternotomy.  Endotracheal tube in place with tip 4.8 cm above the gloria.  Right neck central vascular catheter with tip to the SVC/RA junction.  Enteric drain in place with tip to the stomach.  Central pulmonary vascular congestion with mild edema.  Hypoinflated lungs  Small right and left pleural effusion. No pneumothorax  Levoconvex curvature at the cervical thoracic spine junction.       Impression:         1.  Mild enlarged heart size  2.  Sternotomy.  3.  Satisfactory position of the tubes and lines.  4.  Coarsened bronchovascular pattern to the lungs with features of mild  edema.  5.  Small right and left pleural effusion.  6.  No pneumothorax.        This report was finalized on 8/24/2024 7:24 AM by Michele Liu MD.       XR Chest 1 View [[id number]] Collected: 08/23/24 1712     Updated: 08/23/24 1717    Narrative:      Examination: Portable view semiupright chest     Comparison: 8/23/2024.        Findings: Findings: An OG tube is present, appearing in the interval  with distal tip in the expected position of the stomach.     The endotracheal tube continues to terminate superior to the gloria,  unchanged.  Right internal jugular central venous catheter  appropriately  positioned.     Lung volumes are diminished.  Heart size is enlarged.  Median sternotomy  wires and postsurgical change of CABG present.     Improved appearance of the left upper lobe.  Unchanged right lung.       Impression:         OG tube appearing in the interval with distal tip in the expected  position of the stomach.         This report was finalized on 8/23/2024 5:15 PM by Rosario Rivera MD.       XR Chest 1 View [[id number]] Collected: 08/23/24 1444     Updated: 08/23/24 1453    Narrative:      PROCEDURE: XR CHEST 1 VW-       HISTORY: central line and tube placement; K92.2-Gastrointestinal  hemorrhage, unspecified; R19.7-Diarrhea, unspecified; N18.6-End stage  renal disease; Z99.2-Dependence on renal dialysis; D64.9-Anemia,  unspecified; K29.71-Gastritis, unspecified, with bleeding     COMPARISON: 8/23/2024.     FINDINGS: An endotracheal tube is in place and is well-positioned with  the tip above the gloria. A right IJ central venous catheter is in place  with the tip in the region of the right atrium. The patient is status  post median sternotomy and CABG procedure. The heart is normal in size.  The mediastinum is unremarkable. There are bilateral pleural effusions  and right greater than left airspace disease. There is no pneumothorax.  There are no acute osseous abnormalities.       Impression:      1. Right IJ central venous catheter in place with the tip in the region  of the right atrium.  2. Endotracheal tube well-positioned.  3. Bilateral pleural effusions and right greater than left airspace  disease.           This report was finalized on 8/23/2024 2:51 PM by Kayleigh New M.D..       XR Chest 1 View [[id number]] Collected: 08/23/24 1252     Updated: 08/23/24 1255    Narrative:      PROCEDURE: XR CHEST 1 VW-       HISTORY: hypoxia; K92.2-Gastrointestinal hemorrhage, unspecified;  R19.7-Diarrhea, unspecified; N18.6-End stage renal disease;  Z99.2-Dependence on renal dialysis;  D64.9-Anemia, unspecified;  K29.71-Gastritis, unspecified, with bleeding     COMPARISON: 8/20/2024.     FINDINGS: The patient is status post median sternotomy and CABG  procedure. The heart is normal in size. The mediastinum is unremarkable.  There is a right pleural effusion and basilar opacity similar to the  prior exam. The left lung is clear. There is no pneumothorax. There are  no acute osseous abnormalities. Postsurgical changes are present in the  left humerus.       Impression:      Right pleural effusion and basilar opacity similar to the  prior exam.        This report was finalized on 8/23/2024 12:53 PM by Kayleigh New M.D..                    acetaminophen, 650 mg, Oral, Once per day on Monday Wednesday Friday  acetaminophen, 650 mg, Oral, TID  ammonium lactate, 1 Application, Topical, Nightly  atorvastatin, 40 mg, Oral, Nightly  chlorhexidine, 15 mL, Mouth/Throat, Q12H  doxycycline, 100 mg, Intravenous, Q12H  gabapentin, 200 mg, Oral, Nightly  insulin lispro, 2-7 Units, Subcutaneous, 4x Daily AC & at Bedtime  levothyroxine, 75 mcg, Oral, QAM  multivitamin with minerals, 1 tablet, Oral, Daily  nicotine, 1 patch, Transdermal, Q24H  pantoprazole, 40 mg, Intravenous, BID AC  piperacillin-tazobactam, 3.375 g, Intravenous, Q8H  senna-docusate sodium, 2 tablet, Oral, BID  sertraline, 50 mg, Oral, Nightly  sevelamer, 800 mg, Oral, TID With Meals  sodium chloride, 10 mL, Intravenous, Q12H  sodium chloride, 10 mL, Intravenous, Q12H  sodium chloride, 10 mL, Intravenous, Q12H  sodium chloride, 10 mL, Intravenous, Q12H  sucralfate, 1 g, Oral, 4x Daily AC & at Bedtime      dexmedetomidine, 0.2-1.5 mcg/kg/hr (Dosing Weight)  fentanyl 10 mcg/mL,  mcg/hr, Last Rate: Stopped (08/24/24 0815)  heparin, 7.5 Units/kg/hr (Dosing Weight), Last Rate: 7.5 Units/kg/hr (08/23/24 1639)  Pharmacy to Dose Heparin,   propofol, 5-50 mcg/kg/min (Dosing Weight), Last Rate: Stopped (08/24/24 4735)        Medication Review:      Assessment & Plan   # Acute hypoxic respiratory failure in the setting of cardiac arrest, V. tach requiring defibrillation      #CAD w/ PCI to LAD on 5/28    # GI bleed, status post upper GI endoscopy.      #Hypotension related to sedation   No STEMI on EKB but incomplete LBBB is now complete. H on IV heparin.  Seems to be tolerating well     Cerebell did not show seizures, will await neurological recovery.     #RML/RLL pneumonia   - Concern for aspiration.       Continue Zosyn/doxy        #Acute blood loss anemia            #ESRD in setting of missed dialysis sessions   #Hyperkalemia: Resolved  - Nephrology consulted. Dialysis as per nephrology service.      #UTI  - Significant pyuria on 8/18. Will follow cultures. Currently no growth. Will continue abx as above.          Chronic medical problems   Hypothyroidism- TSH improved at 5.1. Recent free T4 wnl.   GERD  HTN          Vent Settings          Resp Rate (Set): 16     FiO2 (%): 30 %  PEEP/CPAP (cm H2O): 5 cm H20    Minute Ventilation (L/min) (Obs): 8.15 L/min  Resp Rate (Observed) Vent: 16     I:E Ratio (Obs): 1:3.2    PIP Observed (cm H2O): 20 cm H2O         Peak airway pressure is 34, plateau pressure is 14.    Continue  low-dose Precedex for comfort otherwise take him off all sedation for awakening trial followed by weaning trial.      I have personally reviewed x-ray chest, labs, medication list.      Critical Care time spent in direct patient care: 35 minutes (excluding procedure time, if applicable) including high complexity decision making to assess, manipulate, and support vital organ system failure in this individual who has impairment of one or more vital organ systems such that there is a high probability of imminent or life threatening deterioration in the patient’s condition.  Patient is critically ill and is at higher risk for further mortality/morbidity. Continue ICU care .  Really thankful to Dr. Dar stover for having me participate in the  care of this patient    Timmy Prieto MD  08/24/24  09:47 EDT

## 2024-08-24 NOTE — PLAN OF CARE
Problem: Adult Inpatient Plan of Care  Goal: Plan of Care Review  Outcome: Ongoing, Progressing  Goal: Patient-Specific Goal (Individualized)  Outcome: Ongoing, Progressing  Goal: Absence of Hospital-Acquired Illness or Injury  Outcome: Ongoing, Progressing  Intervention: Identify and Manage Fall Risk  Recent Flowsheet Documentation  Taken 8/24/2024 0500 by Seirra Barr RN  Safety Promotion/Fall Prevention: safety round/check completed  Taken 8/24/2024 0400 by Sierra Barr RN  Safety Promotion/Fall Prevention: safety round/check completed  Taken 8/24/2024 0300 by Sierra Barr RN  Safety Promotion/Fall Prevention: safety round/check completed  Taken 8/24/2024 0200 by Sierra Barr RN  Safety Promotion/Fall Prevention: safety round/check completed  Taken 8/24/2024 0100 by Sierra Barr RN  Safety Promotion/Fall Prevention: safety round/check completed  Taken 8/24/2024 0000 by Sierra Barr RN  Safety Promotion/Fall Prevention: safety round/check completed  Taken 8/23/2024 2300 by Sierra Barr RN  Safety Promotion/Fall Prevention: safety round/check completed  Taken 8/23/2024 2200 by Sierra Barr RN  Safety Promotion/Fall Prevention: safety round/check completed  Taken 8/23/2024 2100 by Sierra Barr RN  Safety Promotion/Fall Prevention: safety round/check completed  Taken 8/23/2024 2000 by Sierra Barr RN  Safety Promotion/Fall Prevention: safety round/check completed  Taken 8/23/2024 1900 by Sierra Barr RN  Safety Promotion/Fall Prevention: safety round/check completed  Intervention: Prevent Skin Injury  Recent Flowsheet Documentation  Taken 8/24/2024 0400 by Sierra Barr RN  Body Position:   side-lying   turned   right  Taken 8/24/2024 0200 by Sierra Barr RN  Body Position:   side-lying   turned   left  Taken 8/24/2024 0000 by Sierra Barr RN  Body Position:   side-lying   turned   right  Taken 8/23/2024 2200 by Sierra Barr RN  Body Position:   side-lying   turned    left  Taken 8/23/2024 2000 by Sierra Barr RN  Body Position:   side-lying   turned   right  Skin Protection:   adhesive use limited   incontinence pads utilized   tubing/devices free from skin contact   transparent dressing maintained  Intervention: Prevent and Manage VTE (Venous Thromboembolism) Risk  Recent Flowsheet Documentation  Taken 8/24/2024 0200 by Sierra Barr RN  Activity Management: bedrest  VTE Prevention/Management:   sequential compression devices on   bilateral  Taken 8/23/2024 2000 by Sierra Barr RN  Activity Management: bedrest  VTE Prevention/Management:   bilateral   sequential compression devices on  Range of Motion: ROM (range of motion) performed  Goal: Optimal Comfort and Wellbeing  Outcome: Ongoing, Progressing  Intervention: Provide Person-Centered Care  Recent Flowsheet Documentation  Taken 8/24/2024 0200 by Sierra Barr RN  Trust Relationship/Rapport:   care explained   reassurance provided  Taken 8/23/2024 2000 by Sierra Barr RN  Trust Relationship/Rapport:   care explained   reassurance provided  Goal: Readiness for Transition of Care  Outcome: Ongoing, Progressing     Problem: Skin Injury Risk Increased  Goal: Skin Health and Integrity  Outcome: Ongoing, Progressing  Intervention: Optimize Skin Protection  Recent Flowsheet Documentation  Taken 8/24/2024 0400 by Sierra Barr RN  Head of Bed (HOB) Positioning: HOB at 30 degrees  Taken 8/24/2024 0200 by Sierra Barr RN  Head of Bed (HOB) Positioning: HOB at 30 degrees  Taken 8/24/2024 0000 by Sierra Barr RN  Head of Bed (HOB) Positioning: HOB at 30 degrees  Taken 8/23/2024 2200 by Sierra Barr RN  Head of Bed (HOB) Positioning: HOB at 30 degrees  Taken 8/23/2024 2000 by Sierra Barr RN  Pressure Reduction Techniques:   heels elevated off bed   positioned off wounds   pressure points protected   weight shift assistance provided  Head of Bed (HOB) Positioning: HOB at 30 degrees  Pressure Reduction  Devices:   heel offloading device utilized   positioning supports utilized   pressure-redistributing mattress utilized  Skin Protection:   adhesive use limited   incontinence pads utilized   tubing/devices free from skin contact   transparent dressing maintained     Problem: Diabetes Comorbidity  Goal: Blood Glucose Level Within Targeted Range  Outcome: Ongoing, Progressing     Problem: Heart Failure Comorbidity  Goal: Maintenance of Heart Failure Symptom Control  Outcome: Ongoing, Progressing  Intervention: Maintain Heart Failure-Management  Recent Flowsheet Documentation  Taken 8/23/2024 2200 by Sierra Barr RN  Medication Review/Management: medications reviewed  Taken 8/23/2024 2000 by Sierra Barr RN  Medication Review/Management: medications reviewed     Problem: Hypertension Comorbidity  Goal: Blood Pressure in Desired Range  Outcome: Ongoing, Progressing  Intervention: Maintain Blood Pressure Management  Recent Flowsheet Documentation  Taken 8/23/2024 2200 by Sierra Barr RN  Medication Review/Management: medications reviewed  Taken 8/23/2024 2000 by Sierra Barr RN  Medication Review/Management: medications reviewed     Problem: Adjustment to Illness (Gastrointestinal Bleeding)  Goal: Optimal Coping with Acute Illness  Outcome: Ongoing, Progressing     Problem: Bleeding (Gastrointestinal Bleeding)  Goal: Hemostasis  Outcome: Ongoing, Progressing     Problem: Anemia  Goal: Anemia Symptom Improvement  Outcome: Ongoing, Progressing  Intervention: Monitor and Manage Anemia  Recent Flowsheet Documentation  Taken 8/24/2024 0500 by Sierra Barr RN  Safety Promotion/Fall Prevention: safety round/check completed  Taken 8/24/2024 0400 by Sierra Barr RN  Safety Promotion/Fall Prevention: safety round/check completed  Taken 8/24/2024 0300 by Sierra Barr RN  Safety Promotion/Fall Prevention: safety round/check completed  Taken 8/24/2024 0200 by Sierra Barr RN  Safety Promotion/Fall Prevention:  safety round/check completed  Taken 8/24/2024 0100 by Sierra Barr RN  Safety Promotion/Fall Prevention: safety round/check completed  Taken 8/24/2024 0000 by Sierra Barr RN  Safety Promotion/Fall Prevention: safety round/check completed  Taken 8/23/2024 2300 by Sierra Barr RN  Safety Promotion/Fall Prevention: safety round/check completed  Taken 8/23/2024 2200 by Sierra Barr RN  Safety Promotion/Fall Prevention: safety round/check completed  Taken 8/23/2024 2100 by Sierra Barr RN  Safety Promotion/Fall Prevention: safety round/check completed  Taken 8/23/2024 2000 by Sierra Barr RN  Safety Promotion/Fall Prevention: safety round/check completed  Taken 8/23/2024 1900 by Sierra Barr RN  Safety Promotion/Fall Prevention: safety round/check completed     Problem: Fall Injury Risk  Goal: Absence of Fall and Fall-Related Injury  Outcome: Ongoing, Progressing  Intervention: Identify and Manage Contributors  Recent Flowsheet Documentation  Taken 8/23/2024 2200 by Sierra Barr RN  Medication Review/Management: medications reviewed  Taken 8/23/2024 2000 by Sierra Barr RN  Medication Review/Management: medications reviewed  Intervention: Promote Injury-Free Environment  Recent Flowsheet Documentation  Taken 8/24/2024 0500 by Sierra Barr RN  Safety Promotion/Fall Prevention: safety round/check completed  Taken 8/24/2024 0400 by Sierra Barr RN  Safety Promotion/Fall Prevention: safety round/check completed  Taken 8/24/2024 0300 by Sierra Barr RN  Safety Promotion/Fall Prevention: safety round/check completed  Taken 8/24/2024 0200 by Sierra Barr RN  Safety Promotion/Fall Prevention: safety round/check completed  Taken 8/24/2024 0100 by Sierra Barr RN  Safety Promotion/Fall Prevention: safety round/check completed  Taken 8/24/2024 0000 by Sierra Barr RN  Safety Promotion/Fall Prevention: safety round/check completed  Taken 8/23/2024 2300 by Sierra Barr RN  Safety  Promotion/Fall Prevention: safety round/check completed  Taken 8/23/2024 2200 by Sierra Barr RN  Safety Promotion/Fall Prevention: safety round/check completed  Taken 8/23/2024 2100 by Sierra Barr RN  Safety Promotion/Fall Prevention: safety round/check completed  Taken 8/23/2024 2000 by Sierra Barr RN  Safety Promotion/Fall Prevention: safety round/check completed  Taken 8/23/2024 1900 by Sierra Barr RN  Safety Promotion/Fall Prevention: safety round/check completed     Problem: Adjustment to Illness (Sepsis/Septic Shock)  Goal: Optimal Coping  Outcome: Ongoing, Progressing  Intervention: Optimize Psychosocial Adjustment to Illness  Recent Flowsheet Documentation  Taken 8/23/2024 2000 by Sierra Barr RN  Family/Support System Care:   presence promoted   self-care encouraged   support provided     Problem: Bleeding (Sepsis/Septic Shock)  Goal: Absence of Bleeding  Outcome: Ongoing, Progressing     Problem: Glycemic Control Impaired (Sepsis/Septic Shock)  Goal: Blood Glucose Level Within Desired Range  Outcome: Ongoing, Progressing     Problem: Infection Progression (Sepsis/Septic Shock)  Goal: Absence of Infection Signs and Symptoms  Outcome: Ongoing, Progressing  Intervention: Promote Recovery  Recent Flowsheet Documentation  Taken 8/24/2024 0200 by Sierra Barr RN  Activity Management: bedrest  Taken 8/23/2024 2000 by Sierra Barr RN  Activity Management: bedrest     Problem: Nutrition Impaired (Sepsis/Septic Shock)  Goal: Optimal Nutrition Intake  Outcome: Ongoing, Progressing     Problem: Communication Impairment (Mechanical Ventilation, Invasive)  Goal: Effective Communication  Outcome: Ongoing, Progressing     Problem: Device-Related Complication Risk (Mechanical Ventilation, Invasive)  Goal: Optimal Device Function  Outcome: Ongoing, Progressing     Problem: Inability to Wean (Mechanical Ventilation, Invasive)  Goal: Mechanical Ventilation Liberation  Outcome: Ongoing,  Progressing  Intervention: Promote Extubation and Mechanical Ventilation Liberation  Recent Flowsheet Documentation  Taken 8/23/2024 2200 by Sierra Barr RN  Medication Review/Management: medications reviewed  Taken 8/23/2024 2000 by Sierra Barr RN  Medication Review/Management: medications reviewed     Problem: Nutrition Impairment (Mechanical Ventilation, Invasive)  Goal: Optimal Nutrition Delivery  Outcome: Ongoing, Progressing     Problem: Skin and Tissue Injury (Mechanical Ventilation, Invasive)  Goal: Absence of Device-Related Skin and Tissue Injury  Outcome: Ongoing, Progressing  Intervention: Maintain Skin and Tissue Health  Recent Flowsheet Documentation  Taken 8/23/2024 2000 by Sierra Barr RN  Device Skin Pressure Protection: absorbent pad utilized/changed     Problem: Ventilator-Induced Lung Injury (Mechanical Ventilation, Invasive)  Goal: Absence of Ventilator-Induced Lung Injury  Outcome: Ongoing, Progressing  Intervention: Prevent Ventilator-Associated Pneumonia  Recent Flowsheet Documentation  Taken 8/24/2024 0400 by Sierra Barr RN  Head of Bed ([facility]) Positioning: HOB at 30 degrees  Oral Care:   suction provided   swabbed with antiseptic solution  Taken 8/24/2024 0200 by Sierra Barr RN  Head of Bed ([facility]) Positioning: HOB at 30 degrees  Taken 8/24/2024 0000 by Sierra Barr RN  Head of Bed ([facility]) Positioning: HOB at 30 degrees  Oral Care:   swabbed with antiseptic solution   suction provided  Taken 8/23/2024 2200 by Sierra Barr RN  Head of Bed ([facility]) Positioning: HOB at 30 degrees  Taken 8/23/2024 2000 by Sierra Barr RN  Head of Bed ([facility]) Positioning: HOB at 30 degrees  Oral Care:   suction provided   swabbed with antiseptic solution

## 2024-08-25 NOTE — PROGRESS NOTES
[facility] HOSPITALIST PROGRESS NOTE     Patient Identification:  Name:  Kennedy Prescott  Age:  74 y.o.  Sex:  male  :  1950  MRN:  [id number]  Visit Number:  [id number]  ROOM: [address]     Primary Care Provider:  Jag Guillaume MD    Length of stay in inpatient status:  6    Subjective     Chief Compliant:    Chief Complaint   Patient presents with    Shortness of Breath       History of Presenting Illness:    Deshawn-synepherine started yesterday evening but weaned off this AM. Patient with fever up to 104 this AM. He also went into afib w/ RVR up on moving him. Discussed care with bedside nurse, cardiology, and general surgery. Family not bedside on my examination. Patient on precedex and fentanyl gtt. On low ventilator settings, tolerating well.       ROS:  Otherwise 10 point ROS negative other than documented above in HPI.     Objective     Current Hospital Meds:acetaminophen, 650 mg, Oral, Once per day on   acetaminophen, 650 mg, Oral, TID  ammonium lactate, 1 Application, Topical, Nightly  [Held by provider] aspirin, 81 mg, Oral, Daily  atorvastatin, 40 mg, Oral, Nightly  chlorhexidine, 15 mL, Mouth/Throat, Q12H  [Held by provider] clopidogrel, 75 mg, Oral, Daily  collagenase, 1 Application, Topical, Q24H  doxycycline, 100 mg, Intravenous, Q12H  gabapentin, 200 mg, Oral, Nightly  insulin lispro, 2-7 Units, Subcutaneous, 4x Daily AC & at Bedtime  levothyroxine, 75 mcg, Oral, QAM  metoprolol tartrate, 12.5 mg, Oral, Q12H  multivitamin with minerals, 1 tablet, Oral, Daily  mupirocin, 1 Application, Topical, Q12H  nicotine, 1 patch, Transdermal, Q24H  pantoprazole, 40 mg, Intravenous, BID AC  piperacillin-tazobactam, 3.375 g, Intravenous, Q12H  senna-docusate sodium, 2 tablet, Oral, BID  sertraline, 50 mg, Oral, Nightly  sevelamer, 800 mg, Oral, TID With Meals  sodium chloride, 10 mL, Intravenous, Q12H  sodium chloride, 10 mL, Intravenous, Q12H  sodium chloride, 10 mL,  Intravenous, Q12H  sodium chloride, 10 mL, Intravenous, Q12H  sucralfate, 1 g, Oral, 4x Daily AC & at Bedtime  vancomycin, 2,000 mg, Intravenous, Once  [START ON 8/26/2024] Vancomycin Pharmacy Intermittent/Pulse Dosing, , Does not apply, Daily    dexmedetomidine, 0.2-1.5 mcg/kg/hr (Dosing Weight), Last Rate: 0.5 mcg/kg/hr (08/25/24 0945)  fentanyl 10 mcg/mL,  mcg/hr, Last Rate: 150 mcg/hr (08/25/24 0237)  phenylephrine, 0.5-3 mcg/kg/min, Last Rate: 0.5 mcg/kg/min (08/25/24 1000)  propofol, 5-50 mcg/kg/min (Dosing Weight), Last Rate: Stopped (08/24/24 0815)        Current Antimicrobial Therapy:  Anti-Infectives (From admission, onward)      Ordered     Dose/Rate Route Frequency Start Stop    08/25/24 1041  Vancomycin Pharmacy Intermittent/Pulse Dosing        Ordering Provider: Dar Cochran MD     Does not apply Daily 08/26/24 0900 08/30/24 0859    08/25/24 0851  piperacillin-tazobactam (ZOSYN) IVPB 3.375 g IVPB in 100 mL NS (VTB)        Ordering Provider: Zahra Silva, PharmD    3.375 g  over 4 Hours Intravenous Every 12 Hours 08/25/24 1800 08/28/24 0559    08/25/24 1041  vancomycin IVPB 2000 mg in 0.9% Sodium Chloride 500 mL        Ordering Provider: Dar Cochran MD    2,000 mg  250 mL/hr over 120 Minutes Intravenous Once 08/25/24 1200      08/23/24 1609  piperacillin-tazobactam (ZOSYN) IVPB 3.375 g IVPB in 100 mL NS (VTB)        Ordering Provider: Dar Cochran MD    3.375 g  over 30 Minutes Intravenous Once 08/23/24 1700 08/23/24 1703    08/20/24 0704  doxycycline (VIBRAMYCIN) 100 mg in sodium chloride 0.9 % 100 mL IVPB-VTB        Ordering Provider: Dar Cochran MD    100 mg Intravenous Every 12 Hours 08/20/24 0800 08/28/24 1608          Current Diuretic Therapy:  Diuretics (From admission, onward)      None          ----------------------------------------------------------------------------------------------------------------------  Vital Signs:  Temp:  [99.4 °F (37.4 °C)-104 °F (40 °C)] 104  °F (40 °C)  Heart Rate:  [] 86  Resp:  [16-31] 18  BP: ()/(40-90) 127/75  FiO2 (%):  [30 %] 30 %  SpO2:  [97 %-100 %] 100 %  on   ;   Device (Oxygen Therapy): ventilator  Body mass index is 31.21 kg/m².    Wt Readings from Last 3 Encounters:   08/25/24 101 kg (223 lb 12.3 oz)   08/18/24 95.3 kg (210 lb 1.6 oz)   08/14/24 95.3 kg (210 lb 1.6 oz)     Intake & Output (last 3 days)         08/22 0701 08/23 0700 08/23 0701 08/24 0700 08/24 0701 08/25 0700 08/25 0701 08/26 0700    P.O. 600 120      I.V. (mL/kg)  991.4 (9.4) 634.1 (6)     Blood        IV Piggyback 100 300 700     Total Intake(mL/kg) 700 (6.7) 1411.4 (13.4) 1334.1 (12.7)     Urine (mL/kg/hr) 0 (0) 75 (0) 0 (0)     Emesis/NG output  100      Stool 0 0 0     Dialysis  2000      Total Output 0 2175 0     Net +700 -763.7 +1334.1             Urine Unmeasured Occurrence 0 x       Stool Unmeasured Occurrence 5 x 1 x 0 x           NPO Diet NPO Type: Strict NPO  ----------------------------------------------------------------------------------------------------------------------  Physical exam:  GENERAL:  Patient intubated and sedated.   SKIN:  Warm, dry without rashes, purpura or petechiae.  EYES:  Pupils equal, round and reactive to light.  Conjunctivae normal.  HEAD:  Normocephalic. Atraumatic.   EARS/NOSE/MOUTH/THROAT:  Septum midline.  No excoriations or nasal discharge. No stomatitis or ulcers.  Lips normal. Oropharynx without lesions or exudates.  NECK:  Supple with good range of motion; no thyromegaly or masses  LYMPHATICS:  No cervical, supraclavicular, axillary adenopathy.  RESP:  Lungs clear to auscultation. Good airflow. Intubated receiving mechanical ventilation.   CARDIAC:  Irregularly irregular rhythm. Tachycardic.   GI:  Soft, nontender, no hepatosplenomegaly, normal bowel sounds  MSK:  No clubbing or cyanosis, No joint swelling noted in hands  NEUROLOGICAL:  No focal deficit. Pupils equal and reactive.    ----------------------------------------------------------------------------------------------------------------------  Tele:    ----------------------------------------------------------------------------------------------------------------------  Results from last 7 days   Lab Units 08/25/24  0759 08/24/24  1524 08/23/24  2306 08/23/24  1541 08/20/24  0116 08/19/24  1446   CRP mg/dL  --   --   --   --   --  1.72*   LACTATE mmol/L  --  2.0  --   --   --   --    WBC 10*3/mm3 15.40* 11.51* 8.16 16.97*   < > 8.58   HEMOGLOBIN g/dL 9.4* 8.0* 7.3* 7.8*   < > 5.9*   HEMATOCRIT % 30.2* 26.1* 23.8* 25.2*   < > 19.5*   MCV fL 93.8 94.6 93.0 94.4   < > 97.5*   MCHC g/dL 31.1* 30.7* 30.7* 31.0*   < > 30.3*   PLATELETS 10*3/mm3 267 182 148 193   < > 159   INR   --   --   --  1.31*  --  2.14*    < > = values in this interval not displayed.     Results from last 7 days   Lab Units 08/25/24  0819   PH, ARTERIAL pH units 7.438   PO2 ART mm Hg 73.1*   PCO2, ARTERIAL mm Hg 37.9   HCO3 ART mmol/L 25.5     Results from last 7 days   Lab Units 08/25/24  0759 08/24/24  1524 08/23/24  2306 08/23/24  1541 08/20/24  0742 08/19/24  1446   SODIUM mmol/L 139 129* 141 138   < > 137   POTASSIUM mmol/L 4.3 3.8 3.4* 3.4*   < > 5.8*   MAGNESIUM mg/dL 2.0  --   --  2.0  --  2.3   CHLORIDE mmol/L 94* 88* 97* 94*   < > 96*   CO2 mmol/L 23.4 23.0 26.7 28.5   < > 21.7*   BUN mg/dL 31* 25* 19 20   < > 110*   CREATININE mg/dL 4.47* 3.83* 2.97* 2.65*   < > 7.65*   CALCIUM mg/dL 8.8 8.4* 8.2* 8.2*   < > 8.3*   PHOSPHORUS mg/dL  --   --   --   --   --  7.3*   GLUCOSE mg/dL 156* 146* 112* 148*   < > 190*   ALBUMIN g/dL 3.0* 2.7* 2.6* 3.0*   < > 2.7*   BILIRUBIN mg/dL 0.6 0.5 0.4 0.5   < > 0.4   ALK PHOS U/L 107 104 97 109   < > 109   AST (SGOT) U/L 168* 25 21 24   < > 14   ALT (SGPT) U/L 85* 9 14 15   < > 11    < > = values in this interval not displayed.   Estimated Creatinine Clearance: 17.1 mL/min (A) (by C-G formula based on SCr of 4.47 mg/dL (H)).  No  "results found for: \"AMMONIA\"  Results from last 7 days   Lab Units 08/23/24  1541 08/22/24  2323 08/19/24  1744   HSTROP T ng/L 188* 178* 150*     Results from last 7 days   Lab Units 08/19/24  1446   PROBNP pg/mL >70,000.0*         Glucose   Date/Time Value Ref Range Status   08/25/2024 0642 169 (H) 70 - 130 mg/dL Final   08/24/2024 2103 148 (H) 70 - 130 mg/dL Final   08/24/2024 1150 113 70 - 130 mg/dL Final   08/24/2024 0721 104 70 - 130 mg/dL Final   08/23/2024 2200 128 70 - 130 mg/dL Final   08/23/2024 1601 149 (H) 70 - 130 mg/dL Final   08/23/2024 1137 99 70 - 130 mg/dL Final   08/23/2024 0629 130 70 - 130 mg/dL Final     Lab Results   Component Value Date    TSH 5.160 (H) 08/19/2024    FREET4 0.9 07/29/2024     No results found for: \"PREGTESTUR\", \"PREGSERUM\", \"HCG\", \"HCGQUANT\"  Pain Management Panel          Latest Ref Rng & Units 7/10/2024   Pain Management Panel   Amphetamine, Urine Qual Negative Negative    Barbiturates Screen, Urine Negative Negative    Benzodiazepine Screen, Urine Negative Negative    Buprenorphine, Screen, Urine Negative Negative    Cocaine Screen, Urine Negative Negative    Fentanyl, Urine Negative Negative    Methadone Screen , Urine Negative Negative    Methamphetamine, Ur Negative Negative       Details                 Brief Urine Lab Results  (Last result in the past 365 days)        Color   Clarity   Blood   Leuk Est   Nitrite   Protein   CREAT   Urine HCG        08/18/24 1348 Brown  Comment: Dipstick results may be inaccurate due to color interference.       Turbid   Large (3+)   Large (3+)   Positive   100 mg/dL (2+)                 No results found for: \"BLOODCX\"  Urine Culture   Date Value Ref Range Status   08/18/2024 No growth  Final     No results found for: \"WOUNDCX\"  No results found for: \"STOOLCX\"  No results found for: \"RESPCX\"  No results found for: \"AFBCX\"  Results from last 7 days   Lab Units 08/24/24  1524 08/19/24  1446   LACTATE mmol/L 2.0  --    CRP mg/dL  --  " 1.72*       I have personally looked at the labs and they are summarized above.  ----------------------------------------------------------------------------------------------------------------------  Detailed radiology reports for the last 24 hours:    Imaging Results (Last 24 Hours)       Procedure Component Value Units Date/Time    XR Chest 1 View [[id number]] Collected: 08/25/24 1130     Updated: 08/25/24 1132    Narrative:      Comparison: August 24, 2024.      Endotracheal tube tip similar position. Nasogastric tube noted, tip not  visualized on this exam. Heart is enlarged with vascular congestion.  Persistent bilateral pleural effusions seen, right greater than left. No  pneumothorax is identified. Right IJ central catheter tip projects at  the right atria, similar of to the prior exam. Consider retraction of  desired position is within the SVC. No pneumothorax seen.       Impression:      Impression:  1. Cardiomegaly, vascular congestion and bilateral pleural effusions  similar to the prior exam.  2. Lines and tubes as described. Right IJ central catheter tip projects  at the right atria, consider retraction if desired position is within  the SVC.        This report was finalized on 8/25/2024 11:30 AM by Brenenn Quinn DO.             Assessment & Plan    #Cardiac arrest   #Acute hypoxic respiratory failure   #CAD w/ PCI to LAD on 5/28  #Hypotension related to sedation   #Vtach requiring defibrillation   - Patient had PEA/bradycardia arrest then wide complex arrhythmia directly after EGD on 8/23  - Appears patient was on apixiban/plavix at nursing home. Held for above bleed with plan to restart antiplatelets when bleeding stopped.   - Unfortunately patient had cardiac arrest with episode of wide complex tachycardia. No STEMI on EKB but incomplete LBBB is now complete. HS troponin is suprisingly the same as it has been.   - Discussed with interventional cardiology. No need for emergent LHC  - Started heparin  gtt w/o bolus which patient appears to be tolerating as we await formal cardiology evaluation. Troponin similar to before, no STEMI on EKG, and echo similar, will stop heparin gtt and restart DAPT for recent stent as per discussion with Dr. Eddy.   - Patient is on low ventilator settings. Limited by mental status. Will change to precedex/fentanyl and continue daily SBTs.   - Cerebell did not show seizures, will await neurological recovery. May be from sedation sticking around in setting of ESRD. Will consider CT head if he does not improve.   - Consulted cardiology, pulmonology. Appreciate recs.      #Recurrent fever  #RML/RLL pneumonia   #Distal LLE ulcer w/ associated cellulitis   - Concern for aspiration. Suspect aspiration pneumonitis and pneumonia playing a part. SLP consulted when appropriate.   - Broadened to Zosyn/doxy after code event after evidence of worsening airspace disease in RLL.   - Added vanc on 8/25 given evidence of cellulitis in LLE and continued fevers.   - Repeated blood cultures on 8/25 due to recurrent fevers.   - Surgery evaluated LLE as well. Appreciate recs.      #Acute blood loss anemia   #pAfib on home apixiban with resultant coagulopathy   - Hgb baseline around 10. Hemoglobin presentation 5.9 on presentation. 2 units pRBC ordered in setting of active bleeding. Hemoglobin stable this AM at 7.9    - INR 2.14, PTT 34.8. FFP if patient declines.   - Will continue BID IV protonix. No hx of liver disease   - Surgery consulted. NG tube in place and blood drainage suspicious of upper GI bleed on admission. Went for EGD on 8/20 with intervention on  duodenal ulcer.   - Bleeding symptoms did not improve with continued anemia. Taken back for EGD on   - Holding home apxibian and plavix until bleeding stops.   - No bleeding on repeat EGD on 8/23. Hemoglobin actually improved at 7.8. No blood with OG to suction. Suspect GI bleed has stopped. Restarted antiplatelets as above. Will likely hold  apixiban at discharge and try DAPT if tolerates.      #pAfib w/ RVR  - Started metoprolol today. Blood pressure improved and rate not controlled.   - Holding apixiban as recurrent major bleeding may preclude patient from being on chronically.      #ESRD in setting of missed dialysis sessions   #Hyperkalemia   - Nephrology consulted. Dialysis as per nephrology service.      #UTI  - Significant pyuria on 8/18. Will follow cultures. Currently no growth. Will continue abx as above.      #Recent foot ulceration w/ associated cellulitis   - Admitted to  7/29-8/2: Vascular surgery recommended non-operative management. Superficial ulcer with some maceration but no purulent discharge, chronic draining sinuses, deep tunneling, or other evidence of osteomyelitis.   - Finished abx course at that time, restarting vanc as above for recurrent cellulitis.      Chronic medical problems   Hypothyroidism- TSH improved at 5.1. Recent free T4 wnl.   GERD  HTN     Code status: Full. Nursing home documentation included EMS DNR. Son is next of kin and would like father to remain full code for now. Palliative care consulted.      Dispo: Continue CCU level of care      40 minutes of critical care time spent in care of patient that is critically ill on ventilator. 50% of this time bedside.     Dar Cochran MD  [facility] Hospital[facility]  08/25/24  13:28 EDT

## 2024-08-25 NOTE — PROGRESS NOTES
"Nephrology Progress Note      Subjective     08/24/2024 patient is on ventilator now status post short round of ACLS as he became bradycardic now on ventilator    08/25/2024 patient still on ventilator is still on Deshawn-Synephrine had a spike of fever yesterday also    Objective       Vital signs :     Temp:  [99.4 °F (37.4 °C)-99.6 °F (37.6 °C)] 99.6 °F (37.6 °C)  Heart Rate:  [] 100  Resp:  [16-31] 16  BP: ()/(40-90) 73/48  FiO2 (%):  [30 %] 30 %    Intake/Output                         08/23/24 0701 - 08/24/24 0700 08/24/24 0701 - 08/25/24 0700     5771-2863 3988-0070 Total 6476-4250 7021-6021 Total                 Intake    P.O.  120  -- 120  --  -- --    I.V.  761  230.4 991.4  --  634.1 634.1    IV Piggyback  100  200 300  --  700 700    Total Intake 981 430.4 1411.4 -- 1334.1 1334.1       Output    Urine  --  75 75  --  0 0    Emesis/NG output  100  0 100  --  -- --    Stool  --  -- --  --  0 0    Dialysis  2000  -- 2000  --  -- --    Total Output 2100 75 2175 -- 0 0           08/25/2024 examined and reviewed    Physical Exam:    General Appearance : On ventilator  Lungs : Bilateral anterior crackles  Heart :  regular rhythm & normal rate, normal S1, S2 and no murmur, no rub  Abdomen : Nondistended  Extremities : Trace edema,   Neurologic :   Unable to assess  Access: Left upper arm AV fistula [place]    Laboratory Data :     Albumin Albumin   Date Value Ref Range Status   08/25/2024 3.0 (L) 3.5 - 5.2 g/dL Final   08/24/2024 2.7 (L) 3.5 - 5.2 g/dL Final   08/23/2024 2.6 (L) 3.5 - 5.2 g/dL Final   08/23/2024 3.0 (L) 3.5 - 5.2 g/dL Final   08/22/2024 2.9 (L) 3.5 - 5.2 g/dL Final      Magnesium Magnesium   Date Value Ref Range Status   08/25/2024 2.0 1.6 - 2.4 mg/dL Final   08/23/2024 2.0 1.6 - 2.4 mg/dL Final            PTH               No results found for: \"PTH\"    CBC and coagulation:  Results from last 7 days   Lab Units 08/25/24  0759 08/24/24  1524 08/23/24  2306 08/23/24  1541 " 08/20/24  0116 08/19/24  1446   LACTATE mmol/L  --  2.0  --   --   --   --    CRP mg/dL  --   --   --   --   --  1.72*   WBC 10*3/mm3 15.40* 11.51* 8.16 16.97*   < > 8.58   HEMOGLOBIN g/dL 9.4* 8.0* 7.3* 7.8*   < > 5.9*   HEMATOCRIT % 30.2* 26.1* 23.8* 25.2*   < > 19.5*   MCV fL 93.8 94.6 93.0 94.4   < > 97.5*   MCHC g/dL 31.1* 30.7* 30.7* 31.0*   < > 30.3*   PLATELETS 10*3/mm3 267 182 148 193   < > 159   INR   --   --   --  1.31*  --  2.14*   D DIMER QUANT MCGFEU/mL  --   --   --   --   --  5.26*    < > = values in this interval not displayed.     Acid/base balance:  Results from last 7 days   Lab Units 08/25/24  0819 08/24/24  0429 08/23/24  1615   PH, ARTERIAL pH units 7.438 7.558* 7.498*   PO2 ART mm Hg 73.1* 67.3* 291.0*   PCO2, ARTERIAL mm Hg 37.9 32.9* 39.3   HCO3 ART mmol/L 25.5 29.3* 30.5*       Renal and electrolytes:    Results from last 7 days   Lab Units 08/25/24  0759 08/24/24  1524 08/23/24  2306 08/23/24  1541 08/22/24  2323 08/20/24  0742 08/19/24  1446   SODIUM mmol/L 139 129* 141 138 138   < > 137   POTASSIUM mmol/L 4.3 3.8 3.4* 3.4* 3.6   < > 5.8*   MAGNESIUM mg/dL 2.0  --   --  2.0  --   --  2.3   CHLORIDE mmol/L 94* 88* 97* 94* 95*   < > 96*   CO2 mmol/L 23.4 23.0 26.7 28.5 25.5   < > 21.7*   BUN mg/dL 31* 25* 19 20 39*   < > 110*   CREATININE mg/dL 4.47* 3.83* 2.97* 2.65* 4.23*   < > 7.65*   CALCIUM mg/dL 8.8 8.4* 8.2* 8.2* 8.4*   < > 8.3*   PHOSPHORUS mg/dL  --   --   --   --   --   --  7.3*    < > = values in this interval not displayed.     Estimated Creatinine Clearance: 17.1 mL/min (A) (by C-G formula based on SCr of 4.47 mg/dL (H)).  @GFRCG:3@   Liver and pancreatic function:  Results from last 7 days   Lab Units 08/25/24  0759 08/24/24  1524 08/23/24  2306   ALBUMIN g/dL 3.0* 2.7* 2.6*   BILIRUBIN mg/dL 0.6 0.5 0.4   ALK PHOS U/L 107 104 97   AST (SGOT) U/L 168* 25 21   ALT (SGPT) U/L 85* 9 14         Cardiac:  Results from last 7 days   Lab Units 08/19/24  1446 08/18/24  1210   PROBNP  pg/mL >70,000.0* >70,000.0*     Liver and pancreatic function:  Results from last 7 days   Lab Units 08/25/24  0759 08/24/24  1524 08/23/24  2306   ALBUMIN g/dL 3.0* 2.7* 2.6*   BILIRUBIN mg/dL 0.6 0.5 0.4   ALK PHOS U/L 107 104 97   AST (SGOT) U/L 168* 25 21   ALT (SGPT) U/L 85* 9 14       Medications :     acetaminophen, 650 mg, Oral, Once per day on Monday Wednesday Friday  acetaminophen, 650 mg, Oral, TID  ammonium lactate, 1 Application, Topical, Nightly  [Held by provider] aspirin, 81 mg, Oral, Daily  atorvastatin, 40 mg, Oral, Nightly  chlorhexidine, 15 mL, Mouth/Throat, Q12H  [Held by provider] clopidogrel, 75 mg, Oral, Daily  collagenase, 1 Application, Topical, Q24H  doxycycline, 100 mg, Intravenous, Q12H  gabapentin, 200 mg, Oral, Nightly  insulin lispro, 2-7 Units, Subcutaneous, 4x Daily AC & at Bedtime  levothyroxine, 75 mcg, Oral, QAM  metoprolol tartrate, 12.5 mg, Oral, Q12H  multivitamin with minerals, 1 tablet, Oral, Daily  mupirocin, 1 Application, Topical, Q12H  nicotine, 1 patch, Transdermal, Q24H  pantoprazole, 40 mg, Intravenous, BID AC  piperacillin-tazobactam, 3.375 g, Intravenous, Q12H  senna-docusate sodium, 2 tablet, Oral, BID  sertraline, 50 mg, Oral, Nightly  sevelamer, 800 mg, Oral, TID With Meals  sodium chloride, 10 mL, Intravenous, Q12H  sodium chloride, 10 mL, Intravenous, Q12H  sodium chloride, 10 mL, Intravenous, Q12H  sodium chloride, 10 mL, Intravenous, Q12H  sucralfate, 1 g, Oral, 4x Daily AC & at Bedtime      dexmedetomidine, 0.2-1.5 mcg/kg/hr (Dosing Weight), Last Rate: 0.5 mcg/kg/hr (08/25/24 0945)  fentanyl 10 mcg/mL,  mcg/hr, Last Rate: 150 mcg/hr (08/25/24 0237)  Pharmacy to dose vancomycin,   phenylephrine, 0.5-3 mcg/kg/min, Last Rate: 0.5 mcg/kg/min (08/25/24 1000)  propofol, 5-50 mcg/kg/min (Dosing Weight), Last Rate: Stopped (08/24/24 0815)          Assessment & Plan     08/24/2024 reviewed and updated  08/25/2024 reviewed and updated    -ESRD on intermittent  dialysis  -Status post cardiac arrest  -Shock  -Hypokalemia  - Acute hypoxic respiratory failure likely due to fluid overload in settings of dialysis noncompliance  - Hyperkalemia resolved  - Acute on chronic anemia status post EGD  - Urinary tract infection    08/23/2024   Dr. Saucedo  evaluated on dialysis, tolerating very well.  No intradialytic hypotension.  Ordered ultrafiltration 2 L if t tolerates  Continue on intermittent dialysis Mondays Wednesdays and Fridays next dialysis is tomorrow  Reviewed clinical notes, radiological data, clinical notes.  Discussed the case in detail with primary team    08/24/2024  Patient had cardiac arrest yesterday a brief episode of PEA and bradycardia and wide-complex arrhythmia after EGD is now on ventilator  Patient's potassium is 3.4 will replace check magnesium  Patient already had dialysis yesterday and tolerated well yesterday    08/25/2024  Patient still on Deshawn-Synephrine still on ventilator will repeat labs in the morning and decide further about dialysis potassium is normal  Patient had a spike of fever fistula site is clear blood cultures has been ordered    Prognosis critical    Discussed with RN    Plan of care was discussed with the patients family they understood, verbalized, agreed.        S Jonathan Soto MD  08/25/24  10:35 EDT

## 2024-08-25 NOTE — PLAN OF CARE
Problem: Adult Inpatient Plan of Care  Goal: Plan of Care Review  Outcome: Ongoing, Not Progressing  Goal: Patient-Specific Goal (Individualized)  Outcome: Ongoing, Not Progressing  Goal: Absence of Hospital-Acquired Illness or Injury  Outcome: Ongoing, Not Progressing  Intervention: Identify and Manage Fall Risk  Recent Flowsheet Documentation  Taken 8/25/2024 0400 by Sierra Barr RN  Safety Promotion/Fall Prevention: safety round/check completed  Taken 8/25/2024 0300 by Sierra Barr RN  Safety Promotion/Fall Prevention: safety round/check completed  Taken 8/25/2024 0200 by Sierra Barr RN  Safety Promotion/Fall Prevention: safety round/check completed  Taken 8/25/2024 0100 by Sierra Barr RN  Safety Promotion/Fall Prevention: safety round/check completed  Taken 8/25/2024 0000 by Sierra Barr RN  Safety Promotion/Fall Prevention: safety round/check completed  Taken 8/24/2024 2300 by Sierra Barr RN  Safety Promotion/Fall Prevention: safety round/check completed  Taken 8/24/2024 2200 by Sierra Barr RN  Safety Promotion/Fall Prevention: safety round/check completed  Taken 8/24/2024 2100 by Sierra Barr RN  Safety Promotion/Fall Prevention: safety round/check completed  Taken 8/24/2024 2000 by Sierra Barr RN  Safety Promotion/Fall Prevention: safety round/check completed  Taken 8/24/2024 1900 by Sierra Barr RN  Safety Promotion/Fall Prevention: safety round/check completed  Intervention: Prevent Skin Injury  Recent Flowsheet Documentation  Taken 8/25/2024 0400 by Sierra Barr RN  Body Position:   side-lying   turned   right  Taken 8/25/2024 0200 by Sierra Barr RN  Body Position:   side-lying   turned   left  Taken 8/25/2024 0000 by Sierra Barr RN  Body Position:   side-lying   turned   right  Taken 8/24/2024 2200 by Sierra Barr RN  Body Position:   side-lying   turned   left  Taken 8/24/2024 2000 by Sierra Barr RN  Body Position:   side-lying   turned    right  Intervention: Prevent and Manage VTE (Venous Thromboembolism) Risk  Recent Flowsheet Documentation  Taken 8/25/2024 0200 by Sierra Barr RN  VTE Prevention/Management:   bilateral   sequential compression devices on  Taken 8/24/2024 2000 by Sierra Barr RN  Activity Management: bedrest  VTE Prevention/Management: (see Orders)   bilateral   sequential compression devices on  Range of Motion: ROM (range of motion) performed  Goal: Optimal Comfort and Wellbeing  Outcome: Ongoing, Not Progressing  Intervention: Provide Person-Centered Care  Recent Flowsheet Documentation  Taken 8/25/2024 0200 by Sierra Barr RN  Trust Relationship/Rapport:   care explained   reassurance provided  Taken 8/24/2024 2000 by Sierra Barr RN  Trust Relationship/Rapport:   care explained   reassurance provided  Goal: Readiness for Transition of Care  Outcome: Ongoing, Not Progressing     Problem: Skin Injury Risk Increased  Goal: Skin Health and Integrity  Outcome: Ongoing, Not Progressing  Intervention: Optimize Skin Protection  Recent Flowsheet Documentation  Taken 8/25/2024 0400 by Sierra Barr RN  Head of Bed (HOB) Positioning: HOB at 30 degrees  Taken 8/25/2024 0200 by Sierra Barr RN  Head of Bed (HOB) Positioning: HOB at 30 degrees  Taken 8/25/2024 0000 by Sierra Barr RN  Head of Bed (HOB) Positioning: HOB at 30 degrees  Taken 8/24/2024 2200 by Sierra Barr RN  Head of Bed (HOB) Positioning: HOB at 30 degrees  Taken 8/24/2024 2000 by Sierra Barr RN  Head of Bed (HOB) Positioning: HOB at 30 degrees     Problem: Diabetes Comorbidity  Goal: Blood Glucose Level Within Targeted Range  Outcome: Ongoing, Not Progressing     Problem: Heart Failure Comorbidity  Goal: Maintenance of Heart Failure Symptom Control  Outcome: Ongoing, Not Progressing  Intervention: Maintain Heart Failure-Management  Recent Flowsheet Documentation  Taken 8/25/2024 0200 by Sierra Barr RN  Medication Review/Management: medications  reviewed  Taken 8/25/2024 0000 by Sirera Barr RN  Medication Review/Management: medications reviewed  Taken 8/24/2024 2000 by Sierra Barr RN  Medication Review/Management: medications reviewed     Problem: Hypertension Comorbidity  Goal: Blood Pressure in Desired Range  Outcome: Ongoing, Not Progressing  Intervention: Maintain Blood Pressure Management  Recent Flowsheet Documentation  Taken 8/25/2024 0200 by Sierra Barr RN  Medication Review/Management: medications reviewed  Taken 8/25/2024 0000 by Sierra Barr RN  Medication Review/Management: medications reviewed  Taken 8/24/2024 2000 by Sierra Barr RN  Medication Review/Management: medications reviewed     Problem: Adjustment to Illness (Gastrointestinal Bleeding)  Goal: Optimal Coping with Acute Illness  Outcome: Ongoing, Not Progressing     Problem: Bleeding (Gastrointestinal Bleeding)  Goal: Hemostasis  Outcome: Ongoing, Not Progressing     Problem: Anemia  Goal: Anemia Symptom Improvement  Outcome: Ongoing, Not Progressing  Intervention: Monitor and Manage Anemia  Recent Flowsheet Documentation  Taken 8/25/2024 0400 by Sierra Barr RN  Safety Promotion/Fall Prevention: safety round/check completed  Taken 8/25/2024 0300 by Sierra Barr RN  Safety Promotion/Fall Prevention: safety round/check completed  Taken 8/25/2024 0200 by Sierra Brar RN  Safety Promotion/Fall Prevention: safety round/check completed  Taken 8/25/2024 0100 by Sierra Barr RN  Safety Promotion/Fall Prevention: safety round/check completed  Taken 8/25/2024 0000 by Sierra Barr RN  Safety Promotion/Fall Prevention: safety round/check completed  Taken 8/24/2024 2300 by Sierra Barr RN  Safety Promotion/Fall Prevention: safety round/check completed  Taken 8/24/2024 2200 by Sierra Barr RN  Safety Promotion/Fall Prevention: safety round/check completed  Taken 8/24/2024 2100 by Sierra Barr RN  Safety Promotion/Fall Prevention: safety round/check  completed  Taken 8/24/2024 2000 by Sierra Barr RN  Safety Promotion/Fall Prevention: safety round/check completed  Taken 8/24/2024 1900 by Sierra Barr RN  Safety Promotion/Fall Prevention: safety round/check completed     Problem: Fall Injury Risk  Goal: Absence of Fall and Fall-Related Injury  Outcome: Ongoing, Not Progressing  Intervention: Identify and Manage Contributors  Recent Flowsheet Documentation  Taken 8/25/2024 0200 by Sierra Barr RN  Medication Review/Management: medications reviewed  Taken 8/25/2024 0000 by Sierra Barr RN  Medication Review/Management: medications reviewed  Taken 8/24/2024 2000 by Sierra Barr RN  Medication Review/Management: medications reviewed  Intervention: Promote Injury-Free Environment  Recent Flowsheet Documentation  Taken 8/25/2024 0400 by Sierra Barr RN  Safety Promotion/Fall Prevention: safety round/check completed  Taken 8/25/2024 0300 by Sierra Barr RN  Safety Promotion/Fall Prevention: safety round/check completed  Taken 8/25/2024 0200 by Sierra Barr RN  Safety Promotion/Fall Prevention: safety round/check completed  Taken 8/25/2024 0100 by Sierra Barr RN  Safety Promotion/Fall Prevention: safety round/check completed  Taken 8/25/2024 0000 by Sierra Barr RN  Safety Promotion/Fall Prevention: safety round/check completed  Taken 8/24/2024 2300 by Sierra Barr RN  Safety Promotion/Fall Prevention: safety round/check completed  Taken 8/24/2024 2200 by Sierra Barr RN  Safety Promotion/Fall Prevention: safety round/check completed  Taken 8/24/2024 2100 by Sierra Barr RN  Safety Promotion/Fall Prevention: safety round/check completed  Taken 8/24/2024 2000 by Sierra Barr RN  Safety Promotion/Fall Prevention: safety round/check completed  Taken 8/24/2024 1900 by Sierra Barr RN  Safety Promotion/Fall Prevention: safety round/check completed     Problem: Adjustment to Illness (Sepsis/Septic Shock)  Goal: Optimal  Coping  Outcome: Ongoing, Not Progressing  Intervention: Optimize Psychosocial Adjustment to Illness  Recent Flowsheet Documentation  Taken 8/25/2024 0200 by Sierra Barr, RN  Family/Support System Care: support provided  Taken 8/24/2024 2000 by Sierra Barr, RN  Family/Support System Care:   self-care encouraged   presence promoted   support provided     Problem: Bleeding (Sepsis/Septic Shock)  Goal: Absence of Bleeding  Outcome: Ongoing, Not Progressing     Problem: Glycemic Control Impaired (Sepsis/Septic Shock)  Goal: Blood Glucose Level Within Desired Range  Outcome: Ongoing, Not Progressing     Problem: Infection Progression (Sepsis/Septic Shock)  Goal: Absence of Infection Signs and Symptoms  Outcome: Ongoing, Not Progressing  Intervention: Promote Recovery  Recent Flowsheet Documentation  Taken 8/24/2024 2000 by Sierra Barr, RN  Activity Management: bedrest     Problem: Nutrition Impaired (Sepsis/Septic Shock)  Goal: Optimal Nutrition Intake  Outcome: Ongoing, Not Progressing     Problem: Communication Impairment (Mechanical Ventilation, Invasive)  Goal: Effective Communication  Outcome: Ongoing, Not Progressing     Problem: Device-Related Complication Risk (Mechanical Ventilation, Invasive)  Goal: Optimal Device Function  Outcome: Ongoing, Not Progressing     Problem: Inability to Wean (Mechanical Ventilation, Invasive)  Goal: Mechanical Ventilation Liberation  Outcome: Ongoing, Not Progressing  Intervention: Promote Extubation and Mechanical Ventilation Liberation  Recent Flowsheet Documentation  Taken 8/25/2024 0200 by Sierra Barr, RN  Medication Review/Management: medications reviewed  Taken 8/25/2024 0000 by Sierra Barr, RN  Medication Review/Management: medications reviewed  Taken 8/24/2024 2000 by Sierra Barr, RN  Medication Review/Management: medications reviewed     Problem: Nutrition Impairment (Mechanical Ventilation, Invasive)  Goal: Optimal Nutrition Delivery  Outcome: Ongoing,  Not Progressing     Problem: Skin and Tissue Injury (Mechanical Ventilation, Invasive)  Goal: Absence of Device-Related Skin and Tissue Injury  Outcome: Ongoing, Not Progressing     Problem: Ventilator-Induced Lung Injury (Mechanical Ventilation, Invasive)  Goal: Absence of Ventilator-Induced Lung Injury  Outcome: Ongoing, Not Progressing  Intervention: Prevent Ventilator-Associated Pneumonia  Recent Flowsheet Documentation  Taken 8/25/2024 0400 by Sierra Barr RN  Head of Bed ([facility]) Positioning: HOB at 30 degrees  Oral Care:   suction provided   swabbed with antiseptic solution  Taken 8/25/2024 0200 by Sierra Barr RN  Head of Bed ([facility]) Positioning: HOB at 30 degrees  Taken 8/25/2024 0000 by Sierra Barr RN  Head of Bed ([facility]) Positioning: HOB at 30 degrees  Oral Care:   suction provided   swabbed with antiseptic solution  Taken 8/24/2024 2200 by Sierra Barr RN  Head of Bed ([facility]) Positioning: HOB at 30 degrees  Taken 8/24/2024 2000 by Sierra Barr RN  Head of Bed ([facility]) Positioning: HOB at 30 degrees  Oral Care:   suction provided   swabbed with antiseptic solution

## 2024-08-25 NOTE — PROGRESS NOTES
Diagnosis: GI bleed, recent cardiac arrest, left heal wound    Mr. Prescott is a 74-year-old male status post upper endoscopy for evaluation of ongoing GI bleed complicated by cardiac arrest at completion of the case.  He is also found to have a left heel wound.  Appreciate medicine team's ongoing management of postcardiac arrest.      In regards to his heel wound, recommend Santyl over base of the wound covered with dry gauze and Kerlix.  I have updated the wound orders to reflect this.    Lorrie Amos MD       General Surgeon  [facility]

## 2024-08-25 NOTE — PROGRESS NOTES
Progress Note Critical Care Pulmonary        Subjective  On vent , sedated,  requiring pressors to maintain mean arterial pressure of 60  Interval History: event overnight: Described above        Review of Systems:    On vent , sedated     Vital Signs  Temp:  [99.4 °F (37.4 °C)-104 °F (40 °C)] 104 °F (40 °C)  Heart Rate:  [] 112  Resp:  [16-31] 16  BP: ()/(40-90) 127/75  FiO2 (%):  [30 %] 30 %  Body mass index is 31.21 kg/m².    Intake/Output Summary (Last 24 hours) at 8/25/2024 1056  Last data filed at 8/25/2024 0622  Gross per 24 hour   Intake 1334.11 ml   Output 0 ml   Net 1334.11 ml     No intake/output data recorded.    Physical Exam:  General- normal in appearance, not in any acute distress    HEENT- pupils equally reactive to light, normal in size, no scleral icterus    Neck-supple    Respiratory-bilateral air entry, scattered rhonchi  Cardiovascular-  Normal S1 and S2. No S3, S4 or murmurs. No JVD, no carotid bruit and no edema, pulses normal bilaterally     GI-nontender nondistended bowel sounds positive    CNS-sedated, grossly nonfocal    Extremities-no clubbing and edema        Results Review:      Results from last 7 days   Lab Units 08/25/24  0759 08/24/24  1524 08/23/24  2306   WBC 10*3/mm3 15.40* 11.51* 8.16   HEMOGLOBIN g/dL 9.4* 8.0* 7.3*   PLATELETS 10*3/mm3 267 182 148     Results from last 7 days   Lab Units 08/25/24  0759 08/24/24  1524 08/23/24  2306 08/23/24  1541 08/20/24  0742 08/19/24  1446   SODIUM mmol/L 139 129* 141 138   < > 137   POTASSIUM mmol/L 4.3 3.8 3.4* 3.4*   < > 5.8*   CHLORIDE mmol/L 94* 88* 97* 94*   < > 96*   CO2 mmol/L 23.4 23.0 26.7 28.5   < > 21.7*   BUN mg/dL 31* 25* 19 20   < > 110*   CREATININE mg/dL 4.47* 3.83* 2.97* 2.65*   < > 7.65*   CALCIUM mg/dL 8.8 8.4* 8.2* 8.2*   < > 8.3*   GLUCOSE mg/dL 156* 146* 112* 148*   < > 190*   MAGNESIUM mg/dL 2.0  --   --  2.0  --  2.3    < > = values in this interval not displayed.     Lab Results   Component Value Date     INR 1.31 (H) 08/23/2024    INR 2.14 (H) 08/19/2024    INR 2.50 (H) 08/13/2024    PROTIME 16.4 (H) 08/23/2024    PROTIME 23.9 (H) 08/19/2024    PROTIME 27.0 (H) 08/13/2024     Results from last 7 days   Lab Units 08/25/24  0759 08/24/24  1524 08/23/24  2306   ALK PHOS U/L 107 104 97   BILIRUBIN mg/dL 0.6 0.5 0.4   ALT (SGPT) U/L 85* 9 14   AST (SGOT) U/L 168* 25 21     Results from last 7 days   Lab Units 08/25/24  0819   PH, ARTERIAL pH units 7.438   PO2 ART mm Hg 73.1*   PCO2, ARTERIAL mm Hg 37.9   HCO3 ART mmol/L 25.5     Imaging Results (Last 24 Hours)       Procedure Component Value Units Date/Time    XR Chest 1 View [[id number]] Resulted: 08/25/24 1027     Updated: 08/25/24 1028                 acetaminophen, 650 mg, Oral, Once per day on Monday Wednesday Friday  acetaminophen, 650 mg, Oral, TID  ammonium lactate, 1 Application, Topical, Nightly  [Held by provider] aspirin, 81 mg, Oral, Daily  atorvastatin, 40 mg, Oral, Nightly  chlorhexidine, 15 mL, Mouth/Throat, Q12H  [Held by provider] clopidogrel, 75 mg, Oral, Daily  collagenase, 1 Application, Topical, Q24H  doxycycline, 100 mg, Intravenous, Q12H  gabapentin, 200 mg, Oral, Nightly  insulin lispro, 2-7 Units, Subcutaneous, 4x Daily AC & at Bedtime  levothyroxine, 75 mcg, Oral, QAM  metoprolol tartrate, 12.5 mg, Oral, Q12H  multivitamin with minerals, 1 tablet, Oral, Daily  mupirocin, 1 Application, Topical, Q12H  nicotine, 1 patch, Transdermal, Q24H  pantoprazole, 40 mg, Intravenous, BID AC  piperacillin-tazobactam, 3.375 g, Intravenous, Q12H  senna-docusate sodium, 2 tablet, Oral, BID  sertraline, 50 mg, Oral, Nightly  sevelamer, 800 mg, Oral, TID With Meals  sodium chloride, 10 mL, Intravenous, Q12H  sodium chloride, 10 mL, Intravenous, Q12H  sodium chloride, 10 mL, Intravenous, Q12H  sodium chloride, 10 mL, Intravenous, Q12H  sucralfate, 1 g, Oral, 4x Daily AC & at Bedtime  vancomycin, 2,000 mg, Intravenous, Once  [START ON 8/26/2024] Vancomycin  Pharmacy Intermittent/Pulse Dosing, , Does not apply, Daily      dexmedetomidine, 0.2-1.5 mcg/kg/hr (Dosing Weight), Last Rate: 0.5 mcg/kg/hr (08/25/24 0945)  fentanyl 10 mcg/mL,  mcg/hr, Last Rate: 150 mcg/hr (08/25/24 0237)  phenylephrine, 0.5-3 mcg/kg/min, Last Rate: 0.5 mcg/kg/min (08/25/24 1000)  propofol, 5-50 mcg/kg/min (Dosing Weight), Last Rate: Stopped (08/24/24 0815)        Medication Review:     Assessment & Plan   # Acute hypoxic respiratory failure in the setting of cardiac arrest, V. tach requiring defibrillation        #CAD w/ PCI to LAD on 5/28     # GI bleed, status post upper GI endoscopy.        #Hypotension           #RML/RLL pneumonia   - Concern for aspiration.         Continue Zosyn/doxy         #Acute blood loss anemia               #ESRD in setting of missed dialysis sessions   #Hyperkalemia: Resolved  - Nephrology consulted. Dialysis as per nephrology service.      #UTI  - Significant pyuria on 8/18. Will follow cultures. Currently no growth. Will continue abx as above.            Chronic medical problems   Hypothyroidism- TSH improved at 5.1. Recent free T4 wnl.   GERD  HTN      Vent Settings          Resp Rate (Set): 16  Pressure Support (cm H2O): 8 cm H20  FiO2 (%): 30 %  PEEP/CPAP (cm H2O): 5 cm H20    Minute Ventilation (L/min) (Obs): 7.95 L/min  Resp Rate (Observed) Vent: 16     I:E Ratio (Obs): 1:3.3    PIP Observed (cm H2O): 23 cm H2O         Awakening trial followed by spontaneous breathing trial as tolerated    I have personally reviewed x-ray chest, labs, medication list.      Critical Care time spent in direct patient care: 35 minutes (excluding procedure time, if applicable) including high complexity decision making to assess, manipulate, and support vital organ system failure in this individual who has impairment of one or more vital organ systems such that there is a high probability of imminent or life threatening deterioration in the patient’s condition.  Patient is  critically ill and is at higher risk for further mortality/morbidity. Continue ICU care .      Timmy Prieto MD  08/25/24  10:56 EDT

## 2024-08-25 NOTE — PLAN OF CARE
Goal Outcome Evaluation:         Patient on AC 16, 480, 30%, PEEP 5.  No weaning trial done today.  Patient is turning his head in the direction of voices today which is an improvement from yesterday.

## 2024-08-25 NOTE — PROGRESS NOTES
Pharmacokinetics Service Note:    Kennedy Prescott is a 74 y.o. male being evaluated by Pharmacy for vancomycin dosing.    Indication for Therapy: L heel wound with cellulitis   Currently Estimated Renal Function: hemodialysis dependent ESRD - M/W/F is current HD schedule   Proposed Empiric Regimen Using Predicted Pharmacokinetic Parameters and Demographics: 2 gm loading dose today, then intermittent post HD dosing to maintain adequate levels  Duration of Therapy Ordered: 5 days      Monitoring Planned: Obtain a random level tomorrow AM pre HD to guide further dosing    Thank you.  Zahra Silva, Pharm.D.  8/25/2024  10:49 EDT

## 2024-08-25 NOTE — CONSULTS
Reason for Consultation: Cardiac Arrest and CAD    Patient Care Team:  Jag Guillaume MD as PCP - General (Internal Medicine)    Chief complaint Cardiac Arrest    Subjective .     History of present illness:  Pt had cardiac arrest and h/o CAD  Currently is unable provide history as he is intubated and does move his eyes however no purposeful movements.  Troponins have been flat    ROS  Unable to obtain as he is intubated      History  Past Medical History:   Diagnosis Date    CHF (congestive heart failure)     Coronary artery disease     Diabetes mellitus     Dialysis patient     Disease of thyroid gland     Elevated cholesterol     GERD (gastroesophageal reflux disease)     Hypertension     Myocardial infarction     Renal disorder     stage 5   ,   Past Surgical History:   Procedure Laterality Date    CARDIAC CATHETERIZATION N/A 5/28/2024    Procedure: Left Heart Cath;  Surgeon: Mackenzie Ruiz MD;  Location: [facility] CATH INVASIVE LOCATION;  Service: Cardiology;  Laterality: N/A;    CARDIAC SURGERY      cabg    CHOLECYSTECTOMY      COLONOSCOPY N/A 9/22/2023    Procedure: COLONOSCOPY;  Surgeon: Trip Peter MD;  Location: [facility] OR;  Service: Gastroenterology;  Laterality: N/A;    DIALYSIS FISTULA CREATION Left     arm    ENDOSCOPY N/A 8/20/2024    Procedure: ESOPHAGOGASTRODUODENOSCOPY;  Surgeon: Edgar Sloan MD;  Location: [facility] OR;  Service: Gastroenterology;  Laterality: N/A;   ,   Family History   Problem Relation Age of Onset    Arthritis Mother     COPD Father     Diabetes Father     Heart disease Father     Hyperlipidemia Father     Hypertension Father     Cancer Daughter    ,   Social History     Socioeconomic History    Marital status:    Tobacco Use    Smoking status: Never    Smokeless tobacco: Never   Vaping Use    Vaping status: Never Used   Substance and Sexual Activity    Alcohol use: Never    Drug use: Never    Sexual activity: Defer     E-cigarette/Vaping    E-cigarette/Vaping Use  Never User     Passive Exposure No     Counseling Given No      E-cigarette/Vaping Substances    Nicotine No     THC No     CBD No     Flavoring No      E-cigarette/Vaping Devices    Disposable No     Pre-filled or Refillable Cartridge No     Refillable Tank No     Pre-filled Pod No          ,   Medications Prior to Admission   Medication Sig Dispense Refill Last Dose    acetaminophen (TYLENOL) 325 MG tablet Take 2 tablets by mouth 3 (Three) Times a Week.   Past Week    acetaminophen (TYLENOL) 325 MG tablet Take 2 tablets by mouth 3 (Three) Times a Day.   8/19/2024 at 0900    acetaminophen (TYLENOL) 500 MG tablet Take 1 tablet by mouth Every 4 (Four) Hours As Needed for Mild Pain or Fever.   Past Month    ammonium lactate (AMLACTIN) 12 % cream Apply 1 g topically to the appropriate area as directed Every Night.   8/18/2024    apixaban (ELIQUIS) 5 MG tablet tablet Take 1 tablet by mouth 2 (Two) Times a Day.   8/19/2024 at 1000    atorvastatin (LIPITOR) 40 MG tablet Take 1 tablet by mouth Every Night.   8/18/2024    B COMPLEX-C-FOLIC ACID PO Take 0.8 tablets by mouth Every Morning.   8/19/2024    clopidogrel (PLAVIX) 75 MG tablet Take 1 tablet by mouth Daily.   8/19/2024    gabapentin (NEURONTIN) 100 MG capsule Take 2 capsules by mouth every night at bedtime.   8/18/2024    HYDROcodone-acetaminophen (NORCO) 5-325 MG per tablet Take 1 tablet by mouth Every 12 (Twelve) Hours As Needed for Mild Pain.   Past Month    Insulin Aspart (novoLOG) 100 UNIT/ML injection Inject 0-8 Units under the skin into the appropriate area as directed 4 (Four) Times a Day Before Meals & at Bedtime. Inject as per sliding scale:  If 0-59 = 0;  201-250 = 2 units;   251-300= 4 units;   301-350 = 6 units;  351-400 = 8 units;  401-1000 = 8 units AND notify MD   8/19/2024 at 1130    lactulose (CHRONULAC) 10 GM/15ML solution Take 30 mL by mouth Daily As Needed (for constipation).   Past Week    levothyroxine (SYNTHROID, LEVOTHROID) 75 MCG tablet  Take 1 tablet by mouth Every Morning.   8/19/2024    metoprolol succinate XL (TOPROL-XL) 25 MG 24 hr tablet Take 0.5 tablets by mouth every night at bedtime.   8/18/2024    multivitamin with minerals tablet tablet Take 1 tablet by mouth Daily.   8/19/2024    Nutritional Supplements (Jayant Nutrivigor) pack Take 1 packet by mouth 2 (Two) Times a Day.   8/19/2024 at 1000    ondansetron (ZOFRAN) 4 MG tablet Take 1 tablet by mouth Every 8 (Eight) Hours As Needed for Nausea or Vomiting.   Past Week    pantoprazole (PROTONIX) 40 MG EC tablet Take 1 tablet by mouth Every Morning.   8/19/2024    sertraline (ZOLOFT) 50 MG tablet Take 1 tablet by mouth every night at bedtime.   8/18/2024    sevelamer (RENVELA) 800 MG tablet Take 1 tablet by mouth 3 (Three) Times a Day With Meals.   8/19/2024 at 1130    benzocaine (HURRICAINE/ORAJEL) 20 % gel Apply 1 Application to the mouth or throat Every 2 (Two) Hours As Needed (teeth pain).   More than a month    bisacodyl (DULCOLAX) 10 MG suppository Insert 1 suppository into the rectum Daily As Needed for Constipation.   More than a month    Lidocaine Viscous HCl (XYLOCAINE) 2 % solution Take 5 mL by mouth Every 6 (Six) Hours As Needed for Mild Pain.   More than a month    nitroglycerin (NITROSTAT) 0.4 MG SL tablet Place 1 tablet under the tongue Every 5 (Five) Minutes As Needed for Chest Pain. Take no more than 3 doses in 15 minutes.   More than a month    polyethylene glycol (MiraLax) 17 GM/SCOOP powder Take 17 g by mouth Every 12 (Twelve) Hours As Needed (for constipation).   More than a month   , and Allergies:  Patient has no known allergies.    Objective     Vital Sign Min/Max for last 24 hours  Temp  Min: 99.4 °F (37.4 °C)  Max: 104 °F (40 °C)   BP  Min: 73/48  Max: 144/90   Pulse  Min: 85  Max: 149   Resp  Min: 16  Max: 31   SpO2  Min: 97 %  Max: 100 %   No data recorded   Weight  Min: 101 kg (223 lb 12.3 oz)  Max: 101 kg (223 lb 12.3 oz)     Flowsheet Rows      Flowsheet Row  "First Filed Value   Admission Height 180.3 cm (71\") Documented at 08/19/2024 1419   Admission Weight 99.8 kg (220 lb) Documented at 08/19/2024 1419               Ejection Fraction  No results found for: \"EF\"    Echo EF Estimated  Lab Results   Component Value Date    ECHOEFEST 35.0 05/22/2024       Nuclear Stress Ejection Fraction  No components found for: \"NUCEF\"    Cath Ejection Fraction Quantitative  No results found for: \"CATHEF\"    Physical Exam:   He is in no distress is he does not move his eyes however not to commands.  His pupils appear to be constricted.  There is no edema however poor pulses in the lower extremity with lower extremity wounds present S1-S2 are normal regular rate rhythm no rhonchi or wheezing    Results Review:   I reviewed the patient's new clinical results.      Assessment & Plan       GI bleed    Anemia    Patient is status postcardiac arrest.  Troponins have been fairly flat and his ejection fraction is low normal 45 to 50%.  We will plan to wait and see if his mental status improves.  With fairly flat troponins this is unlikely to be a primarily acute coronary syndrome.  Will continue to monitor for more VT and can consider LifeVest if he eventually makes it to discharge.  He is having fevers and hypotensive suspicious for septic shock due to he does have a history of dialysis  And being treated for UTI and pneumonia  NSTEMI Type II , 2secondary to demand supply mismatch      Heber Eddy MD  08/25/24  11:42 EDT        "

## 2024-08-25 NOTE — PLAN OF CARE
Problem: Ventilator-Induced Lung Injury (Mechanical Ventilation, Invasive)  Goal: Absence of Ventilator-Induced Lung Injury  Outcome: Ongoing, Not Progressing  Intervention: Prevent Ventilator-Associated Pneumonia  Recent Flowsheet Documentation  Taken 8/25/2024 0418 by Randi Maya RRT  Head of Bed (HOB) Positioning: HOB at 30-45 degrees  Taken 8/25/2024 0202 by Randi Maya RRT  Head of Bed (HOB) Positioning: HOB at 30-45 degrees  Taken 8/25/2024 0029 by Randi Maya RRT  Head of Bed (HOB) Positioning: HOB at 30-45 degrees  Taken 8/24/2024 2221 by Randi Maya RRT  Head of Bed (HOB) Positioning: HOB at 30-45 degrees  Taken 8/24/2024 2023 by Randi Maya RRT  Head of Bed (HOB) Positioning: HOB at 30-45 degrees     Problem: Communication Impairment (Mechanical Ventilation, Invasive)  Goal: Effective Communication  Outcome: Ongoing, Progressing     Problem: Device-Related Complication Risk (Mechanical Ventilation, Invasive)  Goal: Optimal Device Function  Outcome: Ongoing, Progressing     Problem: Inability to Wean (Mechanical Ventilation, Invasive)  Goal: Mechanical Ventilation Liberation  Outcome: Ongoing, Progressing   Goal Outcome Evaluation:

## 2024-08-25 NOTE — PLAN OF CARE
Problem: Adult Inpatient Plan of Care  Goal: Plan of Care Review  8/25/2024 1844 by Brennen Bradley, RN  Outcome: Ongoing, Not Progressing  Flowsheets (Taken 8/25/2024 1844)  Progress: no change  Plan of Care Reviewed With: patient  Outcome Evaluation: pt remains intubated and sedated, t max of 104, remains off purvi at this time. on fent, and preced. stopped heparin  8/25/2024 0747 by Brennen Bradley, RN  Outcome: Ongoing, Not Progressing  Goal: Patient-Specific Goal (Individualized)  8/25/2024 1844 by Brennen Bradley, RN  Outcome: Ongoing, Not Progressing  8/25/2024 0747 by Brennen Bradley RN  Outcome: Ongoing, Not Progressing  Goal: Absence of Hospital-Acquired Illness or Injury  8/25/2024 1844 by Brennen Bradley, BHANU  Outcome: Ongoing, Not Progressing  8/25/2024 0747 by Brennen Bradley RN  Outcome: Ongoing, Not Progressing  Intervention: Identify and Manage Fall Risk  Description: Perform standard risk assessment on admission using a validated tool or comprehensive approach appropriate to the patient; reassess fall risk frequently, with change in status or transfer to another level of care.  Communicate fall injury risk to interprofessional healthcare team.  Determine need for increased observation, equipment and environmental modification, such as low bed, signage and supportive, nonskid footwear.  Adjust safety measures to individual developmental age, stage and identified risk factors.  Reinforce the importance of safety and physical activity with patient and family.  Perform regular intentional rounding to assess need for position change, pain assessment and personal needs, including assistance with toileting.  Recent Flowsheet Documentation  Taken 8/25/2024 1800 by Brennen Bradley, RN  Safety Promotion/Fall Prevention:   safety round/check completed   fall prevention program maintained  Taken 8/25/2024 1400 by Brennen Bradley, RN  Safety Promotion/Fall Prevention:   safety round/check  completed   fall prevention program maintained  Taken 8/25/2024 1300 by Brennen Bradley, RN  Safety Promotion/Fall Prevention:   safety round/check completed   fall prevention program maintained  Taken 8/25/2024 1200 by Brennen Bradley RN  Safety Promotion/Fall Prevention:   safety round/check completed   fall prevention program maintained  Taken 8/25/2024 1100 by Brennen Bradley RN  Safety Promotion/Fall Prevention:   safety round/check completed   fall prevention program maintained  Taken 8/25/2024 1000 by Brennen Bradley RN  Safety Promotion/Fall Prevention:   safety round/check completed   fall prevention program maintained  Taken 8/25/2024 0900 by Brennen Bradley RN  Safety Promotion/Fall Prevention:   safety round/check completed   fall prevention program maintained  Taken 8/25/2024 0800 by Brennen Bradley RN  Safety Promotion/Fall Prevention:   safety round/check completed   fall prevention program maintained  Taken 8/25/2024 0700 by Brennen Bradley RN  Safety Promotion/Fall Prevention:   safety round/check completed   fall prevention program maintained  Intervention: Prevent Skin Injury  Description: Perform a screening for skin injury risk, such as pressure or moisture associated skin damage on admission and at regular intervals throughout hospital stay.  Keep all areas of skin (especially folds) clean and dry.  Maintain adequate skin hydration.  Relieve and redistribute pressure and protect bony prominences; implement measures based on patient-specific risk factors.  Match turning and repositioning schedule to clinical condition.  Encourage weight shift frequently; assist with reposition if unable to complete independently.  Float heels off bed; avoid pressure on the Achilles tendon.  Keep skin free from extended contact with medical devices.  Encourage functional activity and mobility, as early as tolerated.  Use aids (e.g., slide boards, mechanical lift) during transfer.  Recent  Flowsheet Documentation  Taken 8/25/2024 1400 by Brennen Bradley RN  Body Position:   left   side-lying  Taken 8/25/2024 1200 by Brennen Bradley RN  Body Position:   right   side-lying  Taken 8/25/2024 1000 by Brennen Bradley RN  Body Position:   left   side-lying  Taken 8/25/2024 0900 by Brennen Bradley RN  Skin Protection: adhesive use limited  Taken 8/25/2024 0800 by Brennen Bradley RN  Body Position:   right   side-lying  Intervention: Prevent and Manage VTE (Venous Thromboembolism) Risk  Description: Assess for VTE (venous thromboembolism) risk.  Encourage and assist with early ambulation.  Initiate and maintain compression or other therapy, as indicated, based on identified risk in accordance with organizational protocol and provider order.  Encourage both active and passive leg exercises while in bed, if unable to ambulate.  Recent Flowsheet Documentation  Taken 8/25/2024 1400 by Brennen Bradley RN  Activity Management: bedrest  Taken 8/25/2024 0900 by Brennen Bradley RN  Activity Management: bedrest  Range of Motion: ROM (range of motion) performed  Goal: Optimal Comfort and Wellbeing  8/25/2024 1844 by Brennen Bradley RN  Outcome: Ongoing, Not Progressing  8/25/2024 0747 by Brennen Bradley RN  Outcome: Ongoing, Not Progressing  Intervention: Provide Person-Centered Care  Description: Use a family-focused approach to care.  Develop trust and rapport by proactively providing information, encouraging questions, addressing concerns and offering reassurance.  Acknowledge emotional response to hospitalization.  Recognize and utilize personal coping strategies.  Honor spiritual and cultural preferences.  Recent Flowsheet Documentation  Taken 8/25/2024 0900 by Brennen Bradley RN  Trust Relationship/Rapport:   care explained   reassurance provided  Goal: Readiness for Transition of Care  8/25/2024 1844 by Brennen Bradley RN  Outcome: Ongoing, Not Progressing  8/25/2024 0747 by  Brennen Bradley, RN  Outcome: Ongoing, Not Progressing   Goal Outcome Evaluation:  Plan of Care Reviewed With: patient        Progress: no change  Outcome Evaluation: pt remains intubated and sedated, t max of 104, remains off purvi at this time. on fent, and preced. stopped heparin

## 2024-08-26 NOTE — PLAN OF CARE
Problem: Communication Impairment (Mechanical Ventilation, Invasive)  Goal: Effective Communication  Outcome: Ongoing, Progressing     Problem: Device-Related Complication Risk (Mechanical Ventilation, Invasive)  Goal: Optimal Device Function  Outcome: Ongoing, Progressing     Problem: Inability to Wean (Mechanical Ventilation, Invasive)  Goal: Mechanical Ventilation Liberation  Outcome: Ongoing, Progressing     Problem: Nutrition Impairment (Mechanical Ventilation, Invasive)  Goal: Optimal Nutrition Delivery  Outcome: Ongoing, Progressing     Problem: Skin and Tissue Injury (Mechanical Ventilation, Invasive)  Goal: Absence of Device-Related Skin and Tissue Injury  Outcome: Ongoing, Progressing     Problem: Ventilator-Induced Lung Injury (Mechanical Ventilation, Invasive)  Goal: Absence of Ventilator-Induced Lung Injury  Outcome: Ongoing, Progressing  Intervention: Prevent Ventilator-Associated Pneumonia  Recent Flowsheet Documentation  Taken 8/26/2024 0429 by Randi Maya RRT  Head of Bed (HOB) Positioning: HOB at 30-45 degrees  Taken 8/26/2024 0245 by Randi Maya RRT  Head of Bed (HOB) Positioning: HOB at 30-45 degrees  Taken 8/26/2024 0012 by Randi Maya RRT  Head of Bed (HOB) Positioning: HOB at 30-45 degrees  Taken 8/25/2024 2215 by Randi Maya RRT  Head of Bed (HOB) Positioning: HOB at 30-45 degrees  Taken 8/25/2024 2010 by Randi Maya RRT  Head of Bed (HOB) Positioning: HOB at 30-45 degrees  Taken 8/25/2024 1823 by Randi Maya RRT  Head of Bed (HOB) Positioning: HOB at 30-45 degrees   Goal Outcome Evaluation:

## 2024-08-26 NOTE — CASE MANAGEMENT/SOCIAL WORK
Discharge Planning Assessment   [place]     Patient Name: Kennedy Prescott  MRN: [id number]  Today's Date: 8/26/2024    Admit Date: 8/19/2024     Discharge Plan       Row Name 08/26/24 1047       Plan    Plan Pt admitted on 8/19/24. was transferred from [facility] to CCU on 8/23/24. Pt is intubated. Pt is a resident of [facility]. Per Irma who states pt had a 24 day bed hold prior to admission and will accept pt back at discharge. Palliative Care following for GOC. SS to follow and assist with discharge planning.                Destination       Service Provider Request Status Selected Services Address Phone Fax Patient Preferred    [facility] Pending - No Request Sent N/A [address] [place] [address] [phone] --               MONA Long

## 2024-08-26 NOTE — PROGRESS NOTES
"Nephrology Progress Note      Subjective   Seen on dialysis, has been started back on pressors.  Currently blood pressure is relatively stable.    Objective       Vital signs :     Temp:  [97.2 °F (36.2 °C)-104 °F (40 °C)] 97.2 °F (36.2 °C)  Heart Rate:  [] 91  Resp:  [16-20] 16  BP: ()/(38-90) 118/78  FiO2 (%):  [30 %] 30 %    Intake/Output                         08/24/24 0701 - 08/25/24 0700 08/25/24 0701 - 08/26/24 0700     6021-4333 0153-5234 Total 9414-1394 8327-4384 Total                 Intake    I.V.  --  634.1 634.1  338.1  350.4 688.5    NG/GT  --  -- --  --  30 30    IV Piggyback  --  700 700  --  200 200    Total Intake -- 1334.1 1334.1 338.1 580.4 918.5       Output    Urine  --  0 0  --  15 15    Emesis/NG output  --  -- --  --  0 0    Stool  --  0 0  --  0 0    Total Output -- 0 0 -- 15 15           08/25/2024 examined and reviewed  08/26/2024 examined and reviewed    Physical Exam:    General Appearance : On ventilator  Lungs : Bilateral anterior crackles  Heart :  regular rhythm & normal rate, normal S1, S2 and no murmur, no rub  Abdomen : Nondistended  Extremities : Trace edema,   Neurologic :   Unable to assess  Access: Left upper arm AV fistula [place]    Laboratory Data :     Albumin Albumin   Date Value Ref Range Status   08/26/2024 2.4 (L) 3.5 - 5.2 g/dL Final   08/25/2024 3.0 (L) 3.5 - 5.2 g/dL Final   08/24/2024 2.7 (L) 3.5 - 5.2 g/dL Final   08/23/2024 2.6 (L) 3.5 - 5.2 g/dL Final   08/23/2024 3.0 (L) 3.5 - 5.2 g/dL Final      Magnesium Magnesium   Date Value Ref Range Status   08/25/2024 2.0 1.6 - 2.4 mg/dL Final   08/23/2024 2.0 1.6 - 2.4 mg/dL Final            PTH               No results found for: \"PTH\"    CBC and coagulation:  Results from last 7 days   Lab Units 08/26/24  0024 08/25/24  0759 08/24/24  1524 08/23/24  2306 08/23/24  1541 08/20/24  0116 08/19/24  1446   LACTATE mmol/L  --   --  2.0  --   --   --   --    CRP mg/dL  --   --   --   --   --   --  1.72* "   WBC 10*3/mm3 13.05* 15.40* 11.51*   < > 16.97*   < > 8.58   HEMOGLOBIN g/dL 8.0* 9.4* 8.0*   < > 7.8*   < > 5.9*   HEMATOCRIT % 26.2* 30.2* 26.1*   < > 25.2*   < > 19.5*   MCV fL 94.6 93.8 94.6   < > 94.4   < > 97.5*   MCHC g/dL 30.5* 31.1* 30.7*   < > 31.0*   < > 30.3*   PLATELETS 10*3/mm3 172 267 182   < > 193   < > 159   INR  1.40*  --   --   --  1.31*  --  2.14*   D DIMER QUANT MCGFEU/mL  --   --   --   --   --   --  5.26*    < > = values in this interval not displayed.     Acid/base balance:  Results from last 7 days   Lab Units 08/25/24  0819 08/24/24  0429 08/23/24  1615   PH, ARTERIAL pH units 7.438 7.558* 7.498*   PO2 ART mm Hg 73.1* 67.3* 291.0*   PCO2, ARTERIAL mm Hg 37.9 32.9* 39.3   HCO3 ART mmol/L 25.5 29.3* 30.5*       Renal and electrolytes:    Results from last 7 days   Lab Units 08/26/24  0024 08/25/24  0759 08/24/24  1524 08/23/24  2306 08/23/24  1541 08/20/24  0742 08/19/24  1446   SODIUM mmol/L 139 139 129* 141 138   < > 137   POTASSIUM mmol/L 3.8 4.3 3.8 3.4* 3.4*   < > 5.8*   MAGNESIUM mg/dL  --  2.0  --   --  2.0  --  2.3   CHLORIDE mmol/L 97* 94* 88* 97* 94*   < > 96*   CO2 mmol/L 23.7 23.4 23.0 26.7 28.5   < > 21.7*   BUN mg/dL 35* 31* 25* 19 20   < > 110*   CREATININE mg/dL 4.96* 4.47* 3.83* 2.97* 2.65*   < > 7.65*   CALCIUM mg/dL 8.3* 8.8 8.4* 8.2* 8.2*   < > 8.3*   PHOSPHORUS mg/dL  --   --   --   --   --   --  7.3*    < > = values in this interval not displayed.     Estimated Creatinine Clearance: 15.4 mL/min (A) (by C-G formula based on SCr of 4.96 mg/dL (H)).  @GFRCG:3@   Liver and pancreatic function:  Results from last 7 days   Lab Units 08/26/24  0024 08/25/24  0759 08/24/24  1524   ALBUMIN g/dL 2.4* 3.0* 2.7*   BILIRUBIN mg/dL 0.4 0.6 0.5   ALK PHOS U/L 86 107 104   AST (SGOT) U/L 126* 168* 25   ALT (SGPT) U/L 90* 85* 9         Cardiac:  Results from last 7 days   Lab Units 08/19/24  1446   PROBNP pg/mL >70,000.0*     Liver and pancreatic function:  Results from last 7 days    Lab Units 08/26/24  0024 08/25/24  0759 08/24/24  1524   ALBUMIN g/dL 2.4* 3.0* 2.7*   BILIRUBIN mg/dL 0.4 0.6 0.5   ALK PHOS U/L 86 107 104   AST (SGOT) U/L 126* 168* 25   ALT (SGPT) U/L 90* 85* 9       Medications :     acetaminophen, 650 mg, Oral, Once per day on Monday Wednesday Friday  acetaminophen, 650 mg, Oral, TID  ammonium lactate, 1 Application, Topical, Nightly  aspirin, 81 mg, Oral, Daily  atorvastatin, 40 mg, Oral, Nightly  chlorhexidine, 15 mL, Mouth/Throat, Q12H  clopidogrel, 75 mg, Oral, Daily  collagenase, 1 Application, Topical, Q24H  doxycycline, 100 mg, Intravenous, Q12H  gabapentin, 200 mg, Oral, Nightly  insulin lispro, 2-7 Units, Subcutaneous, 4x Daily AC & at Bedtime  levothyroxine, 75 mcg, Oral, QAM  metoprolol tartrate, 12.5 mg, Oral, Q12H  multivitamin with minerals, 1 tablet, Oral, Daily  mupirocin, 1 Application, Topical, Q12H  nicotine, 1 patch, Transdermal, Q24H  pantoprazole, 40 mg, Intravenous, BID AC  piperacillin-tazobactam, 3.375 g, Intravenous, Q12H  senna-docusate sodium, 2 tablet, Oral, BID  sertraline, 50 mg, Oral, Nightly  sevelamer, 800 mg, Oral, TID With Meals  sodium chloride, 10 mL, Intravenous, Q12H  sodium chloride, 10 mL, Intravenous, Q12H  sodium chloride, 10 mL, Intravenous, Q12H  sodium chloride, 10 mL, Intravenous, Q12H  sucralfate, 1 g, Oral, 4x Daily AC & at Bedtime  Vancomycin Pharmacy Intermittent/Pulse Dosing, , Does not apply, Daily      dexmedetomidine, 0.2-1.5 mcg/kg/hr (Dosing Weight), Last Rate: 0.8 mcg/kg/hr (08/26/24 0341)  fentanyl 10 mcg/mL,  mcg/hr, Last Rate: 100 mcg/hr (08/26/24 0654)  phenylephrine, 0.5-3 mcg/kg/min, Last Rate: 0.5 mcg/kg/min (08/26/24 0750)  propofol, 5-50 mcg/kg/min (Dosing Weight), Last Rate: Stopped (08/24/24 0815)          Assessment & Plan     -ESRD on intermittent dialysis  -Status post cardiac arrest  -Shock  -Hypokalemia  - Acute hypoxic respiratory failure likely due to fluid overload in settings of dialysis  noncompliance  - Hyperkalemia resolved  - Acute on chronic anemia status post EGD  - Urinary tract infection      Evaluated the patient on dialysis, mild intradialytic hypotension.  Has been started back on pressors.  Will reduce ultrafiltration target and blood flow rate if the blood pressure drops further      Prognosis critical    Discussed with RN    Plan of care was discussed with the patients family they understood, verbalized, agreed.        Nova Saucedo MD  08/26/24  08:17 EDT
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
## 2024-08-26 NOTE — PROGRESS NOTES
Progress Note Critical Care Pulmonary        Subjective    Overnight events reviewed.  All the labs medications ins and outs and vitals reviewed.  MDR done at bed side with RN, pharmacist, nutrition, , hospitalist manager  and RT  All the drips,other meds,  labs,ins and outs , abg, fio2 requirement reviewed and dicussed in rounds.     On hd-Case discussed with dialysis nurse.  Lines reviewed   IJ-was inserted on 23rd of August  Deshawn- 0.5 during HD-MAP running good.  Afib-rhythm reviewed  Siddiqui - on HD- will discontinue  Stage -2 ulcers  Wheel chair bound at baseline  104- fever yesterday-latest microbiology reviewed  Tylenol was given   Sedation - fentanyl   Precdex - 0.8      Interval History: event overnight: Described above        Review of Systems:    On vent , sedated     Vital Signs  Temp:  [97.2 °F (36.2 °C)-100.4 °F (38 °C)] 97.2 °F (36.2 °C)  Heart Rate:  [] 93  Resp:  [16-20] 16  BP: ()/(38-82) 135/82  FiO2 (%):  [30 %] 30 %  Body mass index is 31.21 kg/m².    Intake/Output Summary (Last 24 hours) at 8/26/2024 0933  Last data filed at 8/26/2024 0654  Gross per 24 hour   Intake 918.53 ml   Output 15 ml   Net 903.53 ml     No intake/output data recorded.    Physical Exam:  General-chronically ill in appearance, getting dialysis, ET tube in place    HEENT-PERRLA    Neck-supple    Respiratory-decreased breath sounds bilaterally.  Synchronous with the ventilator.    Cardiovascular-NSR  GI-NTND    CNS-sedated.  Drips reviewed    Extremities-no clubbing and edema        Results Review:      Results from last 7 days   Lab Units 08/26/24  0024 08/25/24  0759 08/24/24  1524   WBC 10*3/mm3 13.05* 15.40* 11.51*   HEMOGLOBIN g/dL 8.0* 9.4* 8.0*   PLATELETS 10*3/mm3 172 267 182     Results from last 7 days   Lab Units 08/26/24  0024 08/25/24  0759 08/24/24  1524 08/23/24  2306 08/23/24  1541 08/20/24  0742 08/19/24  1446   SODIUM mmol/L 139 139 129*   < > 138   < > 137   POTASSIUM mmol/L 3.8 4.3  3.8   < > 3.4*   < > 5.8*   CHLORIDE mmol/L 97* 94* 88*   < > 94*   < > 96*   CO2 mmol/L 23.7 23.4 23.0   < > 28.5   < > 21.7*   BUN mg/dL 35* 31* 25*   < > 20   < > 110*   CREATININE mg/dL 4.96* 4.47* 3.83*   < > 2.65*   < > 7.65*   CALCIUM mg/dL 8.3* 8.8 8.4*   < > 8.2*   < > 8.3*   GLUCOSE mg/dL 143* 156* 146*   < > 148*   < > 190*   MAGNESIUM mg/dL  --  2.0  --   --  2.0  --  2.3    < > = values in this interval not displayed.     Lab Results   Component Value Date    INR 1.40 (H) 08/26/2024    INR 1.31 (H) 08/23/2024    INR 2.14 (H) 08/19/2024    PROTIME 17.2 (H) 08/26/2024    PROTIME 16.4 (H) 08/23/2024    PROTIME 23.9 (H) 08/19/2024     Results from last 7 days   Lab Units 08/26/24  0024 08/25/24  0759 08/24/24  1524   ALK PHOS U/L 86 107 104   BILIRUBIN mg/dL 0.4 0.6 0.5   ALT (SGPT) U/L 90* 85* 9   AST (SGOT) U/L 126* 168* 25     Results from last 7 days   Lab Units 08/25/24  0819   PH, ARTERIAL pH units 7.438   PO2 ART mm Hg 73.1*   PCO2, ARTERIAL mm Hg 37.9   HCO3 ART mmol/L 25.5     Imaging Results (Last 24 Hours)       Procedure Component Value Units Date/Time    XR Chest 1 View [[id number]] Resulted: 08/26/24 0433     Updated: 08/26/24 0454    XR Chest 1 View [[id number]] Collected: 08/25/24 1130     Updated: 08/25/24 1132    Narrative:      Comparison: August 24, 2024.      Endotracheal tube tip similar position. Nasogastric tube noted, tip not  visualized on this exam. Heart is enlarged with vascular congestion.  Persistent bilateral pleural effusions seen, right greater than left. No  pneumothorax is identified. Right IJ central catheter tip projects at  the right atria, similar of to the prior exam. Consider retraction of  desired position is within the SVC. No pneumothorax seen.       Impression:      Impression:  1. Cardiomegaly, vascular congestion and bilateral pleural effusions  similar to the prior exam.  2. Lines and tubes as described. Right IJ central catheter tip projects  at the right  atria, consider retraction if desired position is within  the SVC.        This report was finalized on 8/25/2024 11:30 AM by Brennen Quinn DO.                    acetaminophen, 650 mg, Oral, Once per day on Monday Wednesday Friday  acetaminophen, 650 mg, Oral, TID  ammonium lactate, 1 Application, Topical, Nightly  aspirin, 81 mg, Oral, Daily  atorvastatin, 40 mg, Oral, Nightly  chlorhexidine, 15 mL, Mouth/Throat, Q12H  clopidogrel, 75 mg, Oral, Daily  collagenase, 1 Application, Topical, Q24H  doxycycline, 100 mg, Intravenous, Q12H  gabapentin, 200 mg, Oral, Nightly  insulin lispro, 2-7 Units, Subcutaneous, 4x Daily AC & at Bedtime  levothyroxine, 75 mcg, Oral, QAM  metoprolol tartrate, 12.5 mg, Oral, Q12H  multivitamin with minerals, 1 tablet, Oral, Daily  mupirocin, 1 Application, Topical, Q12H  nicotine, 1 patch, Transdermal, Q24H  pantoprazole, 40 mg, Intravenous, BID AC  piperacillin-tazobactam, 3.375 g, Intravenous, Q12H  senna-docusate sodium, 2 tablet, Oral, BID  sertraline, 50 mg, Oral, Nightly  sevelamer, 800 mg, Oral, TID With Meals  sodium chloride, 10 mL, Intravenous, Q12H  sodium chloride, 10 mL, Intravenous, Q12H  sodium chloride, 10 mL, Intravenous, Q12H  sodium chloride, 10 mL, Intravenous, Q12H  sucralfate, 1 g, Oral, 4x Daily AC & at Bedtime  Vancomycin Pharmacy Intermittent/Pulse Dosing, , Does not apply, Daily      dexmedetomidine, 0.2-1.5 mcg/kg/hr (Dosing Weight), Last Rate: 0.8 mcg/kg/hr (08/26/24 0341)  fentanyl 10 mcg/mL,  mcg/hr, Last Rate: 100 mcg/hr (08/26/24 0654)  phenylephrine, 0.5-3 mcg/kg/min, Last Rate: 0.5 mcg/kg/min (08/26/24 0750)  propofol, 5-50 mcg/kg/min (Dosing Weight), Last Rate: Stopped (08/24/24 0815)      Microbiology Results (last 10 days)       Procedure Component Value - Date/Time    COVID-19 and FLU A/B PCR, 1 HR TAT - Swab, Nasopharynx [[id number]]  (Normal) Collected: 08/22/24 2227    Lab Status: Final result Specimen: Swab from Nasopharynx Updated:  08/22/24 2258     COVID19 Not Detected     Influenza A PCR Not Detected     Influenza B PCR Not Detected    Narrative:      Fact sheet for providers: https://www.fda.gov/media/418639/download    Fact sheet for patients: https://www.fda.gov/media/219928/download    Test performed by PCR.    Urine Culture - Urine, Straight Cath [[id number]]  (Normal) Collected: 08/18/24 1348    Lab Status: Final result Specimen: Urine from Straight Cath Updated: 08/20/24 0954     Urine Culture No growth    COVID-19 and FLU A/B PCR, 1 HR TAT - Swab, Nasopharynx [[id number]]  (Normal) Collected: 08/18/24 1211    Lab Status: Final result Specimen: Swab from Nasopharynx Updated: 08/18/24 1234     COVID19 Not Detected     Influenza A PCR Not Detected     Influenza B PCR Not Detected    Narrative:      Fact sheet for providers: https://www.fda.gov/media/273109/download    Fact sheet for patients: https://www.[employer].gov/media/446033/download    Test performed by PCR.             Narrative & Impression   EXAM:    CT Head Without Intravenous Contrast     EXAM DATE:    8/14/2024 11:53 AM     CLINICAL HISTORY:    AMS     TECHNIQUE:    Axial computed tomography images of the head/brain without intravenous  contrast.  Sagittal and coronal reformatted images were created and  reviewed.  This CT exam was performed using one or more of the following  dose reduction techniques:  automated exposure control, adjustment of  the mA and/or kV according to patient size, and/or use of iterative  reconstruction technique.     COMPARISON:    No relevant prior studies available.     FINDINGS:    BRAIN AND EXTRA-AXIAL SPACES:  Unremarkable as visualized.  No  hemorrhage.  No significant white matter disease.  No edema.  No  ventriculomegaly.    BONES/JOINTS:  Unremarkable as visualized.  No acute fracture.    SOFT TISSUES:  Unremarkable as visualized.    SINUSES:  Unremarkable as visualized.  No acute sinusitis.    MASTOID AIR CELLS:  Unremarkable as visualized.  No  mastoid effusion.     IMPRESSION:    Unremarkable exam demonstrating no CT evidence of acute intracranial  findings.        This report was finalized on 8/14/2024 12:24 PM by Dr. Rodrigo Kemp MD.   Latest Reference Range & Units 08/25/24 08:19   pH, Arterial 7.350 - 7.450 pH units 7.438   pCO2, Arterial 35.0 - 45.0 mm Hg 37.9   pO2, Arterial 83.0 - 108.0 mm Hg 73.1 (L)   HCO3, Arterial 20.0 - 26.0 mmol/L 25.5   Base Excess 0.0 - 2.0 mmol/L 1.3   O2 Saturation, Arterial 94.0 - 99.0 % 96.1   CO2 Content 22 - 33 mmol/L 26.7   A-a DO2 0.0 - 300.0 mmHg 90.1   Carboxyhemoglobin 0 - 5 % 2.4   Methemoglobin 0.00 - 3.00 % 0.20   Oxyhemoglobin 94 - 99 % 93.6 (L)   Hematocrit, Blood Gas 38.0 - 51.0 % 27.1 (L)   Hemoglobin, Blood Gas 14 - 18 g/dL 8.9 (L)   Site  Right Radial   René's Test  Positive   Modality  Ventilator   FIO2 % 30   Ventilator Mode  VC/AC   Set Tidal Volume  480.00   Set Mech Resp Rate  16.0   PEEP  5.0   Barometric Pressure for Blood Gas mmHg 731   (L): Data is abnormally low     Medication Review:     Assessment & Plan     Neuro-drips reviewed.  Adjusted for a RASS goal of -1 to -2.    CT of the head-latest reviewed.  Report as mentioned above.  SAT after HD today      Acute hypoxic respiratory failure-likely due to cardiac arrest and renal failure.  Latest ABG reviewed.  Latest chest x-ray reviewed.  Chest x-ray shows right-sided pleural effusion.      ET tube position reviewed.  Will advance the ET tube by 3 cm.  Will repeat the x-ray.    Will get venous blood gas tomorrow morning.  Latest blood gas reviewed and ventilator settings were adjusted.    Vent Settings          Resp Rate (Set): 16  Pressure Support (cm H2O): 8 cm H20  FiO2 (%): 30 %  PEEP/CPAP (cm H2O): 5 cm H20    Minute Ventilation (L/min) (Obs): 14.6 L/min  Resp Rate (Observed) Vent: 29     I:E Ratio (Obs): 1:2    PIP Observed (cm H2O): 19 cm H2O           Ventilator settings and graphics reviewed.  Peak and plateau pressures reviewed.   Peak and plateau pressures are within normal limits.    Right lower lobe pneumonia-latest microbiology reviewed.  Antibiotics reviewed.  Continue current antibiotics.    If needed will consider bronchoscopy.     Cardiovascular-vitals reviewed.  Currently on Deshawn-Synephrine.  MAP running around 90.  Discussed with patient's nurse and will titrate the Deshawn-Synephrine down to maintain a MAP of 65 and above.    CAD w/ PCI to LAD on 5/28  Latest echo reviewed.    Gastrointestinal-GI bleed, status post upper GI endoscopy.  Report and events reviewed.  Continue to monitor hemoglobin.  Transfuse for hemoglobin less than 8 if patient is having active GI bleeding.  If no GI bleeding recommend transfusion for hemoglobin less than 7.     Continue tube feeds.  Free water 0 patient is on hemodialysis.             Renal-  ESRD in setting of missed dialysis sessions   Continue hemodialysis.  Nephrology on case.  Was able to tolerate hemodialysis today.  Required phenylephrine.  If continues to require phenylephrine will start on midodrine.    Infectious disease-latest micro reviewed   Cont current abx         Endocrine- monitor BS target 140-180  Hypothyroidism- TSH improved at 5.1. Recent free T4 wnl.             I have personally reviewed x-ray chest, labs, medication list.  Results for orders placed during the hospital encounter of 08/19/24    Adult Transthoracic Echo Complete W/ Cont if Necessary Per Protocol    Interpretation Summary    Left ventricular systolic function is mildly decreased. Left ventricular ejection fraction appears to be 46 - 50%.    The left ventricular cavity is mildly dilated.    Left ventricular diastolic function is consistent with (grade III w/high LAP) fixed restrictive pattern.    The left atrial cavity is mild to moderately dilated.    The right atrial cavity is mildly  dilated.    Estimated right ventricular systolic pressure from tricuspid regurgitation is normal (<35 mmHg).    This is a technically  difficult study.     Patient's case was discussed with patient's son over the phone and answered his questions to his satisfaction.  Currently patient remains full code.  Patient's son is not sure if he will proceed towards comfort measures or trach or PEG.    Critical Care time spent in direct patient care: 33 minutes (excluding procedure time, if applicable) including high complexity decision making to assess, manipulate, and support vital organ system failure in this individual who has impairment of one or more vital organ systems such that there is a high probability of imminent or life threatening deterioration in the patient’s condition.  I adjusted patient's drips and ventilator settings.  Patient is critically ill and is at higher risk for further mortality/morbidity. Continue ICU care .           Pedro Humphries MD  08/26/24  09:33 EDT

## 2024-08-26 NOTE — PROGRESS NOTES
.Patient Identification:  Name:  Kennedy Prescott  Age:  74 y.o.  Sex:  male  :  1950  MRN:  [id number]  Visit Number:  [id number]    Chief Complaint:   Respiratory failure    Subjective:    Patient seen on rounds in the morning.  Patient was undergoing dialysis at that time.  Patient is in contact isolation.  Patient intubated and sedated unable to contribute in history.  ----------------------------------------------------------------------------------------------------------------------  Current Hospital Meds:  acetaminophen, 650 mg, Oral, Once per day on   acetaminophen, 650 mg, Oral, TID  ammonium lactate, 1 Application, Topical, Nightly  aspirin, 81 mg, Oral, Daily  atorvastatin, 40 mg, Oral, Nightly  chlorhexidine, 15 mL, Mouth/Throat, Q12H  clopidogrel, 75 mg, Oral, Daily  collagenase, 1 Application, Topical, Q24H  doxycycline, 100 mg, Intravenous, Q12H  gabapentin, 200 mg, Oral, Nightly  insulin lispro, 2-7 Units, Subcutaneous, 4x Daily AC & at Bedtime  levothyroxine, 75 mcg, Oral, QAM  metoprolol tartrate, 12.5 mg, Oral, Q12H  multivitamin with minerals, 1 tablet, Oral, Daily  mupirocin, 1 Application, Topical, Q12H  nicotine, 1 patch, Transdermal, Q24H  pantoprazole, 40 mg, Intravenous, BID AC  piperacillin-tazobactam, 3.375 g, Intravenous, Q12H  senna-docusate sodium, 2 tablet, Oral, BID  sertraline, 50 mg, Oral, Nightly  sevelamer, 800 mg, Oral, TID With Meals  sodium chloride, 10 mL, Intravenous, Q12H  sodium chloride, 10 mL, Intravenous, Q12H  sodium chloride, 10 mL, Intravenous, Q12H  sodium chloride, 10 mL, Intravenous, Q12H  sucralfate, 1 g, Oral, 4x Daily AC & at Bedtime  vancomycin, 1,500 mg, Intravenous, Once  Vancomycin Pharmacy Intermittent/Pulse Dosing, , Does not apply, Daily      dexmedetomidine, 0.2-1.5 mcg/kg/hr (Dosing Weight), Last Rate: 0.8 mcg/kg/hr (24 1103)  fentanyl 10 mcg/mL,  mcg/hr, Last Rate: 100 mcg/hr (24 0654)  phenylephrine,  0.5-3 mcg/kg/min, Last Rate: 0.5 mcg/kg/min (08/26/24 1202)  propofol, 5-50 mcg/kg/min (Dosing Weight), Last Rate: Stopped (08/24/24 0815)      ----------------------------------------------------------------------------------------------------------------------  Vital Signs:  Temp:  [97.2 °F (36.2 °C)-100.4 °F (38 °C)] 98.7 °F (37.1 °C)  Heart Rate:  [] 102  Resp:  [16-27] 27  BP: ()/(29-93) 54/29  FiO2 (%):  [30 %] 30 %      08/23/24  1613 08/24/24  0605 08/25/24  0622   Weight: 103 kg (227 lb 1.2 oz) 104 kg (228 lb 9.9 oz) 101 kg (223 lb 12.3 oz)     Body mass index is 31.21 kg/m².    Intake/Output Summary (Last 24 hours) at 8/26/2024 1240  Last data filed at 8/26/2024 1056  Gross per 24 hour   Intake 1018.53 ml   Output 1815 ml   Net -796.47 ml     NPO Diet NPO Type: Strict NPO  ----------------------------------------------------------------------------------------------------------------------  Physical exam:  Constitutional:    HENT:  Head:  Normocephalic and atraumatic.  ET tube in place    Neck:  Neck supple.  No JVD present.    Cardiovascular: Normal rate, regular rhythm, S1 S2+, NO S3 / S4  Pulmonary/Chest:  Vesicular breath sounds B/L  Abdominal:  Soft.  Bowel sounds are normal.  No distension and no tenderness.      Neurological: Intubated and sedated, unable to perform  skin:  Skin is warm and dry. No rash noted. No pallor.   Musculoskeletal:  No edema, no tenderness, and no deformity.  No red or swollen joints anywhere.   Peripheral vascular: Palpable pulses  ----------------------------------------------------------------------------------------------------------------------    ----------------------------------------------------------------------------------------------------------------------  Results from last 7 days   Lab Units 08/23/24  1541 08/22/24  2323 08/19/24  1744   HSTROP T ng/L 188* 178* 150*     Results from last 7 days   Lab Units 08/26/24  0024 08/25/24  075  "08/24/24  1524 08/23/24  2306 08/23/24  1541 08/20/24  0116 08/19/24  1446   CRP mg/dL  --   --   --   --   --   --  1.72*   LACTATE mmol/L  --   --  2.0  --   --   --   --    WBC 10*3/mm3 13.05* 15.40* 11.51*   < > 16.97*   < > 8.58   HEMOGLOBIN g/dL 8.0* 9.4* 8.0*   < > 7.8*   < > 5.9*   HEMATOCRIT % 26.2* 30.2* 26.1*   < > 25.2*   < > 19.5*   MCV fL 94.6 93.8 94.6   < > 94.4   < > 97.5*   MCHC g/dL 30.5* 31.1* 30.7*   < > 31.0*   < > 30.3*   PLATELETS 10*3/mm3 172 267 182   < > 193   < > 159   INR  1.40*  --   --   --  1.31*  --  2.14*    < > = values in this interval not displayed.     Results from last 7 days   Lab Units 08/25/24  0819   PH, ARTERIAL pH units 7.438   PO2 ART mm Hg 73.1*   PCO2, ARTERIAL mm Hg 37.9   HCO3 ART mmol/L 25.5     Results from last 7 days   Lab Units 08/26/24  0024 08/25/24  0759 08/24/24  1524 08/23/24  2306 08/23/24  1541 08/20/24  0742 08/19/24  1446   SODIUM mmol/L 139 139 129*   < > 138   < > 137   POTASSIUM mmol/L 3.8 4.3 3.8   < > 3.4*   < > 5.8*   MAGNESIUM mg/dL  --  2.0  --   --  2.0  --  2.3   CHLORIDE mmol/L 97* 94* 88*   < > 94*   < > 96*   CO2 mmol/L 23.7 23.4 23.0   < > 28.5   < > 21.7*   BUN mg/dL 35* 31* 25*   < > 20   < > 110*   CREATININE mg/dL 4.96* 4.47* 3.83*   < > 2.65*   < > 7.65*   CALCIUM mg/dL 8.3* 8.8 8.4*   < > 8.2*   < > 8.3*   GLUCOSE mg/dL 143* 156* 146*   < > 148*   < > 190*   ALBUMIN g/dL 2.4* 3.0* 2.7*   < > 3.0*   < > 2.7*   BILIRUBIN mg/dL 0.4 0.6 0.5   < > 0.5   < > 0.4   ALK PHOS U/L 86 107 104   < > 109   < > 109   AST (SGOT) U/L 126* 168* 25   < > 24   < > 14   ALT (SGPT) U/L 90* 85* 9   < > 15   < > 11    < > = values in this interval not displayed.   Estimated Creatinine Clearance: 15.4 mL/min (A) (by C-G formula based on SCr of 4.96 mg/dL (H)).    No results found for: \"AMMONIA\"      No results found for: \"BLOODCX\"  No results found for: \"URINECX\"  No results found for: \"WOUNDCX\"  No results found for: \"STOOLCX\"  Echo:  Results for orders " placed during the hospital encounter of 24    Adult Transthoracic Echo Complete W/ Cont if Necessary Per Protocol    Interpretation Summary    Left ventricular systolic function is mildly decreased. Left ventricular ejection fraction appears to be 46 - 50%.    The left ventricular cavity is mildly dilated.    Left ventricular diastolic function is consistent with (grade III w/high LAP) fixed restrictive pattern.    The left atrial cavity is mild to moderately dilated.    The right atrial cavity is mildly  dilated.    Estimated right ventricular systolic pressure from tricuspid regurgitation is normal (<35 mmHg).    This is a technically difficult study.    @[id number] A-fib with fast ventricular response with PVCs, left bundle branch block        I have personally looked at the labs and they are summarized above.  ----------------------------------------------------------------------------------------------------------------------  Imaging Results (Last 24 Hours)       Procedure Component Value Units Date/Time    XR Chest 1 View [[id number]] Collected: 24 1003     Updated: 24 1006    Narrative:      CLINICAL HISTORY: Respiratory failure.     COMPARISON: 2024.     TECHNIQUE:  Single AP view of the chest       Impression:      FINDINGS AND IMPRESSION:    1.  Endotracheal tube tip is 6.5 cm above the gloria.  2.  Nasogastric tube tip is not seen but is below the gastroesophageal  junction.  3.  Right internal jugular vein central venous catheter tip is in the  right atrium.  Withdrawing 3 cm for cavoatrial placement if desired.  4.  Status post median sternotomy and CABG.  5.  Smaller left pleural effusion when compared to yesterday.  The right  pleural effusion is unchanged.  6.  Improved aeration of the lung bases.  7.  No pneumothorax.  8.  No acute osseous or upper abdominal abnormality.     This report was finalized on 2024 10:04 AM by Angelika Forte MD.              ----------------------------------------------------------------------------------------------------------------------    Assessment:  Coronary artery disease status post bypass surgery in the past.  last stent May 28, 2024 with PCI done to the circumflex vessel  End-stage renal disease on hemodialysis  Respiratory failure currently intubated    Plan:  #1 cardiac.  Patient with history of coronary disease with bypass surgery in the past.  Last cath was in May 2024 in which she was noted to have occluded vein graft to right coronary artery as well as to circumflex.  He underwent native PCI to the circumflex at that time.  Patient came in s/p cardiac arrest currently intubated.  He has mildly elevated high-sensitivity troponin.  He does have renal failure and is on dialysis  Echocardiogram done on 8/24/2024 showed EF of 46 to 50%    Patient with elevated BNP.  Patient s/p cardiac arrest.  If his mental status changes, may need a repeat cath.  May need AICD placed/LifeVest on discharge  Will continue to follow closely and clinically  #2 A-fib.  Anticoagulation with heparin will be advised.        This document has been electronically signed by Tommy Garcia MD [facility], Interventional Cardiology  August 26, 2024 12:40 EDT

## 2024-08-26 NOTE — PLAN OF CARE
Goal Outcome Evaluation:  Plan of Care Reviewed With: patient        Progress: no change  Outcome Evaluation: Pt remains intubated and sedated on fentanyl and precedex gtt. Deshawn gtt infusing. Dialysis completed this shift. Afebrile. Alarm set. Call light in reach. Care ongoing.

## 2024-08-26 NOTE — PLAN OF CARE
Problem: Adult Inpatient Plan of Care  Goal: Plan of Care Review  Outcome: Ongoing, Progressing  Goal: Patient-Specific Goal (Individualized)  Outcome: Ongoing, Progressing  Goal: Absence of Hospital-Acquired Illness or Injury  Outcome: Ongoing, Progressing  Intervention: Identify and Manage Fall Risk  Recent Flowsheet Documentation  Taken 8/26/2024 0400 by Lizzy Palacios RN  Safety Promotion/Fall Prevention: safety round/check completed  Taken 8/26/2024 0300 by Lizzy Palacios RN  Safety Promotion/Fall Prevention: safety round/check completed  Taken 8/26/2024 0200 by Lizzy Palacios RN  Safety Promotion/Fall Prevention: safety round/check completed  Taken 8/26/2024 0100 by Lizzy Palacios RN  Safety Promotion/Fall Prevention: safety round/check completed  Taken 8/26/2024 0000 by Rosenbalm, Lizzy, RN  Safety Promotion/Fall Prevention: safety round/check completed  Taken 8/25/2024 2300 by Lizzy Palacios RN  Safety Promotion/Fall Prevention: safety round/check completed  Taken 8/25/2024 2200 by Lizzy Palacios RN  Safety Promotion/Fall Prevention: safety round/check completed  Taken 8/25/2024 2100 by Lizzy Palacios RN  Safety Promotion/Fall Prevention: safety round/check completed  Taken 8/25/2024 2000 by Rosenbalm, Lizzy, RN  Safety Promotion/Fall Prevention: safety round/check completed  Taken 8/25/2024 1900 by Rosenbalm, Lizzy, RN  Safety Promotion/Fall Prevention: safety round/check completed  Intervention: Prevent Skin Injury  Recent Flowsheet Documentation  Taken 8/26/2024 0400 by Lizzy Palacios RN  Body Position:   left   turned  Taken 8/26/2024 0200 by Lizzy Palacios RN  Body Position:   right   turned  Taken 8/26/2024 0000 by Lizzy Palacios RN  Body Position:   left   turned  Taken 8/25/2024 2200 by Lizzy Palacios RN  Body Position:   right   turned  Taken 8/25/2024 2000 by Lizzy Palacios RN  Body Position:   right   turned  Skin  Protection: adhesive use limited  Intervention: Prevent and Manage VTE (Venous Thromboembolism) Risk  Recent Flowsheet Documentation  Taken 8/26/2024 0200 by Lizzy Palacios RN  Activity Management: bedrest  VTE Prevention/Management:   bilateral   sequential compression devices on  Taken 8/25/2024 2000 by Lizzy Palacios RN  Activity Management: bedrest  VTE Prevention/Management:   bilateral   sequential compression devices on  Goal: Optimal Comfort and Wellbeing  Outcome: Ongoing, Progressing  Intervention: Provide Person-Centered Care  Recent Flowsheet Documentation  Taken 8/26/2024 0200 by Lizzy Palacios RN  Trust Relationship/Rapport:   care explained   choices provided  Taken 8/25/2024 2000 by Lizzy Palacios RN  Trust Relationship/Rapport:   care explained   choices provided  Goal: Readiness for Transition of Care  Outcome: Ongoing, Progressing     Problem: Skin Injury Risk Increased  Goal: Skin Health and Integrity  Outcome: Ongoing, Progressing  Intervention: Optimize Skin Protection  Recent Flowsheet Documentation  Taken 8/26/2024 0400 by Lizzy Palacios RN  Head of Bed (HOB) Positioning: HOB at 30-45 degrees  Taken 8/26/2024 0200 by Lizzy Palacios RN  Head of Bed (HOB) Positioning: HOB at 30-45 degrees  Taken 8/26/2024 0000 by Lizzy Palacios RN  Head of Bed (HOB) Positioning: HOB at 30-45 degrees  Taken 8/25/2024 2200 by Lizzy Palacios RN  Head of Bed (HOB) Positioning: HOB at 30-45 degrees  Taken 8/25/2024 2000 by Lizzy Palacios RN  Pressure Reduction Techniques:   positioned off wounds   pressure points protected   heels elevated off bed  Head of Bed (HOB) Positioning: HOB at 30-45 degrees  Pressure Reduction Devices:   heel offloading device utilized   pressure-redistributing mattress utilized   positioning supports utilized  Skin Protection: adhesive use limited     Problem: Diabetes Comorbidity  Goal: Blood Glucose Level Within Targeted  Range  Outcome: Ongoing, Progressing     Problem: Heart Failure Comorbidity  Goal: Maintenance of Heart Failure Symptom Control  Outcome: Ongoing, Progressing  Intervention: Maintain Heart Failure-Management  Recent Flowsheet Documentation  Taken 8/26/2024 0400 by Lizzy Palacios RN  Medication Review/Management: medications reviewed  Taken 8/26/2024 0200 by Lizzy Palacios RN  Medication Review/Management: medications reviewed  Taken 8/26/2024 0000 by Lizzy Palacios RN  Medication Review/Management: medications reviewed  Taken 8/25/2024 2200 by Lizzy Palacios RN  Medication Review/Management: medications reviewed  Taken 8/25/2024 2000 by Lizzy Palacios RN  Medication Review/Management: medications reviewed     Problem: Hypertension Comorbidity  Goal: Blood Pressure in Desired Range  Outcome: Ongoing, Progressing  Intervention: Maintain Blood Pressure Management  Recent Flowsheet Documentation  Taken 8/26/2024 0400 by Lizzy Palacios RN  Medication Review/Management: medications reviewed  Taken 8/26/2024 0200 by Lizzy Palacios RN  Medication Review/Management: medications reviewed  Taken 8/26/2024 0000 by Lizzy Palacios RN  Medication Review/Management: medications reviewed  Taken 8/25/2024 2200 by Lizzy Palacios RN  Medication Review/Management: medications reviewed  Taken 8/25/2024 2000 by Lizzy Palacios RN  Medication Review/Management: medications reviewed     Problem: Adjustment to Illness (Gastrointestinal Bleeding)  Goal: Optimal Coping with Acute Illness  Outcome: Ongoing, Progressing     Problem: Bleeding (Gastrointestinal Bleeding)  Goal: Hemostasis  Outcome: Ongoing, Progressing     Problem: Anemia  Goal: Anemia Symptom Improvement  Outcome: Ongoing, Progressing  Intervention: Monitor and Manage Anemia  Recent Flowsheet Documentation  Taken 8/26/2024 0400 by Lizzy Palacios RN  Safety Promotion/Fall Prevention: safety round/check  completed  Taken 8/26/2024 0300 by Lizzy Palacios RN  Safety Promotion/Fall Prevention: safety round/check completed  Taken 8/26/2024 0200 by Lizzy Palacios RN  Safety Promotion/Fall Prevention: safety round/check completed  Taken 8/26/2024 0100 by Lizzy Palacios RN  Safety Promotion/Fall Prevention: safety round/check completed  Taken 8/26/2024 0000 by Rosenbalm, Lizzy, RN  Safety Promotion/Fall Prevention: safety round/check completed  Taken 8/25/2024 2300 by Lizzy Palacios RN  Safety Promotion/Fall Prevention: safety round/check completed  Taken 8/25/2024 2200 by Lizzy Palacios RN  Safety Promotion/Fall Prevention: safety round/check completed  Taken 8/25/2024 2100 by Lizzy Palacios RN  Safety Promotion/Fall Prevention: safety round/check completed  Taken 8/25/2024 2000 by Rosenbalm, Lizzy, RN  Safety Promotion/Fall Prevention: safety round/check completed  Taken 8/25/2024 1900 by Rosenbalm, Lizzy, RN  Safety Promotion/Fall Prevention: safety round/check completed     Problem: Fall Injury Risk  Goal: Absence of Fall and Fall-Related Injury  Outcome: Ongoing, Progressing  Intervention: Identify and Manage Contributors  Recent Flowsheet Documentation  Taken 8/26/2024 0400 by Lizzy Palacios RN  Medication Review/Management: medications reviewed  Taken 8/26/2024 0200 by Lizzy Palacios RN  Medication Review/Management: medications reviewed  Taken 8/26/2024 0000 by Lizzy Palacios RN  Medication Review/Management: medications reviewed  Taken 8/25/2024 2200 by Lizzy Palacios RN  Medication Review/Management: medications reviewed  Taken 8/25/2024 2000 by Lizzy Palacios RN  Medication Review/Management: medications reviewed  Intervention: Promote Injury-Free Environment  Recent Flowsheet Documentation  Taken 8/26/2024 0400 by Lizzy Palacios RN  Safety Promotion/Fall Prevention: safety round/check completed  Taken 8/26/2024 0300 by Philip  BHANU Ball  Safety Promotion/Fall Prevention: safety round/check completed  Taken 8/26/2024 0200 by Lizzy Palacios RN  Safety Promotion/Fall Prevention: safety round/check completed  Taken 8/26/2024 0100 by Lizzy Palacios RN  Safety Promotion/Fall Prevention: safety round/check completed  Taken 8/26/2024 0000 by Rosenbalm, Lizzy, RN  Safety Promotion/Fall Prevention: safety round/check completed  Taken 8/25/2024 2300 by Lizzy Palacios RN  Safety Promotion/Fall Prevention: safety round/check completed  Taken 8/25/2024 2200 by Lizzy Palacios RN  Safety Promotion/Fall Prevention: safety round/check completed  Taken 8/25/2024 2100 by Lizzy Palacios RN  Safety Promotion/Fall Prevention: safety round/check completed  Taken 8/25/2024 2000 by Rosenbalm, Lizzy, RN  Safety Promotion/Fall Prevention: safety round/check completed  Taken 8/25/2024 1900 by Rosenbalm, Lizzy, RN  Safety Promotion/Fall Prevention: safety round/check completed     Problem: Adjustment to Illness (Sepsis/Septic Shock)  Goal: Optimal Coping  Outcome: Ongoing, Progressing  Intervention: Optimize Psychosocial Adjustment to Illness  Recent Flowsheet Documentation  Taken 8/26/2024 0200 by Lizzy Palacios RN  Family/Support System Care: support provided  Taken 8/25/2024 2000 by Lizzy Palacios RN  Family/Support System Care: support provided     Problem: Bleeding (Sepsis/Septic Shock)  Goal: Absence of Bleeding  Outcome: Ongoing, Progressing     Problem: Glycemic Control Impaired (Sepsis/Septic Shock)  Goal: Blood Glucose Level Within Desired Range  Outcome: Ongoing, Progressing     Problem: Infection Progression (Sepsis/Septic Shock)  Goal: Absence of Infection Signs and Symptoms  Outcome: Ongoing, Progressing  Intervention: Promote Recovery  Recent Flowsheet Documentation  Taken 8/26/2024 0200 by Lizzy Palacios RN  Activity Management: bedrest  Taken 8/25/2024 2000 by Lizzy Palacios  RN  Activity Management: bedrest     Problem: Nutrition Impaired (Sepsis/Septic Shock)  Goal: Optimal Nutrition Intake  Outcome: Ongoing, Progressing     Problem: Communication Impairment (Mechanical Ventilation, Invasive)  Goal: Effective Communication  Outcome: Ongoing, Progressing     Problem: Device-Related Complication Risk (Mechanical Ventilation, Invasive)  Goal: Optimal Device Function  Outcome: Ongoing, Progressing     Problem: Inability to Wean (Mechanical Ventilation, Invasive)  Goal: Mechanical Ventilation Liberation  Outcome: Ongoing, Progressing  Intervention: Promote Extubation and Mechanical Ventilation Liberation  Recent Flowsheet Documentation  Taken 8/26/2024 0400 by Lizzy Palacios RN  Medication Review/Management: medications reviewed  Taken 8/26/2024 0200 by Lizzy Palacios RN  Medication Review/Management: medications reviewed  Taken 8/26/2024 0000 by Lizzy Palacios RN  Medication Review/Management: medications reviewed  Taken 8/25/2024 2200 by Lizzy Palacios RN  Medication Review/Management: medications reviewed  Taken 8/25/2024 2000 by Lizzy Palacios RN  Medication Review/Management: medications reviewed     Problem: Nutrition Impairment (Mechanical Ventilation, Invasive)  Goal: Optimal Nutrition Delivery  Outcome: Ongoing, Progressing     Problem: Skin and Tissue Injury (Mechanical Ventilation, Invasive)  Goal: Absence of Device-Related Skin and Tissue Injury  Outcome: Ongoing, Progressing  Intervention: Maintain Skin and Tissue Health  Recent Flowsheet Documentation  Taken 8/25/2024 2000 by Lizzy Palacios RN  Device Skin Pressure Protection:   skin-to-skin areas padded   pressure points protected   positioning supports utilized     Problem: Ventilator-Induced Lung Injury (Mechanical Ventilation, Invasive)  Goal: Absence of Ventilator-Induced Lung Injury  Outcome: Ongoing, Progressing  Intervention: Prevent Ventilator-Associated Pneumonia  Recent Flowsheet  Documentation  Taken 8/26/2024 0400 by Lizzy Palacios RN  Head of Bed ([facility]) Positioning: HOB at 30-45 degrees  Oral Care:   lip/mouth moisturizer applied   swabbed with antiseptic solution  Taken 8/26/2024 0200 by Lizzy Palacios RN  Head of Bed ([facility]) Positioning: HOB at 30-45 degrees  VAP Prevention Bundle:   HOB elevation maintained   oral care regularly provided   VTE prophylaxis provided   stress ulcer prophylaxis provided  VAP Prevention Measures: completed  Taken 8/26/2024 0000 by Lizzy Palacios RN  Head of Bed ([facility]) Positioning: HOB at 30-45 degrees  Oral Care:   lip/mouth moisturizer applied   swabbed with antiseptic solution  Taken 8/25/2024 2200 by Lizzy Palacios RN  Head of Bed ([facility]) Positioning: HOB at 30-45 degrees  Taken 8/25/2024 2000 by Lizzy Palacios RN  Head of Bed ([facility]) Positioning: HOB at 30-45 degrees  VAP Prevention Bundle:   HOB elevation maintained   oral care regularly provided   VTE prophylaxis provided   stress ulcer prophylaxis provided  VAP Prevention Measures: completed  Oral Care:   lip/mouth moisturizer applied   swabbed with antiseptic solution   Goal Outcome Evaluation:

## 2024-08-26 NOTE — PROGRESS NOTES
Nutrition Services    Patient Name:  Kennedy Prescott  YOB: 1950  MRN: [id number]  Admit Date:  8/19/2024    Day 6 no significant nutrition.  Prior to intubation patient NPO or clear liquids.  Recommend MD consider nutrition support.     Electronically signed by:  Georgina Snyder RD  08/26/24 15:27 EDT

## 2024-08-27 NOTE — PROGRESS NOTES
Bronchoscopy was done for right-sided infiltrate.    Informed consent was taken from patient's son.  Procedure was done at the bedside with endotracheal tube size 8.  Bronchoscope was passed through the ET tube    Patient had very thick mucopurulent secretions which were suctioned after giving saline multiple times. Then the bronchoscope was wedged into the right middle lobe and 40 mL of saline was given twice and close to 25 mL of mucopurulent secretions were  aspirated back.    Then the bronchoscope was wedged into the right lower lobe and 40 mL of saline was given once and close to 20 mL of mucopurulent secretions were aspirated back.  Then the bronchoscope was wedged into the left lower lobe and close to 40 mL of saline was given and close to 18 mL of mucopurulent secretions were aspirated back.    Patient tolerated the procedure well and there were no immediate complications.      Before the procedure was started patient was put on 100% oxygen and was preoxygenated.    Sample sent for microbiology

## 2024-08-27 NOTE — PROGRESS NOTES
"Nephrology Progress Note      Subjective   Patient is still on vasopressors, remains intubated on mechanical ventilation with FiO2 30% that has not been changed since yesterday    Objective       Vital signs :     Temp:  [97 °F (36.1 °C)-100.8 °F (38.2 °C)] 100.8 °F (38.2 °C)  Heart Rate:  [] 96  Resp:  [16-27] 16  BP: ()/(29-96) 90/62  FiO2 (%):  [30 %] 30 %    Intake/Output                         08/25/24 0701 - 08/26/24 0700 08/26/24 0701 - 08/27/24 0700     [id number] 1901-0700 Total 3686-6356 1631-6900 Total                 Intake    I.V.  338.1  350.4 688.5  --  1449 1449    Other  --  -- --  --  102 102    Flush/ Irrigation Intake (mL) (NG/OG Tube Orogastric 18 Fr Center mouth) -- -- -- -- 102 102    NG/GT  --  30 30  --  208 208    IV Piggyback  --  200 200  700  200 900    Total Intake 338.1 580.4 918.5 700 1959 2659       Output    Urine  --  15 15  --  -- --    Emesis/NG output  --  0 0  --  -- --    Stool  --  0 0  --  -- --    Dialysis  --  -- --  1800  -- 1800    Total Output -- 15 15 1800 -- 1800           08/25/2024 examined and reviewed  08/26/2024 examined and reviewed  08/27/2024 examined and reviewed    Physical Exam:    General Appearance : On ventilator  Lungs : Bilateral anterior crackles  Heart :  regular rhythm & normal rate, normal S1, S2 and no murmur, no rub  Abdomen : Nondistended  Extremities : Trace edema,   Neurologic :   Unable to assess  Access: Left upper arm AV fistula [place]    Laboratory Data :     Albumin Albumin   Date Value Ref Range Status   08/26/2024 2.4 (L) 3.5 - 5.2 g/dL Final   08/25/2024 3.0 (L) 3.5 - 5.2 g/dL Final   08/24/2024 2.7 (L) 3.5 - 5.2 g/dL Final      Magnesium Magnesium   Date Value Ref Range Status   08/25/2024 2.0 1.6 - 2.4 mg/dL Final            PTH               No results found for: \"PTH\"    CBC and coagulation:  Results from last 7 days   Lab Units 08/27/24  0021 08/26/24  0024 08/25/24  0759 08/24/24  1524 08/23/24  2306 " 08/23/24  1541   LACTATE mmol/L  --   --   --  2.0  --   --    WBC 10*3/mm3 23.22* 13.05* 15.40* 11.51*   < > 16.97*   HEMOGLOBIN g/dL 9.9* 8.0* 9.4* 8.0*   < > 7.8*   HEMATOCRIT % 32.5* 26.2* 30.2* 26.1*   < > 25.2*   MCV fL 95.6 94.6 93.8 94.6   < > 94.4   MCHC g/dL 30.5* 30.5* 31.1* 30.7*   < > 31.0*   PLATELETS 10*3/mm3 293 172 267 182   < > 193   INR   --  1.40*  --   --   --  1.31*    < > = values in this interval not displayed.     Acid/base balance:  Results from last 7 days   Lab Units 08/25/24  0819 08/24/24  0429 08/23/24  1615   PH, ARTERIAL pH units 7.438 7.558* 7.498*   PO2 ART mm Hg 73.1* 67.3* 291.0*   PCO2, ARTERIAL mm Hg 37.9 32.9* 39.3   HCO3 ART mmol/L 25.5 29.3* 30.5*       Renal and electrolytes:    Results from last 7 days   Lab Units 08/27/24  0021 08/26/24  0024 08/25/24  0759 08/24/24  1524 08/23/24  2306 08/23/24  1541   SODIUM mmol/L 137 139 139 129* 141 138   POTASSIUM mmol/L 4.5 3.8 4.3 3.8 3.4* 3.4*   MAGNESIUM mg/dL  --   --  2.0  --   --  2.0   CHLORIDE mmol/L 93* 97* 94* 88* 97* 94*   CO2 mmol/L 20.4* 23.7 23.4 23.0 26.7 28.5   BUN mg/dL 25* 35* 31* 25* 19 20   CREATININE mg/dL 3.83* 4.96* 4.47* 3.83* 2.97* 2.65*   CALCIUM mg/dL 8.4* 8.3* 8.8 8.4* 8.2* 8.2*     Estimated Creatinine Clearance: 20.1 mL/min (A) (by C-G formula based on SCr of 3.83 mg/dL (H)).  @GFRCG:3@   Liver and pancreatic function:  Results from last 7 days   Lab Units 08/26/24  0024 08/25/24  0759 08/24/24  1524   ALBUMIN g/dL 2.4* 3.0* 2.7*   BILIRUBIN mg/dL 0.4 0.6 0.5   ALK PHOS U/L 86 107 104   AST (SGOT) U/L 126* 168* 25   ALT (SGPT) U/L 90* 85* 9         Cardiac:        Liver and pancreatic function:  Results from last 7 days   Lab Units 08/26/24  0024 08/25/24  0759 08/24/24  1524   ALBUMIN g/dL 2.4* 3.0* 2.7*   BILIRUBIN mg/dL 0.4 0.6 0.5   ALK PHOS U/L 86 107 104   AST (SGOT) U/L 126* 168* 25   ALT (SGPT) U/L 90* 85* 9       Medications :     acetaminophen, 650 mg, Oral, Once per day on Monday Wednesday  Friday  acetaminophen, 650 mg, Oral, TID  ammonium lactate, 1 Application, Topical, Nightly  aspirin, 81 mg, Oral, Daily  atorvastatin, 40 mg, Oral, Nightly  chlorhexidine, 15 mL, Mouth/Throat, Q12H  clopidogrel, 75 mg, Oral, Daily  collagenase, 1 Application, Topical, Q24H  doxycycline, 100 mg, Intravenous, Q12H  gabapentin, 200 mg, Oral, Nightly  insulin lispro, 2-7 Units, Subcutaneous, 4x Daily AC & at Bedtime  levothyroxine, 75 mcg, Oral, QAM  metoprolol tartrate, 12.5 mg, Oral, Q12H  multivitamin with minerals, 1 tablet, Oral, Daily  mupirocin, 1 Application, Topical, Q12H  nicotine, 1 patch, Transdermal, Q24H  pantoprazole, 40 mg, Intravenous, BID AC  piperacillin-tazobactam, 3.375 g, Intravenous, Q12H  senna-docusate sodium, 2 tablet, Oral, BID  sertraline, 50 mg, Oral, Nightly  sevelamer, 800 mg, Oral, TID With Meals  sodium chloride, 10 mL, Intravenous, Q12H  sodium chloride, 10 mL, Intravenous, Q12H  sodium chloride, 10 mL, Intravenous, Q12H  sodium chloride, 10 mL, Intravenous, Q12H  sucralfate, 1 g, Oral, 4x Daily AC & at Bedtime  Vancomycin Pharmacy Intermittent/Pulse Dosing, , Does not apply, Daily      dexmedetomidine, 0.2-1.5 mcg/kg/hr (Dosing Weight), Last Rate: 1 mcg/kg/hr (08/26/24 2208)  fentanyl 10 mcg/mL,  mcg/hr, Last Rate: 250 mcg/hr (08/27/24 0727)  phenylephrine, 0.5-3 mcg/kg/min, Last Rate: 0.5 mcg/kg/min (08/26/24 2208)  propofol, 5-50 mcg/kg/min (Dosing Weight), Last Rate: Stopped (08/24/24 0815)          Assessment & Plan     -ESRD on intermittent dialysis  -Status post cardiac arrest  -Shock  -Hypokalemia  - Acute hypoxic respiratory failure likely due to fluid overload in settings of dialysis noncompliance  - Hyperkalemia resolved  - Acute on chronic anemia status post EGD  - Urinary tract infection    Patient had dialysis done with some intradialytic hypotension required to resume vasopressors.  Will give IV albumin 1 dose and to attempt to wean off pressors support  Will  continue providing dialysis while inpatient  Prognosis critical    Discussed with RN    Reviewed clinical notes, radiological data.  Labs evaluated, discussed the case in detail with primary team      Nova Saucedo MD  08/27/24  08:17 EDT

## 2024-08-27 NOTE — PLAN OF CARE
Problem: Adult Inpatient Plan of Care  Goal: Plan of Care Review  8/27/2024 0649 by Lizzy Palacios RN  Outcome: Ongoing, Not Progressing  8/27/2024 0649 by Lizzy Palacios RN  Outcome: Ongoing, Progressing  Goal: Patient-Specific Goal (Individualized)  8/27/2024 0649 by Lizzy Palacios RN  Outcome: Ongoing, Not Progressing  8/27/2024 0649 by Lizzy Palacios RN  Outcome: Ongoing, Progressing  Goal: Absence of Hospital-Acquired Illness or Injury  8/27/2024 0649 by Lizzy Palacios RN  Outcome: Ongoing, Not Progressing  8/27/2024 0649 by Lizzy Palacios RN  Outcome: Ongoing, Progressing  Intervention: Identify and Manage Fall Risk  Recent Flowsheet Documentation  Taken 8/27/2024 0600 by Lizzy Palacios RN  Safety Promotion/Fall Prevention: safety round/check completed  Taken 8/27/2024 0500 by Lizzy Palacios RN  Safety Promotion/Fall Prevention: safety round/check completed  Taken 8/27/2024 0400 by Lizzy Palacios RN  Safety Promotion/Fall Prevention: safety round/check completed  Taken 8/27/2024 0300 by Lizzy Palacios RN  Safety Promotion/Fall Prevention: safety round/check completed  Taken 8/27/2024 0100 by Lizzy Palacios RN  Safety Promotion/Fall Prevention: safety round/check completed  Taken 8/27/2024 0000 by Rosenbalm, Lizzy, RN  Safety Promotion/Fall Prevention: safety round/check completed  Taken 8/26/2024 2300 by Lizzy Palacios RN  Safety Promotion/Fall Prevention: safety round/check completed  Taken 8/26/2024 2200 by Lizzy Palacios RN  Safety Promotion/Fall Prevention: safety round/check completed  Taken 8/26/2024 2100 by Lizzy Palacios RN  Safety Promotion/Fall Prevention: safety round/check completed  Taken 8/26/2024 2000 by Rosenbalm, Lizzy, RN  Safety Promotion/Fall Prevention: safety round/check completed  Taken 8/26/2024 1900 by Rosenbalm, Lizzy, RN  Safety Promotion/Fall Prevention: safety round/check  completed  Intervention: Prevent Skin Injury  Recent Flowsheet Documentation  Taken 8/27/2024 0600 by Lizzy Palacios RN  Body Position:   left   turned  Taken 8/27/2024 0400 by Lizzy Palacios RN  Body Position:   right   turned  Taken 8/27/2024 0200 by Lizzy Palacios RN  Body Position:   left   turned  Taken 8/27/2024 0000 by Lizzy Palacios RN  Body Position:   right   turned  Taken 8/26/2024 2200 by Lizzy Palacios RN  Body Position:   right   turned  Taken 8/26/2024 2000 by Lizzy Palacios RN  Body Position:   left   turned  Skin Protection: adhesive use limited  Intervention: Prevent and Manage VTE (Venous Thromboembolism) Risk  Recent Flowsheet Documentation  Taken 8/27/2024 0200 by Lizzy Palacios RN  VTE Prevention/Management:   bilateral   sequential compression devices on  Taken 8/26/2024 2000 by Lizzy Palacios RN  Activity Management: bedrest  VTE Prevention/Management:   bilateral   sequential compression devices on  Goal: Optimal Comfort and Wellbeing  8/27/2024 0649 by Lizzy Palacios RN  Outcome: Ongoing, Not Progressing  8/27/2024 0649 by Lizzy Palacios RN  Outcome: Ongoing, Progressing  Intervention: Provide Person-Centered Care  Recent Flowsheet Documentation  Taken 8/27/2024 0200 by Lizzy Palacios RN  Trust Relationship/Rapport: care explained  Taken 8/26/2024 2000 by Lizzy Palacios RN  Trust Relationship/Rapport: care explained  Goal: Readiness for Transition of Care  8/27/2024 0649 by Lizzy Palacios RN  Outcome: Ongoing, Not Progressing  8/27/2024 0649 by Lizzy Palacios RN  Outcome: Ongoing, Progressing     Problem: Skin Injury Risk Increased  Goal: Skin Health and Integrity  8/27/2024 0649 by Lizzy Palacios RN  Outcome: Ongoing, Not Progressing  8/27/2024 0649 by Lizzy Palacios RN  Outcome: Ongoing, Progressing  Intervention: Optimize Skin Protection  Recent Flowsheet Documentation  Taken 8/27/2024 0600 by  Lizzy Palacios RN  Head of Bed (HOB) Positioning: HOB at 30-45 degrees  Taken 8/27/2024 0400 by Lizzy Palacios RN  Head of Bed (HOB) Positioning: HOB at 30-45 degrees  Taken 8/27/2024 0200 by Lizzy Palacios RN  Head of Bed (HOB) Positioning: HOB at 30-45 degrees  Taken 8/27/2024 0000 by Lizzy Palacios RN  Head of Bed (HOB) Positioning: HOB at 30-45 degrees  Taken 8/26/2024 2200 by Lizzy Palacios RN  Head of Bed (HOB) Positioning: HOB at 30-45 degrees  Taken 8/26/2024 2000 by Lizzy Palacios RN  Pressure Reduction Techniques:   heels elevated off bed   positioned off wounds   pressure points protected  Head of Bed (HOB) Positioning: HOB at 30-45 degrees  Pressure Reduction Devices:   positioning supports utilized   heel offloading device utilized   pressure-redistributing mattress utilized  Skin Protection: adhesive use limited     Problem: Diabetes Comorbidity  Goal: Blood Glucose Level Within Targeted Range  8/27/2024 0649 by Lizzy Palacios RN  Outcome: Ongoing, Not Progressing  8/27/2024 0649 by Lizzy Palcaios RN  Outcome: Ongoing, Progressing     Problem: Heart Failure Comorbidity  Goal: Maintenance of Heart Failure Symptom Control  8/27/2024 0649 by Lizzy Palacios RN  Outcome: Ongoing, Not Progressing  8/27/2024 0649 by Lizzy Palacios RN  Outcome: Ongoing, Progressing  Intervention: Maintain Heart Failure-Management  Recent Flowsheet Documentation  Taken 8/27/2024 0600 by Lizzy Palacios RN  Medication Review/Management: medications reviewed  Taken 8/27/2024 0400 by Lizzy Palacios RN  Medication Review/Management: medications reviewed  Taken 8/27/2024 0000 by Lizzy Palacios RN  Medication Review/Management: medications reviewed  Taken 8/26/2024 2200 by Lizzy Palacios RN  Medication Review/Management: medications reviewed  Taken 8/26/2024 2000 by Lizzy Palacios RN  Medication Review/Management: medications reviewed      Problem: Hypertension Comorbidity  Goal: Blood Pressure in Desired Range  8/27/2024 0649 by Lizzy Palacios RN  Outcome: Ongoing, Not Progressing  8/27/2024 0649 by Lizzy Palacios RN  Outcome: Ongoing, Progressing  Intervention: Maintain Blood Pressure Management  Recent Flowsheet Documentation  Taken 8/27/2024 0600 by Lizzy Palacios RN  Medication Review/Management: medications reviewed  Taken 8/27/2024 0400 by Lizzy Palacios RN  Medication Review/Management: medications reviewed  Taken 8/27/2024 0000 by Lizzy Palacios RN  Medication Review/Management: medications reviewed  Taken 8/26/2024 2200 by Lizzy Palacios RN  Medication Review/Management: medications reviewed  Taken 8/26/2024 2000 by Lizzy Palacios RN  Medication Review/Management: medications reviewed     Problem: Adjustment to Illness (Gastrointestinal Bleeding)  Goal: Optimal Coping with Acute Illness  8/27/2024 0649 by Lizzy Palacios RN  Outcome: Ongoing, Not Progressing  8/27/2024 0649 by Lizzy Palacios RN  Outcome: Ongoing, Progressing     Problem: Bleeding (Gastrointestinal Bleeding)  Goal: Hemostasis  8/27/2024 0649 by Lizzy Palacios RN  Outcome: Ongoing, Not Progressing  8/27/2024 0649 by Lizzy Palacios RN  Outcome: Ongoing, Progressing     Problem: Anemia  Goal: Anemia Symptom Improvement  8/27/2024 0649 by Lizzy Palacios RN  Outcome: Ongoing, Not Progressing  8/27/2024 0649 by Lizzy Palacios RN  Outcome: Ongoing, Progressing  Intervention: Monitor and Manage Anemia  Recent Flowsheet Documentation  Taken 8/27/2024 0600 by Lizzy Palacios RN  Safety Promotion/Fall Prevention: safety round/check completed  Taken 8/27/2024 0500 by Lizzy Palacios RN  Safety Promotion/Fall Prevention: safety round/check completed  Taken 8/27/2024 0400 by Lizzy Palacios RN  Safety Promotion/Fall Prevention: safety round/check completed  Taken 8/27/2024 0300 by Philip  BHANU Ball  Safety Promotion/Fall Prevention: safety round/check completed  Taken 8/27/2024 0100 by Lizzy Palacios RN  Safety Promotion/Fall Prevention: safety round/check completed  Taken 8/27/2024 0000 by Rosenbalm, Lizzy, RN  Safety Promotion/Fall Prevention: safety round/check completed  Taken 8/26/2024 2300 by Lizzy Palaicos RN  Safety Promotion/Fall Prevention: safety round/check completed  Taken 8/26/2024 2200 by Lizzy Palacios RN  Safety Promotion/Fall Prevention: safety round/check completed  Taken 8/26/2024 2100 by Lizzy Palacios RN  Safety Promotion/Fall Prevention: safety round/check completed  Taken 8/26/2024 2000 by Rosenbalm, Lizzy, RN  Safety Promotion/Fall Prevention: safety round/check completed  Taken 8/26/2024 1900 by Rosenbalm, Lizzy, RN  Safety Promotion/Fall Prevention: safety round/check completed     Problem: Fall Injury Risk  Goal: Absence of Fall and Fall-Related Injury  8/27/2024 0649 by Lizzy Palacios RN  Outcome: Ongoing, Not Progressing  8/27/2024 0649 by Lizzy Palacios RN  Outcome: Ongoing, Progressing  Intervention: Identify and Manage Contributors  Recent Flowsheet Documentation  Taken 8/27/2024 0600 by Lizzy Palacios RN  Medication Review/Management: medications reviewed  Taken 8/27/2024 0400 by Lizzy Palacios RN  Medication Review/Management: medications reviewed  Taken 8/27/2024 0000 by Lizzy Palacios RN  Medication Review/Management: medications reviewed  Taken 8/26/2024 2200 by Lizzy Palacios RN  Medication Review/Management: medications reviewed  Taken 8/26/2024 2000 by Lizzy Palacios RN  Medication Review/Management: medications reviewed  Intervention: Promote Injury-Free Environment  Recent Flowsheet Documentation  Taken 8/27/2024 0600 by Lizzy Palacios RN  Safety Promotion/Fall Prevention: safety round/check completed  Taken 8/27/2024 0500 by Lizzy Palacios RN  Safety Promotion/Fall  Prevention: safety round/check completed  Taken 8/27/2024 0400 by Lizzy Palacios RN  Safety Promotion/Fall Prevention: safety round/check completed  Taken 8/27/2024 0300 by Lizzy Palacios RN  Safety Promotion/Fall Prevention: safety round/check completed  Taken 8/27/2024 0100 by Lizzy Palacios RN  Safety Promotion/Fall Prevention: safety round/check completed  Taken 8/27/2024 0000 by Rosenbalm, Lizzy, RN  Safety Promotion/Fall Prevention: safety round/check completed  Taken 8/26/2024 2300 by Lizzy Palacios RN  Safety Promotion/Fall Prevention: safety round/check completed  Taken 8/26/2024 2200 by Lizzy Palacios RN  Safety Promotion/Fall Prevention: safety round/check completed  Taken 8/26/2024 2100 by Lizzy Palacios RN  Safety Promotion/Fall Prevention: safety round/check completed  Taken 8/26/2024 2000 by Rosenbalm, Lizzy, RN  Safety Promotion/Fall Prevention: safety round/check completed  Taken 8/26/2024 1900 by Rosenbalm, Lizzy, RN  Safety Promotion/Fall Prevention: safety round/check completed     Problem: Adjustment to Illness (Sepsis/Septic Shock)  Goal: Optimal Coping  8/27/2024 0649 by Lizzy Palacios RN  Outcome: Ongoing, Not Progressing  8/27/2024 0649 by Lizzy Palacios RN  Outcome: Ongoing, Progressing  Intervention: Optimize Psychosocial Adjustment to Illness  Recent Flowsheet Documentation  Taken 8/26/2024 2000 by Lizzy Palacios RN  Family/Support System Care: support provided     Problem: Bleeding (Sepsis/Septic Shock)  Goal: Absence of Bleeding  8/27/2024 0649 by Lizzy Palacios RN  Outcome: Ongoing, Not Progressing  8/27/2024 0649 by Lizzy Palacios RN  Outcome: Ongoing, Progressing     Problem: Glycemic Control Impaired (Sepsis/Septic Shock)  Goal: Blood Glucose Level Within Desired Range  8/27/2024 0649 by Lizzy Palacios RN  Outcome: Ongoing, Not Progressing  8/27/2024 0649 by Lizzy Palacios RN  Outcome: Ongoing,  Progressing     Problem: Infection Progression (Sepsis/Septic Shock)  Goal: Absence of Infection Signs and Symptoms  8/27/2024 0649 by Lizzy Palacios RN  Outcome: Ongoing, Not Progressing  8/27/2024 0649 by Lizzy Palacios RN  Outcome: Ongoing, Progressing  Intervention: Promote Recovery  Recent Flowsheet Documentation  Taken 8/26/2024 2000 by Lizzy Palacios RN  Activity Management: bedrest     Problem: Nutrition Impaired (Sepsis/Septic Shock)  Goal: Optimal Nutrition Intake  8/27/2024 0649 by Lizzy Palacios RN  Outcome: Ongoing, Not Progressing  8/27/2024 0649 by Lizzy Palacios RN  Outcome: Ongoing, Progressing     Problem: Communication Impairment (Mechanical Ventilation, Invasive)  Goal: Effective Communication  8/27/2024 0649 by Lizzy Palacios RN  Outcome: Ongoing, Not Progressing  8/27/2024 0649 by Lizzy Palacios RN  Outcome: Ongoing, Progressing     Problem: Device-Related Complication Risk (Mechanical Ventilation, Invasive)  Goal: Optimal Device Function  8/27/2024 0649 by Lizzy Palacios RN  Outcome: Ongoing, Not Progressing  8/27/2024 0649 by Lizzy Palacios RN  Outcome: Ongoing, Progressing     Problem: Inability to Wean (Mechanical Ventilation, Invasive)  Goal: Mechanical Ventilation Liberation  8/27/2024 0649 by Lizzy Palacios RN  Outcome: Ongoing, Not Progressing  8/27/2024 0649 by Lizzy Palacios RN  Outcome: Ongoing, Progressing  Intervention: Promote Extubation and Mechanical Ventilation Liberation  Recent Flowsheet Documentation  Taken 8/27/2024 0600 by Lizzy Palacios RN  Medication Review/Management: medications reviewed  Taken 8/27/2024 0400 by Lizzy Palacios RN  Medication Review/Management: medications reviewed  Taken 8/27/2024 0000 by Lizzy Palacios RN  Medication Review/Management: medications reviewed  Taken 8/26/2024 2200 by Lizzy Palacios, RN  Medication Review/Management: medications reviewed  Taken  8/26/2024 2000 by Lizzy Palacios RN  Medication Review/Management: medications reviewed     Problem: Nutrition Impairment (Mechanical Ventilation, Invasive)  Goal: Optimal Nutrition Delivery  8/27/2024 0649 by Lizzy Palacios RN  Outcome: Ongoing, Not Progressing  8/27/2024 0649 by Lizzy Palacios RN  Outcome: Ongoing, Progressing     Problem: Skin and Tissue Injury (Mechanical Ventilation, Invasive)  Goal: Absence of Device-Related Skin and Tissue Injury  8/27/2024 0649 by Lizzy Palacios RN  Outcome: Ongoing, Not Progressing  8/27/2024 0649 by Lizzy Palacios RN  Outcome: Ongoing, Progressing  Intervention: Maintain Skin and Tissue Health  Recent Flowsheet Documentation  Taken 8/26/2024 2000 by Lizzy Palacios RN  Device Skin Pressure Protection:   positioning supports utilized   pressure points protected   skin-to-device areas padded     Problem: Ventilator-Induced Lung Injury (Mechanical Ventilation, Invasive)  Goal: Absence of Ventilator-Induced Lung Injury  8/27/2024 0649 by Lizzy Palacios RN  Outcome: Ongoing, Not Progressing  8/27/2024 0649 by Lizzy Palacios RN  Outcome: Ongoing, Progressing  Intervention: Prevent Ventilator-Associated Pneumonia  Recent Flowsheet Documentation  Taken 8/27/2024 0600 by Lizzy Palacios RN  Head of Bed ([facility]) Positioning: HOB at 30-45 degrees  Taken 8/27/2024 0400 by Lizzy Palacios RN  Head of Bed ([facility]) Positioning: HOB at 30-45 degrees  Oral Care:   lip/mouth moisturizer applied   swabbed with antiseptic solution  Taken 8/27/2024 0200 by Lizzy Palacios RN  Head of Bed ([facility]) Positioning: HOB at 30-45 degrees  Taken 8/27/2024 0000 by Lizzy Palacios RN  Head of Bed ([facility]) Positioning: HOB at 30-45 degrees  Oral Care:   lip/mouth moisturizer applied   swabbed with antiseptic solution  Taken 8/26/2024 2200 by Lizzy Palacios RN  Head of Bed ([facility]) Positioning: HOB at 30-45 degrees  Taken 8/26/2024 2000 by  Lizzy Palacios, RN  Head of Bed (HOB) Positioning: HOB at 30-45 degrees  VAP Prevention Bundle:   HOB elevation maintained   oral care regularly provided   VTE prophylaxis provided   stress ulcer prophylaxis provided  VAP Prevention Measures: completed  Oral Care:   lip/mouth moisturizer applied   swabbed with antiseptic solution   Goal Outcome Evaluation:

## 2024-08-27 NOTE — PLAN OF CARE
Problem: Communication Impairment (Mechanical Ventilation, Invasive)  Goal: Effective Communication  Outcome: Ongoing, Progressing   Goal Outcome Evaluation:                                               [FreeTextEntry1] : Jenna is a [id number] F coming in for acute visit for fever, nasal congestion and cough since Sunday in the setting of FOC having viral symptoms. Noted to have rhinorrhea on exam. Symptoms likely due to viral URI. Recommend supportive are: antipyretics as needed for fever, encourage fluid intake and monitor hydration status, for nasal congestion -  use nasal saline followed by nasal suction, humidifier in room, and can breathe in steam from shower. For cough, take some honey a few times a day. Return if symptoms worsen or persist. Reviewed that if having respiratory distress, not having at least 2 diapers in 24 hours, or not able to take anything by mouth - needs to be seen in ED immediately.

## 2024-08-27 NOTE — PROGRESS NOTES
"Palliative Care Daily Progress Note     S: Medical record reviewed, followed up with Primary RN Janette and Dr Marcum regarding patient's condition. Per discussion with Dr Marcum pt had been febrile and new blood cultures collected.When I saw Kennedy he was on precedex, fentanyl, neosynephrine and was on vent at 30% FiO2 and 5 of peep. He did have his eyes opened and would squeeze my hands, left stronger than right. His son Joaquina and wife entered the room while Dr Marcum and myself were at bedside. Kennedy was not in distress at this time.      O:   Palliative Performance Scale Score:     BP 90/77   Pulse 112   Temp 98.8 °F (37.1 °C) (Rectal)   Resp 28   Ht 180.3 cm (71\")   Wt 103 kg (227 lb 1.2 oz)   SpO2 96%   BMI 31.67 kg/m²     Intake/Output Summary (Last 24 hours) at 8/27/2024 1532  Last data filed at 8/27/2024 1414  Gross per 24 hour   Intake 2259.03 ml   Output --   Net 2259.03 ml       PE:  General Appearance:    Chronically ill appearing, awake, sedated on vent cooperative, NAD   HEENT:    NC/AT, without obvious abnormality, EOMI, anicteric    Neck:   supple, trachea midline, no JVD   Lungs:     Sedated on vent 30/5, no distress, diminished in bases no wheezing, rhonchi or rales noted.    Heart:    Tachycardic, irregularly irregular rhythm   Abdomen:     Soft, NT, ND, NABS    Extremities:   Moves all extremities weakly , BLE 1+ edema, cool extremities.   Pulses:   Pulses palpable and equal bilaterally   Skin:   Warm, dry   Neurologic:   Sedated on vent, did open eyes and tracked, squeezed BL hands   Psych:   Calm, flat         Meds: Reviewed and changes noted    Labs:   Results from last 7 days   Lab Units 08/27/24  0021   WBC 10*3/mm3 23.22*   HEMOGLOBIN g/dL 9.9*   HEMATOCRIT % 32.5*   PLATELETS 10*3/mm3 293     Results from last 7 days   Lab Units 08/27/24  0021   SODIUM mmol/L 137   POTASSIUM mmol/L 4.5   CHLORIDE mmol/L 93*   CO2 mmol/L 20.4*   BUN mg/dL 25*   CREATININE mg/dL 3.83*   GLUCOSE mg/dL " 105*   CALCIUM mg/dL 8.4*     Results from last 7 days   Lab Units 08/27/24  0021 08/26/24  0024   SODIUM mmol/L 137 139   POTASSIUM mmol/L 4.5 3.8   CHLORIDE mmol/L 93* 97*   CO2 mmol/L 20.4* 23.7   BUN mg/dL 25* 35*   CREATININE mg/dL 3.83* 4.96*   CALCIUM mg/dL 8.4* 8.3*   BILIRUBIN mg/dL  --  0.4   ALK PHOS U/L  --  86   ALT (SGPT) U/L  --  90*   AST (SGOT) U/L  --  126*   GLUCOSE mg/dL 105* 143*     Imaging Results (Last 72 Hours)       Procedure Component Value Units Date/Time    XR Chest 1 View [[id number]] Collected: 08/26/24 1523     Updated: 08/26/24 1530    Narrative:      XR CHEST 1 VW-     CLINICAL INDICATION: ETT advanced; K92.2-Gastrointestinal hemorrhage,  unspecified; R19.7-Diarrhea, unspecified; N18.6-End stage renal disease;  Z99.2-Dependence on renal dialysis; D64.9-Anemia, unspecified;  K29.71-Gastritis, unspecified, with bleeding        COMPARISON: Same day earlier     TECHNIQUE: Single frontal view of the chest.     FINDINGS:     LUNGS: Lungs are adequately aerated.      HEART AND MEDIASTINUM: Heart and mediastinal contours are unremarkable        SKELETON: Bony and soft tissue structures are unremarkable.     Endotracheal tube overlies tracheal air column approximately 2.4 cm  below the lower margin of the sternoclavicular joint on the left       Impression:      Endotracheal tube slightly advanced.           This report was finalized on 8/26/2024 3:28 PM by Dr. Theron Estrada MD.       XR Chest 1 View [[id number]] Collected: 08/26/24 1522     Updated: 08/26/24 1525    Narrative:      XR CHEST 1 VW-     CLINICAL INDICATION: et tube position; K92.2-Gastrointestinal  hemorrhage, unspecified; R19.7-Diarrhea, unspecified; N18.6-End stage  renal disease; Z99.2-Dependence on renal dialysis; D64.9-Anemia,  unspecified; K29.71-Gastritis, unspecified, with bleeding        COMPARISON: 8/25/2024     TECHNIQUE: Single frontal view of the chest.     FINDINGS:     LUNGS: Small bilateral effusions  Mild  vascular congestion  Overall little change     HEART AND MEDIASTINUM: Heart and mediastinal contours are unremarkable        SKELETON: Bony and soft tissue structures are unremarkable.             Impression:         1. Lungs unchanged  2. Endotracheal tube overlying the tracheal air column just below the  sternoclavicular joints           This report was finalized on 8/26/2024 3:23 PM by Dr. Theron Estrada MD.       XR Chest 1 View [[id number]] Collected: 08/26/24 1003     Updated: 08/26/24 1006    Narrative:      CLINICAL HISTORY: Respiratory failure.     COMPARISON: 8/25/2024.     TECHNIQUE:  Single AP view of the chest       Impression:      FINDINGS AND IMPRESSION:    1.  Endotracheal tube tip is 6.5 cm above the gloria.  2.  Nasogastric tube tip is not seen but is below the gastroesophageal  junction.  3.  Right internal jugular vein central venous catheter tip is in the  right atrium.  Withdrawing 3 cm for cavoatrial placement if desired.  4.  Status post median sternotomy and CABG.  5.  Smaller left pleural effusion when compared to yesterday.  The right  pleural effusion is unchanged.  6.  Improved aeration of the lung bases.  7.  No pneumothorax.  8.  No acute osseous or upper abdominal abnormality.     This report was finalized on 8/26/2024 10:04 AM by Angelika Forte MD.       XR Chest 1 View [[id number]] Collected: 08/25/24 1130     Updated: 08/25/24 1132    Narrative:      Comparison: August 24, 2024.      Endotracheal tube tip similar position. Nasogastric tube noted, tip not  visualized on this exam. Heart is enlarged with vascular congestion.  Persistent bilateral pleural effusions seen, right greater than left. No  pneumothorax is identified. Right IJ central catheter tip projects at  the right atria, similar of to the prior exam. Consider retraction of  desired position is within the SVC. No pneumothorax seen.       Impression:      Impression:  1. Cardiomegaly, vascular congestion and bilateral  pleural effusions  similar to the prior exam.  2. Lines and tubes as described. Right IJ central catheter tip projects  at the right atria, consider retraction if desired position is within  the SVC.        This report was finalized on 8/25/2024 11:30 AM by Brennen Quinn DO.                 Diagnostics: Reviewed    A: Kennedy Prescott is a 74 y.o. male admitted on 8/19/2024 originally for shortness of breath, pt had been seen in ER day before due to AMS however that resolved, Kennedy is a resident of the [facility].  Kennedy has a medical history of CHF, CAD, diabetes mellitus, ESRD, hypertension and GERD and is on hemodialysis. Per pt he had not been going to HD due to diarrhea but is willing to continue HD. On 8/23 pt was having upper endoscopy done however when endoscope was being removed Kennedy became bradycardic, ultimately ended up required ACLS, per notes was having wide complex tachycardia requiring cardioversion, requiring ventilator post procedure and was transferred to CCU. Today 8/27/2024 pt is still intubated on vent with sedation, he was awake and opening eyes, tracking and squeezing hands.      P:  I was able to speak with pts son Joaquina and his wife at bedside this morning with Dr Marcum present.  I introduced palliative and let him know that we were here for support. Joaquina stated that his dad is a fighter and has already been through many traumatic events in his life and he is confident he will come through this as well. I discussed pt with DR Marcum and Yandel.      We will continue to follow along. Please do not hesitate to contact us regarding further sx mgmt or GOC needs, including after hours or on weekends via our on call provider at [phone].     Estefani Barrera, APRN    8/27/2024

## 2024-08-27 NOTE — PROGRESS NOTES
.Patient Identification:  Name:  Kennedy Prescott  Age:  74 y.o.  Sex:  male  :  1950  MRN:  [id number]  Visit Number:  [id number]    Chief Complaint:   Coronary artery disease    Subjective:    Patient remains intubated although more alert.  Patient s/p bronchoscopy today.  ----------------------------------------------------------------------------------------------------------------------  Current Hospital Meds:  acetaminophen, 650 mg, Oral, Once per day on   acetaminophen, 650 mg, Oral, TID  ammonium lactate, 1 Application, Topical, Nightly  aspirin, 81 mg, Oral, Daily  atorvastatin, 40 mg, Oral, Nightly  chlorhexidine, 15 mL, Mouth/Throat, Q12H  clopidogrel, 75 mg, Oral, Daily  collagenase, 1 Application, Topical, Q24H  doxycycline, 100 mg, Intravenous, Q12H  gabapentin, 200 mg, Oral, Nightly  insulin lispro, 2-7 Units, Subcutaneous, 4x Daily AC & at Bedtime  levothyroxine, 75 mcg, Oral, QAM  meropenem, 1,000 mg, Intravenous, Once  [START ON 2024] meropenem, 1,000 mg, Intravenous, Q24H  metoprolol tartrate, 12.5 mg, Oral, Q12H  micafungin (MYCAMINE) IV, 100 mg, Intravenous, Q24H  midodrine, 5 mg, Nasogastric, Q8H  multivitamin with minerals, 1 tablet, Oral, Daily  mupirocin, 1 Application, Topical, Q12H  nicotine, 1 patch, Transdermal, Q24H  pantoprazole, 40 mg, Intravenous, BID AC  senna-docusate sodium, 2 tablet, Oral, BID  sertraline, 50 mg, Oral, Nightly  sevelamer, 800 mg, Oral, TID With Meals  sodium chloride, 10 mL, Intravenous, Q12H  sodium chloride, 10 mL, Intravenous, Q12H  sodium chloride, 10 mL, Intravenous, Q12H  sodium chloride, 10 mL, Intravenous, Q12H  sucralfate, 1 g, Oral, 4x Daily AC & at Bedtime  Vancomycin Pharmacy Intermittent/Pulse Dosing, , Does not apply, Daily      dexmedetomidine, 0.2-1.5 mcg/kg/hr (Dosing Weight), Last Rate: 0.4 mcg/kg/hr (24 1328)  fentanyl 10 mcg/mL,  mcg/hr, Last Rate: 150 mcg/hr (24 1332)  phenylephrine, 0.5-3  mcg/kg/min, Last Rate: 1.7 mcg/kg/min (08/27/24 1606)  propofol, 5-50 mcg/kg/min (Dosing Weight), Last Rate: Stopped (08/24/24 0815)      ----------------------------------------------------------------------------------------------------------------------  Vital Signs:  Temp:  [97 °F (36.1 °C)-100.8 °F (38.2 °C)] 98.8 °F (37.1 °C)  Heart Rate:  [] 116  Resp:  [16-28] 19  BP: ()/(29-94) 105/50  FiO2 (%):  [30 %-100 %] 100 %      08/24/24  0605 08/25/24  0622 08/27/24  0645   Weight: 104 kg (228 lb 9.9 oz) 101 kg (223 lb 12.3 oz) 103 kg (227 lb 1.2 oz)     Body mass index is 31.67 kg/m².    Intake/Output Summary (Last 24 hours) at 8/27/2024 1716  Last data filed at 8/27/2024 1414  Gross per 24 hour   Intake 2259.03 ml   Output --   Net 2259.03 ml     NPO Diet NPO Type: Strict NPO  ----------------------------------------------------------------------------------------------------------------------  Physical exam:  Constitutional:    HENT:  Head:  Normocephalic and atraumatic.  ET tube in place  Eyes:  Conjunctivae  normal.  Pupils are equal, round,   No scleral icterus.    Neck:  Neck supple.  No JVD present.    Cardiovascular: Normal rate, regular rhythm, S1 S2+, NO S3 / S4  Pulmonary/Chest:  Vesicular breath sounds B/L, no wheezing heard  Abdominal:  Soft.  Bowel sounds are normal.  No distension and no tenderness.      Neurological: Intubated and sedated opens eyes spontaneously skin:  Skin is warm and dry. No rash noted. No pallor.   Musculoskeletal:  No edema, no tenderness, and no deformity.  No red or swollen joints anywhere.   Peripheral vascular:  2+ Pulses B/L DP  ----------------------------------------------------------------------------------------------------------------------    ----------------------------------------------------------------------------------------------------------------------  Results from last 7 days   Lab Units 08/23/24  1541 08/22/24  3378   HSTROP T ng/L 188* 178*  "    Results from last 7 days   Lab Units 08/27/24  0021 08/26/24  0024 08/25/24  0759 08/24/24  1524 08/23/24  2306 08/23/24  1541   LACTATE mmol/L  --   --   --  2.0  --   --    WBC 10*3/mm3 23.22* 13.05* 15.40* 11.51*   < > 16.97*   HEMOGLOBIN g/dL 9.9* 8.0* 9.4* 8.0*   < > 7.8*   HEMATOCRIT % 32.5* 26.2* 30.2* 26.1*   < > 25.2*   MCV fL 95.6 94.6 93.8 94.6   < > 94.4   MCHC g/dL 30.5* 30.5* 31.1* 30.7*   < > 31.0*   PLATELETS 10*3/mm3 293 172 267 182   < > 193   INR   --  1.40*  --   --   --  1.31*    < > = values in this interval not displayed.     Results from last 7 days   Lab Units 08/25/24  0819   PH, ARTERIAL pH units 7.438   PO2 ART mm Hg 73.1*   PCO2, ARTERIAL mm Hg 37.9   HCO3 ART mmol/L 25.5     Results from last 7 days   Lab Units 08/27/24  0021 08/26/24  0024 08/25/24  0759 08/24/24  1524 08/23/24  2306 08/23/24  1541   SODIUM mmol/L 137 139 139 129*   < > 138   POTASSIUM mmol/L 4.5 3.8 4.3 3.8   < > 3.4*   MAGNESIUM mg/dL  --   --  2.0  --   --  2.0   CHLORIDE mmol/L 93* 97* 94* 88*   < > 94*   CO2 mmol/L 20.4* 23.7 23.4 23.0   < > 28.5   BUN mg/dL 25* 35* 31* 25*   < > 20   CREATININE mg/dL 3.83* 4.96* 4.47* 3.83*   < > 2.65*   CALCIUM mg/dL 8.4* 8.3* 8.8 8.4*   < > 8.2*   GLUCOSE mg/dL 105* 143* 156* 146*   < > 148*   ALBUMIN g/dL  --  2.4* 3.0* 2.7*   < > 3.0*   BILIRUBIN mg/dL  --  0.4 0.6 0.5   < > 0.5   ALK PHOS U/L  --  86 107 104   < > 109   AST (SGOT) U/L  --  126* 168* 25   < > 24   ALT (SGPT) U/L  --  90* 85* 9   < > 15    < > = values in this interval not displayed.   Estimated Creatinine Clearance: 20.1 mL/min (A) (by C-G formula based on SCr of 3.83 mg/dL (H)).    No results found for: \"AMMONIA\"      Blood Culture   Date Value Ref Range Status   08/25/2024 No growth at 2 days  Preliminary   08/25/2024 No growth at 2 days  Preliminary     No results found for: \"URINECX\"  No results found for: \"WOUNDCX\"  No results found for: \"STOOLCX\"  Echo:  Results for orders placed during the hospital " encounter of 24    Adult Transthoracic Echo Complete W/ Cont if Necessary Per Protocol    Interpretation Summary    Left ventricular systolic function is mildly decreased. Left ventricular ejection fraction appears to be 46 - 50%.    The left ventricular cavity is mildly dilated.    Left ventricular diastolic function is consistent with (grade III w/high LAP) fixed restrictive pattern.    The left atrial cavity is mild to moderately dilated.    The right atrial cavity is mildly  dilated.    Estimated right ventricular systolic pressure from tricuspid regurgitation is normal (<35 mmHg).    This is a technically difficult study.    @EK EKG showed A-fib with fast ventricular response with premature ventricular ectopic beat  Telemetry shows sinus rhythm      I have personally looked at the labs and they are summarized above.  ----------------------------------------------------------------------------------------------------------------------  Imaging Results (Last 24 Hours)       ** No results found for the last 24 hours. **          ----------------------------------------------------------------------------------------------------------------------    Assessment:  Coronary artery disease with history of bypass surgery in the past.  PCI to circumflex vessel 2024  Respiratory failure  End-stage renal disease  Hyperlipidemia      Plan:  #1 cardiac.  Patient with history of coronary artery disease with bypass surgery in the past.  Last cath and PCI was in May 2024 and had PCI to the circumflex artery.  Patient underwent EGD for low H&H and had PEA arrest post procedure.  Patient currently intubated but arousable.    Will advise to continue dual endplate therapy for now.  LVEF 46 to 50% on 2024.  #2 hyperlipidemia.  Continue lipid-lowering medication  #3 A-fib.  Patient on metoprolol.  Paroxysmal A-fib.  Patient may not be a candidate for anticoagulation.  Will watch closely.  H&H is  stable        This document has been electronically signed by Tommy Garcia MD FAC, Interventional Cardiology  August 27, 2024 17:16 EDT

## 2024-08-27 NOTE — PROGRESS NOTES
[facility] HOSPITALIST PROGRESS NOTE     Patient Identification:  Name:  Kennedy Prescott  Age:  74 y.o.  Sex:  male  :  1950  MRN:  [id number]  Visit Number:  [id number]  ROOM: [address]     Primary Care Provider:  Jag Guillaume MD    Length of stay in inpatient status:  8    Subjective     Chief Compliant:    Chief Complaint   Patient presents with    Shortness of Breath       History of Presenting Illness:    Patient seen and examined this morning. He remains intubated and unable to provide history, but was able to make eye contact with me and squeezed his right hand when requested to do so. Intermittently follows commands. Had a fever early this morning and has had increased vasopressor requirement.    Objective     Current Hospital Meds:acetaminophen, 650 mg, Oral, Once per day on   acetaminophen, 650 mg, Oral, TID  ammonium lactate, 1 Application, Topical, Nightly  aspirin, 81 mg, Oral, Daily  atorvastatin, 40 mg, Oral, Nightly  chlorhexidine, 15 mL, Mouth/Throat, Q12H  clopidogrel, 75 mg, Oral, Daily  collagenase, 1 Application, Topical, Q24H  doxycycline, 100 mg, Intravenous, Q12H  gabapentin, 200 mg, Oral, Nightly  insulin lispro, 2-7 Units, Subcutaneous, 4x Daily AC & at Bedtime  levothyroxine, 75 mcg, Oral, QAM  meropenem, 1,000 mg, Intravenous, Once  [START ON 2024] meropenem, 1,000 mg, Intravenous, Q24H  metoprolol tartrate, 12.5 mg, Oral, Q12H  micafungin (MYCAMINE) IV, 100 mg, Intravenous, Q24H  midodrine, 5 mg, Nasogastric, Q8H  multivitamin with minerals, 1 tablet, Oral, Daily  mupirocin, 1 Application, Topical, Q12H  nicotine, 1 patch, Transdermal, Q24H  pantoprazole, 40 mg, Intravenous, BID AC  senna-docusate sodium, 2 tablet, Oral, BID  sertraline, 50 mg, Oral, Nightly  sevelamer, 800 mg, Oral, TID With Meals  sodium chloride, 10 mL, Intravenous, Q12H  sodium chloride, 10 mL, Intravenous, Q12H  sodium chloride, 10 mL, Intravenous, Q12H  sodium  chloride, 10 mL, Intravenous, Q12H  sucralfate, 1 g, Oral, 4x Daily AC & at Bedtime  Vancomycin Pharmacy Intermittent/Pulse Dosing, , Does not apply, Daily    dexmedetomidine, 0.2-1.5 mcg/kg/hr (Dosing Weight), Last Rate: 0.4 mcg/kg/hr (08/27/24 1328)  fentanyl 10 mcg/mL,  mcg/hr, Last Rate: 150 mcg/hr (08/27/24 1332)  phenylephrine, 0.5-3 mcg/kg/min, Last Rate: 1.7 mcg/kg/min (08/27/24 1606)  propofol, 5-50 mcg/kg/min (Dosing Weight), Last Rate: Stopped (08/24/24 0815)        Current Antimicrobial Therapy:  Anti-Infectives (From admission, onward)      Ordered     Dose/Rate Route Frequency Start Stop    08/27/24 1646  meropenem (MERREM) 1,000 mg in sodium chloride 0.9 % 100 mL IVPB-VTB        Ordering Provider: Pedro Humphries MD    1,000 mg  over 3 Hours Intravenous Every 24 Hours 08/28/24 0000 09/02/24 2359    08/27/24 1646  meropenem (MERREM) 1,000 mg in sodium chloride 0.9 % 100 mL IVPB-VTB        Ordering Provider: Pedro Humphries MD    1,000 mg  over 30 Minutes Intravenous Once 08/27/24 1745      08/27/24 0954  micafungin sodium (MYCAMINE) 100 mg in sodium chloride 0.9 % 100 mL IVPB        Ordering Provider: Pedro Humphries MD    100 mg  over 60 Minutes Intravenous Every 24 Hours 08/27/24 1130 09/03/24 1129    08/26/24 1028  vancomycin IVPB 1500 mg in 0.9% NaCl (Premix) 500 mL        Ordering Provider: Naima Marcum DO    1,500 mg  333.3 mL/hr over 90 Minutes Intravenous Once 08/26/24 1300 08/26/24 1433    08/25/24 1041  Vancomycin Pharmacy Intermittent/Pulse Dosing        Ordering Provider: Naima Marcum DO     Does not apply Daily 08/26/24 0900 09/01/24 2359    08/25/24 1041  vancomycin IVPB 2000 mg in 0.9% Sodium Chloride 500 mL        Ordering Provider: Dar Cochran MD    2,000 mg  250 mL/hr over 120 Minutes Intravenous Once 08/25/24 1200 08/25/24 1748    08/23/24 1609  piperacillin-tazobactam (ZOSYN) IVPB 3.375 g IVPB in 100 mL NS (VTB)        Ordering Provider: Dar Cochran MD     3.375 g  over 30 Minutes Intravenous Once 08/23/24 1700 08/23/24 1703    08/20/24 0704  doxycycline (VIBRAMYCIN) 100 mg in sodium chloride 0.9 % 100 mL IVPB-VTB        Ordering Provider: Pedro Humphries MD    100 mg Intravenous Every 12 Hours 08/20/24 0800 09/01/24 0949          Current Diuretic Therapy:  Diuretics (From admission, onward)      None          ----------------------------------------------------------------------------------------------------------------------  Vital Signs:  Temp:  [97 °F (36.1 °C)-100.8 °F (38.2 °C)] 98.8 °F (37.1 °C)  Heart Rate:  [] 116  Resp:  [16-28] 19  BP: ()/(29-94) 105/50  FiO2 (%):  [30 %-100 %] 100 %  SpO2:  [83 %-100 %] 98 %  on   ;   Device (Oxygen Therapy): ventilator  Body mass index is 31.67 kg/m².    Wt Readings from Last 3 Encounters:   08/27/24 103 kg (227 lb 1.2 oz)   08/18/24 95.3 kg (210 lb 1.6 oz)   08/14/24 95.3 kg (210 lb 1.6 oz)     Intake & Output (last 3 days)         08/24 0701 08/25 0700 08/25 0701 08/26 0700 08/26 0701 08/27 0700 08/27 0701 08/28 0700    P.O.        I.V. (mL/kg) 634.1 (6) 688.5 (6.6) 1449 (13.8)     Other   102     NG/GT  30 208     IV Piggyback 700 200 900 200    Total Intake(mL/kg) 1334.1 (12.7) 918.5 (8.7) 2659 (25.3) 200 (1.9)    Urine (mL/kg/hr) 0 (0) 15 (0)      Emesis/NG output  0      Stool 0 0      Dialysis   1800     Total Output 0 15 1800     Net +1334.1 +903.5 +859 +200            Stool Unmeasured Occurrence 0 x             NPO Diet NPO Type: Strict NPO  ----------------------------------------------------------------------------------------------------------------------  Physical exam:   Constitutional:  Well-developed and well-nourished.  No acute distress.    HENT:  Head:  Normocephalic and atraumatic.   Cardiovascular:  tachycardia, irregularly irregular rhythm   Pulmonary/Chest:  No respiratory distress, sounds of mechanical ventilation, opening pops and diminished air movement bilaterally, no wheezing,  crackles, or rhonchi noted  Abdominal:  Soft. No distension, bowel sounds are present  Musculoskeletal:  No deformity.    Neurological: awake, makes eye contact, follows commands by squeezing his right hand but is slow at following commands; unable to fully assess due to intubation  Skin:  Skin is warm and dry.   Peripheral vascular:  No clubbing, no cyanosis, no edema.  Edited by: Naima Marcum DO at 8/27/2024 1705  ----------------------------------------------------------------------------------------------------------------------  Results from last 7 days   Lab Units 08/27/24  0021 08/26/24  0024 08/25/24  0759 08/24/24  1524 08/23/24  2306 08/23/24  1541   LACTATE mmol/L  --   --   --  2.0  --   --    WBC 10*3/mm3 23.22* 13.05* 15.40* 11.51*   < > 16.97*   HEMOGLOBIN g/dL 9.9* 8.0* 9.4* 8.0*   < > 7.8*   HEMATOCRIT % 32.5* 26.2* 30.2* 26.1*   < > 25.2*   MCV fL 95.6 94.6 93.8 94.6   < > 94.4   MCHC g/dL 30.5* 30.5* 31.1* 30.7*   < > 31.0*   PLATELETS 10*3/mm3 293 172 267 182   < > 193   INR   --  1.40*  --   --   --  1.31*    < > = values in this interval not displayed.     Results from last 7 days   Lab Units 08/25/24  0819   PH, ARTERIAL pH units 7.438   PO2 ART mm Hg 73.1*   PCO2, ARTERIAL mm Hg 37.9   HCO3 ART mmol/L 25.5     Results from last 7 days   Lab Units 08/27/24  0021 08/26/24  0024 08/25/24  0759 08/24/24  1524 08/23/24  2306 08/23/24  1541   SODIUM mmol/L 137 139 139 129*   < > 138   POTASSIUM mmol/L 4.5 3.8 4.3 3.8   < > 3.4*   MAGNESIUM mg/dL  --   --  2.0  --   --  2.0   CHLORIDE mmol/L 93* 97* 94* 88*   < > 94*   CO2 mmol/L 20.4* 23.7 23.4 23.0   < > 28.5   BUN mg/dL 25* 35* 31* 25*   < > 20   CREATININE mg/dL 3.83* 4.96* 4.47* 3.83*   < > 2.65*   CALCIUM mg/dL 8.4* 8.3* 8.8 8.4*   < > 8.2*   GLUCOSE mg/dL 105* 143* 156* 146*   < > 148*   ALBUMIN g/dL  --  2.4* 3.0* 2.7*   < > 3.0*   BILIRUBIN mg/dL  --  0.4 0.6 0.5   < > 0.5   ALK PHOS U/L  --  86 107 104   < > 109   AST (SGOT) U/L  --   "126* 168* 25   < > 24   ALT (SGPT) U/L  --  90* 85* 9   < > 15    < > = values in this interval not displayed.   Estimated Creatinine Clearance: 20.1 mL/min (A) (by C-G formula based on SCr of 3.83 mg/dL (H)).  No results found for: \"AMMONIA\"  Results from last 7 days   Lab Units 08/23/24  1541 08/22/24  2323   HSTROP T ng/L 188* 178*             Glucose   Date/Time Value Ref Range Status   08/27/2024 1629 75 70 - 130 mg/dL Final   08/27/2024 1136 97 70 - 130 mg/dL Final   08/27/2024 0659 109 70 - 130 mg/dL Final   08/26/2024 2014 106 70 - 130 mg/dL Final   08/26/2024 1630 130 70 - 130 mg/dL Final   08/26/2024 1106 99 70 - 130 mg/dL Final   08/26/2024 0657 154 (H) 70 - 130 mg/dL Final   08/25/2024 2047 153 (H) 70 - 130 mg/dL Final     Lab Results   Component Value Date    TSH 5.160 (H) 08/19/2024    FREET4 0.9 07/29/2024     No results found for: \"PREGTESTUR\", \"PREGSERUM\", \"HCG\", \"HCGQUANT\"  Pain Management Panel          Latest Ref Rng & Units 7/10/2024   Pain Management Panel   Amphetamine, Urine Qual Negative Negative    Barbiturates Screen, Urine Negative Negative    Benzodiazepine Screen, Urine Negative Negative    Buprenorphine, Screen, Urine Negative Negative    Cocaine Screen, Urine Negative Negative    Fentanyl, Urine Negative Negative    Methadone Screen , Urine Negative Negative    Methamphetamine, Ur Negative Negative       Details                 Brief Urine Lab Results  (Last result in the past 365 days)        Color   Clarity   Blood   Leuk Est   Nitrite   Protein   CREAT   Urine HCG        08/18/24 1348 Brown  Comment: Dipstick results may be inaccurate due to color interference.       Turbid   Large (3+)   Large (3+)   Positive   100 mg/dL (2+)                 Blood Culture   Date Value Ref Range Status   08/25/2024 No growth at 2 days  Preliminary   08/25/2024 No growth at 2 days  Preliminary     No results found for: \"URINECX\"  No results found for: \"WOUNDCX\"  No results found for: \"STOOLCX\"  No " "results found for: \"RESPCX\"  No results found for: \"AFBCX\"  Results from last 7 days   Lab Units 08/24/24  1524   LACTATE mmol/L 2.0       I have personally looked at the labs and they are summarized above.  ----------------------------------------------------------------------------------------------------------------------  Detailed radiology reports for the last 24 hours:  Imaging Results (Last 24 Hours)       ** No results found for the last 24 hours. **          Assessment & Plan      #Cardiac arrest   #Acute hypoxic respiratory failure   #CAD w/ PCI to LAD on 5/28  #Hypotension related to sedation   #Vtach requiring defibrillation   - Patient had PEA/bradycardia arrest then wide complex arrhythmia directly after EGD on 8/23. Resuscitated per ACLS protocol.   - Previously had an incomplete LBBB on EKG, after this episode it is now a complete LBBB. Troponin similar to previous values. Evaluated by Cardiology. No need for emergent left heart catheterization.  - Plavix was held due to clinically significant GI bleeding, but has now been restarted.   - Pulmonology following, appreciate assistance. On low vent support and mentation is slowly improving.  - monitor on telemetry. Vent weaning trials per Pulmonology pending improvement in mental status and improvement in pneumonia.  - WBC increased to 23.22 today and he also had a fever this morning. Antimicrobial coverage broadened from zosyn and doxycycline to meropenem, vancomycin, and micafungin per Pulmonology today. Bronchoscopy done and cultures obtained, follow up results when available.    #Acute blood loss anemia   #pAfib chronically anticoagulated with apixiban, anticoagulation currently on hold  - s/p EGD on 8/20 with intervention on duodenal ulcer. Continued to have anemia and signs of bleeding so he was taken back for repeat EGD on 8/23 which did not reveal any active bleeding. Cardiac arrest occurred after this as discussed above.   - CBC in a.m.  - " Hemoglobin relatively stable around 8-9 the past two days. Hgb up to 9.9 today.  - Continue PPI BID  - Hold full dose anticoagulation but DAPT has been restarted in the setting of recent PCI with stent placement.     #paroxysmal atrial fibrillation with RVR  #Intermittent hypotension   - intermittently unable to receive metoprolol due to hypotension. Heart rate not well controlled today and he was unable to get the a.m. dose of metoprolol. Continue purvi-synephrine and wean as tolerated. Start trial of midodrine 5mg q8h to try to reduce need for vasopressors, especially as this has been more pronounced with dialysis sessions.    #ESRD on intermittent HD  #Hyperkalemia, resolved   - continue per Nephrology, appreciate assistance  - BMP in a.m.     #UTI  - significant pyuria was noted on 8/18  - urine culture showed no growth. Completed a course of empiric ceftriaxone.     #Recent foot ulceration w/ associated cellulitis  - Admitted to  7/29-8/2 and vascular surgery recommended non-operative management. No current clinical evidence of osteomyelitis. Continue vancomycin for recurrent cellulitis and follow up repeat cultures if able to be obtained.     #Hypothyroidism  - continue home levothyroxine 75mcg daily    #GERD  - PPI    #Hypertension  - now with hypotension intermittently, hold antihypertensive agents except as needed for heart rate control  Edited by: Naima Marcum DO at 8/27/2024 1704    Active VTE Prophylaxis  Mechanical:        Start        08/19/24 2227  Maintain Sequential Compression Device  Continuous                            Dispo: pending clinical course, prognosis guarded    Naima Marcum DO  [facility]  08/27/24  17:05 EDT

## 2024-08-27 NOTE — PLAN OF CARE
Goal Outcome Evaluation:  Plan of Care Reviewed With: patient        Progress: no change  Outcome Evaluation: Pt remains intubated and sedated on fentanyl and precedex gtt. Deshawn gtt infusing. Bronch completed this shift. Febrile this am. Cooling blanket maintained. Alarms set. Care ongoing.

## 2024-08-27 NOTE — PROGRESS NOTES
Progress Note Critical Care Pulmonary        Subjective    Overnight events reviewed.  All the labs medications ins and outs and vitals reviewed.  MDR done at bed side with RN, pharmacist, nutrition, , hospitalist manager  and RT  All the drips,other meds,  labs,ins and outs , abg, fio2 requirement reviewed and dicussed in rounds.     Wbc -went up    Deshawn requirement going up     Albumin   Afib-   Had HD - yesterday -1.8 l off     Spoke to son   Had trach in past  Micafungin- spoke to dr tom  Interval History: event overnight: Described above        Review of Systems:    On vent , sedated     Vital Signs  Temp:  [97 °F (36.1 °C)-100.8 °F (38.2 °C)] 100.8 °F (38.2 °C)  Heart Rate:  [] 96  Resp:  [16-27] 16  BP: ()/(29-96) 90/62  FiO2 (%):  [30 %] 30 %  Body mass index is 31.67 kg/m².    Intake/Output Summary (Last 24 hours) at 8/27/2024 0830  Last data filed at 8/27/2024 0700  Gross per 24 hour   Intake 2659.03 ml   Output 1800 ml   Net 859.03 ml     No intake/output data recorded.    Physical Exam:  General-chronically ill in appearance ET tube in place, son and daughter-in-law at bedside    HEENT-PERRLA    Neck-supple    Respiratory-decreased breath sounds bilaterally.  Synchronous with the ventilator.    Cardiovascular-NSR  GI-NTND    CNS-sedated.  Drips reviewed    Extremities-no clubbing and edema        Results Review:      Results from last 7 days   Lab Units 08/27/24  0021 08/26/24  0024 08/25/24  0759   WBC 10*3/mm3 23.22* 13.05* 15.40*   HEMOGLOBIN g/dL 9.9* 8.0* 9.4*   PLATELETS 10*3/mm3 293 172 267     Results from last 7 days   Lab Units 08/27/24  0021 08/26/24  0024 08/25/24  0759 08/23/24  2306 08/23/24  1541   SODIUM mmol/L 137 139 139   < > 138   POTASSIUM mmol/L 4.5 3.8 4.3   < > 3.4*   CHLORIDE mmol/L 93* 97* 94*   < > 94*   CO2 mmol/L 20.4* 23.7 23.4   < > 28.5   BUN mg/dL 25* 35* 31*   < > 20   CREATININE mg/dL 3.83* 4.96* 4.47*   < > 2.65*   CALCIUM mg/dL 8.4* 8.3* 8.8    < > 8.2*   GLUCOSE mg/dL 105* 143* 156*   < > 148*   MAGNESIUM mg/dL  --   --  2.0  --  2.0    < > = values in this interval not displayed.     Lab Results   Component Value Date    INR 1.40 (H) 08/26/2024    INR 1.31 (H) 08/23/2024    INR 2.14 (H) 08/19/2024    PROTIME 17.2 (H) 08/26/2024    PROTIME 16.4 (H) 08/23/2024    PROTIME 23.9 (H) 08/19/2024     Results from last 7 days   Lab Units 08/26/24  0024 08/25/24  0759 08/24/24  1524   ALK PHOS U/L 86 107 104   BILIRUBIN mg/dL 0.4 0.6 0.5   ALT (SGPT) U/L 90* 85* 9   AST (SGOT) U/L 126* 168* 25     Results from last 7 days   Lab Units 08/25/24  0819   PH, ARTERIAL pH units 7.438   PO2 ART mm Hg 73.1*   PCO2, ARTERIAL mm Hg 37.9   HCO3 ART mmol/L 25.5     Imaging Results (Last 24 Hours)       Procedure Component Value Units Date/Time    XR Chest 1 View [[id number]] Collected: 08/26/24 1523     Updated: 08/26/24 1530    Narrative:      XR CHEST 1 VW-     CLINICAL INDICATION: ETT advanced; K92.2-Gastrointestinal hemorrhage,  unspecified; R19.7-Diarrhea, unspecified; N18.6-End stage renal disease;  Z99.2-Dependence on renal dialysis; D64.9-Anemia, unspecified;  K29.71-Gastritis, unspecified, with bleeding        COMPARISON: Same day earlier     TECHNIQUE: Single frontal view of the chest.     FINDINGS:     LUNGS: Lungs are adequately aerated.      HEART AND MEDIASTINUM: Heart and mediastinal contours are unremarkable        SKELETON: Bony and soft tissue structures are unremarkable.     Endotracheal tube overlies tracheal air column approximately 2.4 cm  below the lower margin of the sternoclavicular joint on the left       Impression:      Endotracheal tube slightly advanced.           This report was finalized on 8/26/2024 3:28 PM by Dr. Theron Estrada MD.       XR Chest 1 View [[id number]] Collected: 08/26/24 1522     Updated: 08/26/24 1525    Narrative:      XR CHEST 1 VW-     CLINICAL INDICATION: et tube position; K92.2-Gastrointestinal  hemorrhage, unspecified;  R19.7-Diarrhea, unspecified; N18.6-End stage  renal disease; Z99.2-Dependence on renal dialysis; D64.9-Anemia,  unspecified; K29.71-Gastritis, unspecified, with bleeding        COMPARISON: 8/25/2024     TECHNIQUE: Single frontal view of the chest.     FINDINGS:     LUNGS: Small bilateral effusions  Mild vascular congestion  Overall little change     HEART AND MEDIASTINUM: Heart and mediastinal contours are unremarkable        SKELETON: Bony and soft tissue structures are unremarkable.             Impression:         1. Lungs unchanged  2. Endotracheal tube overlying the tracheal air column just below the  sternoclavicular joints           This report was finalized on 8/26/2024 3:23 PM by Dr. Theron Estrada MD.       XR Chest 1 View [[id number]] Collected: 08/26/24 1003     Updated: 08/26/24 1006    Narrative:      CLINICAL HISTORY: Respiratory failure.     COMPARISON: 8/25/2024.     TECHNIQUE:  Single AP view of the chest       Impression:      FINDINGS AND IMPRESSION:    1.  Endotracheal tube tip is 6.5 cm above the gloria.  2.  Nasogastric tube tip is not seen but is below the gastroesophageal  junction.  3.  Right internal jugular vein central venous catheter tip is in the  right atrium.  Withdrawing 3 cm for cavoatrial placement if desired.  4.  Status post median sternotomy and CABG.  5.  Smaller left pleural effusion when compared to yesterday.  The right  pleural effusion is unchanged.  6.  Improved aeration of the lung bases.  7.  No pneumothorax.  8.  No acute osseous or upper abdominal abnormality.     This report was finalized on 8/26/2024 10:04 AM by Angelika Forte MD.                    acetaminophen, 650 mg, Oral, Once per day on Monday Wednesday Friday  acetaminophen, 650 mg, Oral, TID  ammonium lactate, 1 Application, Topical, Nightly  aspirin, 81 mg, Oral, Daily  atorvastatin, 40 mg, Oral, Nightly  chlorhexidine, 15 mL, Mouth/Throat, Q12H  clopidogrel, 75 mg, Oral, Daily  collagenase, 1 Application,  Topical, Q24H  doxycycline, 100 mg, Intravenous, Q12H  gabapentin, 200 mg, Oral, Nightly  insulin lispro, 2-7 Units, Subcutaneous, 4x Daily AC & at Bedtime  levothyroxine, 75 mcg, Oral, QAM  metoprolol tartrate, 12.5 mg, Oral, Q12H  multivitamin with minerals, 1 tablet, Oral, Daily  mupirocin, 1 Application, Topical, Q12H  nicotine, 1 patch, Transdermal, Q24H  pantoprazole, 40 mg, Intravenous, BID AC  piperacillin-tazobactam, 3.375 g, Intravenous, Q12H  senna-docusate sodium, 2 tablet, Oral, BID  sertraline, 50 mg, Oral, Nightly  sevelamer, 800 mg, Oral, TID With Meals  sodium chloride, 10 mL, Intravenous, Q12H  sodium chloride, 10 mL, Intravenous, Q12H  sodium chloride, 10 mL, Intravenous, Q12H  sodium chloride, 10 mL, Intravenous, Q12H  sucralfate, 1 g, Oral, 4x Daily AC & at Bedtime  Vancomycin Pharmacy Intermittent/Pulse Dosing, , Does not apply, Daily      dexmedetomidine, 0.2-1.5 mcg/kg/hr (Dosing Weight), Last Rate: 1 mcg/kg/hr (08/26/24 2208)  fentanyl 10 mcg/mL,  mcg/hr, Last Rate: 250 mcg/hr (08/27/24 0727)  phenylephrine, 0.5-3 mcg/kg/min, Last Rate: 0.5 mcg/kg/min (08/26/24 2208)  propofol, 5-50 mcg/kg/min (Dosing Weight), Last Rate: Stopped (08/24/24 0815)      Microbiology Results (last 10 days)       Procedure Component Value - Date/Time    Blood Culture - Blood, Arm, Left [[id number]]  (Normal) Collected: 08/25/24 1549    Lab Status: Preliminary result Specimen: Blood from Arm, Left Updated: 08/26/24 1631     Blood Culture No growth at 24 hours    Blood Culture - Blood, Wrist, Right [[id number]]  (Normal) Collected: 08/25/24 1257    Lab Status: Preliminary result Specimen: Blood from Wrist, Right Updated: 08/26/24 1330     Blood Culture No growth at 24 hours    COVID-19 and FLU A/B PCR, 1 HR TAT - Swab, Nasopharynx [[id number]]  (Normal) Collected: 08/22/24 2227    Lab Status: Final result Specimen: Swab from Nasopharynx Updated: 08/22/24 2258     COVID19 Not Detected     Influenza A PCR Not  Detected     Influenza B PCR Not Detected    Narrative:      Fact sheet for providers: https://www.fda.gov/media/282013/download    Fact sheet for patients: https://www.[employer].gov/media/522147/download    Test performed by PCR.    Urine Culture - Urine, Straight Cath [[id number]]  (Normal) Collected: 08/18/24 1348    Lab Status: Final result Specimen: Urine from Straight Cath Updated: 08/20/24 0954     Urine Culture No growth    COVID-19 and FLU A/B PCR, 1 HR TAT - Swab, Nasopharynx [[id number]]  (Normal) Collected: 08/18/24 1211    Lab Status: Final result Specimen: Swab from Nasopharynx Updated: 08/18/24 1234     COVID19 Not Detected     Influenza A PCR Not Detected     Influenza B PCR Not Detected    Narrative:      Fact sheet for providers: https://www.fda.gov/media/688668/download    Fact sheet for patients: https://www.[employer].gov/media/611194/download    Test performed by PCR.             Narrative & Impression   EXAM:    CT Head Without Intravenous Contrast     EXAM DATE:    8/14/2024 11:53 AM     CLINICAL HISTORY:    AMS     TECHNIQUE:    Axial computed tomography images of the head/brain without intravenous  contrast.  Sagittal and coronal reformatted images were created and  reviewed.  This CT exam was performed using one or more of the following  dose reduction techniques:  automated exposure control, adjustment of  the mA and/or kV according to patient size, and/or use of iterative  reconstruction technique.     COMPARISON:    No relevant prior studies available.     FINDINGS:    BRAIN AND EXTRA-AXIAL SPACES:  Unremarkable as visualized.  No  hemorrhage.  No significant white matter disease.  No edema.  No  ventriculomegaly.    BONES/JOINTS:  Unremarkable as visualized.  No acute fracture.    SOFT TISSUES:  Unremarkable as visualized.    SINUSES:  Unremarkable as visualized.  No acute sinusitis.    MASTOID AIR CELLS:  Unremarkable as visualized.  No mastoid effusion.     IMPRESSION:    Unremarkable exam  demonstrating no CT evidence of acute intracranial  findings.        This report was finalized on 8/14/2024 12:24 PM by Dr. Rodrigo Kemp MD.   Latest Reference Range & Units 08/25/24 08:19   pH, Arterial 7.350 - 7.450 pH units 7.438   pCO2, Arterial 35.0 - 45.0 mm Hg 37.9   pO2, Arterial 83.0 - 108.0 mm Hg 73.1 (L)   HCO3, Arterial 20.0 - 26.0 mmol/L 25.5   Base Excess 0.0 - 2.0 mmol/L 1.3   O2 Saturation, Arterial 94.0 - 99.0 % 96.1   CO2 Content 22 - 33 mmol/L 26.7   A-a DO2 0.0 - 300.0 mmHg 90.1   Carboxyhemoglobin 0 - 5 % 2.4   Methemoglobin 0.00 - 3.00 % 0.20   Oxyhemoglobin 94 - 99 % 93.6 (L)   Hematocrit, Blood Gas 38.0 - 51.0 % 27.1 (L)   Hemoglobin, Blood Gas 14 - 18 g/dL 8.9 (L)   Site  Right Radial   René's Test  Positive   Modality  Ventilator   FIO2 % 30   Ventilator Mode  VC/AC   Set Tidal Volume  480.00   Set Mech Resp Rate  16.0   PEEP  5.0   Barometric Pressure for Blood Gas mmHg 731   (L): Data is abnormally low      Latest Reference Range & Units 08/27/24 00:20   CO2 Content 22 - 33 mmol/L 24.6   Carboxyhemoglobin Venous 0.0 - 5.0 % 2.1   Methemoglobin Venous 0.0 - 3.0 % 0.2   Oxyhemoglobin Venous 45.0 - 75.0 % 73.3   Hemoglobin, Blood Gas 14 - 18 g/dL 10.1 (L)   Site  Lab   Modality  Ventilator   FIO2 % 30   Ventilator Mode  VC/AC   Set Tidal Volume  480.00   Set Mech Resp Rate  16.0   PEEP  5.0   Barometric Pressure for Blood Gas mmHg 730   pH, Venous 7.320 - 7.420 pH Units 7.359   pCO2, Venous 41.0 - 51.0 mm Hg 41.5   pO2, Venous 27.0 - 53.0 mm Hg 43.8   HCO3, Venous 22.0 - 28.0 mmol/L 23.4   Base Excess 0.0 - 2.0 mmol/L -2.0 (L)   O2 Saturation, Venous 45.0 - 75.0 % 75.0   (L): Data is abnormally low  Medication Review:     Assessment & Plan     Neuro-drips reviewed.  Adjusted for a RASS goal of -1 to -2.    CT of the head-latest reviewed.  Report as mentioned above.  SAT was done and patient opened his eyes but did not follow any commands.      Acute hypoxic respiratory failure-likely due  to cardiac arrest and renal failure.  Latest ABG reviewed.  Latest chest x-ray reviewed.  Chest x-ray shows right-sided pleural effusion.      ET tube position reviewed.      Blood gas from today morning reviewed and adjusted vent settings.    Will repeat venous blood gas tomorrow morning    Vent Settings          Resp Rate (Set): 16  Pressure Support (cm H2O): 8 cm H20  FiO2 (%): 30 %  PEEP/CPAP (cm H2O): 5 cm H20    Minute Ventilation (L/min) (Obs): 8.02 L/min  Resp Rate (Observed) Vent: 16     I:E Ratio (Obs): 1:3.3    PIP Observed (cm H2O): 19 cm H2O           Ventilator settings and graphics reviewed.    Peak and plateau pressures reviewed and are within normal limits    Right lower lobe pneumonia-latest microbiology reviewed.  Antibiotics reviewed.  Continue current antibiotics.    White count increased and patient has pulm infiltrates.  Bronchoscopy done.  Mucopurulent secretions were cleared from right lower lobe and left lower lobe.    Culture sent for microbiology.    Goals of care discussed with patient's son at bedside.    Cardiovascular-vitals reviewed.  Vasopressors titrated for a MAP of 65 and above.    CAD w/ PCI to LAD on 5/28  Latest echo reviewed.    Gastrointestinal-GI bleed, status post upper GI endoscopy.  Report and events reviewed.  Continue to monitor hemoglobin.  Transfuse for hemoglobin less than 8 if patient is having active GI bleeding.  If no GI bleeding recommend transfusion for hemoglobin less than 7.     Continue tube feeds.  Free water 0 patient is on hemodialysis.             Renal-  ESRD in setting of missed dialysis sessions   Continue hemodialysis.  Nephrology on case.  Was able to tolerate hemodialysis today.  Required phenylephrine.    Infectious disease-latest micro reviewed   White count went up.   Adjusted antibiotics.  Changed cefepime to meropenem, continue vancomycin and started micafungin bronchoscopy done and culture sent        Endocrine- monitor BS target  140-180  Hypothyroidism- TSH improved at 5.1. Recent free T4 wnl.             I have personally reviewed x-ray chest, labs, medication list.  Results for orders placed during the hospital encounter of 08/19/24    Adult Transthoracic Echo Complete W/ Cont if Necessary Per Protocol    Interpretation Summary    Left ventricular systolic function is mildly decreased. Left ventricular ejection fraction appears to be 46 - 50%.    The left ventricular cavity is mildly dilated.    Left ventricular diastolic function is consistent with (grade III w/high LAP) fixed restrictive pattern.    The left atrial cavity is mild to moderately dilated.    The right atrial cavity is mildly  dilated.    Estimated right ventricular systolic pressure from tricuspid regurgitation is normal (<35 mmHg).    This is a technically difficult study.     Goals of care discussed with patient's son and daughter-in-law at bedside.  Answered their questions to the satisfaction.    Critical Care time spent in direct patient care: 33 minutes (excluding procedure time, if applicable) including high complexity decision making to assess, manipulate, and support vital organ system failure in this individual who has impairment of one or more vital organ systems such that there is a high probability of imminent or life threatening deterioration in the patient’s condition.  I adjusted patient's drips and ventilator settings.  Patient is critically ill and is at higher risk for further mortality/morbidity. Continue ICU care .           Pedro Humphries MD  08/27/24  08:30 EDT

## 2024-08-28 NOTE — PROGRESS NOTES
"Palliative Care Daily Progress Note     S: Medical record reviewed, followed up with Primary RN Janette and Dr Marcum regarding patient's condition. Per discussion with Dr Marcum Kennedy had Stemi overnight and now has complete blockage of rt side,and is now maxed on levophed and purvi. With WBC's again increasing. When I saw Kennedy initially this morning he was on minimal sedation however was not as responsive as yesterday, would not squeeze hands and was not tracking as he was yesterday.      O:   Palliative Performance Scale Score:     BP (!) 70/56   Pulse 104   Temp 98.8 °F (37.1 °C) (Rectal)   Resp 28   Ht 180.3 cm (71\")   Wt 103 kg (227 lb 1.2 oz)   SpO2 (!) 87%   BMI 31.67 kg/m²     Intake/Output Summary (Last 24 hours) at 8/28/2024 1631  Last data filed at 8/28/2024 1222  Gross per 24 hour   Intake 4495.32 ml   Output 0 ml   Net 4495.32 ml       PE:  General Appearance:    Chronically ill appearing, sedated on vent cooperative, NAD   HEENT:    NC/AT, without obvious abnormality, anicteric, not tracking   Neck:   supple, trachea midline, no JVD   Lungs:     Sedated on vent 30/5, no distress, diminished in bases, coarse upper lobe sounds no wheezing, or rales noted.    Heart:    Tachycardic, irregularly irregular rhythm   Abdomen:     Soft, NT, ND, NABS    Extremities:   Not moving extremities, BLE 1+ edema, cool extremities.   Pulses:   Pulses palpable and equal bilaterally   Skin:   Warm, dry, pale   Neurologic:   He was on minimal sedation however was not responsive like yesterday, would not squeeze hands and was not tracking as he was yesterday.   Psych:   Unable to assess          Meds: Reviewed and changes noted    Labs:   Results from last 7 days   Lab Units 08/28/24  0002   WBC 10*3/mm3 39.78*   HEMOGLOBIN g/dL 10.5*   HEMATOCRIT % 37.7   PLATELETS 10*3/mm3 285     Results from last 7 days   Lab Units 08/28/24  0651   SODIUM mmol/L 140   POTASSIUM mmol/L 5.4*   CHLORIDE mmol/L 94*   CO2 mmol/L 12.0* "   BUN mg/dL 38*   CREATININE mg/dL 4.78*   GLUCOSE mg/dL 126*   CALCIUM mg/dL 8.6     Results from last 7 days   Lab Units 08/28/24  0651   SODIUM mmol/L 140   POTASSIUM mmol/L 5.4*   CHLORIDE mmol/L 94*   CO2 mmol/L 12.0*   BUN mg/dL 38*   CREATININE mg/dL 4.78*   CALCIUM mg/dL 8.6   BILIRUBIN mg/dL 0.7   ALK PHOS U/L 196*   ALT (SGPT) U/L 1,183*   AST (SGOT) U/L 3,307*   GLUCOSE mg/dL 126*     Imaging Results (Last 72 Hours)       Procedure Component Value Units Date/Time    XR Chest 1 View [[id number]] Collected: 08/26/24 1523     Updated: 08/26/24 1530    Narrative:      XR CHEST 1 VW-     CLINICAL INDICATION: ETT advanced; K92.2-Gastrointestinal hemorrhage,  unspecified; R19.7-Diarrhea, unspecified; N18.6-End stage renal disease;  Z99.2-Dependence on renal dialysis; D64.9-Anemia, unspecified;  K29.71-Gastritis, unspecified, with bleeding        COMPARISON: Same day earlier     TECHNIQUE: Single frontal view of the chest.     FINDINGS:     LUNGS: Lungs are adequately aerated.      HEART AND MEDIASTINUM: Heart and mediastinal contours are unremarkable        SKELETON: Bony and soft tissue structures are unremarkable.     Endotracheal tube overlies tracheal air column approximately 2.4 cm  below the lower margin of the sternoclavicular joint on the left       Impression:      Endotracheal tube slightly advanced.           This report was finalized on 8/26/2024 3:28 PM by Dr. Theron Estrada MD.       XR Chest 1 View [[id number]] Collected: 08/26/24 1522     Updated: 08/26/24 1525    Narrative:      XR CHEST 1 VW-     CLINICAL INDICATION: et tube position; K92.2-Gastrointestinal  hemorrhage, unspecified; R19.7-Diarrhea, unspecified; N18.6-End stage  renal disease; Z99.2-Dependence on renal dialysis; D64.9-Anemia,  unspecified; K29.71-Gastritis, unspecified, with bleeding        COMPARISON: 8/25/2024     TECHNIQUE: Single frontal view of the chest.     FINDINGS:     LUNGS: Small bilateral effusions  Mild vascular  congestion  Overall little change     HEART AND MEDIASTINUM: Heart and mediastinal contours are unremarkable        SKELETON: Bony and soft tissue structures are unremarkable.             Impression:         1. Lungs unchanged  2. Endotracheal tube overlying the tracheal air column just below the  sternoclavicular joints           This report was finalized on 8/26/2024 3:23 PM by Dr. Theron Estraad MD.       XR Chest 1 View [[id number]] Collected: 08/26/24 1003     Updated: 08/26/24 1006    Narrative:      CLINICAL HISTORY: Respiratory failure.     COMPARISON: 8/25/2024.     TECHNIQUE:  Single AP view of the chest       Impression:      FINDINGS AND IMPRESSION:    1.  Endotracheal tube tip is 6.5 cm above the gloria.  2.  Nasogastric tube tip is not seen but is below the gastroesophageal  junction.  3.  Right internal jugular vein central venous catheter tip is in the  right atrium.  Withdrawing 3 cm for cavoatrial placement if desired.  4.  Status post median sternotomy and CABG.  5.  Smaller left pleural effusion when compared to yesterday.  The right  pleural effusion is unchanged.  6.  Improved aeration of the lung bases.  7.  No pneumothorax.  8.  No acute osseous or upper abdominal abnormality.     This report was finalized on 8/26/2024 10:04 AM by Angelika Forte MD.                 Diagnostics: Reviewed    A: Kennedy Prescott is a 74 y.o. male  admitted on 8/19/2024 originally for shortness of breath, pt had been seen in ER day before due to AMS however that resolved, Kennedy is a resident of the [facility].  Kennedy has a medical history of CHF, CAD, diabetes mellitus, ESRD, hypertension and GERD and is on hemodialysis. Per pt he had not been going to HD due to diarrhea but is willing to continue HD. On 8/23 pt was having upper endoscopy done however when endoscope was being removed Kennedy became bradycardic, ultimately ended up required ACLS, per notes was having wide complex tachycardia requiring  cardioversion, requiring ventilator post procedure and was transferred to CCU.     Today 8/28/2024 pt is still intubated on vent with minimal sedation, he was not responding like yesterday and was maxed on vasopressors, levo and purvi, overnight pt had a STEMI with his oxygen desaturating.         P:  I was able to be present with Dr Marcum to discuss with patients family son Joaquina, his wife and daughter the events that occurred through the night and that it was thought that Kennedy would be to unstable to seek any cardiac intervention, high risk of him not surviving through heart cath. Dr Marcum discussed again options, one was continue treatment as is vs transitioning to comfort measures and what that entailed. I discussed if they chose to continue treatment to at least consider making him a DNR as this would only cause further trauma to him. They wanted to discuss among family and there were other members still coming. Shortly after this conversation, Kennedy had a short code event, I went to find pts son to discuss with him what had happened, after conversation he did decide to make his father a DNR/DNI. Dr Marcum also spoke with Joaquina and family regarding considering comfort measures, they are still discussing it.       We will continue to follow along. Please do not hesitate to contact us regarding further sx mgmt or GOC needs, including after hours or on weekends via our on call provider at [phone].     Estefani Barrera, APRN    8/28/2024

## 2024-08-28 NOTE — PROGRESS NOTES
.Patient Identification:  Name:  Kennedy Prescott  Age:  74 y.o.  Sex:  male  :  1950  MRN:  [id number]  Visit Number:  [id number]    Chief Complaint:   Respiratory failure    Subjective:    Patient seen and examined.  Patient is not very responsive.  Events of overnight reviewed.  Patient had bronchoscopy yesterday and had an episode of fast A-fib and hypotension afterwards.  Overnight patient was noted to be hypoxic with pH of 7.0, his INR went up to 4.25.  His lactate went up to 14.1.  Patient is requiring 2 inotropes and is on 60% FiO2.  On exam patient pupils are not very reactive and sluggish.  He is not as responsive As yesterday afternoon.  EKG done today shows incomplete left bundle with possible ST elevation inferiorly.  Old EKGs were compared from  which were normal.  EKG of 2024 showed left bundle branch block with ST-T changes inferiorly which are more prominent on EKG from overnight and this morning.  ----------------------------------------------------------------------------------------------------------------------  Current Hospital Meds:  acetaminophen, 650 mg, Oral, Once per day on   acetaminophen, 650 mg, Oral, TID  ammonium lactate, 1 Application, Topical, Nightly  aspirin, 81 mg, Oral, Daily  chlorhexidine, 15 mL, Mouth/Throat, Q12H  clopidogrel, 75 mg, Oral, Daily  collagenase, 1 Application, Topical, Q24H  doxycycline, 100 mg, Intravenous, Q12H  heparin (porcine), 2,000 Units, Intravenous, Once  hydrocortisone sodium succinate, 100 mg, Intravenous, Q8H  insulin lispro, 2-7 Units, Subcutaneous, 4x Daily AC & at Bedtime  levothyroxine, 75 mcg, Oral, QAM  Linezolid, 600 mg, Intravenous, Q12H  meropenem, 1,000 mg, Intravenous, Q24H  metoprolol tartrate, 12.5 mg, Oral, Q12H  micafungin (MYCAMINE) IV, 100 mg, Intravenous, Q24H  midodrine, 5 mg, Nasogastric, Q8H  multivitamin with minerals, 1 tablet, Oral, Daily  mupirocin, 1 Application, Topical,  Q12H  nicotine, 1 patch, Transdermal, Q24H  pantoprazole, 40 mg, Intravenous, BID AC  senna-docusate sodium, 2 tablet, Oral, BID  sevelamer, 800 mg, Oral, TID With Meals  sodium chloride, 10 mL, Intravenous, Q12H  sodium chloride, 10 mL, Intravenous, Q12H  sodium chloride, 10 mL, Intravenous, Q12H  sodium chloride, 10 mL, Intravenous, Q12H  sucralfate, 1 g, Oral, 4x Daily AC & at Bedtime      amiodarone, 0.5 mg/min, Last Rate: 0.5 mg/min (08/28/24 0558)  dexmedetomidine, 0.2-1.5 mcg/kg/hr (Dosing Weight), Last Rate: 0.8 mcg/kg/hr (08/28/24 0900)  heparin, 11.7 Units/kg/hr (Dosing Weight), Last Rate: 9.7 Units/kg/hr (08/28/24 0023)  norepinephrine, 0.02-1 mcg/kg/min, Last Rate: 0.28 mcg/kg/min (08/28/24 0958)  Pharmacy to Dose Heparin,   phenylephrine, 0.5-3 mcg/kg/min, Last Rate: 3 mcg/kg/min (08/28/24 0809)  propofol, 5-50 mcg/kg/min (Dosing Weight), Last Rate: 10 mcg/kg/min (08/28/24 1023)  vasopressin, 0.03 Units/min, Last Rate: Stopped (08/28/24 1023)      ----------------------------------------------------------------------------------------------------------------------  Vital Signs:  Temp:  [97.8 °F (36.6 °C)-99.3 °F (37.4 °C)] 99 °F (37.2 °C)  Heart Rate:  [] 133  Resp:  [16-28] 24  BP: ()/(25-94) 132/88  FiO2 (%):  [50 %-100 %] 100 %      08/24/24  0605 08/25/24  0622 08/27/24  0645   Weight: 104 kg (228 lb 9.9 oz) 101 kg (223 lb 12.3 oz) 103 kg (227 lb 1.2 oz)     Body mass index is 31.67 kg/m².    Intake/Output Summary (Last 24 hours) at 8/28/2024 1032  Last data filed at 8/28/2024 0620  Gross per 24 hour   Intake 4095.32 ml   Output 0 ml   Net 4095.32 ml     NPO Diet NPO Type: Strict NPO  ----------------------------------------------------------------------------------------------------------------------  Physical exam:  Constitutional:    HENT:  Head:  Normocephalic and atraumatic.  ET tube in place  Eyes:  Conjunctivae and EOM are normal.  Pupils are not reactive to light and are sluggish    neck:  Neck supple.  No JVD present.    Cardiovascular: Tachycardic rate, irregular rhythm, S1 S2+, NO S3 / S4  Pulmonary/Chest:  Vesicular breath sounds B/L  Abdominal:  Soft.  Bowel sounds are normal.  No distension and no tenderness.      Neurological: Intubated and sedated   skin:  Skin is warm and dry.   Musculoskeletal: Left heel with bandage in place  ----------------------------------------------------------------------------------------------------------------------    ----------------------------------------------------------------------------------------------------------------------  Results from last 7 days   Lab Units 08/28/24  0205 08/28/24  0002 08/23/24  1541   HSTROP T ng/L 237* 237* 188*     Results from last 7 days   Lab Units 08/28/24  0845 08/28/24  0842 08/28/24  0002 08/27/24  0021 08/26/24  0024 08/25/24  0759 08/24/24  1524 08/23/24  2306 08/23/24  1541   LACTATE mmol/L  --  14.1*  --   --   --   --  2.0  --   --    WBC 10*3/mm3  --   --  39.78* 23.22* 13.05*   < > 11.51*   < > 16.97*   HEMOGLOBIN g/dL  --   --  10.5* 9.9* 8.0*   < > 8.0*   < > 7.8*   HEMATOCRIT %  --   --  37.7 32.5* 26.2*   < > 26.1*   < > 25.2*   MCV fL  --   --  103.9* 95.6 94.6   < > 94.6   < > 94.4   MCHC g/dL  --   --  27.9* 30.5* 30.5*   < > 30.7*   < > 31.0*   PLATELETS 10*3/mm3  --   --  285 293 172   < > 182   < > 193   INR  4.25*  --  3.30*  --  1.40*  --   --   --  1.31*    < > = values in this interval not displayed.     Results from last 7 days   Lab Units 08/28/24  0831   PH, ARTERIAL pH units 7.193*   PO2 ART mm Hg 140.0*   PCO2, ARTERIAL mm Hg 32.0*   HCO3 ART mmol/L 12.3*     Results from last 7 days   Lab Units 08/28/24  0651 08/28/24  0002 08/27/24  0021 08/26/24  0024 08/25/24  0759 08/23/24  2306 08/23/24  1541   SODIUM mmol/L 140 139 137 139 139   < > 138   POTASSIUM mmol/L 5.4* 5.5* 4.5 3.8 4.3   < > 3.4*   MAGNESIUM mg/dL  --   --   --   --  2.0  --  2.0   CHLORIDE mmol/L 94* 93* 93* 97* 94*   <  "> 94*   CO2 mmol/L 12.0* 12.6* 20.4* 23.7 23.4   < > 28.5   BUN mg/dL 38* 33* 25* 35* 31*   < > 20   CREATININE mg/dL 4.78* 4.60* 3.83* 4.96* 4.47*   < > 2.65*   CALCIUM mg/dL 8.6 8.7 8.4* 8.3* 8.8   < > 8.2*   GLUCOSE mg/dL 126* 70 105* 143* 156*   < > 148*   ALBUMIN g/dL 2.1*  --   --  2.4* 3.0*   < > 3.0*   BILIRUBIN mg/dL 0.7  --   --  0.4 0.6   < > 0.5   ALK PHOS U/L 196*  --   --  86 107   < > 109   AST (SGOT) U/L 3,307*  --   --  126* 168*   < > 24   ALT (SGPT) U/L 1,183*  --   --  90* 85*   < > 15    < > = values in this interval not displayed.   Estimated Creatinine Clearance: 16.1 mL/min (A) (by C-G formula based on SCr of 4.78 mg/dL (H)).    No results found for: \"AMMONIA\"      Blood Culture   Date Value Ref Range Status   08/25/2024 No growth at 2 days  Preliminary   08/25/2024 No growth at 2 days  Preliminary     No results found for: \"URINECX\"  No results found for: \"WOUNDCX\"  No results found for: \"STOOLCX\"  Echo:  Results for orders placed during the hospital encounter of 08/19/24    Adult Transthoracic Echo Complete W/ Cont if Necessary Per Protocol    Interpretation Summary    Left ventricular systolic function is mildly decreased. Left ventricular ejection fraction appears to be 46 - 50%.    The left ventricular cavity is mildly dilated.    Left ventricular diastolic function is consistent with (grade III w/high LAP) fixed restrictive pattern.    The left atrial cavity is mild to moderately dilated.    The right atrial cavity is mildly  dilated.    Estimated right ventricular systolic pressure from tricuspid regurgitation is normal (<35 mmHg).    This is a technically difficult study.    @EKG: A-fib, incomplete left bundle branch block, ischemic changes inferolateral possible acute        I have personally looked at the labs and they are summarized above.  ----------------------------------------------------------------------------------------------------------------------  Imaging Results (Last " 24 Hours)       ** No results found for the last 24 hours. **          ----------------------------------------------------------------------------------------------------------------------    Assessment:  Coronary artery disease status post bypass surgery in the past.  Status post PCI to the circumflex vessel on August 28, 2024  A-fib with fast ventricular response  End-stage renal disease  Sepsis  Pneumonia with respiratory failure  Plan:  #1 cardiac.  Patient with history of bypass surgery in the past.  Last stent was in May this year.  Will keep patient on dual antiplatelet therapy   patient s/p PEA arrest after EGD this admission.  Patient has been intubated and has taken a turn towards the worse.  He is currently requiring 2 inotropes and remains intubated.  Patient is showing EKG abnormalities consistent with acute ischemia but is not stable to have any procedure done due to coagulation abnormalities, acidemia.  Discussed care with hospitalist, nephrologist and patient's son.  #2 A-fib.  Will keep patient on amiodarone.  Anticoagulation with heparin to continue  #3 cardiomyopathy.  Patient LVEF was 46 to 50% on echo done August 24, 2024.  Limited echo requested today shows EF around 40-45.  Will continue blood pressure support with inotropes for now.        This document has been electronically signed by Tommy Garcia MD [facility], Interventional Cardiology  August 28, 2024 10:32 EDT

## 2024-08-28 NOTE — PLAN OF CARE
Problem: Device-Related Complication Risk (Mechanical Ventilation, Invasive)  Goal: Optimal Device Function  Intervention: Optimize Device Care and Function  Recent Flowsheet Documentation  Taken 8/28/2024 0622 by Meagan Maya, RRT  Airway Safety Measures: manual resuscitator/mask at bedside   Goal Outcome Evaluation:

## 2024-08-28 NOTE — PROGRESS NOTES
"THC Physician - Brief Progress Note  PERMANENT  08/27/2024 22:25    [facility] [place] [facility] - [facility] [place] ([facility])    SATINDER HOMLAN    Date of Service 08/27/2024 22:25    HPI/Events of Note called re pt's bp with pt now on \"max dose\" purvi. will add levophed and if possible wean purvi as it may drop co/ci akosua in pt with decreased LVF.      Interventions Major-Hypotension - evaluation and management  Intermediate-Communication with other healthcare providers and/or family        Electronically Signed by: Sravan Freeman) on 08/27/2024 22:27  "

## 2024-08-28 NOTE — PROGRESS NOTES
"Nephrology Progress Note      Subjective   Patient is still on vasopressors, remains intubated on mechanical ventilation with FiO2 30% that has not been changed since yesterday    Objective       Vital signs :     Temp:  [97.8 °F (36.6 °C)-100.6 °F (38.1 °C)] 99.3 °F (37.4 °C)  Heart Rate:  [] 109  Resp:  [16-28] 20  BP: ()/(25-94) 100/62  FiO2 (%):  [30 %-100 %] 60 %    Intake/Output                         08/26/24 0701 - 08/27/24 0700 08/27/24 0701 - 08/28/24 0700     [id number] 8942-0983 Total 5884-9556 4327-4679 Total                 Intake    I.V.  --  1449 1449  639.6  2207.8 2847.3    Other  --  102 102  132  121 253    Flush/ Irrigation Intake (mL) (NG/OG Tube Orogastric 18 Fr Center mouth) -- 102 102 132 121 253    NG/GT  --  208 208  191  304 495    IV Piggyback  700  200 900  300  300 600    Total Intake 700 1959 2659 1262.6 2932.8 4195.3       Output    Urine  --  -- --  --  0 0    Dialysis  1800  -- 1800  --  -- --    Total Output 1800 -- 1800 -- 0 0           08/25/2024 examined and reviewed  08/26/2024 examined and reviewed  08/27/2024 examined and reviewed    Physical Exam:    General Appearance : On ventilator  Lungs : Bilateral anterior crackles  Heart :  regular rhythm & normal rate, normal S1, S2 and no murmur, no rub  Abdomen : Nondistended  Extremities : Trace edema,   Neurologic :   Unable to assess  Access: Left upper arm AV fistula [place]    Laboratory Data :     Albumin Albumin   Date Value Ref Range Status   08/26/2024 2.4 (L) 3.5 - 5.2 g/dL Final   08/25/2024 3.0 (L) 3.5 - 5.2 g/dL Final      Magnesium Magnesium   Date Value Ref Range Status   08/25/2024 2.0 1.6 - 2.4 mg/dL Final            PTH               No results found for: \"PTH\"    CBC and coagulation:  Results from last 7 days   Lab Units 08/28/24  0002 08/27/24  0021 08/26/24  0024 08/25/24  0759 08/24/24  1524 08/23/24  2306 08/23/24  1541   LACTATE mmol/L  --   --   --   --  2.0  --   --    WBC 10*3/mm3 39.78* " 23.22* 13.05*   < > 11.51*   < > 16.97*   HEMOGLOBIN g/dL 10.5* 9.9* 8.0*   < > 8.0*   < > 7.8*   HEMATOCRIT % 37.7 32.5* 26.2*   < > 26.1*   < > 25.2*   MCV fL 103.9* 95.6 94.6   < > 94.6   < > 94.4   MCHC g/dL 27.9* 30.5* 30.5*   < > 30.7*   < > 31.0*   PLATELETS 10*3/mm3 285 293 172   < > 182   < > 193   INR  3.30*  --  1.40*  --   --   --  1.31*    < > = values in this interval not displayed.     Acid/base balance:  Results from last 7 days   Lab Units 08/25/24  0819 08/24/24  0429 08/23/24  1615   PH, ARTERIAL pH units 7.438 7.558* 7.498*   PO2 ART mm Hg 73.1* 67.3* 291.0*   PCO2, ARTERIAL mm Hg 37.9 32.9* 39.3   HCO3 ART mmol/L 25.5 29.3* 30.5*       Renal and electrolytes:    Results from last 7 days   Lab Units 08/28/24  0002 08/27/24  0021 08/26/24  0024 08/25/24  0759 08/24/24  1524 08/23/24  2306 08/23/24  1541   SODIUM mmol/L 139 137 139 139 129*   < > 138   POTASSIUM mmol/L 5.5* 4.5 3.8 4.3 3.8   < > 3.4*   MAGNESIUM mg/dL  --   --   --  2.0  --   --  2.0   CHLORIDE mmol/L 93* 93* 97* 94* 88*   < > 94*   CO2 mmol/L 12.6* 20.4* 23.7 23.4 23.0   < > 28.5   BUN mg/dL 33* 25* 35* 31* 25*   < > 20   CREATININE mg/dL 4.60* 3.83* 4.96* 4.47* 3.83*   < > 2.65*   CALCIUM mg/dL 8.7 8.4* 8.3* 8.8 8.4*   < > 8.2*    < > = values in this interval not displayed.     Estimated Creatinine Clearance: 16.7 mL/min (A) (by C-G formula based on SCr of 4.6 mg/dL (H)).  @GFRCG:3@   Liver and pancreatic function:  Results from last 7 days   Lab Units 08/26/24  0024 08/25/24  0759 08/24/24  1524   ALBUMIN g/dL 2.4* 3.0* 2.7*   BILIRUBIN mg/dL 0.4 0.6 0.5   ALK PHOS U/L 86 107 104   AST (SGOT) U/L 126* 168* 25   ALT (SGPT) U/L 90* 85* 9         Cardiac:        Liver and pancreatic function:  Results from last 7 days   Lab Units 08/26/24  0024 08/25/24  0759 08/24/24  1524   ALBUMIN g/dL 2.4* 3.0* 2.7*   BILIRUBIN mg/dL 0.4 0.6 0.5   ALK PHOS U/L 86 107 104   AST (SGOT) U/L 126* 168* 25   ALT (SGPT) U/L 90* 85* 9        Medications :     acetaminophen, 650 mg, Oral, Once per day on Monday Wednesday Friday  acetaminophen, 650 mg, Oral, TID  ammonium lactate, 1 Application, Topical, Nightly  aspirin, 81 mg, Oral, Daily  atorvastatin, 40 mg, Oral, Nightly  chlorhexidine, 15 mL, Mouth/Throat, Q12H  clopidogrel, 75 mg, Oral, Daily  collagenase, 1 Application, Topical, Q24H  doxycycline, 100 mg, Intravenous, Q12H  gabapentin, 200 mg, Oral, Nightly  insulin lispro, 2-7 Units, Subcutaneous, 4x Daily AC & at Bedtime  levothyroxine, 75 mcg, Oral, QAM  meropenem, 1,000 mg, Intravenous, Q24H  metoprolol tartrate, 12.5 mg, Oral, Q12H  micafungin (MYCAMINE) IV, 100 mg, Intravenous, Q24H  midodrine, 5 mg, Nasogastric, Q8H  multivitamin with minerals, 1 tablet, Oral, Daily  mupirocin, 1 Application, Topical, Q12H  nicotine, 1 patch, Transdermal, Q24H  pantoprazole, 40 mg, Intravenous, BID AC  senna-docusate sodium, 2 tablet, Oral, BID  sertraline, 50 mg, Oral, Nightly  sevelamer, 800 mg, Oral, TID With Meals  sodium chloride, 10 mL, Intravenous, Q12H  sodium chloride, 10 mL, Intravenous, Q12H  sodium chloride, 10 mL, Intravenous, Q12H  sodium chloride, 10 mL, Intravenous, Q12H  sucralfate, 1 g, Oral, 4x Daily AC & at Bedtime  Vancomycin Pharmacy Intermittent/Pulse Dosing, , Does not apply, Daily      amiodarone, 0.5 mg/min, Last Rate: 0.5 mg/min (08/28/24 0558)  dexmedetomidine, 0.2-1.5 mcg/kg/hr (Dosing Weight), Last Rate: 1.2 mcg/kg/hr (08/28/24 0419)  fentanyl 10 mcg/mL,  mcg/hr, Last Rate: 150 mcg/hr (08/28/24 0017)  heparin, 9.7 Units/kg/hr (Dosing Weight), Last Rate: 9.7 Units/kg/hr (08/28/24 0023)  norepinephrine, 0.02-1 mcg/kg/min, Last Rate: 0.18 mcg/kg/min (08/28/24 0715)  Pharmacy to Dose Heparin,   phenylephrine, 0.5-3 mcg/kg/min, Last Rate: 3 mcg/kg/min (08/28/24 0545)  propofol, 5-50 mcg/kg/min (Dosing Weight), Last Rate: Stopped (08/24/24 0815)          Assessment & Plan     -ESRD on intermittent dialysis  -Status post  cardiac arrest  -Shock  -Hypokalemia  - Acute hypoxic respiratory failure likely due to fluid overload in settings of dialysis noncompliance  - Hyperkalemia resolved  - Acute on chronic anemia status post EGD  - Urinary tract infection    Patient had dialysis done with some intradialytic hypotension required to resume vasopressors.  Will give IV albumin 1 dose and to attempt to wean off pressors support  Will continue providing dialysis while inpatient  Prognosis critical    Discussed with RN    Reviewed clinical notes, radiological data.  Labs evaluated, discussed the case in detail with primary team      Nova Saucedo MD  08/28/24  07:53 EDT

## 2024-08-28 NOTE — PLAN OF CARE
Problem: Adult Inpatient Plan of Care  Goal: Plan of Care Review  Outcome: Ongoing, Not Progressing  Goal: Patient-Specific Goal (Individualized)  Outcome: Ongoing, Not Progressing  Goal: Absence of Hospital-Acquired Illness or Injury  Outcome: Ongoing, Not Progressing  Intervention: Identify and Manage Fall Risk  Recent Flowsheet Documentation  Taken 8/28/2024 0500 by Lizzy Palacios RN  Safety Promotion/Fall Prevention: safety round/check completed  Taken 8/28/2024 0400 by Lizzy Palacios RN  Safety Promotion/Fall Prevention: safety round/check completed  Taken 8/28/2024 0300 by Lizzy Palacios RN  Safety Promotion/Fall Prevention: safety round/check completed  Taken 8/28/2024 0200 by Lizzy Palacios RN  Safety Promotion/Fall Prevention: safety round/check completed  Taken 8/28/2024 0100 by Lizzy Palacios RN  Safety Promotion/Fall Prevention: safety round/check completed  Taken 8/28/2024 0000 by Rosenbalm, Lizzy, RN  Safety Promotion/Fall Prevention: safety round/check completed  Taken 8/27/2024 2300 by Lizzy Palacios RN  Safety Promotion/Fall Prevention: safety round/check completed  Taken 8/27/2024 2200 by Lizzy Palacios RN  Safety Promotion/Fall Prevention: safety round/check completed  Taken 8/27/2024 2100 by Lizzy Palacios RN  Safety Promotion/Fall Prevention: safety round/check completed  Taken 8/27/2024 2000 by Rosenbalm, Lizzy, RN  Safety Promotion/Fall Prevention: safety round/check completed  Taken 8/27/2024 1900 by Rosenbalm, Lizzy, RN  Safety Promotion/Fall Prevention: safety round/check completed  Intervention: Prevent Skin Injury  Recent Flowsheet Documentation  Taken 8/28/2024 0400 by Lizzy Palacios RN  Body Position:   right   turned  Taken 8/28/2024 0200 by Lizzy Palacios RN  Body Position:   left   turned  Skin Protection: adhesive use limited  Taken 8/28/2024 0000 by Lizzy Palacios RN  Body Position:   right   turned  Taken  8/27/2024 2200 by Lizzy Palacios RN  Body Position:   left   turned  Taken 8/27/2024 2000 by Lizzy Palacios RN  Body Position:   right   turned  Intervention: Prevent and Manage VTE (Venous Thromboembolism) Risk  Recent Flowsheet Documentation  Taken 8/28/2024 0200 by Lizzy Palacios RN  Activity Management: bedrest  VTE Prevention/Management:   bilateral   sequential compression devices on  Taken 8/27/2024 2000 by Lizzy Palacios RN  Activity Management: bedrest  VTE Prevention/Management:   bilateral   sequential compression devices on  Goal: Optimal Comfort and Wellbeing  Outcome: Ongoing, Not Progressing  Intervention: Provide Person-Centered Care  Recent Flowsheet Documentation  Taken 8/28/2024 0200 by Lizzy Palacios RN  Trust Relationship/Rapport: care explained  Taken 8/27/2024 2000 by Lizzy Palacios RN  Trust Relationship/Rapport: care explained  Goal: Readiness for Transition of Care  Outcome: Ongoing, Not Progressing     Problem: Skin Injury Risk Increased  Goal: Skin Health and Integrity  Outcome: Ongoing, Not Progressing  Intervention: Optimize Skin Protection  Recent Flowsheet Documentation  Taken 8/28/2024 0400 by Lizzy Palacios RN  Head of Bed (HOB) Positioning: HOB at 30-45 degrees  Taken 8/28/2024 0200 by Lizzy Palacios RN  Pressure Reduction Techniques:   heels elevated off bed   positioned off wounds   pressure points protected  Head of Bed (HOB) Positioning: HOB at 30-45 degrees  Pressure Reduction Devices:   positioning supports utilized   heel offloading device utilized   pressure-redistributing mattress utilized  Skin Protection: adhesive use limited  Taken 8/28/2024 0000 by Lizzy Palacios RN  Head of Bed (HOB) Positioning: HOB at 30-45 degrees  Taken 8/27/2024 2200 by Lizzy Palacios RN  Head of Bed (HOB) Positioning: HOB at 30-45 degrees  Taken 8/27/2024 2000 by Lizzy Palacios RN  Head of Bed (HOB) Positioning: HOB at 30-45  degrees     Problem: Diabetes Comorbidity  Goal: Blood Glucose Level Within Targeted Range  Outcome: Ongoing, Not Progressing     Problem: Heart Failure Comorbidity  Goal: Maintenance of Heart Failure Symptom Control  Outcome: Ongoing, Not Progressing  Intervention: Maintain Heart Failure-Management  Recent Flowsheet Documentation  Taken 8/28/2024 0400 by Lizzy Palacios RN  Medication Review/Management: medications reviewed  Taken 8/28/2024 0200 by Lizzy Palacios RN  Medication Review/Management: medications reviewed  Taken 8/28/2024 0000 by Lizzy Palacios RN  Medication Review/Management: medications reviewed  Taken 8/27/2024 2200 by Lizzy Palacios RN  Medication Review/Management: medications reviewed  Taken 8/27/2024 2000 by Lizzy Palacios RN  Medication Review/Management: medications reviewed     Problem: Hypertension Comorbidity  Goal: Blood Pressure in Desired Range  Outcome: Ongoing, Not Progressing  Intervention: Maintain Blood Pressure Management  Recent Flowsheet Documentation  Taken 8/28/2024 0400 by Lizzy Palacios RN  Medication Review/Management: medications reviewed  Taken 8/28/2024 0200 by Lizzy Palacios RN  Medication Review/Management: medications reviewed  Taken 8/28/2024 0000 by Lizzy Palacios RN  Medication Review/Management: medications reviewed  Taken 8/27/2024 2200 by Lizzy Palacios RN  Medication Review/Management: medications reviewed  Taken 8/27/2024 2000 by Lizzy Palacios RN  Medication Review/Management: medications reviewed     Problem: Adjustment to Illness (Gastrointestinal Bleeding)  Goal: Optimal Coping with Acute Illness  Outcome: Ongoing, Not Progressing     Problem: Bleeding (Gastrointestinal Bleeding)  Goal: Hemostasis  Outcome: Ongoing, Not Progressing     Problem: Anemia  Goal: Anemia Symptom Improvement  Outcome: Ongoing, Not Progressing  Intervention: Monitor and Manage Anemia  Recent Flowsheet Documentation  Taken  8/28/2024 0500 by Lizzy Palacios RN  Safety Promotion/Fall Prevention: safety round/check completed  Taken 8/28/2024 0400 by Lizzy Palacios RN  Safety Promotion/Fall Prevention: safety round/check completed  Taken 8/28/2024 0300 by Lizzy Palacios RN  Safety Promotion/Fall Prevention: safety round/check completed  Taken 8/28/2024 0200 by Lizzy Palacios RN  Safety Promotion/Fall Prevention: safety round/check completed  Taken 8/28/2024 0100 by Lizzy Palacios RN  Safety Promotion/Fall Prevention: safety round/check completed  Taken 8/28/2024 0000 by Rosenbalm, Lizyz, RN  Safety Promotion/Fall Prevention: safety round/check completed  Taken 8/27/2024 2300 by Lizzy Palacios RN  Safety Promotion/Fall Prevention: safety round/check completed  Taken 8/27/2024 2200 by Lizzy Palacios RN  Safety Promotion/Fall Prevention: safety round/check completed  Taken 8/27/2024 2100 by Lizzy Palacios RN  Safety Promotion/Fall Prevention: safety round/check completed  Taken 8/27/2024 2000 by Rosenbalm, Lizzy, RN  Safety Promotion/Fall Prevention: safety round/check completed  Taken 8/27/2024 1900 by Rosenbalm, Lizzy, RN  Safety Promotion/Fall Prevention: safety round/check completed     Problem: Fall Injury Risk  Goal: Absence of Fall and Fall-Related Injury  Outcome: Ongoing, Not Progressing  Intervention: Identify and Manage Contributors  Recent Flowsheet Documentation  Taken 8/28/2024 0400 by Lizzy Palacios RN  Medication Review/Management: medications reviewed  Taken 8/28/2024 0200 by Lizzy Palacios RN  Medication Review/Management: medications reviewed  Taken 8/28/2024 0000 by Lizzy Palacios RN  Medication Review/Management: medications reviewed  Taken 8/27/2024 2200 by Lizzy Palacios RN  Medication Review/Management: medications reviewed  Taken 8/27/2024 2000 by Lizzy Palacios RN  Medication Review/Management: medications reviewed  Intervention:  Promote Injury-Free Environment  Recent Flowsheet Documentation  Taken 8/28/2024 0500 by Lizzy Palacios RN  Safety Promotion/Fall Prevention: safety round/check completed  Taken 8/28/2024 0400 by Lizzy Palacios RN  Safety Promotion/Fall Prevention: safety round/check completed  Taken 8/28/2024 0300 by Lizzy Palacios RN  Safety Promotion/Fall Prevention: safety round/check completed  Taken 8/28/2024 0200 by Lizzy Palacios RN  Safety Promotion/Fall Prevention: safety round/check completed  Taken 8/28/2024 0100 by Lizzy Palacios RN  Safety Promotion/Fall Prevention: safety round/check completed  Taken 8/28/2024 0000 by Rosenbalm, Lizzy, RN  Safety Promotion/Fall Prevention: safety round/check completed  Taken 8/27/2024 2300 by Lizzy Palacios RN  Safety Promotion/Fall Prevention: safety round/check completed  Taken 8/27/2024 2200 by Lizzy Palacios RN  Safety Promotion/Fall Prevention: safety round/check completed  Taken 8/27/2024 2100 by Lizzy Palacios RN  Safety Promotion/Fall Prevention: safety round/check completed  Taken 8/27/2024 2000 by Rosenbalm, Lizzy, RN  Safety Promotion/Fall Prevention: safety round/check completed  Taken 8/27/2024 1900 by Rosenbalm, Lizzy, RN  Safety Promotion/Fall Prevention: safety round/check completed     Problem: Adjustment to Illness (Sepsis/Septic Shock)  Goal: Optimal Coping  Outcome: Ongoing, Not Progressing  Intervention: Optimize Psychosocial Adjustment to Illness  Recent Flowsheet Documentation  Taken 8/27/2024 2000 by Lizzy Palacios RN  Family/Support System Care: support provided     Problem: Bleeding (Sepsis/Septic Shock)  Goal: Absence of Bleeding  Outcome: Ongoing, Not Progressing     Problem: Glycemic Control Impaired (Sepsis/Septic Shock)  Goal: Blood Glucose Level Within Desired Range  Outcome: Ongoing, Not Progressing     Problem: Infection Progression (Sepsis/Septic Shock)  Goal: Absence of Infection Signs  and Symptoms  Outcome: Ongoing, Not Progressing  Intervention: Promote Recovery  Recent Flowsheet Documentation  Taken 8/28/2024 0200 by Lizzy Palacios RN  Activity Management: bedrest  Taken 8/27/2024 2000 by Lizzy Palaciso RN  Activity Management: bedrest     Problem: Nutrition Impaired (Sepsis/Septic Shock)  Goal: Optimal Nutrition Intake  Outcome: Ongoing, Not Progressing     Problem: Communication Impairment (Mechanical Ventilation, Invasive)  Goal: Effective Communication  Outcome: Ongoing, Not Progressing     Problem: Device-Related Complication Risk (Mechanical Ventilation, Invasive)  Goal: Optimal Device Function  Outcome: Ongoing, Not Progressing     Problem: Inability to Wean (Mechanical Ventilation, Invasive)  Goal: Mechanical Ventilation Liberation  Outcome: Ongoing, Not Progressing  Intervention: Promote Extubation and Mechanical Ventilation Liberation  Recent Flowsheet Documentation  Taken 8/28/2024 0400 by Lizzy Palacios RN  Medication Review/Management: medications reviewed  Taken 8/28/2024 0200 by Lizzy Palacios RN  Medication Review/Management: medications reviewed  Taken 8/28/2024 0000 by Lizzy Palacios RN  Medication Review/Management: medications reviewed  Taken 8/27/2024 2200 by Lizzy Palacios RN  Medication Review/Management: medications reviewed  Taken 8/27/2024 2000 by Lizzy Palacios RN  Medication Review/Management: medications reviewed     Problem: Nutrition Impairment (Mechanical Ventilation, Invasive)  Goal: Optimal Nutrition Delivery  Outcome: Ongoing, Not Progressing     Problem: Skin and Tissue Injury (Mechanical Ventilation, Invasive)  Goal: Absence of Device-Related Skin and Tissue Injury  Outcome: Ongoing, Not Progressing  Intervention: Maintain Skin and Tissue Health  Recent Flowsheet Documentation  Taken 8/28/2024 0200 by Lizzy Palacios RN  Device Skin Pressure Protection:   skin-to-device areas padded   pressure points protected    positioning supports utilized   adhesive use limited     Problem: Ventilator-Induced Lung Injury (Mechanical Ventilation, Invasive)  Goal: Absence of Ventilator-Induced Lung Injury  Outcome: Ongoing, Not Progressing  Intervention: Prevent Ventilator-Associated Pneumonia  Recent Flowsheet Documentation  Taken 8/28/2024 0400 by iLzzy Palacios RN  Head of Bed ([facility]) Positioning: HOB at 30-45 degrees  Oral Care:   lip/mouth moisturizer applied   swabbed with antiseptic solution  Taken 8/28/2024 0200 by Lizzy Palacios RN  Head of Bed ([facility]) Positioning: HOB at 30-45 degrees  VAP Prevention Bundle:   oral care regularly provided   HOB elevation maintained   VTE prophylaxis provided   stress ulcer prophylaxis provided  VAP Prevention Measures: completed  Taken 8/28/2024 0000 by Lizzy Palacios RN  Head of Bed ([facility]) Positioning: HOB at 30-45 degrees  Oral Care:   lip/mouth moisturizer applied   swabbed with antiseptic solution  Taken 8/27/2024 2200 by Lizzy Palacios RN  Head of Bed ([facility]) Positioning: HOB at 30-45 degrees  Taken 8/27/2024 2000 by Lizzy Palacios RN  Head of Bed ([facility]) Positioning: HOB at 30-45 degrees  VAP Prevention Bundle:   HOB elevation maintained   oral care regularly provided   VTE prophylaxis provided   stress ulcer prophylaxis provided  VAP Prevention Measures: completed  Oral Care:   lip/mouth moisturizer applied   swabbed with antiseptic solution   Goal Outcome Evaluation:

## 2024-08-28 NOTE — CASE MANAGEMENT/SOCIAL WORK
Discharge Planning Assessment   [place]     Patient Name: Kennedy Prescott  MRN: [id number]  Today's Date: 8/28/2024    Admit Date: 8/19/2024     Discharge Plan       Row Name 08/28/24 1343       Plan    Plan Pt admitted on 8/19/24. Pt remains intubated. Pt is a resident of [facility]. Per Irma who states pt had a 24 day bed hold prior to admission and will accept pt back at discharge. SS to follow and assist with discharge planning.    16:58: Pt was transitioned to comfort measures on this date. SS to follow.            Destination       Service Provider Request Status Selected Services Address Phone Fax Patient Preferred    [facility] Pending - No Request Sent N/A [address] [place] [address] [phone] --               MONA Long

## 2024-08-28 NOTE — PROGRESS NOTES
TH[facility] Physician - Brief Progress Note  PERMANENT  08/28/2024 02:30    [facility] [place] [facility] [place] - CCU - [address] - [place], [place] ([facility])    SATINDER HOLMAN    Date of Service 08/28/2024 02:30    HPI/Events of Note severe met acidosis will give bicarb bolus and increase vent rate slightly. bp better on levophed      Interventions Major-Acid-Base disturbance - evaluation and management, Respiratory failure - evaluation and management, Shock - evaluation and management  Intermediate-Communication with other healthcare providers and/or family        Electronically Signed by: Sravan Freeman) on 08/28/2024 02:32

## 2024-08-28 NOTE — PROGRESS NOTES
Progress Note Critical Care Pulmonary        Subjective      Overnight events reviewed.  Patient had significant events.  White count continues to increase.    Later in the morning patient later in the morning patient coded briefly.  Goals of care were discussed with patient's family.  Currently patient is DNR.    Case was discussed with primary team and palliative care team.  I highly recommend comfort measures.  I have discussed my recommendations with patient's family in the past.  Adjusted patient's drips and ventilator settings.  Will repeat venous blood gas and will accordingly adjust patient's ventilator settings.  Overnight events reviewed.  All the labs medications ins and outs and vitals reviewed.  MDR done at bed side with RN, pharmacist, nutrition, , hospitalist manager  and RT  All the drips,other meds,  labs,ins and outs , abg, fio2 requirement reviewed and dicussed in rounds.     Wbc -went up    Deshawn requirement going up   Case discussed with primary team  Added stress dose steroids        Review of Systems:    On vent , sedated     Vital Signs  Temp:  [97.8 °F (36.6 °C)-99.3 °F (37.4 °C)] 99 °F (37.2 °C)  Heart Rate:  [] 117  Resp:  [16-28] 24  BP: ()/(25-94) 89/67  FiO2 (%):  [30 %-100 %] 60 %  Body mass index is 31.67 kg/m².    Intake/Output Summary (Last 24 hours) at 8/28/2024 0938  Last data filed at 8/28/2024 0620  Gross per 24 hour   Intake 4095.32 ml   Output 0 ml   Net 4095.32 ml     No intake/output data recorded.    Physical Exam:  General-chronically ill in appearance ET tube in place,     HEENT-PERRLA    Neck-supple    Respiratory-decreased breath sounds bilaterally.  Synchronous with the ventilator.  Peak and plateau pressures within normal limits    Cardiovascular-NSR  GI-NTND    CNS-sedated.  Drips reviewed    Extremities-no clubbing and edema        Results Review:      Results from last 7 days   Lab Units 08/28/24  0002 08/27/24  0021 08/26/24  0024   WBC  10*3/mm3 39.78* 23.22* 13.05*   HEMOGLOBIN g/dL 10.5* 9.9* 8.0*   PLATELETS 10*3/mm3 285 293 172     Results from last 7 days   Lab Units 08/28/24  0651 08/28/24  0002 08/27/24  0021 08/26/24  0024 08/25/24  0759 08/23/24  2306 08/23/24  1541   SODIUM mmol/L 140 139 137   < > 139   < > 138   POTASSIUM mmol/L 5.4* 5.5* 4.5   < > 4.3   < > 3.4*   CHLORIDE mmol/L 94* 93* 93*   < > 94*   < > 94*   CO2 mmol/L 12.0* 12.6* 20.4*   < > 23.4   < > 28.5   BUN mg/dL 38* 33* 25*   < > 31*   < > 20   CREATININE mg/dL 4.78* 4.60* 3.83*   < > 4.47*   < > 2.65*   CALCIUM mg/dL 8.6 8.7 8.4*   < > 8.8   < > 8.2*   GLUCOSE mg/dL 126* 70 105*   < > 156*   < > 148*   MAGNESIUM mg/dL  --   --   --   --  2.0  --  2.0    < > = values in this interval not displayed.     Lab Results   Component Value Date    INR 3.30 (H) 08/28/2024    INR 1.40 (H) 08/26/2024    INR 1.31 (H) 08/23/2024    PROTIME 33.5 (H) 08/28/2024    PROTIME 17.2 (H) 08/26/2024    PROTIME 16.4 (H) 08/23/2024     Results from last 7 days   Lab Units 08/28/24  0651 08/26/24  0024 08/25/24  0759   ALK PHOS U/L 196* 86 107   BILIRUBIN mg/dL 0.7 0.4 0.6   ALT (SGPT) U/L 1,183* 90* 85*   AST (SGOT) U/L 3,307* 126* 168*     Results from last 7 days   Lab Units 08/28/24  0831   PH, ARTERIAL pH units 7.193*   PO2 ART mm Hg 140.0*   PCO2, ARTERIAL mm Hg 32.0*   HCO3 ART mmol/L 12.3*     Imaging Results (Last 24 Hours)       ** No results found for the last 24 hours. **                 acetaminophen, 650 mg, Oral, Once per day on Monday Wednesday Friday  acetaminophen, 650 mg, Oral, TID  ammonium lactate, 1 Application, Topical, Nightly  aspirin, 81 mg, Oral, Daily  chlorhexidine, 15 mL, Mouth/Throat, Q12H  clopidogrel, 75 mg, Oral, Daily  collagenase, 1 Application, Topical, Q24H  doxycycline, 100 mg, Intravenous, Q12H  hydrocortisone sodium succinate, 100 mg, Intravenous, Q8H  insulin lispro, 2-7 Units, Subcutaneous, 4x Daily AC & at Bedtime  levothyroxine, 75 mcg, Oral,  QAM  Linezolid, 600 mg, Intravenous, Q12H  meropenem, 1,000 mg, Intravenous, Q24H  metoprolol tartrate, 12.5 mg, Oral, Q12H  micafungin (MYCAMINE) IV, 100 mg, Intravenous, Q24H  midodrine, 5 mg, Nasogastric, Q8H  multivitamin with minerals, 1 tablet, Oral, Daily  mupirocin, 1 Application, Topical, Q12H  nicotine, 1 patch, Transdermal, Q24H  pantoprazole, 40 mg, Intravenous, BID AC  senna-docusate sodium, 2 tablet, Oral, BID  sevelamer, 800 mg, Oral, TID With Meals  sodium chloride, 10 mL, Intravenous, Q12H  sodium chloride, 10 mL, Intravenous, Q12H  sodium chloride, 10 mL, Intravenous, Q12H  sodium chloride, 10 mL, Intravenous, Q12H  sucralfate, 1 g, Oral, 4x Daily AC & at Bedtime      amiodarone, 0.5 mg/min, Last Rate: 0.5 mg/min (08/28/24 0558)  dexmedetomidine, 0.2-1.5 mcg/kg/hr (Dosing Weight), Last Rate: 0.8 mcg/kg/hr (08/28/24 0900)  heparin, 9.7 Units/kg/hr (Dosing Weight), Last Rate: 9.7 Units/kg/hr (08/28/24 0023)  norepinephrine, 0.02-1 mcg/kg/min, Last Rate: 0.2 mcg/kg/min (08/28/24 0800)  Pharmacy to Dose Heparin,   phenylephrine, 0.5-3 mcg/kg/min, Last Rate: 3 mcg/kg/min (08/28/24 0809)  propofol, 5-50 mcg/kg/min (Dosing Weight), Last Rate: Stopped (08/24/24 0815)      Microbiology Results (last 10 days)       Procedure Component Value - Date/Time    BAL Culture, Quantitative - Lavage, Lung, Left Lower Lobe [[id number]] Collected: 08/27/24 1236    Lab Status: Preliminary result Specimen: Lavage from Lung, Left Lower Lobe Updated: 08/27/24 1619     Gram Stain WBCs seen      No organisms seen    BAL Culture, Quantitative - Lavage, Lung, Right Middle Lobe [[id number]] Collected: 08/27/24 1236    Lab Status: Preliminary result Specimen: Lavage from Lung, Right Middle Lobe Updated: 08/27/24 1614     Gram Stain WBCs seen      Gram negative bacilli    BAL Culture, Quantitative - Lavage, Lung, Right Lower Lobe [[id number]] Collected: 08/27/24 1236    Lab Status: Preliminary result Specimen: Lavage from Lung, Right  Lower Lobe Updated: 08/27/24 1620     Gram Stain WBCs seen      No organisms seen    Blood Culture - Blood, Arm, Left [[id number]]  (Normal) Collected: 08/25/24 1549    Lab Status: Preliminary result Specimen: Blood from Arm, Left Updated: 08/27/24 1631     Blood Culture No growth at 2 days    Blood Culture - Blood, Wrist, Right [[id number]]  (Normal) Collected: 08/25/24 1257    Lab Status: Preliminary result Specimen: Blood from Wrist, Right Updated: 08/27/24 1316     Blood Culture No growth at 2 days    COVID-19 and FLU A/B PCR, 1 HR TAT - Swab, Nasopharynx [[id number]]  (Normal) Collected: 08/22/24 2227    Lab Status: Final result Specimen: Swab from Nasopharynx Updated: 08/22/24 2258     COVID19 Not Detected     Influenza A PCR Not Detected     Influenza B PCR Not Detected    Narrative:      Fact sheet for providers: https://www.fda.gov/media/086587/download    Fact sheet for patients: https://www.fda.gov/media/994879/download    Test performed by PCR.    Urine Culture - Urine, Straight Cath [[id number]]  (Normal) Collected: 08/18/24 1348    Lab Status: Final result Specimen: Urine from Straight Cath Updated: 08/20/24 0954     Urine Culture No growth    COVID-19 and FLU A/B PCR, 1 HR TAT - Swab, Nasopharynx [[id number]]  (Normal) Collected: 08/18/24 1211    Lab Status: Final result Specimen: Swab from Nasopharynx Updated: 08/18/24 1234     COVID19 Not Detected     Influenza A PCR Not Detected     Influenza B PCR Not Detected    Narrative:      Fact sheet for providers: https://www.fda.gov/media/237493/download    Fact sheet for patients: https://www.fda.gov/media/811873/download    Test performed by PCR.             Narrative & Impression   EXAM:    CT Head Without Intravenous Contrast     EXAM DATE:    8/14/2024 11:53 AM     CLINICAL HISTORY:    AMS     TECHNIQUE:    Axial computed tomography images of the head/brain without intravenous  contrast.  Sagittal and coronal reformatted images were created and  reviewed.   This CT exam was performed using one or more of the following  dose reduction techniques:  automated exposure control, adjustment of  the mA and/or kV according to patient size, and/or use of iterative  reconstruction technique.     COMPARISON:    No relevant prior studies available.     FINDINGS:    BRAIN AND EXTRA-AXIAL SPACES:  Unremarkable as visualized.  No  hemorrhage.  No significant white matter disease.  No edema.  No  ventriculomegaly.    BONES/JOINTS:  Unremarkable as visualized.  No acute fracture.    SOFT TISSUES:  Unremarkable as visualized.    SINUSES:  Unremarkable as visualized.  No acute sinusitis.    MASTOID AIR CELLS:  Unremarkable as visualized.  No mastoid effusion.     IMPRESSION:    Unremarkable exam demonstrating no CT evidence of acute intracranial  findings.        Microbiology Results (last 10 days)       Procedure Component Value - Date/Time    BAL Culture, Quantitative - Lavage, Lung, Left Lower Lobe [[id number]]  (Abnormal) Collected: 08/27/24 1236    Lab Status: Preliminary result Specimen: Lavage from Lung, Left Lower Lobe Updated: 08/28/24 1048     BAL Culture 25,000 CFU/mL Pseudomonas species      No Normal Respiratory Sherry     Gram Stain WBCs seen      No organisms seen    BAL Culture, Quantitative - Lavage, Lung, Right Middle Lobe [[id number]]  (Abnormal) Collected: 08/27/24 1236    Lab Status: Preliminary result Specimen: Lavage from Lung, Right Middle Lobe Updated: 08/28/24 1050     BAL Culture 25,000 CFU/mL Pseudomonas species      No Normal Respiratory Sherry     Gram Stain WBCs seen      Gram negative bacilli    Narrative:      Refer to [facility] Culture from same day for MICS    BAL Culture, Quantitative - Lavage, Lung, Right Lower Lobe [[id number]]  (Abnormal) Collected: 08/27/24 1236    Lab Status: Preliminary result Specimen: Lavage from Lung, Right Lower Lobe Updated: 08/28/24 1050     BAL Culture 25,000 CFU/mL Pseudomonas species      No Normal Respiratory Sherry     Gram Stain  WBCs seen      No organisms seen    Narrative:      Refer to LLL Culture from same day for MICS    Blood Culture - Blood, Hand, Right [[id number]]  (Normal) Collected: 08/27/24 1043    Lab Status: Preliminary result Specimen: Blood from Hand, Right Updated: 08/28/24 1100     Blood Culture No growth at 24 hours    Blood Culture - Blood, Arm, Right [[id number]]  (Normal) Collected: 08/27/24 1024    Lab Status: Preliminary result Specimen: Blood from Arm, Right Updated: 08/28/24 1100     Blood Culture No growth at 24 hours    Blood Culture - Blood, Arm, Left [[id number]]  (Normal) Collected: 08/25/24 1549    Lab Status: Preliminary result Specimen: Blood from Arm, Left Updated: 08/27/24 1631     Blood Culture No growth at 2 days    Blood Culture - Blood, Wrist, Right [[id number]]  (Normal) Collected: 08/25/24 1257    Lab Status: Preliminary result Specimen: Blood from Wrist, Right Updated: 08/28/24 1330     Blood Culture No growth at 3 days    COVID-19 and FLU A/B PCR, 1 HR TAT - Swab, Nasopharynx [[id number]]  (Normal) Collected: 08/22/24 2227    Lab Status: Final result Specimen: Swab from Nasopharynx Updated: 08/22/24 2258     COVID19 Not Detected     Influenza A PCR Not Detected     Influenza B PCR Not Detected    Narrative:      Fact sheet for providers: https://www.fda.gov/media/385386/download    Fact sheet for patients: https://www.fda.gov/media/489634/download    Test performed by PCR.           This report was finalized on 8/14/2024 12:24 PM by Dr. Rodrigo Kemp MD.        Latest Reference Range & Units 08/28/24 02:07 08/28/24 08:31   pH, Arterial 7.350 - 7.450 pH units  7.193 (C)   pCO2, Arterial 35.0 - 45.0 mm Hg  32.0 (L)   pO2, Arterial 83.0 - 108.0 mm Hg  140.0 (H)   HCO3, Arterial 20.0 - 26.0 mmol/L  12.3 (L)   Base Excess 0.0 - 2.0 mmol/L  -14.7 (L)   O2 Saturation, Arterial 94.0 - 99.0 %  99.0   CO2 Content 22 - 33 mmol/L 15.2 (L) 13.3 (L)   A-a DO2 0.0 - 300.0 mmHg  235.3   Carboxyhemoglobin 0 - 5 %  1.8    Methemoglobin 0.00 - 3.00 %  0.40   Oxyhemoglobin 94 - 99 %  96.8   Carboxyhemoglobin Venous 0.0 - 5.0 % 1.4    Methemoglobin Venous 0.0 - 3.0 % 0.9    Oxyhemoglobin Venous 45.0 - 75.0 % 34.7 (L)    Hematocrit, Blood Gas 38.0 - 51.0 %  29.3 (L)   Hemoglobin, Blood Gas 14 - 18 g/dL 10.3 (L) 9.5 (L)   Site  Lab Right Radial   René's Test   Positive   Modality  Ventilator Ventilator   FIO2 % 50 60   Ventilator Mode  VC/AC VC/AC   Set Tidal Volume  480.00 480.00   Set Mech Resp Rate  16.0 18.0   PEEP  5.0 5.0   Barometric Pressure for Blood Gas mmHg 729 729   Notified By  [id number]   Notified Time  08/28/2024 02:12 08/28/2024 08:35   Notified Who  ELENA Marcum   pH, Venous 7.320 - 7.420 pH Units 7.082 (C)    pCO2, Venous 41.0 - 51.0 mm Hg 46.4    pO2, Venous 27.0 - 53.0 mm Hg 27.8    HCO3, Venous 22.0 - 28.0 mmol/L 13.8 (L)    Base Excess 0.0 - 2.0 mmol/L -15.5 (L)    O2 Saturation, Venous 45.0 - 75.0 % 35.5 (L)    (C): Data is critically low  (L): Data is abnormally low  (H): Data is abnormally high  Medication Review:     Assessment & Plan     Neuro-drips reviewed.  Adjusted for a RASS goal of -1 to -2.    CT of the head-latest reviewed.  Report as mentioned above.        Acute hypoxic respiratory failure-likely due to cardiac arrest and renal failure.  Latest ABG reviewed.  Latest chest x-ray reviewed.  Chest x-ray shows right-sided pleural effusion.           Blood gas from today morning reviewed and adjusted vent settings.  Increased respiratory rate.  Will repeat VBG and accordingly will adjust ventilator settings and/or push amps of sodium bicarbonate.  Mostly patient is having metabolic acidosis.  Patient had a CODE BLUE today.  Lactic acid levels are going up.    Lactic acidosis-Likely patient is having ischemic gut and likely having translocation of the bacteria from there.      Lab 08/28/24  1152 08/28/24  0842 08/24/24  1524   LACTATE 14.9* 14.1* 2.0      Will get a KUB-to make sure patient does  not have any perforation.  Unstable for a CAT scan of the abdomen and pelvis.      Will repeat venous blood gas tomorrow morning    Vent Settings          Resp Rate (Set): 18  Pressure Support (cm H2O): 8 cm H20  FiO2 (%): 60 %  PEEP/CPAP (cm H2O): 5 cm H20    Minute Ventilation (L/min) (Obs): 10.9 L/min  Resp Rate (Observed) Vent: 22     I:E Ratio (Obs): 1:2.8    PIP Observed (cm H2O): 22 cm H2O         Respiratory cultures reviewed and growing Pseudomonas.  Patient is currently on meropenem.    Changed vancomycin to linezolid.      Ventilator settings and graphics reviewed.    Peak and plateau pressures reviewed.    Right lower lobe pneumonia-latest microbiology reviewed.  Antibiotics reviewed.  Continue current antibiotics.    White count increased and patient has pulm infiltrates.  Bronchoscopy done.  Mucopurulent secretions were cleared from right lower lobe and left lower lobe.    Culture sent for microbiology.    Goals of care discussed with patient's son and patient is currently DNR.    Cardiovascular-vitals reviewed.  Vasopressors titrated for a MAP of 65 and above.    CAD w/ PCI to LAD on 5/28  Latest echo reviewed.    Gastrointestinal-GI bleed, status post upper GI endoscopy.  Report and events reviewed.  Continue to monitor hemoglobin.  Transfuse for hemoglobin less than 8 if patient is having active GI bleeding.  If no GI bleeding recommend transfusion for hemoglobin less than 7.     Will hold the tube feeds and get KUB to rule out perforation.    Will start on D5 at rate of 50.  Also started on hydrocortisone which should help with patient's blood sugars.             Renal-  ESRD in setting of missed dialysis sessions   Continue hemodialysis.  Nephrology on case.  Was able to tolerate hemodialysis today.  Required phenylephrine.    Infectious disease-latest micro reviewed     White count went up.   Adjusted antibiotics again today  Changed vancomycin to linezolid.  Changed cefepime to meropenem  yesterday.  Continue micafungin.  Cultures reviewed.        Endocrine- monitor BS target 140-180  Hypothyroidism- TSH improved at 5.1. Recent free T4 wnl.             I have personally reviewed x-ray chest, labs, medication list.  Results for orders placed during the hospital encounter of 08/19/24    Adult Transthoracic Echo Complete W/ Cont if Necessary Per Protocol    Interpretation Summary    Left ventricular systolic function is mildly decreased. Left ventricular ejection fraction appears to be 46 - 50%.    The left ventricular cavity is mildly dilated.    Left ventricular diastolic function is consistent with (grade III w/high LAP) fixed restrictive pattern.    The left atrial cavity is mild to moderately dilated.    The right atrial cavity is mildly  dilated.    Estimated right ventricular systolic pressure from tricuspid regurgitation is normal (<35 mmHg).    This is a technically difficult study.     Overall prognosis extremely poor.  Recommend comfort measures.  Currently patient is DNR.  Goals of care discussion-with patient's son today.    Critical Care time spent in direct patient care: 33 minutes (excluding procedure time, if applicable) including high complexity decision making to assess, manipulate, and support vital organ system failure in this individual who has impairment of one or more vital organ systems such that there is a high probability of imminent or life threatening deterioration in the patient’s condition.  I adjusted patient's drips and ventilator settings.  Patient is critically ill and is at higher risk for further mortality/morbidity. Continue ICU care .           Pedro Humphries MD  08/28/24  09:38 EDT

## 2024-08-28 NOTE — NURSING NOTE
1000: asystole one round CPR  1001 vasopressing started 0.04  1004 Amp bicarb  1006 per family pt no cpr

## 2024-08-28 NOTE — PLAN OF CARE
Problem: Communication Impairment (Mechanical Ventilation, Invasive)  Goal: Effective Communication  Outcome: Ongoing, Progressing     Problem: Device-Related Complication Risk (Mechanical Ventilation, Invasive)  Goal: Optimal Device Function  Outcome: Ongoing, Progressing     Problem: Inability to Wean (Mechanical Ventilation, Invasive)  Goal: Mechanical Ventilation Liberation  Outcome: Ongoing, Progressing   Goal Outcome Evaluation:

## 2024-08-28 NOTE — NURSING NOTE
Patient's family has decided to make pt comfort care. BRENT (Alanna Franco) notified and was ruled out. Will call with cardiac time of death.

## 2024-08-28 NOTE — PROGRESS NOTES
[facility] INFUSION  Kennedy Prescott is a  74 y.o. male receiving heparin infusion.     Therapy for (VTE/Cardiac):   Cardiac  Patient Dosing Weight: 103kg   Initial Bolus (Y/N):   N  Any Bolus (Y/N):   Y        Signs or Symptoms of Bleeding: No     Cardiac or Other (Not VTE)   Initial Bolus: 60 units/kg (Max 4,000 units)  Initial rate: 12 units/kg/hr (Max 1,000 units/hr)   Anti Xa Rebolus Infusion Hold time Change infusion Dose (Units/kg/hr) Next Anti Xa or aPTT Level Due   < 0.11 50 Units/kg  (4000 Units Max) None Increase by  3 Units/kg/hr 6 hours   0.11- 0.19 25 Units/kg  (2000 Units Max) None Increase by  2 Units/kg/hr 6 hours   0.2 - 0.29 0 None Increase by  1 Units/kg/hr 6 hours   0.3 - 0.5 0 None No Change 6 hours (after 2 consecutive levels in range check q24h @0700)   0.51 - 0.6 0 None Decrease by  1 Units/kg/hr 6 hours   0.61 - 0.8 0 30 Minutes Decrease by  2 Units/kg/hr 6 hours   0.81 - 1 0 60 Minutes Decrease by  3 Units/kg/hr 6 hours   >1 0 Hold  After Anti Xa less than 0.5 decrease previous rate by  4 Units/kg/hr  Every 2 hours until Anti Xa  less than 0.5 then when infusion restarts in 6 hours         Recommend anti-Xa every 6 hours.          Date   Time   Anti-Xa Current Rate (Unit/kg/hr) Bolus   (Units) Rate Change   (Unit/kg/hr) New Rate (Unit/kg/hr) Next   Anti-Xa Comments  Pump Check Daily   08/28 0015 <0.10  New start  No initial bolus  - 9.7 0700 Initiate infusion , no bolus, no s/s bleeding per BHANU Ball    8/28 0842 0.17 9.7 2000 +2 11.7 1600 Bolus, rate adjusted, no s/s bleeding, d/w    Roselyn   RN                                                                                                                                                                                                                      Pharmacy will continue to follow anti-Xa results and monitor for signs and symptoms of bleeding or thrombosis.      08/28/24   dio

## 2024-08-29 LAB
BACTERIA SPEC AEROBE CULT: ABNORMAL
GRAM STN SPEC: ABNORMAL

## 2024-08-29 NOTE — CASE MANAGEMENT/SOCIAL WORK
Discharge Planning Assessment   [facility]     Patient Name: Kennedy Prescott  MRN: [id number]  Today's Date: 2024    Admit Date: 2024     Discharge Plan       Row Name 24 0826       Plan    Final Discharge Disposition Code 20 -     Final Note Pt  on 24.  notified [facility] per Irma. No other needs identified.          TIM LongW

## 2024-08-30 LAB
BACTERIA SPEC AEROBE CULT: NORMAL
BACTERIA SPEC AEROBE CULT: NORMAL
FUNGUS SPEC CULT: NORMAL
FUNGUS SPEC FUNGUS STN: NORMAL

## 2024-09-01 LAB
BACTERIA SPEC AEROBE CULT: NORMAL
BACTERIA SPEC AEROBE CULT: NORMAL

## 2024-09-06 LAB
FUNGUS SPEC CULT: ABNORMAL
FUNGUS SPEC FUNGUS STN: ABNORMAL
FUNGUS SPEC FUNGUS STN: ABNORMAL

## 2024-09-16 LAB
ACID FAST STN SPEC: NEGATIVE
MYCOBACTERIUM SPEC QL CULT: POSITIVE
SPECIMEN PREPARATION: ABNORMAL

## 2024-09-17 NOTE — DISCHARGE SUMMARY
[facility] DISCHARGE SUMMARY    Patient Identification:  Name:  Kennedy Prescott  Age:  74 y.o.  Sex:  male  :  1950  MRN:  [id number]  Visit Number:  [id number]    Date of Admission: 2024  Date of Discharge:  2024     PCP: Jag Guillaume MD    DISCHARGE DIAGNOSIS   #Cardiac arrest   #Acute hypoxic respiratory failure   #CAD w/ PCI to LAD on   #Hypotension related to sedation   #Vtach requiring defibrillation   #Acute blood loss anemia   #pAfib chronically anticoagulated with apixiban  #Paroxsymal atrial fibrillation with RVR  #Intermittent hypotension  #ESRD on intermittent hemodialysis  #Hyperkalemia  #UTI  #Recent foot ulceration with associated cellulitis  #Hypothyroidism  #GERD  #History of hypertension     CONSULTS   General Surgery  Pulmonology  Cardiology   Nephrology  Palliative Care    PROCEDURES PERFORMED  EGD on 24 and 24    HOSPITAL COURSE  Patient is a 74 y.o. male presented to [facility] complaining of shortness of breath.  Please see the admitting history and physical for further details.      Patient had anemia requiring transfusion on initial presentation and was having bloody bowel movements. He was admitted and General Surgery consulted for evaluation. EGD was performed on 24 which revealed an oozing, superficial duodenal ulcer. Epinephrine was injected for hemostasis. He continued to have signs of upper GI bleeding and required further transfusion, so a repeat EGD was performed on 24. When endoscope was being removed he became bradycardic and then pulseless requiring a short round of ACLS. He then had what appeared to be wide complex tachycardia requiring cardioversion. He was stabilized and continued treatment in the ICU. Course was complicated by development of sepsis with unclear source. He was treated for VAP and Pulmonology assisted with ventilator management. He continued to have intermittent hypotension,  especially with dialysis sessions. This worsened in the setting of sepsis and he required multiple vasopressors for blood pressure support. Goals of care were discussed with patient's family and ultimately the decision was made to transition to comfort measures only. He was extubated and treated with prn medications for pain/discomfort. Patient  on 24 and time of death was noted to be 17:47.       Body mass index is 31.67 kg/m².  Wt Readings from Last 3 Encounters:   24 103 kg (227 lb 1.2 oz)   24 95.3 kg (210 lb 1.6 oz)   24 95.3 kg (210 lb 1.6 oz)       PHYSICAL EXAM:   Patient unresponsive, no cardiac activity heard on auscultation, and apneic.    DISCHARGE DISPOSITION         TEST  RESULTS PENDING AT DISCHARGE  Pending Labs       Order Current Status    AFB Culture - Lavage, Lung, Left Lower Lobe Preliminary result    AFB Culture - Lavage, Lung, Right Lower Lobe Preliminary result    AFB Culture - Lavage, Lung, Right Middle Lobe Preliminary result    Fungus Culture - Lavage, Lung, Left Lower Lobe Preliminary result    Fungus Culture - Lavage, Lung, Right Lower Lobe Preliminary result    Fungus Culture - Lavage, Lung, Right Middle Lobe Preliminary result             CODE STATUS  Code Status and Medical Interventions: No CPR (Do Not Attempt to Resuscitate); Comfort Measures; Mervin Kunz   Ordered at: 24 1626     Level Of Support Discussed With:    Next of Kin (If No Surrogate)     Code Status (Patient has no pulse and is not breathing):    No CPR (Do Not Attempt to Resuscitate)     Medical Interventions (Patient has pulse or is breathing):    Comfort Measures     Comments:    Mervin Joaquina       Naima Marcum DO  [facility]  24  02:05 EDT    Please note that this discharge summary required more than 30 minutes to complete.

## 2024-09-25 LAB
ACID FAST STN SPEC: NEGATIVE
FUNGUS SPEC CULT: NORMAL
FUNGUS SPEC FUNGUS STN: NORMAL
M AVIUM CMPLX DNA SPEC QL NAA+PROBE: POSITIVE
M TB CMPLX DNA ISLT/SPM QL: NEGATIVE
MYCOBACTERIUM SPEC QL CULT: POSITIVE
OTHER:: ABNORMAL
REFLEX ID BY MALDI: ABNORMAL
SPECIMEN PREPARATION: ABNORMAL

## 2024-09-30 LAB
FUNGUS SPEC CULT: ABNORMAL
FUNGUS SPEC FUNGUS STN: ABNORMAL
FUNGUS SPEC FUNGUS STN: ABNORMAL

## 2024-10-11 LAB
ACID FAST STN SPEC: NEGATIVE
ACID FAST STN SPEC: NEGATIVE
MYCOBACTERIUM SPEC QL CULT: NEGATIVE
MYCOBACTERIUM SPEC QL CULT: NEGATIVE
SPECIMEN PREPARATION: NORMAL
SPECIMEN PREPARATION: NORMAL

## (undated) DEVICE — RADIFOCUS GLIDEWIRE: Brand: GLIDEWIRE

## (undated) DEVICE — DEV INFL MONARCH 25W

## (undated) DEVICE — FRCP BX RADJAW4 NDL 2.8 240CM LG OG BX40

## (undated) DEVICE — ST INF PRI SMRTSTE 20DRP 2VLV 24ML 117

## (undated) DEVICE — RADIAL RUNWAY TOP PADS: Brand: RADIAL RUNWAY TOP PADS

## (undated) DEVICE — Device: Brand: DEFENDO AIR/WATER/SUCTION AND BIOPSY VALVE

## (undated) DEVICE — CATH F5 INF PIG145 110CM 6SH: Brand: INFINITI

## (undated) DEVICE — DRAPE, RADIAL, STERILE: Brand: MEDLINE

## (undated) DEVICE — NC TREK CORONARY DILATATION CATHETER 3.0 MM X 15 MM / RAPID-EXCHANGE: Brand: NC TREK

## (undated) DEVICE — NC TREK CORONARY DILATATION CATHETER 4.0 MM X 8 MM / RAPID-EXCHANGE: Brand: NC TREK

## (undated) DEVICE — GW INQWIRE FC PTFE J/3MM .035 180

## (undated) DEVICE — CATH F5 INF JR 4 100CM: Brand: INFINITI

## (undated) DEVICE — Device

## (undated) DEVICE — THE BITE BLOCK MAXI, LATEX FREE STRAP IS USED TO PROTECT THE ENDOSCOPE INSERTION TUBE FROM BEING BITTEN BY THE PATIENT.

## (undated) DEVICE — TREK CORONARY DILATATION CATHETER 2.50 MM X 15 MM / RAPID-EXCHANGE: Brand: TREK

## (undated) DEVICE — 6F .070 XB LAD 3.5 100CM: Brand: VISTA BRITE TIP

## (undated) DEVICE — ENDOGATOR AUXILIARY WATER JET CONNECTOR: Brand: ENDOGATOR

## (undated) DEVICE — PK CATH CARD 70

## (undated) DEVICE — GW INQW FIX/CORE PTFE J/3MM .035 260CM

## (undated) DEVICE — SINGLE PORT MANIFOLD: Brand: NEPTUNE 2

## (undated) DEVICE — CATH F5 INF 3DRC 100CM: Brand: INFINITI

## (undated) DEVICE — MODEL AT P65, P/N 701554-001KIT CONTENTS: HAND CONTROLLER, 3-WAY HIGH-PRESSURE STOPCOCK WITH ROTATING END AND PREMIUM HIGH-PRESSURE TUBING: Brand: ANGIOTOUCH® KIT

## (undated) DEVICE — CATH F5 INF JL 3.5 100CM: Brand: INFINITI

## (undated) DEVICE — ADULT DISPOSABLE SINGLE-PATIENT USE PULSE OXIMETER SENSOR: Brand: NONIN

## (undated) DEVICE — HI-TORQUE BALANCE MIDDLEWEIGHT GUIDE WIRE W/HYDROCOAT .014 STRAIGHT TIP 3.0 CM X 190 CM: Brand: HI-TORQUE BALANCE MIDDLEWEIGHT

## (undated) DEVICE — GLIDESHEATH SLENDER STAINLESS STEEL KIT: Brand: GLIDESHEATH SLENDER

## (undated) DEVICE — PAD, DEFIB, ADULT, RADIOTRANS, ZOLL: Brand: MEDLINE

## (undated) DEVICE — KT MINI ACC MAK COAXL 5F 10CM

## (undated) DEVICE — NDL SCLEROTHRPY INTERJECT 25G 4 240 CLR

## (undated) DEVICE — ANGIO-SEAL VIP VASCULAR CLOSURE DEVICE: Brand: ANGIO-SEAL

## (undated) DEVICE — TUBING, SUCTION, 1/4" X 20', STRAIGHT: Brand: MEDLINE INDUSTRIES, INC.

## (undated) DEVICE — A2000 MULTI-USE SYRINGE KIT, P/N 701277-003KIT CONTENTS: 100ML CONTRAST RESERVOIR AND TUBING WITH CONTRAST SPIKE AND CLAMP: Brand: A2000 MULTI-USE SYRINGE KIT

## (undated) DEVICE — CATH F5 INF LCB 100CM: Brand: INFINITI

## (undated) DEVICE — COPILOT BLEEDBACK CONTROL VALVE: Brand: COPILOT

## (undated) DEVICE — GUIDELINER CATHETERS ARE INTENDED TO BE USED IN CONJUNCTION WITH GUIDE CATHETERS TO ACCESS DISCRETE REGIONS OF THE CORONARY AND/OR PERIPHERAL VASCULATURE, AND TO FACILITATE PLACEMENT OF INTERVENTIONAL DEVICES.: Brand: GUIDELINER® V3 CATHETER

## (undated) DEVICE — PINNACLE INTRODUCER SHEATH: Brand: PINNACLE

## (undated) DEVICE — CATH F5 INF IM 100CM: Brand: INFINITI

## (undated) DEVICE — NC TREK CORONARY DILATATION CATHETER 3.5 MM X 12 MM / RAPID-EXCHANGE: Brand: NC TREK

## (undated) DEVICE — LN FLTR ORL/NASL MICROSTREAM NONINTUB A/LNG

## (undated) DEVICE — ST EXT IV SMRTSTE 2VLV FIX M LL 6ML 41